# Patient Record
Sex: MALE | Race: WHITE | Employment: OTHER | URBAN - METROPOLITAN AREA
[De-identification: names, ages, dates, MRNs, and addresses within clinical notes are randomized per-mention and may not be internally consistent; named-entity substitution may affect disease eponyms.]

---

## 2017-03-18 PROBLEM — R19.7 DIARRHEA: Status: ACTIVE | Noted: 2017-03-18

## 2017-03-18 PROBLEM — R07.9 CHEST PAIN: Status: ACTIVE | Noted: 2017-03-18

## 2017-03-18 PROBLEM — R19.7 DIARRHEA: Status: RESOLVED | Noted: 2017-03-18 | Resolved: 2017-03-18

## 2018-03-27 ENCOUNTER — HOSPITAL ENCOUNTER (OUTPATIENT)
Dept: MRI IMAGING | Age: 78
Discharge: HOME | End: 2018-03-27
Attending: FAMILY MEDICINE | Admitting: FAMILY MEDICINE

## 2018-03-27 DIAGNOSIS — D35.2 BENIGN NEOPLASM OF PITUITARY GLAND (HCC): ICD-10-CM

## 2018-03-27 DIAGNOSIS — J85.1 ABSCESS OF LEFT LUNG WITH PNEUMONIA, UNSPECIFIED PART OF LUNG (HCC): ICD-10-CM

## 2018-03-27 DIAGNOSIS — J85.1 ABSCESS OF LUNG WITH PNEUMONIA (HCC): ICD-10-CM

## 2018-03-27 DIAGNOSIS — D35.3 BENIGN NEOPLASM OF PITUITARY GLAND AND CRANIOPHARYNGEAL DUCT (POUCH) (HCC): ICD-10-CM

## 2018-03-27 DIAGNOSIS — D35.2 BENIGN NEOPLASM OF PITUITARY GLAND AND CRANIOPHARYNGEAL DUCT (POUCH) (HCC): ICD-10-CM

## 2018-06-20 ENCOUNTER — HOSPITAL ENCOUNTER (OUTPATIENT)
Dept: ULTRASOUND IMAGING | Age: 78
Discharge: HOME | End: 2018-06-20
Attending: FAMILY MEDICINE | Admitting: FAMILY MEDICINE

## 2018-06-20 DIAGNOSIS — I67.9 CEREBROVASCULAR DISORDER, WITH DELIVERY, WITH MENTION OF POSTPARTUM COMPLICATION: ICD-10-CM

## 2018-06-20 DIAGNOSIS — M81.0 SENILE OSTEOPOROSIS: ICD-10-CM

## 2018-06-20 DIAGNOSIS — I67.9 CEREBROVASCULAR DISEASE: ICD-10-CM

## 2018-11-02 ENCOUNTER — APPOINTMENT (OUTPATIENT)
Dept: GENERAL RADIOLOGY | Age: 78
DRG: 072 | End: 2018-11-02
Payer: MEDICARE

## 2018-11-02 ENCOUNTER — APPOINTMENT (OUTPATIENT)
Dept: CT IMAGING | Age: 78
DRG: 072 | End: 2018-11-02
Payer: MEDICARE

## 2018-11-02 ENCOUNTER — HOSPITAL ENCOUNTER (INPATIENT)
Age: 78
LOS: 4 days | Discharge: HOME HEALTH | DRG: 072 | End: 2018-11-06
Attending: EMERGENCY MEDICINE | Admitting: HOSPITALIST
Payer: MEDICARE

## 2018-11-02 DIAGNOSIS — D72.829 LEUKOCYTOSIS, UNSPECIFIED TYPE: ICD-10-CM

## 2018-11-02 DIAGNOSIS — R41.82 ALTERED MENTAL STATUS, UNSPECIFIED ALTERED MENTAL STATUS TYPE: Primary | ICD-10-CM

## 2018-11-02 PROBLEM — G93.40 ACUTE ENCEPHALOPATHY: Status: ACTIVE | Noted: 2018-11-02

## 2018-11-02 LAB
ALBUMIN SERPL-MCNC: 4.1 GM/DL (ref 3.4–5)
ALP BLD-CCNC: 75 IU/L (ref 40–129)
ALT SERPL-CCNC: 24 U/L (ref 10–40)
AMMONIA: 26 UMOL/L (ref 16–60)
ANION GAP SERPL CALCULATED.3IONS-SCNC: 21 MMOL/L (ref 4–16)
AST SERPL-CCNC: 40 IU/L (ref 15–37)
BACTERIA: NEGATIVE /HPF
BANDED NEUTROPHILS ABSOLUTE COUNT: 2.82 K/CU MM
BANDED NEUTROPHILS RELATIVE PERCENT: 13 % (ref 5–11)
BILIRUB SERPL-MCNC: 1.3 MG/DL (ref 0–1)
BILIRUBIN URINE: NEGATIVE MG/DL
BLOOD, URINE: ABNORMAL
BUN BLDV-MCNC: 20 MG/DL (ref 6–23)
CALCIUM SERPL-MCNC: 9.9 MG/DL (ref 8.3–10.6)
CHLORIDE BLD-SCNC: 91 MMOL/L (ref 99–110)
CHP ED QC CHECK: YES
CLARITY: CLEAR
CO2: 18 MMOL/L (ref 21–32)
COLOR: YELLOW
CREAT SERPL-MCNC: 1.1 MG/DL (ref 0.9–1.3)
DIFFERENTIAL TYPE: ABNORMAL
GFR AFRICAN AMERICAN: >60 ML/MIN/1.73M2
GFR NON-AFRICAN AMERICAN: >60 ML/MIN/1.73M2
GLUCOSE BLD-MCNC: 191 MG/DL (ref 70–99)
GLUCOSE BLD-MCNC: 270 MG/DL (ref 70–99)
GLUCOSE BLD-MCNC: 301 MG/DL
GLUCOSE BLD-MCNC: 301 MG/DL (ref 70–99)
GLUCOSE BLD-MCNC: 320 MG/DL (ref 70–99)
GLUCOSE, URINE: 150 MG/DL
HCT VFR BLD CALC: 43.4 % (ref 42–52)
HEMOGLOBIN: 14.5 GM/DL (ref 13.5–18)
KETONES, URINE: ABNORMAL MG/DL
LEUKOCYTE ESTERASE, URINE: NEGATIVE
LIPASE: 21 IU/L (ref 13–60)
LYMPHOCYTES ABSOLUTE: 1.3 K/CU MM
LYMPHOCYTES RELATIVE PERCENT: 6 % (ref 24–44)
MCH RBC QN AUTO: 30.3 PG (ref 27–31)
MCHC RBC AUTO-ENTMCNC: 33.4 % (ref 32–36)
MCV RBC AUTO: 90.6 FL (ref 78–100)
MONOCYTES ABSOLUTE: 1.7 K/CU MM
MONOCYTES RELATIVE PERCENT: 8 % (ref 0–4)
MUCUS: ABNORMAL HPF
NITRITE URINE, QUANTITATIVE: NEGATIVE
OVALOCYTES: ABNORMAL
PDW BLD-RTO: 13.2 % (ref 11.7–14.9)
PH, URINE: 6 (ref 5–8)
PLATELET # BLD: 213 K/CU MM (ref 140–440)
PMV BLD AUTO: 9.6 FL (ref 7.5–11.1)
POLYCHROMASIA: ABNORMAL
POTASSIUM SERPL-SCNC: 3.5 MMOL/L (ref 3.5–5.1)
PROTEIN UA: >500 MG/DL
RBC # BLD: 4.79 M/CU MM (ref 4.6–6.2)
RBC URINE: 2 /HPF (ref 0–3)
SEGMENTED NEUTROPHILS ABSOLUTE COUNT: 15.9 K/CU MM
SEGMENTED NEUTROPHILS RELATIVE PERCENT: 73 % (ref 36–66)
SODIUM BLD-SCNC: 130 MMOL/L (ref 135–145)
SPECIFIC GRAVITY UA: 1.05 (ref 1–1.03)
SQUAMOUS EPITHELIAL: <1 /HPF
TOTAL PROTEIN: 7.9 GM/DL (ref 6.4–8.2)
TRICHOMONAS: ABNORMAL /HPF
TROPONIN T: 0.02 NG/ML
UROBILINOGEN, URINE: NORMAL MG/DL (ref 0.2–1)
WBC # BLD: 21.7 K/CU MM (ref 4–10.5)
WBC UA: 1 /HPF (ref 0–2)

## 2018-11-02 PROCEDURE — 6370000000 HC RX 637 (ALT 250 FOR IP): Performed by: NURSE PRACTITIONER

## 2018-11-02 PROCEDURE — 2140000000 HC CCU INTERMEDIATE R&B

## 2018-11-02 PROCEDURE — 6360000002 HC RX W HCPCS: Performed by: EMERGENCY MEDICINE

## 2018-11-02 PROCEDURE — 85007 BL SMEAR W/DIFF WBC COUNT: CPT

## 2018-11-02 PROCEDURE — 81001 URINALYSIS AUTO W/SCOPE: CPT

## 2018-11-02 PROCEDURE — 83690 ASSAY OF LIPASE: CPT

## 2018-11-02 PROCEDURE — 94761 N-INVAS EAR/PLS OXIMETRY MLT: CPT

## 2018-11-02 PROCEDURE — 2500000003 HC RX 250 WO HCPCS: Performed by: NURSE PRACTITIONER

## 2018-11-02 PROCEDURE — 2580000003 HC RX 258: Performed by: EMERGENCY MEDICINE

## 2018-11-02 PROCEDURE — 82140 ASSAY OF AMMONIA: CPT

## 2018-11-02 PROCEDURE — 84484 ASSAY OF TROPONIN QUANT: CPT

## 2018-11-02 PROCEDURE — 96374 THER/PROPH/DIAG INJ IV PUSH: CPT

## 2018-11-02 PROCEDURE — 71046 X-RAY EXAM CHEST 2 VIEWS: CPT

## 2018-11-02 PROCEDURE — 2580000003 HC RX 258: Performed by: NURSE PRACTITIONER

## 2018-11-02 PROCEDURE — S0028 INJECTION, FAMOTIDINE, 20 MG: HCPCS | Performed by: NURSE PRACTITIONER

## 2018-11-02 PROCEDURE — 70450 CT HEAD/BRAIN W/O DYE: CPT

## 2018-11-02 PROCEDURE — 80053 COMPREHEN METABOLIC PANEL: CPT

## 2018-11-02 PROCEDURE — 6360000002 HC RX W HCPCS: Performed by: NURSE PRACTITIONER

## 2018-11-02 PROCEDURE — 82962 GLUCOSE BLOOD TEST: CPT

## 2018-11-02 PROCEDURE — 6360000004 HC RX CONTRAST MEDICATION: Performed by: EMERGENCY MEDICINE

## 2018-11-02 PROCEDURE — 87040 BLOOD CULTURE FOR BACTERIA: CPT

## 2018-11-02 PROCEDURE — 74177 CT ABD & PELVIS W/CONTRAST: CPT

## 2018-11-02 PROCEDURE — 36415 COLL VENOUS BLD VENIPUNCTURE: CPT

## 2018-11-02 PROCEDURE — 93005 ELECTROCARDIOGRAM TRACING: CPT | Performed by: EMERGENCY MEDICINE

## 2018-11-02 PROCEDURE — 85027 COMPLETE CBC AUTOMATED: CPT

## 2018-11-02 PROCEDURE — 71250 CT THORAX DX C-: CPT

## 2018-11-02 PROCEDURE — 99285 EMERGENCY DEPT VISIT HI MDM: CPT

## 2018-11-02 PROCEDURE — 87086 URINE CULTURE/COLONY COUNT: CPT

## 2018-11-02 RX ORDER — DOCUSATE SODIUM 100 MG/1
100 CAPSULE, LIQUID FILLED ORAL 2 TIMES DAILY
Status: DISCONTINUED | OUTPATIENT
Start: 2018-11-02 | End: 2018-11-06 | Stop reason: HOSPADM

## 2018-11-02 RX ORDER — DEXTROSE MONOHYDRATE 25 G/50ML
12.5 INJECTION, SOLUTION INTRAVENOUS PRN
Status: DISCONTINUED | OUTPATIENT
Start: 2018-11-02 | End: 2018-11-06 | Stop reason: HOSPADM

## 2018-11-02 RX ORDER — LATANOPROST 50 UG/ML
1 SOLUTION/ DROPS OPHTHALMIC NIGHTLY
COMMUNITY

## 2018-11-02 RX ORDER — CIPROFLOXACIN 2 MG/ML
400 INJECTION, SOLUTION INTRAVENOUS EVERY 12 HOURS
Status: DISCONTINUED | OUTPATIENT
Start: 2018-11-02 | End: 2018-11-06 | Stop reason: HOSPADM

## 2018-11-02 RX ORDER — CILOSTAZOL 100 MG/1
100 TABLET ORAL 2 TIMES DAILY
Status: DISCONTINUED | OUTPATIENT
Start: 2018-11-02 | End: 2018-11-06 | Stop reason: HOSPADM

## 2018-11-02 RX ORDER — ONDANSETRON 2 MG/ML
4 INJECTION INTRAMUSCULAR; INTRAVENOUS EVERY 30 MIN PRN
Status: DISCONTINUED | OUTPATIENT
Start: 2018-11-02 | End: 2018-11-02 | Stop reason: ALTCHOICE

## 2018-11-02 RX ORDER — LISINOPRIL 20 MG/1
20 TABLET ORAL DAILY
Status: DISCONTINUED | OUTPATIENT
Start: 2018-11-02 | End: 2018-11-06 | Stop reason: HOSPADM

## 2018-11-02 RX ORDER — ASCORBIC ACID 1000 MG
1 TABLET ORAL DAILY
Status: ON HOLD | COMMUNITY
End: 2019-01-23

## 2018-11-02 RX ORDER — NICOTINE POLACRILEX 4 MG
15 LOZENGE BUCCAL PRN
Status: DISCONTINUED | OUTPATIENT
Start: 2018-11-02 | End: 2018-11-06 | Stop reason: HOSPADM

## 2018-11-02 RX ORDER — SODIUM CHLORIDE 0.9 % (FLUSH) 0.9 %
10 SYRINGE (ML) INJECTION EVERY 12 HOURS SCHEDULED
Status: DISCONTINUED | OUTPATIENT
Start: 2018-11-02 | End: 2018-11-06 | Stop reason: HOSPADM

## 2018-11-02 RX ORDER — ATORVASTATIN CALCIUM 40 MG/1
40 TABLET, FILM COATED ORAL DAILY
COMMUNITY

## 2018-11-02 RX ORDER — ASPIRIN 325 MG
325 TABLET ORAL DAILY
Status: DISCONTINUED | OUTPATIENT
Start: 2018-11-03 | End: 2018-11-06 | Stop reason: HOSPADM

## 2018-11-02 RX ORDER — ONDANSETRON 2 MG/ML
4 INJECTION INTRAMUSCULAR; INTRAVENOUS EVERY 6 HOURS PRN
Status: DISCONTINUED | OUTPATIENT
Start: 2018-11-02 | End: 2018-11-06 | Stop reason: HOSPADM

## 2018-11-02 RX ORDER — CARVEDILOL 6.25 MG/1
6.25 TABLET ORAL 2 TIMES DAILY WITH MEALS
Status: DISCONTINUED | OUTPATIENT
Start: 2018-11-02 | End: 2018-11-05

## 2018-11-02 RX ORDER — SODIUM CHLORIDE 9 MG/ML
INJECTION, SOLUTION INTRAVENOUS CONTINUOUS
Status: DISCONTINUED | OUTPATIENT
Start: 2018-11-02 | End: 2018-11-02 | Stop reason: ALTCHOICE

## 2018-11-02 RX ORDER — DOCUSATE SODIUM 100 MG/1
100 CAPSULE, LIQUID FILLED ORAL NIGHTLY PRN
COMMUNITY

## 2018-11-02 RX ORDER — SODIUM CHLORIDE 9 MG/ML
INJECTION, SOLUTION INTRAVENOUS CONTINUOUS
Status: DISCONTINUED | OUTPATIENT
Start: 2018-11-02 | End: 2018-11-05

## 2018-11-02 RX ORDER — 0.9 % SODIUM CHLORIDE 0.9 %
10 VIAL (ML) INJECTION PRN
Status: DISCONTINUED | OUTPATIENT
Start: 2018-11-02 | End: 2018-11-06 | Stop reason: HOSPADM

## 2018-11-02 RX ORDER — DILTIAZEM HYDROCHLORIDE 60 MG/1
60 TABLET, FILM COATED ORAL 2 TIMES DAILY
Status: DISCONTINUED | OUTPATIENT
Start: 2018-11-02 | End: 2018-11-05

## 2018-11-02 RX ORDER — SODIUM CHLORIDE 0.9 % (FLUSH) 0.9 %
10 SYRINGE (ML) INJECTION PRN
Status: DISCONTINUED | OUTPATIENT
Start: 2018-11-02 | End: 2018-11-06 | Stop reason: HOSPADM

## 2018-11-02 RX ORDER — DEXTROSE MONOHYDRATE 50 MG/ML
100 INJECTION, SOLUTION INTRAVENOUS PRN
Status: DISCONTINUED | OUTPATIENT
Start: 2018-11-02 | End: 2018-11-06 | Stop reason: HOSPADM

## 2018-11-02 RX ORDER — DOCUSATE SODIUM 100 MG/1
100 CAPSULE, LIQUID FILLED ORAL NIGHTLY
Status: DISCONTINUED | OUTPATIENT
Start: 2018-11-02 | End: 2018-11-02 | Stop reason: SDUPTHER

## 2018-11-02 RX ADMIN — SODIUM CHLORIDE: 9 INJECTION, SOLUTION INTRAVENOUS at 12:11

## 2018-11-02 RX ADMIN — CIPROFLOXACIN 400 MG: 2 INJECTION, SOLUTION INTRAVENOUS at 17:50

## 2018-11-02 RX ADMIN — CARVEDILOL 6.25 MG: 6.25 TABLET, FILM COATED ORAL at 17:46

## 2018-11-02 RX ADMIN — SODIUM CHLORIDE, PRESERVATIVE FREE 10 ML: 5 INJECTION INTRAVENOUS at 12:34

## 2018-11-02 RX ADMIN — DOCUSATE SODIUM 100 MG: 100 CAPSULE, LIQUID FILLED ORAL at 22:12

## 2018-11-02 RX ADMIN — SODIUM CHLORIDE: 900 INJECTION INTRAVENOUS at 18:56

## 2018-11-02 RX ADMIN — LISINOPRIL 20 MG: 20 TABLET ORAL at 18:05

## 2018-11-02 RX ADMIN — INSULIN HUMAN 7 UNITS: 100 INJECTION, SUSPENSION SUBCUTANEOUS at 22:16

## 2018-11-02 RX ADMIN — INSULIN LISPRO 1 UNITS: 100 INJECTION, SOLUTION INTRAVENOUS; SUBCUTANEOUS at 22:16

## 2018-11-02 RX ADMIN — INSULIN LISPRO 6 UNITS: 100 INJECTION, SOLUTION INTRAVENOUS; SUBCUTANEOUS at 17:44

## 2018-11-02 RX ADMIN — CILOSTAZOL 100 MG: 100 TABLET ORAL at 22:12

## 2018-11-02 RX ADMIN — SODIUM CHLORIDE, PRESERVATIVE FREE 10 ML: 5 INJECTION INTRAVENOUS at 22:13

## 2018-11-02 RX ADMIN — ENOXAPARIN SODIUM 40 MG: 40 INJECTION SUBCUTANEOUS at 17:49

## 2018-11-02 RX ADMIN — ONDANSETRON HYDROCHLORIDE 4 MG: 2 INJECTION, SOLUTION INTRAMUSCULAR; INTRAVENOUS at 12:11

## 2018-11-02 RX ADMIN — DILTIAZEM HYDROCHLORIDE 60 MG: 60 TABLET, FILM COATED ORAL at 22:12

## 2018-11-02 RX ADMIN — FAMOTIDINE 20 MG: 10 INJECTION, SOLUTION INTRAVENOUS at 22:12

## 2018-11-02 RX ADMIN — IOPAMIDOL 80 ML: 755 INJECTION, SOLUTION INTRAVENOUS at 12:34

## 2018-11-02 RX ADMIN — METRONIDAZOLE 500 MG: 500 INJECTION, SOLUTION INTRAVENOUS at 17:50

## 2018-11-02 ASSESSMENT — PAIN SCALES - GENERAL
PAINLEVEL_OUTOF10: 0
PAINLEVEL_OUTOF10: 5
PAINLEVEL_OUTOF10: 3
PAINLEVEL_OUTOF10: 0

## 2018-11-02 ASSESSMENT — PAIN DESCRIPTION - LOCATION: LOCATION: HEAD

## 2018-11-02 ASSESSMENT — PAIN DESCRIPTION - PAIN TYPE: TYPE: ACUTE PAIN

## 2018-11-02 NOTE — ED NOTES
[place],lactic acid,venousph,ammonai,type & screen, and first set of blood cultures were drawn on patient     Betina Alemanem  11/02/18 1135

## 2018-11-02 NOTE — H&P
Hyperglycemia; and Headache    She Siu is a 77 y.o. male who presents with acute confusion and elevated blood sugars at home. The patient is alert but poor historian. Wife at bedside provides most HPI information. Reports acutely ill for 2 days with poor po intake d/t continued nausea and vomiting. Today presents d/t increased confusion. She describes the patient being b/l BKA and wears prosthetics. He could not figure out how to put on prosthetics without assistance. States he wandered around the house today. She states he also complained of headache and epigastric pain. Incontinent of urine and noted dark in color. He has known  large suprasellar mass unchanged per CT. Extensive work up at [facility] 2017. Outside records reviewed. Ten point ROS: reviewed negative, unless as noted in above HPI. Objective:   No intake or output data in the 24 hours ending 11/02/18 1552     Vitals:   Vitals:    11/02/18 1331 11/02/18 1432 11/02/18 1514 11/02/18 1531   BP: (!) 179/108 (!) 170/94 (!) 168/130 (!) 170/90   Pulse: 101 99 100 97   Resp: 25 13 16 15   Temp:       TempSrc:       SpO2: 96% 96% 98% 97%   Weight:       Height:           Physical Exam: 11/02/18     GEN -Awake chronically ill appearing male, sitting upright in bed , NAD. normal body habitus. Appears given age. EYES -Pupils are equally round. No scleral erythema, discharge, or conjunctivitis. HENT -MM are moist. Oral pharynx without exudates, no evidence of thrush. NECK -Supple, no apparent thyromegaly or masses. RESP -CTA, no wheezes, rales or rhonchi. Symmetric chest movement while on RA.  C/V -S1/S2 auscultated. RRR without appreciable M/R/G. No JVD or carotid bruits. Peripheral pulses equal bilaterally and palpable. No peripheral edema. GI -Abdomen is soft non distended and without significant tenderness. No masses or guarding. + BS Rectal exam deferred.  -No CVA tenderness. Renee catheter is not present.   LYMPH-No palpable cervical Hallucinations, confusion    Celebrex [Celecoxib] Other (See Comments)     'causes him to be nervous and jittery\"    Diphenhydramine Hcl Other (See Comments)     nervous and jittery    Diphenhydramine Hcl [Diphenhydramine] Anxiety    Exenatide Other (See Comments)     Causes him to be nervous and jittery    Metoprolol Succinate Other (See Comments)     Hallucinations     Phenergan [Promethazine Hcl] Other (See Comments)     Hallucinations, nervous, jittery    Plavix [Clopidogrel Bisulfate] Other (See Comments)     Causes bleeding in his stomach    Pseudoephedrine Anxiety    Reglan [Metoclopramide Hcl] Other (See Comments)     \"Caused him to just sit in a chair and rock back and forth\"       Medications:   Medications:    Infusions:    sodium chloride 150 mL/hr at 11/02/18 1211     PRN Meds:   ondansetron 4 mg Q30 Min PRN   sodium chloride (PF) 10 mL PRN       Data:     Laboratory this visit:  Reviewed  Recent Labs      11/02/18   1128   WBC  21.7*   HGB  14.5   HCT  43.4   PLT  213      Recent Labs      11/02/18   1128   NA  130*   K  3.5   CL  91*   CO2  18*   BUN  20   CREATININE  1.1     Recent Labs      11/02/18   1128   AST  40*   ALT  24   BILITOT  1.3*   ALKPHOS  75       Radiology this visit:  Reviewed. Xr Chest Standard (2 Vw)    Result Date: 11/2/2018  EXAMINATION: TWO VIEWS OF THE CHEST 11/2/2018 12:21 pm COMPARISON: CT chest 03/27/2018, x-ray chest portable 05/24/2017 HISTORY: ORDERING SYSTEM PROVIDED HISTORY: altered mental status TECHNOLOGIST PROVIDED HISTORY: Reason for exam:->altered mental status Ordering Physician Provided Reason for Exam: altered mental status Acuity: Acute Type of Exam: Initial Additional signs and symptoms: Pt's wife present at bedside and states pt has been vomiting for 2 days and has been c/o headache. Wife denies pt falling. FINDINGS: Postsurgical changes from previous CABG. Normal heart size and pulmonary vasculature. Aortic calcifications.   No focal consolidations, pleural effusions, or pneumothorax. No evidence of acute process in the chest.     Ct Head Wo Contrast    Result Date: 11/2/2018  EXAMINATION: CT OF THE HEAD WITHOUT CONTRAST  11/2/2018 12:32 pm TECHNIQUE: CT of the head was performed without the administration of intravenous contrast. Dose modulation, iterative reconstruction, and/or weight based adjustment of the mA/kV was utilized to reduce the radiation dose to as low as reasonably achievable. COMPARISON: CT 05/24/2017 HISTORY: ORDERING SYSTEM PROVIDED HISTORY: CONFUSION/DELIRIUM, ALTERED LOC, UNEXPLAINED TECHNOLOGIST PROVIDED HISTORY: Has a \"code stroke\" or \"stroke alert\" been called? ->No Ordering Physician Provided Reason for Exam: CONFUSION/DELIRIUM Acuity: Acute Type of Exam: Initial FINDINGS: BRAIN/VENTRICLES: There is no acute intracranial hemorrhage, mass effect or midline shift. No abnormal extra-axial fluid collection. The gray-white differentiation is maintained without evidence of an acute infarct. There is no evidence of hydrocephalus. Sellar/suprasellar mass is unchanged at 2.7 x 2.3 cm on series 2, image 27. Adjacent to the left trang there is a 1.7 x 1.2 cm lesion on series 2, image 24, not significant changed. Mild atrophic changes. Mild periventricular white matter hypoattenuation, likely small vessel ischemic disease. ORBITS: The visualized portion of the orbits demonstrate no acute abnormality. SINUSES: The visualized paranasal sinuses and mastoid air cells demonstrate no acute abnormality. SOFT TISSUES/SKULL:  No acute abnormality of the visualized skull or soft tissues. No acute intracranial abnormality. No significant change in the sellar/suprasellar mass with extension into the left prepontine cistern.      Ct Chest Wo Contrast    Result Date: 11/2/2018  EXAMINATION: CT OF THE CHEST WITHOUT CONTRAST 11/2/2018 3:10 pm TECHNIQUE: CT of the chest was performed without the administration of intravenous contrast. may relate to an incompetent sphincter. 3. Stable small hiatal hernia.        EKG this visit:  Reviewed             Electronically signed by MOLLY Leslie CNP on 11/2/2018 at 3:52 PM

## 2018-11-02 NOTE — ED NOTES
Urinal provided to pt per his request. Wife at bedside. Call light in reach.       Alo Rader RN  11/02/18 3124

## 2018-11-03 LAB
ALBUMIN SERPL-MCNC: 4.2 GM/DL (ref 3.4–5)
ALP BLD-CCNC: 65 IU/L (ref 40–128)
ALT SERPL-CCNC: 26 U/L (ref 10–40)
ANION GAP SERPL CALCULATED.3IONS-SCNC: 17 MMOL/L (ref 4–16)
AST SERPL-CCNC: 58 IU/L (ref 15–37)
BASOPHILS ABSOLUTE: 0 K/CU MM
BASOPHILS RELATIVE PERCENT: 0.1 % (ref 0–1)
BILIRUB SERPL-MCNC: 0.9 MG/DL (ref 0–1)
BUN BLDV-MCNC: 24 MG/DL (ref 6–23)
CALCIUM SERPL-MCNC: 9.3 MG/DL (ref 8.3–10.6)
CHLORIDE BLD-SCNC: 100 MMOL/L (ref 99–110)
CO2: 21 MMOL/L (ref 21–32)
CREAT SERPL-MCNC: 1.1 MG/DL (ref 0.9–1.3)
DIFFERENTIAL TYPE: ABNORMAL
EKG ATRIAL RATE: 97 BPM
EKG DIAGNOSIS: NORMAL
EKG P AXIS: 60 DEGREES
EKG P-R INTERVAL: 188 MS
EKG Q-T INTERVAL: 382 MS
EKG QRS DURATION: 104 MS
EKG QTC CALCULATION (BAZETT): 485 MS
EKG R AXIS: -37 DEGREES
EKG T AXIS: 100 DEGREES
EKG VENTRICULAR RATE: 97 BPM
EOSINOPHILS ABSOLUTE: 0 K/CU MM
EOSINOPHILS RELATIVE PERCENT: 0 % (ref 0–3)
GFR AFRICAN AMERICAN: >60 ML/MIN/1.73M2
GFR NON-AFRICAN AMERICAN: >60 ML/MIN/1.73M2
GLUCOSE BLD-MCNC: 161 MG/DL (ref 70–99)
GLUCOSE BLD-MCNC: 189 MG/DL (ref 70–99)
GLUCOSE BLD-MCNC: 213 MG/DL (ref 70–99)
GLUCOSE BLD-MCNC: 221 MG/DL (ref 70–99)
GLUCOSE BLD-MCNC: 227 MG/DL (ref 70–99)
GLUCOSE BLD-MCNC: 259 MG/DL (ref 70–99)
HCT VFR BLD CALC: 41.3 % (ref 42–52)
HEMOGLOBIN: 13.6 GM/DL (ref 13.5–18)
IMMATURE NEUTROPHIL %: 0.4 % (ref 0–0.43)
LACTATE: 2.1 MMOL/L (ref 0.4–2)
LACTATE: 2.4 MMOL/L (ref 0.4–2)
LYMPHOCYTES ABSOLUTE: 1.8 K/CU MM
LYMPHOCYTES RELATIVE PERCENT: 11.5 % (ref 24–44)
MAGNESIUM: 2.2 MG/DL (ref 1.8–2.4)
MCH RBC QN AUTO: 29.7 PG (ref 27–31)
MCHC RBC AUTO-ENTMCNC: 32.9 % (ref 32–36)
MCV RBC AUTO: 90.2 FL (ref 78–100)
MONOCYTES ABSOLUTE: 1.2 K/CU MM
MONOCYTES RELATIVE PERCENT: 7.6 % (ref 0–4)
NUCLEATED RBC %: 0 %
PDW BLD-RTO: 13.2 % (ref 11.7–14.9)
PLATELET # BLD: 207 K/CU MM (ref 140–440)
PMV BLD AUTO: 9.8 FL (ref 7.5–11.1)
POTASSIUM SERPL-SCNC: 3.8 MMOL/L (ref 3.5–5.1)
RBC # BLD: 4.58 M/CU MM (ref 4.6–6.2)
SEGMENTED NEUTROPHILS ABSOLUTE COUNT: 12.6 K/CU MM
SEGMENTED NEUTROPHILS RELATIVE PERCENT: 80.4 % (ref 36–66)
SODIUM BLD-SCNC: 138 MMOL/L (ref 135–145)
TOTAL IMMATURE NEUTOROPHIL: 0.07 K/CU MM
TOTAL NUCLEATED RBC: 0 K/CU MM
TOTAL PROTEIN: 6.8 GM/DL (ref 6.4–8.2)
WBC # BLD: 15.7 K/CU MM (ref 4–10.5)

## 2018-11-03 PROCEDURE — 80053 COMPREHEN METABOLIC PANEL: CPT

## 2018-11-03 PROCEDURE — 83735 ASSAY OF MAGNESIUM: CPT

## 2018-11-03 PROCEDURE — 2580000003 HC RX 258: Performed by: NURSE PRACTITIONER

## 2018-11-03 PROCEDURE — S0028 INJECTION, FAMOTIDINE, 20 MG: HCPCS | Performed by: NURSE PRACTITIONER

## 2018-11-03 PROCEDURE — 82962 GLUCOSE BLOOD TEST: CPT

## 2018-11-03 PROCEDURE — 6360000002 HC RX W HCPCS: Performed by: HOSPITALIST

## 2018-11-03 PROCEDURE — 2140000000 HC CCU INTERMEDIATE R&B

## 2018-11-03 PROCEDURE — 36415 COLL VENOUS BLD VENIPUNCTURE: CPT

## 2018-11-03 PROCEDURE — 6370000000 HC RX 637 (ALT 250 FOR IP): Performed by: INTERNAL MEDICINE

## 2018-11-03 PROCEDURE — 2500000003 HC RX 250 WO HCPCS: Performed by: NURSE PRACTITIONER

## 2018-11-03 PROCEDURE — 6360000002 HC RX W HCPCS: Performed by: NURSE PRACTITIONER

## 2018-11-03 PROCEDURE — 6370000000 HC RX 637 (ALT 250 FOR IP): Performed by: NURSE PRACTITIONER

## 2018-11-03 PROCEDURE — 93010 ELECTROCARDIOGRAM REPORT: CPT | Performed by: INTERNAL MEDICINE

## 2018-11-03 PROCEDURE — 83605 ASSAY OF LACTIC ACID: CPT

## 2018-11-03 PROCEDURE — 85025 COMPLETE CBC W/AUTO DIFF WBC: CPT

## 2018-11-03 RX ORDER — ACETAMINOPHEN 325 MG/1
650 TABLET ORAL EVERY 4 HOURS PRN
Status: DISCONTINUED | OUTPATIENT
Start: 2018-11-03 | End: 2018-11-06 | Stop reason: HOSPADM

## 2018-11-03 RX ORDER — HALOPERIDOL 5 MG/ML
2 INJECTION INTRAMUSCULAR EVERY 6 HOURS PRN
Status: DISCONTINUED | OUTPATIENT
Start: 2018-11-03 | End: 2018-11-06 | Stop reason: HOSPADM

## 2018-11-03 RX ORDER — HALOPERIDOL 5 MG/ML
2 INJECTION INTRAMUSCULAR ONCE
Status: COMPLETED | OUTPATIENT
Start: 2018-11-03 | End: 2018-11-03

## 2018-11-03 RX ADMIN — CILOSTAZOL 100 MG: 100 TABLET ORAL at 21:56

## 2018-11-03 RX ADMIN — FAMOTIDINE 20 MG: 10 INJECTION, SOLUTION INTRAVENOUS at 21:57

## 2018-11-03 RX ADMIN — LISINOPRIL 20 MG: 20 TABLET ORAL at 09:03

## 2018-11-03 RX ADMIN — CIPROFLOXACIN 400 MG: 2 INJECTION, SOLUTION INTRAVENOUS at 05:47

## 2018-11-03 RX ADMIN — INSULIN HUMAN 7 UNITS: 100 INJECTION, SUSPENSION SUBCUTANEOUS at 09:24

## 2018-11-03 RX ADMIN — CIPROFLOXACIN 400 MG: 2 INJECTION, SOLUTION INTRAVENOUS at 17:02

## 2018-11-03 RX ADMIN — HALOPERIDOL LACTATE 2 MG: 5 INJECTION, SOLUTION INTRAMUSCULAR at 22:03

## 2018-11-03 RX ADMIN — SODIUM CHLORIDE, PRESERVATIVE FREE 10 ML: 5 INJECTION INTRAVENOUS at 21:56

## 2018-11-03 RX ADMIN — DOCUSATE SODIUM 100 MG: 100 CAPSULE, LIQUID FILLED ORAL at 09:03

## 2018-11-03 RX ADMIN — ENOXAPARIN SODIUM 40 MG: 40 INJECTION SUBCUTANEOUS at 09:01

## 2018-11-03 RX ADMIN — MAGNESIUM HYDROXIDE 30 ML: 400 SUSPENSION ORAL at 15:09

## 2018-11-03 RX ADMIN — INSULIN LISPRO 2 UNITS: 100 INJECTION, SOLUTION INTRAVENOUS; SUBCUTANEOUS at 22:05

## 2018-11-03 RX ADMIN — INSULIN HUMAN 7 UNITS: 100 INJECTION, SUSPENSION SUBCUTANEOUS at 22:08

## 2018-11-03 RX ADMIN — METRONIDAZOLE 500 MG: 500 INJECTION, SOLUTION INTRAVENOUS at 02:37

## 2018-11-03 RX ADMIN — CILOSTAZOL 100 MG: 100 TABLET ORAL at 09:02

## 2018-11-03 RX ADMIN — INSULIN LISPRO 2 UNITS: 100 INJECTION, SOLUTION INTRAVENOUS; SUBCUTANEOUS at 12:53

## 2018-11-03 RX ADMIN — SODIUM CHLORIDE: 900 INJECTION INTRAVENOUS at 18:44

## 2018-11-03 RX ADMIN — FAMOTIDINE 20 MG: 10 INJECTION, SOLUTION INTRAVENOUS at 09:23

## 2018-11-03 RX ADMIN — METRONIDAZOLE 500 MG: 500 INJECTION, SOLUTION INTRAVENOUS at 12:28

## 2018-11-03 RX ADMIN — ACETAMINOPHEN 650 MG: 325 TABLET ORAL at 18:43

## 2018-11-03 RX ADMIN — METRONIDAZOLE 500 MG: 500 INJECTION, SOLUTION INTRAVENOUS at 19:27

## 2018-11-03 RX ADMIN — ACETAMINOPHEN 650 MG: 325 TABLET ORAL at 01:16

## 2018-11-03 RX ADMIN — DOCUSATE SODIUM 100 MG: 100 CAPSULE, LIQUID FILLED ORAL at 21:57

## 2018-11-03 RX ADMIN — ASPIRIN 325 MG ORAL TABLET 325 MG: 325 PILL ORAL at 09:03

## 2018-11-03 RX ADMIN — CARVEDILOL 6.25 MG: 6.25 TABLET, FILM COATED ORAL at 17:02

## 2018-11-03 RX ADMIN — INSULIN LISPRO 6 UNITS: 100 INJECTION, SOLUTION INTRAVENOUS; SUBCUTANEOUS at 09:24

## 2018-11-03 RX ADMIN — DILTIAZEM HYDROCHLORIDE 60 MG: 60 TABLET, FILM COATED ORAL at 21:56

## 2018-11-03 RX ADMIN — HALOPERIDOL LACTATE 2 MG: 5 INJECTION, SOLUTION INTRAMUSCULAR at 05:38

## 2018-11-03 RX ADMIN — DILTIAZEM HYDROCHLORIDE 60 MG: 60 TABLET, FILM COATED ORAL at 09:03

## 2018-11-03 RX ADMIN — INSULIN LISPRO 2 UNITS: 100 INJECTION, SOLUTION INTRAVENOUS; SUBCUTANEOUS at 17:31

## 2018-11-03 RX ADMIN — CARVEDILOL 6.25 MG: 6.25 TABLET, FILM COATED ORAL at 09:02

## 2018-11-03 ASSESSMENT — PAIN SCALES - GENERAL
PAINLEVEL_OUTOF10: 0
PAINLEVEL_OUTOF10: 0
PAINLEVEL_OUTOF10: 8
PAINLEVEL_OUTOF10: 0
PAINLEVEL_OUTOF10: 0
PAINLEVEL_OUTOF10: 10
PAINLEVEL_OUTOF10: 4

## 2018-11-03 ASSESSMENT — PAIN DESCRIPTION - ORIENTATION
ORIENTATION: RIGHT;LEFT
ORIENTATION: RIGHT

## 2018-11-03 ASSESSMENT — PAIN DESCRIPTION - PAIN TYPE
TYPE: ACUTE PAIN
TYPE: ACUTE PAIN

## 2018-11-03 ASSESSMENT — PAIN DESCRIPTION - LOCATION
LOCATION: FOOT
LOCATION: HEAD

## 2018-11-03 ASSESSMENT — PAIN DESCRIPTION - DESCRIPTORS: DESCRIPTORS: PATIENT UNABLE TO DESCRIBE

## 2018-11-03 NOTE — PROGRESS NOTES
Nutrition Assessment    Type and Reason for Visit: Initial, Positive Nutrition Screen (weight loss, poor intake )    Nutrition Recommendations: Continue current carb controlled diet per order    Will offer glucerna oral nutrition supplement at this time    Assist with meals    Malnutrition Assessment:  · Malnutrition Status: Insufficient data  · Context: Acute illness or injury    Nutrition Diagnosis:   · Problem: Predicted suboptimal energy intake  · Etiology: related to Cognitive or neurological impairment     Signs and symptoms:  as evidenced by Diet history of poor intake    Nutrition Assessment:  · Subjective Assessment: Admit due to altered mental status. Sitter present in room on visit. Currently on carb controlled diet, sitter reporting patient eating meals today. · Nutrition-Focused Physical Findings: nutrition bre 2 probably inadequate, restless in bed on visit  · Wound Type: None  · Current Nutrition Therapies:  · Oral Diet Orders: Carb Control 4 Carbs/Meal   · Oral Diet intake: Unable to assess, Non-select  · Oral Nutrition Supplement (ONS) Orders: None  · ONS intake:    · Anthropometric Measures:  · Ht: 5' 8\" (172.7 cm)   · Current Body Wt: 146 lb (66.2 kg)  · % Weight Change: limited weight hx at this time,     · Ideal Body Wt: 154 lb (69.9 kg), % Ideal Body 107  · Adjusted Body Wt: 136 lb (61.7 kg), body weight adjusted for Bilateral, BKA  · BMI Classification: BMI 18.5 - 24.9 Normal Weight  · Comparative Standards (Estimated Nutrition Needs):  · Estimated Daily Total Kcal: 7650-8272  · Estimated Daily Protein (g): 66-79    Estimated Intake vs Estimated Needs: Intake Improving    Nutrition Risk Level:  Moderate    Nutrition Interventions:   Continue current diet, Start ONS  Continued Inpatient Monitoring, Education not appropriate at this time, Coordination of Care, Feeding Assistance/Environment Change    Nutrition Evaluation:   · Evaluation: Goals set   · Goals: Patient will consume at least 70% of meals during stay     · Monitoring: Meal Intake, Diet Tolerance, Pertinent Labs, Weight, Patient/Family Education, Nausea or Vomiting, Mental Status/Confusion    See Adult Nutrition Doc Flowsheet for more detail.      Electronically signed by Raghav Miller RD, PASCALE on 11/3/18 at 12:04 PM    Contact Number: [phone]

## 2018-11-03 NOTE — PROGRESS NOTES
Hospitalist Progress Note      Name:  Kim Deal /Age/Sex: 1940  (77 y.o. male)   MRN & CSN:  [id number] & [id number] Admission Date/Time: 2018 11:19 AM   Location:  [facility] PCP: Rigo Kaiser MD       Kim Deal is a 77 y.o.  male  who presents with Altered Mental Status; Hyperglycemia; and Headache      Assessment and Plan:   1. Acute metabolic encephalopathy  Wbc count 21.7, CT abdomen showed no acute disease, CT chest showed nad  UA clear, CXR NAD, lactic was 2.7, recheck lactic in am tomm. Wbc count improved overnight with abx, but still has some confusion, pt agitated from time to time  Patient was given Haldol overnight and is restless this morning. Concern for acute dystonic reaction from extrapyramidal side effects of Haldol  Unable to give Benadryl second to patient allergy , cont to monitor patient  Check Brain MRI to rule out cva when more medically stable and calm. 2. Elevated troponin in setting of h/o cad  CAD s/p PTCA. On tele. trops negative trend. Likely represent demand  On asa, BB. Statin and nitro. 3. HTN  Stable on home meds    4. DM  a1c is 7.1  cnt ssi here. Hold oral meds for now    5. HLD  Cont statin    dispo  Cont abx, give him 24-48 hours to recover from encephalopathy      Diet DIET CARB CONTROL;   Dietary Nutrition Supplements: Diabetic Oral Supplement   DVT Prophylaxis [x] Lovenox, []  Heparin, [] SCDs, [] Ambulation   GI Prophylaxis [x] PPI,  [] H2 Blocker,  [] Carafate,  [] Diet/Tube Feeds   Code Status Full Code     Medications:   Medications:    sodium chloride flush  10 mL Intravenous 2 times per day    docusate sodium  100 mg Oral BID    enoxaparin  40 mg Subcutaneous Daily    famotidine (PEPCID) injection  20 mg Intravenous BID    insulin lispro  0-12 Units Subcutaneous TID WC    insulin lispro  0-6 Units Subcutaneous Nightly    carvedilol  6.25 mg Oral BID WC    cilostazol  100 mg Oral BID    diltiazem  60 mg Oral BID   

## 2018-11-03 NOTE — PLAN OF CARE
Problem: Falls - Risk of:  Goal: Will remain free from falls  Will remain free from falls   Outcome: Ongoing    Goal: Absence of physical injury  Absence of physical injury   Outcome: Ongoing      Problem: Pain:  Goal: Pain level will decrease  Pain level will decrease   Outcome: Ongoing    Goal: Control of acute pain  Control of acute pain   Outcome: Ongoing    Goal: Control of chronic pain  Control of chronic pain   Outcome: Ongoing      Problem: Risk for Impaired Skin Integrity  Goal: Tissue integrity - skin and mucous membranes  Structural intactness and normal physiological function of skin and  mucous membranes.    Outcome: Ongoing      Problem: Restraint Use - Nonviolent/Non-Self-Destructive Behavior:  Goal: Absence of restraint indications  Absence of restraint indications   Outcome: Ongoing    Goal: Absence of restraint-related injury  Absence of restraint-related injury   Outcome: Ongoing

## 2018-11-03 NOTE — PLAN OF CARE
Problem: Falls - Risk of:  Goal: Will remain free from falls  Will remain free from falls   Outcome: Ongoing      Problem: Pain:  Goal: Pain level will decrease  Pain level will decrease   Outcome: Ongoing    Goal: Control of acute pain  Control of acute pain   Outcome: Ongoing    Goal: Control of chronic pain  Control of chronic pain   Outcome: Ongoing      Problem: Risk for Impaired Skin Integrity  Goal: Tissue integrity - skin and mucous membranes  Structural intactness and normal physiological function of skin and  mucous membranes.    Outcome: Ongoing      Problem: Restraint Use - Nonviolent/Non-Self-Destructive Behavior:  Goal: Absence of restraint indications  Absence of restraint indications   Outcome: Ongoing    Goal: Absence of restraint-related injury  Absence of restraint-related injury   Outcome: Ongoing      Problem: Nutrition  Goal: Optimal nutrition therapy  Outcome: Ongoing

## 2018-11-04 LAB
BASOPHILS ABSOLUTE: 0 K/CU MM
BASOPHILS RELATIVE PERCENT: 0.1 % (ref 0–1)
CULTURE: NORMAL
DIFFERENTIAL TYPE: ABNORMAL
EOSINOPHILS ABSOLUTE: 0.1 K/CU MM
EOSINOPHILS RELATIVE PERCENT: 0.3 % (ref 0–3)
GLUCOSE BLD-MCNC: 169 MG/DL (ref 70–99)
GLUCOSE BLD-MCNC: 182 MG/DL (ref 70–99)
GLUCOSE BLD-MCNC: 190 MG/DL (ref 70–99)
GLUCOSE BLD-MCNC: 253 MG/DL (ref 70–99)
HCT VFR BLD CALC: 38.3 % (ref 42–52)
HEMOGLOBIN: 12.6 GM/DL (ref 13.5–18)
IMMATURE NEUTROPHIL %: 0.6 % (ref 0–0.43)
LACTATE: 1.7 MMOL/L (ref 0.4–2)
LYMPHOCYTES ABSOLUTE: 2.4 K/CU MM
LYMPHOCYTES RELATIVE PERCENT: 13.8 % (ref 24–44)
Lab: NORMAL
MCH RBC QN AUTO: 29.5 PG (ref 27–31)
MCHC RBC AUTO-ENTMCNC: 32.9 % (ref 32–36)
MCV RBC AUTO: 89.7 FL (ref 78–100)
MONOCYTES ABSOLUTE: 1.5 K/CU MM
MONOCYTES RELATIVE PERCENT: 8.6 % (ref 0–4)
NUCLEATED RBC %: 0 %
PDW BLD-RTO: 13.2 % (ref 11.7–14.9)
PLATELET # BLD: 216 K/CU MM (ref 140–440)
PMV BLD AUTO: 9.9 FL (ref 7.5–11.1)
RBC # BLD: 4.27 M/CU MM (ref 4.6–6.2)
REPORT STATUS: NORMAL
SEGMENTED NEUTROPHILS ABSOLUTE COUNT: 13.2 K/CU MM
SEGMENTED NEUTROPHILS RELATIVE PERCENT: 76.6 % (ref 36–66)
SPECIMEN: NORMAL
TOTAL IMMATURE NEUTOROPHIL: 0.1 K/CU MM
TOTAL NUCLEATED RBC: 0 K/CU MM
WBC # BLD: 17.2 K/CU MM (ref 4–10.5)

## 2018-11-04 PROCEDURE — 6370000000 HC RX 637 (ALT 250 FOR IP): Performed by: NURSE PRACTITIONER

## 2018-11-04 PROCEDURE — 36415 COLL VENOUS BLD VENIPUNCTURE: CPT

## 2018-11-04 PROCEDURE — 2500000003 HC RX 250 WO HCPCS: Performed by: NURSE PRACTITIONER

## 2018-11-04 PROCEDURE — 83605 ASSAY OF LACTIC ACID: CPT

## 2018-11-04 PROCEDURE — 2580000003 HC RX 258: Performed by: NURSE PRACTITIONER

## 2018-11-04 PROCEDURE — 82962 GLUCOSE BLOOD TEST: CPT

## 2018-11-04 PROCEDURE — 85025 COMPLETE CBC W/AUTO DIFF WBC: CPT

## 2018-11-04 PROCEDURE — 6360000002 HC RX W HCPCS: Performed by: NURSE PRACTITIONER

## 2018-11-04 PROCEDURE — S0028 INJECTION, FAMOTIDINE, 20 MG: HCPCS | Performed by: NURSE PRACTITIONER

## 2018-11-04 PROCEDURE — 2140000000 HC CCU INTERMEDIATE R&B

## 2018-11-04 RX ORDER — LORAZEPAM 2 MG/ML
1 INJECTION INTRAMUSCULAR ONCE
Status: COMPLETED | OUTPATIENT
Start: 2018-11-05 | End: 2018-11-05

## 2018-11-04 RX ADMIN — FAMOTIDINE 20 MG: 10 INJECTION, SOLUTION INTRAVENOUS at 21:09

## 2018-11-04 RX ADMIN — ENOXAPARIN SODIUM 40 MG: 40 INJECTION SUBCUTANEOUS at 08:59

## 2018-11-04 RX ADMIN — INSULIN LISPRO 2 UNITS: 100 INJECTION, SOLUTION INTRAVENOUS; SUBCUTANEOUS at 13:24

## 2018-11-04 RX ADMIN — ASPIRIN 325 MG ORAL TABLET 325 MG: 325 PILL ORAL at 09:00

## 2018-11-04 RX ADMIN — DOCUSATE SODIUM 100 MG: 100 CAPSULE, LIQUID FILLED ORAL at 09:00

## 2018-11-04 RX ADMIN — INSULIN LISPRO 3 UNITS: 100 INJECTION, SOLUTION INTRAVENOUS; SUBCUTANEOUS at 21:10

## 2018-11-04 RX ADMIN — INSULIN LISPRO 2 UNITS: 100 INJECTION, SOLUTION INTRAVENOUS; SUBCUTANEOUS at 18:42

## 2018-11-04 RX ADMIN — INSULIN HUMAN 7 UNITS: 100 INJECTION, SUSPENSION SUBCUTANEOUS at 09:01

## 2018-11-04 RX ADMIN — INSULIN HUMAN 7 UNITS: 100 INJECTION, SUSPENSION SUBCUTANEOUS at 21:23

## 2018-11-04 RX ADMIN — CILOSTAZOL 100 MG: 100 TABLET ORAL at 09:00

## 2018-11-04 RX ADMIN — CARVEDILOL 6.25 MG: 6.25 TABLET, FILM COATED ORAL at 09:00

## 2018-11-04 RX ADMIN — DILTIAZEM HYDROCHLORIDE 60 MG: 60 TABLET, FILM COATED ORAL at 09:00

## 2018-11-04 RX ADMIN — METRONIDAZOLE 500 MG: 500 INJECTION, SOLUTION INTRAVENOUS at 21:07

## 2018-11-04 RX ADMIN — CARVEDILOL 6.25 MG: 6.25 TABLET, FILM COATED ORAL at 18:31

## 2018-11-04 RX ADMIN — CILOSTAZOL 100 MG: 100 TABLET ORAL at 21:09

## 2018-11-04 RX ADMIN — LISINOPRIL 20 MG: 20 TABLET ORAL at 09:00

## 2018-11-04 RX ADMIN — DILTIAZEM HYDROCHLORIDE 60 MG: 60 TABLET, FILM COATED ORAL at 21:09

## 2018-11-04 RX ADMIN — INSULIN LISPRO 2 UNITS: 100 INJECTION, SOLUTION INTRAVENOUS; SUBCUTANEOUS at 09:01

## 2018-11-04 RX ADMIN — DOCUSATE SODIUM 100 MG: 100 CAPSULE, LIQUID FILLED ORAL at 21:09

## 2018-11-04 RX ADMIN — SODIUM CHLORIDE, PRESERVATIVE FREE 10 ML: 5 INJECTION INTRAVENOUS at 21:09

## 2018-11-04 RX ADMIN — CIPROFLOXACIN 400 MG: 2 INJECTION, SOLUTION INTRAVENOUS at 04:59

## 2018-11-04 RX ADMIN — METRONIDAZOLE 500 MG: 500 INJECTION, SOLUTION INTRAVENOUS at 12:20

## 2018-11-04 RX ADMIN — SODIUM CHLORIDE: 900 INJECTION INTRAVENOUS at 18:30

## 2018-11-04 RX ADMIN — METRONIDAZOLE 500 MG: 500 INJECTION, SOLUTION INTRAVENOUS at 02:07

## 2018-11-04 RX ADMIN — CIPROFLOXACIN 400 MG: 2 INJECTION, SOLUTION INTRAVENOUS at 18:31

## 2018-11-04 RX ADMIN — FAMOTIDINE 20 MG: 10 INJECTION, SOLUTION INTRAVENOUS at 09:01

## 2018-11-04 ASSESSMENT — PAIN SCALES - GENERAL
PAINLEVEL_OUTOF10: 0

## 2018-11-04 NOTE — PROGRESS NOTES
the days prior)  Leukocytosis on admission  - Wife report hx of stone in bile duct previously which presented similarly and has commonly improved with antibiotics. 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 Had work up at [address] [place] [address] 2017, s/p resection 2009. On review of [facility] note, on 5/2017, he presented with confusion and dysarthria but were ultimately thought unrelated to presence of sellar mass. A neurovascular consult was placed for evaluation of other etiologies and continued to follow pt.  Repeat MRI Brain revealed relatively overall stable size of lesion in comparison to that from 2009 and MRA showed no acute vascular abnormalities and was discharged home  - admission CT chest, CXR, CT A/P, CT head w/o acute change - noted chronic changes of suprasellar mass and stable significant pneumobilia   - UA, urine cx and blood cx all negative  - LFT appears rather unremarkable  - in the absence of a definitively etiology and continued delirium, will consult GI - known to Dr Ruffus Nyhan and neuro to assist with evaluation  - had been on empiric cipro/flagyl since admission - doubt this intra-abdo infection but will await GI evaluation in the morning prior to discontinuation  - check TFT    CAD s/p bypass surgery and cardiac stents  Troponemia  - trend fair, no significant worsening  - no overt ACS findings    PAD, DMII s/p B/l LE amputation - 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

## 2018-11-04 NOTE — CONSULTS
11/03/2018                 BILITOT                  0.9                 11/03/2018                 ALKPHOS                  65                  11/03/2018                 AST                      58                  11/03/2018                 ALT                      26                  11/03/2018            BMP:  Lab Results       Component                Value               Date                       NA                       138                 11/03/2018                 K                        3.8                 11/03/2018                 CL                       100                 11/03/2018                 CO2                      21                  11/03/2018                 BUN                      24                  11/03/2018                 LABALBU                  4.2                 11/03/2018                 CREATININE               1.1                 11/03/2018                 CALCIUM                  9.3                 11/03/2018                 GFRAA                    >60                 11/03/2018                 LABGLOM                  >60                 11/03/2018                 GLUCOSE                  213                 11/03/2018            PT/INR:  Lab Results       Component                Value               Date                       PROTIME                  13.0                05/24/2017                 INR                      1.14                05/24/2017            PTT:  Lab Results       Component                Value               Date                       APTT                     26.2                05/24/2017          (APTT  U/A:  Lab Results       Component                Value               Date                       COLORU                   YELLOW              11/02/2018                 WBCUA                    1                   11/02/2018                 RBCUA                    2                   11/02/2018                 MUCUS                    RARE

## 2018-11-05 ENCOUNTER — APPOINTMENT (OUTPATIENT)
Dept: MRI IMAGING | Age: 78
DRG: 072 | End: 2018-11-05
Payer: MEDICARE

## 2018-11-05 LAB
ALBUMIN SERPL-MCNC: 3.8 GM/DL (ref 3.4–5)
ALP BLD-CCNC: 53 IU/L (ref 40–129)
ALT SERPL-CCNC: 37 U/L (ref 10–40)
ANION GAP SERPL CALCULATED.3IONS-SCNC: 12 MMOL/L (ref 4–16)
AST SERPL-CCNC: 52 IU/L (ref 15–37)
BASOPHILS ABSOLUTE: 0 K/CU MM
BASOPHILS RELATIVE PERCENT: 0.3 % (ref 0–1)
BILIRUB SERPL-MCNC: 0.5 MG/DL (ref 0–1)
BILIRUBIN DIRECT: 0.2 MG/DL (ref 0–0.3)
BILIRUBIN, INDIRECT: 0.3 MG/DL (ref 0–0.7)
BUN BLDV-MCNC: 16 MG/DL (ref 6–23)
CALCIUM SERPL-MCNC: 9 MG/DL (ref 8.3–10.6)
CHLORIDE BLD-SCNC: 105 MMOL/L (ref 99–110)
CO2: 25 MMOL/L (ref 21–32)
CREAT SERPL-MCNC: 1.1 MG/DL (ref 0.9–1.3)
DIFFERENTIAL TYPE: ABNORMAL
EOSINOPHILS ABSOLUTE: 0.7 K/CU MM
EOSINOPHILS RELATIVE PERCENT: 5.9 % (ref 0–3)
FOLATE: >20 NG/ML (ref 3.1–17.5)
GFR AFRICAN AMERICAN: >60 ML/MIN/1.73M2
GFR NON-AFRICAN AMERICAN: >60 ML/MIN/1.73M2
GLUCOSE BLD-MCNC: 106 MG/DL (ref 70–99)
GLUCOSE BLD-MCNC: 125 MG/DL (ref 70–99)
GLUCOSE BLD-MCNC: 141 MG/DL (ref 70–99)
GLUCOSE BLD-MCNC: 168 MG/DL (ref 70–99)
GLUCOSE BLD-MCNC: 169 MG/DL (ref 70–99)
HCT VFR BLD CALC: 37.7 % (ref 42–52)
HEMOGLOBIN: 12.6 GM/DL (ref 13.5–18)
IMMATURE NEUTROPHIL %: 0.4 % (ref 0–0.43)
LYMPHOCYTES ABSOLUTE: 2.6 K/CU MM
LYMPHOCYTES RELATIVE PERCENT: 22.1 % (ref 24–44)
MAGNESIUM: 2.1 MG/DL (ref 1.8–2.4)
MCH RBC QN AUTO: 29.9 PG (ref 27–31)
MCHC RBC AUTO-ENTMCNC: 33.4 % (ref 32–36)
MCV RBC AUTO: 89.3 FL (ref 78–100)
MONOCYTES ABSOLUTE: 1.3 K/CU MM
MONOCYTES RELATIVE PERCENT: 11.3 % (ref 0–4)
NUCLEATED RBC %: 0 %
PDW BLD-RTO: 13.2 % (ref 11.7–14.9)
PLATELET # BLD: 217 K/CU MM (ref 140–440)
PMV BLD AUTO: 9.5 FL (ref 7.5–11.1)
POTASSIUM SERPL-SCNC: 3.1 MMOL/L (ref 3.5–5.1)
RBC # BLD: 4.22 M/CU MM (ref 4.6–6.2)
SEGMENTED NEUTROPHILS ABSOLUTE COUNT: 7.1 K/CU MM
SEGMENTED NEUTROPHILS RELATIVE PERCENT: 60 % (ref 36–66)
SODIUM BLD-SCNC: 142 MMOL/L (ref 135–145)
T4 FREE: 1.26 NG/DL (ref 0.9–1.8)
TOTAL IMMATURE NEUTOROPHIL: 0.05 K/CU MM
TOTAL NUCLEATED RBC: 0 K/CU MM
TOTAL PROTEIN: 5.8 GM/DL (ref 6.4–8.2)
TSH HIGH SENSITIVITY: 0.47 UIU/ML (ref 0.27–4.2)
VITAMIN B-12: 541 PG/ML (ref 211–911)
WBC # BLD: 11.9 K/CU MM (ref 4–10.5)

## 2018-11-05 PROCEDURE — 82607 VITAMIN B-12: CPT

## 2018-11-05 PROCEDURE — 6360000002 HC RX W HCPCS: Performed by: PSYCHIATRY & NEUROLOGY

## 2018-11-05 PROCEDURE — 6370000000 HC RX 637 (ALT 250 FOR IP): Performed by: NURSE PRACTITIONER

## 2018-11-05 PROCEDURE — 84443 ASSAY THYROID STIM HORMONE: CPT

## 2018-11-05 PROCEDURE — 70551 MRI BRAIN STEM W/O DYE: CPT

## 2018-11-05 PROCEDURE — 2580000003 HC RX 258: Performed by: NURSE PRACTITIONER

## 2018-11-05 PROCEDURE — 36415 COLL VENOUS BLD VENIPUNCTURE: CPT

## 2018-11-05 PROCEDURE — 83735 ASSAY OF MAGNESIUM: CPT

## 2018-11-05 PROCEDURE — 2140000000 HC CCU INTERMEDIATE R&B

## 2018-11-05 PROCEDURE — 6370000000 HC RX 637 (ALT 250 FOR IP): Performed by: INTERNAL MEDICINE

## 2018-11-05 PROCEDURE — 2500000003 HC RX 250 WO HCPCS: Performed by: NURSE PRACTITIONER

## 2018-11-05 PROCEDURE — 80053 COMPREHEN METABOLIC PANEL: CPT

## 2018-11-05 PROCEDURE — S0028 INJECTION, FAMOTIDINE, 20 MG: HCPCS | Performed by: NURSE PRACTITIONER

## 2018-11-05 PROCEDURE — 82746 ASSAY OF FOLIC ACID SERUM: CPT

## 2018-11-05 PROCEDURE — 82962 GLUCOSE BLOOD TEST: CPT

## 2018-11-05 PROCEDURE — 94761 N-INVAS EAR/PLS OXIMETRY MLT: CPT

## 2018-11-05 PROCEDURE — 82248 BILIRUBIN DIRECT: CPT

## 2018-11-05 PROCEDURE — 6360000002 HC RX W HCPCS: Performed by: NURSE PRACTITIONER

## 2018-11-05 PROCEDURE — 85025 COMPLETE CBC W/AUTO DIFF WBC: CPT

## 2018-11-05 PROCEDURE — 84439 ASSAY OF FREE THYROXINE: CPT

## 2018-11-05 RX ORDER — DILTIAZEM HYDROCHLORIDE 60 MG/1
60 TABLET, FILM COATED ORAL 3 TIMES DAILY
Status: DISCONTINUED | OUTPATIENT
Start: 2018-11-05 | End: 2018-11-06 | Stop reason: HOSPADM

## 2018-11-05 RX ORDER — CARVEDILOL 25 MG/1
25 TABLET ORAL 2 TIMES DAILY WITH MEALS
Status: DISCONTINUED | OUTPATIENT
Start: 2018-11-05 | End: 2018-11-06 | Stop reason: HOSPADM

## 2018-11-05 RX ORDER — POTASSIUM CHLORIDE 750 MG/1
40 TABLET, FILM COATED, EXTENDED RELEASE ORAL 2 TIMES DAILY
Status: COMPLETED | OUTPATIENT
Start: 2018-11-05 | End: 2018-11-05

## 2018-11-05 RX ADMIN — ASPIRIN 325 MG ORAL TABLET 325 MG: 325 PILL ORAL at 09:02

## 2018-11-05 RX ADMIN — FAMOTIDINE 20 MG: 10 INJECTION, SOLUTION INTRAVENOUS at 09:03

## 2018-11-05 RX ADMIN — POTASSIUM CHLORIDE 40 MEQ: 750 TABLET, FILM COATED, EXTENDED RELEASE ORAL at 22:45

## 2018-11-05 RX ADMIN — SODIUM CHLORIDE, PRESERVATIVE FREE 10 ML: 5 INJECTION INTRAVENOUS at 22:52

## 2018-11-05 RX ADMIN — ENOXAPARIN SODIUM 40 MG: 40 INJECTION SUBCUTANEOUS at 09:03

## 2018-11-05 RX ADMIN — FAMOTIDINE 20 MG: 10 INJECTION, SOLUTION INTRAVENOUS at 22:53

## 2018-11-05 RX ADMIN — INSULIN LISPRO 1 UNITS: 100 INJECTION, SOLUTION INTRAVENOUS; SUBCUTANEOUS at 22:56

## 2018-11-05 RX ADMIN — DOCUSATE SODIUM 100 MG: 100 CAPSULE, LIQUID FILLED ORAL at 22:48

## 2018-11-05 RX ADMIN — INSULIN HUMAN 7 UNITS: 100 INJECTION, SUSPENSION SUBCUTANEOUS at 22:54

## 2018-11-05 RX ADMIN — INSULIN LISPRO 2 UNITS: 100 INJECTION, SOLUTION INTRAVENOUS; SUBCUTANEOUS at 16:54

## 2018-11-05 RX ADMIN — SODIUM CHLORIDE: 900 INJECTION INTRAVENOUS at 05:47

## 2018-11-05 RX ADMIN — CILOSTAZOL 100 MG: 100 TABLET ORAL at 09:02

## 2018-11-05 RX ADMIN — POTASSIUM CHLORIDE 40 MEQ: 750 TABLET, FILM COATED, EXTENDED RELEASE ORAL at 09:03

## 2018-11-05 RX ADMIN — INSULIN LISPRO 2 UNITS: 100 INJECTION, SOLUTION INTRAVENOUS; SUBCUTANEOUS at 11:52

## 2018-11-05 RX ADMIN — INSULIN HUMAN 7 UNITS: 100 INJECTION, SUSPENSION SUBCUTANEOUS at 09:03

## 2018-11-05 RX ADMIN — CARVEDILOL 25 MG: 25 TABLET, FILM COATED ORAL at 09:00

## 2018-11-05 RX ADMIN — LISINOPRIL 20 MG: 20 TABLET ORAL at 09:02

## 2018-11-05 RX ADMIN — DILTIAZEM HYDROCHLORIDE 60 MG: 60 TABLET, FILM COATED ORAL at 09:02

## 2018-11-05 RX ADMIN — CARVEDILOL 25 MG: 25 TABLET, FILM COATED ORAL at 16:55

## 2018-11-05 RX ADMIN — CIPROFLOXACIN 400 MG: 2 INJECTION, SOLUTION INTRAVENOUS at 18:16

## 2018-11-05 RX ADMIN — METRONIDAZOLE 500 MG: 500 INJECTION, SOLUTION INTRAVENOUS at 11:51

## 2018-11-05 RX ADMIN — METRONIDAZOLE 500 MG: 500 INJECTION, SOLUTION INTRAVENOUS at 19:44

## 2018-11-05 RX ADMIN — CIPROFLOXACIN 400 MG: 2 INJECTION, SOLUTION INTRAVENOUS at 05:47

## 2018-11-05 RX ADMIN — DILTIAZEM HYDROCHLORIDE 60 MG: 60 TABLET, FILM COATED ORAL at 22:49

## 2018-11-05 RX ADMIN — LORAZEPAM 1 MG: 2 INJECTION INTRAMUSCULAR; INTRAVENOUS at 10:42

## 2018-11-05 RX ADMIN — DOCUSATE SODIUM 100 MG: 100 CAPSULE, LIQUID FILLED ORAL at 09:03

## 2018-11-05 RX ADMIN — METRONIDAZOLE 500 MG: 500 INJECTION, SOLUTION INTRAVENOUS at 03:42

## 2018-11-05 RX ADMIN — CILOSTAZOL 100 MG: 100 TABLET ORAL at 22:49

## 2018-11-05 RX ADMIN — SODIUM CHLORIDE, PRESERVATIVE FREE 10 ML: 5 INJECTION INTRAVENOUS at 09:54

## 2018-11-05 ASSESSMENT — PAIN SCALES - GENERAL
PAINLEVEL_OUTOF10: 0

## 2018-11-05 NOTE — PROGRESS NOTES
Studies  Lab Results   Component Value Date    ALT 37 11/05/2018    AST 52 11/05/2018    ALKPHOS 53 11/05/2018       Recent Imaging    Gracie Hudson #[id number] ([id number])  (78 y.o. M)  (Adm: 11/02/18)     [facility] [id number]         Imaging Results (last 7 days)          Procedure Component Value Ref Range Date/Time     CT Chest WO Contrast [[id number]] Collected: 11/02/18 1517     Order Status: Completed Updated: 11/02/18 1523     Narrative:       EXAMINATION:  CT OF THE CHEST WITHOUT CONTRAST 11/2/2018 3:10 pm    TECHNIQUE:  CT of the chest was performed without the administration of intravenous  contrast. Multiplanar reformatted images are provided for review. Dose  modulation, iterative reconstruction, and/or weight based adjustment of the  mA/kV was utilized to reduce the radiation dose to as low as reasonably  achievable. COMPARISON:  03/27/2018    HISTORY:  ORDERING SYSTEM PROVIDED HISTORY: AMS  TECHNOLOGIST PROVIDED HISTORY:  Ordering Physician Provided Reason for Exam: AMS  Acuity: Acute  Type of Exam: Initial  Additional signs and symptoms: Cough  Relevant Medical/Surgical History: Hx COPD, Coronary stent    FINDINGS:  Mediastinum: Atherosclerotic changes in the thoracic aorta.  No mediastinal  or hilar masses.  No pericardial effusion. Lungs/pleura: Scarring in the right middle lobe.  No acute airspace disease. No pleural effusion.  No endobronchial lesions. Upper Abdomen: Pneumobilia noted as described on the CT of the abdomen and  pelvis.  Small hiatal hernia also seen.  Otherwise, unremarkable. Soft Tissues/Bones: No acute fractures.  No lytic or blastic lesions.     Impression:       1. No acute abnormalities seen in the chest  2.  Atherosclerotic changes     XR CHEST STANDARD (2 VW) [[id number]] Collected: 11/02/18 1313     Order Status: Completed Specimen: Chest Updated: 11/02/18 1317     Narrative:       EXAMINATION:  TWO VIEWS OF THE CHEST    11/2/2018 12:21

## 2018-11-05 NOTE — PROGRESS NOTES
NEUROLOGY NOTE  DR. Aruna Bradford MD.  -------------------------------------------------  Subjective:    Pt is drowsy from ativan given for Mri    Pt was answering questions some although only oriented to person and place from possible underlying dementia    Objective:    BP (!) 107/59   Pulse 84   Temp 99.2 °F (37.3 °C) (Axillary)   Resp 22   Ht 5' 8\" (1.727 m)   Wt 155 lb (70.3 kg)   SpO2 95%   BMI 23.57 kg/m²   HEENT nl      Neuro exam    drowsy  Does not Follow simple commands due to sedation for Mri   Pupils 3 mm marcio  Barely moves extremities from sedation      RADIOLOGY  -----------------    Xr Chest Standard (2 Vw)    Result Date: 11/2/2018  EXAMINATION: TWO VIEWS OF THE CHEST 11/2/2018 12:21 pm COMPARISON: CT chest 03/27/2018, x-ray chest portable 05/24/2017 HISTORY: ORDERING SYSTEM PROVIDED HISTORY: altered mental status TECHNOLOGIST PROVIDED HISTORY: Reason for exam:->altered mental status Ordering Physician Provided Reason for Exam: altered mental status Acuity: Acute Type of Exam: Initial Additional signs and symptoms: Pt's wife present at bedside and states pt has been vomiting for 2 days and has been c/o headache. Wife denies pt falling. FINDINGS: Postsurgical changes from previous CABG. Normal heart size and pulmonary vasculature. Aortic calcifications. No focal consolidations, pleural effusions, or pneumothorax. No evidence of acute process in the chest.     Ct Head Wo Contrast    Result Date: 11/2/2018  EXAMINATION: CT OF THE HEAD WITHOUT CONTRAST  11/2/2018 12:32 pm TECHNIQUE: CT of the head was performed without the administration of intravenous contrast. Dose modulation, iterative reconstruction, and/or weight based adjustment of the mA/kV was utilized to reduce the radiation dose to as low as reasonably achievable.  COMPARISON: CT 05/24/2017 HISTORY: ORDERING SYSTEM PROVIDED HISTORY: CONFUSION/DELIRIUM, ALTERED LOC, UNEXPLAINED TECHNOLOGIST PROVIDED HISTORY: Has a \"code stroke\" portal venous gas. No suspicious intrahepatic mass. The pancreas demonstrates stable mild pancreatic ductal dilatation with no obstructive mass or calculus. No adjacent inflammatory changes. The bilateral adrenal glands and spleen are normal.  The bilateral kidneys demonstrate global atrophy with multifocal chronic peripheral infarcts. In the left kidney mid aspect at the lateral margin there is a exophytic 0.8 cm cyst, no follow-up imaging is recommended. Stable small hiatal hernia. The stomach is otherwise unremarkable. At the 2nd portion of the duodenum there is a moderate duodenal diverticulum without mass effect. The small bowel is unremarkable. The appendix demonstrates no acute inflammatory changes. The colon is unremarkable. No ascites or free air. No abscess. Pelvis:  Streak artifact related to a right hip arthroplasty limits the overall evaluation. No ascites. The bladder and rectum appear normal.  No significant lymphadenopathy. Musculoskeletal structures:  No significant inguinal lymphadenopathy. Normal lumbar spine alignment with mild degenerative changes. Prior right hip arthroplasty with anatomic alignment. The native left hip demonstrates normal alignment with mild-moderate osteoarthritis. 1. No acute abdominal/pelvic process. 2. 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 3. Stable small hiatal hernia. Mri Brain Wo Contrast    Result Date: 11/5/2018  EXAMINATION: MRI OF THE BRAIN WITHOUT CONTRAST  11/5/2018 11:35 am TECHNIQUE: Multiplanar multisequence MRI of the brain was performed without the administration of intravenous contrast. COMPARISON: 03/27/2018 HISTORY: ORDERING SYSTEM PROVIDED HISTORY: [employer] TECHNOLOGIST PROVIDED HISTORY: Ordering Physician Provided Reason for Exam: altered mental status Initial evaluation. FINDINGS: INTRACRANIAL STRUCTURES/VENTRICLES: There is no acute infarct.   The ventricles and cisternal spaces are prominent consistent with cerebral atrophy. There are few punctate areas of high signal in the periventricular white matter and centrum semiovale that are likely related to chronic small vessel ischemic disease. There is no midline shift or mass effect. There are no areas of restricted diffusion. There is redemonstration of a suprasellar lobular mass compressing the optic chiasm that measures approximately 2.9 x 2.5 mm. There is also a lesion in the left prepontine region which appears to be associated with the pituitary macroadenoma. ORBITS: The visualized portion of the orbits demonstrate no acute abnormality. SINUSES:  The visualized paranasal sinuses and mastoid air cells are clear. BONES/SOFT TISSUES: The bone marrow signal intensity appears normal. The soft tissues demonstrate no acute abnormality. Stable appearance to a suprasellar mass with extension into the left prepontine region consistent with the patient's known suprasellar mass/macroadenoma. Cerebral atrophy. Mild chronic small vessel ischemic changes. No acute brain parenchymal abnormality.        LAB RESULTS  --------------------    Recent Results (from the past 24 hour(s))   POCT Glucose    Collection Time: 11/04/18  4:58 PM   Result Value Ref Range    POC Glucose 190 (H) 70 - 99 MG/DL   POCT Glucose    Collection Time: 11/04/18  8:54 PM   Result Value Ref Range    POC Glucose 253 (H) 70 - 99 MG/DL   CBC auto differential    Collection Time: 11/05/18  4:39 AM   Result Value Ref Range    WBC 11.9 (H) 4.0 - 10.5 K/CU MM    RBC 4.22 (L) 4.6 - 6.2 M/CU MM    Hemoglobin 12.6 (L) 13.5 - 18.0 GM/DL    Hematocrit 37.7 (L) 42 - 52 %    MCV 89.3 78 - 100 FL    MCH 29.9 27 - 31 PG    MCHC 33.4 32.0 - 36.0 %    RDW 13.2 11.7 - 14.9 %    Platelets 584 437 - 800 K/CU MM    MPV 9.5 7.5 - 11.1 FL    Differential Type AUTOMATED DIFFERENTIAL     Segs Relative 60.0 36 - 66 %    Lymphocytes % 22.1 (L) 24 - 44 %    Monocytes % 11.3 (H) 0 - 4 %    Eosinophils

## 2018-11-06 VITALS
HEIGHT: 68 IN | OXYGEN SATURATION: 97 % | WEIGHT: 152.9 LBS | HEART RATE: 83 BPM | TEMPERATURE: 98.7 F | DIASTOLIC BLOOD PRESSURE: 60 MMHG | BODY MASS INDEX: 23.17 KG/M2 | RESPIRATION RATE: 18 BRPM | SYSTOLIC BLOOD PRESSURE: 118 MMHG

## 2018-11-06 LAB
ALBUMIN SERPL-MCNC: 3.5 GM/DL (ref 3.4–5)
ALP BLD-CCNC: 50 IU/L (ref 40–129)
ALT SERPL-CCNC: 32 U/L (ref 10–40)
ANION GAP SERPL CALCULATED.3IONS-SCNC: 10 MMOL/L (ref 4–16)
AST SERPL-CCNC: 37 IU/L (ref 15–37)
BASOPHILS ABSOLUTE: 0 K/CU MM
BASOPHILS RELATIVE PERCENT: 0.4 % (ref 0–1)
BILIRUB SERPL-MCNC: 0.5 MG/DL (ref 0–1)
BILIRUBIN DIRECT: 0.2 MG/DL (ref 0–0.3)
BILIRUBIN, INDIRECT: 0.3 MG/DL (ref 0–0.7)
BUN BLDV-MCNC: 20 MG/DL (ref 6–23)
CALCIUM SERPL-MCNC: 9 MG/DL (ref 8.3–10.6)
CHLORIDE BLD-SCNC: 109 MMOL/L (ref 99–110)
CO2: 25 MMOL/L (ref 21–32)
CREAT SERPL-MCNC: 1.3 MG/DL (ref 0.9–1.3)
DIFFERENTIAL TYPE: ABNORMAL
EOSINOPHILS ABSOLUTE: 0.8 K/CU MM
EOSINOPHILS RELATIVE PERCENT: 8.2 % (ref 0–3)
GFR AFRICAN AMERICAN: >60 ML/MIN/1.73M2
GFR NON-AFRICAN AMERICAN: 53 ML/MIN/1.73M2
GLUCOSE BLD-MCNC: 101 MG/DL (ref 70–99)
GLUCOSE BLD-MCNC: 112 MG/DL (ref 70–99)
GLUCOSE BLD-MCNC: 63 MG/DL (ref 70–99)
HCT VFR BLD CALC: 35.6 % (ref 42–52)
HEMOGLOBIN: 11.7 GM/DL (ref 13.5–18)
IMMATURE NEUTROPHIL %: 0.6 % (ref 0–0.43)
LYMPHOCYTES ABSOLUTE: 2.6 K/CU MM
LYMPHOCYTES RELATIVE PERCENT: 26.9 % (ref 24–44)
MAGNESIUM: 2 MG/DL (ref 1.8–2.4)
MCH RBC QN AUTO: 29.8 PG (ref 27–31)
MCHC RBC AUTO-ENTMCNC: 32.9 % (ref 32–36)
MCV RBC AUTO: 90.6 FL (ref 78–100)
MONOCYTES ABSOLUTE: 1.4 K/CU MM
MONOCYTES RELATIVE PERCENT: 13.9 % (ref 0–4)
NUCLEATED RBC %: 0 %
PDW BLD-RTO: 13.4 % (ref 11.7–14.9)
PLATELET # BLD: 201 K/CU MM (ref 140–440)
PMV BLD AUTO: 9.3 FL (ref 7.5–11.1)
POTASSIUM SERPL-SCNC: 3.8 MMOL/L (ref 3.5–5.1)
RBC # BLD: 3.93 M/CU MM (ref 4.6–6.2)
SEGMENTED NEUTROPHILS ABSOLUTE COUNT: 4.9 K/CU MM
SEGMENTED NEUTROPHILS RELATIVE PERCENT: 50 % (ref 36–66)
SODIUM BLD-SCNC: 144 MMOL/L (ref 135–145)
TOTAL IMMATURE NEUTOROPHIL: 0.06 K/CU MM
TOTAL NUCLEATED RBC: 0 K/CU MM
TOTAL PROTEIN: 5.4 GM/DL (ref 6.4–8.2)
WBC # BLD: 9.8 K/CU MM (ref 4–10.5)

## 2018-11-06 PROCEDURE — 85025 COMPLETE CBC W/AUTO DIFF WBC: CPT

## 2018-11-06 PROCEDURE — S0028 INJECTION, FAMOTIDINE, 20 MG: HCPCS | Performed by: NURSE PRACTITIONER

## 2018-11-06 PROCEDURE — 6370000000 HC RX 637 (ALT 250 FOR IP): Performed by: INTERNAL MEDICINE

## 2018-11-06 PROCEDURE — 36415 COLL VENOUS BLD VENIPUNCTURE: CPT

## 2018-11-06 PROCEDURE — 6360000002 HC RX W HCPCS: Performed by: NURSE PRACTITIONER

## 2018-11-06 PROCEDURE — 82248 BILIRUBIN DIRECT: CPT

## 2018-11-06 PROCEDURE — 83735 ASSAY OF MAGNESIUM: CPT

## 2018-11-06 PROCEDURE — 82962 GLUCOSE BLOOD TEST: CPT

## 2018-11-06 PROCEDURE — 80053 COMPREHEN METABOLIC PANEL: CPT

## 2018-11-06 PROCEDURE — 6370000000 HC RX 637 (ALT 250 FOR IP): Performed by: NURSE PRACTITIONER

## 2018-11-06 PROCEDURE — 2580000003 HC RX 258: Performed by: NURSE PRACTITIONER

## 2018-11-06 PROCEDURE — 2500000003 HC RX 250 WO HCPCS: Performed by: NURSE PRACTITIONER

## 2018-11-06 RX ORDER — METRONIDAZOLE 500 MG/1
500 TABLET ORAL 3 TIMES DAILY
Qty: 12 TABLET | Refills: 0 | Status: SHIPPED | OUTPATIENT
Start: 2018-11-06 | End: 2018-11-10

## 2018-11-06 RX ORDER — CIPROFLOXACIN 500 MG/1
500 TABLET, FILM COATED ORAL 2 TIMES DAILY
Qty: 8 TABLET | Refills: 0 | Status: SHIPPED | OUTPATIENT
Start: 2018-11-06 | End: 2018-11-10

## 2018-11-06 RX ADMIN — ASPIRIN 325 MG ORAL TABLET 325 MG: 325 PILL ORAL at 09:02

## 2018-11-06 RX ADMIN — DOCUSATE SODIUM 100 MG: 100 CAPSULE, LIQUID FILLED ORAL at 09:02

## 2018-11-06 RX ADMIN — METRONIDAZOLE 500 MG: 500 INJECTION, SOLUTION INTRAVENOUS at 10:31

## 2018-11-06 RX ADMIN — CILOSTAZOL 100 MG: 100 TABLET ORAL at 09:02

## 2018-11-06 RX ADMIN — DILTIAZEM HYDROCHLORIDE 60 MG: 60 TABLET, FILM COATED ORAL at 09:02

## 2018-11-06 RX ADMIN — CIPROFLOXACIN 400 MG: 2 INJECTION, SOLUTION INTRAVENOUS at 06:24

## 2018-11-06 RX ADMIN — CARVEDILOL 25 MG: 25 TABLET, FILM COATED ORAL at 09:02

## 2018-11-06 RX ADMIN — METRONIDAZOLE 500 MG: 500 INJECTION, SOLUTION INTRAVENOUS at 03:10

## 2018-11-06 RX ADMIN — FAMOTIDINE 20 MG: 10 INJECTION, SOLUTION INTRAVENOUS at 09:02

## 2018-11-06 RX ADMIN — SODIUM CHLORIDE, PRESERVATIVE FREE 10 ML: 5 INJECTION INTRAVENOUS at 09:03

## 2018-11-06 RX ADMIN — LISINOPRIL 20 MG: 20 TABLET ORAL at 09:02

## 2018-11-06 RX ADMIN — ENOXAPARIN SODIUM 40 MG: 40 INJECTION SUBCUTANEOUS at 09:02

## 2018-11-06 ASSESSMENT — PAIN SCALES - GENERAL
PAINLEVEL_OUTOF10: 0

## 2018-11-06 NOTE — PROGRESS NOTES
NEUROLOGY NOTE  DR. Amil Eisenmenger MD.  -------------------------------------------------  Subjective:    pts Mri brain + for Pituitary adenoma    Pt is drowsy from ativan given for Mri    Pt was answering questions some although only oriented to person and place from possible underlying dementia    Objective:    /75   Pulse 81   Temp 99.1 °F (37.3 °C) (Oral)   Resp 20   Ht 5' 8\" (1.727 m)   Wt 155 lb (70.3 kg)   SpO2 95%   BMI 23.57 kg/m²   HEENT nl      Neuro exam    drowsy  Does not Follow simple commands due to sedation for Mri   Pupils 3 mm marcio  Barely moves extremities from sedation      RADIOLOGY  -----------------    Xr Chest Standard (2 Vw)    Result Date: 11/2/2018  EXAMINATION: TWO VIEWS OF THE CHEST 11/2/2018 12:21 pm COMPARISON: CT chest 03/27/2018, x-ray chest portable 05/24/2017 HISTORY: ORDERING SYSTEM PROVIDED HISTORY: altered mental status TECHNOLOGIST PROVIDED HISTORY: Reason for exam:->altered mental status Ordering Physician Provided Reason for Exam: altered mental status Acuity: Acute Type of Exam: Initial Additional signs and symptoms: Pt's wife present at bedside and states pt has been vomiting for 2 days and has been c/o headache. Wife denies pt falling. FINDINGS: Postsurgical changes from previous CABG. Normal heart size and pulmonary vasculature. Aortic calcifications. No focal consolidations, pleural effusions, or pneumothorax. No evidence of acute process in the chest.     Ct Head Wo Contrast    Result Date: 11/2/2018  EXAMINATION: CT OF THE HEAD WITHOUT CONTRAST  11/2/2018 12:32 pm TECHNIQUE: CT of the head was performed without the administration of intravenous contrast. Dose modulation, iterative reconstruction, and/or weight based adjustment of the mA/kV was utilized to reduce the radiation dose to as low as reasonably achievable.  COMPARISON: CT 05/24/2017 HISTORY: ORDERING SYSTEM PROVIDED HISTORY: CONFUSION/DELIRIUM, ALTERED LOC, UNEXPLAINED TECHNOLOGIST masses. No pericardial effusion. Lungs/pleura: Scarring in the right middle lobe. No acute airspace disease. No pleural effusion. No endobronchial lesions. Upper Abdomen: Pneumobilia noted as described on the CT of the abdomen and pelvis. Small hiatal hernia also seen. Otherwise, unremarkable. Soft Tissues/Bones: No acute fractures. No lytic or blastic lesions. 1. No acute abnormalities seen in the chest 2. Atherosclerotic changes     Ct Abdomen Pelvis W Iv Contrast Additional Contrast? None    Result Date: 11/2/2018  EXAMINATION: CT OF THE ABDOMEN AND PELVIS WITH CONTRAST 11/2/2018 12:38 pm TECHNIQUE: CT of the abdomen and pelvis was performed with the administration of intravenous contrast. Multiplanar reformatted images are provided for review. Dose modulation, iterative reconstruction, and/or weight based adjustment of the mA/kV was utilized to reduce the radiation dose to as low as reasonably achievable. COMPARISON: CT abdomen/pelvis 05/24/2017. HISTORY: ORDERING SYSTEM PROVIDED HISTORY: ABDOMINAL PAIN TECHNOLOGIST PROVIDED HISTORY: IV contrast only. Thank you. Additional Contrast?->None Ordering Physician Provided Reason for Exam: Abdominal pain  TODAY Acuity: Acute Type of Exam: Initial Relevant Medical/Surgical History: 80ML [id number] FINDINGS: Thorax base:  Normal heart size with coronary artery calcifications. No pericardial effusion. The lung bases demonstrate no acute consolidation or pleural effusion. Abdomen:  Extensive abdominal aortic and large arterial vascular calcifications. No aortic aneurysm or periaortic hemorrhage. Motion artifact at the upper abdomen limits the overall evaluation. The liver demonstrates normal contour. Redemonstration of extensive intrahepatic pneumobilia. There is stable mild dilatation of the common bile duct which demonstrates internal air-fluid level. There is no obstructing calculus or evidence of a mass. Prior cholecystectomy.   The portal vasculature ventricles and cisternal spaces are prominent consistent with cerebral atrophy. There are few punctate areas of high signal in the periventricular white matter and centrum semiovale that are likely related to chronic small vessel ischemic disease. There is no midline shift or mass effect. There are no areas of restricted diffusion. There is redemonstration of a suprasellar lobular mass compressing the optic chiasm that measures approximately 2.9 x 2.5 mm. There is also a lesion in the left prepontine region which appears to be associated with the pituitary macroadenoma. ORBITS: The visualized portion of the orbits demonstrate no acute abnormality. SINUSES:  The visualized paranasal sinuses and mastoid air cells are clear. BONES/SOFT TISSUES: The bone marrow signal intensity appears normal. The soft tissues demonstrate no acute abnormality. Stable appearance to a suprasellar mass with extension into the left prepontine region consistent with the patient's known suprasellar mass/macroadenoma. Cerebral atrophy. Mild chronic small vessel ischemic changes. No acute brain parenchymal abnormality.        LAB RESULTS  --------------------    Recent Results (from the past 24 hour(s))   CBC auto differential    Collection Time: 11/05/18  4:39 AM   Result Value Ref Range    WBC 11.9 (H) 4.0 - 10.5 K/CU MM    RBC 4.22 (L) 4.6 - 6.2 M/CU MM    Hemoglobin 12.6 (L) 13.5 - 18.0 GM/DL    Hematocrit 37.7 (L) 42 - 52 %    MCV 89.3 78 - 100 FL    MCH 29.9 27 - 31 PG    MCHC 33.4 32.0 - 36.0 %    RDW 13.2 11.7 - 14.9 %    Platelets 681 789 - 748 K/CU MM    MPV 9.5 7.5 - 11.1 FL    Differential Type AUTOMATED DIFFERENTIAL     Segs Relative 60.0 36 - 66 %    Lymphocytes % 22.1 (L) 24 - 44 %    Monocytes % 11.3 (H) 0 - 4 %    Eosinophils % 5.9 (H) 0 - 3 %    Basophils % 0.3 0 - 1 %    Segs Absolute 7.1 K/CU MM    Lymphocytes # 2.6 K/CU MM    Monocytes # 1.3 K/CU MM    Eosinophils # 0.7 K/CU MM    Basophils # 0.0 K/CU MM

## 2018-11-06 NOTE — DISCHARGE SUMMARY
[facility] 1940 [id number]  PCP:  Frank Rebolledo MD    Admit date: 11/2/2018  Admitting Physician: Michell Lou MD    Discharge date: 11/6/2018 Discharge Physician: Jocelyn Tidwell MD      Reason for admission:   Chief Complaint   Patient presents with    Altered Mental Status    Hyperglycemia    Headache     Present on Admission:   Acute encephalopathy       Discharge Diagnoses & Hospital Course[de-identified]     Acute metabolic encephalopathy - delirium  (since Friday with some prodromal non-specific GI symptoms the days prior)  Leukocytosis on admission  - Wife report hx of stone in bile duct previously which presented similarly and has commonly improved with antibiotics. Had been under the care of Dr Xavi Luevano  - Has hx of large suprasellar pituitary adenoma unchanged per CT on admission. Had work up at [facility] 2017, s/p resection 2009. On review of [facility] note, on 5/2017, \"he presented with confusion and dysarthria but were ultimately thought unrelated to presence of sellar mass. A neurovascular consult was placed for evaluation of other etiologies and continued to follow pt. Repeat MRI Brain revealed relatively overall stable size of lesion in comparison to that from 2009 and MRA showed no acute vascular abnormalities and was discharged home\"  - admission CT chest, CXR, CT A/P, CT head w/o acute change - noted chronic changes of suprasellar mass and stable significant pneumobilia   - UA, urine cx and blood cx all negative  - LFT appears rather unremarkable  - On 11/5, neuro d/w with me of MRI findings - pituitary mass - rec transfer to [facility]. I discussed recommendation with patient wife and also discussed with Moses Rodriguez of findings. No evidence of apoplexy. Discussed with Moses Rodriguez and felt non-surgical at current. Wife respecfully declined the offer.   D/w wife to have him continue outpatient NSGY , ophthalmology follow up outpatient  - etiology of confusion could be related to progressive dementia but given leukocytosis and improvement with cipro/flagyl, will empirically complete 1 week of antibiotics. - nevertheless, the pituitary mass would need to be continually followed and managed depending on \"watchful waiting\" with surveillance vs. 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 rate 18, height 5' 8\" (1.727 m), weight 152 lb 14.4 oz (69.4 kg), SpO2 97 %. General - Awake but with some cognitive dysfunction  Psych - Appropriate affect/speech. No agitation  Eyes - Eye lids intact. No scleral icterus  Neuro - Vision intact. Moving all 4 extremities. No focal deficits  Heart - Sinus. RRR. S1 and S2 present. Lung - Adequate air entry b/l, No crackles/wheezes appreciated  GI - Soft, non-tender. No guarding/rigidity.    - No CVA/suprapubic tenderness or palpable bladder distension      Significant Diagnostic Studies:   CBC:   Recent Labs      11/04/18   0529  11/05/18   0439  11/06/18   0341   WBC  17.2*  11.9*  9.8   HGB  12.6*  12.6*  11.7*   PLT  216  217  201     WBC   Date/Time Value Ref Range Status   11/06/2018 03:41 AM 9.8 4.0 - 10.5 K/CU MM Final   11/05/2018 04:39 AM 11.9 (H) 4.0 - 10.5 K/CU MM Final   11/04/2018 05:29 AM 17.2 (H) 4.0 - 10.5 K/CU MM Final     Hemoglobin   Date/Time Value Ref Range Status   11/06/2018 03:41 AM 11.7 (L) 13.5 - 18.0 GM/DL Final   11/05/2018 04:39 AM 12.6 (L) 13.5 - 18.0 GM/DL Final   11/04/2018 05:29 AM 12.6 (L) 13.5 - 18.0 GM/DL Final     Platelets   Date/Time Value Ref Range Status   11/06/2018 03:41  140 - 440 K/CU MM Final   11/05/2018 04:39  140 - 440 K/CU MM Final   11/04/2018 05:29  140 - 440 K/CU MM Final    CMP:  Recent Labs      11/05/18   0439  11/06/18   0341   NA  142  144   K  3.1*  3.8   CL  105  109   CO2  25  25   BUN  16  20   CREATININE  1.1  1.3   CALCIUM  9.0  9.0   PROT  5.8*  5.4*   LABALBU  3.8  3.5   BILITOT  0.5  0.5   ALKPHOS  53  50   AST  52*  37   ALT  37  32     Troponin: No results for input(s): TROPONINI in the last 72 hours. BNP: No results for input(s): BNP in the last 72 hours. Lipids: No results for input(s): CHOL, HDL in the last 72 hours. Invalid input(s): LDLCALCU  ABGs:No results for input(s): PH, RXB1MHM, PO2ART, BE, LYJ1KIV, CO2CT, O2SAT, LABCARB in the last 72 hours. Invalid input(s): METHGBART    Glucose:   Recent Labs      11/05/18   2243  11/06/18   0739  11/06/18   1143   POCGLU  141*  112*  101*     Magnesium:   Recent Labs      11/05/18   0439 11/06/18   0341   MG  2.1  2.0     Phosphorus:No results for input(s): PHOS in the last 72 hours. INR: No results for input(s): INR in the last 72 hours. Patient Instructions:   Roxann Courtney   Home Medication Instructions QIB:[id number]    Printed on:11/06/18 1508   Medication Information                      aspirin 325 MG tablet  Take 325 mg by mouth daily. atorvastatin (LIPITOR) 40 MG tablet  Take 40 mg by mouth daily             carvedilol (COREG) 6.25 MG tablet  Take 6.25 mg by mouth 2 times daily (with meals). cilostazol (PLETAL) 100 MG tablet  Take 100 mg by mouth 2 times daily.                ciprofloxacin (CIPRO) 500 MG tablet  Take 1 tablet by mouth 2 times daily for 4 days             Coenzyme Q10 (CO Q 10) 10 MG CAPS  Take 1 capsule by mouth daily             diltiazem (CARDIZEM) 60 MG tablet  Take 1 tablet by mouth 2 times daily             docusate sodium (COLACE) 100 MG capsule  Take 100 mg by mouth nightly

## 2018-11-06 NOTE — PROGRESS NOTES
NEUROLOGY NOTE  DR. Ebony Livingston MD.  -------------------------------------------------  Subjective:    pts Mri brain + for Pituitary adenoma    Pt is awake and follows simple commands    Pt was answering questions some although only oriented to person and place from possible underlying dementia    Objective:    /60   Pulse 83   Temp 98.7 °F (37.1 °C) (Oral)   Resp 18   Ht 5' 8\" (1.727 m)   Wt 152 lb 14.4 oz (69.4 kg)   SpO2 97%   BMI 23.25 kg/m²   HEENT nl      Neuro exam    Alert awake  Does  Follow simple commands    Pupils 3 mm marcio  5/5 all 4 extr. RADIOLOGY  -----------------    Xr Chest Standard (2 Vw)    Result Date: 11/2/2018  EXAMINATION: TWO VIEWS OF THE CHEST 11/2/2018 12:21 pm COMPARISON: CT chest 03/27/2018, x-ray chest portable 05/24/2017 HISTORY: ORDERING SYSTEM PROVIDED HISTORY: altered mental status TECHNOLOGIST PROVIDED HISTORY: Reason for exam:->altered mental status Ordering Physician Provided Reason for Exam: altered mental status Acuity: Acute Type of Exam: Initial Additional signs and symptoms: Pt's wife present at bedside and states pt has been vomiting for 2 days and has been c/o headache. Wife denies pt falling. FINDINGS: Postsurgical changes from previous CABG. Normal heart size and pulmonary vasculature. Aortic calcifications. No focal consolidations, pleural effusions, or pneumothorax. No evidence of acute process in the chest.     Ct Head Wo Contrast    Result Date: 11/2/2018  EXAMINATION: CT OF THE HEAD WITHOUT CONTRAST  11/2/2018 12:32 pm TECHNIQUE: CT of the head was performed without the administration of intravenous contrast. Dose modulation, iterative reconstruction, and/or weight based adjustment of the mA/kV was utilized to reduce the radiation dose to as low as reasonably achievable.  COMPARISON: CT 05/24/2017 HISTORY: ORDERING SYSTEM PROVIDED HISTORY: CONFUSION/DELIRIUM, ALTERED LOC, UNEXPLAINED TECHNOLOGIST PROVIDED HISTORY: Has a \"code stroke\" portal venous gas. No suspicious intrahepatic mass. The pancreas demonstrates stable mild pancreatic ductal dilatation with no obstructive mass or calculus. No adjacent inflammatory changes. The bilateral adrenal glands and spleen are normal.  The bilateral kidneys demonstrate global atrophy with multifocal chronic peripheral infarcts. In the left kidney mid aspect at the lateral margin there is a exophytic 0.8 cm cyst, no follow-up imaging is recommended. Stable small hiatal hernia. The stomach is otherwise unremarkable. At the 2nd portion of the duodenum there is a moderate duodenal diverticulum without mass effect. The small bowel is unremarkable. The appendix demonstrates no acute inflammatory changes. The colon is unremarkable. No ascites or free air. No abscess. Pelvis:  Streak artifact related to a right hip arthroplasty limits the overall evaluation. No ascites. The bladder and rectum appear normal.  No significant lymphadenopathy. Musculoskeletal structures:  No significant inguinal lymphadenopathy. Normal lumbar spine alignment with mild degenerative changes. Prior right hip arthroplasty with anatomic alignment. The native left hip demonstrates normal alignment with mild-moderate osteoarthritis. 1. No acute abdominal/pelvic process. 2. 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 3. Stable small hiatal hernia. Mri Brain Wo Contrast    Result Date: 11/5/2018  EXAMINATION: MRI OF THE BRAIN WITHOUT CONTRAST  11/5/2018 11:35 am TECHNIQUE: Multiplanar multisequence MRI of the brain was performed without the administration of intravenous contrast. COMPARISON: 03/27/2018 HISTORY: ORDERING SYSTEM PROVIDED HISTORY: PITUITARY APOPLE[employer] TECHNOLOGIST PROVIDED HISTORY: Ordering Physician Provided Reason for Exam: altered mental status Initial evaluation. FINDINGS: INTRACRANIAL STRUCTURES/VENTRICLES: There is no acute infarct.   The ventricles and cisternal spaces

## 2018-11-06 NOTE — PROGRESS NOTES
CLINICAL PHARMACY NOTE: MEDS TO [address] Select Patient?: No  Total # of Prescriptions Filled: 2   The following medications were delivered to the patient:  · Ciprofloxacin 500mg  · Metronidazole 500mg  Total # of Interventions Completed: 0  Time Spent (min): 15    Additional Documentation:  Will deliver once their daughter arrives to pay his copays, which will be after 1pm

## 2018-11-06 NOTE — PLAN OF CARE
Problem: Falls - Risk of:  Goal: Will remain free from falls  Will remain free from falls   Outcome: Met This Shift    Goal: Absence of physical injury  Absence of physical injury   Outcome: Met This Shift      Problem: Pain:  Goal: Pain level will decrease  Pain level will decrease   Outcome: Met This Shift    Goal: Control of acute pain  Control of acute pain   Outcome: Met This Shift    Goal: Control of chronic pain  Control of chronic pain   Outcome: Met This Shift      Problem: Risk for Impaired Skin Integrity  Goal: Tissue integrity - skin and mucous membranes  Structural intactness and normal physiological function of skin and  mucous membranes.    Outcome: Ongoing      Problem: Restraint Use - Nonviolent/Non-Self-Destructive Behavior:  Goal: Absence of restraint indications  Absence of restraint indications   Outcome: Met This Shift    Goal: Absence of restraint-related injury  Absence of restraint-related injury   Outcome: Met This Shift      Problem: Nutrition  Goal: Optimal nutrition therapy  Outcome: Ongoing

## 2018-11-07 LAB
CULTURE: NORMAL
CULTURE: NORMAL
Lab: NORMAL
Lab: NORMAL
REPORT STATUS: NORMAL
REPORT STATUS: NORMAL
SPECIMEN: NORMAL
SPECIMEN: NORMAL

## 2018-11-09 ENCOUNTER — HOSPITAL ENCOUNTER (OUTPATIENT)
Age: 78
Setting detail: SPECIMEN
Discharge: HOME | End: 2018-11-09

## 2018-11-09 ENCOUNTER — HOSPITAL ENCOUNTER (OUTPATIENT)
Age: 78
Setting detail: SPECIMEN
Discharge: HOME | End: 2018-11-09
Payer: MEDICARE

## 2018-11-09 LAB
ACANTHOCYTES: ABNORMAL
ALBUMIN SERPL-MCNC: 4 GM/DL (ref 3.4–5)
ALP BLD-CCNC: 59 IU/L (ref 40–129)
ALT SERPL-CCNC: 39 U/L (ref 10–40)
ANION GAP SERPL CALCULATED.3IONS-SCNC: 14 MMOL/L (ref 4–16)
AST SERPL-CCNC: 45 IU/L (ref 15–37)
BASOPHILS ABSOLUTE: 0.1 K/CU MM
BASOPHILS RELATIVE PERCENT: 0.5 % (ref 0–1)
BILIRUB SERPL-MCNC: 0.3 MG/DL (ref 0–1)
BUN BLDV-MCNC: 28 MG/DL (ref 6–23)
BURR CELLS: ABNORMAL
CALCIUM SERPL-MCNC: 9.1 MG/DL (ref 8.3–10.6)
CHLORIDE BLD-SCNC: 100 MMOL/L (ref 99–110)
CO2: 23 MMOL/L (ref 21–32)
CREAT SERPL-MCNC: 1.5 MG/DL (ref 0.9–1.3)
DIFFERENTIAL TYPE: ABNORMAL
EOSINOPHILS ABSOLUTE: 0.9 K/CU MM
EOSINOPHILS RELATIVE PERCENT: 8.4 % (ref 0–3)
GFR AFRICAN AMERICAN: 55 ML/MIN/1.73M2
GFR NON-AFRICAN AMERICAN: 45 ML/MIN/1.73M2
GLUCOSE BLD-MCNC: 141 MG/DL (ref 70–99)
HCT VFR BLD CALC: 41.7 % (ref 42–52)
HEMOGLOBIN: 13 GM/DL (ref 13.5–18)
IMMATURE NEUTROPHIL %: 0.9 % (ref 0–0.43)
LYMPHOCYTES ABSOLUTE: 3.6 K/CU MM
LYMPHOCYTES RELATIVE PERCENT: 32.4 % (ref 24–44)
MACROCYTES: ABNORMAL
MCH RBC QN AUTO: 29.5 PG (ref 27–31)
MCHC RBC AUTO-ENTMCNC: 31.2 % (ref 32–36)
MCV RBC AUTO: 94.8 FL (ref 78–100)
MONOCYTES ABSOLUTE: 1.5 K/CU MM
MONOCYTES RELATIVE PERCENT: 13.9 % (ref 0–4)
OVALOCYTES: ABNORMAL
PDW BLD-RTO: 13.7 % (ref 11.7–14.9)
PLATELET # BLD: 241 K/CU MM (ref 140–440)
PLT MORPHOLOGY: ABNORMAL
PMV BLD AUTO: 9.3 FL (ref 7.5–11.1)
POLYCHROMASIA: ABNORMAL
POTASSIUM SERPL-SCNC: 3.9 MMOL/L (ref 3.5–5.1)
RBC # BLD: 4.4 M/CU MM (ref 4.6–6.2)
SEGMENTED NEUTROPHILS ABSOLUTE COUNT: 4.8 K/CU MM
SEGMENTED NEUTROPHILS RELATIVE PERCENT: 43.9 % (ref 36–66)
SODIUM BLD-SCNC: 137 MMOL/L (ref 135–145)
TOTAL IMMATURE NEUTOROPHIL: 0.1 K/CU MM
TOTAL PROTEIN: 6.4 GM/DL (ref 6.4–8.2)
WBC # BLD: 11 K/CU MM (ref 4–10.5)

## 2018-11-09 PROCEDURE — 85025 COMPLETE CBC W/AUTO DIFF WBC: CPT

## 2018-11-09 PROCEDURE — 87086 URINE CULTURE/COLONY COUNT: CPT

## 2018-11-09 PROCEDURE — 36415 COLL VENOUS BLD VENIPUNCTURE: CPT

## 2018-11-09 PROCEDURE — 80053 COMPREHEN METABOLIC PANEL: CPT

## 2018-11-09 PROCEDURE — 81001 URINALYSIS AUTO W/SCOPE: CPT

## 2018-11-10 ENCOUNTER — HOSPITAL ENCOUNTER (OUTPATIENT)
Age: 78
Setting detail: SPECIMEN
Discharge: HOME | End: 2018-11-10

## 2018-11-11 LAB
BACTERIA: ABNORMAL /HPF
BILIRUBIN URINE: NEGATIVE MG/DL
BLOOD, URINE: NEGATIVE
CALCIUM OXALATE CRYSTALS: ABNORMAL /HPF
CLARITY: ABNORMAL
COLOR: ABNORMAL
GLUCOSE, URINE: NEGATIVE MG/DL
KETONES, URINE: NEGATIVE MG/DL
LEUKOCYTE ESTERASE, URINE: NEGATIVE
MUCUS: ABNORMAL HPF
NITRITE URINE, QUANTITATIVE: NEGATIVE
PH, URINE: 5 (ref 5–8)
PROTEIN UA: NEGATIVE MG/DL
RBC URINE: ABNORMAL /HPF (ref 0–3)
SPECIFIC GRAVITY UA: 1.02 (ref 1–1.03)
TRICHOMONAS: ABNORMAL /HPF
UROBILINOGEN, URINE: NORMAL MG/DL (ref 0.2–1)
WBC UA: <1 /HPF (ref 0–2)

## 2018-11-12 ENCOUNTER — HOSPITAL ENCOUNTER (OUTPATIENT)
Age: 78
Setting detail: SPECIMEN
Discharge: HOME | End: 2018-11-12
Payer: MEDICARE

## 2018-11-12 LAB
ALBUMIN SERPL-MCNC: 3.7 GM/DL (ref 3.4–5)
ALP BLD-CCNC: 47 IU/L (ref 40–129)
ALT SERPL-CCNC: 30 U/L (ref 10–40)
AMMONIA: 28 UMOL/L (ref 16–60)
ANION GAP SERPL CALCULATED.3IONS-SCNC: 13 MMOL/L (ref 4–16)
AST SERPL-CCNC: 27 IU/L (ref 15–37)
BASOPHILS ABSOLUTE: 0.1 K/CU MM
BASOPHILS RELATIVE PERCENT: 1.3 % (ref 0–1)
BILIRUB SERPL-MCNC: 0.4 MG/DL (ref 0–1)
BUN BLDV-MCNC: 20 MG/DL (ref 6–23)
CALCIUM SERPL-MCNC: 9.2 MG/DL (ref 8.3–10.6)
CHLORIDE BLD-SCNC: 103 MMOL/L (ref 99–110)
CHOLESTEROL: 144 MG/DL
CO2: 25 MMOL/L (ref 21–32)
CORTISOL - AM: 14.3 UG/DL (ref 6–18.4)
CREAT SERPL-MCNC: 1.3 MG/DL (ref 0.9–1.3)
CULTURE: NORMAL
DIFFERENTIAL TYPE: ABNORMAL
EOSINOPHILS ABSOLUTE: 0.7 K/CU MM
EOSINOPHILS RELATIVE PERCENT: 8.6 % (ref 0–3)
ESTIMATED AVERAGE GLUCOSE: 148 MG/DL
GFR AFRICAN AMERICAN: >60 ML/MIN/1.73M2
GFR NON-AFRICAN AMERICAN: 53 ML/MIN/1.73M2
GLUCOSE BLD-MCNC: 102 MG/DL (ref 70–99)
HBA1C MFR BLD: 6.8 % (ref 4.2–6.3)
HCT VFR BLD CALC: 38.7 % (ref 42–52)
HDLC SERPL-MCNC: 52 MG/DL
HEMOGLOBIN: 12.4 GM/DL (ref 13.5–18)
IMMATURE NEUTROPHIL %: 0.8 % (ref 0–0.43)
LDL CHOLESTEROL DIRECT: 84 MG/DL
LYMPHOCYTES ABSOLUTE: 2.9 K/CU MM
LYMPHOCYTES RELATIVE PERCENT: 36.5 % (ref 24–44)
Lab: NORMAL
MCH RBC QN AUTO: 30.4 PG (ref 27–31)
MCHC RBC AUTO-ENTMCNC: 32 % (ref 32–36)
MCV RBC AUTO: 94.9 FL (ref 78–100)
MONOCYTES ABSOLUTE: 0.9 K/CU MM
MONOCYTES RELATIVE PERCENT: 11.5 % (ref 0–4)
NUCLEATED RBC %: 0 %
PDW BLD-RTO: 13.7 % (ref 11.7–14.9)
PLATELET # BLD: 255 K/CU MM (ref 140–440)
PMV BLD AUTO: 9 FL (ref 7.5–11.1)
POTASSIUM SERPL-SCNC: 4.2 MMOL/L (ref 3.5–5.1)
RBC # BLD: 4.08 M/CU MM (ref 4.6–6.2)
REPORT STATUS: NORMAL
SEGMENTED NEUTROPHILS ABSOLUTE COUNT: 3.3 K/CU MM
SEGMENTED NEUTROPHILS RELATIVE PERCENT: 41.3 % (ref 36–66)
SODIUM BLD-SCNC: 141 MMOL/L (ref 135–145)
SPECIMEN: NORMAL
TOTAL IMMATURE NEUTOROPHIL: 0.06 K/CU MM
TOTAL NUCLEATED RBC: 0 K/CU MM
TOTAL PROTEIN: 6 GM/DL (ref 6.4–8.2)
TRIGL SERPL-MCNC: 101 MG/DL
TSH HIGH SENSITIVITY: 1.03 UIU/ML (ref 0.27–4.2)
WBC # BLD: 8 K/CU MM (ref 4–10.5)

## 2018-11-12 PROCEDURE — 82533 TOTAL CORTISOL: CPT

## 2018-11-12 PROCEDURE — 82140 ASSAY OF AMMONIA: CPT

## 2018-11-12 PROCEDURE — 84443 ASSAY THYROID STIM HORMONE: CPT

## 2018-11-12 PROCEDURE — 36415 COLL VENOUS BLD VENIPUNCTURE: CPT

## 2018-11-12 PROCEDURE — 85025 COMPLETE CBC W/AUTO DIFF WBC: CPT

## 2018-11-12 PROCEDURE — 83721 ASSAY OF BLOOD LIPOPROTEIN: CPT

## 2018-11-12 PROCEDURE — 80053 COMPREHEN METABOLIC PANEL: CPT

## 2018-11-12 PROCEDURE — 80061 LIPID PANEL: CPT

## 2018-11-12 PROCEDURE — 83036 HEMOGLOBIN GLYCOSYLATED A1C: CPT

## 2019-01-22 ENCOUNTER — HOSPITAL ENCOUNTER (INPATIENT)
Age: 79
LOS: 9 days | Discharge: NURSING, REHAB OR LONG-TERM CARE | DRG: 444 | End: 2019-01-31
Attending: EMERGENCY MEDICINE | Admitting: FAMILY MEDICINE
Payer: MEDICARE

## 2019-01-22 DIAGNOSIS — R11.2 NON-INTRACTABLE VOMITING WITH NAUSEA, UNSPECIFIED VOMITING TYPE: ICD-10-CM

## 2019-01-22 DIAGNOSIS — R93.2 ABNORMAL FINDINGS ON IMAGING OF BILIARY TRACT: ICD-10-CM

## 2019-01-22 DIAGNOSIS — R10.13 ABDOMINAL PAIN, EPIGASTRIC: Primary | ICD-10-CM

## 2019-01-22 PROBLEM — R11.10 VOMITING: Status: ACTIVE | Noted: 2019-01-22

## 2019-01-22 LAB
ALBUMIN SERPL-MCNC: 4.1 GM/DL (ref 3.4–5)
ALP BLD-CCNC: 85 IU/L (ref 40–129)
ALT SERPL-CCNC: 22 U/L (ref 10–40)
ANION GAP SERPL CALCULATED.3IONS-SCNC: 17 MMOL/L (ref 4–16)
AST SERPL-CCNC: 29 IU/L (ref 15–37)
BACTERIA: NEGATIVE /HPF
BASE EXCESS MIXED: 1.1 (ref 0–1.2)
BASOPHILS ABSOLUTE: 0 K/CU MM
BASOPHILS RELATIVE PERCENT: 0.3 % (ref 0–1)
BILIRUB SERPL-MCNC: 1.1 MG/DL (ref 0–1)
BILIRUBIN URINE: NEGATIVE MG/DL
BLOOD, URINE: ABNORMAL
BUN BLDV-MCNC: 18 MG/DL (ref 6–23)
CALCIUM SERPL-MCNC: 9.5 MG/DL (ref 8.3–10.6)
CHLORIDE BLD-SCNC: 98 MMOL/L (ref 99–110)
CLARITY: CLEAR
CO2: 20 MMOL/L (ref 21–32)
COLOR: YELLOW
CREAT SERPL-MCNC: 1.2 MG/DL (ref 0.9–1.3)
DIFFERENTIAL TYPE: ABNORMAL
EOSINOPHILS ABSOLUTE: 0 K/CU MM
EOSINOPHILS RELATIVE PERCENT: 0.3 % (ref 0–3)
GFR AFRICAN AMERICAN: >60 ML/MIN/1.73M2
GFR NON-AFRICAN AMERICAN: 58 ML/MIN/1.73M2
GLUCOSE BLD-MCNC: 156 MG/DL (ref 70–99)
GLUCOSE, URINE: 50 MG/DL
HCO3 VENOUS: 22.9 MMOL/L (ref 19–25)
HCT VFR BLD CALC: 37.8 % (ref 42–52)
HEMOGLOBIN: 12.7 GM/DL (ref 13.5–18)
HYALINE CASTS: 0 /LPF
IMMATURE NEUTROPHIL %: 0.8 % (ref 0–0.43)
KETONES, URINE: ABNORMAL MG/DL
LACTATE: 1.3 MMOL/L (ref 0.4–2)
LEUKOCYTE ESTERASE, URINE: NEGATIVE
LIPASE: 21 IU/L (ref 13–60)
LYMPHOCYTES ABSOLUTE: 2.5 K/CU MM
LYMPHOCYTES RELATIVE PERCENT: 16.9 % (ref 24–44)
MAGNESIUM: 2.1 MG/DL (ref 1.8–2.4)
MCH RBC QN AUTO: 30.2 PG (ref 27–31)
MCHC RBC AUTO-ENTMCNC: 33.6 % (ref 32–36)
MCV RBC AUTO: 90 FL (ref 78–100)
MONOCYTES ABSOLUTE: 1.8 K/CU MM
MONOCYTES RELATIVE PERCENT: 12 % (ref 0–4)
NITRITE URINE, QUANTITATIVE: NEGATIVE
NUCLEATED RBC %: 0 %
O2 SAT, VEN: 93.4 % (ref 50–70)
PCO2, VEN: 28 MMHG (ref 38–52)
PDW BLD-RTO: 12.8 % (ref 11.7–14.9)
PH VENOUS: 7.52 (ref 7.32–7.42)
PH, URINE: 7 (ref 5–8)
PLATELET # BLD: 252 K/CU MM (ref 140–440)
PMV BLD AUTO: 9.3 FL (ref 7.5–11.1)
PO2, VEN: 113 MMHG (ref 28–48)
POTASSIUM SERPL-SCNC: 3.3 MMOL/L (ref 3.5–5.1)
PROTEIN UA: 100 MG/DL
RBC # BLD: 4.2 M/CU MM (ref 4.6–6.2)
RBC URINE: <1 /HPF (ref 0–3)
SEGMENTED NEUTROPHILS ABSOLUTE COUNT: 10.2 K/CU MM
SEGMENTED NEUTROPHILS RELATIVE PERCENT: 69.7 % (ref 36–66)
SODIUM BLD-SCNC: 135 MMOL/L (ref 135–145)
SPECIFIC GRAVITY UA: 1.01 (ref 1–1.03)
SQUAMOUS EPITHELIAL: <1 /HPF
TOTAL IMMATURE NEUTOROPHIL: 0.11 K/CU MM
TOTAL NUCLEATED RBC: 0 K/CU MM
TOTAL PROTEIN: 7.2 GM/DL (ref 6.4–8.2)
TRICHOMONAS: ABNORMAL /HPF
TROPONIN T: <0.01 NG/ML
UROBILINOGEN, URINE: NORMAL MG/DL (ref 0.2–1)
WBC # BLD: 14.6 K/CU MM (ref 4–10.5)
WBC UA: <1 /HPF (ref 0–2)

## 2019-01-22 PROCEDURE — 99285 EMERGENCY DEPT VISIT HI MDM: CPT

## 2019-01-22 PROCEDURE — 1200000000 HC SEMI PRIVATE

## 2019-01-22 PROCEDURE — 84484 ASSAY OF TROPONIN QUANT: CPT

## 2019-01-22 PROCEDURE — 80053 COMPREHEN METABOLIC PANEL: CPT

## 2019-01-22 PROCEDURE — 83605 ASSAY OF LACTIC ACID: CPT

## 2019-01-22 PROCEDURE — 82805 BLOOD GASES W/O2 SATURATION: CPT

## 2019-01-22 PROCEDURE — 96365 THER/PROPH/DIAG IV INF INIT: CPT

## 2019-01-22 PROCEDURE — 36415 COLL VENOUS BLD VENIPUNCTURE: CPT

## 2019-01-22 PROCEDURE — 85025 COMPLETE CBC W/AUTO DIFF WBC: CPT

## 2019-01-22 PROCEDURE — 93010 ELECTROCARDIOGRAM REPORT: CPT | Performed by: INTERNAL MEDICINE

## 2019-01-22 PROCEDURE — 87086 URINE CULTURE/COLONY COUNT: CPT

## 2019-01-22 PROCEDURE — 96375 TX/PRO/DX INJ NEW DRUG ADDON: CPT

## 2019-01-22 PROCEDURE — 83735 ASSAY OF MAGNESIUM: CPT

## 2019-01-22 PROCEDURE — 6360000002 HC RX W HCPCS: Performed by: EMERGENCY MEDICINE

## 2019-01-22 PROCEDURE — 2580000003 HC RX 258: Performed by: EMERGENCY MEDICINE

## 2019-01-22 PROCEDURE — 87040 BLOOD CULTURE FOR BACTERIA: CPT

## 2019-01-22 PROCEDURE — 81001 URINALYSIS AUTO W/SCOPE: CPT

## 2019-01-22 PROCEDURE — 83690 ASSAY OF LIPASE: CPT

## 2019-01-22 PROCEDURE — 93005 ELECTROCARDIOGRAM TRACING: CPT | Performed by: EMERGENCY MEDICINE

## 2019-01-22 RX ORDER — POTASSIUM CHLORIDE 7.45 MG/ML
20 INJECTION INTRAVENOUS ONCE
Status: DISCONTINUED | OUTPATIENT
Start: 2019-01-22 | End: 2019-01-22 | Stop reason: CLARIF

## 2019-01-22 RX ORDER — DEXTROSE MONOHYDRATE 25 G/50ML
12.5 INJECTION, SOLUTION INTRAVENOUS PRN
Status: DISCONTINUED | OUTPATIENT
Start: 2019-01-22 | End: 2019-01-31 | Stop reason: HOSPADM

## 2019-01-22 RX ORDER — MORPHINE SULFATE 4 MG/ML
2 INJECTION, SOLUTION INTRAMUSCULAR; INTRAVENOUS EVERY 30 MIN PRN
Status: DISCONTINUED | OUTPATIENT
Start: 2019-01-22 | End: 2019-01-22

## 2019-01-22 RX ORDER — SODIUM CHLORIDE 9 MG/ML
INJECTION, SOLUTION INTRAVENOUS CONTINUOUS
Status: DISCONTINUED | OUTPATIENT
Start: 2019-01-22 | End: 2019-01-22

## 2019-01-22 RX ORDER — SODIUM CHLORIDE 0.9 % (FLUSH) 0.9 %
10 SYRINGE (ML) INJECTION PRN
Status: DISCONTINUED | OUTPATIENT
Start: 2019-01-22 | End: 2019-01-31 | Stop reason: HOSPADM

## 2019-01-22 RX ORDER — TRAMADOL HYDROCHLORIDE 50 MG/1
50 TABLET ORAL EVERY 6 HOURS PRN
Status: ON HOLD | COMMUNITY
End: 2019-01-31

## 2019-01-22 RX ORDER — DILTIAZEM HYDROCHLORIDE 60 MG/1
60 TABLET, FILM COATED ORAL 2 TIMES DAILY
Status: DISCONTINUED | OUTPATIENT
Start: 2019-01-22 | End: 2019-01-31 | Stop reason: HOSPADM

## 2019-01-22 RX ORDER — CILOSTAZOL 100 MG/1
100 TABLET ORAL 2 TIMES DAILY
Status: DISCONTINUED | OUTPATIENT
Start: 2019-01-22 | End: 2019-01-23

## 2019-01-22 RX ORDER — ASPIRIN 325 MG
325 TABLET ORAL DAILY
Status: DISCONTINUED | OUTPATIENT
Start: 2019-01-23 | End: 2019-01-31 | Stop reason: HOSPADM

## 2019-01-22 RX ORDER — POTASSIUM CHLORIDE 7.45 MG/ML
10 INJECTION INTRAVENOUS ONCE
Status: COMPLETED | OUTPATIENT
Start: 2019-01-22 | End: 2019-01-22

## 2019-01-22 RX ORDER — GABAPENTIN 600 MG/1
600 TABLET ORAL 3 TIMES DAILY PRN
Status: ON HOLD | COMMUNITY
End: 2020-04-09 | Stop reason: HOSPADM

## 2019-01-22 RX ORDER — ATORVASTATIN CALCIUM 40 MG/1
40 TABLET, FILM COATED ORAL DAILY
Status: DISCONTINUED | OUTPATIENT
Start: 2019-01-23 | End: 2019-01-31 | Stop reason: HOSPADM

## 2019-01-22 RX ORDER — DEXTROSE MONOHYDRATE 50 MG/ML
100 INJECTION, SOLUTION INTRAVENOUS PRN
Status: DISCONTINUED | OUTPATIENT
Start: 2019-01-22 | End: 2019-01-31 | Stop reason: HOSPADM

## 2019-01-22 RX ORDER — ONDANSETRON 2 MG/ML
4 INJECTION INTRAMUSCULAR; INTRAVENOUS EVERY 6 HOURS PRN
Status: DISCONTINUED | OUTPATIENT
Start: 2019-01-22 | End: 2019-01-31 | Stop reason: HOSPADM

## 2019-01-22 RX ORDER — ONDANSETRON 2 MG/ML
4 INJECTION INTRAMUSCULAR; INTRAVENOUS EVERY 6 HOURS PRN
Status: DISCONTINUED | OUTPATIENT
Start: 2019-01-22 | End: 2019-01-23

## 2019-01-22 RX ORDER — MORPHINE SULFATE 4 MG/ML
2 INJECTION, SOLUTION INTRAMUSCULAR; INTRAVENOUS
Status: DISCONTINUED | OUTPATIENT
Start: 2019-01-22 | End: 2019-01-24

## 2019-01-22 RX ORDER — GLIMEPIRIDE 1 MG/1
1 TABLET ORAL
Status: DISCONTINUED | OUTPATIENT
Start: 2019-01-23 | End: 2019-01-23

## 2019-01-22 RX ORDER — SODIUM CHLORIDE 0.9 % (FLUSH) 0.9 %
10 SYRINGE (ML) INJECTION 2 TIMES DAILY
Status: DISCONTINUED | OUTPATIENT
Start: 2019-01-23 | End: 2019-01-31 | Stop reason: HOSPADM

## 2019-01-22 RX ORDER — SODIUM CHLORIDE 9 MG/ML
INJECTION, SOLUTION INTRAVENOUS CONTINUOUS
Status: DISCONTINUED | OUTPATIENT
Start: 2019-01-22 | End: 2019-01-31 | Stop reason: HOSPADM

## 2019-01-22 RX ORDER — CARVEDILOL 6.25 MG/1
6.25 TABLET ORAL 2 TIMES DAILY WITH MEALS
Status: DISCONTINUED | OUTPATIENT
Start: 2019-01-23 | End: 2019-01-23

## 2019-01-22 RX ORDER — 0.9 % SODIUM CHLORIDE 0.9 %
1000 INTRAVENOUS SOLUTION INTRAVENOUS ONCE
Status: DISCONTINUED | OUTPATIENT
Start: 2019-01-22 | End: 2019-01-31 | Stop reason: HOSPADM

## 2019-01-22 RX ORDER — TRAMADOL HYDROCHLORIDE 50 MG/1
50 TABLET ORAL EVERY 6 HOURS PRN
Status: DISCONTINUED | OUTPATIENT
Start: 2019-01-22 | End: 2019-01-31 | Stop reason: HOSPADM

## 2019-01-22 RX ORDER — NICOTINE POLACRILEX 4 MG
15 LOZENGE BUCCAL PRN
Status: DISCONTINUED | OUTPATIENT
Start: 2019-01-22 | End: 2019-01-31 | Stop reason: HOSPADM

## 2019-01-22 RX ORDER — ONDANSETRON 2 MG/ML
4 INJECTION INTRAMUSCULAR; INTRAVENOUS EVERY 30 MIN PRN
Status: DISCONTINUED | OUTPATIENT
Start: 2019-01-22 | End: 2019-01-22

## 2019-01-22 RX ADMIN — SODIUM CHLORIDE: 9 INJECTION, SOLUTION INTRAVENOUS at 19:38

## 2019-01-22 RX ADMIN — POTASSIUM CHLORIDE 10 MEQ: 7.46 INJECTION, SOLUTION INTRAVENOUS at 21:13

## 2019-01-22 RX ADMIN — ONDANSETRON 4 MG: 2 INJECTION INTRAMUSCULAR; INTRAVENOUS at 19:50

## 2019-01-22 RX ADMIN — POTASSIUM CHLORIDE 10 MEQ: 7.46 INJECTION, SOLUTION INTRAVENOUS at 22:19

## 2019-01-22 RX ADMIN — MORPHINE SULFATE 2 MG: 4 INJECTION INTRAVENOUS at 19:50

## 2019-01-22 ASSESSMENT — PAIN SCALES - GENERAL: PAINLEVEL_OUTOF10: 5

## 2019-01-23 ENCOUNTER — ANESTHESIA EVENT (OUTPATIENT)
Dept: ENDOSCOPY | Age: 79
DRG: 444 | End: 2019-01-23
Payer: MEDICARE

## 2019-01-23 ENCOUNTER — APPOINTMENT (OUTPATIENT)
Dept: MRI IMAGING | Age: 79
DRG: 444 | End: 2019-01-23
Payer: MEDICARE

## 2019-01-23 LAB
ANION GAP SERPL CALCULATED.3IONS-SCNC: 14 MMOL/L (ref 4–16)
BASOPHILS ABSOLUTE: 0.1 K/CU MM
BASOPHILS RELATIVE PERCENT: 0.6 % (ref 0–1)
BUN BLDV-MCNC: 18 MG/DL (ref 6–23)
CALCIUM SERPL-MCNC: 9.1 MG/DL (ref 8.3–10.6)
CHLORIDE BLD-SCNC: 102 MMOL/L (ref 99–110)
CO2: 22 MMOL/L (ref 21–32)
CREAT SERPL-MCNC: 1.4 MG/DL (ref 0.9–1.3)
DIFFERENTIAL TYPE: ABNORMAL
EOSINOPHILS ABSOLUTE: 0.5 K/CU MM
EOSINOPHILS RELATIVE PERCENT: 4.8 % (ref 0–3)
ESTIMATED AVERAGE GLUCOSE: 131 MG/DL
GFR AFRICAN AMERICAN: 59 ML/MIN/1.73M2
GFR NON-AFRICAN AMERICAN: 49 ML/MIN/1.73M2
GLUCOSE BLD-MCNC: 100 MG/DL (ref 70–99)
GLUCOSE BLD-MCNC: 113 MG/DL (ref 70–99)
GLUCOSE BLD-MCNC: 137 MG/DL (ref 70–99)
GLUCOSE BLD-MCNC: 64 MG/DL (ref 70–99)
GLUCOSE BLD-MCNC: 71 MG/DL (ref 70–99)
GLUCOSE BLD-MCNC: 72 MG/DL (ref 70–99)
GLUCOSE BLD-MCNC: 81 MG/DL (ref 70–99)
HBA1C MFR BLD: 6.2 % (ref 4.2–6.3)
HCT VFR BLD CALC: 40.5 % (ref 42–52)
HEMOGLOBIN: 13 GM/DL (ref 13.5–18)
IMMATURE NEUTROPHIL %: 0.4 % (ref 0–0.43)
INR BLD: 1.11 INDEX
LYMPHOCYTES ABSOLUTE: 2.7 K/CU MM
LYMPHOCYTES RELATIVE PERCENT: 25.9 % (ref 24–44)
MAGNESIUM: 2 MG/DL (ref 1.8–2.4)
MCH RBC QN AUTO: 30.1 PG (ref 27–31)
MCHC RBC AUTO-ENTMCNC: 32.1 % (ref 32–36)
MCV RBC AUTO: 93.8 FL (ref 78–100)
MONOCYTES ABSOLUTE: 1.4 K/CU MM
MONOCYTES RELATIVE PERCENT: 13.6 % (ref 0–4)
NUCLEATED RBC %: 0 %
PDW BLD-RTO: 12.9 % (ref 11.7–14.9)
PLATELET # BLD: 230 K/CU MM (ref 140–440)
PMV BLD AUTO: 9.1 FL (ref 7.5–11.1)
POTASSIUM SERPL-SCNC: 3.8 MMOL/L (ref 3.5–5.1)
PROTHROMBIN TIME: 12.6 SECONDS (ref 9.12–12.5)
RBC # BLD: 4.32 M/CU MM (ref 4.6–6.2)
SEGMENTED NEUTROPHILS ABSOLUTE COUNT: 5.8 K/CU MM
SEGMENTED NEUTROPHILS RELATIVE PERCENT: 54.7 % (ref 36–66)
SODIUM BLD-SCNC: 138 MMOL/L (ref 135–145)
TOTAL IMMATURE NEUTOROPHIL: 0.04 K/CU MM
TOTAL NUCLEATED RBC: 0 K/CU MM
WBC # BLD: 10.5 K/CU MM (ref 4–10.5)

## 2019-01-23 PROCEDURE — 82962 GLUCOSE BLOOD TEST: CPT

## 2019-01-23 PROCEDURE — 74181 MRI ABDOMEN W/O CONTRAST: CPT

## 2019-01-23 PROCEDURE — 1200000000 HC SEMI PRIVATE

## 2019-01-23 PROCEDURE — 6360000002 HC RX W HCPCS: Performed by: SPECIALIST

## 2019-01-23 PROCEDURE — 94761 N-INVAS EAR/PLS OXIMETRY MLT: CPT

## 2019-01-23 PROCEDURE — 6360000002 HC RX W HCPCS: Performed by: FAMILY MEDICINE

## 2019-01-23 PROCEDURE — 6370000000 HC RX 637 (ALT 250 FOR IP): Performed by: FAMILY MEDICINE

## 2019-01-23 PROCEDURE — 6360000002 HC RX W HCPCS: Performed by: HOSPITALIST

## 2019-01-23 PROCEDURE — 83036 HEMOGLOBIN GLYCOSYLATED A1C: CPT

## 2019-01-23 PROCEDURE — 2580000003 HC RX 258: Performed by: EMERGENCY MEDICINE

## 2019-01-23 PROCEDURE — 85610 PROTHROMBIN TIME: CPT

## 2019-01-23 PROCEDURE — 85025 COMPLETE CBC W/AUTO DIFF WBC: CPT

## 2019-01-23 PROCEDURE — 96361 HYDRATE IV INFUSION ADD-ON: CPT

## 2019-01-23 PROCEDURE — 36415 COLL VENOUS BLD VENIPUNCTURE: CPT

## 2019-01-23 PROCEDURE — 2580000003 HC RX 258: Performed by: FAMILY MEDICINE

## 2019-01-23 PROCEDURE — 6360000002 HC RX W HCPCS: Performed by: EMERGENCY MEDICINE

## 2019-01-23 PROCEDURE — 83735 ASSAY OF MAGNESIUM: CPT

## 2019-01-23 PROCEDURE — 6370000000 HC RX 637 (ALT 250 FOR IP): Performed by: HOSPITALIST

## 2019-01-23 PROCEDURE — 2580000003 HC RX 258: Performed by: HOSPITALIST

## 2019-01-23 PROCEDURE — 96375 TX/PRO/DX INJ NEW DRUG ADDON: CPT

## 2019-01-23 PROCEDURE — 2580000003 HC RX 258: Performed by: SPECIALIST

## 2019-01-23 PROCEDURE — 80048 BASIC METABOLIC PNL TOTAL CA: CPT

## 2019-01-23 RX ORDER — CARVEDILOL 12.5 MG/1
12.5 TABLET ORAL 2 TIMES DAILY WITH MEALS
Status: DISCONTINUED | OUTPATIENT
Start: 2019-01-23 | End: 2019-01-24

## 2019-01-23 RX ADMIN — CEFTRIAXONE 1 G: 1 INJECTION, POWDER, FOR SOLUTION INTRAMUSCULAR; INTRAVENOUS at 21:04

## 2019-01-23 RX ADMIN — CARVEDILOL 12.5 MG: 12.5 TABLET, FILM COATED ORAL at 17:16

## 2019-01-23 RX ADMIN — TRAMADOL HYDROCHLORIDE 50 MG: 50 TABLET, FILM COATED ORAL at 23:43

## 2019-01-23 RX ADMIN — DEXTROSE MONOHYDRATE 12.5 G: 25 INJECTION, SOLUTION INTRAVENOUS at 01:00

## 2019-01-23 RX ADMIN — MORPHINE SULFATE 2 MG: 4 INJECTION INTRAVENOUS at 06:32

## 2019-01-23 RX ADMIN — ONDANSETRON 4 MG: 2 INJECTION INTRAMUSCULAR; INTRAVENOUS at 17:04

## 2019-01-23 RX ADMIN — DEXTROSE MONOHYDRATE 1 G: 5 INJECTION INTRAVENOUS at 00:52

## 2019-01-23 RX ADMIN — MORPHINE SULFATE 2 MG: 4 INJECTION INTRAVENOUS at 12:41

## 2019-01-23 RX ADMIN — DILTIAZEM HYDROCHLORIDE 60 MG: 60 TABLET, FILM COATED ORAL at 21:04

## 2019-01-23 RX ADMIN — SODIUM CHLORIDE: 9 INJECTION, SOLUTION INTRAVENOUS at 04:44

## 2019-01-23 RX ADMIN — HYDRALAZINE HYDROCHLORIDE 10 MG: 20 INJECTION, SOLUTION INTRAMUSCULAR; INTRAVENOUS at 19:19

## 2019-01-23 RX ADMIN — TRAMADOL HYDROCHLORIDE 50 MG: 50 TABLET, FILM COATED ORAL at 17:17

## 2019-01-23 RX ADMIN — TRAMADOL HYDROCHLORIDE 50 MG: 50 TABLET, FILM COATED ORAL at 11:37

## 2019-01-23 ASSESSMENT — PAIN SCALES - GENERAL
PAINLEVEL_OUTOF10: 5
PAINLEVEL_OUTOF10: 7
PAINLEVEL_OUTOF10: 6
PAINLEVEL_OUTOF10: 6

## 2019-01-23 ASSESSMENT — COPD QUESTIONNAIRES: CAT_SEVERITY: MODERATE

## 2019-01-24 ENCOUNTER — ANESTHESIA (OUTPATIENT)
Dept: ENDOSCOPY | Age: 79
DRG: 444 | End: 2019-01-24
Payer: MEDICARE

## 2019-01-24 ENCOUNTER — APPOINTMENT (OUTPATIENT)
Dept: CT IMAGING | Age: 79
DRG: 444 | End: 2019-01-24
Payer: MEDICARE

## 2019-01-24 ENCOUNTER — ANESTHESIA EVENT (OUTPATIENT)
Dept: ENDOSCOPY | Age: 79
DRG: 444 | End: 2019-01-24
Payer: MEDICARE

## 2019-01-24 LAB
ALBUMIN SERPL-MCNC: 4.1 GM/DL (ref 3.4–5)
ALP BLD-CCNC: 80 IU/L (ref 40–129)
ALT SERPL-CCNC: 26 U/L (ref 10–40)
AMMONIA: 55 UMOL/L (ref 16–60)
ANION GAP SERPL CALCULATED.3IONS-SCNC: 17 MMOL/L (ref 4–16)
AST SERPL-CCNC: 42 IU/L (ref 15–37)
BACTERIA: NEGATIVE /HPF
BASOPHILS ABSOLUTE: 0.1 K/CU MM
BASOPHILS RELATIVE PERCENT: 1 % (ref 0–1)
BILIRUB SERPL-MCNC: 0.6 MG/DL (ref 0–1)
BILIRUBIN URINE: NEGATIVE MG/DL
BLOOD, URINE: NEGATIVE
BUN BLDV-MCNC: 16 MG/DL (ref 6–23)
CALCIUM SERPL-MCNC: 9 MG/DL (ref 8.3–10.6)
CHLORIDE BLD-SCNC: 105 MMOL/L (ref 99–110)
CLARITY: CLEAR
CO2: 18 MMOL/L (ref 21–32)
COLOR: COLORLESS
CREAT SERPL-MCNC: 1.2 MG/DL (ref 0.9–1.3)
CULTURE: NORMAL
DIFFERENTIAL TYPE: ABNORMAL
EOSINOPHILS ABSOLUTE: 0.7 K/CU MM
EOSINOPHILS RELATIVE PERCENT: 7.2 % (ref 0–3)
GFR AFRICAN AMERICAN: >60 ML/MIN/1.73M2
GFR NON-AFRICAN AMERICAN: 58 ML/MIN/1.73M2
GLUCOSE BLD-MCNC: 146 MG/DL (ref 70–99)
GLUCOSE BLD-MCNC: 160 MG/DL (ref 70–99)
GLUCOSE BLD-MCNC: 171 MG/DL (ref 70–99)
GLUCOSE BLD-MCNC: 213 MG/DL (ref 70–99)
GLUCOSE, URINE: NEGATIVE MG/DL
HCT VFR BLD CALC: 40.7 % (ref 42–52)
HEMOGLOBIN: 13.4 GM/DL (ref 13.5–18)
IMMATURE NEUTROPHIL %: 0.3 % (ref 0–0.43)
KETONES, URINE: NEGATIVE MG/DL
LEUKOCYTE ESTERASE, URINE: NEGATIVE
LIPASE: 40 IU/L (ref 13–60)
LYMPHOCYTES ABSOLUTE: 2.7 K/CU MM
LYMPHOCYTES RELATIVE PERCENT: 27.9 % (ref 24–44)
Lab: NORMAL
MCH RBC QN AUTO: 29.7 PG (ref 27–31)
MCHC RBC AUTO-ENTMCNC: 32.9 % (ref 32–36)
MCV RBC AUTO: 90.2 FL (ref 78–100)
MONOCYTES ABSOLUTE: 1.3 K/CU MM
MONOCYTES RELATIVE PERCENT: 13.3 % (ref 0–4)
MUCUS: ABNORMAL HPF
NITRITE URINE, QUANTITATIVE: NEGATIVE
NUCLEATED RBC %: 0 %
PDW BLD-RTO: 12.8 % (ref 11.7–14.9)
PH, URINE: 7 (ref 5–8)
PLATELET # BLD: 217 K/CU MM (ref 140–440)
PMV BLD AUTO: 8.9 FL (ref 7.5–11.1)
POTASSIUM SERPL-SCNC: 3.7 MMOL/L (ref 3.5–5.1)
PROTEIN UA: NEGATIVE MG/DL
RBC # BLD: 4.51 M/CU MM (ref 4.6–6.2)
RBC URINE: <1 /HPF (ref 0–3)
REPORT STATUS: NORMAL
SEGMENTED NEUTROPHILS ABSOLUTE COUNT: 4.9 K/CU MM
SEGMENTED NEUTROPHILS RELATIVE PERCENT: 50.3 % (ref 36–66)
SODIUM BLD-SCNC: 140 MMOL/L (ref 135–145)
SPECIFIC GRAVITY UA: 1 (ref 1–1.03)
SPECIMEN: NORMAL
TOTAL COLONY COUNT: NORMAL
TOTAL IMMATURE NEUTOROPHIL: 0.03 K/CU MM
TOTAL NUCLEATED RBC: 0 K/CU MM
TOTAL PROTEIN: 6.4 GM/DL (ref 6.4–8.2)
TRICHOMONAS: ABNORMAL /HPF
UROBILINOGEN, URINE: NORMAL MG/DL (ref 0.2–1)
WBC # BLD: 9.7 K/CU MM (ref 4–10.5)
WBC UA: <1 /HPF (ref 0–2)

## 2019-01-24 PROCEDURE — 6360000002 HC RX W HCPCS: Performed by: SPECIALIST

## 2019-01-24 PROCEDURE — 94761 N-INVAS EAR/PLS OXIMETRY MLT: CPT

## 2019-01-24 PROCEDURE — 2580000003 HC RX 258: Performed by: HOSPITALIST

## 2019-01-24 PROCEDURE — 6360000002 HC RX W HCPCS: Performed by: FAMILY MEDICINE

## 2019-01-24 PROCEDURE — 2140000000 HC CCU INTERMEDIATE R&B

## 2019-01-24 PROCEDURE — 70450 CT HEAD/BRAIN W/O DYE: CPT

## 2019-01-24 PROCEDURE — 51702 INSERT TEMP BLADDER CATH: CPT

## 2019-01-24 PROCEDURE — 80053 COMPREHEN METABOLIC PANEL: CPT

## 2019-01-24 PROCEDURE — 6370000000 HC RX 637 (ALT 250 FOR IP): Performed by: FAMILY MEDICINE

## 2019-01-24 PROCEDURE — 36415 COLL VENOUS BLD VENIPUNCTURE: CPT

## 2019-01-24 PROCEDURE — 2700000000 HC OXYGEN THERAPY PER DAY

## 2019-01-24 PROCEDURE — 2580000003 HC RX 258: Performed by: SPECIALIST

## 2019-01-24 PROCEDURE — 81001 URINALYSIS AUTO W/SCOPE: CPT

## 2019-01-24 PROCEDURE — 85025 COMPLETE CBC W/AUTO DIFF WBC: CPT

## 2019-01-24 PROCEDURE — 82140 ASSAY OF AMMONIA: CPT

## 2019-01-24 PROCEDURE — 82962 GLUCOSE BLOOD TEST: CPT

## 2019-01-24 PROCEDURE — 83690 ASSAY OF LIPASE: CPT

## 2019-01-24 PROCEDURE — 6370000000 HC RX 637 (ALT 250 FOR IP): Performed by: HOSPITALIST

## 2019-01-24 RX ORDER — LORAZEPAM 2 MG/ML
0.5 INJECTION INTRAMUSCULAR ONCE
Status: DISCONTINUED | OUTPATIENT
Start: 2019-01-24 | End: 2019-01-31 | Stop reason: HOSPADM

## 2019-01-24 RX ORDER — CARVEDILOL 6.25 MG/1
6.25 TABLET ORAL 2 TIMES DAILY WITH MEALS
Status: DISCONTINUED | OUTPATIENT
Start: 2019-01-24 | End: 2019-01-31 | Stop reason: HOSPADM

## 2019-01-24 RX ORDER — 0.9 % SODIUM CHLORIDE 0.9 %
500 INTRAVENOUS SOLUTION INTRAVENOUS ONCE
Status: COMPLETED | OUTPATIENT
Start: 2019-01-24 | End: 2019-01-24

## 2019-01-24 RX ORDER — HYDROMORPHONE HCL 110MG/55ML
1 PATIENT CONTROLLED ANALGESIA SYRINGE INTRAVENOUS
Status: DISCONTINUED | OUTPATIENT
Start: 2019-01-24 | End: 2019-01-24

## 2019-01-24 RX ORDER — LORAZEPAM 2 MG/ML
1 INJECTION INTRAMUSCULAR EVERY 6 HOURS PRN
Status: DISCONTINUED | OUTPATIENT
Start: 2019-01-24 | End: 2019-01-27

## 2019-01-24 RX ADMIN — LORAZEPAM 1 MG: 2 INJECTION INTRAMUSCULAR; INTRAVENOUS at 02:47

## 2019-01-24 RX ADMIN — TRAMADOL HYDROCHLORIDE 50 MG: 50 TABLET, FILM COATED ORAL at 13:24

## 2019-01-24 RX ADMIN — HYDROMORPHONE HYDROCHLORIDE 1 MG: 2 INJECTION INTRAMUSCULAR; INTRAVENOUS; SUBCUTANEOUS at 01:33

## 2019-01-24 RX ADMIN — ASPIRIN 325 MG ORAL TABLET 325 MG: 325 PILL ORAL at 12:14

## 2019-01-24 RX ADMIN — ATORVASTATIN CALCIUM 40 MG: 40 TABLET, FILM COATED ORAL at 12:14

## 2019-01-24 RX ADMIN — INSULIN LISPRO 1 UNITS: 100 INJECTION, SOLUTION INTRAVENOUS; SUBCUTANEOUS at 21:47

## 2019-01-24 RX ADMIN — SODIUM CHLORIDE: 9 INJECTION, SOLUTION INTRAVENOUS at 17:51

## 2019-01-24 RX ADMIN — Medication 30 MG: at 12:14

## 2019-01-24 RX ADMIN — TRAMADOL HYDROCHLORIDE 50 MG: 50 TABLET, FILM COATED ORAL at 20:32

## 2019-01-24 RX ADMIN — DILTIAZEM HYDROCHLORIDE 60 MG: 60 TABLET, FILM COATED ORAL at 12:14

## 2019-01-24 RX ADMIN — SODIUM CHLORIDE 500 ML: 9 INJECTION, SOLUTION INTRAVENOUS at 15:39

## 2019-01-24 RX ADMIN — SODIUM CHLORIDE: 9 INJECTION, SOLUTION INTRAVENOUS at 02:47

## 2019-01-24 RX ADMIN — DILTIAZEM HYDROCHLORIDE 60 MG: 60 TABLET, FILM COATED ORAL at 20:32

## 2019-01-24 RX ADMIN — CEFTRIAXONE 1 G: 1 INJECTION, POWDER, FOR SOLUTION INTRAMUSCULAR; INTRAVENOUS at 21:47

## 2019-01-24 RX ADMIN — CARVEDILOL 12.5 MG: 12.5 TABLET, FILM COATED ORAL at 12:13

## 2019-01-24 ASSESSMENT — PAIN SCALES - GENERAL
PAINLEVEL_OUTOF10: 3
PAINLEVEL_OUTOF10: 5
PAINLEVEL_OUTOF10: 6

## 2019-01-24 ASSESSMENT — COPD QUESTIONNAIRES: CAT_SEVERITY: MODERATE

## 2019-01-25 ENCOUNTER — APPOINTMENT (OUTPATIENT)
Dept: GENERAL RADIOLOGY | Age: 79
DRG: 444 | End: 2019-01-25
Payer: MEDICARE

## 2019-01-25 ENCOUNTER — ANESTHESIA (OUTPATIENT)
Dept: ENDOSCOPY | Age: 79
DRG: 444 | End: 2019-01-25
Payer: MEDICARE

## 2019-01-25 VITALS
DIASTOLIC BLOOD PRESSURE: 74 MMHG | SYSTOLIC BLOOD PRESSURE: 118 MMHG | RESPIRATION RATE: 22 BRPM | OXYGEN SATURATION: 100 %

## 2019-01-25 LAB
GLUCOSE BLD-MCNC: 110 MG/DL (ref 70–99)
GLUCOSE BLD-MCNC: 146 MG/DL (ref 70–99)
GLUCOSE BLD-MCNC: 284 MG/DL (ref 70–99)
GLUCOSE BLD-MCNC: 342 MG/DL (ref 70–99)
GLUCOSE BLD-MCNC: 97 MG/DL (ref 70–99)
HCT VFR BLD CALC: 34.8 % (ref 42–52)
HCT VFR BLD CALC: 46.7 % (ref 42–52)
HEMOGLOBIN: 11.2 GM/DL (ref 13.5–18)
HEMOGLOBIN: 13.3 GM/DL (ref 13.5–18)

## 2019-01-25 PROCEDURE — 85014 HEMATOCRIT: CPT

## 2019-01-25 PROCEDURE — 7100000000 HC PACU RECOVERY - FIRST 15 MIN: Performed by: SPECIALIST

## 2019-01-25 PROCEDURE — 2720000010 HC SURG SUPPLY STERILE: Performed by: SPECIALIST

## 2019-01-25 PROCEDURE — C1726 CATH, BAL DIL, NON-VASCULAR: HCPCS | Performed by: SPECIALIST

## 2019-01-25 PROCEDURE — 6360000004 HC RX CONTRAST MEDICATION: Performed by: SPECIALIST

## 2019-01-25 PROCEDURE — 3700000001 HC ADD 15 MINUTES (ANESTHESIA): Performed by: SPECIALIST

## 2019-01-25 PROCEDURE — 3609015200 HC ERCP REMOVE CALCULI/DEBRIS BILIARY/PANCREAS DUCT: Performed by: SPECIALIST

## 2019-01-25 PROCEDURE — 7100000001 HC PACU RECOVERY - ADDTL 15 MIN: Performed by: SPECIALIST

## 2019-01-25 PROCEDURE — 2580000003 HC RX 258: Performed by: NURSE ANESTHETIST, CERTIFIED REGISTERED

## 2019-01-25 PROCEDURE — 2140000000 HC CCU INTERMEDIATE R&B

## 2019-01-25 PROCEDURE — 82962 GLUCOSE BLOOD TEST: CPT

## 2019-01-25 PROCEDURE — 0FC98ZZ EXTIRPATION OF MATTER FROM COMMON BILE DUCT, VIA NATURAL OR ARTIFICIAL OPENING ENDOSCOPIC: ICD-10-PCS | Performed by: SPECIALIST

## 2019-01-25 PROCEDURE — 6370000000 HC RX 637 (ALT 250 FOR IP): Performed by: FAMILY MEDICINE

## 2019-01-25 PROCEDURE — C1769 GUIDE WIRE: HCPCS | Performed by: SPECIALIST

## 2019-01-25 PROCEDURE — BF10YZZ FLUOROSCOPY OF BILE DUCTS USING OTHER CONTRAST: ICD-10-PCS | Performed by: SPECIALIST

## 2019-01-25 PROCEDURE — BF101ZZ FLUOROSCOPY OF BILE DUCTS USING LOW OSMOLAR CONTRAST: ICD-10-PCS | Performed by: SPECIALIST

## 2019-01-25 PROCEDURE — 2709999900 HC NON-CHARGEABLE SUPPLY: Performed by: SPECIALIST

## 2019-01-25 PROCEDURE — 6360000002 HC RX W HCPCS: Performed by: FAMILY MEDICINE

## 2019-01-25 PROCEDURE — 3700000000 HC ANESTHESIA ATTENDED CARE: Performed by: SPECIALIST

## 2019-01-25 PROCEDURE — 6360000002 HC RX W HCPCS: Performed by: SPECIALIST

## 2019-01-25 PROCEDURE — 2580000003 HC RX 258: Performed by: FAMILY MEDICINE

## 2019-01-25 PROCEDURE — 94761 N-INVAS EAR/PLS OXIMETRY MLT: CPT

## 2019-01-25 PROCEDURE — 2500000003 HC RX 250 WO HCPCS: Performed by: NURSE ANESTHETIST, CERTIFIED REGISTERED

## 2019-01-25 PROCEDURE — 2580000003 HC RX 258: Performed by: SPECIALIST

## 2019-01-25 PROCEDURE — 2580000003 HC RX 258: Performed by: HOSPITALIST

## 2019-01-25 PROCEDURE — 85018 HEMOGLOBIN: CPT

## 2019-01-25 PROCEDURE — 36415 COLL VENOUS BLD VENIPUNCTURE: CPT

## 2019-01-25 PROCEDURE — 2700000000 HC OXYGEN THERAPY PER DAY

## 2019-01-25 PROCEDURE — 6370000000 HC RX 637 (ALT 250 FOR IP): Performed by: HOSPITALIST

## 2019-01-25 RX ORDER — HALOPERIDOL 5 MG/ML
2 INJECTION INTRAMUSCULAR ONCE
Status: DISCONTINUED | OUTPATIENT
Start: 2019-01-25 | End: 2019-01-31 | Stop reason: HOSPADM

## 2019-01-25 RX ORDER — SODIUM CHLORIDE 9 MG/ML
INJECTION, SOLUTION INTRAVENOUS CONTINUOUS PRN
Status: DISCONTINUED | OUTPATIENT
Start: 2019-01-25 | End: 2019-01-25 | Stop reason: SDUPTHER

## 2019-01-25 RX ADMIN — SODIUM CHLORIDE, PRESERVATIVE FREE 10 ML: 5 INJECTION INTRAVENOUS at 08:59

## 2019-01-25 RX ADMIN — SODIUM CHLORIDE: 9 INJECTION, SOLUTION INTRAVENOUS at 20:02

## 2019-01-25 RX ADMIN — LORAZEPAM 1 MG: 2 INJECTION INTRAMUSCULAR; INTRAVENOUS at 08:59

## 2019-01-25 RX ADMIN — INSULIN LISPRO 3 UNITS: 100 INJECTION, SOLUTION INTRAVENOUS; SUBCUTANEOUS at 18:44

## 2019-01-25 RX ADMIN — SODIUM CHLORIDE: 9 INJECTION, SOLUTION INTRAVENOUS at 08:05

## 2019-01-25 RX ADMIN — CARVEDILOL 6.25 MG: 6.25 TABLET, FILM COATED ORAL at 18:44

## 2019-01-25 RX ADMIN — LORAZEPAM 1 MG: 2 INJECTION INTRAMUSCULAR; INTRAVENOUS at 02:24

## 2019-01-25 RX ADMIN — CEFTRIAXONE 1 G: 1 INJECTION, POWDER, FOR SOLUTION INTRAMUSCULAR; INTRAVENOUS at 20:02

## 2019-01-25 RX ADMIN — INSULIN LISPRO 2 UNITS: 100 INJECTION, SOLUTION INTRAVENOUS; SUBCUTANEOUS at 20:01

## 2019-01-25 RX ADMIN — SODIUM CHLORIDE: 9 INJECTION, SOLUTION INTRAVENOUS at 12:47

## 2019-01-25 RX ADMIN — DILTIAZEM HYDROCHLORIDE 60 MG: 60 TABLET, FILM COATED ORAL at 20:02

## 2019-01-25 RX ADMIN — TRAMADOL HYDROCHLORIDE 50 MG: 50 TABLET, FILM COATED ORAL at 18:49

## 2019-01-25 RX ADMIN — GLUCAGON HYDROCHLORIDE 0.2 MG: KIT at 14:02

## 2019-01-25 ASSESSMENT — PULMONARY FUNCTION TESTS
PIF_VALUE: 10
PIF_VALUE: 15
PIF_VALUE: 14
PIF_VALUE: 13
PIF_VALUE: 14
PIF_VALUE: 6
PIF_VALUE: 0
PIF_VALUE: 14
PIF_VALUE: 15
PIF_VALUE: 14
PIF_VALUE: 13
PIF_VALUE: 1
PIF_VALUE: 14
PIF_VALUE: 15
PIF_VALUE: 15
PIF_VALUE: 11
PIF_VALUE: 14
PIF_VALUE: 24
PIF_VALUE: 15
PIF_VALUE: 11
PIF_VALUE: 15
PIF_VALUE: 5
PIF_VALUE: 14
PIF_VALUE: 1
PIF_VALUE: 14
PIF_VALUE: 23
PIF_VALUE: 19
PIF_VALUE: 6
PIF_VALUE: 14
PIF_VALUE: 4
PIF_VALUE: 14
PIF_VALUE: 11
PIF_VALUE: 1
PIF_VALUE: 14
PIF_VALUE: 4
PIF_VALUE: 3
PIF_VALUE: 14
PIF_VALUE: 14
PIF_VALUE: 15
PIF_VALUE: 17
PIF_VALUE: 15
PIF_VALUE: 10
PIF_VALUE: 2
PIF_VALUE: 1
PIF_VALUE: 16
PIF_VALUE: 14
PIF_VALUE: 14
PIF_VALUE: 11
PIF_VALUE: 14
PIF_VALUE: 14
PIF_VALUE: 1
PIF_VALUE: 1
PIF_VALUE: 14
PIF_VALUE: 10
PIF_VALUE: 15
PIF_VALUE: 15
PIF_VALUE: 14
PIF_VALUE: 3
PIF_VALUE: 2
PIF_VALUE: 2
PIF_VALUE: 33
PIF_VALUE: 0
PIF_VALUE: 14
PIF_VALUE: 0
PIF_VALUE: 15
PIF_VALUE: 18
PIF_VALUE: 23
PIF_VALUE: 14
PIF_VALUE: 3
PIF_VALUE: 14
PIF_VALUE: 15
PIF_VALUE: 14
PIF_VALUE: 1
PIF_VALUE: 15
PIF_VALUE: 14
PIF_VALUE: 15
PIF_VALUE: 14
PIF_VALUE: 11
PIF_VALUE: 1
PIF_VALUE: 3
PIF_VALUE: 11
PIF_VALUE: 1
PIF_VALUE: 15
PIF_VALUE: 14
PIF_VALUE: 14
PIF_VALUE: 15
PIF_VALUE: 15
PIF_VALUE: 3
PIF_VALUE: 3
PIF_VALUE: 11
PIF_VALUE: 15
PIF_VALUE: 11
PIF_VALUE: 16
PIF_VALUE: 15
PIF_VALUE: 4
PIF_VALUE: 13

## 2019-01-25 ASSESSMENT — PAIN SCALES - GENERAL
PAINLEVEL_OUTOF10: 0
PAINLEVEL_OUTOF10: 0
PAINLEVEL_OUTOF10: 1
PAINLEVEL_OUTOF10: 0
PAINLEVEL_OUTOF10: 0
PAINLEVEL_OUTOF10: 10

## 2019-01-25 ASSESSMENT — COPD QUESTIONNAIRES: CAT_SEVERITY: MODERATE

## 2019-01-26 ENCOUNTER — APPOINTMENT (OUTPATIENT)
Dept: CT IMAGING | Age: 79
DRG: 444 | End: 2019-01-26
Payer: MEDICARE

## 2019-01-26 LAB
ACANTHOCYTES: ABNORMAL
ALBUMIN SERPL-MCNC: 3.4 GM/DL (ref 3.4–5)
ALP BLD-CCNC: 75 IU/L (ref 40–128)
ALT SERPL-CCNC: 36 U/L (ref 10–40)
ANION GAP SERPL CALCULATED.3IONS-SCNC: 13 MMOL/L (ref 4–16)
AST SERPL-CCNC: 49 IU/L (ref 15–37)
BANDED NEUTROPHILS ABSOLUTE COUNT: 1.58 K/CU MM
BANDED NEUTROPHILS RELATIVE PERCENT: 10 % (ref 5–11)
BILIRUB SERPL-MCNC: 0.4 MG/DL (ref 0–1)
BUN BLDV-MCNC: 21 MG/DL (ref 6–23)
BURR CELLS: ABNORMAL
CALCIUM SERPL-MCNC: 8.6 MG/DL (ref 8.3–10.6)
CHLORIDE BLD-SCNC: 107 MMOL/L (ref 99–110)
CO2: 18 MMOL/L (ref 21–32)
CREAT SERPL-MCNC: 1.1 MG/DL (ref 0.9–1.3)
DIFFERENTIAL TYPE: ABNORMAL
GFR AFRICAN AMERICAN: >60 ML/MIN/1.73M2
GFR NON-AFRICAN AMERICAN: >60 ML/MIN/1.73M2
GLUCOSE BLD-MCNC: 140 MG/DL (ref 70–99)
GLUCOSE BLD-MCNC: 199 MG/DL (ref 70–99)
GLUCOSE BLD-MCNC: 207 MG/DL (ref 70–99)
GLUCOSE BLD-MCNC: 208 MG/DL (ref 70–99)
GLUCOSE BLD-MCNC: 246 MG/DL (ref 70–99)
GLUCOSE BLD-MCNC: 322 MG/DL (ref 70–99)
HCT VFR BLD CALC: 32.8 % (ref 42–52)
HCT VFR BLD CALC: 34.8 % (ref 42–52)
HCT VFR BLD CALC: 35 % (ref 42–52)
HCT VFR BLD CALC: 36 % (ref 42–52)
HEMOGLOBIN: 10.5 GM/DL (ref 13.5–18)
HEMOGLOBIN: 11.1 GM/DL (ref 13.5–18)
HEMOGLOBIN: 11.2 GM/DL (ref 13.5–18)
HEMOGLOBIN: 11.4 GM/DL (ref 13.5–18)
LIPASE: 29 IU/L (ref 13–60)
LYMPHOCYTES ABSOLUTE: 1.1 K/CU MM
LYMPHOCYTES RELATIVE PERCENT: 7 % (ref 24–44)
MCH RBC QN AUTO: 30.3 PG (ref 27–31)
MCHC RBC AUTO-ENTMCNC: 32.2 % (ref 32–36)
MCV RBC AUTO: 94.1 FL (ref 78–100)
MONOCYTES ABSOLUTE: 0.2 K/CU MM
MONOCYTES RELATIVE PERCENT: 1 % (ref 0–4)
OVALOCYTES: ABNORMAL
PDW BLD-RTO: 13 % (ref 11.7–14.9)
PLATELET # BLD: 160 K/CU MM (ref 140–440)
PMV BLD AUTO: 10 FL (ref 7.5–11.1)
POTASSIUM SERPL-SCNC: 3.5 MMOL/L (ref 3.5–5.1)
RBC # BLD: 3.7 M/CU MM (ref 4.6–6.2)
SEGMENTED NEUTROPHILS ABSOLUTE COUNT: 12.9 K/CU MM
SEGMENTED NEUTROPHILS RELATIVE PERCENT: 82 % (ref 36–66)
SODIUM BLD-SCNC: 138 MMOL/L (ref 135–145)
TOTAL PROTEIN: 5.3 GM/DL (ref 6.4–8.2)
WBC # BLD: 15.8 K/CU MM (ref 4–10.5)

## 2019-01-26 PROCEDURE — 6360000004 HC RX CONTRAST MEDICATION: Performed by: SPECIALIST

## 2019-01-26 PROCEDURE — 83690 ASSAY OF LIPASE: CPT

## 2019-01-26 PROCEDURE — 84403 ASSAY OF TOTAL TESTOSTERONE: CPT

## 2019-01-26 PROCEDURE — 6360000002 HC RX W HCPCS: Performed by: NURSE PRACTITIONER

## 2019-01-26 PROCEDURE — 6370000000 HC RX 637 (ALT 250 FOR IP): Performed by: HOSPITALIST

## 2019-01-26 PROCEDURE — 74160 CT ABDOMEN W/CONTRAST: CPT

## 2019-01-26 PROCEDURE — 2700000000 HC OXYGEN THERAPY PER DAY

## 2019-01-26 PROCEDURE — 2580000003 HC RX 258: Performed by: HOSPITALIST

## 2019-01-26 PROCEDURE — 85018 HEMOGLOBIN: CPT

## 2019-01-26 PROCEDURE — 6370000000 HC RX 637 (ALT 250 FOR IP): Performed by: FAMILY MEDICINE

## 2019-01-26 PROCEDURE — 80053 COMPREHEN METABOLIC PANEL: CPT

## 2019-01-26 PROCEDURE — 83001 ASSAY OF GONADOTROPIN (FSH): CPT

## 2019-01-26 PROCEDURE — 6360000002 HC RX W HCPCS: Performed by: FAMILY MEDICINE

## 2019-01-26 PROCEDURE — 85014 HEMATOCRIT: CPT

## 2019-01-26 PROCEDURE — 6360000002 HC RX W HCPCS: Performed by: SPECIALIST

## 2019-01-26 PROCEDURE — 6360000002 HC RX W HCPCS

## 2019-01-26 PROCEDURE — 36415 COLL VENOUS BLD VENIPUNCTURE: CPT

## 2019-01-26 PROCEDURE — 94761 N-INVAS EAR/PLS OXIMETRY MLT: CPT

## 2019-01-26 PROCEDURE — 84270 ASSAY OF SEX HORMONE GLOBUL: CPT

## 2019-01-26 PROCEDURE — 83002 ASSAY OF GONADOTROPIN (LH): CPT

## 2019-01-26 PROCEDURE — 85027 COMPLETE CBC AUTOMATED: CPT

## 2019-01-26 PROCEDURE — 6370000000 HC RX 637 (ALT 250 FOR IP): Performed by: INTERNAL MEDICINE

## 2019-01-26 PROCEDURE — 2140000000 HC CCU INTERMEDIATE R&B

## 2019-01-26 PROCEDURE — 2580000003 HC RX 258: Performed by: SPECIALIST

## 2019-01-26 PROCEDURE — 82962 GLUCOSE BLOOD TEST: CPT

## 2019-01-26 PROCEDURE — 84146 ASSAY OF PROLACTIN: CPT

## 2019-01-26 RX ORDER — INSULIN GLARGINE 100 [IU]/ML
20 INJECTION, SOLUTION SUBCUTANEOUS NIGHTLY
Status: DISCONTINUED | OUTPATIENT
Start: 2019-01-26 | End: 2019-01-27

## 2019-01-26 RX ORDER — HYDROMORPHONE HCL 110MG/55ML
0.5 PATIENT CONTROLLED ANALGESIA SYRINGE INTRAVENOUS ONCE
Status: COMPLETED | OUTPATIENT
Start: 2019-01-26 | End: 2019-01-26

## 2019-01-26 RX ORDER — HYDROMORPHONE HCL 110MG/55ML
PATIENT CONTROLLED ANALGESIA SYRINGE INTRAVENOUS
Status: COMPLETED
Start: 2019-01-26 | End: 2019-01-26

## 2019-01-26 RX ORDER — 0.9 % SODIUM CHLORIDE 0.9 %
10 VIAL (ML) INJECTION
Status: COMPLETED | OUTPATIENT
Start: 2019-01-26 | End: 2019-01-26

## 2019-01-26 RX ORDER — INSULIN GLARGINE 100 [IU]/ML
15 INJECTION, SOLUTION SUBCUTANEOUS NIGHTLY
Status: DISCONTINUED | OUTPATIENT
Start: 2019-01-26 | End: 2019-01-26

## 2019-01-26 RX ORDER — HYDROMORPHONE HCL 110MG/55ML
0.5 PATIENT CONTROLLED ANALGESIA SYRINGE INTRAVENOUS EVERY 4 HOURS PRN
Status: DISCONTINUED | OUTPATIENT
Start: 2019-01-26 | End: 2019-01-27

## 2019-01-26 RX ADMIN — DILTIAZEM HYDROCHLORIDE 60 MG: 60 TABLET, FILM COATED ORAL at 10:34

## 2019-01-26 RX ADMIN — IOPAMIDOL 75 ML: 755 INJECTION, SOLUTION INTRAVENOUS at 08:50

## 2019-01-26 RX ADMIN — CEFTRIAXONE 1 G: 1 INJECTION, POWDER, FOR SOLUTION INTRAMUSCULAR; INTRAVENOUS at 20:56

## 2019-01-26 RX ADMIN — LORAZEPAM 1 MG: 2 INJECTION INTRAMUSCULAR; INTRAVENOUS at 04:34

## 2019-01-26 RX ADMIN — INSULIN HUMAN 15 UNITS: 100 INJECTION, SUSPENSION SUBCUTANEOUS at 10:44

## 2019-01-26 RX ADMIN — SODIUM CHLORIDE, PRESERVATIVE FREE 10 ML: 5 INJECTION INTRAVENOUS at 08:51

## 2019-01-26 RX ADMIN — HYDROMORPHONE HYDROCHLORIDE 0.5 MG: 2 INJECTION INTRAMUSCULAR; INTRAVENOUS; SUBCUTANEOUS at 09:14

## 2019-01-26 RX ADMIN — CARVEDILOL 6.25 MG: 6.25 TABLET, FILM COATED ORAL at 10:38

## 2019-01-26 RX ADMIN — Medication 0.5 MG: at 09:14

## 2019-01-26 RX ADMIN — Medication 30 MG: at 10:37

## 2019-01-26 RX ADMIN — INSULIN LISPRO 4 UNITS: 100 INJECTION, SOLUTION INTRAVENOUS; SUBCUTANEOUS at 16:15

## 2019-01-26 RX ADMIN — TRAMADOL HYDROCHLORIDE 50 MG: 50 TABLET, FILM COATED ORAL at 16:31

## 2019-01-26 RX ADMIN — HYDROMORPHONE HYDROCHLORIDE 0.5 MG: 2 INJECTION INTRAMUSCULAR; INTRAVENOUS; SUBCUTANEOUS at 20:52

## 2019-01-26 RX ADMIN — TRAMADOL HYDROCHLORIDE 50 MG: 50 TABLET, FILM COATED ORAL at 01:16

## 2019-01-26 RX ADMIN — ATORVASTATIN CALCIUM 40 MG: 40 TABLET, FILM COATED ORAL at 10:37

## 2019-01-26 RX ADMIN — SODIUM CHLORIDE: 9 INJECTION, SOLUTION INTRAVENOUS at 12:35

## 2019-01-26 RX ADMIN — ASPIRIN 325 MG ORAL TABLET 325 MG: 325 PILL ORAL at 10:34

## 2019-01-26 ASSESSMENT — PAIN SCALES - GENERAL
PAINLEVEL_OUTOF10: 0
PAINLEVEL_OUTOF10: 4
PAINLEVEL_OUTOF10: 0
PAINLEVEL_OUTOF10: 7
PAINLEVEL_OUTOF10: 0
PAINLEVEL_OUTOF10: 0

## 2019-01-26 ASSESSMENT — PAIN DESCRIPTION - PAIN TYPE
TYPE: ACUTE PAIN
TYPE: ACUTE PAIN

## 2019-01-26 ASSESSMENT — PAIN DESCRIPTION - LOCATION
LOCATION: GENERALIZED
LOCATION: ABDOMEN

## 2019-01-26 ASSESSMENT — PAIN DESCRIPTION - DESCRIPTORS
DESCRIPTORS: ACHING
DESCRIPTORS: ACHING

## 2019-01-27 LAB
CULTURE: NORMAL
FOLLICLE STIMULATING HORMONE: 2.3 MLU/ML
GLUCOSE BLD-MCNC: 135 MG/DL (ref 70–99)
GLUCOSE BLD-MCNC: 148 MG/DL (ref 70–99)
GLUCOSE BLD-MCNC: 191 MG/DL (ref 70–99)
GLUCOSE BLD-MCNC: 78 MG/DL (ref 70–99)
GLUCOSE BLD-MCNC: 85 MG/DL (ref 70–99)
GLUCOSE BLD-MCNC: 89 MG/DL (ref 70–99)
HCT VFR BLD CALC: 34 % (ref 42–52)
HCT VFR BLD CALC: 35.6 % (ref 42–52)
HEMOGLOBIN: 10.7 GM/DL (ref 13.5–18)
HEMOGLOBIN: 10.8 GM/DL (ref 13.5–18)
LH: 0.7 MIU/L
Lab: NORMAL
PROLACTIN: 3.9 NG/ML
REPORT STATUS: NORMAL
SPECIMEN: NORMAL

## 2019-01-27 PROCEDURE — 2580000003 HC RX 258: Performed by: SPECIALIST

## 2019-01-27 PROCEDURE — 6360000002 HC RX W HCPCS: Performed by: SPECIALIST

## 2019-01-27 PROCEDURE — 85018 HEMOGLOBIN: CPT

## 2019-01-27 PROCEDURE — 85014 HEMATOCRIT: CPT

## 2019-01-27 PROCEDURE — 82962 GLUCOSE BLOOD TEST: CPT

## 2019-01-27 PROCEDURE — 6370000000 HC RX 637 (ALT 250 FOR IP): Performed by: INTERNAL MEDICINE

## 2019-01-27 PROCEDURE — 6370000000 HC RX 637 (ALT 250 FOR IP): Performed by: FAMILY MEDICINE

## 2019-01-27 PROCEDURE — 2580000003 HC RX 258: Performed by: FAMILY MEDICINE

## 2019-01-27 PROCEDURE — 2580000003 HC RX 258: Performed by: HOSPITALIST

## 2019-01-27 PROCEDURE — 2140000000 HC CCU INTERMEDIATE R&B

## 2019-01-27 PROCEDURE — 6370000000 HC RX 637 (ALT 250 FOR IP): Performed by: HOSPITALIST

## 2019-01-27 PROCEDURE — 36415 COLL VENOUS BLD VENIPUNCTURE: CPT

## 2019-01-27 RX ORDER — OXYCODONE HYDROCHLORIDE 5 MG/1
5 TABLET ORAL EVERY 6 HOURS PRN
Status: DISCONTINUED | OUTPATIENT
Start: 2019-01-27 | End: 2019-01-31 | Stop reason: HOSPADM

## 2019-01-27 RX ORDER — ACETAMINOPHEN 325 MG/1
650 TABLET ORAL EVERY 4 HOURS PRN
Status: DISCONTINUED | OUTPATIENT
Start: 2019-01-27 | End: 2019-01-31 | Stop reason: HOSPADM

## 2019-01-27 RX ORDER — INSULIN GLARGINE 100 [IU]/ML
10 INJECTION, SOLUTION SUBCUTANEOUS NIGHTLY
Status: DISCONTINUED | OUTPATIENT
Start: 2019-01-27 | End: 2019-01-29

## 2019-01-27 RX ADMIN — TRAMADOL HYDROCHLORIDE 50 MG: 50 TABLET, FILM COATED ORAL at 23:36

## 2019-01-27 RX ADMIN — OXYCODONE HYDROCHLORIDE 5 MG: 5 TABLET ORAL at 19:36

## 2019-01-27 RX ADMIN — SODIUM CHLORIDE: 9 INJECTION, SOLUTION INTRAVENOUS at 02:39

## 2019-01-27 RX ADMIN — CARVEDILOL 6.25 MG: 6.25 TABLET, FILM COATED ORAL at 08:26

## 2019-01-27 RX ADMIN — DILTIAZEM HYDROCHLORIDE 60 MG: 60 TABLET, FILM COATED ORAL at 08:23

## 2019-01-27 RX ADMIN — CEFTRIAXONE 1 G: 1 INJECTION, POWDER, FOR SOLUTION INTRAMUSCULAR; INTRAVENOUS at 19:40

## 2019-01-27 RX ADMIN — ATORVASTATIN CALCIUM 40 MG: 40 TABLET, FILM COATED ORAL at 08:26

## 2019-01-27 RX ADMIN — INSULIN LISPRO 2 UNITS: 100 INJECTION, SOLUTION INTRAVENOUS; SUBCUTANEOUS at 00:07

## 2019-01-27 RX ADMIN — ACETAMINOPHEN 650 MG: 325 TABLET ORAL at 19:03

## 2019-01-27 RX ADMIN — CARVEDILOL 6.25 MG: 6.25 TABLET, FILM COATED ORAL at 17:05

## 2019-01-27 RX ADMIN — INSULIN LISPRO 2 UNITS: 100 INJECTION, SOLUTION INTRAVENOUS; SUBCUTANEOUS at 20:03

## 2019-01-27 RX ADMIN — ENOXAPARIN SODIUM 40 MG: 100 INJECTION SUBCUTANEOUS at 08:26

## 2019-01-27 RX ADMIN — SODIUM CHLORIDE: 9 INJECTION, SOLUTION INTRAVENOUS at 15:32

## 2019-01-27 RX ADMIN — SODIUM CHLORIDE, PRESERVATIVE FREE 10 ML: 5 INJECTION INTRAVENOUS at 08:32

## 2019-01-27 RX ADMIN — DILTIAZEM HYDROCHLORIDE 60 MG: 60 TABLET, FILM COATED ORAL at 19:37

## 2019-01-27 RX ADMIN — Medication 30 MG: at 08:26

## 2019-01-27 RX ADMIN — ASPIRIN 325 MG ORAL TABLET 325 MG: 325 PILL ORAL at 08:23

## 2019-01-27 ASSESSMENT — PAIN SCALES - GENERAL
PAINLEVEL_OUTOF10: 4
PAINLEVEL_OUTOF10: 0
PAINLEVEL_OUTOF10: 6
PAINLEVEL_OUTOF10: 6
PAINLEVEL_OUTOF10: 0
PAINLEVEL_OUTOF10: 4

## 2019-01-27 ASSESSMENT — PAIN DESCRIPTION - DESCRIPTORS
DESCRIPTORS: ACHING
DESCRIPTORS: ACHING

## 2019-01-27 ASSESSMENT — PAIN DESCRIPTION - PAIN TYPE
TYPE: ACUTE PAIN

## 2019-01-27 ASSESSMENT — PAIN DESCRIPTION - LOCATION
LOCATION: ABDOMEN

## 2019-01-28 LAB
CULTURE: NORMAL
GLUCOSE BLD-MCNC: 100 MG/DL (ref 70–99)
GLUCOSE BLD-MCNC: 158 MG/DL (ref 70–99)
GLUCOSE BLD-MCNC: 247 MG/DL (ref 70–99)
GLUCOSE BLD-MCNC: 76 MG/DL (ref 70–99)
GLUCOSE BLD-MCNC: 79 MG/DL (ref 70–99)
GLUCOSE BLD-MCNC: 89 MG/DL (ref 70–99)
HCT VFR BLD CALC: 33.3 % (ref 42–52)
HEMOGLOBIN: 10.7 GM/DL (ref 13.5–18)
Lab: NORMAL
MCH RBC QN AUTO: 29.9 PG (ref 27–31)
MCHC RBC AUTO-ENTMCNC: 32.1 % (ref 32–36)
MCV RBC AUTO: 93 FL (ref 78–100)
PDW BLD-RTO: 13.2 % (ref 11.7–14.9)
PLATELET # BLD: 149 K/CU MM (ref 140–440)
PMV BLD AUTO: 10.4 FL (ref 7.5–11.1)
RBC # BLD: 3.58 M/CU MM (ref 4.6–6.2)
REPORT STATUS: NORMAL
SPECIMEN: NORMAL
WBC # BLD: 9.8 K/CU MM (ref 4–10.5)

## 2019-01-28 PROCEDURE — 2580000003 HC RX 258: Performed by: FAMILY MEDICINE

## 2019-01-28 PROCEDURE — 6370000000 HC RX 637 (ALT 250 FOR IP): Performed by: HOSPITALIST

## 2019-01-28 PROCEDURE — 82962 GLUCOSE BLOOD TEST: CPT

## 2019-01-28 PROCEDURE — 36415 COLL VENOUS BLD VENIPUNCTURE: CPT

## 2019-01-28 PROCEDURE — 6370000000 HC RX 637 (ALT 250 FOR IP): Performed by: INTERNAL MEDICINE

## 2019-01-28 PROCEDURE — 6360000002 HC RX W HCPCS: Performed by: SPECIALIST

## 2019-01-28 PROCEDURE — 6360000002 HC RX W HCPCS: Performed by: FAMILY MEDICINE

## 2019-01-28 PROCEDURE — 97530 THERAPEUTIC ACTIVITIES: CPT

## 2019-01-28 PROCEDURE — 97163 PT EVAL HIGH COMPLEX 45 MIN: CPT

## 2019-01-28 PROCEDURE — 94761 N-INVAS EAR/PLS OXIMETRY MLT: CPT

## 2019-01-28 PROCEDURE — 97167 OT EVAL HIGH COMPLEX 60 MIN: CPT

## 2019-01-28 PROCEDURE — 2580000003 HC RX 258: Performed by: HOSPITALIST

## 2019-01-28 PROCEDURE — 2140000000 HC CCU INTERMEDIATE R&B

## 2019-01-28 PROCEDURE — 6370000000 HC RX 637 (ALT 250 FOR IP): Performed by: FAMILY MEDICINE

## 2019-01-28 PROCEDURE — 85027 COMPLETE CBC AUTOMATED: CPT

## 2019-01-28 PROCEDURE — 2580000003 HC RX 258: Performed by: SPECIALIST

## 2019-01-28 PROCEDURE — 97112 NEUROMUSCULAR REEDUCATION: CPT

## 2019-01-28 PROCEDURE — 92610 EVALUATE SWALLOWING FUNCTION: CPT

## 2019-01-28 PROCEDURE — 2700000000 HC OXYGEN THERAPY PER DAY

## 2019-01-28 RX ORDER — HALOPERIDOL 5 MG/ML
5 INJECTION INTRAMUSCULAR ONCE
Status: COMPLETED | OUTPATIENT
Start: 2019-01-28 | End: 2019-01-28

## 2019-01-28 RX ORDER — HYDROXYZINE PAMOATE 25 MG/1
50 CAPSULE ORAL EVERY 6 HOURS PRN
Status: DISCONTINUED | OUTPATIENT
Start: 2019-01-28 | End: 2019-01-31 | Stop reason: HOSPADM

## 2019-01-28 RX ADMIN — SODIUM CHLORIDE: 9 INJECTION, SOLUTION INTRAVENOUS at 18:14

## 2019-01-28 RX ADMIN — OXYCODONE HYDROCHLORIDE 5 MG: 5 TABLET ORAL at 03:39

## 2019-01-28 RX ADMIN — SODIUM CHLORIDE: 9 INJECTION, SOLUTION INTRAVENOUS at 05:57

## 2019-01-28 RX ADMIN — ATORVASTATIN CALCIUM 40 MG: 40 TABLET, FILM COATED ORAL at 09:12

## 2019-01-28 RX ADMIN — ENOXAPARIN SODIUM 40 MG: 100 INJECTION SUBCUTANEOUS at 09:12

## 2019-01-28 RX ADMIN — HYDROXYZINE PAMOATE 50 MG: 25 CAPSULE ORAL at 01:00

## 2019-01-28 RX ADMIN — DILTIAZEM HYDROCHLORIDE 60 MG: 60 TABLET, FILM COATED ORAL at 09:11

## 2019-01-28 RX ADMIN — Medication 30 MG: at 09:12

## 2019-01-28 RX ADMIN — INSULIN GLARGINE 10 UNITS: 100 INJECTION, SOLUTION SUBCUTANEOUS at 20:56

## 2019-01-28 RX ADMIN — SODIUM CHLORIDE, PRESERVATIVE FREE 10 ML: 5 INJECTION INTRAVENOUS at 09:12

## 2019-01-28 RX ADMIN — INSULIN LISPRO 1 UNITS: 100 INJECTION, SOLUTION INTRAVENOUS; SUBCUTANEOUS at 20:57

## 2019-01-28 RX ADMIN — ASPIRIN 325 MG ORAL TABLET 325 MG: 325 PILL ORAL at 09:11

## 2019-01-28 RX ADMIN — CEFTRIAXONE 1 G: 1 INJECTION, POWDER, FOR SOLUTION INTRAMUSCULAR; INTRAVENOUS at 20:57

## 2019-01-28 RX ADMIN — CARVEDILOL 6.25 MG: 6.25 TABLET, FILM COATED ORAL at 18:08

## 2019-01-28 RX ADMIN — HALOPERIDOL LACTATE 5 MG: 5 INJECTION INTRAMUSCULAR at 03:12

## 2019-01-28 RX ADMIN — CARVEDILOL 6.25 MG: 6.25 TABLET, FILM COATED ORAL at 09:11

## 2019-01-28 RX ADMIN — DILTIAZEM HYDROCHLORIDE 60 MG: 60 TABLET, FILM COATED ORAL at 20:57

## 2019-01-28 ASSESSMENT — PAIN SCALES - GENERAL
PAINLEVEL_OUTOF10: 0
PAINLEVEL_OUTOF10: 7
PAINLEVEL_OUTOF10: 0

## 2019-01-28 ASSESSMENT — PAIN DESCRIPTION - LOCATION: LOCATION: BACK

## 2019-01-28 ASSESSMENT — PAIN DESCRIPTION - PAIN TYPE: TYPE: CHRONIC PAIN;ACUTE PAIN

## 2019-01-29 LAB
GLUCOSE BLD-MCNC: 133 MG/DL (ref 70–99)
GLUCOSE BLD-MCNC: 143 MG/DL (ref 70–99)
GLUCOSE BLD-MCNC: 158 MG/DL (ref 70–99)
GLUCOSE BLD-MCNC: 161 MG/DL (ref 70–99)
GLUCOSE BLD-MCNC: 206 MG/DL (ref 70–99)
HCT VFR BLD CALC: 38.6 % (ref 42–52)
HEMOGLOBIN: 11.8 GM/DL (ref 13.5–18)
MCH RBC QN AUTO: 30.2 PG (ref 27–31)
MCHC RBC AUTO-ENTMCNC: 30.6 % (ref 32–36)
MCV RBC AUTO: 98.7 FL (ref 78–100)
PDW BLD-RTO: 13.3 % (ref 11.7–14.9)
PLATELET # BLD: 176 K/CU MM (ref 140–440)
PMV BLD AUTO: 10.7 FL (ref 7.5–11.1)
RBC # BLD: 3.91 M/CU MM (ref 4.6–6.2)
SEX HORMONE BINDING GLOBULIN: 99
TESTOSTERONE FREE PERCENT: 0.8
TESTOSTERONE FREE: <1
TESTOSTERONE TOTAL-MALE: <3
WBC # BLD: 11.4 K/CU MM (ref 4–10.5)

## 2019-01-29 PROCEDURE — 2140000000 HC CCU INTERMEDIATE R&B

## 2019-01-29 PROCEDURE — 6360000002 HC RX W HCPCS: Performed by: SPECIALIST

## 2019-01-29 PROCEDURE — 2580000003 HC RX 258: Performed by: SPECIALIST

## 2019-01-29 PROCEDURE — 92526 ORAL FUNCTION THERAPY: CPT

## 2019-01-29 PROCEDURE — 36415 COLL VENOUS BLD VENIPUNCTURE: CPT

## 2019-01-29 PROCEDURE — 82962 GLUCOSE BLOOD TEST: CPT

## 2019-01-29 PROCEDURE — 6370000000 HC RX 637 (ALT 250 FOR IP): Performed by: HOSPITALIST

## 2019-01-29 PROCEDURE — 2580000003 HC RX 258: Performed by: HOSPITALIST

## 2019-01-29 PROCEDURE — 85027 COMPLETE CBC AUTOMATED: CPT

## 2019-01-29 PROCEDURE — 2580000003 HC RX 258: Performed by: FAMILY MEDICINE

## 2019-01-29 PROCEDURE — 6370000000 HC RX 637 (ALT 250 FOR IP): Performed by: FAMILY MEDICINE

## 2019-01-29 RX ORDER — INSULIN GLARGINE 100 [IU]/ML
15 INJECTION, SOLUTION SUBCUTANEOUS NIGHTLY
Status: DISCONTINUED | OUTPATIENT
Start: 2019-01-29 | End: 2019-01-31 | Stop reason: HOSPADM

## 2019-01-29 RX ADMIN — ATORVASTATIN CALCIUM 40 MG: 40 TABLET, FILM COATED ORAL at 09:35

## 2019-01-29 RX ADMIN — DILTIAZEM HYDROCHLORIDE 60 MG: 60 TABLET, FILM COATED ORAL at 21:35

## 2019-01-29 RX ADMIN — CARVEDILOL 6.25 MG: 6.25 TABLET, FILM COATED ORAL at 17:15

## 2019-01-29 RX ADMIN — INSULIN LISPRO 1 UNITS: 100 INJECTION, SOLUTION INTRAVENOUS; SUBCUTANEOUS at 21:34

## 2019-01-29 RX ADMIN — CARVEDILOL 6.25 MG: 6.25 TABLET, FILM COATED ORAL at 09:35

## 2019-01-29 RX ADMIN — ASPIRIN 325 MG ORAL TABLET 325 MG: 325 PILL ORAL at 09:36

## 2019-01-29 RX ADMIN — Medication 30 MG: at 09:35

## 2019-01-29 RX ADMIN — DILTIAZEM HYDROCHLORIDE 60 MG: 60 TABLET, FILM COATED ORAL at 09:35

## 2019-01-29 RX ADMIN — SODIUM CHLORIDE, PRESERVATIVE FREE 10 ML: 5 INJECTION INTRAVENOUS at 09:35

## 2019-01-29 RX ADMIN — MAGNESIUM HYDROXIDE 30 ML: 400 SUSPENSION ORAL at 12:06

## 2019-01-29 RX ADMIN — CEFTRIAXONE 1 G: 1 INJECTION, POWDER, FOR SOLUTION INTRAMUSCULAR; INTRAVENOUS at 21:33

## 2019-01-29 RX ADMIN — SODIUM CHLORIDE: 9 INJECTION, SOLUTION INTRAVENOUS at 19:13

## 2019-01-29 RX ADMIN — OXYCODONE HYDROCHLORIDE 5 MG: 5 TABLET ORAL at 07:50

## 2019-01-29 RX ADMIN — INSULIN LISPRO 1 UNITS: 100 INJECTION, SOLUTION INTRAVENOUS; SUBCUTANEOUS at 17:23

## 2019-01-29 RX ADMIN — INSULIN LISPRO 1 UNITS: 100 INJECTION, SOLUTION INTRAVENOUS; SUBCUTANEOUS at 12:06

## 2019-01-29 RX ADMIN — ENOXAPARIN SODIUM 40 MG: 100 INJECTION SUBCUTANEOUS at 09:34

## 2019-01-29 ASSESSMENT — PAIN DESCRIPTION - ORIENTATION: ORIENTATION: RIGHT;LEFT

## 2019-01-29 ASSESSMENT — PAIN SCALES - GENERAL
PAINLEVEL_OUTOF10: 0
PAINLEVEL_OUTOF10: 7
PAINLEVEL_OUTOF10: 0
PAINLEVEL_OUTOF10: 5
PAINLEVEL_OUTOF10: 0

## 2019-01-29 ASSESSMENT — PAIN DESCRIPTION - ONSET: ONSET: ON-GOING

## 2019-01-29 ASSESSMENT — PAIN - FUNCTIONAL ASSESSMENT: PAIN_FUNCTIONAL_ASSESSMENT: PREVENTS OR INTERFERES WITH MANY ACTIVE NOT PASSIVE ACTIVITIES

## 2019-01-29 ASSESSMENT — PAIN DESCRIPTION - PROGRESSION
CLINICAL_PROGRESSION: RAPIDLY IMPROVING
CLINICAL_PROGRESSION: GRADUALLY WORSENING

## 2019-01-29 ASSESSMENT — PAIN DESCRIPTION - PAIN TYPE: TYPE: PHANTOM PAIN

## 2019-01-29 ASSESSMENT — PAIN DESCRIPTION - DESCRIPTORS: DESCRIPTORS: ACHING;SORE

## 2019-01-29 ASSESSMENT — PAIN DESCRIPTION - LOCATION: LOCATION: LEG

## 2019-01-29 ASSESSMENT — PAIN DESCRIPTION - FREQUENCY: FREQUENCY: INTERMITTENT

## 2019-01-30 LAB
GLUCOSE BLD-MCNC: 125 MG/DL (ref 70–99)
GLUCOSE BLD-MCNC: 146 MG/DL (ref 70–99)
GLUCOSE BLD-MCNC: 151 MG/DL (ref 70–99)
GLUCOSE BLD-MCNC: 158 MG/DL (ref 70–99)
GLUCOSE BLD-MCNC: 184 MG/DL (ref 70–99)

## 2019-01-30 PROCEDURE — 97110 THERAPEUTIC EXERCISES: CPT

## 2019-01-30 PROCEDURE — 2140000000 HC CCU INTERMEDIATE R&B

## 2019-01-30 PROCEDURE — 94761 N-INVAS EAR/PLS OXIMETRY MLT: CPT

## 2019-01-30 PROCEDURE — 6370000000 HC RX 637 (ALT 250 FOR IP): Performed by: HOSPITALIST

## 2019-01-30 PROCEDURE — 82962 GLUCOSE BLOOD TEST: CPT

## 2019-01-30 PROCEDURE — 97112 NEUROMUSCULAR REEDUCATION: CPT

## 2019-01-30 PROCEDURE — 2700000000 HC OXYGEN THERAPY PER DAY

## 2019-01-30 PROCEDURE — 2580000003 HC RX 258: Performed by: SPECIALIST

## 2019-01-30 PROCEDURE — 2580000003 HC RX 258: Performed by: HOSPITALIST

## 2019-01-30 PROCEDURE — 6360000002 HC RX W HCPCS: Performed by: SPECIALIST

## 2019-01-30 PROCEDURE — 6360000002 HC RX W HCPCS: Performed by: FAMILY MEDICINE

## 2019-01-30 PROCEDURE — 6370000000 HC RX 637 (ALT 250 FOR IP): Performed by: FAMILY MEDICINE

## 2019-01-30 RX ADMIN — ENOXAPARIN SODIUM 40 MG: 100 INJECTION SUBCUTANEOUS at 08:36

## 2019-01-30 RX ADMIN — HYDROXYZINE PAMOATE 50 MG: 25 CAPSULE ORAL at 14:28

## 2019-01-30 RX ADMIN — CEFTRIAXONE 1 G: 1 INJECTION, POWDER, FOR SOLUTION INTRAMUSCULAR; INTRAVENOUS at 20:48

## 2019-01-30 RX ADMIN — OXYCODONE HYDROCHLORIDE 5 MG: 5 TABLET ORAL at 20:55

## 2019-01-30 RX ADMIN — HYDROXYZINE PAMOATE 50 MG: 25 CAPSULE ORAL at 20:55

## 2019-01-30 RX ADMIN — DILTIAZEM HYDROCHLORIDE 60 MG: 60 TABLET, FILM COATED ORAL at 20:48

## 2019-01-30 RX ADMIN — Medication 30 MG: at 08:36

## 2019-01-30 RX ADMIN — CARVEDILOL 6.25 MG: 6.25 TABLET, FILM COATED ORAL at 08:36

## 2019-01-30 RX ADMIN — DILTIAZEM HYDROCHLORIDE 60 MG: 60 TABLET, FILM COATED ORAL at 08:36

## 2019-01-30 RX ADMIN — INSULIN LISPRO 1 UNITS: 100 INJECTION, SOLUTION INTRAVENOUS; SUBCUTANEOUS at 11:23

## 2019-01-30 RX ADMIN — ATORVASTATIN CALCIUM 40 MG: 40 TABLET, FILM COATED ORAL at 08:36

## 2019-01-30 RX ADMIN — SODIUM CHLORIDE: 9 INJECTION, SOLUTION INTRAVENOUS at 08:41

## 2019-01-30 RX ADMIN — TRAMADOL HYDROCHLORIDE 50 MG: 50 TABLET, FILM COATED ORAL at 08:36

## 2019-01-30 RX ADMIN — ONDANSETRON 4 MG: 2 INJECTION INTRAMUSCULAR; INTRAVENOUS at 08:41

## 2019-01-30 RX ADMIN — INSULIN LISPRO 1 UNITS: 100 INJECTION, SOLUTION INTRAVENOUS; SUBCUTANEOUS at 08:26

## 2019-01-30 RX ADMIN — ASPIRIN 325 MG ORAL TABLET 325 MG: 325 PILL ORAL at 08:36

## 2019-01-30 RX ADMIN — TRAMADOL HYDROCHLORIDE 50 MG: 50 TABLET, FILM COATED ORAL at 14:28

## 2019-01-30 RX ADMIN — CARVEDILOL 6.25 MG: 6.25 TABLET, FILM COATED ORAL at 17:50

## 2019-01-30 RX ADMIN — HYDROXYZINE PAMOATE 50 MG: 25 CAPSULE ORAL at 02:59

## 2019-01-30 RX ADMIN — INSULIN LISPRO 1 UNITS: 100 INJECTION, SOLUTION INTRAVENOUS; SUBCUTANEOUS at 20:47

## 2019-01-30 ASSESSMENT — PAIN SCALES - GENERAL
PAINLEVEL_OUTOF10: 0
PAINLEVEL_OUTOF10: 0
PAINLEVEL_OUTOF10: 4
PAINLEVEL_OUTOF10: 0
PAINLEVEL_OUTOF10: 5
PAINLEVEL_OUTOF10: 5
PAINLEVEL_OUTOF10: 0

## 2019-01-30 ASSESSMENT — PAIN DESCRIPTION - DESCRIPTORS: DESCRIPTORS: ACHING

## 2019-01-30 ASSESSMENT — PAIN DESCRIPTION - LOCATION
LOCATION: ABDOMEN;HEAD
LOCATION: LEG

## 2019-01-30 ASSESSMENT — PAIN DESCRIPTION - ORIENTATION: ORIENTATION: RIGHT

## 2019-01-31 VITALS
OXYGEN SATURATION: 100 % | SYSTOLIC BLOOD PRESSURE: 121 MMHG | RESPIRATION RATE: 14 BRPM | BODY MASS INDEX: 22.33 KG/M2 | WEIGHT: 159.5 LBS | HEART RATE: 77 BPM | HEIGHT: 71 IN | TEMPERATURE: 98.2 F | DIASTOLIC BLOOD PRESSURE: 71 MMHG

## 2019-01-31 LAB — GLUCOSE BLD-MCNC: 92 MG/DL (ref 70–99)

## 2019-01-31 PROCEDURE — 82962 GLUCOSE BLOOD TEST: CPT

## 2019-01-31 PROCEDURE — 6370000000 HC RX 637 (ALT 250 FOR IP): Performed by: HOSPITALIST

## 2019-01-31 PROCEDURE — 97112 NEUROMUSCULAR REEDUCATION: CPT

## 2019-01-31 PROCEDURE — 51702 INSERT TEMP BLADDER CATH: CPT

## 2019-01-31 PROCEDURE — 6370000000 HC RX 637 (ALT 250 FOR IP): Performed by: FAMILY MEDICINE

## 2019-01-31 PROCEDURE — 6360000002 HC RX W HCPCS: Performed by: SPECIALIST

## 2019-01-31 PROCEDURE — 97110 THERAPEUTIC EXERCISES: CPT

## 2019-01-31 RX ORDER — TRAMADOL HYDROCHLORIDE 50 MG/1
50 TABLET ORAL EVERY 6 HOURS PRN
Qty: 5 TABLET | Refills: 0 | Status: SHIPPED | OUTPATIENT
Start: 2019-01-31 | End: 2019-02-03

## 2019-01-31 RX ORDER — INSULIN GLARGINE 100 [IU]/ML
15 INJECTION, SOLUTION SUBCUTANEOUS NIGHTLY
Qty: 1 VIAL | Refills: 3 | Status: ON HOLD | OUTPATIENT
Start: 2019-01-31 | End: 2020-04-09 | Stop reason: HOSPADM

## 2019-01-31 RX ADMIN — ENOXAPARIN SODIUM 40 MG: 100 INJECTION SUBCUTANEOUS at 09:27

## 2019-01-31 RX ADMIN — Medication 30 MG: at 09:28

## 2019-01-31 RX ADMIN — TRAMADOL HYDROCHLORIDE 50 MG: 50 TABLET, FILM COATED ORAL at 09:28

## 2019-01-31 RX ADMIN — ASPIRIN 325 MG ORAL TABLET 325 MG: 325 PILL ORAL at 09:28

## 2019-01-31 RX ADMIN — ATORVASTATIN CALCIUM 40 MG: 40 TABLET, FILM COATED ORAL at 09:28

## 2019-01-31 RX ADMIN — CARVEDILOL 6.25 MG: 6.25 TABLET, FILM COATED ORAL at 09:28

## 2019-01-31 RX ADMIN — DILTIAZEM HYDROCHLORIDE 60 MG: 60 TABLET, FILM COATED ORAL at 09:28

## 2019-01-31 ASSESSMENT — PAIN SCALES - GENERAL
PAINLEVEL_OUTOF10: 0
PAINLEVEL_OUTOF10: 0
PAINLEVEL_OUTOF10: 5

## 2019-02-01 ENCOUNTER — HOSPITAL ENCOUNTER (OUTPATIENT)
Age: 79
Setting detail: SPECIMEN
Discharge: HOME | End: 2019-02-01

## 2019-02-01 LAB
ALBUMIN SERPL-MCNC: 3 GM/DL (ref 3.4–5)
ALP BLD-CCNC: 60 IU/L (ref 40–128)
ALT SERPL-CCNC: 18 U/L (ref 10–40)
ANION GAP SERPL CALCULATED.3IONS-SCNC: 10 MMOL/L (ref 4–16)
AST SERPL-CCNC: 19 IU/L (ref 15–37)
BASOPHILS ABSOLUTE: 0.1 K/CU MM
BASOPHILS RELATIVE PERCENT: 0.5 % (ref 0–1)
BILIRUB SERPL-MCNC: 0.3 MG/DL (ref 0–1)
BUN BLDV-MCNC: 18 MG/DL (ref 6–23)
CALCIUM SERPL-MCNC: 8.7 MG/DL (ref 8.3–10.6)
CHLORIDE BLD-SCNC: 110 MMOL/L (ref 99–110)
CO2: 24 MMOL/L (ref 21–32)
CREAT SERPL-MCNC: 1.3 MG/DL (ref 0.9–1.3)
DIFFERENTIAL TYPE: ABNORMAL
EKG ATRIAL RATE: 83 BPM
EKG DIAGNOSIS: NORMAL
EKG P AXIS: 118 DEGREES
EKG P-R INTERVAL: 264 MS
EKG Q-T INTERVAL: 430 MS
EKG QRS DURATION: 102 MS
EKG QTC CALCULATION (BAZETT): 505 MS
EKG R AXIS: -27 DEGREES
EKG T AXIS: -1 DEGREES
EKG VENTRICULAR RATE: 83 BPM
EOSINOPHILS ABSOLUTE: 0.5 K/CU MM
EOSINOPHILS RELATIVE PERCENT: 5.9 % (ref 0–3)
GFR AFRICAN AMERICAN: >60 ML/MIN/1.73M2
GFR NON-AFRICAN AMERICAN: 53 ML/MIN/1.73M2
GLUCOSE BLD-MCNC: 134 MG/DL (ref 70–99)
HCT VFR BLD CALC: 34.8 % (ref 42–52)
HEMOGLOBIN: 10.8 GM/DL (ref 13.5–18)
IMMATURE NEUTROPHIL %: 0.7 % (ref 0–0.43)
LYMPHOCYTES ABSOLUTE: 1.5 K/CU MM
LYMPHOCYTES RELATIVE PERCENT: 15.9 % (ref 24–44)
MCH RBC QN AUTO: 29.9 PG (ref 27–31)
MCHC RBC AUTO-ENTMCNC: 31 % (ref 32–36)
MCV RBC AUTO: 96.4 FL (ref 78–100)
MONOCYTES ABSOLUTE: 1 K/CU MM
MONOCYTES RELATIVE PERCENT: 11.2 % (ref 0–4)
NUCLEATED RBC %: 0 %
PDW BLD-RTO: 13.8 % (ref 11.7–14.9)
PLATELET # BLD: 192 K/CU MM (ref 140–440)
PMV BLD AUTO: 11.1 FL (ref 7.5–11.1)
POTASSIUM SERPL-SCNC: 3.8 MMOL/L (ref 3.5–5.1)
RBC # BLD: 3.61 M/CU MM (ref 4.6–6.2)
SEGMENTED NEUTROPHILS ABSOLUTE COUNT: 6.1 K/CU MM
SEGMENTED NEUTROPHILS RELATIVE PERCENT: 65.8 % (ref 36–66)
SODIUM BLD-SCNC: 144 MMOL/L (ref 135–145)
TOTAL IMMATURE NEUTOROPHIL: 0.06 K/CU MM
TOTAL NUCLEATED RBC: 0 K/CU MM
TOTAL PROTEIN: 4.8 GM/DL (ref 6.4–8.2)
WBC # BLD: 9.2 K/CU MM (ref 4–10.5)

## 2019-02-01 PROCEDURE — 36415 COLL VENOUS BLD VENIPUNCTURE: CPT

## 2019-02-01 PROCEDURE — 80053 COMPREHEN METABOLIC PANEL: CPT

## 2019-02-01 PROCEDURE — 85025 COMPLETE CBC W/AUTO DIFF WBC: CPT

## 2019-02-11 ENCOUNTER — HOSPITAL ENCOUNTER (OUTPATIENT)
Age: 79
Setting detail: SPECIMEN
Discharge: HOME | End: 2019-02-11
Payer: MEDICARE

## 2019-02-11 LAB
ANION GAP SERPL CALCULATED.3IONS-SCNC: 12 MMOL/L (ref 4–16)
BUN BLDV-MCNC: 17 MG/DL (ref 6–23)
CALCIUM SERPL-MCNC: 8.8 MG/DL (ref 8.3–10.6)
CHLORIDE BLD-SCNC: 102 MMOL/L (ref 99–110)
CO2: 22 MMOL/L (ref 21–32)
CREAT SERPL-MCNC: 1.2 MG/DL (ref 0.9–1.3)
ESTIMATED AVERAGE GLUCOSE: 128 MG/DL
GFR AFRICAN AMERICAN: >60 ML/MIN/1.73M2
GFR NON-AFRICAN AMERICAN: 58 ML/MIN/1.73M2
GLUCOSE BLD-MCNC: 89 MG/DL (ref 70–99)
HBA1C MFR BLD: 6.1 % (ref 4.2–6.3)
HCT VFR BLD CALC: 34.8 % (ref 42–52)
HEMOGLOBIN: 10.9 GM/DL (ref 13.5–18)
MCH RBC QN AUTO: 29.7 PG (ref 27–31)
MCHC RBC AUTO-ENTMCNC: 31.3 % (ref 32–36)
MCV RBC AUTO: 94.8 FL (ref 78–100)
PDW BLD-RTO: 13.2 % (ref 11.7–14.9)
PLATELET # BLD: 214 K/CU MM (ref 140–440)
PMV BLD AUTO: 10.2 FL (ref 7.5–11.1)
POTASSIUM SERPL-SCNC: 4.7 MMOL/L (ref 3.5–5.1)
RBC # BLD: 3.67 M/CU MM (ref 4.6–6.2)
SODIUM BLD-SCNC: 136 MMOL/L (ref 135–145)
WBC # BLD: 6.7 K/CU MM (ref 4–10.5)

## 2019-02-11 PROCEDURE — 36415 COLL VENOUS BLD VENIPUNCTURE: CPT

## 2019-02-11 PROCEDURE — 80048 BASIC METABOLIC PNL TOTAL CA: CPT

## 2019-02-11 PROCEDURE — 83036 HEMOGLOBIN GLYCOSYLATED A1C: CPT

## 2019-02-11 PROCEDURE — 85027 COMPLETE CBC AUTOMATED: CPT

## 2019-03-15 ENCOUNTER — HOSPITAL ENCOUNTER (OUTPATIENT)
Age: 79
Setting detail: SPECIMEN
Discharge: HOME | End: 2019-03-15
Payer: MEDICARE

## 2019-03-15 LAB
ALBUMIN SERPL-MCNC: 4.3 GM/DL (ref 3.4–5)
ALP BLD-CCNC: 70 IU/L (ref 40–129)
ALT SERPL-CCNC: 14 U/L (ref 10–40)
ANION GAP SERPL CALCULATED.3IONS-SCNC: 6 MMOL/L (ref 4–16)
AST SERPL-CCNC: 20 IU/L (ref 15–37)
BASOPHILS ABSOLUTE: 0.1 K/CU MM
BASOPHILS RELATIVE PERCENT: 0.9 % (ref 0–1)
BILIRUB SERPL-MCNC: 0.4 MG/DL (ref 0–1)
BUN BLDV-MCNC: 16 MG/DL (ref 6–23)
CALCIUM SERPL-MCNC: 9.5 MG/DL (ref 8.3–10.6)
CHLORIDE BLD-SCNC: 99 MMOL/L (ref 99–110)
CHOLESTEROL, FASTING: 122 MG/DL
CO2: 35 MMOL/L (ref 21–32)
CREAT SERPL-MCNC: 1.2 MG/DL (ref 0.9–1.3)
DIFFERENTIAL TYPE: ABNORMAL
EOSINOPHILS ABSOLUTE: 0.8 K/CU MM
EOSINOPHILS RELATIVE PERCENT: 10 % (ref 0–3)
ESTIMATED AVERAGE GLUCOSE: 146 MG/DL
GFR AFRICAN AMERICAN: >60 ML/MIN/1.73M2
GFR NON-AFRICAN AMERICAN: 58 ML/MIN/1.73M2
GLUCOSE FASTING: 166 MG/DL (ref 70–99)
HBA1C MFR BLD: 6.7 % (ref 4.2–6.3)
HCT VFR BLD CALC: 39.6 % (ref 42–52)
HDLC SERPL-MCNC: 62 MG/DL
HEMOGLOBIN: 12.8 GM/DL (ref 13.5–18)
IMMATURE NEUTROPHIL %: 0.3 % (ref 0–0.43)
LDL CHOLESTEROL DIRECT: 60 MG/DL
LYMPHOCYTES ABSOLUTE: 1.9 K/CU MM
LYMPHOCYTES RELATIVE PERCENT: 23.8 % (ref 24–44)
MCH RBC QN AUTO: 29.2 PG (ref 27–31)
MCHC RBC AUTO-ENTMCNC: 32.3 % (ref 32–36)
MCV RBC AUTO: 90.4 FL (ref 78–100)
MONOCYTES ABSOLUTE: 0.9 K/CU MM
MONOCYTES RELATIVE PERCENT: 10.9 % (ref 0–4)
PDW BLD-RTO: 13.1 % (ref 11.7–14.9)
PLATELET # BLD: 190 K/CU MM (ref 140–440)
PMV BLD AUTO: 9.9 FL (ref 7.5–11.1)
POTASSIUM SERPL-SCNC: 4.1 MMOL/L (ref 3.5–5.1)
RBC # BLD: 4.38 M/CU MM (ref 4.6–6.2)
SEGMENTED NEUTROPHILS ABSOLUTE COUNT: 4.2 K/CU MM
SEGMENTED NEUTROPHILS RELATIVE PERCENT: 54.1 % (ref 36–66)
SODIUM BLD-SCNC: 140 MMOL/L (ref 135–145)
TOTAL IMMATURE NEUTOROPHIL: 0.02 K/CU MM
TOTAL PROTEIN: 6.4 GM/DL (ref 6.4–8.2)
TRIGLYCERIDE, FASTING: 62 MG/DL
WBC # BLD: 7.8 K/CU MM (ref 4–10.5)

## 2019-03-15 PROCEDURE — 80061 LIPID PANEL: CPT

## 2019-03-15 PROCEDURE — 83036 HEMOGLOBIN GLYCOSYLATED A1C: CPT

## 2019-03-15 PROCEDURE — 85025 COMPLETE CBC W/AUTO DIFF WBC: CPT

## 2019-03-15 PROCEDURE — 80053 COMPREHEN METABOLIC PANEL: CPT

## 2019-09-01 ENCOUNTER — APPOINTMENT (OUTPATIENT)
Dept: GENERAL RADIOLOGY | Age: 79
DRG: 564 | End: 2019-09-01
Payer: MEDICARE

## 2019-09-01 ENCOUNTER — APPOINTMENT (OUTPATIENT)
Dept: MRI IMAGING | Age: 79
DRG: 564 | End: 2019-09-01
Payer: MEDICARE

## 2019-09-01 ENCOUNTER — HOSPITAL ENCOUNTER (INPATIENT)
Age: 79
LOS: 9 days | Discharge: NURSING, REHAB OR LONG-TERM CARE | DRG: 564 | End: 2019-09-10
Attending: EMERGENCY MEDICINE | Admitting: INTERNAL MEDICINE
Payer: MEDICARE

## 2019-09-01 ENCOUNTER — APPOINTMENT (OUTPATIENT)
Dept: CT IMAGING | Age: 79
DRG: 564 | End: 2019-09-01
Payer: MEDICARE

## 2019-09-01 DIAGNOSIS — R10.13 EPIGASTRIC PAIN: ICD-10-CM

## 2019-09-01 DIAGNOSIS — R11.2 NAUSEA VOMITING AND DIARRHEA: Primary | ICD-10-CM

## 2019-09-01 DIAGNOSIS — R10.9 ABDOMINAL PAIN, UNSPECIFIED ABDOMINAL LOCATION: ICD-10-CM

## 2019-09-01 DIAGNOSIS — R73.9 HYPERGLYCEMIA: ICD-10-CM

## 2019-09-01 DIAGNOSIS — D72.829 LEUKOCYTOSIS, UNSPECIFIED TYPE: ICD-10-CM

## 2019-09-01 DIAGNOSIS — E87.20 LACTIC ACIDOSIS: ICD-10-CM

## 2019-09-01 DIAGNOSIS — R17 ELEVATED BILIRUBIN: ICD-10-CM

## 2019-09-01 DIAGNOSIS — R19.7 NAUSEA VOMITING AND DIARRHEA: Primary | ICD-10-CM

## 2019-09-01 DIAGNOSIS — R77.8 ELEVATED TROPONIN: ICD-10-CM

## 2019-09-01 PROBLEM — A41.9 SEPSIS (HCC): Status: ACTIVE | Noted: 2019-09-01

## 2019-09-01 LAB
ALBUMIN SERPL-MCNC: 4.9 GM/DL (ref 3.4–5)
ALP BLD-CCNC: 91 IU/L (ref 40–129)
ALT SERPL-CCNC: 24 U/L (ref 10–40)
AMMONIA: 38 UMOL/L (ref 16–60)
ANION GAP SERPL CALCULATED.3IONS-SCNC: 19 MMOL/L (ref 4–16)
AST SERPL-CCNC: 32 IU/L (ref 15–37)
BACTERIA: NEGATIVE /HPF
BASE EXCESS MIXED: ABNORMAL (ref 0–1.2)
BASOPHILS ABSOLUTE: 0 K/CU MM
BASOPHILS RELATIVE PERCENT: 0.2 % (ref 0–1)
BETA-HYDROXYBUTYRATE: 8.9 MG/DL (ref 0–3)
BILIRUB SERPL-MCNC: 1.5 MG/DL (ref 0–1)
BILIRUBIN URINE: NEGATIVE MG/DL
BLOOD, URINE: ABNORMAL
BUN BLDV-MCNC: 23 MG/DL (ref 6–23)
CALCIUM SERPL-MCNC: 10.7 MG/DL (ref 8.3–10.6)
CHLORIDE BLD-SCNC: 90 MMOL/L (ref 99–110)
CLARITY: CLEAR
CO2: 23 MMOL/L (ref 21–32)
COLOR: YELLOW
COMMENT: ABNORMAL
CREAT SERPL-MCNC: 1.3 MG/DL (ref 0.9–1.3)
DIFFERENTIAL TYPE: ABNORMAL
EOSINOPHILS ABSOLUTE: 0 K/CU MM
EOSINOPHILS RELATIVE PERCENT: 0 % (ref 0–3)
GFR AFRICAN AMERICAN: >60 ML/MIN/1.73M2
GFR NON-AFRICAN AMERICAN: 53 ML/MIN/1.73M2
GLUCOSE BLD-MCNC: 309 MG/DL (ref 70–99)
GLUCOSE BLD-MCNC: 365 MG/DL (ref 70–99)
GLUCOSE, URINE: >500 MG/DL
HCO3 VENOUS: 24.6 MMOL/L (ref 19–25)
HCT VFR BLD CALC: 45.2 % (ref 42–52)
HEMOGLOBIN: 15 GM/DL (ref 13.5–18)
IMMATURE NEUTROPHIL %: 0.6 % (ref 0–0.43)
KETONES, URINE: ABNORMAL MG/DL
LACTATE: 2.4 MMOL/L (ref 0.4–2)
LACTATE: ABNORMAL MMOL/L (ref 0.4–2)
LACTIC ACID, SEPSIS: 2.6 MMOL/L (ref 0.5–1.9)
LEUKOCYTE ESTERASE, URINE: NEGATIVE
LIPASE: 19 IU/L (ref 13–60)
LYMPHOCYTES ABSOLUTE: 1.4 K/CU MM
LYMPHOCYTES RELATIVE PERCENT: 7 % (ref 24–44)
MCH RBC QN AUTO: 29.1 PG (ref 27–31)
MCHC RBC AUTO-ENTMCNC: 33.2 % (ref 32–36)
MCV RBC AUTO: 87.6 FL (ref 78–100)
MONOCYTES ABSOLUTE: 1.8 K/CU MM
MONOCYTES RELATIVE PERCENT: 8.6 % (ref 0–4)
NITRITE URINE, QUANTITATIVE: NEGATIVE
O2 SAT, VEN: 94.4 % (ref 50–70)
PCO2, VEN: 38 MMHG (ref 38–52)
PDW BLD-RTO: 12.9 % (ref 11.7–14.9)
PH VENOUS: 7.42 (ref 7.32–7.42)
PH, URINE: 7 (ref 5–8)
PLATELET # BLD: 227 K/CU MM (ref 140–440)
PMV BLD AUTO: 9.7 FL (ref 7.5–11.1)
PO2, VEN: 166 MMHG (ref 28–48)
POTASSIUM SERPL-SCNC: 3.3 MMOL/L (ref 3.5–5.1)
PROTEIN UA: >500 MG/DL
RBC # BLD: 5.16 M/CU MM (ref 4.6–6.2)
RBC URINE: 1 /HPF (ref 0–3)
SEGMENTED NEUTROPHILS ABSOLUTE COUNT: 17.2 K/CU MM
SEGMENTED NEUTROPHILS RELATIVE PERCENT: 83.6 % (ref 36–66)
SODIUM BLD-SCNC: 132 MMOL/L (ref 135–145)
SPECIFIC GRAVITY UA: 1.02 (ref 1–1.03)
TOTAL IMMATURE NEUTOROPHIL: 0.12 K/CU MM
TOTAL PROTEIN: 8.3 GM/DL (ref 6.4–8.2)
TRICHOMONAS: ABNORMAL /HPF
TROPONIN T: 0.15 NG/ML
TROPONIN T: 0.17 NG/ML
UROBILINOGEN, URINE: NORMAL MG/DL (ref 0.2–1)
WBC # BLD: 20.6 K/CU MM (ref 4–10.5)
WBC UA: <1 /HPF (ref 0–2)

## 2019-09-01 PROCEDURE — 83605 ASSAY OF LACTIC ACID: CPT

## 2019-09-01 PROCEDURE — 87040 BLOOD CULTURE FOR BACTERIA: CPT

## 2019-09-01 PROCEDURE — 70450 CT HEAD/BRAIN W/O DYE: CPT

## 2019-09-01 PROCEDURE — 84484 ASSAY OF TROPONIN QUANT: CPT

## 2019-09-01 PROCEDURE — 36415 COLL VENOUS BLD VENIPUNCTURE: CPT

## 2019-09-01 PROCEDURE — 6360000002 HC RX W HCPCS: Performed by: INTERNAL MEDICINE

## 2019-09-01 PROCEDURE — 6360000002 HC RX W HCPCS: Performed by: PHYSICIAN ASSISTANT

## 2019-09-01 PROCEDURE — 96365 THER/PROPH/DIAG IV INF INIT: CPT

## 2019-09-01 PROCEDURE — 2580000003 HC RX 258: Performed by: PHYSICIAN ASSISTANT

## 2019-09-01 PROCEDURE — 74177 CT ABD & PELVIS W/CONTRAST: CPT

## 2019-09-01 PROCEDURE — 96361 HYDRATE IV INFUSION ADD-ON: CPT

## 2019-09-01 PROCEDURE — 96375 TX/PRO/DX INJ NEW DRUG ADDON: CPT

## 2019-09-01 PROCEDURE — 6360000004 HC RX CONTRAST MEDICATION: Performed by: PHYSICIAN ASSISTANT

## 2019-09-01 PROCEDURE — 93005 ELECTROCARDIOGRAM TRACING: CPT | Performed by: PHYSICIAN ASSISTANT

## 2019-09-01 PROCEDURE — 82962 GLUCOSE BLOOD TEST: CPT

## 2019-09-01 PROCEDURE — 85025 COMPLETE CBC W/AUTO DIFF WBC: CPT

## 2019-09-01 PROCEDURE — 6370000000 HC RX 637 (ALT 250 FOR IP): Performed by: INTERNAL MEDICINE

## 2019-09-01 PROCEDURE — 2060000000 HC ICU INTERMEDIATE R&B

## 2019-09-01 PROCEDURE — 99285 EMERGENCY DEPT VISIT HI MDM: CPT

## 2019-09-01 PROCEDURE — 74181 MRI ABDOMEN W/O CONTRAST: CPT

## 2019-09-01 PROCEDURE — 2500000003 HC RX 250 WO HCPCS: Performed by: INTERNAL MEDICINE

## 2019-09-01 PROCEDURE — 96368 THER/DIAG CONCURRENT INF: CPT

## 2019-09-01 PROCEDURE — 82805 BLOOD GASES W/O2 SATURATION: CPT

## 2019-09-01 PROCEDURE — 71045 X-RAY EXAM CHEST 1 VIEW: CPT

## 2019-09-01 PROCEDURE — 81001 URINALYSIS AUTO W/SCOPE: CPT

## 2019-09-01 PROCEDURE — 82800 BLOOD PH: CPT

## 2019-09-01 PROCEDURE — 82010 KETONE BODYS QUAN: CPT

## 2019-09-01 PROCEDURE — 83690 ASSAY OF LIPASE: CPT

## 2019-09-01 PROCEDURE — 2580000003 HC RX 258: Performed by: INTERNAL MEDICINE

## 2019-09-01 PROCEDURE — 82140 ASSAY OF AMMONIA: CPT

## 2019-09-01 PROCEDURE — 80053 COMPREHEN METABOLIC PANEL: CPT

## 2019-09-01 RX ORDER — LATANOPROST 50 UG/ML
1 SOLUTION/ DROPS OPHTHALMIC NIGHTLY
Status: DISCONTINUED | OUTPATIENT
Start: 2019-09-01 | End: 2019-09-10 | Stop reason: HOSPADM

## 2019-09-01 RX ORDER — NICOTINE POLACRILEX 4 MG
15 LOZENGE BUCCAL PRN
Status: DISCONTINUED | OUTPATIENT
Start: 2019-09-01 | End: 2019-09-10 | Stop reason: HOSPADM

## 2019-09-01 RX ORDER — CARVEDILOL 6.25 MG/1
6.25 TABLET ORAL 2 TIMES DAILY WITH MEALS
Status: DISCONTINUED | OUTPATIENT
Start: 2019-09-02 | End: 2019-09-10 | Stop reason: HOSPADM

## 2019-09-01 RX ORDER — DEXTROSE MONOHYDRATE 50 MG/ML
100 INJECTION, SOLUTION INTRAVENOUS PRN
Status: DISCONTINUED | OUTPATIENT
Start: 2019-09-01 | End: 2019-09-10 | Stop reason: HOSPADM

## 2019-09-01 RX ORDER — DILTIAZEM HYDROCHLORIDE 60 MG/1
60 TABLET, FILM COATED ORAL 2 TIMES DAILY
Status: DISCONTINUED | OUTPATIENT
Start: 2019-09-01 | End: 2019-09-10 | Stop reason: HOSPADM

## 2019-09-01 RX ORDER — SODIUM CHLORIDE 0.9 % (FLUSH) 0.9 %
10 SYRINGE (ML) INJECTION PRN
Status: DISCONTINUED | OUTPATIENT
Start: 2019-09-01 | End: 2019-09-10 | Stop reason: HOSPADM

## 2019-09-01 RX ORDER — SODIUM CHLORIDE 9 MG/ML
INJECTION, SOLUTION INTRAVENOUS CONTINUOUS
Status: DISCONTINUED | OUTPATIENT
Start: 2019-09-01 | End: 2019-09-02

## 2019-09-01 RX ORDER — 0.9 % SODIUM CHLORIDE 0.9 %
1000 INTRAVENOUS SOLUTION INTRAVENOUS ONCE
Status: COMPLETED | OUTPATIENT
Start: 2019-09-01 | End: 2019-09-02

## 2019-09-01 RX ORDER — ATORVASTATIN CALCIUM 40 MG/1
40 TABLET, FILM COATED ORAL DAILY
Status: DISCONTINUED | OUTPATIENT
Start: 2019-09-02 | End: 2019-09-10 | Stop reason: HOSPADM

## 2019-09-01 RX ORDER — CIPROFLOXACIN 2 MG/ML
400 INJECTION, SOLUTION INTRAVENOUS EVERY 12 HOURS
Status: DISCONTINUED | OUTPATIENT
Start: 2019-09-01 | End: 2019-09-02

## 2019-09-01 RX ORDER — ASPIRIN 325 MG
325 TABLET ORAL DAILY
Status: DISCONTINUED | OUTPATIENT
Start: 2019-09-02 | End: 2019-09-10 | Stop reason: HOSPADM

## 2019-09-01 RX ORDER — DEXTROSE MONOHYDRATE 25 G/50ML
12.5 INJECTION, SOLUTION INTRAVENOUS PRN
Status: DISCONTINUED | OUTPATIENT
Start: 2019-09-01 | End: 2019-09-10 | Stop reason: HOSPADM

## 2019-09-01 RX ORDER — MORPHINE SULFATE 4 MG/ML
4 INJECTION, SOLUTION INTRAMUSCULAR; INTRAVENOUS EVERY 30 MIN PRN
Status: DISCONTINUED | OUTPATIENT
Start: 2019-09-01 | End: 2019-09-01

## 2019-09-01 RX ORDER — ONDANSETRON 2 MG/ML
4 INJECTION INTRAMUSCULAR; INTRAVENOUS EVERY 30 MIN PRN
Status: DISCONTINUED | OUTPATIENT
Start: 2019-09-01 | End: 2019-09-01

## 2019-09-01 RX ORDER — CILOSTAZOL 100 MG/1
100 TABLET ORAL 2 TIMES DAILY
Status: DISCONTINUED | OUTPATIENT
Start: 2019-09-01 | End: 2019-09-03

## 2019-09-01 RX ORDER — 0.9 % SODIUM CHLORIDE 0.9 %
1000 INTRAVENOUS SOLUTION INTRAVENOUS ONCE
Status: COMPLETED | OUTPATIENT
Start: 2019-09-01 | End: 2019-09-01

## 2019-09-01 RX ORDER — DOCUSATE SODIUM 100 MG/1
100 CAPSULE, LIQUID FILLED ORAL NIGHTLY PRN
Status: DISCONTINUED | OUTPATIENT
Start: 2019-09-01 | End: 2019-09-10 | Stop reason: HOSPADM

## 2019-09-01 RX ADMIN — IOPAMIDOL 75 ML: 755 INJECTION, SOLUTION INTRAVENOUS at 17:07

## 2019-09-01 RX ADMIN — INSULIN LISPRO 4 UNITS: 100 INJECTION, SOLUTION INTRAVENOUS; SUBCUTANEOUS at 23:08

## 2019-09-01 RX ADMIN — METRONIDAZOLE 500 MG: 500 INJECTION, SOLUTION INTRAVENOUS at 20:15

## 2019-09-01 RX ADMIN — SODIUM CHLORIDE: 9 INJECTION, SOLUTION INTRAVENOUS at 23:02

## 2019-09-01 RX ADMIN — DILTIAZEM HYDROCHLORIDE 60 MG: 60 TABLET, FILM COATED ORAL at 23:03

## 2019-09-01 RX ADMIN — ONDANSETRON 4 MG: 2 INJECTION INTRAMUSCULAR; INTRAVENOUS at 17:03

## 2019-09-01 RX ADMIN — CILOSTAZOL 100 MG: 100 TABLET ORAL at 23:03

## 2019-09-01 RX ADMIN — CIPROFLOXACIN 400 MG: 2 INJECTION, SOLUTION INTRAVENOUS at 20:16

## 2019-09-01 RX ADMIN — ENOXAPARIN SODIUM 70 MG: 80 INJECTION SUBCUTANEOUS at 23:03

## 2019-09-01 RX ADMIN — MORPHINE SULFATE 4 MG: 4 INJECTION, SOLUTION INTRAMUSCULAR; INTRAVENOUS at 17:03

## 2019-09-01 RX ADMIN — SODIUM CHLORIDE 1000 ML: 9 INJECTION, SOLUTION INTRAVENOUS at 22:05

## 2019-09-01 RX ADMIN — SODIUM CHLORIDE 1000 ML: 9 INJECTION, SOLUTION INTRAVENOUS at 17:03

## 2019-09-01 RX ADMIN — LATANOPROST 1 DROP: 50 SOLUTION/ DROPS OPHTHALMIC at 23:03

## 2019-09-01 ASSESSMENT — PAIN DESCRIPTION - PAIN TYPE: TYPE: ACUTE PAIN

## 2019-09-01 ASSESSMENT — PAIN SCALES - GENERAL
PAINLEVEL_OUTOF10: 0
PAINLEVEL_OUTOF10: 0
PAINLEVEL_OUTOF10: 10
PAINLEVEL_OUTOF10: 10

## 2019-09-01 ASSESSMENT — PAIN DESCRIPTION - LOCATION: LOCATION: ABDOMEN

## 2019-09-01 ASSESSMENT — PAIN DESCRIPTION - FREQUENCY: FREQUENCY: CONTINUOUS

## 2019-09-01 ASSESSMENT — PAIN DESCRIPTION - ORIENTATION: ORIENTATION: MID;UPPER

## 2019-09-01 NOTE — PROGRESS NOTES
Pt sees Dr. Coy Viramontes for stones. Sister at bedside and stated that Dr. Coy Viramontes said if pt starts feeling this way to consult him.

## 2019-09-01 NOTE — H&P
findings of bowel obstruction or inflammatory change.  Scattered  retained fecal debris within the colon. Pelvis: The bladder and rectum are normal.    Peritoneum/Retroperitoneum: No intraperitoneal free air or free fluid.  No  evidence of mesenteric or retroperitoneal lymphadenopathy. Bones/Soft Tissues: No suspicious lytic or blastic osseous lesion.     Impression:       No acute intra-abdominal or pelvic abnormality.     CT Head WO Contrast [[id number]] Collected: 09/01/19 1718     Order Status: Completed Updated: 09/01/19 1728     Narrative:       EXAMINATION:  CT OF THE HEAD WITHOUT CONTRAST  9/1/2019 5:05 pm    TECHNIQUE:  CT of the head was performed without the administration of intravenous  contrast. Dose modulation, iterative reconstruction, and/or weight based  adjustment of the mA/kV was utilized to reduce the radiation dose to as low  as reasonably achievable. COMPARISON:  01/24/2019    HISTORY:  ORDERING SYSTEM PROVIDED HISTORY: CONFUSION/DELIRIUM, ALTERED LOC, UNEXPLAINED  TECHNOLOGIST PROVIDED HISTORY:  Has a \"code stroke\" or \"stroke alert\" been called? ->No  Reason for Exam: AMS  Acuity: Acute  Type of Exam: Initial  Additional signs and symptoms: AMS  Relevant Medical/Surgical History: NONE    FINDINGS:  BRAIN/VENTRICLES: There is redemonstration of the sellar and suprasellar mass  extending into the left prepontine region consistent with the patient's known  macroadenoma.  No acute intracranial hemorrhage or cortical-based infarct. No mass effect or midline shift.  Periventricular hypodensity suggesting  sequela of chronic small vessel ischemic disease.  There are no findings of  acute hydrocephalus. ORBITS: The visualized portion of the orbits demonstrate no acute abnormality. SINUSES: The visualized paranasal sinuses and mastoid air cells demonstrate  no acute abnormality.     SOFT TISSUES/SKULL:  No acute abnormality of the visualized skull or soft  tissues.     Impression:

## 2019-09-01 NOTE — ED TRIAGE NOTES
Pt to ED today via EMS for c/o vomiting since last night. Pt has hx of bile stones and has to have them removed every couple years. Pt currently living at home by himself, his wife who is patients care giver is currently at a nursing home for rehab s/p neck fx. Daughter is at bedside, states her and her brother both live 1 hr away and has had people stopping over to check on patient.

## 2019-09-02 ENCOUNTER — APPOINTMENT (OUTPATIENT)
Dept: GENERAL RADIOLOGY | Age: 79
DRG: 564 | End: 2019-09-02
Payer: MEDICARE

## 2019-09-02 ENCOUNTER — APPOINTMENT (OUTPATIENT)
Dept: CT IMAGING | Age: 79
DRG: 564 | End: 2019-09-02
Payer: MEDICARE

## 2019-09-02 LAB
ALBUMIN SERPL-MCNC: 4.2 GM/DL (ref 3.4–5)
ALP BLD-CCNC: 74 IU/L (ref 40–129)
ALT SERPL-CCNC: 19 U/L (ref 10–40)
ANION GAP SERPL CALCULATED.3IONS-SCNC: 14 MMOL/L (ref 4–16)
AST SERPL-CCNC: 33 IU/L (ref 15–37)
ATYPICAL LYMPHOCYTE ABSOLUTE COUNT: ABNORMAL
BANDED NEUTROPHILS ABSOLUTE COUNT: 3.01 K/CU MM
BANDED NEUTROPHILS RELATIVE PERCENT: 14 % (ref 5–11)
BILIRUB SERPL-MCNC: 0.8 MG/DL (ref 0–1)
BILIRUBIN DIRECT: 0.2 MG/DL (ref 0–0.3)
BILIRUBIN, INDIRECT: 0.6 MG/DL (ref 0–0.7)
BUN BLDV-MCNC: 27 MG/DL (ref 6–23)
CALCIUM IONIZED: 4.44 MG/DL (ref 4.48–5.28)
CALCIUM SERPL-MCNC: 9.3 MG/DL (ref 8.3–10.6)
CHLORIDE BLD-SCNC: 104 MMOL/L (ref 99–110)
CO2: 22 MMOL/L (ref 21–32)
CREAT SERPL-MCNC: 1.5 MG/DL (ref 0.9–1.3)
DIFFERENTIAL TYPE: ABNORMAL
EKG ATRIAL RATE: 108 BPM
EKG DIAGNOSIS: NORMAL
EKG P AXIS: 40 DEGREES
EKG P-R INTERVAL: 180 MS
EKG Q-T INTERVAL: 366 MS
EKG QRS DURATION: 104 MS
EKG QTC CALCULATION (BAZETT): 490 MS
EKG R AXIS: -30 DEGREES
EKG T AXIS: 67 DEGREES
EKG VENTRICULAR RATE: 108 BPM
ELLIPTOCYTES: ABNORMAL
GFR AFRICAN AMERICAN: 54 ML/MIN/1.73M2
GFR NON-AFRICAN AMERICAN: 45 ML/MIN/1.73M2
GLUCOSE BLD-MCNC: 137 MG/DL (ref 70–99)
GLUCOSE BLD-MCNC: 138 MG/DL (ref 70–99)
GLUCOSE BLD-MCNC: 147 MG/DL (ref 70–99)
GLUCOSE BLD-MCNC: 149 MG/DL (ref 70–99)
GLUCOSE BLD-MCNC: 152 MG/DL (ref 70–99)
GLUCOSE BLD-MCNC: 201 MG/DL (ref 70–99)
GLUCOSE BLD-MCNC: 215 MG/DL (ref 70–99)
HCT VFR BLD CALC: 41 % (ref 42–52)
HEMOGLOBIN: 13 GM/DL (ref 13.5–18)
IONIZED CA: 1.11 MMOL/L (ref 1.12–1.32)
LACTATE: 2.1 MMOL/L (ref 0.4–2)
LACTIC ACID, SEPSIS: 3 MMOL/L (ref 0.5–1.9)
LYMPHOCYTES ABSOLUTE: 3.7 K/CU MM
LYMPHOCYTES RELATIVE PERCENT: 17 % (ref 24–44)
MCH RBC QN AUTO: 29.3 PG (ref 27–31)
MCHC RBC AUTO-ENTMCNC: 31.7 % (ref 32–36)
MCV RBC AUTO: 92.3 FL (ref 78–100)
METAMYELOCYTES ABSOLUTE COUNT: 0.65 K/CU MM
METAMYELOCYTES PERCENT: 3 %
MONOCYTES ABSOLUTE: 0.9 K/CU MM
MONOCYTES RELATIVE PERCENT: 4 % (ref 0–4)
MYELOCYTE PERCENT: 1 %
MYELOCYTES ABSOLUTE COUNT: 0.22 K/CU MM
PDW BLD-RTO: 13.1 % (ref 11.7–14.9)
PLATELET # BLD: 205 K/CU MM (ref 140–440)
PMV BLD AUTO: 9.6 FL (ref 7.5–11.1)
POTASSIUM SERPL-SCNC: 3.7 MMOL/L (ref 3.5–5.1)
PROCALCITONIN: 0.68
RBC # BLD: 4.44 M/CU MM (ref 4.6–6.2)
SEGMENTED NEUTROPHILS ABSOLUTE COUNT: 13 K/CU MM
SEGMENTED NEUTROPHILS RELATIVE PERCENT: 61 % (ref 36–66)
SODIUM BLD-SCNC: 140 MMOL/L (ref 135–145)
TOTAL PROTEIN: 6.5 GM/DL (ref 6.4–8.2)
TROPONIN T: 0.15 NG/ML
WBC # BLD: 21.5 K/CU MM (ref 4–10.5)
WBC # BLD: ABNORMAL 10*3/UL

## 2019-09-02 PROCEDURE — 36415 COLL VENOUS BLD VENIPUNCTURE: CPT

## 2019-09-02 PROCEDURE — 6360000002 HC RX W HCPCS

## 2019-09-02 PROCEDURE — 99222 1ST HOSP IP/OBS MODERATE 55: CPT | Performed by: INTERNAL MEDICINE

## 2019-09-02 PROCEDURE — 2700000000 HC OXYGEN THERAPY PER DAY

## 2019-09-02 PROCEDURE — 6360000002 HC RX W HCPCS: Performed by: INTERNAL MEDICINE

## 2019-09-02 PROCEDURE — 82962 GLUCOSE BLOOD TEST: CPT

## 2019-09-02 PROCEDURE — 93010 ELECTROCARDIOGRAM REPORT: CPT | Performed by: INTERNAL MEDICINE

## 2019-09-02 PROCEDURE — 80053 COMPREHEN METABOLIC PANEL: CPT

## 2019-09-02 PROCEDURE — C9113 INJ PANTOPRAZOLE SODIUM, VIA: HCPCS | Performed by: INTERNAL MEDICINE

## 2019-09-02 PROCEDURE — 84484 ASSAY OF TROPONIN QUANT: CPT

## 2019-09-02 PROCEDURE — 94761 N-INVAS EAR/PLS OXIMETRY MLT: CPT

## 2019-09-02 PROCEDURE — 94640 AIRWAY INHALATION TREATMENT: CPT

## 2019-09-02 PROCEDURE — 2060000000 HC ICU INTERMEDIATE R&B

## 2019-09-02 PROCEDURE — 84145 PROCALCITONIN (PCT): CPT

## 2019-09-02 PROCEDURE — 94150 VITAL CAPACITY TEST: CPT

## 2019-09-02 PROCEDURE — 2500000003 HC RX 250 WO HCPCS: Performed by: NURSE PRACTITIONER

## 2019-09-02 PROCEDURE — 71250 CT THORAX DX C-: CPT

## 2019-09-02 PROCEDURE — 2500000003 HC RX 250 WO HCPCS: Performed by: INTERNAL MEDICINE

## 2019-09-02 PROCEDURE — 82248 BILIRUBIN DIRECT: CPT

## 2019-09-02 PROCEDURE — 2580000003 HC RX 258: Performed by: INTERNAL MEDICINE

## 2019-09-02 PROCEDURE — 83605 ASSAY OF LACTIC ACID: CPT

## 2019-09-02 PROCEDURE — 82330 ASSAY OF CALCIUM: CPT

## 2019-09-02 PROCEDURE — 71045 X-RAY EXAM CHEST 1 VIEW: CPT

## 2019-09-02 PROCEDURE — 6370000000 HC RX 637 (ALT 250 FOR IP): Performed by: INTERNAL MEDICINE

## 2019-09-02 PROCEDURE — 85027 COMPLETE CBC AUTOMATED: CPT

## 2019-09-02 PROCEDURE — 85007 BL SMEAR W/DIFF WBC COUNT: CPT

## 2019-09-02 PROCEDURE — 89220 SPUTUM SPECIMEN COLLECTION: CPT

## 2019-09-02 RX ORDER — ACETAMINOPHEN 650 MG/1
650 SUPPOSITORY RECTAL EVERY 4 HOURS PRN
Status: DISCONTINUED | OUTPATIENT
Start: 2019-09-02 | End: 2019-09-10 | Stop reason: HOSPADM

## 2019-09-02 RX ORDER — HYDROMORPHONE HCL 110MG/55ML
0.5 PATIENT CONTROLLED ANALGESIA SYRINGE INTRAVENOUS
Status: DISCONTINUED | OUTPATIENT
Start: 2019-09-02 | End: 2019-09-03

## 2019-09-02 RX ORDER — DILTIAZEM HYDROCHLORIDE 5 MG/ML
10 INJECTION INTRAVENOUS ONCE
Status: COMPLETED | OUTPATIENT
Start: 2019-09-02 | End: 2019-09-02

## 2019-09-02 RX ORDER — ONDANSETRON 2 MG/ML
4 INJECTION INTRAMUSCULAR; INTRAVENOUS EVERY 6 HOURS PRN
Status: DISCONTINUED | OUTPATIENT
Start: 2019-09-02 | End: 2019-09-10 | Stop reason: HOSPADM

## 2019-09-02 RX ORDER — PANTOPRAZOLE SODIUM 40 MG/10ML
40 INJECTION, POWDER, LYOPHILIZED, FOR SOLUTION INTRAVENOUS 2 TIMES DAILY
Status: DISCONTINUED | OUTPATIENT
Start: 2019-09-02 | End: 2019-09-10 | Stop reason: HOSPADM

## 2019-09-02 RX ORDER — HYDROMORPHONE HCL 110MG/55ML
PATIENT CONTROLLED ANALGESIA SYRINGE INTRAVENOUS
Status: COMPLETED
Start: 2019-09-02 | End: 2019-09-02

## 2019-09-02 RX ORDER — PROMETHAZINE HYDROCHLORIDE 25 MG/ML
INJECTION, SOLUTION INTRAMUSCULAR; INTRAVENOUS
Status: COMPLETED
Start: 2019-09-02 | End: 2019-09-02

## 2019-09-02 RX ORDER — IPRATROPIUM BROMIDE AND ALBUTEROL SULFATE 2.5; .5 MG/3ML; MG/3ML
1 SOLUTION RESPIRATORY (INHALATION)
Status: DISCONTINUED | OUTPATIENT
Start: 2019-09-02 | End: 2019-09-10 | Stop reason: HOSPADM

## 2019-09-02 RX ORDER — PROMETHAZINE HYDROCHLORIDE 25 MG/ML
6.25 INJECTION, SOLUTION INTRAMUSCULAR; INTRAVENOUS EVERY 6 HOURS PRN
Status: DISCONTINUED | OUTPATIENT
Start: 2019-09-02 | End: 2019-09-10 | Stop reason: HOSPADM

## 2019-09-02 RX ORDER — MORPHINE SULFATE 4 MG/ML
2 INJECTION, SOLUTION INTRAMUSCULAR; INTRAVENOUS EVERY 4 HOURS PRN
Status: DISCONTINUED | OUTPATIENT
Start: 2019-09-02 | End: 2019-09-02

## 2019-09-02 RX ADMIN — MORPHINE SULFATE 2 MG: 4 INJECTION, SOLUTION INTRAMUSCULAR; INTRAVENOUS at 09:48

## 2019-09-02 RX ADMIN — METRONIDAZOLE 500 MG: 500 INJECTION, SOLUTION INTRAVENOUS at 20:31

## 2019-09-02 RX ADMIN — ONDANSETRON HYDROCHLORIDE 4 MG: 2 INJECTION, SOLUTION INTRAMUSCULAR; INTRAVENOUS at 09:49

## 2019-09-02 RX ADMIN — LATANOPROST 1 DROP: 50 SOLUTION/ DROPS OPHTHALMIC at 20:33

## 2019-09-02 RX ADMIN — PROMETHAZINE HYDROCHLORIDE 6.25 MG: 25 INJECTION INTRAMUSCULAR; INTRAVENOUS at 17:13

## 2019-09-02 RX ADMIN — MORPHINE SULFATE 2 MG: 4 INJECTION, SOLUTION INTRAMUSCULAR; INTRAVENOUS at 16:10

## 2019-09-02 RX ADMIN — INSULIN LISPRO 1 UNITS: 100 INJECTION, SOLUTION INTRAVENOUS; SUBCUTANEOUS at 03:40

## 2019-09-02 RX ADMIN — DILTIAZEM HYDROCHLORIDE 10 MG: 5 INJECTION INTRAVENOUS at 22:51

## 2019-09-02 RX ADMIN — METRONIDAZOLE 500 MG: 500 INJECTION, SOLUTION INTRAVENOUS at 12:53

## 2019-09-02 RX ADMIN — INSULIN LISPRO 2 UNITS: 100 INJECTION, SOLUTION INTRAVENOUS; SUBCUTANEOUS at 23:36

## 2019-09-02 RX ADMIN — CIPROFLOXACIN 400 MG: 2 INJECTION, SOLUTION INTRAVENOUS at 09:26

## 2019-09-02 RX ADMIN — Medication 10 ML: at 20:33

## 2019-09-02 RX ADMIN — IPRATROPIUM BROMIDE AND ALBUTEROL SULFATE 1 AMPULE: .5; 3 SOLUTION RESPIRATORY (INHALATION) at 10:26

## 2019-09-02 RX ADMIN — IPRATROPIUM BROMIDE AND ALBUTEROL SULFATE 1 AMPULE: .5; 3 SOLUTION RESPIRATORY (INHALATION) at 20:46

## 2019-09-02 RX ADMIN — METRONIDAZOLE 500 MG: 500 INJECTION, SOLUTION INTRAVENOUS at 04:56

## 2019-09-02 RX ADMIN — HYDROMORPHONE HYDROCHLORIDE 0.5 MG: 2 INJECTION, SOLUTION INTRAMUSCULAR; INTRAVENOUS; SUBCUTANEOUS at 21:36

## 2019-09-02 RX ADMIN — ENOXAPARIN SODIUM 30 MG: 30 INJECTION SUBCUTANEOUS at 17:41

## 2019-09-02 RX ADMIN — HYDROMORPHONE HYDROCHLORIDE 0.5 MG: 2 INJECTION, SOLUTION INTRAMUSCULAR; INTRAVENOUS; SUBCUTANEOUS at 17:12

## 2019-09-02 RX ADMIN — ONDANSETRON HYDROCHLORIDE 4 MG: 2 INJECTION, SOLUTION INTRAMUSCULAR; INTRAVENOUS at 16:11

## 2019-09-02 RX ADMIN — PROMETHAZINE HYDROCHLORIDE 6.25 MG: 25 INJECTION, SOLUTION INTRAMUSCULAR; INTRAVENOUS at 17:13

## 2019-09-02 RX ADMIN — CEFTRIAXONE 1 G: 1 INJECTION, POWDER, FOR SOLUTION INTRAMUSCULAR; INTRAVENOUS at 11:40

## 2019-09-02 RX ADMIN — PANTOPRAZOLE SODIUM 40 MG: 40 INJECTION, POWDER, FOR SOLUTION INTRAVENOUS at 20:31

## 2019-09-02 RX ADMIN — PANTOPRAZOLE SODIUM 40 MG: 40 INJECTION, POWDER, FOR SOLUTION INTRAVENOUS at 09:49

## 2019-09-02 ASSESSMENT — PAIN SCALES - WONG BAKER
WONGBAKER_NUMERICALRESPONSE: 0
WONGBAKER_NUMERICALRESPONSE: 6
WONGBAKER_NUMERICALRESPONSE: 8
WONGBAKER_NUMERICALRESPONSE: 8
WONGBAKER_NUMERICALRESPONSE: 10

## 2019-09-02 ASSESSMENT — PAIN DESCRIPTION - LOCATION
LOCATION: ABDOMEN

## 2019-09-02 ASSESSMENT — PAIN SCALES - GENERAL
PAINLEVEL_OUTOF10: 0
PAINLEVEL_OUTOF10: 7
PAINLEVEL_OUTOF10: 0

## 2019-09-02 ASSESSMENT — PAIN DESCRIPTION - DESCRIPTORS
DESCRIPTORS: ACHING
DESCRIPTORS: ACHING;DISCOMFORT
DESCRIPTORS: ACHING;DISCOMFORT
DESCRIPTORS: ACHING

## 2019-09-02 ASSESSMENT — PAIN - FUNCTIONAL ASSESSMENT: PAIN_FUNCTIONAL_ASSESSMENT: ACTIVITIES ARE NOT PREVENTED

## 2019-09-02 ASSESSMENT — PAIN DESCRIPTION - PAIN TYPE
TYPE: ACUTE PAIN

## 2019-09-02 ASSESSMENT — PAIN DESCRIPTION - ORIENTATION
ORIENTATION: MID;UPPER
ORIENTATION: UPPER;MID

## 2019-09-02 ASSESSMENT — PAIN DESCRIPTION - FREQUENCY
FREQUENCY: CONTINUOUS

## 2019-09-02 ASSESSMENT — PAIN DESCRIPTION - PROGRESSION: CLINICAL_PROGRESSION: OTHER (COMMENT)

## 2019-09-02 ASSESSMENT — PAIN DESCRIPTION - ONSET: ONSET: UNABLE TO TELL

## 2019-09-02 NOTE — CONSULTS
Anxiety    Exenatide Other (See Comments)     Causes him to be nervous and jittery    Metoprolol Succinate Other (See Comments)     Hallucinations     Phenergan [Promethazine Hcl] Other (See Comments)     Hallucinations, nervous, jittery    Plavix [Clopidogrel Bisulfate] Other (See Comments)     Causes bleeding in his stomach    Pseudoephedrine Anxiety    Reglan [Metoclopramide Hcl] Other (See Comments)     \"Caused him to just sit in a chair and rock back and forth\"         pantoprazole (PROTONIX) injection 40 mg BID   ipratropium-albuterol (DUONEB) nebulizer solution 1 ampule Q4H WA   cefTRIAXone (ROCEPHIN) 1 g in dextrose 5 % 50 mL IVPB Q24H   ondansetron (ZOFRAN) injection 4 mg Q6H PRN   morphine sulfate (PF) injection 2 mg Q4H PRN   sodium chloride flush 0.9 % injection 10 mL PRN   aspirin tablet 325 mg Daily   atorvastatin (LIPITOR) tablet 40 mg Daily   carvedilol (COREG) tablet 6.25 mg BID WC   cilostazol (PLETAL) tablet 100 mg BID   diltiazem (CARDIZEM) tablet 60 mg BID   docusate sodium (COLACE) capsule 100 mg Nightly PRN   latanoprost (XALATAN) 0.005 % ophthalmic solution 1 drop Nightly   metronidazole (FLAGYL) 500 mg in NaCl 100 mL IVPB premix Q8H   glucose (GLUTOSE) 40 % oral gel 15 g PRN   dextrose 50 % IV solution PRN   glucagon (rDNA) injection 1 mg PRN   dextrose 5 % solution PRN   insulin lispro (HUMALOG) injection vial 0-6 Units Q4H     Current Facility-Administered Medications   Medication Dose Route Frequency Provider Last Rate Last Dose    pantoprazole (PROTONIX) injection 40 mg  40 mg Intravenous BID Chelsi Velazquez MD        ipratropium-albuterol (DUONEB) nebulizer solution 1 ampule  1 ampule Inhalation Q4H EVARISTO Velazquez MD        cefTRIAXone (ROCEPHIN) 1 g in dextrose 5 % 50 mL IVPB  1 g Intravenous Q24H Chelsi Velazquez MD        ondansetron [facility]) injection 4 mg  4 mg Intravenous Q6H PRN Chelsi Velazquez MD        morphine sulfate (PF) injection 2 mg  2 mg Intravenous Q4H

## 2019-09-02 NOTE — CONSULTS
been  sent for culture. INR was 1. 11. Blood chemistry showed potassium of  3.3; otherwise, it was unremarkable. BUN and creatinine were normal.   Lipids were normal.  Liver functions were normal except for a bilirubin  of 1.5. Repeat blood sugar was 360. IMPRESSION:  1. History of vomiting which is recent. 2.  History of recurrent common bile duct stones, status post history of  ERCP and sphincterotomies. 3.  Status post cholecystectomy. 4.  Status post remote history of coronary artery bypass surgery. 5.  Leukocytosis, etiology undetermined. RECOMMENDATIONS:  I recommended MRCP done today. We will give him some  liquid diet and we will follow the patient.         Arsenio Ochoa MD    D: 09/01/2019 17:54:09       T: 09/01/2019 19:52:57     [email or web address]  Job#: [id number]     Doc#: [id number]    CC:  MD Ros Pryor MD

## 2019-09-02 NOTE — CONSULTS
Name:  Chanel Guzman /Age/Sex: 1940  ([de-identified] y.o. male)   MRN & CSN:  [id number] & [id number] Admission Date/Time: 2019  1:51 PM   Location:  -A PCP: Whitney Elkins, [address] Day: 2          Referring physician:  Phil Duffy MD         Reason for consultation:  Daniel Roper        Thanks for referral.    Information source: chart/nurse    CC;  confused      HPI:   Thank you for involving me in taking  care of Chanel Guzman who  is a [de-identified] y. o.year  Old male  Presents with  Confusion, Has h/o CAD, PVD  And GI issues,  Trop elevated, has no CP, SOB. Past medical history:    has a past medical history of Arthritis, Biliary stones, CAD (coronary artery disease), COPD (chronic obstructive pulmonary disease) ([facility] [address] Diabetes mellitus ([facility] [address] [facility] History of blood transfusion, Hx of angiography, Hyperlipidemia, Hypertension, Kidney stone, and Kidney stones. Past surgical history:   has a past surgical history that includes Cardiac surgery; joint replacement; Cholecystectomy; Coronary angioplasty with stent; fracture surgery; Leg amputation below knee; ERCP (14); other surgical history (14); ERCP (14); ERCP (10/24/15); ERCP (2017); Abdomen surgery; Dilatation, esophagus; and ERCP (N/A, 2019). Social History:   reports that he has never smoked. He has never used smokeless tobacco. He reports that he does not drink alcohol or use drugs. Family history:  family history includes Cancer in his father and mother; Diabetes in his mother.     Allergies   Allergen Reactions    Byetta 10 Mcg Pen [Exenatide]     Phenylephrine     Sulbactam     Ampicillin Rash    Aricept [Donepezil Hydrochloride] Other (See Comments)     Hallucinations, confusion    Benztropine Other (See Comments)     Hallucinations, confusion    Celebrex [Celecoxib] Other (See Comments)     'causes him to be nervous and jittery\"    Diphenhydramine Hcl Other (See Comments) normocephalic     Eyes: normal, noninjected conjunctiva    ENT: normal mucosa, noninjected throat, normal     NECK: No JVP  No thyromegaly        Cardiovascular: No thrills palpated   Auscultation: Normal S1 and S2,  no murmur   carotid bruit no   Abdominal Aorta no bruit    Respiratory:    Breath sounds Diminshed bilaterally    Extremities:  Trace Edema clubbing ,   no cyanosis    SKIN: Warm and well perfused, no pallor or cyanosis    Vascular exam:   marcio AKA        Abdomen:  No masses or tenderness. No organomegaly noted. Neurological:  confused  Lab Review   Recent Labs     09/02/19 0320   WBC 21.5*   HGB 13.0*   HCT 41.0*         Recent Labs     09/02/19 0320      K 3.7      CO2 22   BUN 27*   CREATININE 1.5*     Recent Labs     09/02/19 0320   AST 33   ALT 19   BILIDIR 0.2   BILITOT 0.8   ALKPHOS 74     No results for input(s): TROPONINI in the last 72 hours.   Lab Results   Component Value Date    BNP 9 03/11/2014     (H) 08/30/2012     Lab Results   Component Value Date    INR 1.11 01/23/2019    PROTIME 12.6 (H) 01/23/2019           Assessment/Recommendations:     - Elev trop type 2 elevation, check echo in am  - CAD  H/o CABg  CPM  - risk factor modification  - hyperlipidimea on statin         Padmini Albright MD, 9/2/2019 11:43 AM

## 2019-09-02 NOTE — PROGRESS NOTES
Patient is relaxing in room and states patients ABD feels better and doesn't feel sick anymore. Patients daughter was updated.

## 2019-09-03 ENCOUNTER — APPOINTMENT (OUTPATIENT)
Dept: CT IMAGING | Age: 79
DRG: 564 | End: 2019-09-03
Payer: MEDICARE

## 2019-09-03 LAB
ALBUMIN SERPL-MCNC: 4.2 GM/DL (ref 3.4–5)
ALP BLD-CCNC: 72 IU/L (ref 40–129)
ALT SERPL-CCNC: 20 U/L (ref 10–40)
ANION GAP SERPL CALCULATED.3IONS-SCNC: 13 MMOL/L (ref 4–16)
AST SERPL-CCNC: 35 IU/L (ref 15–37)
ATYPICAL LYMPHOCYTE ABSOLUTE COUNT: ABNORMAL
BANDED NEUTROPHILS ABSOLUTE COUNT: 1.22 K/CU MM
BANDED NEUTROPHILS RELATIVE PERCENT: 8 % (ref 5–11)
BILIRUB SERPL-MCNC: 0.5 MG/DL (ref 0–1)
BILIRUBIN DIRECT: 0.2 MG/DL (ref 0–0.3)
BILIRUBIN, INDIRECT: 0.3 MG/DL (ref 0–0.7)
BUN BLDV-MCNC: 25 MG/DL (ref 6–23)
C-REACTIVE PROTEIN, HIGH SENSITIVITY: 149.5 MG/L
CALCIUM IONIZED: 4.68 MG/DL (ref 4.48–5.28)
CALCIUM SERPL-MCNC: 9.3 MG/DL (ref 8.3–10.6)
CHLORIDE BLD-SCNC: 106 MMOL/L (ref 99–110)
CO2: 24 MMOL/L (ref 21–32)
CREAT SERPL-MCNC: 1.4 MG/DL (ref 0.9–1.3)
DIFFERENTIAL TYPE: ABNORMAL
EKG ATRIAL RATE: 101 BPM
EKG DIAGNOSIS: NORMAL
EKG P AXIS: 38 DEGREES
EKG P-R INTERVAL: 182 MS
EKG Q-T INTERVAL: 394 MS
EKG QRS DURATION: 106 MS
EKG QTC CALCULATION (BAZETT): 510 MS
EKG R AXIS: -29 DEGREES
EKG T AXIS: 81 DEGREES
EKG VENTRICULAR RATE: 101 BPM
ELLIPTOCYTES: ABNORMAL
EOSINOPHILS ABSOLUTE: 0.9 K/CU MM
EOSINOPHILS RELATIVE PERCENT: 6 % (ref 0–3)
ERYTHROCYTE SEDIMENTATION RATE: 42 MM/HR (ref 0–20)
GFR AFRICAN AMERICAN: 59 ML/MIN/1.73M2
GFR NON-AFRICAN AMERICAN: 49 ML/MIN/1.73M2
GLUCOSE BLD-MCNC: 135 MG/DL (ref 70–99)
GLUCOSE BLD-MCNC: 140 MG/DL (ref 70–99)
GLUCOSE BLD-MCNC: 186 MG/DL (ref 70–99)
GLUCOSE BLD-MCNC: 187 MG/DL (ref 70–99)
GLUCOSE BLD-MCNC: 198 MG/DL (ref 70–99)
GLUCOSE BLD-MCNC: 220 MG/DL (ref 70–99)
HCT VFR BLD CALC: 41 % (ref 42–52)
HEMOGLOBIN: 13 GM/DL (ref 13.5–18)
INR BLD: 1.17 INDEX
IONIZED CA: 1.17 MMOL/L (ref 1.12–1.32)
LACTATE: 1.1 MMOL/L (ref 0.4–2)
LV EF: 50 %
LVEF MODALITY: NORMAL
LYMPHOCYTES ABSOLUTE: 2.1 K/CU MM
LYMPHOCYTES RELATIVE PERCENT: 14 % (ref 24–44)
MAGNESIUM: 2.4 MG/DL (ref 1.8–2.4)
MCH RBC QN AUTO: 29.3 PG (ref 27–31)
MCHC RBC AUTO-ENTMCNC: 31.7 % (ref 32–36)
MCV RBC AUTO: 92.3 FL (ref 78–100)
METAMYELOCYTES ABSOLUTE COUNT: 0.15 K/CU MM
METAMYELOCYTES PERCENT: 1 %
MONOCYTES ABSOLUTE: 1.8 K/CU MM
MONOCYTES RELATIVE PERCENT: 12 % (ref 0–4)
PDW BLD-RTO: 13.2 % (ref 11.7–14.9)
PLATELET # BLD: 201 K/CU MM (ref 140–440)
PMV BLD AUTO: 9.8 FL (ref 7.5–11.1)
POTASSIUM SERPL-SCNC: 3.6 MMOL/L (ref 3.5–5.1)
PROCALCITONIN: 0.51
PROTHROMBIN TIME: 13.6 SECONDS (ref 9.12–12.5)
RBC # BLD: 4.44 M/CU MM (ref 4.6–6.2)
SEGMENTED NEUTROPHILS ABSOLUTE COUNT: 9.1 K/CU MM
SEGMENTED NEUTROPHILS RELATIVE PERCENT: 59 % (ref 36–66)
SODIUM BLD-SCNC: 143 MMOL/L (ref 135–145)
TOTAL PROTEIN: 6.4 GM/DL (ref 6.4–8.2)
WBC # BLD: 15.3 K/CU MM (ref 4–10.5)
WBC # BLD: ABNORMAL 10*3/UL

## 2019-09-03 PROCEDURE — 82330 ASSAY OF CALCIUM: CPT

## 2019-09-03 PROCEDURE — APPSS30 APP SPLIT SHARED TIME 16-30 MINUTES: Performed by: NURSE PRACTITIONER

## 2019-09-03 PROCEDURE — 84145 PROCALCITONIN (PCT): CPT

## 2019-09-03 PROCEDURE — 6360000002 HC RX W HCPCS: Performed by: INTERNAL MEDICINE

## 2019-09-03 PROCEDURE — 2500000003 HC RX 250 WO HCPCS: Performed by: NURSE PRACTITIONER

## 2019-09-03 PROCEDURE — 6370000000 HC RX 637 (ALT 250 FOR IP): Performed by: INTERNAL MEDICINE

## 2019-09-03 PROCEDURE — 82962 GLUCOSE BLOOD TEST: CPT

## 2019-09-03 PROCEDURE — 99222 1ST HOSP IP/OBS MODERATE 55: CPT | Performed by: INTERNAL MEDICINE

## 2019-09-03 PROCEDURE — 83735 ASSAY OF MAGNESIUM: CPT

## 2019-09-03 PROCEDURE — 80053 COMPREHEN METABOLIC PANEL: CPT

## 2019-09-03 PROCEDURE — 6360000002 HC RX W HCPCS: Performed by: NURSE PRACTITIONER

## 2019-09-03 PROCEDURE — C9113 INJ PANTOPRAZOLE SODIUM, VIA: HCPCS | Performed by: INTERNAL MEDICINE

## 2019-09-03 PROCEDURE — 93306 TTE W/DOPPLER COMPLETE: CPT

## 2019-09-03 PROCEDURE — 2700000000 HC OXYGEN THERAPY PER DAY

## 2019-09-03 PROCEDURE — 2580000003 HC RX 258: Performed by: INTERNAL MEDICINE

## 2019-09-03 PROCEDURE — 83605 ASSAY OF LACTIC ACID: CPT

## 2019-09-03 PROCEDURE — 93005 ELECTROCARDIOGRAM TRACING: CPT | Performed by: NURSE PRACTITIONER

## 2019-09-03 PROCEDURE — 2060000000 HC ICU INTERMEDIATE R&B

## 2019-09-03 PROCEDURE — 87073 CULTURE BACTERIA ANAEROBIC: CPT

## 2019-09-03 PROCEDURE — 85652 RBC SED RATE AUTOMATED: CPT

## 2019-09-03 PROCEDURE — 73700 CT LOWER EXTREMITY W/O DYE: CPT

## 2019-09-03 PROCEDURE — 85610 PROTHROMBIN TIME: CPT

## 2019-09-03 PROCEDURE — 92610 EVALUATE SWALLOWING FUNCTION: CPT | Performed by: SPEECH-LANGUAGE PATHOLOGIST

## 2019-09-03 PROCEDURE — 87186 SC STD MICRODIL/AGAR DIL: CPT

## 2019-09-03 PROCEDURE — 87077 CULTURE AEROBIC IDENTIFY: CPT

## 2019-09-03 PROCEDURE — 99231 SBSQ HOSP IP/OBS SF/LOW 25: CPT | Performed by: INTERNAL MEDICINE

## 2019-09-03 PROCEDURE — 86141 C-REACTIVE PROTEIN HS: CPT

## 2019-09-03 PROCEDURE — 82248 BILIRUBIN DIRECT: CPT

## 2019-09-03 PROCEDURE — 87147 CULTURE TYPE IMMUNOLOGIC: CPT

## 2019-09-03 PROCEDURE — 87071 CULTURE AEROBIC QUANT OTHER: CPT

## 2019-09-03 PROCEDURE — 2500000003 HC RX 250 WO HCPCS: Performed by: INTERNAL MEDICINE

## 2019-09-03 PROCEDURE — 86140 C-REACTIVE PROTEIN: CPT

## 2019-09-03 PROCEDURE — 93010 ELECTROCARDIOGRAM REPORT: CPT | Performed by: INTERNAL MEDICINE

## 2019-09-03 PROCEDURE — 87040 BLOOD CULTURE FOR BACTERIA: CPT

## 2019-09-03 PROCEDURE — 85027 COMPLETE CBC AUTOMATED: CPT

## 2019-09-03 PROCEDURE — 94640 AIRWAY INHALATION TREATMENT: CPT

## 2019-09-03 PROCEDURE — 2580000003 HC RX 258: Performed by: NURSE PRACTITIONER

## 2019-09-03 PROCEDURE — 94761 N-INVAS EAR/PLS OXIMETRY MLT: CPT

## 2019-09-03 PROCEDURE — 85007 BL SMEAR W/DIFF WBC COUNT: CPT

## 2019-09-03 PROCEDURE — 36415 COLL VENOUS BLD VENIPUNCTURE: CPT

## 2019-09-03 RX ORDER — DILTIAZEM HYDROCHLORIDE 5 MG/ML
10 INJECTION INTRAVENOUS ONCE
Status: COMPLETED | OUTPATIENT
Start: 2019-09-03 | End: 2019-09-03

## 2019-09-03 RX ORDER — HYDRALAZINE HYDROCHLORIDE 20 MG/ML
10 INJECTION INTRAMUSCULAR; INTRAVENOUS EVERY 6 HOURS PRN
Status: DISCONTINUED | OUTPATIENT
Start: 2019-09-03 | End: 2019-09-04

## 2019-09-03 RX ORDER — ACETYLCYSTEINE 200 MG/ML
2 SOLUTION ORAL; RESPIRATORY (INHALATION) 3 TIMES DAILY
Status: DISCONTINUED | OUTPATIENT
Start: 2019-09-03 | End: 2019-09-10 | Stop reason: HOSPADM

## 2019-09-03 RX ORDER — ACETYLCYSTEINE 200 MG/ML
2 SOLUTION ORAL; RESPIRATORY (INHALATION) 3 TIMES DAILY
Status: DISCONTINUED | OUTPATIENT
Start: 2019-09-03 | End: 2019-09-03

## 2019-09-03 RX ORDER — POTASSIUM CHLORIDE AND SODIUM CHLORIDE 900; 300 MG/100ML; MG/100ML
INJECTION, SOLUTION INTRAVENOUS CONTINUOUS
Status: DISCONTINUED | OUTPATIENT
Start: 2019-09-03 | End: 2019-09-05

## 2019-09-03 RX ORDER — MAGNESIUM SULFATE 1 G/100ML
1 INJECTION INTRAVENOUS ONCE
Status: COMPLETED | OUTPATIENT
Start: 2019-09-03 | End: 2019-09-03

## 2019-09-03 RX ORDER — SODIUM CHLORIDE 9 MG/ML
INJECTION, SOLUTION INTRAVENOUS CONTINUOUS
Status: DISCONTINUED | OUTPATIENT
Start: 2019-09-03 | End: 2019-09-03

## 2019-09-03 RX ADMIN — POTASSIUM CHLORIDE AND SODIUM CHLORIDE: 900; 300 INJECTION, SOLUTION INTRAVENOUS at 10:33

## 2019-09-03 RX ADMIN — MAGNESIUM SULFATE HEPTAHYDRATE 1 G: 1 INJECTION, SOLUTION INTRAVENOUS at 06:36

## 2019-09-03 RX ADMIN — INSULIN LISPRO 1 UNITS: 100 INJECTION, SOLUTION INTRAVENOUS; SUBCUTANEOUS at 18:30

## 2019-09-03 RX ADMIN — PANTOPRAZOLE SODIUM 40 MG: 40 INJECTION, POWDER, FOR SOLUTION INTRAVENOUS at 10:32

## 2019-09-03 RX ADMIN — IPRATROPIUM BROMIDE AND ALBUTEROL SULFATE 1 AMPULE: .5; 3 SOLUTION RESPIRATORY (INHALATION) at 12:06

## 2019-09-03 RX ADMIN — LATANOPROST 1 DROP: 50 SOLUTION/ DROPS OPHTHALMIC at 21:33

## 2019-09-03 RX ADMIN — ACETAMINOPHEN 650 MG: 650 SUPPOSITORY RECTAL at 17:17

## 2019-09-03 RX ADMIN — INSULIN LISPRO 2 UNITS: 100 INJECTION, SOLUTION INTRAVENOUS; SUBCUTANEOUS at 10:33

## 2019-09-03 RX ADMIN — IPRATROPIUM BROMIDE AND ALBUTEROL SULFATE 1 AMPULE: .5; 3 SOLUTION RESPIRATORY (INHALATION) at 19:51

## 2019-09-03 RX ADMIN — DILTIAZEM HYDROCHLORIDE 10 MG: 5 INJECTION INTRAVENOUS at 06:35

## 2019-09-03 RX ADMIN — HYDRALAZINE HYDROCHLORIDE 10 MG: 20 INJECTION INTRAMUSCULAR; INTRAVENOUS at 17:02

## 2019-09-03 RX ADMIN — ACETYLCYSTEINE 400 MG: 200 SOLUTION ORAL; RESPIRATORY (INHALATION) at 16:23

## 2019-09-03 RX ADMIN — IPRATROPIUM BROMIDE AND ALBUTEROL SULFATE 1 AMPULE: .5; 3 SOLUTION RESPIRATORY (INHALATION) at 16:22

## 2019-09-03 RX ADMIN — SODIUM CHLORIDE: 9 INJECTION, SOLUTION INTRAVENOUS at 01:00

## 2019-09-03 RX ADMIN — MEROPENEM 1 G: 1 INJECTION, POWDER, FOR SOLUTION INTRAVENOUS at 15:30

## 2019-09-03 RX ADMIN — METRONIDAZOLE 500 MG: 500 INJECTION, SOLUTION INTRAVENOUS at 06:36

## 2019-09-03 RX ADMIN — PANTOPRAZOLE SODIUM 40 MG: 40 INJECTION, POWDER, FOR SOLUTION INTRAVENOUS at 21:33

## 2019-09-03 RX ADMIN — Medication 10 ML: at 10:32

## 2019-09-03 RX ADMIN — ACETAMINOPHEN 650 MG: 650 SUPPOSITORY RECTAL at 13:15

## 2019-09-03 RX ADMIN — ACETYLCYSTEINE 400 MG: 200 SOLUTION ORAL; RESPIRATORY (INHALATION) at 08:31

## 2019-09-03 RX ADMIN — MEROPENEM 1 G: 1 INJECTION, POWDER, FOR SOLUTION INTRAVENOUS at 23:25

## 2019-09-03 RX ADMIN — ACETYLCYSTEINE 400 MG: 200 SOLUTION ORAL; RESPIRATORY (INHALATION) at 19:51

## 2019-09-03 RX ADMIN — INSULIN LISPRO 1 UNITS: 100 INJECTION, SOLUTION INTRAVENOUS; SUBCUTANEOUS at 15:39

## 2019-09-03 RX ADMIN — INSULIN LISPRO 1 UNITS: 100 INJECTION, SOLUTION INTRAVENOUS; SUBCUTANEOUS at 07:06

## 2019-09-03 RX ADMIN — METRONIDAZOLE 500 MG: 500 INJECTION, SOLUTION INTRAVENOUS at 13:19

## 2019-09-03 RX ADMIN — CEFTRIAXONE SODIUM 2 G: 2 INJECTION, POWDER, FOR SOLUTION INTRAMUSCULAR; INTRAVENOUS at 10:33

## 2019-09-03 RX ADMIN — IPRATROPIUM BROMIDE AND ALBUTEROL SULFATE 1 AMPULE: .5; 3 SOLUTION RESPIRATORY (INHALATION) at 08:31

## 2019-09-03 ASSESSMENT — PAIN DESCRIPTION - LOCATION
LOCATION: ABDOMEN

## 2019-09-03 ASSESSMENT — PAIN DESCRIPTION - FREQUENCY
FREQUENCY: CONTINUOUS

## 2019-09-03 ASSESSMENT — PAIN DESCRIPTION - DESCRIPTORS
DESCRIPTORS: ACHING;DISCOMFORT

## 2019-09-03 ASSESSMENT — PAIN DESCRIPTION - ORIENTATION
ORIENTATION: MID;LEFT
ORIENTATION: MID;UPPER
ORIENTATION: MID;LEFT
ORIENTATION: MID;UPPER

## 2019-09-03 ASSESSMENT — PAIN - FUNCTIONAL ASSESSMENT
PAIN_FUNCTIONAL_ASSESSMENT: ACTIVITIES ARE NOT PREVENTED

## 2019-09-03 ASSESSMENT — PAIN DESCRIPTION - PAIN TYPE
TYPE: ACUTE PAIN

## 2019-09-03 ASSESSMENT — PAIN SCALES - GENERAL
PAINLEVEL_OUTOF10: 2
PAINLEVEL_OUTOF10: 0
PAINLEVEL_OUTOF10: 5
PAINLEVEL_OUTOF10: 1
PAINLEVEL_OUTOF10: 0
PAINLEVEL_OUTOF10: 5
PAINLEVEL_OUTOF10: 2
PAINLEVEL_OUTOF10: 0

## 2019-09-03 ASSESSMENT — PAIN DESCRIPTION - PROGRESSION
CLINICAL_PROGRESSION: OTHER (COMMENT)
CLINICAL_PROGRESSION: GRADUALLY IMPROVING
CLINICAL_PROGRESSION: OTHER (COMMENT)
CLINICAL_PROGRESSION: GRADUALLY IMPROVING

## 2019-09-03 ASSESSMENT — PAIN DESCRIPTION - ONSET
ONSET: UNABLE TO TELL
ONSET: UNABLE TO TELL
ONSET: ON-GOING
ONSET: ON-GOING

## 2019-09-03 NOTE — PROGRESS NOTES
Continuous  Pain Onset: Unable to tell  Clinical Progression: Other (Comment)(unable to state)  Functional Pain Assessment: Activities are not prevented  Non-Pharmaceutical Pain Intervention(s): Repositioned, Relaxation techniques  Response to Pain Intervention: Asleep with RR greater than 10    Reason for Referral  Jenae Aden was referred for a bedside swallow evaluation to assess the efficiency of his swallow function, identify signs and symptoms of aspiration and make recommendations regarding safe dietary consistencies, effective compensatory strategies, and safe eating environment. Treatment Plan  Requires SLP Intervention: Yes  Duration/Frequency of Treatment: 3-5 x/week x 1 week or until goals met  D/C Recommendations: To be determined       Recommended Diet and Intervention  Diet Solids Recommendation: NPO  Liquid Consistency Recommendation: NPO  Recommended Form of Meds: Via alternative means of nutrition  Recommendations: Dysphagia treatment;NPO  Therapeutic Interventions: Therapeutic PO trials with SLP;Patient/Family education; Thermal stimulation    Compensatory Swallowing Strategies       Treatment/Goals  Short-term Goals  Timeframe for Short-term Goals: 1 week  or until goals met  Goal 1: Pt will participate in repeat BSE for safe initiaton of PO diet. Long-term Goals  Timeframe for Long-term Goals: N/a    General  Chart Reviewed: Yes  Behavior/Cognition: Alert;Confused; Doesn't follow directions  Respiratory Status: O2 via nasual cannula  O2 Device: Nasal cannula  Follows Directions: None  Patient Positioning: Upright in bed  Baseline Vocal Quality: Wet  Volitional Cough: Weak  Volitional Swallow: Absent  Prior Dysphagia History: dysphagia, mbs 2014 with trace silent aspiration thins  Consistencies Administered:  Ice Chips           Vision/Hearing  Hearing  Hearing: Within functional limits    Oral Motor Deficits  Oral/Motor  Oral Motor: (unable to assess due to impaired command following)    Oral Phase Dysfunction  Oral Phase  Oral Phase: WFL(for limited trials of ice-chips x 2)     Indicators of Pharyngeal Phase Dysfunction   Pharyngeal Phase  Pharyngeal Phase: Exceptions  Indicators of Pharyngeal Phase Dysfunction  Absent Swallow: All    Prognosis  Prognosis  Prognosis for safe diet advancement: fair  Barriers to reach goals: cognitive deficits;severity of dysphagia  Barriers/Prognosis Comment: previous h/o dysphagia  Individuals consulted  Consulted and agree with results and recommendations: RN    Education  Patient Education: results and recommendations  Patient Education Response: No evidence of learning  Safety Devices in place: Yes  Type of devices:  All fall risk precautions in place    G-Code            Therapy Time  SLP Individual Minutes  Time In: 4624  Time Out: 7122  Minutes: [address] [place], [place]  9/3/2019 9:14 AM

## 2019-09-03 NOTE — PROGRESS NOTES
does not drink alcohol or use drugs. Family history:  family history includes Cancer in his father and mother; Diabetes in his mother. Allergies   Allergen Reactions    Byetta 10 Mcg Pen [Exenatide]     Phenylephrine     Sulbactam     Ampicillin Rash    Aricept [Donepezil Hydrochloride] Other (See Comments)     Hallucinations, confusion    Benztropine Other (See Comments)     Hallucinations, confusion    Celebrex [Celecoxib] Other (See Comments)     'causes him to be nervous and jittery\"    Diphenhydramine Hcl Other (See Comments)     nervous and jittery    Diphenhydramine Hcl [Diphenhydramine] Anxiety    Exenatide Other (See Comments)     Causes him to be nervous and jittery    Metoprolol Succinate Other (See Comments)     Hallucinations     Phenergan [Promethazine Hcl] Other (See Comments)     Hallucinations, nervous, jittery    Plavix [Clopidogrel Bisulfate] Other (See Comments)     Causes bleeding in his stomach    Pseudoephedrine Anxiety    Reglan [Metoclopramide Hcl] Other (See Comments)     \"Caused him to just sit in a chair and rock back and forth\"       Review of Systems:   All 14 systems were reviewed and are negative  Except for the positive findings  which as documented     BP (!) 178/71   Pulse 101   Temp 100.8 °F (38.2 °C) (Oral)   Resp 20   Ht 5' 5\" (1.651 m)   Wt 153 lb 14.1 oz (69.8 kg)   SpO2 92%   BMI 25.61 kg/m²       Intake/Output Summary (Last 24 hours) at 9/3/2019 1700  Last data filed at 9/3/2019 1656  Gross per 24 hour   Intake 2343.25 ml   Output 950 ml   Net 1393.25 ml       Physical Exam:  Constitutional:  Well developed, Well nourished, No acute distress, Non-toxic appearance. HENT:  Normocephalic, Atraumatic, Bilateral external ears normal, Oropharynx moist  Eyes:  PERRL, Conjunctiva normal, No discharge. Respiratory:  Normal breath sounds, No respiratory distress, No wheezing, No chest tenderness.    Cardiovascular:  Elevated heart rate, Normal rhythm, No

## 2019-09-03 NOTE — PROGRESS NOTES
Spoke with Dr. Kourtney Perrin in regards to Dr. Zelda White recommendation to hold lovenox and pletal for possible ERCP. New orders received.  Valentina Darden RN

## 2019-09-03 NOTE — PROGRESS NOTES
TECHNOLOGIST PROVIDED HISTORY: Reason for Exam: severe abd pain and emisis  JG FINDINGS: MRCP images are suboptimal due to respiratory motion artifact. Known pneumobilia seen on recent CT is suboptimally visualized by MRI. Liver otherwise appears homogeneous without evidence of discrete lesion or fatty infiltration. Spleen and adrenal glands appear unremarkable. Stable small renal cysts and nonspecific perinephric edema. Stable mild pancreatic atrophy. Stable scattered small pancreatic cystic lesions adjacent to the pancreatic duct largest largest again in the superior pancreatic body. Gallbladder: Cholecystectomy. Bile Ducts: Stable central intrahepatic and extrahepatic bile duct dilatation measuring up to approximately 1.7 cm. No rounded filling defects identified. Few areas of heterogeneous linear hypointensity within the extrahepatic bile ducts. Pancreatic Duct: Stable diffuse dilatation measuring up to approximately 7 mm. Other:  Normal caliber abdominal aorta. No gross adenopathy. Mild size hiatal hernia. Bowel loops are normal caliber. Lung bases grossly clear. Normal heart size. 1. Cholecystectomy with stable central intrahepatic and extrahepatic bile duct dilatation as measured above. No rounded filling defects to suggest choledocholithiasis. Few areas of heterogeneous linear hypointensity within the extrahepatic bile ducts which are nonspecific, possibly sludge or sloughed mucosa. 2. Stable mild pancreatic atrophy with diffuse ductal dilatation as measured above. Stable scattered small pancreatic cystic lesions adjacent to the pancreatic duct largest largest again in the superior pancreatic body suggesting side-branch IPMNs. Consider follow-up assessment in 1-2 years to ensure continued stability.            [facility]

## 2019-09-03 NOTE — CONSULTS
endocarditis  Wounds: C/D/I  HEMT: AT/NC Oropharynx pink, moist, and without lesions or exudates; Upper edentulous, lower teeth, many missing teeth, no loose teeth  Eyes: PERRLA, EOMI, conjunctiva pink, sclera anicteric. Neck: Supple. Trachea midline. No LAD. Chest: no distress and CTA. Good air movement. Heart: RRR and no MRG. Abd: soft, non-distended, tenderness to light palpation, no hepatomegaly. Normoactive bowel sounds. Ext: no clubbing, cyanosis, or edema, bilateral BKA  Catheter Site: without erythema or tenderness  Neuro: Disoriented x 4. CN 2-12 intact and no focal sensory or motor deficits    ? Diagnostic Studies: reviewed  9/1/2019 XR Chest Portable:    FINDINGS:  No consolidation, effusion or pneumothorax.  Stable cardiomediastinal  silhouette.      Impression:       No acute process. 9/1/2019 CT Head WO Contrast:    ?  Impression:        No acute intracranial abnormality. 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 9/1/2019 CT Abdomen Pelvis W IV Contrast:? FINDINGS:  Lower Chest: The visualized portion of the lower chest demonstrates no acute  abnormality. Organs: Stable left hepatic pneumobilia suggesting postsurgical origin.  The  pancreas, spleen, adrenal glands are normal.  The kidneys enhance  symmetrically.  No renal mass or hydronephrosis.  The gallbladder has been  resected. GI/Bowel: No findings of bowel obstruction or inflammatory change.  Scattered  retained fecal debris within the colon. Pelvis: The bladder and rectum are normal.    Peritoneum/Retroperitoneum: No intraperitoneal free air or free fluid.  No  evidence of mesenteric or retroperitoneal lymphadenopathy. Bones/Soft Tissues: No suspicious lytic or blastic osseous lesion.      Impression:       No acute intra-abdominal or pelvic abnormality. 9/2/2019 MRI Abdomen WO Contrast MRCP:  Impression:        1.  Cholecystectomy

## 2019-09-04 LAB
ALBUMIN SERPL-MCNC: 3.8 GM/DL (ref 3.4–5)
ALP BLD-CCNC: 68 IU/L (ref 40–129)
ALT SERPL-CCNC: 17 U/L (ref 10–40)
ANION GAP SERPL CALCULATED.3IONS-SCNC: 14 MMOL/L (ref 4–16)
AST SERPL-CCNC: 23 IU/L (ref 15–37)
BASOPHILS ABSOLUTE: 0.1 K/CU MM
BASOPHILS RELATIVE PERCENT: 0.3 % (ref 0–1)
BILIRUB SERPL-MCNC: 0.6 MG/DL (ref 0–1)
BILIRUBIN DIRECT: 0.2 MG/DL (ref 0–0.3)
BILIRUBIN, INDIRECT: 0.4 MG/DL (ref 0–0.7)
BUN BLDV-MCNC: 17 MG/DL (ref 6–23)
CALCIUM IONIZED: 4.28 MG/DL (ref 4.48–5.28)
CALCIUM SERPL-MCNC: 9.2 MG/DL (ref 8.3–10.6)
CHLORIDE BLD-SCNC: 107 MMOL/L (ref 99–110)
CO2: 22 MMOL/L (ref 21–32)
CREAT SERPL-MCNC: 1.2 MG/DL (ref 0.9–1.3)
DIFFERENTIAL TYPE: ABNORMAL
EOSINOPHILS ABSOLUTE: 0.1 K/CU MM
EOSINOPHILS RELATIVE PERCENT: 0.9 % (ref 0–3)
GFR AFRICAN AMERICAN: >60 ML/MIN/1.73M2
GFR NON-AFRICAN AMERICAN: 58 ML/MIN/1.73M2
GLUCOSE BLD-MCNC: 132 MG/DL (ref 70–99)
GLUCOSE BLD-MCNC: 149 MG/DL (ref 70–99)
GLUCOSE BLD-MCNC: 150 MG/DL (ref 70–99)
GLUCOSE BLD-MCNC: 152 MG/DL (ref 70–99)
GLUCOSE BLD-MCNC: 161 MG/DL (ref 70–99)
GLUCOSE BLD-MCNC: 165 MG/DL (ref 70–99)
GLUCOSE BLD-MCNC: 167 MG/DL (ref 70–99)
GLUCOSE BLD-MCNC: 173 MG/DL (ref 70–99)
HCT VFR BLD CALC: 39.1 % (ref 42–52)
HEMOGLOBIN: 12.4 GM/DL (ref 13.5–18)
IMMATURE NEUTROPHIL %: 0.7 % (ref 0–0.43)
IONIZED CA: 1.07 MMOL/L (ref 1.12–1.32)
LACTATE: 1.3 MMOL/L (ref 0.4–2)
LYMPHOCYTES ABSOLUTE: 1.2 K/CU MM
LYMPHOCYTES RELATIVE PERCENT: 8.3 % (ref 24–44)
MCH RBC QN AUTO: 29.1 PG (ref 27–31)
MCHC RBC AUTO-ENTMCNC: 31.7 % (ref 32–36)
MCV RBC AUTO: 91.8 FL (ref 78–100)
MONOCYTES ABSOLUTE: 1.4 K/CU MM
MONOCYTES RELATIVE PERCENT: 9.3 % (ref 0–4)
NUCLEATED RBC %: 0 %
PDW BLD-RTO: 13.2 % (ref 11.7–14.9)
PLATELET # BLD: 199 K/CU MM (ref 140–440)
PMV BLD AUTO: 9.8 FL (ref 7.5–11.1)
POTASSIUM SERPL-SCNC: 3.5 MMOL/L (ref 3.5–5.1)
PROCALCITONIN: 0.36
RBC # BLD: 4.26 M/CU MM (ref 4.6–6.2)
SEGMENTED NEUTROPHILS ABSOLUTE COUNT: 11.8 K/CU MM
SEGMENTED NEUTROPHILS RELATIVE PERCENT: 80.5 % (ref 36–66)
SODIUM BLD-SCNC: 143 MMOL/L (ref 135–145)
TOTAL IMMATURE NEUTOROPHIL: 0.1 K/CU MM
TOTAL NUCLEATED RBC: 0 K/CU MM
TOTAL PROTEIN: 5.8 GM/DL (ref 6.4–8.2)
WBC # BLD: 14.6 K/CU MM (ref 4–10.5)

## 2019-09-04 PROCEDURE — 82330 ASSAY OF CALCIUM: CPT

## 2019-09-04 PROCEDURE — 6360000002 HC RX W HCPCS: Performed by: INTERNAL MEDICINE

## 2019-09-04 PROCEDURE — C9113 INJ PANTOPRAZOLE SODIUM, VIA: HCPCS | Performed by: INTERNAL MEDICINE

## 2019-09-04 PROCEDURE — 99232 SBSQ HOSP IP/OBS MODERATE 35: CPT | Performed by: INTERNAL MEDICINE

## 2019-09-04 PROCEDURE — 92526 ORAL FUNCTION THERAPY: CPT | Performed by: SPEECH-LANGUAGE PATHOLOGIST

## 2019-09-04 PROCEDURE — 2700000000 HC OXYGEN THERAPY PER DAY

## 2019-09-04 PROCEDURE — 82962 GLUCOSE BLOOD TEST: CPT

## 2019-09-04 PROCEDURE — 2580000003 HC RX 258: Performed by: INTERNAL MEDICINE

## 2019-09-04 PROCEDURE — 84145 PROCALCITONIN (PCT): CPT

## 2019-09-04 PROCEDURE — 6360000002 HC RX W HCPCS: Performed by: NURSE PRACTITIONER

## 2019-09-04 PROCEDURE — 99211 OFF/OP EST MAY X REQ PHY/QHP: CPT

## 2019-09-04 PROCEDURE — 85025 COMPLETE CBC W/AUTO DIFF WBC: CPT

## 2019-09-04 PROCEDURE — 82248 BILIRUBIN DIRECT: CPT

## 2019-09-04 PROCEDURE — 6370000000 HC RX 637 (ALT 250 FOR IP): Performed by: INTERNAL MEDICINE

## 2019-09-04 PROCEDURE — 36415 COLL VENOUS BLD VENIPUNCTURE: CPT

## 2019-09-04 PROCEDURE — APPSS30 APP SPLIT SHARED TIME 16-30 MINUTES: Performed by: NURSE PRACTITIONER

## 2019-09-04 PROCEDURE — 94150 VITAL CAPACITY TEST: CPT

## 2019-09-04 PROCEDURE — 94761 N-INVAS EAR/PLS OXIMETRY MLT: CPT

## 2019-09-04 PROCEDURE — 83605 ASSAY OF LACTIC ACID: CPT

## 2019-09-04 PROCEDURE — 2060000000 HC ICU INTERMEDIATE R&B

## 2019-09-04 PROCEDURE — 80053 COMPREHEN METABOLIC PANEL: CPT

## 2019-09-04 PROCEDURE — 2500000003 HC RX 250 WO HCPCS: Performed by: INTERNAL MEDICINE

## 2019-09-04 PROCEDURE — 94640 AIRWAY INHALATION TREATMENT: CPT

## 2019-09-04 RX ORDER — ACETAMINOPHEN 325 MG/1
650 TABLET ORAL EVERY 4 HOURS PRN
Status: DISCONTINUED | OUTPATIENT
Start: 2019-09-04 | End: 2019-09-10 | Stop reason: HOSPADM

## 2019-09-04 RX ORDER — OXYCODONE HYDROCHLORIDE 5 MG/1
5 TABLET ORAL EVERY 4 HOURS PRN
Status: DISCONTINUED | OUTPATIENT
Start: 2019-09-04 | End: 2019-09-10 | Stop reason: HOSPADM

## 2019-09-04 RX ORDER — MORPHINE SULFATE 4 MG/ML
4 INJECTION, SOLUTION INTRAMUSCULAR; INTRAVENOUS EVERY 4 HOURS PRN
Status: DISCONTINUED | OUTPATIENT
Start: 2019-09-04 | End: 2019-09-10 | Stop reason: HOSPADM

## 2019-09-04 RX ORDER — METOPROLOL TARTRATE 5 MG/5ML
5 INJECTION INTRAVENOUS EVERY 6 HOURS PRN
Status: DISCONTINUED | OUTPATIENT
Start: 2019-09-04 | End: 2019-09-10 | Stop reason: HOSPADM

## 2019-09-04 RX ADMIN — INSULIN LISPRO 1 UNITS: 100 INJECTION, SOLUTION INTRAVENOUS; SUBCUTANEOUS at 10:51

## 2019-09-04 RX ADMIN — MORPHINE SULFATE 4 MG: 4 INJECTION, SOLUTION INTRAMUSCULAR; INTRAVENOUS at 22:26

## 2019-09-04 RX ADMIN — METOPROLOL TARTRATE 5 MG: 5 INJECTION INTRAVENOUS at 18:32

## 2019-09-04 RX ADMIN — INSULIN LISPRO 1 UNITS: 100 INJECTION, SOLUTION INTRAVENOUS; SUBCUTANEOUS at 01:04

## 2019-09-04 RX ADMIN — IPRATROPIUM BROMIDE AND ALBUTEROL SULFATE 1 AMPULE: .5; 3 SOLUTION RESPIRATORY (INHALATION) at 15:37

## 2019-09-04 RX ADMIN — MEROPENEM 1 G: 1 INJECTION, POWDER, FOR SOLUTION INTRAVENOUS at 14:22

## 2019-09-04 RX ADMIN — LATANOPROST 1 DROP: 50 SOLUTION/ DROPS OPHTHALMIC at 21:02

## 2019-09-04 RX ADMIN — Medication 10 ML: at 08:07

## 2019-09-04 RX ADMIN — IPRATROPIUM BROMIDE AND ALBUTEROL SULFATE 1 AMPULE: .5; 3 SOLUTION RESPIRATORY (INHALATION) at 21:18

## 2019-09-04 RX ADMIN — ACETYLCYSTEINE 400 MG: 200 SOLUTION ORAL; RESPIRATORY (INHALATION) at 21:23

## 2019-09-04 RX ADMIN — INSULIN LISPRO 1 UNITS: 100 INJECTION, SOLUTION INTRAVENOUS; SUBCUTANEOUS at 03:00

## 2019-09-04 RX ADMIN — MEROPENEM 1 G: 1 INJECTION, POWDER, FOR SOLUTION INTRAVENOUS at 06:20

## 2019-09-04 RX ADMIN — IPRATROPIUM BROMIDE AND ALBUTEROL SULFATE 1 AMPULE: .5; 3 SOLUTION RESPIRATORY (INHALATION) at 07:44

## 2019-09-04 RX ADMIN — ACETYLCYSTEINE 400 MG: 200 SOLUTION ORAL; RESPIRATORY (INHALATION) at 07:44

## 2019-09-04 RX ADMIN — METOPROLOL TARTRATE 5 MG: 5 INJECTION INTRAVENOUS at 08:04

## 2019-09-04 RX ADMIN — PANTOPRAZOLE SODIUM 40 MG: 40 INJECTION, POWDER, FOR SOLUTION INTRAVENOUS at 21:01

## 2019-09-04 RX ADMIN — PANTOPRAZOLE SODIUM 40 MG: 40 INJECTION, POWDER, FOR SOLUTION INTRAVENOUS at 08:04

## 2019-09-04 RX ADMIN — INSULIN LISPRO 1 UNITS: 100 INJECTION, SOLUTION INTRAVENOUS; SUBCUTANEOUS at 06:21

## 2019-09-04 RX ADMIN — INSULIN LISPRO 1 UNITS: 100 INJECTION, SOLUTION INTRAVENOUS; SUBCUTANEOUS at 15:28

## 2019-09-04 RX ADMIN — ACETYLCYSTEINE 400 MG: 200 SOLUTION ORAL; RESPIRATORY (INHALATION) at 15:37

## 2019-09-04 RX ADMIN — POTASSIUM CHLORIDE AND SODIUM CHLORIDE 50 ML/HR: 900; 300 INJECTION, SOLUTION INTRAVENOUS at 06:25

## 2019-09-04 RX ADMIN — INSULIN LISPRO 1 UNITS: 100 INJECTION, SOLUTION INTRAVENOUS; SUBCUTANEOUS at 23:40

## 2019-09-04 RX ADMIN — MEROPENEM 1 G: 1 INJECTION, POWDER, FOR SOLUTION INTRAVENOUS at 22:27

## 2019-09-04 RX ADMIN — ENOXAPARIN SODIUM 30 MG: 30 INJECTION SUBCUTANEOUS at 08:06

## 2019-09-04 RX ADMIN — HYDRALAZINE HYDROCHLORIDE 10 MG: 20 INJECTION INTRAMUSCULAR; INTRAVENOUS at 01:30

## 2019-09-04 RX ADMIN — IPRATROPIUM BROMIDE AND ALBUTEROL SULFATE 1 AMPULE: .5; 3 SOLUTION RESPIRATORY (INHALATION) at 11:48

## 2019-09-04 ASSESSMENT — PAIN SCALES - GENERAL
PAINLEVEL_OUTOF10: 0
PAINLEVEL_OUTOF10: 7
PAINLEVEL_OUTOF10: 0

## 2019-09-04 ASSESSMENT — PAIN DESCRIPTION - LOCATION: LOCATION: ABDOMEN

## 2019-09-04 ASSESSMENT — PAIN DESCRIPTION - PROGRESSION: CLINICAL_PROGRESSION: OTHER (COMMENT)

## 2019-09-04 ASSESSMENT — PAIN DESCRIPTION - ONSET: ONSET: UNABLE TO TELL

## 2019-09-04 ASSESSMENT — PAIN DESCRIPTION - DESCRIPTORS: DESCRIPTORS: DISCOMFORT

## 2019-09-04 ASSESSMENT — PAIN DESCRIPTION - FREQUENCY: FREQUENCY: CONTINUOUS

## 2019-09-04 ASSESSMENT — PAIN DESCRIPTION - PAIN TYPE: TYPE: ACUTE PAIN

## 2019-09-04 ASSESSMENT — PAIN DESCRIPTION - ORIENTATION: ORIENTATION: MID

## 2019-09-04 NOTE — PROGRESS NOTES
Infectious Disease Progress Note  2019   Patient Name: Ernestine Dubin : 1940      Impression  Bacterial sepsis:  -Suggested by fever and elevated pct. -GI suspects choledocholithiasis with cholangitis , however this is unclear (Owing to a previus history of similar       presentation). -Right BKA stump is tender and red:? Stump osteomyelitis  -CT right femur suspects osteomyelitis- will confirm with MRI     Plan  -D/c ceftriaxone and metronidazole  -Start meropenem  -MRI of the right femur    Ongoing Antimicrobial Therapy:  meropenem 9/3  ? Completed Antimicrobial Therapy  ceftriaxone 9/3  metronidazole -9/3  ciprofloxacin   ceftriaxone   ciprofloxacin 2018  cephalexin 10/2017  piperacillin-taxobactam 10/2015  moxifloxacin   ? History:? Interval history noted  Denies n/v/d/f or untoward effects of antibiotics. Physical Exam:  Vital Signs: BP (!) 160/84   Pulse 112   Temp 98.3 °F (36.8 °C) (Oral)   Resp 18   Ht 5' 5\" (1.651 m)   Wt 155 lb 13.8 oz (70.7 kg)   SpO2 93%   BMI 25.94 kg/m²     VS: noted; wt   Gen: Pleasantly disoriented, no distress  Skin: no stigmata of endocarditis  Wounds: C/D/I  HEMT: AT/NC Oropharynx pink, moist, and without lesions or exudates; Upper edentulous, lower teeth, many missing teeth, no loose teeth  Eyes: PERRLA, EOMI, conjunctiva pink, sclera anicteric. Neck: Supple. Trachea midline. No LAD. Chest: no distress and CTA. Good air movement. Heart: RRR and no MRG. Abd: soft, non-distended, tenderness to light palpation, no hepatomegaly. Normoactive bowel sounds.   Ext: no clubbing, cyanosis, or edema, bilateral BKA, right BKA stump callus, surrounding inflammation tender  Catheter Site: without erythema or tenderness  Neuro: Disoriented x 4. CN 2-12 intact and no focal sensory or motor deficits     Radiologic / Imaging / TESTING  9/3/2019 CT Femur Right WO contrast:  Impression:        Bone lysis within the distal tibia at the site of amputation posterior with  associated soft tissue swelling.  Findings are suspicious for osteomyelitis. This could be confirmed with contrast-enhanced MRI if patient is able.           Labs:    Recent Results (from the past 24 hour(s))   POCT Glucose    Collection Time: 09/03/19 10:30 AM   Result Value Ref Range    POC Glucose 220 (H) 70 - 99 MG/DL   EKG 12 Lead    Collection Time: 09/03/19  1:09 PM   Result Value Ref Range    Ventricular Rate 101 BPM    Atrial Rate 101 BPM    P-R Interval 182 ms    QRS Duration 106 ms    Q-T Interval 394 ms    QTc Calculation (Bazett) 510 ms    P Axis 38 degrees    R Axis -29 degrees    T Axis 81 degrees    Diagnosis       Sinus tachycardia  Inferior infarct (cited on or before 01-SEP-2019)  Abnormal ECG  When compared with ECG of 01-SEP-2019 16:21,  No significant change was found  Confirmed by [facility] Wendy ZHU ([id number]) on 9/3/2019 4:58:02 PM     POCT Glucose    Collection Time: 09/03/19  3:29 PM   Result Value Ref Range    POC Glucose 187 (H) 70 - 99 MG/DL   Wound culture    Collection Time: 09/03/19  5:05 PM   Result Value Ref Range    Specimen LEG     Special Requests R stump     Culture CULTURE IN PROGRESS    POCT Glucose    Collection Time: 09/03/19  6:30 PM   Result Value Ref Range    POC Glucose 186 (H) 70 - 99 MG/DL   POCT Glucose    Collection Time: 09/04/19  1:01 AM   Result Value Ref Range    POC Glucose 152 (H) 70 - 99 MG/DL   POCT Glucose    Collection Time: 09/04/19  3:30 AM   Result Value Ref Range    POC Glucose 161 (H) 70 - 99 MG/DL   CBC Auto Differential    Collection Time: 09/04/19  3:35 AM   Result Value Ref Range    WBC 14.6 (H) 4.0 - 10.5 K/CU MM    RBC 4.26 (L) 4.6 - 6.2 M/CU MM    Hemoglobin 12.4 (L) 13.5 - 18.0 GM/DL    Hematocrit 39.1 (L) 42 - 52 %    MCV 91.8 78 - 100 FL    MCH 29.1 27 - 31 PG    MCHC 31.7 (L) 32.0 - 36.0 %    RDW 13.2 11.7 - 14.9 %    Platelets 954 205 - 573 K/CU MM    MPV 9.8 7.5 - 11.1 FL    Differential Type AUTOMATED DIFFERENTIAL

## 2019-09-04 NOTE — PROGRESS NOTES
Speech Language Pathology  [facility]  DEPARTMENT OF SPEECH/LANGUAGE PATHOLOGY  DAILY PROGRESS NOTE  Sydna Phalen  9/4/2019  [id number]  Lactic acidosis [E87.2]  Epigastric pain [R10.13]  Hyperglycemia [R73.9]  Elevated troponin [R74.8]  Elevated bilirubin [R17]  Nausea vomiting and diarrhea [R11.2, R19.7]  Sepsis ([address] [A41.9]  Abdominal pain, unspecified abdominal location [R10.9]  Leukocytosis, unspecified type [D72.829]  Allergies   Allergen Reactions    Byetta 10 Mcg Pen [Exenatide]     Phenylephrine     Sulbactam     Ampicillin Rash    Aricept [Donepezil Hydrochloride] Other (See Comments)     Hallucinations, confusion    Benztropine Other (See Comments)     Hallucinations, confusion    Celebrex [Celecoxib] Other (See Comments)     'causes him to be nervous and jittery\"    Diphenhydramine Hcl Other (See Comments)     nervous and jittery    Diphenhydramine Hcl [Diphenhydramine] Anxiety    Exenatide Other (See Comments)     Causes him to be nervous and jittery    Metoprolol Succinate Other (See Comments)     Hallucinations     Phenergan [Promethazine Hcl] Other (See Comments)     Hallucinations, nervous, jittery    Plavix [Clopidogrel Bisulfate] Other (See Comments)     Causes bleeding in his stomach    Pseudoephedrine Anxiety    Reglan [Metoclopramide Hcl] Other (See Comments)     \"Caused him to just sit in a chair and rock back and forth\"         Pt was seen this date for dysphagia treatment. IMPRESSION AND RECOMMENDATIONS:   Pt seen for ongoing assessment of swallowing. Pt has a h/o dysphagia with recommendation for nectar thick liquids on previous admissions. Pt demonstrates improved secretion management today with clear vocal quality.   Volitional swallow is absent, intermittent severely effortful swallow and moderately reduced laryngeal elevation for trials of ice-chips and thins by spoon and cup with 1 episode of immediate cough that appeared to

## 2019-09-04 NOTE — CONSULTS
[id number] [facility] Wound Ostomy Continence Nurse  Consult Note       NAME:  Lissette Johnson RECORD NUMBER:  [id number]  AGE: [de-identified] y.o. GENDER: male  : 1940  TODAY'S DATE:  2019    Subjective   Reason for  Evaluation and Assessment: skin assessment      Fariba Roche is a [de-identified] y.o. male referred by:   [] Physician  [] Nursing  [] Other:     Wound Identification:  Wound Type: pressure  Contributing Factors: chronic pressure, decreased mobility and shear force    Wound History:  Seen in hospital, no history of skin issues  Current Wound Care Treatment:  Wound Care: Wash with soap and water, pat dry. Cover with [employer]. Change every other day. Patient Goal of Care:  [x] Wound Healing  [] Odor Control  [] Palliative Care  [] Pain Control   [] Other:         PAST MEDICAL HISTORY        Diagnosis Date    Arthritis     Biliary stones     CAD (coronary artery disease)     [facility] disease) [facility]     Diabetes mellitus ([facility] [address]     History of blood transfusion     Hx of angiography 3/11/2014    Pulmonary Arteries: Small sliding hiatal hernia. Mod coronary artery calcifications. Mild bilat gynecomastia.     Hyperlipidemia     Hypertension     Kidney stone     Kidney stones        PAST SURGICAL HISTORY    Past Surgical History:   Procedure Laterality Date    ABDOMEN SURGERY      stones in bile duct removed x3    CARDIAC SURGERY      CHOLECYSTECTOMY      CORONARY ANGIOPLASTY WITH STENT PLACEMENT      DILATATION, ESOPHAGUS      ERCP  14    w/ dilation of papilla    ERCP  14    ERCP  10/24/15    extractions stones, balloon dilatation     ERCP  2017    stone extraction     ERCP N/A 2019    ERCP STONE REMOVAL/ DILATATION OF PAPILLA WITH 10-12MM AND 12-15MM BALLOON AND 9-12MM, 12-15MM  EXTRACTOR BALLOON USED FOR REMOVAL OF MULTIPLE  CBD STONES performed by Lizzy Theodore MD at [address] ophthalmic solution 1 drop nightly      diltiazem (CARDIZEM) 60 MG tablet Take 1 tablet by mouth 2 times daily 90 tablet 3    glimepiride (AMARYL) 1 MG tablet Take 1 mg by mouth every morning (before breakfast)      lisinopril (PRINIVIL;ZESTRIL) 20 MG tablet Take 1 tablet by mouth daily. (Patient not taking: Reported on 1/23/2019) 30 tablet 3    hydrocortisone valerate (WESTCORT) 0.2 % ointment Apply topically twice daily to rash as needed 60 g 1    Multiple Vitamins-Minerals (MULTIVITAMIN PO) Take 1 tablet by mouth daily.  cilostazol (PLETAL) 100 MG tablet Take 100 mg by mouth 2 times daily.  carvedilol (COREG) 6.25 MG tablet Take 6.25 mg by mouth 2 times daily (with meals).  aspirin 325 MG tablet Take 325 mg by mouth daily.  insulin lispro (HUMALOG) 100 UNIT/ML injection Inject  into the skin 3 times daily (before meals).  Sliding scale          Objective    BP (!) 141/71   Pulse 92   Temp 98.3 °F (36.8 °C) (Oral)   Resp 19   Ht 5' 5\" (1.651 m)   Wt 155 lb 13.8 oz (70.7 kg)   SpO2 93%   BMI 25.94 kg/m²     LABS:  WBC:    Lab Results   Component Value Date    WBC 14.6 09/04/2019     H/H:    Lab Results   Component Value Date    HGB 12.4 09/04/2019    HCT 39.1 09/04/2019     PTT:    Lab Results   Component Value Date    APTT 26.2 05/24/2017   [APTT}  PT/INR:    Lab Results   Component Value Date    PROTIME 13.6 09/03/2019    INR 1.17 09/03/2019     HgBA1c:    Lab Results   Component Value Date    LABA1C 6.7 03/15/2019       Assessment   Beka Risk Score: Beka Scale Score: 16    Patient Active Problem List   Diagnosis Code    Diabetes mellitus ([facility] [address] E11.9    Hypertension I10    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    Choledocholithiasis K80.50    COPD (chronic obstructive pulmonary disease) (HCC) J44.9    Chest pain R07.9    Abdominal pain, epigastric R10.13    Elevated LFTs R94.5    Abnormal findings on imaging of biliary tract R93.2    Chest pain R07.9    Coronary artery disease involving coronary bypass graft of native heart without angina pectoris I25.810    Acute encephalopathy G93.40    Vomiting R11.10    Sepsis (HCC) A41.9    Nausea vomiting and diarrhea R11.2, R19.7    Elevated troponin R74.8    Fever R50.9       Measurements:            Response to treatment:  Well tolerated by patient. Pain Assessment:  Severity:  0 / 10  Quality of pain: N/A  Wound Pain Timing/Severity: none  Premedicated: N/A    Plan   Plan of Care:    Photo taken of right lower leg stump. No open areas noted. Callous area with small reddened area on center. Elevated on pillow to avoid pressure.  Will monitor  Specialty Bed Required : Yes   [x] Low Air Loss   [] Pressure Redistribution  [] Fluid Immersion  [] Bariatric  [] Total Pressure Relief  [] Other:     Current Diet: Diet NPO Effective Now  Dietician consult:  N/A    Discharge Plan:  Placement for patient upon discharge: home with support, undecided     Patient appropriate for Outpatient [address]: N/A    Referrals:  []   [] [facility]  [] Supplies  [] Other    Patient/Caregiver Teaching:  Level of patient/caregiver understanding able to:   [] Indicates understanding       [] Needs reinforcement  [] Unsuccessful      [] Verbal Understanding  [] Demonstrated understanding       [x] No evidence of learning  [] Refused teaching         [] N/A       Electronically signed by Freedom Garcia RN, on 9/4/2019 at 12:26 PM

## 2019-09-04 NOTE — CARE COORDINATION
Spoke with patient at bedside . Patient is from home and normally lives with his wife. Patient confirmed that patient's wife is currently in a nursing home. Patient stated that he has prosthesis at home for his bilateral BKA's but added that he also has a wheel chair for mobility. Patient stated a friend from [facility] assists patient at home. Patient has PCP and [facility] and is able to afford his prescriptions. Case Management following to assist with discharge needs.

## 2019-09-04 NOTE — PROGRESS NOTES
[facility] [place]  [address], [address] [facility],Third [address]  Phone: [phone]    Fax [phone]                  Joe Shankar MD, Kulwant Tilley MD, Olaf Lai MD, MD Hollis Jaramillo MD Bennye Balsam, MD Marrion Shores, APRDONN Ennis, APRN Monty Duane, APRDONN [place], APRDONN    Cardiology Progress Note      Today's Plan: Pre op clearance for ERCP. Admit Date:  9/1/2019    Consult reason/ Seen today for: elevated troponin    Subjective and  Overnight Events:  He states no to pain. He continue to be confused. Plan to do ERCP soon. Assessment / Plan / Recommendation:     1. Elev trop type 2 elevation, Echo- EF 50%   2. CAD  H/o CABg  CPM  3. Risk factor modification  4. Hyperlipidemia; Lipid panel at goal. on statin  5. Pre op clearance; he is considered a moderate risk for surgery  6. DVT prophylaxis if not contraindicated while in the hospital.       History of Presenting Illness:    Chief complain on admission : [de-identified] y. o.year old who is admitted for  Chief Complaint   Patient presents with    Emesis     hx of bile stones that he has to have removed every couple years        Past medical history:    has a past medical history of Arthritis, Biliary stones, CAD (coronary artery disease), COPD (chronic obstructive pulmonary disease) ([facility] [address] Diabetes mellitus ([facility] [address] History of blood transfusion, Hx of angiography, Hyperlipidemia, Hypertension, Kidney stone, and Kidney stones. Past surgical history:   has a past surgical history that includes Cardiac surgery; joint replacement; Cholecystectomy; Coronary angioplasty with stent; fracture surgery; Leg amputation below knee; ERCP (03/13/14); other surgical history (03/13/14); ERCP (9/11/14); ERCP (10/24/15); ERCP (03/18/2017); Abdomen surgery; Dilatation, esophagus; and ERCP (N/A, 1/25/2019). Social History:   reports that he has never smoked.  He has never used smokeless rate, Normal rhythm, No murmurs appreciated, No rubs appreciated, No gallops appreciated, JVP not elevated  Abdomen/GI:  Bowel sounds normal, Soft, No tenderness, No masses, No pulsatile masses. Musculoskeletal:  Intact distal pulses, No edema, No tenderness, No cyanosis, No clubbing. Lymphatic:  No lymphadenopathy noted. Neurologic:  Alert & oriented x 1  Psychiatric:  Affect  and  Mood : sad    Telemetry Reviewed:   Sinus Tach Rate 120    Medications:    acetylcysteine  2 mL Inhalation TID    meropenem  1 g Intravenous Q8H    pantoprazole  40 mg Intravenous BID    ipratropium-albuterol  1 ampule Inhalation Q4H WA    enoxaparin  30 mg Subcutaneous Daily    aspirin  325 mg Oral Daily    atorvastatin  40 mg Oral Daily    carvedilol  6.25 mg Oral BID WC    diltiazem  60 mg Oral BID    latanoprost  1 drop Both Eyes Nightly    insulin lispro  0-6 Units Subcutaneous Q4H      0.9% NaCl with KCl 40 mEq 50 mL/hr (09/04/19 0625)    dextrose       metoprolol, hydrALAZINE, ondansetron, promethazine, acetaminophen, sodium chloride flush, docusate sodium, glucose, dextrose, glucagon (rDNA), dextrose    Lab Data:  CBC:   Recent Labs     09/02/19  0320 09/03/19 0211 09/04/19  0335   WBC 21.5* 15.3* 14.6*   HGB 13.0* 13.0* 12.4*   HCT 41.0* 41.0* 39.1*   MCV 92.3 92.3 91.8    201 199     BMP:   Recent Labs     09/02/19  0320 09/03/19 0211 09/04/19  0335    143 143   K 3.7 3.6 3.5    106 107   CO2 22 24 22   BUN 27* 25* 17   CREATININE 1.5* 1.4* 1.2     PT/INR:   Recent Labs     09/03/19 0211   PROTIME 13.6*   INR 1.17     BNP:  No results for input(s): PROBNP in the last 72 hours.   TROPONIN:   Recent Labs     09/01/19  1418 09/01/19  2153 09/02/19  0320   TROPONINT 0.152* 0.175* 0.154*      Revised Cardiac Risk Index:   High risk type of surgery no   H/O ischemic heart disease  (h/o MI, or a positive stress test, current complaint of chest pain considered to be secondary to myocardial ischemia,  Use of nitrate therapy or ECG with pathological Q waves; do not count prior coronary revascularization procedure unless one of the other criteria for ischemic heart disease is present) no     H/O heart failure no  H/O CVA no   H/O DM treated with insulin yes  Preoperative serum creatinine >2.0 mg/dl (177 micromol/L) no     The calculated rate of cardiac death, nonfatal myocardial infarction, and nonfatal cardiac arrest according to the number of predictors is; One risk factor 1.0% ( 95% CI: 0.5-1.4)     he is considered a moderate risk for surgery. ECHO :   Echocardiogram 9/3/2019    Pt. unable to lay flat done in partial sitting position.   Technically difficult examination patient uncooperative.   Left ventricular function is low normal, EF is estimated at 50%.  Grade II diastolic dysfunction.   Sclerotic, but non-stenotic aortic valve.   Mitral annular calcification is present.   No significant valvular regurgitation noted.   No evidence of pericardial effusion. All labs, medications and tests reviewed by myself , continue all other medications of all above medical condition listed as is except for changes mentioned above. Thank you very much for consult , please call with questions. Electronically signed by MOLLY Fisher CNP on 9/4/2019 at 10:52 AM        I have seen ,spoken to  and examined this patient personally, independently of the nurse practitioner. I have reviewed the hospital care given to date and reviewed all pertinent labs and imaging. The plan was developed mutually at the time of the visit with the patient,  NP  and myself. I have spoken with patient, nursing staff and provided written and verbal instructions . The above note has been reviewed and I agree with the assessment, diagnosis, and treatment plan with changes made by me as follows     CARDIOLOGY ATTENDING ADDENDUM    HPI:  I have reviewed the above HPI  And agree with above   Fernando Davila is a [de-identified] y. o.year

## 2019-09-04 NOTE — PROGRESS NOTES
Date: 9/1/2019  EXAMINATION: CT OF THE HEAD WITHOUT CONTRAST  9/1/2019 5:05 pm TECHNIQUE: CT of the head was performed without the administration of intravenous contrast. Dose modulation, iterative reconstruction, and/or weight based adjustment of the mA/kV was utilized to reduce the radiation dose to as low as reasonably achievable. COMPARISON: 01/24/2019 HISTORY: ORDERING SYSTEM PROVIDED HISTORY: CONFUSION/DELIRIUM, ALTERED LOC, UNEXPLAINED TECHNOLOGIST PROVIDED HISTORY: Has a \"code stroke\" or \"stroke alert\" been called? ->No Reason for Exam: AMS Acuity: Acute Type of Exam: Initial Additional signs and symptoms: AMS Relevant Medical/Surgical History: NONE FINDINGS: BRAIN/VENTRICLES: There is redemonstration of the sellar and suprasellar mass extending into the left prepontine region consistent with the patient's known macroadenoma. No acute intracranial hemorrhage or cortical-based infarct. No mass effect or midline shift. Periventricular hypodensity suggesting sequela of chronic small vessel ischemic disease. There are no findings of acute hydrocephalus. ORBITS: The visualized portion of the orbits demonstrate no acute abnormality. SINUSES: The visualized paranasal sinuses and mastoid air cells demonstrate no acute abnormality. SOFT TISSUES/SKULL:  No acute abnormality of the visualized skull or soft tissues. No acute intracranial abnormality. 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 Ct Abdomen Pelvis W Iv Contrast    Result Date: 9/1/2019  EXAMINATION: CT OF THE ABDOMEN AND PELVIS WITH CONTRAST 9/1/2019 5:05 pm TECHNIQUE: CT of the abdomen and pelvis was performed with the administration of intravenous contrast. Multiplanar reformatted images are provided for review.  Dose modulation, iterative reconstruction, and/or weight based adjustment of the mA/kV was utilized to reduce the radiation dose to as low as reasonably

## 2019-09-05 ENCOUNTER — APPOINTMENT (OUTPATIENT)
Dept: MRI IMAGING | Age: 79
DRG: 564 | End: 2019-09-05
Payer: MEDICARE

## 2019-09-05 ENCOUNTER — APPOINTMENT (OUTPATIENT)
Dept: GENERAL RADIOLOGY | Age: 79
DRG: 564 | End: 2019-09-05
Payer: MEDICARE

## 2019-09-05 LAB
ALBUMIN SERPL-MCNC: 3.6 GM/DL (ref 3.4–5)
ALBUMIN SERPL-MCNC: 3.6 GM/DL (ref 3.4–5)
ALP BLD-CCNC: 68 IU/L (ref 40–128)
ALP BLD-CCNC: 68 IU/L (ref 40–129)
ALT SERPL-CCNC: 15 U/L (ref 10–40)
ALT SERPL-CCNC: 15 U/L (ref 10–40)
AMYLASE: 62 U/L (ref 25–115)
ANION GAP SERPL CALCULATED.3IONS-SCNC: 13 MMOL/L (ref 4–16)
APTT: 25.7 SECONDS (ref 21.2–33)
AST SERPL-CCNC: 22 IU/L (ref 15–37)
AST SERPL-CCNC: 22 IU/L (ref 15–37)
BASOPHILS ABSOLUTE: 0.1 K/CU MM
BASOPHILS RELATIVE PERCENT: 0.6 % (ref 0–1)
BILIRUB SERPL-MCNC: 0.4 MG/DL (ref 0–1)
BILIRUB SERPL-MCNC: 0.4 MG/DL (ref 0–1)
BILIRUBIN DIRECT: 0.2 MG/DL (ref 0–0.3)
BILIRUBIN, INDIRECT: 0.2 MG/DL (ref 0–0.7)
BUN BLDV-MCNC: 22 MG/DL (ref 6–23)
CALCIUM IONIZED: 3.92 MG/DL (ref 4.48–5.28)
CALCIUM SERPL-MCNC: 9.2 MG/DL (ref 8.3–10.6)
CHLORIDE BLD-SCNC: 116 MMOL/L (ref 99–110)
CO2: 20 MMOL/L (ref 21–32)
CREAT SERPL-MCNC: 1.1 MG/DL (ref 0.9–1.3)
DIFFERENTIAL TYPE: ABNORMAL
EOSINOPHILS ABSOLUTE: 0.8 K/CU MM
EOSINOPHILS RELATIVE PERCENT: 6.8 % (ref 0–3)
GFR AFRICAN AMERICAN: >60 ML/MIN/1.73M2
GFR NON-AFRICAN AMERICAN: >60 ML/MIN/1.73M2
GLUCOSE BLD-MCNC: 107 MG/DL (ref 70–99)
GLUCOSE BLD-MCNC: 110 MG/DL (ref 70–99)
GLUCOSE BLD-MCNC: 127 MG/DL (ref 70–99)
GLUCOSE BLD-MCNC: 134 MG/DL (ref 70–99)
GLUCOSE BLD-MCNC: 168 MG/DL (ref 70–99)
GLUCOSE BLD-MCNC: 185 MG/DL (ref 70–99)
GLUCOSE BLD-MCNC: 234 MG/DL (ref 70–99)
HCT VFR BLD CALC: 41.7 % (ref 42–52)
HEMOGLOBIN: 12.4 GM/DL (ref 13.5–18)
IMMATURE NEUTROPHIL %: 0.6 % (ref 0–0.43)
INR BLD: 0.99 INDEX
IONIZED CA: 0.98 MMOL/L (ref 1.12–1.32)
LIPASE: 52 IU/L (ref 13–60)
LYMPHOCYTES ABSOLUTE: 1.9 K/CU MM
LYMPHOCYTES RELATIVE PERCENT: 16.2 % (ref 24–44)
MCH RBC QN AUTO: 28.8 PG (ref 27–31)
MCHC RBC AUTO-ENTMCNC: 29.7 % (ref 32–36)
MCV RBC AUTO: 96.8 FL (ref 78–100)
MONOCYTES ABSOLUTE: 1.3 K/CU MM
MONOCYTES RELATIVE PERCENT: 11.3 % (ref 0–4)
NUCLEATED RBC %: 0 %
PDW BLD-RTO: 13.4 % (ref 11.7–14.9)
PLATELET # BLD: 220 K/CU MM (ref 140–440)
PMV BLD AUTO: 9.6 FL (ref 7.5–11.1)
POTASSIUM SERPL-SCNC: 4.7 MMOL/L (ref 3.5–5.1)
PROTHROMBIN TIME: 11.5 SECONDS (ref 9.12–12.5)
RBC # BLD: 4.31 M/CU MM (ref 4.6–6.2)
SEGMENTED NEUTROPHILS ABSOLUTE COUNT: 7.4 K/CU MM
SEGMENTED NEUTROPHILS RELATIVE PERCENT: 64.5 % (ref 36–66)
SODIUM BLD-SCNC: 149 MMOL/L (ref 135–145)
TOTAL IMMATURE NEUTOROPHIL: 0.07 K/CU MM
TOTAL NUCLEATED RBC: 0 K/CU MM
TOTAL PROTEIN: 5.9 GM/DL (ref 6.4–8.2)
TOTAL PROTEIN: 5.9 GM/DL (ref 6.4–8.2)
WBC # BLD: 11.4 K/CU MM (ref 4–10.5)

## 2019-09-05 PROCEDURE — C9113 INJ PANTOPRAZOLE SODIUM, VIA: HCPCS | Performed by: INTERNAL MEDICINE

## 2019-09-05 PROCEDURE — 74230 X-RAY XM SWLNG FUNCJ C+: CPT

## 2019-09-05 PROCEDURE — 6360000002 HC RX W HCPCS: Performed by: INTERNAL MEDICINE

## 2019-09-05 PROCEDURE — 2060000000 HC ICU INTERMEDIATE R&B

## 2019-09-05 PROCEDURE — 2700000000 HC OXYGEN THERAPY PER DAY

## 2019-09-05 PROCEDURE — 6360000002 HC RX W HCPCS: Performed by: NURSE PRACTITIONER

## 2019-09-05 PROCEDURE — 73718 MRI LOWER EXTREMITY W/O DYE: CPT

## 2019-09-05 PROCEDURE — 80053 COMPREHEN METABOLIC PANEL: CPT

## 2019-09-05 PROCEDURE — 99231 SBSQ HOSP IP/OBS SF/LOW 25: CPT | Performed by: INTERNAL MEDICINE

## 2019-09-05 PROCEDURE — 2580000003 HC RX 258: Performed by: INTERNAL MEDICINE

## 2019-09-05 PROCEDURE — 85025 COMPLETE CBC W/AUTO DIFF WBC: CPT

## 2019-09-05 PROCEDURE — 94640 AIRWAY INHALATION TREATMENT: CPT

## 2019-09-05 PROCEDURE — 82150 ASSAY OF AMYLASE: CPT

## 2019-09-05 PROCEDURE — 36415 COLL VENOUS BLD VENIPUNCTURE: CPT

## 2019-09-05 PROCEDURE — 92526 ORAL FUNCTION THERAPY: CPT

## 2019-09-05 PROCEDURE — 85610 PROTHROMBIN TIME: CPT

## 2019-09-05 PROCEDURE — 83690 ASSAY OF LIPASE: CPT

## 2019-09-05 PROCEDURE — 92611 MOTION FLUOROSCOPY/SWALLOW: CPT

## 2019-09-05 PROCEDURE — 82962 GLUCOSE BLOOD TEST: CPT

## 2019-09-05 PROCEDURE — 99232 SBSQ HOSP IP/OBS MODERATE 35: CPT | Performed by: NURSE PRACTITIONER

## 2019-09-05 PROCEDURE — 6370000000 HC RX 637 (ALT 250 FOR IP): Performed by: INTERNAL MEDICINE

## 2019-09-05 PROCEDURE — APPSS30 APP SPLIT SHARED TIME 16-30 MINUTES: Performed by: NURSE PRACTITIONER

## 2019-09-05 PROCEDURE — 85730 THROMBOPLASTIN TIME PARTIAL: CPT

## 2019-09-05 PROCEDURE — 2500000003 HC RX 250 WO HCPCS: Performed by: INTERNAL MEDICINE

## 2019-09-05 PROCEDURE — 94761 N-INVAS EAR/PLS OXIMETRY MLT: CPT

## 2019-09-05 PROCEDURE — 82248 BILIRUBIN DIRECT: CPT

## 2019-09-05 PROCEDURE — 82330 ASSAY OF CALCIUM: CPT

## 2019-09-05 RX ORDER — SODIUM CHLORIDE 450 MG/100ML
1000 INJECTION, SOLUTION INTRAVENOUS CONTINUOUS
Status: DISCONTINUED | OUTPATIENT
Start: 2019-09-05 | End: 2019-09-06

## 2019-09-05 RX ORDER — LINEZOLID 2 MG/ML
600 INJECTION, SOLUTION INTRAVENOUS EVERY 12 HOURS
Status: DISCONTINUED | OUTPATIENT
Start: 2019-09-05 | End: 2019-09-05

## 2019-09-05 RX ADMIN — ACETYLCYSTEINE 400 MG: 200 SOLUTION ORAL; RESPIRATORY (INHALATION) at 20:07

## 2019-09-05 RX ADMIN — IPRATROPIUM BROMIDE AND ALBUTEROL SULFATE 1 AMPULE: .5; 3 SOLUTION RESPIRATORY (INHALATION) at 08:31

## 2019-09-05 RX ADMIN — MEROPENEM 1 G: 1 INJECTION, POWDER, FOR SOLUTION INTRAVENOUS at 06:12

## 2019-09-05 RX ADMIN — MEROPENEM 1 G: 1 INJECTION, POWDER, FOR SOLUTION INTRAVENOUS at 16:27

## 2019-09-05 RX ADMIN — CARVEDILOL 6.25 MG: 6.25 TABLET, FILM COATED ORAL at 16:35

## 2019-09-05 RX ADMIN — LATANOPROST 1 DROP: 50 SOLUTION/ DROPS OPHTHALMIC at 21:16

## 2019-09-05 RX ADMIN — PANTOPRAZOLE SODIUM 40 MG: 40 INJECTION, POWDER, FOR SOLUTION INTRAVENOUS at 07:53

## 2019-09-05 RX ADMIN — Medication 10 ML: at 07:55

## 2019-09-05 RX ADMIN — IPRATROPIUM BROMIDE AND ALBUTEROL SULFATE 1 AMPULE: .5; 3 SOLUTION RESPIRATORY (INHALATION) at 16:02

## 2019-09-05 RX ADMIN — DILTIAZEM HYDROCHLORIDE 60 MG: 60 TABLET, FILM COATED ORAL at 21:13

## 2019-09-05 RX ADMIN — ACETYLCYSTEINE 400 MG: 200 SOLUTION ORAL; RESPIRATORY (INHALATION) at 16:03

## 2019-09-05 RX ADMIN — IPRATROPIUM BROMIDE AND ALBUTEROL SULFATE 1 AMPULE: .5; 3 SOLUTION RESPIRATORY (INHALATION) at 11:29

## 2019-09-05 RX ADMIN — MEROPENEM 1 G: 1 INJECTION, POWDER, FOR SOLUTION INTRAVENOUS at 21:16

## 2019-09-05 RX ADMIN — METOPROLOL TARTRATE 5 MG: 5 INJECTION INTRAVENOUS at 07:53

## 2019-09-05 RX ADMIN — PANTOPRAZOLE SODIUM 40 MG: 40 INJECTION, POWDER, FOR SOLUTION INTRAVENOUS at 21:15

## 2019-09-05 RX ADMIN — Medication 10 ML: at 21:15

## 2019-09-05 RX ADMIN — SODIUM CHLORIDE 1000 ML: 4.5 INJECTION, SOLUTION INTRAVENOUS at 16:27

## 2019-09-05 RX ADMIN — POTASSIUM CHLORIDE AND SODIUM CHLORIDE: 900; 300 INJECTION, SOLUTION INTRAVENOUS at 02:37

## 2019-09-05 RX ADMIN — MORPHINE SULFATE 4 MG: 4 INJECTION, SOLUTION INTRAMUSCULAR; INTRAVENOUS at 07:54

## 2019-09-05 RX ADMIN — ONDANSETRON HYDROCHLORIDE 4 MG: 2 INJECTION, SOLUTION INTRAMUSCULAR; INTRAVENOUS at 07:53

## 2019-09-05 RX ADMIN — VANCOMYCIN HYDROCHLORIDE 1250 MG: 5 INJECTION, POWDER, LYOPHILIZED, FOR SOLUTION INTRAVENOUS at 16:27

## 2019-09-05 RX ADMIN — INSULIN LISPRO 1 UNITS: 100 INJECTION, SOLUTION INTRAVENOUS; SUBCUTANEOUS at 23:05

## 2019-09-05 RX ADMIN — INSULIN LISPRO 1 UNITS: 100 INJECTION, SOLUTION INTRAVENOUS; SUBCUTANEOUS at 16:31

## 2019-09-05 RX ADMIN — ACETYLCYSTEINE 400 MG: 200 SOLUTION ORAL; RESPIRATORY (INHALATION) at 08:32

## 2019-09-05 RX ADMIN — IPRATROPIUM BROMIDE AND ALBUTEROL SULFATE 1 AMPULE: .5; 3 SOLUTION RESPIRATORY (INHALATION) at 20:05

## 2019-09-05 RX ADMIN — INSULIN LISPRO 2 UNITS: 100 INJECTION, SOLUTION INTRAVENOUS; SUBCUTANEOUS at 19:09

## 2019-09-05 ASSESSMENT — PAIN SCALES - GENERAL: PAINLEVEL_OUTOF10: 7

## 2019-09-05 ASSESSMENT — PAIN DESCRIPTION - LOCATION: LOCATION: ABDOMEN

## 2019-09-05 ASSESSMENT — PAIN DESCRIPTION - FREQUENCY: FREQUENCY: CONTINUOUS

## 2019-09-05 ASSESSMENT — PAIN DESCRIPTION - ORIENTATION: ORIENTATION: MID

## 2019-09-05 ASSESSMENT — PAIN DESCRIPTION - PAIN TYPE: TYPE: ACUTE PAIN

## 2019-09-05 ASSESSMENT — PAIN DESCRIPTION - DESCRIPTORS: DESCRIPTORS: DISCOMFORT;ACHING

## 2019-09-05 NOTE — PROGRESS NOTES
current complaint of chest pain considered to be secondary to myocardial ischemia,  Use of nitrate therapy or ECG with pathological Q waves; do not count prior coronary revascularization procedure unless one of the other criteria for ischemic heart disease is present) no     H/O heart failure no  H/O CVA no   H/O DM treated with insulin yes  Preoperative serum creatinine >2.0 mg/dl (177 micromol/L) no     The calculated rate of cardiac death, nonfatal myocardial infarction, and nonfatal cardiac arrest according to the number of predictors is; One risk factor 1.0% ( 95% CI: 0.5-1.4)     he is considered a moderate risk for surgery. ECHO :   Echocardiogram 9/3/2019    Pt. unable to lay flat done in partial sitting position.   Technically difficult examination patient uncooperative.   Left ventricular function is low normal, EF is estimated at 50%.  Grade II diastolic dysfunction.   Sclerotic, but non-stenotic aortic valve.   Mitral annular calcification is present.   No significant valvular regurgitation noted.   No evidence of pericardial effusion. All labs, medications and tests reviewed by myself , continue all other medications of all above medical condition listed as is except for changes mentioned above. Thank you very much for consult , please call with questions. Electronically signed by MOLLY Crandall CNP on 9/5/2019 at 11:04 AM        I have seen ,spoken to  and examined this patient personally, independently of the nurse practitioner. I have reviewed the hospital care given to date and reviewed all pertinent labs and imaging. The plan was developed mutually at the time of the visit with the patient,  NP  and myself. I have spoken with patient, nursing staff and provided written and verbal instructions . The above note has been reviewed and I agree with the assessment, diagnosis, and treatment plan with changes made by me as follows     CARDIOLOGY ATTENDING ADDENDUM    HPI:  I have

## 2019-09-05 NOTE — PROGRESS NOTES
Lang Peacock MD   40 mg at 09/05/19 0753    ipratropium-albuterol (DUONEB) nebulizer solution 1 ampule  1 ampule Inhalation Q4H WA Lang Peacock MD   1 ampule at 09/05/19 1129    ondansetron (ZOFRAN) injection 4 mg  4 mg Intravenous Q6H PRN Lang Peacock MD   4 mg at 09/05/19 0753    enoxaparin (LOVENOX) injection 30 mg  30 mg Subcutaneous Daily Lang Peacock MD   30 mg at 09/04/19 0806    promethazine (PHENERGAN) injection 6.25 mg  6.25 mg Intravenous Q6H PRN Lang Peacock MD   6.25 mg at 09/02/19 1713    acetaminophen (TYLENOL) suppository 650 mg  650 mg Rectal Q4H PRN Lang Peacock MD   650 mg at 09/03/19 1717    sodium chloride flush 0.9 % injection 10 mL  10 mL Intravenous PRDONN Cooley MD   10 mL at 09/05/19 0755    aspirin tablet 325 mg  325 mg Oral Daily Rosa Isela Cooley MD        atorvastatin (LIPITOR) tablet 40 mg  40 mg Oral Daily Rosa Isela Cooley MD        carvedilol (COREG) tablet 6.25 mg  6.25 mg Oral BID WC Rosa Isela Cooley MD        diltiazem (CARDIZEM) tablet 60 mg  60 mg Oral BID Rosa Isela Cooley MD   60 mg at 09/01/19 2303    docusate sodium (COLACE) capsule 100 mg  100 mg Oral Nightly HUMBLE Cooley MD        latanoprost (XALATAN) 0.005 % ophthalmic solution 1 drop  1 drop Both Eyes Nightly Rosa Isela Cooley MD   1 drop at 09/04/19 2102    glucose (GLUTOSE) 40 % oral gel 15 g  15 g Oral HUMBLE Cooley MD        dextrose 50 % IV solution  12.5 g Intravenous HUMBLE Cooley MD        glucagon (rDNA) injection 1 mg  1 mg Intramuscular PRDONN Cooley MD        dextrose 5 % solution  100 mL/hr Intravenous PRMD Chidi Galan insulin lispro (HUMALOG) injection vial 0-6 Units  0-6 Units Subcutaneous Q4H Rosa Isela Cooley MD   1 Units at 09/04/19 2340       Subjective:     Patient seemed to be somewhat better today. Still n.p.o. due to high risk of aspiration. Possible ERCP today      Objective:        Intake/Output Summary (Last 24 hours) at 9/5/2019 9176  Last MUCUS RARE 01/24/2019    BACTERIA NEGATIVE 09/01/2019    CLARITYU CLEAR 09/01/2019    SPECGRAV 1.025 09/01/2019    LEUKOCYTESUR NEGATIVE 09/01/2019    BLOODU SMALL 09/01/2019       Ct Head Wo Contrast    Result Date: 9/1/2019  EXAMINATION: CT OF THE HEAD WITHOUT CONTRAST  9/1/2019 5:05 pm TECHNIQUE: CT of the head was performed without the administration of intravenous contrast. Dose modulation, iterative reconstruction, and/or weight based adjustment of the mA/kV was utilized to reduce the radiation dose to as low as reasonably achievable. COMPARISON: 01/24/2019 HISTORY: ORDERING SYSTEM PROVIDED HISTORY: CONFUSION/DELIRIUM, ALTERED LOC, UNEXPLAINED TECHNOLOGIST PROVIDED HISTORY: Has a \"code stroke\" or \"stroke alert\" been called? ->No Reason for Exam: AMS Acuity: Acute Type of Exam: Initial Additional signs and symptoms: AMS Relevant Medical/Surgical History: NONE FINDINGS: BRAIN/VENTRICLES: There is redemonstration of the sellar and suprasellar mass extending into the left prepontine region consistent with the patient's known macroadenoma. No acute intracranial hemorrhage or cortical-based infarct. No mass effect or midline shift. Periventricular hypodensity suggesting sequela of chronic small vessel ischemic disease. There are no findings of acute hydrocephalus. ORBITS: The visualized portion of the orbits demonstrate no acute abnormality. SINUSES: The visualized paranasal sinuses and mastoid air cells demonstrate no acute abnormality. SOFT TISSUES/SKULL:  No acute abnormality of the visualized skull or soft tissues. No acute intracranial abnormality. 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      Ct Abdomen Pelvis W Iv Contrast    Result Date: 9/1/2019  EXAMINATION: CT OF THE ABDOMEN AND PELVIS WITH CONTRAST 9/1/2019 5:05 pm TECHNIQUE: CT of the abdomen and pelvis was performed with the administration of intravenous contrast.

## 2019-09-05 NOTE — PROCEDURES
tolerance monitoring and possible diet level advancement. Patient Position: Lateral and Patient Degrees: 90 degrees upright       Consistencies Administered: Dysphagia Minced and Moist (Dysphagia II); Dysphagia Pureed (Dysphagia I); Thin teaspoon;Nectar  teaspoon;Honey cup                             Dysphagia Outcome Severity Scale: Level 3: Moderate dysphagia- Total assisstance, supervision or strategies. Two or more diet consistencies restricted  Penetration-Aspiration Scale (PAS): 8 - Material Enters the airway, passes below the vocal folds, and no effort is made to eject    Recommended Diet:  Solid consistency: Dysphagia Pureed (Dysphagia I)  Liquid consistency: Moderately Thick (Honey)  Liquid administration via: Cup;Spoon    Medication administration: Meds in puree    Safe Swallow Protocol:  Supervision: Close  Compensatory Swallowing Strategies: Remain upright for 30-45 minutes after meals;Eat/Feed slowly;Upright as possible for all oral intake              Recommendations/Treatment  Requires SLP Intervention: Yes  Recommendations: GI Eval     D/C Recommendations: To be determined  Postural Changes and/or Swallow Maneuvers: Upright 90 degrees      Recommended Exercises:    Therapeutic Interventions: Diet tolerance monitoring;Patient/Family education; Therapeutic PO trials with SLP         Education: Images and recommendations were reviewed with Gail Conti  following this exam.   Patient Education: results and recommendations  Patient Education Response: Needs reinforcement    Prognosis  Prognosis for safe diet advancement: fair  Barriers to reach goals: severity of dysphagia  Barriers/Prognosis Comment: previous h/o dysphagia  Duration/Frequency of Treatment  Duration/Frequency of Treatment: 3-4xs weekly/ LOS or until goals are met   Safety Devices  Safety Devices in place: Yes  Type of devices:  All fall risk precautions in place      Goals:    Long Term:               Short Term:     Goal 1: Pt

## 2019-09-06 ENCOUNTER — ANESTHESIA EVENT (OUTPATIENT)
Dept: ENDOSCOPY | Age: 79
DRG: 564 | End: 2019-09-06
Payer: MEDICARE

## 2019-09-06 LAB
ALBUMIN SERPL-MCNC: 3.5 GM/DL (ref 3.4–5)
ALBUMIN SERPL-MCNC: 3.5 GM/DL (ref 3.4–5)
ALP BLD-CCNC: 66 IU/L (ref 40–128)
ALP BLD-CCNC: 66 IU/L (ref 40–129)
ALT SERPL-CCNC: 16 U/L (ref 10–40)
ALT SERPL-CCNC: 16 U/L (ref 10–40)
ANION GAP SERPL CALCULATED.3IONS-SCNC: 12 MMOL/L (ref 4–16)
AST SERPL-CCNC: 23 IU/L (ref 15–37)
AST SERPL-CCNC: 23 IU/L (ref 15–37)
BASOPHILS ABSOLUTE: 0.1 K/CU MM
BASOPHILS RELATIVE PERCENT: 0.6 % (ref 0–1)
BILIRUB SERPL-MCNC: 0.4 MG/DL (ref 0–1)
BILIRUB SERPL-MCNC: 0.4 MG/DL (ref 0–1)
BILIRUBIN DIRECT: 0.2 MG/DL (ref 0–0.3)
BILIRUBIN, INDIRECT: 0.2 MG/DL (ref 0–0.7)
BUN BLDV-MCNC: 23 MG/DL (ref 6–23)
CALCIUM IONIZED: 4.72 MG/DL (ref 4.48–5.28)
CALCIUM SERPL-MCNC: 9.2 MG/DL (ref 8.3–10.6)
CHLORIDE BLD-SCNC: 110 MMOL/L (ref 99–110)
CO2: 22 MMOL/L (ref 21–32)
CREAT SERPL-MCNC: 1.1 MG/DL (ref 0.9–1.3)
CULTURE: ABNORMAL
CULTURE: NORMAL
CULTURE: NORMAL
DIFFERENTIAL TYPE: ABNORMAL
EOSINOPHILS ABSOLUTE: 1.2 K/CU MM
EOSINOPHILS RELATIVE PERCENT: 10.3 % (ref 0–3)
GFR AFRICAN AMERICAN: >60 ML/MIN/1.73M2
GFR NON-AFRICAN AMERICAN: >60 ML/MIN/1.73M2
GLUCOSE BLD-MCNC: 121 MG/DL (ref 70–99)
GLUCOSE BLD-MCNC: 143 MG/DL (ref 70–99)
GLUCOSE BLD-MCNC: 186 MG/DL (ref 70–99)
GLUCOSE BLD-MCNC: 194 MG/DL (ref 70–99)
GLUCOSE BLD-MCNC: 207 MG/DL (ref 70–99)
GLUCOSE BLD-MCNC: 267 MG/DL (ref 70–99)
HCT VFR BLD CALC: 39.8 % (ref 42–52)
HEMOGLOBIN: 12.2 GM/DL (ref 13.5–18)
IMMATURE NEUTROPHIL %: 0.8 % (ref 0–0.43)
IONIZED CA: 1.18 MMOL/L (ref 1.12–1.32)
LYMPHOCYTES ABSOLUTE: 1.6 K/CU MM
LYMPHOCYTES RELATIVE PERCENT: 14 % (ref 24–44)
Lab: ABNORMAL
Lab: NORMAL
Lab: NORMAL
MCH RBC QN AUTO: 29 PG (ref 27–31)
MCHC RBC AUTO-ENTMCNC: 30.7 % (ref 32–36)
MCV RBC AUTO: 94.5 FL (ref 78–100)
MONOCYTES ABSOLUTE: 1.3 K/CU MM
MONOCYTES RELATIVE PERCENT: 11.7 % (ref 0–4)
NUCLEATED RBC %: 0 %
PDW BLD-RTO: 13.3 % (ref 11.7–14.9)
PLATELET # BLD: 217 K/CU MM (ref 140–440)
PMV BLD AUTO: 9.9 FL (ref 7.5–11.1)
POTASSIUM SERPL-SCNC: 4.2 MMOL/L (ref 3.5–5.1)
PROCALCITONIN: 0.2
RBC # BLD: 4.21 M/CU MM (ref 4.6–6.2)
SEGMENTED NEUTROPHILS ABSOLUTE COUNT: 7.1 K/CU MM
SEGMENTED NEUTROPHILS RELATIVE PERCENT: 62.6 % (ref 36–66)
SODIUM BLD-SCNC: 144 MMOL/L (ref 135–145)
SPECIMEN: ABNORMAL
SPECIMEN: NORMAL
SPECIMEN: NORMAL
TOTAL IMMATURE NEUTOROPHIL: 0.09 K/CU MM
TOTAL NUCLEATED RBC: 0 K/CU MM
TOTAL PROTEIN: 5.7 GM/DL (ref 6.4–8.2)
TOTAL PROTEIN: 5.7 GM/DL (ref 6.4–8.2)
WBC # BLD: 11.3 K/CU MM (ref 4–10.5)

## 2019-09-06 PROCEDURE — 99222 1ST HOSP IP/OBS MODERATE 55: CPT | Performed by: NURSE PRACTITIONER

## 2019-09-06 PROCEDURE — 84145 PROCALCITONIN (PCT): CPT

## 2019-09-06 PROCEDURE — 92526 ORAL FUNCTION THERAPY: CPT

## 2019-09-06 PROCEDURE — 82962 GLUCOSE BLOOD TEST: CPT

## 2019-09-06 PROCEDURE — 2580000003 HC RX 258: Performed by: INTERNAL MEDICINE

## 2019-09-06 PROCEDURE — C9113 INJ PANTOPRAZOLE SODIUM, VIA: HCPCS | Performed by: INTERNAL MEDICINE

## 2019-09-06 PROCEDURE — 82248 BILIRUBIN DIRECT: CPT

## 2019-09-06 PROCEDURE — 6370000000 HC RX 637 (ALT 250 FOR IP): Performed by: INTERNAL MEDICINE

## 2019-09-06 PROCEDURE — 82330 ASSAY OF CALCIUM: CPT

## 2019-09-06 PROCEDURE — 1200000000 HC SEMI PRIVATE

## 2019-09-06 PROCEDURE — 94640 AIRWAY INHALATION TREATMENT: CPT

## 2019-09-06 PROCEDURE — 94150 VITAL CAPACITY TEST: CPT

## 2019-09-06 PROCEDURE — 36415 COLL VENOUS BLD VENIPUNCTURE: CPT

## 2019-09-06 PROCEDURE — 80053 COMPREHEN METABOLIC PANEL: CPT

## 2019-09-06 PROCEDURE — 94761 N-INVAS EAR/PLS OXIMETRY MLT: CPT

## 2019-09-06 PROCEDURE — 6370000000 HC RX 637 (ALT 250 FOR IP): Performed by: NURSE PRACTITIONER

## 2019-09-06 PROCEDURE — 6360000002 HC RX W HCPCS: Performed by: INTERNAL MEDICINE

## 2019-09-06 PROCEDURE — 85025 COMPLETE CBC W/AUTO DIFF WBC: CPT

## 2019-09-06 PROCEDURE — 2580000003 HC RX 258: Performed by: SPECIALIST

## 2019-09-06 PROCEDURE — 6360000002 HC RX W HCPCS: Performed by: NURSE PRACTITIONER

## 2019-09-06 RX ORDER — SODIUM CHLORIDE, SODIUM LACTATE, POTASSIUM CHLORIDE, CALCIUM CHLORIDE 600; 310; 30; 20 MG/100ML; MG/100ML; MG/100ML; MG/100ML
INJECTION, SOLUTION INTRAVENOUS CONTINUOUS
Status: DISCONTINUED | OUTPATIENT
Start: 2019-09-06 | End: 2019-09-10 | Stop reason: HOSPADM

## 2019-09-06 RX ADMIN — INSULIN LISPRO 3 UNITS: 100 INJECTION, SOLUTION INTRAVENOUS; SUBCUTANEOUS at 04:19

## 2019-09-06 RX ADMIN — IPRATROPIUM BROMIDE AND ALBUTEROL SULFATE 1 AMPULE: .5; 3 SOLUTION RESPIRATORY (INHALATION) at 15:44

## 2019-09-06 RX ADMIN — IPRATROPIUM BROMIDE AND ALBUTEROL SULFATE 1 AMPULE: .5; 3 SOLUTION RESPIRATORY (INHALATION) at 07:39

## 2019-09-06 RX ADMIN — DILTIAZEM HYDROCHLORIDE 60 MG: 60 TABLET, FILM COATED ORAL at 09:42

## 2019-09-06 RX ADMIN — CHLORHEXIDINE GLUCONATE: 4 LIQUID TOPICAL at 22:44

## 2019-09-06 RX ADMIN — PANTOPRAZOLE SODIUM 40 MG: 40 INJECTION, POWDER, FOR SOLUTION INTRAVENOUS at 21:40

## 2019-09-06 RX ADMIN — INSULIN LISPRO 1 UNITS: 100 INJECTION, SOLUTION INTRAVENOUS; SUBCUTANEOUS at 07:01

## 2019-09-06 RX ADMIN — DILTIAZEM HYDROCHLORIDE 60 MG: 60 TABLET, FILM COATED ORAL at 21:40

## 2019-09-06 RX ADMIN — MEROPENEM 1 G: 1 INJECTION, POWDER, FOR SOLUTION INTRAVENOUS at 19:03

## 2019-09-06 RX ADMIN — IPRATROPIUM BROMIDE AND ALBUTEROL SULFATE 1 AMPULE: .5; 3 SOLUTION RESPIRATORY (INHALATION) at 20:19

## 2019-09-06 RX ADMIN — ACETYLCYSTEINE 400 MG: 200 SOLUTION ORAL; RESPIRATORY (INHALATION) at 15:45

## 2019-09-06 RX ADMIN — PANTOPRAZOLE SODIUM 40 MG: 40 INJECTION, POWDER, FOR SOLUTION INTRAVENOUS at 09:41

## 2019-09-06 RX ADMIN — CARVEDILOL 6.25 MG: 6.25 TABLET, FILM COATED ORAL at 19:03

## 2019-09-06 RX ADMIN — VANCOMYCIN HYDROCHLORIDE 1250 MG: 5 INJECTION, POWDER, LYOPHILIZED, FOR SOLUTION INTRAVENOUS at 15:40

## 2019-09-06 RX ADMIN — SODIUM CHLORIDE 1000 ML: 4.5 INJECTION, SOLUTION INTRAVENOUS at 04:18

## 2019-09-06 RX ADMIN — ACETYLCYSTEINE 400 MG: 200 SOLUTION ORAL; RESPIRATORY (INHALATION) at 20:21

## 2019-09-06 RX ADMIN — MEROPENEM 1 G: 1 INJECTION, POWDER, FOR SOLUTION INTRAVENOUS at 06:03

## 2019-09-06 RX ADMIN — Medication 10 ML: at 09:42

## 2019-09-06 RX ADMIN — ENOXAPARIN SODIUM 30 MG: 30 INJECTION SUBCUTANEOUS at 09:41

## 2019-09-06 RX ADMIN — Medication: at 21:40

## 2019-09-06 RX ADMIN — ACETYLCYSTEINE 400 MG: 200 SOLUTION ORAL; RESPIRATORY (INHALATION) at 07:40

## 2019-09-06 RX ADMIN — INSULIN LISPRO 2 UNITS: 100 INJECTION, SOLUTION INTRAVENOUS; SUBCUTANEOUS at 12:48

## 2019-09-06 RX ADMIN — IPRATROPIUM BROMIDE AND ALBUTEROL SULFATE 1 AMPULE: .5; 3 SOLUTION RESPIRATORY (INHALATION) at 11:07

## 2019-09-06 RX ADMIN — SODIUM CHLORIDE, POTASSIUM CHLORIDE, SODIUM LACTATE AND CALCIUM CHLORIDE: 600; 310; 30; 20 INJECTION, SOLUTION INTRAVENOUS at 14:15

## 2019-09-06 RX ADMIN — CARVEDILOL 6.25 MG: 6.25 TABLET, FILM COATED ORAL at 09:42

## 2019-09-06 RX ADMIN — Medication 10 ML: at 21:40

## 2019-09-06 RX ADMIN — ASPIRIN 325 MG ORAL TABLET 325 MG: 325 PILL ORAL at 09:42

## 2019-09-06 RX ADMIN — ATORVASTATIN CALCIUM 40 MG: 40 TABLET, FILM COATED ORAL at 09:42

## 2019-09-06 ASSESSMENT — PAIN SCALES - GENERAL
PAINLEVEL_OUTOF10: 0
PAINLEVEL_OUTOF10: 0

## 2019-09-06 NOTE — PROGRESS NOTES
Hospitalist Progress Note         Admit Date: 9/1/2019    PCP: Elke Mcmillan MD     Chief Complaint   Patient presents with    Emesis     hx of bile stones that he has to have removed every couple years        Assessment and Plan:      Sepsis ([address] possibly from gastroenteritis vs ??choledocholithiasis: GI evaluated. ERCP by Kam Dominguez today. Restart Pletal and Lovenox after procedure. Continue keep n.p.o.   Nausea vomiting and diarrhea pending for stool C. difficile. PPI IV and Zofran IV   Elevated troponin evaluated by cardiology. Normal EF on echo.  Swelling difficulty/choking on water: Aspiration risk on barium swallow study. On dysphagia diet as per SLP recommendations. Monitor for aspiration. 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 Wound cultures growing MRSA heavy growth. Continue vancomycin. VTE prophylaxis LMWH. Transfer from stepdown unit    Current Facility-Administered Medications   Medication Dose Route Frequency Provider Last Rate Last Dose    [START ON 9/8/2019] enoxaparin (LOVENOX) injection 30 mg  30 mg Subcutaneous Daily Chelsie Santoyo MD        lactated ringers infusion   Intravenous Continuous Chelsie Santoyo  mL/hr at 09/06/19 1415      meropenem (MERREM) 1 g in sodium chloride 0.9 % 100 mL IVPB (mini-bag)  1 g Intravenous Q12H Cary Vasquez MD        mupirocin (BACTROBAN) 2 % ointment   Nasal BID MOLLY Welch CNP        chlorhexidine gluconate (ANTISEPTIC SKIN CLEANSER) 4 % solution   Topical Daily MOLLY Welch CNP        insulin lispro (HUMALOG) injection vial 0-6 Units  0-6 Units Subcutaneous TID WC Oracio Shanks MD        insulin lispro (HUMALOG) injection vial 0-3 Units  0-3 Units Subcutaneous Nightly Oracio Shanks MD        vancomycin (VANCOCIN) 1,250 mg in dextrose 5 % 250 mL IVPB  1,250 mg Intravenous Q24H Cary Vasquez  mL/hr at Grisel Cortez MD        dextrose 50 % IV solution  12.5 g Intravenous PRN Grisel Cortez MD        glucagon (rDNA) injection 1 mg  1 mg Intramuscular PRN Grisel Cortez MD        dextrose 5 % solution  100 mL/hr Intravenous PRN Grisel Cortez MD           Subjective:     Patient seemed to be much better today. Possible ERCP tomorrow. Tolerating diet today      Objective: Intake/Output Summary (Last 24 hours) at 9/6/2019 1651  Last data filed at 9/6/2019 1415  Gross per 24 hour   Intake 2284 ml   Output 1750 ml   Net 534 ml      Vitals:   Vitals:    09/06/19 1459   BP:    Pulse: 71   Resp:    Temp:    SpO2:      Physical Exam:  General Appearance:  Alert awake not in distress  Head:      Normocephalic, without obvious abnormality, atraumatic  Eyes:       Conjunctiva/corneas clear, EOM's intact  Lungs:    Stable B/L diffuse intermittent rhonchi and occasional wheeze +  Heart:                Regular rate and rhythm, S1 and S2 normal, no murmur,   rub or gallop  Abdomen:     Soft, non-tender, bowel sounds active, no masses, no organomegaly  Extremities:   B/L BKA+  Neurological:   Grossly Intact.     Significant Diagnostic Studies:   DATA:    CBC   Recent Labs     09/04/19  0335 09/05/19  0347 09/06/19  0212   WBC 14.6* 11.4* 11.3*   HGB 12.4* 12.4* 12.2*   HCT 39.1* 41.7* 39.8*    220 217      BMP   Recent Labs     09/04/19  0335 09/05/19  0347 09/06/19  0212    149* 144   K 3.5 4.7 4.2    116* 110   CO2 22 20* 22   BUN 17 22 23   CREATININE 1.2 1.1 1.1     LFT'S   Recent Labs     09/04/19  0335 09/05/19  0347 09/06/19  0212   AST 23 22  22 23  23   ALT 17 15  15 16  16   BILIDIR 0.2 0.2 0.2   BILITOT 0.6 0.4  0.4 0.4  0.4   ALKPHOS 68 68  68 66  66     COAG   Recent Labs     09/05/19  0347   INR 0.99     POC:   Lab Results   Component Value Date    POCGLU 121 09/06/2019    POCGLU 207 09/06/2019    POCGLU 143 09/06/2019    POCGLU 267 09/06/2019     [id number]:  Lab Results Component Value Date    LABA1C 6.7 03/15/2019     CARDIAC ENZYMES  No results for input(s): CKTOTAL, CKMB, CKMBINDEX, TROPONINI in the last 72 hours. Troponin:   No results for input(s): TROPONINT in the last 72 hours. BNP: No results for input(s): PROBNP in the last 72 hours. U/A:    Lab Results   Component Value Date    COLORU YELLOW 09/01/2019    WBCUA <1 09/01/2019    RBCUA 1 09/01/2019    MUCUS RARE 01/24/2019    BACTERIA NEGATIVE 09/01/2019    CLARITYU CLEAR 09/01/2019    SPECGRAV 1.025 09/01/2019    LEUKOCYTESUR NEGATIVE 09/01/2019    BLOODU SMALL 09/01/2019       Ct Head Wo Contrast    Result Date: 9/1/2019  EXAMINATION: CT OF THE HEAD WITHOUT CONTRAST  9/1/2019 5:05 pm TECHNIQUE: CT of the head was performed without the administration of intravenous contrast. Dose modulation, iterative reconstruction, and/or weight based adjustment of the mA/kV was utilized to reduce the radiation dose to as low as reasonably achievable. COMPARISON: 01/24/2019 HISTORY: ORDERING SYSTEM PROVIDED HISTORY: CONFUSION/DELIRIUM, ALTERED LOC, UNEXPLAINED TECHNOLOGIST PROVIDED HISTORY: Has a \"code stroke\" or \"stroke alert\" been called? ->No Reason for Exam: AMS Acuity: Acute Type of Exam: Initial Additional signs and symptoms: AMS Relevant Medical/Surgical History: NONE FINDINGS: BRAIN/VENTRICLES: There is redemonstration of the sellar and suprasellar mass extending into the left prepontine region consistent with the patient's known macroadenoma. No acute intracranial hemorrhage or cortical-based infarct. No mass effect or midline shift. Periventricular hypodensity suggesting sequela of chronic small vessel ischemic disease. There are no findings of acute hydrocephalus. ORBITS: The visualized portion of the orbits demonstrate no acute abnormality. SINUSES: The visualized paranasal sinuses and mastoid air cells demonstrate no acute abnormality. SOFT TISSUES/SKULL:  No acute abnormality of the visualized skull or soft tissues. No acute intracranial abnormality. 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 Ct Abdomen Pelvis W Iv Contrast    Result Date: 9/1/2019  EXAMINATION: CT OF THE ABDOMEN AND PELVIS WITH CONTRAST 9/1/2019 5:05 pm TECHNIQUE: CT of the abdomen and pelvis was performed with the administration of intravenous contrast. Multiplanar reformatted images are provided for review. Dose modulation, iterative reconstruction, and/or weight based adjustment of the mA/kV was utilized to reduce the radiation dose to as low as reasonably achievable. COMPARISON: 1/26/2019 HISTORY: ORDERING SYSTEM PROVIDED HISTORY: ABDOMINAL PAIN TECHNOLOGIST PROVIDED HISTORY: IV contrast only. Thank you. Reason for Exam: ABD PAIN Acuity: Unknown Type of Exam: Unknown Additional signs and symptoms: NONE Relevant Medical/Surgical History: 75 ML ISOVUE 370 FINDINGS: Lower Chest: The visualized portion of the lower chest demonstrates no acute abnormality. Organs: Stable left hepatic pneumobilia suggesting postsurgical origin. The pancreas, spleen, adrenal glands are normal.  The kidneys enhance symmetrically. No renal mass or hydronephrosis. The gallbladder has been resected. GI/Bowel: No findings of bowel obstruction or inflammatory change. Scattered retained fecal debris within the colon. Pelvis: The bladder and rectum are normal. Peritoneum/Retroperitoneum: No intraperitoneal free air or free fluid. No evidence of mesenteric or retroperitoneal lymphadenopathy. Bones/Soft Tissues: No suspicious lytic or blastic osseous lesion. No acute intra-abdominal or pelvic abnormality.      Xr Chest Portable    Result Date: 9/1/2019  EXAMINATION: ONE XRAY VIEW OF THE CHEST 9/1/2019 3:53 pm COMPARISON: 11/02/2018 HISTORY: ORDERING SYSTEM PROVIDED HISTORY: pain or SOB TECHNOLOGIST PROVIDED HISTORY: Reason for exam:->pain or SOB Reason for Exam: chest pain Acuity: Acute Type of filling defects to suggest choledocholithiasis. Few areas of heterogeneous linear hypointensity within the extrahepatic bile ducts which are nonspecific, possibly sludge or sloughed mucosa. 2. Stable mild pancreatic atrophy with diffuse ductal dilatation as measured above. Stable scattered small pancreatic cystic lesions adjacent to the pancreatic duct largest largest again in the superior pancreatic body suggesting side-branch IPMNs. Consider follow-up assessment in 1-2 years to ensure continued stability.            [facility]

## 2019-09-06 NOTE — PROGRESS NOTES
Nutrition Assessment    Type and Reason for Visit: Initial    Nutrition Recommendations:   · Continue current diet  · Start frozen supplements    Nutrition Assessment: Pt assessed due to a length of stay. He has been on a dysphagia puree, moderately thick and has only been consuming 26-50% of his meals. Will order supplements and continue to follow. Malnutrition Assessment:  · Malnutrition Status: At risk for malnutrition  · Context: Chronic illness  · Findings of the 6 clinical characteristics of malnutrition (Minimum of 2 out of 6 clinical characteristics is required to make the diagnosis of moderate or severe Protein Calorie Malnutrition based on AND/ASPEN Guidelines):  1. Energy Intake-Less than or equal to 75% of estimated energy requirement, Greater than or equal to 5 days    2. Weight Loss-No significant weight loss, in 3 months  3. Fat Loss-No significant subcutaneous fat loss, Orbital  4. Muscle Loss-No significant muscle mass loss, Clavicles (pectoralis and deltoids)  5. Fluid Accumulation-No significant fluid accumulation, Extremities  6.   Strength-Not measured    Nutrition Risk Level: High    Nutrient Needs:  · Estimated Daily Total Kcal: 9972-3462 based on MSJ  · Estimated Daily Protein (g): 62-74 based on 1-1.2 g/kg/IBW  · Estimated Daily Total Fluid (ml/day): 7961-7502 based on 1 mL/kcal    Nutrition Diagnosis:   · Problem: Increased nutrient needs  · Etiology: related to Endocrine dysfunction     Signs and symptoms:  as evidenced by Presence of wounds    Objective Information:  · Wound Type: Open Wounds  · Current Nutrition Therapies:  · Oral Diet Orders: Dysphagia Pureed (Dysphagia 1), Moderately Thick   · Oral Diet intake: 26-50%, 51-75%  · Oral Nutrition Supplement (ONS) Orders: None  · Anthropometric Measures:  · Ht: 5' 5\" (165.1 cm)   · Current Body Wt: 151 lb (68.5 kg)  · Admission Body Wt: 152 lb (68.9 kg)  · Usual Body Wt: 152 lb (68.9 kg)  · % Weight Change: none noted  · Ideal Body Wt: 136 lb (61.7 kg), % Ideal Body 111%  · BMI Classification: BMI 25.0 - 29.9 Overweight    Nutrition Interventions:   Continue current diet, Start ONS  Continued Inpatient Monitoring, Education Not Indicated, Coordination of Care    Nutrition Evaluation:   · Evaluation: Goals set   · Goals: pt will consume greater than 75% of his meals and supplements    · Monitoring: Meal Intake, Weight, Pertinent Labs, Supplement Intake, Wound Healing      Electronically signed by Isabela Yu RD, LD on 1/1/37 at 2:48 PM    Contact Number: [phone]

## 2019-09-06 NOTE — PROGRESS NOTES
tender  Catheter Site: without erythema or tenderness  Neuro: Disoriented x 4. 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 No evidence for osteomyelitis is identified. Trace focal fluid in the distal amputation stump adjacent to the residual  tibia.  No surrounding inflammatory change.  Findings are nonspecific but  could reflect an adventitial bursa.  Abscess felt unlikely given the lack of  surrounding inflammatory change.  Clinical correlation can be made and  aspiration can be considered as clinically indicated. Degenerative changes to the right knee along with trace knee joint effusion. 9/5/2019 Barium Swallow:  Abnormal swallowing mechanism with aspiration.       Focal narrowing is noted within the cervical esophagus at the level of C4-C5   demonstrated on multiple images.  Underlying stricture is suspected.       Please see separate speech pathology report for full discussion of findings   and recommendations.           Labs:    Recent Results (from the past 24 hour(s))   POCT Glucose    Collection Time: 09/04/19 11:39 PM   Result Value Ref Range    POC Glucose 150 (H) 70 - 99 MG/DL   POCT Glucose    Collection Time: 09/05/19  3:41 AM   Result Value Ref Range    POC Glucose 110 (H) 70 - 99 MG/DL   Calcium, Ionized    Collection Time: 09/05/19  3:47 AM   Result Value Ref Range    Ionized Ca 0.98 (L) 1.12 - 1.32 mMOL/L    Calcium, Ion 3.92 (L) 4.48 - 5.28 MG/DL   Hepatic Function Panel    Collection Time: 09/05/19  3:47 AM   Result Value Ref Range    Alb 3.6 3.4 - 5.0 GM/DL    Total Bilirubin 0.4 0.0 - 1.0 MG/DL    Bilirubin, Direct 0.2 0.0 - 0.3 MG/DL    Bilirubin, Indirect 0.2 0 - 0.7 MG/DL    Alkaline Phosphatase 68 40 - 129 IU/L    AST 22 15 - 37 IU/L    ALT 15 10 - 40 U/L    Total Protein 5.9 (L) 6.4 - 8.2 GM/DL   CBC Auto Differential    Collection Time: 09/05/19  3:47 AM   Result Value Ref Range    WBC 11.4 (H) 4.0 - 10.5 K/CU MM    RBC 4.31 (L) 4.6 - 6.2 M/CU MM    Hemoglobin 12.4 (L) 13.5 - 18.0 GM/DL    Hematocrit 41.7 (L) 42 - 52 %    MCV 96.8 78 - 100 FL    MCH 28.8 27 - 31 PG    MCHC 29.7 (L) 32.0 - 36.0 %    RDW 13.4 11.7 - 14.9 %    Platelets 647 249 - 777 K/CU MM    MPV 9.6 7.5 - 11.1 FL    Differential Type AUTOMATED DIFFERENTIAL     Segs Relative 64.5 36 - 66 %    Lymphocytes % 16.2 (L) 24 - 44 %    Monocytes % 11.3 (H) 0 - 4 %    Eosinophils % 6.8 (H) 0 - 3 %    Basophils % 0.6 0 - 1 %    Segs Absolute 7.4 K/CU MM    Lymphocytes Absolute 1.9 K/CU MM    Monocytes Absolute 1.3 K/CU MM    Eosinophils Absolute 0.8 K/CU MM    Basophils Absolute 0.1 K/CU MM    Nucleated RBC % 0.0 %    Total Nucleated RBC 0.0 K/CU MM    Total Immature Neutrophil 0.07 K/CU MM    Immature Neutrophil % 0.6 (H) 0 - 0.43 %   Comprehensive Metabolic Panel    Collection Time: 09/05/19  3:47 AM   Result Value Ref Range    Sodium 149 (H) 135 - 145 MMOL/L    Potassium 4.7 3.5 - 5.1 MMOL/L    Chloride 116 (H) 99 - 110 mMol/L    CO2 20 (L) 21 - 32 MMOL/L    BUN 22 6 - 23 MG/DL    CREATININE 1.1 0.9 - 1.3 MG/DL    Glucose 134 (H) 70 - 99 MG/DL    Calcium 9.2 8.3 - 10.6 MG/DL    Alb 3.6 3.4 - 5.0 GM/DL    Total Protein 5.9 (L) 6.4 - 8.2 GM/DL    Total Bilirubin 0.4 0.0 - 1.0 MG/DL    ALT 15 10 - 40 U/L    AST 22 15 - 37 IU/L    Alkaline Phosphatase 68 40 - 128 IU/L    GFR Non-African American >60 >60 mL/min/1.73m2    GFR African American >60 >60 mL/min/1.73m2    Anion Gap 13 4 - 16   Amylase    Collection Time: 09/05/19  3:47 AM   Result Value Ref Range    Amylase 62 25 - 115 U/L   Lipase    Collection Time: 09/05/19  3:47 AM   Result Value Ref Range    Lipase 52 13 - 60 IU/L   Protime/INR & PTT    Collection Time: 09/05/19  3:47 AM   Result Value Ref Range    Protime 11.5 9.12 - 12.5 SECONDS    INR 0.99 INDEX    aPTT 25.7 21.2 - 33.0 SECONDS   POCT

## 2019-09-06 NOTE — PROGRESS NOTES
Continues with pharyngeal dysphagia characterized by delayed swallow initiation and reduced laryngeal elevation, and spontaneous repetitive swallow for both textures. Delayed coughing noted following completion of PO trials - suspect d/t backflow of material from esophagus into pharynx as demonstrated on MBS yesterday. Attempted education with pt re: current diet recommendations, results of MBS, and plan for continued therapy. Pt demonstrates limited understanding at this time and will benefit from repeated education. Recommend continued puree/honey diet with strict aspiration precautions. Pt is a total feed and should be presented with PO at a slow rate. SLP will continue to follow.     GOALS (current status in bold):  Short-term Goals  Timeframe for Short-term Goals: 1 week  or until goals met  Goal 1: Pt will tolerate pureed diet/moderately (honey) thick liquids without clinical evidence of aspiration 100% Ongoing, continue  Goal 2: Pt will participate in advanced PO trials for possible safe diet level advancement 10/10 trials Not appropriate, DNT  Goal 3: Pt/caregivers will demonstrate comprehension of recommendations/POC Caregivers indicate understanding, continue      EDUCATION: recommendations, plan    PAIN RATING (0-10 Scale): does not report  Time in/Time out: SLP Individual Minutes  Time In: 0945  Time Out: 1000  Minutes: 15    Visit number: [address] MEND[place] CCC-SLP  9/6/2019  11:09 AM

## 2019-09-06 NOTE — ANESTHESIA PRE PROCEDURE
09/06/2019    ALT 16 09/06/2019    ALT 16 09/06/2019       POC Tests:   Recent Labs     09/06/19  1508   POCGLU 121*       Coags:   Lab Results   Component Value Date    PROTIME 11.5 09/05/2019    INR 0.99 09/05/2019    APTT 25.7 09/05/2019       HCG (If Applicable): No results found for: PREGTESTUR, PREGSERUM, HCG, HCGQUANT     ABGs: No results found for: PHART, PO2ART, MEH5SLA, OTZ7IMK, BEART, A7VITHPD     Type & Screen (If Applicable):  No results found for: LABABO, [address]    Anesthesia Evaluation  Patient summary reviewed  Airway: Mallampati: III  TM distance: >3 FB   Neck ROM: limited  Mouth opening: > = 3 FB Dental:    (+) upper dentures      Pulmonary:normal exam    (+) COPD:                             Cardiovascular:    (+) hypertension:, CAD:, CABG/stent:,          Beta Blocker:  Order written      ROS comment: Cardiology note 9/5/2019:  1. Elev trop type 2 elevation, Echo- EF 50%. Continue current plan   2. CAD  H/o CABg  CPM  3. Risk factor modification  4. Hyperlipidemia; Lipid panel at goal. on statin  5. Pre op clearance; he is considered a moderate risk for surgery  6. DVT prophylaxis if not contraindicated while in the hospital.     Echo 9/1/2019:  Summary   Pt. unable to lay flat done in partial sitting position.   Technically difficult examination patient uncooperative.   Left ventricular function is low normal, EF is estimated at 50%.  Grade II diastolic dysfunction.   Sclerotic, but non-stenotic aortic valve.   Mitral annular calcification is present.   No significant valvular regurgitation noted.   No evidence of pericardial effusion. Neuro/Psych:   Negative Neuro/Psych ROS              GI/Hepatic/Renal:            ROS comment: CBD stones. Endo/Other:    (+) Diabetes, . Abdominal:           Vascular: negative vascular ROS. Anesthesia Plan      general     ASA 4       Induction: intravenous.       Anesthetic plan and risks discussed with patient. Plan discussed with attending. MOLLY Acevedo - CRNA   9/6/2019       Pre Anesthesia Assessment complete.  Chart reviewed on 9/6/2019

## 2019-09-06 NOTE — PROGRESS NOTES
RBC 0.0 K/CU MM    Total Immature Neutrophil 0.09 K/CU MM    Immature Neutrophil % 0.8 (H) 0 - 0.43 %   Comprehensive Metabolic Panel    Collection Time: 09/06/19  2:12 AM   Result Value Ref Range    Sodium 144 135 - 145 MMOL/L    Potassium 4.2 3.5 - 5.1 MMOL/L    Chloride 110 99 - 110 mMol/L    CO2 22 21 - 32 MMOL/L    BUN 23 6 - 23 MG/DL    CREATININE 1.1 0.9 - 1.3 MG/DL    Glucose 194 (H) 70 - 99 MG/DL    Calcium 9.2 8.3 - 10.6 MG/DL    Alb 3.5 3.4 - 5.0 GM/DL    Total Protein 5.7 (L) 6.4 - 8.2 GM/DL    Total Bilirubin 0.4 0.0 - 1.0 MG/DL    ALT 16 10 - 40 U/L    AST 23 15 - 37 IU/L    Alkaline Phosphatase 66 40 - 128 IU/L    GFR Non-African American >60 >60 mL/min/1.73m2    GFR African American >60 >60 mL/min/1.73m2    Anion Gap 12 4 - 16   Procalcitonin    Collection Time: 09/06/19  2:12 AM   Result Value Ref Range    Procalcitonin 0.197    POCT Glucose    Collection Time: 09/06/19  4:16 AM   Result Value Ref Range    POC Glucose 267 (H) 70 - 99 MG/DL   POCT Glucose    Collection Time: 09/06/19  7:00 AM   Result Value Ref Range    POC Glucose 143 (H) 70 - 99 MG/DL     CULTURE results: Invalid input(s): BLOOD CULTURE,  URINE CULTURE, SURGICAL CULTURE    Diagnosis:  Patient Active Problem List   Diagnosis    Diabetes mellitus ([facility]    Hypertension    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[facility]    Choledocholithiasis    COPD (chronic obstructive pulmonary disease) (HCC)    Chest pain    Abdominal pain, epigastric    Elevated LFTs    Abnormal findings on imaging of biliary tract    Chest pain    Coronary artery disease involving coronary bypass graft of native heart without angina pectoris    Acute encephalopathy    Vomiting    Sepsis (HCC)    Nausea vomiting and diarrhea    Elevated troponin    Fever       Active Problems  Active Problems:    Sepsis (HCC)    Nausea vomiting and diarrhea    Elevated troponin    Fever  Resolved Problems:    * No resolved hospital problems.  *    Electronically

## 2019-09-07 ENCOUNTER — ANESTHESIA (OUTPATIENT)
Dept: ENDOSCOPY | Age: 79
DRG: 564 | End: 2019-09-07
Payer: MEDICARE

## 2019-09-07 ENCOUNTER — APPOINTMENT (OUTPATIENT)
Dept: GENERAL RADIOLOGY | Age: 79
DRG: 564 | End: 2019-09-07
Payer: MEDICARE

## 2019-09-07 VITALS
SYSTOLIC BLOOD PRESSURE: 144 MMHG | OXYGEN SATURATION: 97 % | RESPIRATION RATE: 9 BRPM | TEMPERATURE: 98.4 F | DIASTOLIC BLOOD PRESSURE: 111 MMHG

## 2019-09-07 LAB
ALBUMIN SERPL-MCNC: 3.6 GM/DL (ref 3.4–5)
ALBUMIN SERPL-MCNC: 3.6 GM/DL (ref 3.4–5)
ALP BLD-CCNC: 68 IU/L (ref 40–128)
ALP BLD-CCNC: 68 IU/L (ref 40–129)
ALT SERPL-CCNC: 17 U/L (ref 10–40)
ALT SERPL-CCNC: 17 U/L (ref 10–40)
AMYLASE: 69 U/L (ref 25–115)
ANION GAP SERPL CALCULATED.3IONS-SCNC: 14 MMOL/L (ref 4–16)
AST SERPL-CCNC: 29 IU/L (ref 15–37)
AST SERPL-CCNC: 29 IU/L (ref 15–37)
BASOPHILS ABSOLUTE: 0.1 K/CU MM
BASOPHILS RELATIVE PERCENT: 0.7 % (ref 0–1)
BILIRUB SERPL-MCNC: 0.7 MG/DL (ref 0–1)
BILIRUB SERPL-MCNC: 0.7 MG/DL (ref 0–1)
BILIRUBIN DIRECT: 0.2 MG/DL (ref 0–0.3)
BILIRUBIN, INDIRECT: 0.5 MG/DL (ref 0–0.7)
BUN BLDV-MCNC: 18 MG/DL (ref 6–23)
CALCIUM IONIZED: 4.68 MG/DL (ref 4.48–5.28)
CALCIUM SERPL-MCNC: 9.3 MG/DL (ref 8.3–10.6)
CHLORIDE BLD-SCNC: 100 MMOL/L (ref 99–110)
CO2: 24 MMOL/L (ref 21–32)
CREAT SERPL-MCNC: 1 MG/DL (ref 0.9–1.3)
DIFFERENTIAL TYPE: ABNORMAL
DOSE AMOUNT: NORMAL
DOSE TIME: NORMAL
EOSINOPHILS ABSOLUTE: 0.7 K/CU MM
EOSINOPHILS RELATIVE PERCENT: 8 % (ref 0–3)
GFR AFRICAN AMERICAN: >60 ML/MIN/1.73M2
GFR NON-AFRICAN AMERICAN: >60 ML/MIN/1.73M2
GLUCOSE BLD-MCNC: 133 MG/DL (ref 70–99)
GLUCOSE BLD-MCNC: 173 MG/DL (ref 70–99)
GLUCOSE BLD-MCNC: 222 MG/DL (ref 70–99)
GLUCOSE BLD-MCNC: 237 MG/DL (ref 70–99)
GLUCOSE BLD-MCNC: 244 MG/DL (ref 70–99)
GLUCOSE BLD-MCNC: 273 MG/DL (ref 70–99)
GLUCOSE BLD-MCNC: 292 MG/DL (ref 70–99)
HCT VFR BLD CALC: 40.9 % (ref 42–52)
HEMOGLOBIN: 13.3 GM/DL (ref 13.5–18)
IMMATURE NEUTROPHIL %: 1.2 % (ref 0–0.43)
IONIZED CA: 1.17 MMOL/L (ref 1.12–1.32)
LIPASE: 68 IU/L (ref 13–60)
LYMPHOCYTES ABSOLUTE: 1.6 K/CU MM
LYMPHOCYTES RELATIVE PERCENT: 18.4 % (ref 24–44)
MCH RBC QN AUTO: 29 PG (ref 27–31)
MCHC RBC AUTO-ENTMCNC: 32.5 % (ref 32–36)
MCV RBC AUTO: 89.3 FL (ref 78–100)
MONOCYTES ABSOLUTE: 1.4 K/CU MM
MONOCYTES RELATIVE PERCENT: 15.8 % (ref 0–4)
NUCLEATED RBC %: 0 %
PDW BLD-RTO: 12.7 % (ref 11.7–14.9)
PLATELET # BLD: 224 K/CU MM (ref 140–440)
PMV BLD AUTO: 9.7 FL (ref 7.5–11.1)
POTASSIUM SERPL-SCNC: 4.1 MMOL/L (ref 3.5–5.1)
RBC # BLD: 4.58 M/CU MM (ref 4.6–6.2)
SEGMENTED NEUTROPHILS ABSOLUTE COUNT: 4.9 K/CU MM
SEGMENTED NEUTROPHILS RELATIVE PERCENT: 55.9 % (ref 36–66)
SODIUM BLD-SCNC: 138 MMOL/L (ref 135–145)
TOTAL IMMATURE NEUTOROPHIL: 0.11 K/CU MM
TOTAL NUCLEATED RBC: 0 K/CU MM
TOTAL PROTEIN: 5.9 GM/DL (ref 6.4–8.2)
TOTAL PROTEIN: 5.9 GM/DL (ref 6.4–8.2)
VANCOMYCIN TROUGH: 10.3 UG/ML (ref 10–20)
WBC # BLD: 8.8 K/CU MM (ref 4–10.5)

## 2019-09-07 PROCEDURE — 7100000000 HC PACU RECOVERY - FIRST 15 MIN: Performed by: SPECIALIST

## 2019-09-07 PROCEDURE — 80202 ASSAY OF VANCOMYCIN: CPT

## 2019-09-07 PROCEDURE — 6370000000 HC RX 637 (ALT 250 FOR IP): Performed by: INTERNAL MEDICINE

## 2019-09-07 PROCEDURE — C1726 CATH, BAL DIL, NON-VASCULAR: HCPCS | Performed by: SPECIALIST

## 2019-09-07 PROCEDURE — 2580000003 HC RX 258: Performed by: INTERNAL MEDICINE

## 2019-09-07 PROCEDURE — 3700000001 HC ADD 15 MINUTES (ANESTHESIA): Performed by: SPECIALIST

## 2019-09-07 PROCEDURE — 6360000002 HC RX W HCPCS: Performed by: INTERNAL MEDICINE

## 2019-09-07 PROCEDURE — 2500000003 HC RX 250 WO HCPCS: Performed by: NURSE ANESTHETIST, CERTIFIED REGISTERED

## 2019-09-07 PROCEDURE — 2580000003 HC RX 258: Performed by: SPECIALIST

## 2019-09-07 PROCEDURE — 7100000001 HC PACU RECOVERY - ADDTL 15 MIN: Performed by: SPECIALIST

## 2019-09-07 PROCEDURE — 94640 AIRWAY INHALATION TREATMENT: CPT

## 2019-09-07 PROCEDURE — 1200000000 HC SEMI PRIVATE

## 2019-09-07 PROCEDURE — 0FC98ZZ EXTIRPATION OF MATTER FROM COMMON BILE DUCT, VIA NATURAL OR ARTIFICIAL OPENING ENDOSCOPIC: ICD-10-PCS | Performed by: SPECIALIST

## 2019-09-07 PROCEDURE — C9113 INJ PANTOPRAZOLE SODIUM, VIA: HCPCS | Performed by: INTERNAL MEDICINE

## 2019-09-07 PROCEDURE — 80053 COMPREHEN METABOLIC PANEL: CPT

## 2019-09-07 PROCEDURE — 3609015200 HC ERCP REMOVE CALCULI/DEBRIS BILIARY/PANCREAS DUCT: Performed by: SPECIALIST

## 2019-09-07 PROCEDURE — 36415 COLL VENOUS BLD VENIPUNCTURE: CPT

## 2019-09-07 PROCEDURE — 6370000000 HC RX 637 (ALT 250 FOR IP): Performed by: NURSE PRACTITIONER

## 2019-09-07 PROCEDURE — 2580000003 HC RX 258: Performed by: NURSE ANESTHETIST, CERTIFIED REGISTERED

## 2019-09-07 PROCEDURE — 2720000010 HC SURG SUPPLY STERILE: Performed by: SPECIALIST

## 2019-09-07 PROCEDURE — 94150 VITAL CAPACITY TEST: CPT

## 2019-09-07 PROCEDURE — 3700000000 HC ANESTHESIA ATTENDED CARE: Performed by: SPECIALIST

## 2019-09-07 PROCEDURE — 94761 N-INVAS EAR/PLS OXIMETRY MLT: CPT

## 2019-09-07 PROCEDURE — C1769 GUIDE WIRE: HCPCS | Performed by: SPECIALIST

## 2019-09-07 PROCEDURE — 0F798ZZ DILATION OF COMMON BILE DUCT, VIA NATURAL OR ARTIFICIAL OPENING ENDOSCOPIC: ICD-10-PCS | Performed by: SPECIALIST

## 2019-09-07 PROCEDURE — 82962 GLUCOSE BLOOD TEST: CPT

## 2019-09-07 PROCEDURE — 80048 BASIC METABOLIC PNL TOTAL CA: CPT

## 2019-09-07 PROCEDURE — 83690 ASSAY OF LIPASE: CPT

## 2019-09-07 PROCEDURE — 76000 FLUOROSCOPY <1 HR PHYS/QHP: CPT

## 2019-09-07 PROCEDURE — 85025 COMPLETE CBC W/AUTO DIFF WBC: CPT

## 2019-09-07 PROCEDURE — 2709999900 HC NON-CHARGEABLE SUPPLY: Performed by: SPECIALIST

## 2019-09-07 PROCEDURE — 82330 ASSAY OF CALCIUM: CPT

## 2019-09-07 PROCEDURE — 82248 BILIRUBIN DIRECT: CPT

## 2019-09-07 PROCEDURE — 6360000002 HC RX W HCPCS: Performed by: NURSE ANESTHETIST, CERTIFIED REGISTERED

## 2019-09-07 PROCEDURE — 82150 ASSAY OF AMYLASE: CPT

## 2019-09-07 RX ORDER — HYDRALAZINE HYDROCHLORIDE 20 MG/ML
5 INJECTION INTRAMUSCULAR; INTRAVENOUS EVERY 10 MIN PRN
Status: DISCONTINUED | OUTPATIENT
Start: 2019-09-07 | End: 2019-09-07 | Stop reason: HOSPADM

## 2019-09-07 RX ORDER — FENTANYL CITRATE 50 UG/ML
50 INJECTION, SOLUTION INTRAMUSCULAR; INTRAVENOUS EVERY 5 MIN PRN
Status: DISCONTINUED | OUTPATIENT
Start: 2019-09-07 | End: 2019-09-07 | Stop reason: HOSPADM

## 2019-09-07 RX ORDER — ONDANSETRON 2 MG/ML
INJECTION INTRAMUSCULAR; INTRAVENOUS PRN
Status: DISCONTINUED | OUTPATIENT
Start: 2019-09-07 | End: 2019-09-07 | Stop reason: SDUPTHER

## 2019-09-07 RX ORDER — PROPOFOL 10 MG/ML
INJECTION, EMULSION INTRAVENOUS PRN
Status: DISCONTINUED | OUTPATIENT
Start: 2019-09-07 | End: 2019-09-07 | Stop reason: SDUPTHER

## 2019-09-07 RX ORDER — QUETIAPINE FUMARATE 25 MG/1
50 TABLET, FILM COATED ORAL 2 TIMES DAILY
Status: DISCONTINUED | OUTPATIENT
Start: 2019-09-07 | End: 2019-09-10 | Stop reason: HOSPADM

## 2019-09-07 RX ORDER — SODIUM CHLORIDE, SODIUM LACTATE, POTASSIUM CHLORIDE, CALCIUM CHLORIDE 600; 310; 30; 20 MG/100ML; MG/100ML; MG/100ML; MG/100ML
INJECTION, SOLUTION INTRAVENOUS CONTINUOUS PRN
Status: DISCONTINUED | OUTPATIENT
Start: 2019-09-07 | End: 2019-09-07 | Stop reason: SDUPTHER

## 2019-09-07 RX ORDER — FENTANYL CITRATE 50 UG/ML
INJECTION, SOLUTION INTRAMUSCULAR; INTRAVENOUS PRN
Status: DISCONTINUED | OUTPATIENT
Start: 2019-09-07 | End: 2019-09-07 | Stop reason: SDUPTHER

## 2019-09-07 RX ORDER — MEPERIDINE HYDROCHLORIDE 25 MG/ML
12.5 INJECTION INTRAMUSCULAR; INTRAVENOUS; SUBCUTANEOUS EVERY 5 MIN PRN
Status: DISCONTINUED | OUTPATIENT
Start: 2019-09-07 | End: 2019-09-07 | Stop reason: HOSPADM

## 2019-09-07 RX ORDER — SUCCINYLCHOLINE CHLORIDE 20 MG/ML
INJECTION INTRAMUSCULAR; INTRAVENOUS PRN
Status: DISCONTINUED | OUTPATIENT
Start: 2019-09-07 | End: 2019-09-07 | Stop reason: SDUPTHER

## 2019-09-07 RX ORDER — LIDOCAINE HYDROCHLORIDE 20 MG/ML
INJECTION, SOLUTION EPIDURAL; INFILTRATION; INTRACAUDAL; PERINEURAL PRN
Status: DISCONTINUED | OUTPATIENT
Start: 2019-09-07 | End: 2019-09-07 | Stop reason: SDUPTHER

## 2019-09-07 RX ORDER — HYDROMORPHONE HCL 110MG/55ML
0.5 PATIENT CONTROLLED ANALGESIA SYRINGE INTRAVENOUS EVERY 5 MIN PRN
Status: DISCONTINUED | OUTPATIENT
Start: 2019-09-07 | End: 2019-09-07 | Stop reason: HOSPADM

## 2019-09-07 RX ORDER — DEXAMETHASONE SODIUM PHOSPHATE 4 MG/ML
INJECTION, SOLUTION INTRA-ARTICULAR; INTRALESIONAL; INTRAMUSCULAR; INTRAVENOUS; SOFT TISSUE PRN
Status: DISCONTINUED | OUTPATIENT
Start: 2019-09-07 | End: 2019-09-07 | Stop reason: SDUPTHER

## 2019-09-07 RX ADMIN — ATORVASTATIN CALCIUM 40 MG: 40 TABLET, FILM COATED ORAL at 11:11

## 2019-09-07 RX ADMIN — SODIUM CHLORIDE, POTASSIUM CHLORIDE, SODIUM LACTATE AND CALCIUM CHLORIDE: 600; 310; 30; 20 INJECTION, SOLUTION INTRAVENOUS at 10:02

## 2019-09-07 RX ADMIN — DILTIAZEM HYDROCHLORIDE 60 MG: 60 TABLET, FILM COATED ORAL at 11:11

## 2019-09-07 RX ADMIN — QUETIAPINE FUMARATE 50 MG: 25 TABLET ORAL at 21:37

## 2019-09-07 RX ADMIN — DEXAMETHASONE SODIUM PHOSPHATE 8 MG: 4 INJECTION, SOLUTION INTRAMUSCULAR; INTRAVENOUS at 09:07

## 2019-09-07 RX ADMIN — FENTANYL CITRATE 50 MCG: 50 INJECTION INTRAMUSCULAR; INTRAVENOUS at 08:36

## 2019-09-07 RX ADMIN — LIDOCAINE HYDROCHLORIDE 60 MG: 20 INJECTION, SOLUTION EPIDURAL; INFILTRATION; INTRACAUDAL; PERINEURAL at 08:47

## 2019-09-07 RX ADMIN — DILTIAZEM HYDROCHLORIDE 60 MG: 60 TABLET, FILM COATED ORAL at 21:37

## 2019-09-07 RX ADMIN — Medication: at 21:37

## 2019-09-07 RX ADMIN — ASPIRIN 325 MG ORAL TABLET 325 MG: 325 PILL ORAL at 11:10

## 2019-09-07 RX ADMIN — CARVEDILOL 6.25 MG: 6.25 TABLET, FILM COATED ORAL at 11:10

## 2019-09-07 RX ADMIN — SODIUM CHLORIDE, POTASSIUM CHLORIDE, SODIUM LACTATE AND CALCIUM CHLORIDE: 600; 310; 30; 20 INJECTION, SOLUTION INTRAVENOUS at 05:17

## 2019-09-07 RX ADMIN — MEROPENEM 1 G: 1 INJECTION, POWDER, FOR SOLUTION INTRAVENOUS at 05:17

## 2019-09-07 RX ADMIN — IPRATROPIUM BROMIDE AND ALBUTEROL SULFATE 1 AMPULE: .5; 3 SOLUTION RESPIRATORY (INHALATION) at 14:49

## 2019-09-07 RX ADMIN — IPRATROPIUM BROMIDE AND ALBUTEROL SULFATE 1 AMPULE: .5; 3 SOLUTION RESPIRATORY (INHALATION) at 11:38

## 2019-09-07 RX ADMIN — PROPOFOL 10 MG: 10 INJECTION, EMULSION INTRAVENOUS at 08:47

## 2019-09-07 RX ADMIN — FENTANYL CITRATE 50 MCG: 50 INJECTION INTRAMUSCULAR; INTRAVENOUS at 09:02

## 2019-09-07 RX ADMIN — SODIUM CHLORIDE, POTASSIUM CHLORIDE, SODIUM LACTATE AND CALCIUM CHLORIDE: 600; 310; 30; 20 INJECTION, SOLUTION INTRAVENOUS at 08:36

## 2019-09-07 RX ADMIN — Medication: at 11:11

## 2019-09-07 RX ADMIN — GLUCAGON HYDROCHLORIDE 1 MG: KIT at 09:04

## 2019-09-07 RX ADMIN — VANCOMYCIN HYDROCHLORIDE 1250 MG: 5 INJECTION, POWDER, LYOPHILIZED, FOR SOLUTION INTRAVENOUS at 15:26

## 2019-09-07 RX ADMIN — SUCCINYLCHOLINE CHLORIDE 100 MG: 20 INJECTION, SOLUTION INTRAMUSCULAR; INTRAVENOUS at 08:47

## 2019-09-07 RX ADMIN — PANTOPRAZOLE SODIUM 40 MG: 40 INJECTION, POWDER, FOR SOLUTION INTRAVENOUS at 11:11

## 2019-09-07 RX ADMIN — PANTOPRAZOLE SODIUM 40 MG: 40 INJECTION, POWDER, FOR SOLUTION INTRAVENOUS at 21:36

## 2019-09-07 RX ADMIN — ONDANSETRON 4 MG: 2 INJECTION INTRAMUSCULAR; INTRAVENOUS at 09:07

## 2019-09-07 RX ADMIN — MEROPENEM 1 G: 1 INJECTION, POWDER, FOR SOLUTION INTRAVENOUS at 16:12

## 2019-09-07 RX ADMIN — Medication 10 ML: at 11:11

## 2019-09-07 RX ADMIN — QUETIAPINE FUMARATE 50 MG: 25 TABLET ORAL at 12:31

## 2019-09-07 ASSESSMENT — PAIN SCALES - GENERAL
PAINLEVEL_OUTOF10: 0

## 2019-09-07 ASSESSMENT — PULMONARY FUNCTION TESTS
PIF_VALUE: 1
PIF_VALUE: 11
PIF_VALUE: 11
PIF_VALUE: 2
PIF_VALUE: 1
PIF_VALUE: 1
PIF_VALUE: 11
PIF_VALUE: 9
PIF_VALUE: 4
PIF_VALUE: 1
PIF_VALUE: 2
PIF_VALUE: 11
PIF_VALUE: 25
PIF_VALUE: 27
PIF_VALUE: 4
PIF_VALUE: 11
PIF_VALUE: 11
PIF_VALUE: 5
PIF_VALUE: 11
PIF_VALUE: 11
PIF_VALUE: 3
PIF_VALUE: 2
PIF_VALUE: 1
PIF_VALUE: 12
PIF_VALUE: 0
PIF_VALUE: 11
PIF_VALUE: 10
PIF_VALUE: 0
PIF_VALUE: 9
PIF_VALUE: 3
PIF_VALUE: 8
PIF_VALUE: 24
PIF_VALUE: 15
PIF_VALUE: 1
PIF_VALUE: 11
PIF_VALUE: 0
PIF_VALUE: 12
PIF_VALUE: 2
PIF_VALUE: 11
PIF_VALUE: 11
PIF_VALUE: 17
PIF_VALUE: 1
PIF_VALUE: 10
PIF_VALUE: 1
PIF_VALUE: 11
PIF_VALUE: 6
PIF_VALUE: 8
PIF_VALUE: 17
PIF_VALUE: 11
PIF_VALUE: 13
PIF_VALUE: 7
PIF_VALUE: 1

## 2019-09-07 NOTE — DISCHARGE INSTR - COC
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    Choledocholithiasis K80.50    COPD (chronic obstructive pulmonary disease) ([facility]) J44.9    Chest pain R07.9    Abdominal pain, epigastric R10.13    Elevated LFTs R94.5    Abnormal findings on imaging of biliary tract R93.2    Chest pain R07.9    Coronary artery disease involving coronary bypass graft of native heart without angina pectoris I25.810    Acute encephalopathy G93.40    Vomiting R11.10    Sepsis ([facility]) A41.9    Nausea vomiting and diarrhea R11.2, R19.7    Elevated troponin R74.8    Fever R50.9       Isolation/Infection:   Isolation          Contact        Patient Infection Status     Infection Onset Added Last Indicated Last Indicated By Review Planned Expiration Resolved Resolved By    [facility] 09/03/19 09/05/19 09/03/19 Wound culture              Nurse Assessment:  Last Vital Signs: BP (!) 164/79   Pulse 77   Temp 98.7 °F (37.1 °C) (Oral)   Resp 18   Ht 5' 5\" (1.651 m)   Wt 152 lb 12.5 oz (69.3 kg)   SpO2 95%   BMI 25.42 kg/m²     Last documented pain score (0-10 scale): Pain Level: 0  Last Weight:   Wt Readings from Last 1 Encounters:   09/06/19 152 lb 12.5 oz (69.3 kg)     Mental Status:  oriented, alert, coherent, logical and thought processes intact    IV Access:  - None    Nursing Mobility/ADLs:  Walking   Assisted  Transfer  Assisted  Bathing  Assisted  Dressing  Assisted  Toileting  Assisted  Feeding  Assisted  Med Admin  Assisted  Med Delivery   whole    Wound Care Documentation and Therapy:        Elimination:  Continence:   · Bowel:  Yes  · Bladder: Yes  Urinary Catheter: Removal Date 9/10/2019   Colostomy/Ileostomy/Ileal Conduit: No       Date of Last BM: ***    Intake/Output Summary (Last 24 hours) at 9/7/2019 3699 [facility] filed at 9/7/2019 1006  Gross per 24 hour   Intake 2600 ml   Output 2640 ml   Net -40 ml     I/O last 3 completed shifts: In: 2180 [P.O.:380; I.V.:1800]  Out: 2000 [Urine:2000]    Safety Concerns: At Risk for Falls    Impairments/Disabilities:      None        Patient's personal belongings (please select all that are sent with patient):  None    RN SIGNATURE:  Electronically signed by Arabella Choi RN on 9/10/19 at 4:22 PM    CASE MANAGEMENT/SOCIAL WORK SECTION    Inpatient Status Date: ***    Readmission Risk Assessment Score:  Readmission Risk              Risk of Unplanned Readmission:        23           Discharging to Facility/ Agency   · Name:   · Address:  · Phone:  · Fax:    Dialysis Facility (if applicable)   · Name:  · Address:  · Dialysis Schedule:  · Phone:  · Fax:    / signature: {Esignature:[id number]}    PHYSICIAN SECTION  Nutrition Therapy:  Current Nutrition Therapy:   - Oral Diet: Minced/moist and Honey thick liquids  Routes of Feeding: Oral  Liquids: Honey Thick Liquids  Daily Fluid Restriction: no  Last Modified Barium Swallow with Video (Video Swallowing Test): done     Treatments at the Time of Hospital Discharge:   Respiratory Treatments: None  Oxygen Therapy:  is not on home oxygen therapy. Ventilator:    - No ventilator support    Rehab Therapies: Physical Therapy and Occupational Therapy  Weight Bearing Status/Restrictions: No weight bearing restirctions  Wound care: Wound care: Right lower leg amp. Site. Wash with soap and water, pat dry. Cover with mepilex border, change every other day. Continue to elevate on pillow to avoid pressure. Wound team to reassess on 9/13/19.          Prognosis: Good    Condition at Discharge: Stable    Rehab Potential (if transferring to Rehab): Good    Recommended Labs or Other Treatments After Discharge: CBC,BMP,LFT and Mag in 5 days    Physician Certification: I certify the above information and transfer of

## 2019-09-07 NOTE — BRIEF OP NOTE
Brief Postoperative Note      Chelsi Coppola is a [de-identified] y.o. male     Pre-operative Diagnosis: CBD STONES / BILIARY SEPSIS    Post-operative Diagnosis: WIDELY PATENT PAPILLA FROM PRIOR ES ---- DILATED CBD---CBD STONES AND SLUDGE --REMOVAL OF CBD STONE AND SLUDGE    Procedure:  ERCP WITH BALLOON DILATION OF [id number]----REMOVAL OF CBD STONE / SLUDGE WITH 15 MM BALLOON    Anesthesia: GEN    Surgeons/Assistants:Gladis Bryson     Estimated Blood Loss: NONE    Complications: None    Specimens: were not obtained      Gladis Bryson   9/7/2019   9:23 AM

## 2019-09-08 LAB
ALBUMIN SERPL-MCNC: 3.5 GM/DL (ref 3.4–5)
ALBUMIN SERPL-MCNC: 3.5 GM/DL (ref 3.4–5)
ALP BLD-CCNC: 64 IU/L (ref 40–128)
ALP BLD-CCNC: 64 IU/L (ref 40–129)
ALT SERPL-CCNC: 16 U/L (ref 10–40)
ALT SERPL-CCNC: 16 U/L (ref 10–40)
ANION GAP SERPL CALCULATED.3IONS-SCNC: 14 MMOL/L (ref 4–16)
AST SERPL-CCNC: 23 IU/L (ref 15–37)
AST SERPL-CCNC: 23 IU/L (ref 15–37)
BASOPHILS ABSOLUTE: 0 K/CU MM
BASOPHILS RELATIVE PERCENT: 0.3 % (ref 0–1)
BILIRUB SERPL-MCNC: 0.5 MG/DL (ref 0–1)
BILIRUB SERPL-MCNC: 0.5 MG/DL (ref 0–1)
BILIRUBIN DIRECT: 0.2 MG/DL (ref 0–0.3)
BILIRUBIN, INDIRECT: 0.3 MG/DL (ref 0–0.7)
BUN BLDV-MCNC: 22 MG/DL (ref 6–23)
CALCIUM IONIZED: 4.72 MG/DL (ref 4.48–5.28)
CALCIUM SERPL-MCNC: 9.3 MG/DL (ref 8.3–10.6)
CHLORIDE BLD-SCNC: 101 MMOL/L (ref 99–110)
CO2: 23 MMOL/L (ref 21–32)
CREAT SERPL-MCNC: 1 MG/DL (ref 0.9–1.3)
DIFFERENTIAL TYPE: ABNORMAL
EOSINOPHILS ABSOLUTE: 0 K/CU MM
EOSINOPHILS RELATIVE PERCENT: 0 % (ref 0–3)
GFR AFRICAN AMERICAN: >60 ML/MIN/1.73M2
GFR NON-AFRICAN AMERICAN: >60 ML/MIN/1.73M2
GLUCOSE BLD-MCNC: 154 MG/DL (ref 70–99)
GLUCOSE BLD-MCNC: 195 MG/DL (ref 70–99)
GLUCOSE BLD-MCNC: 250 MG/DL (ref 70–99)
GLUCOSE BLD-MCNC: 257 MG/DL (ref 70–99)
GLUCOSE BLD-MCNC: 287 MG/DL (ref 70–99)
HCT VFR BLD CALC: 38.5 % (ref 42–52)
HEMOGLOBIN: 12.6 GM/DL (ref 13.5–18)
IMMATURE NEUTROPHIL %: 2.1 % (ref 0–0.43)
IONIZED CA: 1.18 MMOL/L (ref 1.12–1.32)
LYMPHOCYTES ABSOLUTE: 1.3 K/CU MM
LYMPHOCYTES RELATIVE PERCENT: 11.7 % (ref 24–44)
MCH RBC QN AUTO: 28.7 PG (ref 27–31)
MCHC RBC AUTO-ENTMCNC: 32.7 % (ref 32–36)
MCV RBC AUTO: 87.7 FL (ref 78–100)
MONOCYTES ABSOLUTE: 0.8 K/CU MM
MONOCYTES RELATIVE PERCENT: 7.8 % (ref 0–4)
NUCLEATED RBC %: 0 %
PDW BLD-RTO: 12.4 % (ref 11.7–14.9)
PLATELET # BLD: 210 K/CU MM (ref 140–440)
PMV BLD AUTO: 10 FL (ref 7.5–11.1)
POTASSIUM SERPL-SCNC: 4.2 MMOL/L (ref 3.5–5.1)
RBC # BLD: 4.39 M/CU MM (ref 4.6–6.2)
SEGMENTED NEUTROPHILS ABSOLUTE COUNT: 8.4 K/CU MM
SEGMENTED NEUTROPHILS RELATIVE PERCENT: 78.1 % (ref 36–66)
SODIUM BLD-SCNC: 138 MMOL/L (ref 135–145)
TOTAL IMMATURE NEUTOROPHIL: 0.22 K/CU MM
TOTAL NUCLEATED RBC: 0 K/CU MM
TOTAL PROTEIN: 5.8 GM/DL (ref 6.4–8.2)
TOTAL PROTEIN: 5.8 GM/DL (ref 6.4–8.2)
WBC # BLD: 10.7 K/CU MM (ref 4–10.5)

## 2019-09-08 PROCEDURE — 6360000002 HC RX W HCPCS: Performed by: INTERNAL MEDICINE

## 2019-09-08 PROCEDURE — 6370000000 HC RX 637 (ALT 250 FOR IP): Performed by: NURSE PRACTITIONER

## 2019-09-08 PROCEDURE — 94640 AIRWAY INHALATION TREATMENT: CPT

## 2019-09-08 PROCEDURE — 6370000000 HC RX 637 (ALT 250 FOR IP): Performed by: INTERNAL MEDICINE

## 2019-09-08 PROCEDURE — 82962 GLUCOSE BLOOD TEST: CPT

## 2019-09-08 PROCEDURE — 2580000003 HC RX 258: Performed by: INTERNAL MEDICINE

## 2019-09-08 PROCEDURE — 36415 COLL VENOUS BLD VENIPUNCTURE: CPT

## 2019-09-08 PROCEDURE — 1200000000 HC SEMI PRIVATE

## 2019-09-08 PROCEDURE — 82330 ASSAY OF CALCIUM: CPT

## 2019-09-08 PROCEDURE — 6360000002 HC RX W HCPCS: Performed by: NURSE PRACTITIONER

## 2019-09-08 PROCEDURE — 80053 COMPREHEN METABOLIC PANEL: CPT

## 2019-09-08 PROCEDURE — 85025 COMPLETE CBC W/AUTO DIFF WBC: CPT

## 2019-09-08 PROCEDURE — 6360000002 HC RX W HCPCS: Performed by: SPECIALIST

## 2019-09-08 PROCEDURE — 94761 N-INVAS EAR/PLS OXIMETRY MLT: CPT

## 2019-09-08 PROCEDURE — 2700000000 HC OXYGEN THERAPY PER DAY

## 2019-09-08 PROCEDURE — C9113 INJ PANTOPRAZOLE SODIUM, VIA: HCPCS | Performed by: INTERNAL MEDICINE

## 2019-09-08 PROCEDURE — 2580000003 HC RX 258: Performed by: SPECIALIST

## 2019-09-08 PROCEDURE — 82248 BILIRUBIN DIRECT: CPT

## 2019-09-08 RX ADMIN — SODIUM CHLORIDE, POTASSIUM CHLORIDE, SODIUM LACTATE AND CALCIUM CHLORIDE: 600; 310; 30; 20 INJECTION, SOLUTION INTRAVENOUS at 00:17

## 2019-09-08 RX ADMIN — DILTIAZEM HYDROCHLORIDE 60 MG: 60 TABLET, FILM COATED ORAL at 08:48

## 2019-09-08 RX ADMIN — MEROPENEM 1 G: 1 INJECTION, POWDER, FOR SOLUTION INTRAVENOUS at 00:14

## 2019-09-08 RX ADMIN — ENOXAPARIN SODIUM 30 MG: 30 INJECTION SUBCUTANEOUS at 08:44

## 2019-09-08 RX ADMIN — IPRATROPIUM BROMIDE AND ALBUTEROL SULFATE 1 AMPULE: .5; 3 SOLUTION RESPIRATORY (INHALATION) at 15:54

## 2019-09-08 RX ADMIN — ATORVASTATIN CALCIUM 40 MG: 40 TABLET, FILM COATED ORAL at 08:48

## 2019-09-08 RX ADMIN — CHLORHEXIDINE GLUCONATE: 4 LIQUID TOPICAL at 08:48

## 2019-09-08 RX ADMIN — IPRATROPIUM BROMIDE AND ALBUTEROL SULFATE 1 AMPULE: .5; 3 SOLUTION RESPIRATORY (INHALATION) at 07:41

## 2019-09-08 RX ADMIN — LATANOPROST 1 DROP: 50 SOLUTION/ DROPS OPHTHALMIC at 00:14

## 2019-09-08 RX ADMIN — ACETYLCYSTEINE 400 MG: 200 SOLUTION ORAL; RESPIRATORY (INHALATION) at 20:21

## 2019-09-08 RX ADMIN — ASPIRIN 325 MG ORAL TABLET 325 MG: 325 PILL ORAL at 08:48

## 2019-09-08 RX ADMIN — VANCOMYCIN HYDROCHLORIDE 1500 MG: 5 INJECTION, POWDER, LYOPHILIZED, FOR SOLUTION INTRAVENOUS at 15:20

## 2019-09-08 RX ADMIN — IPRATROPIUM BROMIDE AND ALBUTEROL SULFATE 1 AMPULE: .5; 3 SOLUTION RESPIRATORY (INHALATION) at 12:17

## 2019-09-08 RX ADMIN — CARVEDILOL 6.25 MG: 6.25 TABLET, FILM COATED ORAL at 08:48

## 2019-09-08 RX ADMIN — Medication 10 ML: at 08:44

## 2019-09-08 RX ADMIN — PANTOPRAZOLE SODIUM 40 MG: 40 INJECTION, POWDER, FOR SOLUTION INTRAVENOUS at 21:18

## 2019-09-08 RX ADMIN — Medication: at 21:18

## 2019-09-08 RX ADMIN — CARVEDILOL 6.25 MG: 6.25 TABLET, FILM COATED ORAL at 16:35

## 2019-09-08 RX ADMIN — QUETIAPINE FUMARATE 50 MG: 25 TABLET ORAL at 08:48

## 2019-09-08 RX ADMIN — ACETYLCYSTEINE 400 MG: 200 SOLUTION ORAL; RESPIRATORY (INHALATION) at 07:42

## 2019-09-08 RX ADMIN — LATANOPROST 1 DROP: 50 SOLUTION/ DROPS OPHTHALMIC at 21:30

## 2019-09-08 RX ADMIN — Medication: at 08:44

## 2019-09-08 RX ADMIN — SODIUM CHLORIDE, POTASSIUM CHLORIDE, SODIUM LACTATE AND CALCIUM CHLORIDE: 600; 310; 30; 20 INJECTION, SOLUTION INTRAVENOUS at 21:18

## 2019-09-08 RX ADMIN — IPRATROPIUM BROMIDE AND ALBUTEROL SULFATE 1 AMPULE: .5; 3 SOLUTION RESPIRATORY (INHALATION) at 20:22

## 2019-09-08 RX ADMIN — OXYCODONE HYDROCHLORIDE 5 MG: 5 TABLET ORAL at 21:17

## 2019-09-08 RX ADMIN — DILTIAZEM HYDROCHLORIDE 60 MG: 60 TABLET, FILM COATED ORAL at 21:18

## 2019-09-08 RX ADMIN — MEROPENEM 1 G: 1 INJECTION, POWDER, FOR SOLUTION INTRAVENOUS at 17:27

## 2019-09-08 RX ADMIN — MEROPENEM 1 G: 1 INJECTION, POWDER, FOR SOLUTION INTRAVENOUS at 08:44

## 2019-09-08 RX ADMIN — QUETIAPINE FUMARATE 50 MG: 25 TABLET ORAL at 21:18

## 2019-09-08 RX ADMIN — Medication 10 ML: at 21:18

## 2019-09-08 RX ADMIN — PANTOPRAZOLE SODIUM 40 MG: 40 INJECTION, POWDER, FOR SOLUTION INTRAVENOUS at 08:44

## 2019-09-08 ASSESSMENT — PAIN SCALES - GENERAL
PAINLEVEL_OUTOF10: 0
PAINLEVEL_OUTOF10: 7

## 2019-09-08 NOTE — PLAN OF CARE
Ongoing
Problem: Falls - Risk of:  Goal: Will remain free from falls  Description  Will remain free from falls  9/8/2019 1129 by Karin Ann RN  Outcome: Ongoing  9/8/2019 0302 by Keya Man LPN  Outcome: Ongoing  Goal: Absence of physical injury  Description  Absence of physical injury  9/8/2019 1129 by Karin Ann RN  Outcome: Ongoing  9/8/2019 0302 by Keya Man LPN  Outcome: Ongoing     Problem: Risk for Impaired Skin Integrity  Goal: Tissue integrity - skin and mucous membranes  Description  Structural intactness and normal physiological function of skin and  mucous membranes.   9/8/2019 1129 by Karin Ann RN  Outcome: Ongoing  9/8/2019 0302 by Keya Man LPN  Outcome: Ongoing     Problem: Pain:  Goal: Pain level will decrease  Description  Pain level will decrease  9/8/2019 1129 by Karin Ann RN  Outcome: Ongoing  9/8/2019 0302 by Keya Man LPN  Outcome: Ongoing  Goal: Control of acute pain  Description  Control of acute pain  9/8/2019 1129 by Karin Ann RN  Outcome: Ongoing  9/8/2019 0302 by Keya Man LPN  Outcome: Ongoing  Goal: Control of chronic pain  Description  Control of chronic pain  9/8/2019 1129 by Karin Ann RN  Outcome: Ongoing  9/8/2019 0302 by Keya Man LPN  Outcome: Ongoing
therapy  9/7/2019 0017 by Paul Park RN  Outcome: Ongoing  9/6/2019 1450 by Kishore Chua RD, LD  Outcome: Ongoing

## 2019-09-08 NOTE — PROGRESS NOTES
HISTORY: ORDERING SYSTEM PROVIDED HISTORY: pain or SOB TECHNOLOGIST PROVIDED HISTORY: Reason for exam:->pain or SOB Reason for Exam: chest pain Acuity: Acute Type of Exam: Initial FINDINGS: No consolidation, effusion or pneumothorax. Stable cardiomediastinal silhouette. No acute process. Mri Abdomen Wo Contrast Mrcp    Result Date: 9/2/2019  EXAMINATION: MRI OF THE ABDOMEN WITHOUT CONTRAST AND MRCP 9/1/2019 8:07 pm TECHNIQUE: Multiplanar multisequence MRI of the abdomen was performed without the administration of intravenous contrast.  After initial T2 axial and coronal images, thick slab, thin slab and 3D coronal MRCP sequences were obtained without the administration of intravenous contrast.  MIP images are provided for review. COMPARISON: MRI abdomen 01/23/2019 CT abdomen pelvis 09/01/2019 HISTORY: ORDERING SYSTEM PROVIDED HISTORY: Epigastric pain TECHNOLOGIST PROVIDED HISTORY: Reason for Exam: severe abd pain and emisis  JG FINDINGS: MRCP images are suboptimal due to respiratory motion artifact. Known pneumobilia seen on recent CT is suboptimally visualized by MRI. Liver otherwise appears homogeneous without evidence of discrete lesion or fatty infiltration. Spleen and adrenal glands appear unremarkable. Stable small renal cysts and nonspecific perinephric edema. Stable mild pancreatic atrophy. Stable scattered small pancreatic cystic lesions adjacent to the pancreatic duct largest largest again in the superior pancreatic body. Gallbladder: Cholecystectomy. Bile Ducts: Stable central intrahepatic and extrahepatic bile duct dilatation measuring up to approximately 1.7 cm. No rounded filling defects identified. Few areas of heterogeneous linear hypointensity within the extrahepatic bile ducts. Pancreatic Duct: Stable diffuse dilatation measuring up to approximately 7 mm. Other:  Normal caliber abdominal aorta. No gross adenopathy. Mild size hiatal hernia. Bowel loops are normal caliber.   Lung

## 2019-09-09 LAB
ALBUMIN SERPL-MCNC: 3.3 GM/DL (ref 3.4–5)
ALBUMIN SERPL-MCNC: 3.3 GM/DL (ref 3.4–5)
ALP BLD-CCNC: 60 IU/L (ref 40–128)
ALP BLD-CCNC: 60 IU/L (ref 40–129)
ALT SERPL-CCNC: 19 U/L (ref 10–40)
ALT SERPL-CCNC: 19 U/L (ref 10–40)
ANION GAP SERPL CALCULATED.3IONS-SCNC: 10 MMOL/L (ref 4–16)
AST SERPL-CCNC: 23 IU/L (ref 15–37)
AST SERPL-CCNC: 23 IU/L (ref 15–37)
BASOPHILS ABSOLUTE: 0 K/CU MM
BASOPHILS RELATIVE PERCENT: 0.3 % (ref 0–1)
BILIRUB SERPL-MCNC: 0.3 MG/DL (ref 0–1)
BILIRUB SERPL-MCNC: 0.3 MG/DL (ref 0–1)
BILIRUBIN DIRECT: 0.2 MG/DL (ref 0–0.3)
BILIRUBIN, INDIRECT: 0.1 MG/DL (ref 0–0.7)
BUN BLDV-MCNC: 22 MG/DL (ref 6–23)
CALCIUM IONIZED: 4.92 MG/DL (ref 4.48–5.28)
CALCIUM SERPL-MCNC: 9.2 MG/DL (ref 8.3–10.6)
CHLORIDE BLD-SCNC: 104 MMOL/L (ref 99–110)
CO2: 28 MMOL/L (ref 21–32)
CREAT SERPL-MCNC: 1.1 MG/DL (ref 0.9–1.3)
DIFFERENTIAL TYPE: ABNORMAL
EOSINOPHILS ABSOLUTE: 0.2 K/CU MM
EOSINOPHILS RELATIVE PERCENT: 1.8 % (ref 0–3)
GFR AFRICAN AMERICAN: >60 ML/MIN/1.73M2
GFR NON-AFRICAN AMERICAN: >60 ML/MIN/1.73M2
GLUCOSE BLD-MCNC: 140 MG/DL (ref 70–99)
GLUCOSE BLD-MCNC: 165 MG/DL (ref 70–99)
GLUCOSE BLD-MCNC: 200 MG/DL (ref 70–99)
GLUCOSE BLD-MCNC: 226 MG/DL (ref 70–99)
GLUCOSE BLD-MCNC: 245 MG/DL (ref 70–99)
HCT VFR BLD CALC: 38.6 % (ref 42–52)
HEMOGLOBIN: 12.2 GM/DL (ref 13.5–18)
IMMATURE NEUTROPHIL %: 1.8 % (ref 0–0.43)
IONIZED CA: 1.23 MMOL/L (ref 1.12–1.32)
LYMPHOCYTES ABSOLUTE: 1.7 K/CU MM
LYMPHOCYTES RELATIVE PERCENT: 18.4 % (ref 24–44)
MCH RBC QN AUTO: 28.5 PG (ref 27–31)
MCHC RBC AUTO-ENTMCNC: 31.6 % (ref 32–36)
MCV RBC AUTO: 90.2 FL (ref 78–100)
MONOCYTES ABSOLUTE: 1 K/CU MM
MONOCYTES RELATIVE PERCENT: 11 % (ref 0–4)
NUCLEATED RBC %: 0 %
PDW BLD-RTO: 12.8 % (ref 11.7–14.9)
PLATELET # BLD: 215 K/CU MM (ref 140–440)
PMV BLD AUTO: 9.9 FL (ref 7.5–11.1)
POTASSIUM SERPL-SCNC: 4.2 MMOL/L (ref 3.5–5.1)
RBC # BLD: 4.28 M/CU MM (ref 4.6–6.2)
SEGMENTED NEUTROPHILS ABSOLUTE COUNT: 6.2 K/CU MM
SEGMENTED NEUTROPHILS RELATIVE PERCENT: 66.7 % (ref 36–66)
SODIUM BLD-SCNC: 142 MMOL/L (ref 135–145)
TOTAL IMMATURE NEUTOROPHIL: 0.17 K/CU MM
TOTAL NUCLEATED RBC: 0 K/CU MM
TOTAL PROTEIN: 5.5 GM/DL (ref 6.4–8.2)
TOTAL PROTEIN: 5.5 GM/DL (ref 6.4–8.2)
WBC # BLD: 9.3 K/CU MM (ref 4–10.5)

## 2019-09-09 PROCEDURE — 6360000002 HC RX W HCPCS: Performed by: NURSE PRACTITIONER

## 2019-09-09 PROCEDURE — 2580000003 HC RX 258: Performed by: INTERNAL MEDICINE

## 2019-09-09 PROCEDURE — C9113 INJ PANTOPRAZOLE SODIUM, VIA: HCPCS | Performed by: INTERNAL MEDICINE

## 2019-09-09 PROCEDURE — 6370000000 HC RX 637 (ALT 250 FOR IP): Performed by: INTERNAL MEDICINE

## 2019-09-09 PROCEDURE — 97112 NEUROMUSCULAR REEDUCATION: CPT

## 2019-09-09 PROCEDURE — 85025 COMPLETE CBC W/AUTO DIFF WBC: CPT

## 2019-09-09 PROCEDURE — 94640 AIRWAY INHALATION TREATMENT: CPT

## 2019-09-09 PROCEDURE — 99232 SBSQ HOSP IP/OBS MODERATE 35: CPT | Performed by: NURSE PRACTITIONER

## 2019-09-09 PROCEDURE — 6370000000 HC RX 637 (ALT 250 FOR IP): Performed by: NURSE PRACTITIONER

## 2019-09-09 PROCEDURE — 82248 BILIRUBIN DIRECT: CPT

## 2019-09-09 PROCEDURE — 82330 ASSAY OF CALCIUM: CPT

## 2019-09-09 PROCEDURE — 1200000000 HC SEMI PRIVATE

## 2019-09-09 PROCEDURE — 97530 THERAPEUTIC ACTIVITIES: CPT

## 2019-09-09 PROCEDURE — 80053 COMPREHEN METABOLIC PANEL: CPT

## 2019-09-09 PROCEDURE — 82962 GLUCOSE BLOOD TEST: CPT

## 2019-09-09 PROCEDURE — 94761 N-INVAS EAR/PLS OXIMETRY MLT: CPT

## 2019-09-09 PROCEDURE — 97163 PT EVAL HIGH COMPLEX 45 MIN: CPT

## 2019-09-09 PROCEDURE — 6360000002 HC RX W HCPCS: Performed by: INTERNAL MEDICINE

## 2019-09-09 PROCEDURE — 36415 COLL VENOUS BLD VENIPUNCTURE: CPT

## 2019-09-09 PROCEDURE — 97110 THERAPEUTIC EXERCISES: CPT

## 2019-09-09 PROCEDURE — 6360000002 HC RX W HCPCS: Performed by: SPECIALIST

## 2019-09-09 PROCEDURE — 80048 BASIC METABOLIC PNL TOTAL CA: CPT

## 2019-09-09 PROCEDURE — 94150 VITAL CAPACITY TEST: CPT

## 2019-09-09 PROCEDURE — 92526 ORAL FUNCTION THERAPY: CPT | Performed by: SPEECH-LANGUAGE PATHOLOGIST

## 2019-09-09 PROCEDURE — 2580000003 HC RX 258: Performed by: SPECIALIST

## 2019-09-09 RX ADMIN — ATORVASTATIN CALCIUM 40 MG: 40 TABLET, FILM COATED ORAL at 11:25

## 2019-09-09 RX ADMIN — CARVEDILOL 6.25 MG: 6.25 TABLET, FILM COATED ORAL at 11:25

## 2019-09-09 RX ADMIN — SODIUM CHLORIDE, POTASSIUM CHLORIDE, SODIUM LACTATE AND CALCIUM CHLORIDE: 600; 310; 30; 20 INJECTION, SOLUTION INTRAVENOUS at 12:13

## 2019-09-09 RX ADMIN — ACETYLCYSTEINE 400 MG: 200 SOLUTION ORAL; RESPIRATORY (INHALATION) at 20:47

## 2019-09-09 RX ADMIN — DILTIAZEM HYDROCHLORIDE 60 MG: 60 TABLET, FILM COATED ORAL at 23:30

## 2019-09-09 RX ADMIN — PANTOPRAZOLE SODIUM 40 MG: 40 INJECTION, POWDER, FOR SOLUTION INTRAVENOUS at 11:25

## 2019-09-09 RX ADMIN — QUETIAPINE FUMARATE 50 MG: 25 TABLET ORAL at 23:30

## 2019-09-09 RX ADMIN — IPRATROPIUM BROMIDE AND ALBUTEROL SULFATE 1 AMPULE: .5; 3 SOLUTION RESPIRATORY (INHALATION) at 20:47

## 2019-09-09 RX ADMIN — VANCOMYCIN HYDROCHLORIDE 1500 MG: 5 INJECTION, POWDER, LYOPHILIZED, FOR SOLUTION INTRAVENOUS at 13:18

## 2019-09-09 RX ADMIN — ENOXAPARIN SODIUM 30 MG: 30 INJECTION SUBCUTANEOUS at 11:25

## 2019-09-09 RX ADMIN — IPRATROPIUM BROMIDE AND ALBUTEROL SULFATE 1 AMPULE: .5; 3 SOLUTION RESPIRATORY (INHALATION) at 11:54

## 2019-09-09 RX ADMIN — CARVEDILOL 6.25 MG: 6.25 TABLET, FILM COATED ORAL at 16:44

## 2019-09-09 RX ADMIN — MEROPENEM 1 G: 1 INJECTION, POWDER, FOR SOLUTION INTRAVENOUS at 00:36

## 2019-09-09 RX ADMIN — MEROPENEM 1 G: 1 INJECTION, POWDER, FOR SOLUTION INTRAVENOUS at 11:26

## 2019-09-09 RX ADMIN — QUETIAPINE FUMARATE 50 MG: 25 TABLET ORAL at 11:25

## 2019-09-09 RX ADMIN — LATANOPROST 1 DROP: 50 SOLUTION/ DROPS OPHTHALMIC at 23:30

## 2019-09-09 RX ADMIN — ACETYLCYSTEINE 400 MG: 200 SOLUTION ORAL; RESPIRATORY (INHALATION) at 11:55

## 2019-09-09 RX ADMIN — ASPIRIN 325 MG ORAL TABLET 325 MG: 325 PILL ORAL at 11:25

## 2019-09-09 RX ADMIN — IPRATROPIUM BROMIDE AND ALBUTEROL SULFATE 1 AMPULE: .5; 3 SOLUTION RESPIRATORY (INHALATION) at 15:43

## 2019-09-09 RX ADMIN — DILTIAZEM HYDROCHLORIDE 60 MG: 60 TABLET, FILM COATED ORAL at 11:24

## 2019-09-09 RX ADMIN — CHLORHEXIDINE GLUCONATE: 4 LIQUID TOPICAL at 11:32

## 2019-09-09 ASSESSMENT — PAIN SCALES - GENERAL: PAINLEVEL_OUTOF10: 0

## 2019-09-09 NOTE — PROGRESS NOTES
or edema, bilateral BKA, right BKA stump callus, surrounding inflammation tender  Catheter Site: without erythema or tenderness  Neuro: Disoriented x 4. CN 2-12 intact and no focal sensory or motor deficits      Radiologic / Imaging / TESTING  Reviewed.       Labs:    Recent Results (from the past 24 hour(s))   POCT Glucose    Collection Time: 09/08/19  5:52 PM   Result Value Ref Range    POC Glucose 287 (H) 70 - 99 MG/DL   POCT Glucose    Collection Time: 09/08/19  9:28 PM   Result Value Ref Range    POC Glucose 154 (H) 70 - 99 MG/DL   Calcium, Ionized    Collection Time: 09/09/19  6:55 AM   Result Value Ref Range    Ionized Ca 1.23 1.12 - 1.32 mMOL/L    Calcium, Ion 4.92 4.48 - 5.28 MG/DL   Hepatic Function Panel    Collection Time: 09/09/19  6:55 AM   Result Value Ref Range    Alb 3.3 (L) 3.4 - 5.0 GM/DL    Total Bilirubin 0.3 0.0 - 1.0 MG/DL    Bilirubin, Direct 0.2 0.0 - 0.3 MG/DL    Bilirubin, Indirect 0.1 0 - 0.7 MG/DL    Alkaline Phosphatase 60 40 - 129 IU/L    AST 23 15 - 37 IU/L    ALT 19 10 - 40 U/L    Total Protein 5.5 (L) 6.4 - 8.2 GM/DL   CBC Auto Differential    Collection Time: 09/09/19  6:55 AM   Result Value Ref Range    WBC 9.3 4.0 - 10.5 K/CU MM    RBC 4.28 (L) 4.6 - 6.2 M/CU MM    Hemoglobin 12.2 (L) 13.5 - 18.0 GM/DL    Hematocrit 38.6 (L) 42 - 52 %    MCV 90.2 78 - 100 FL    MCH 28.5 27 - 31 PG    MCHC 31.6 (L) 32.0 - 36.0 %    RDW 12.8 11.7 - 14.9 %    Platelets 552 587 - 002 K/CU MM    MPV 9.9 7.5 - 11.1 FL    Differential Type AUTOMATED DIFFERENTIAL     Segs Relative 66.7 (H) 36 - 66 %    Lymphocytes % 18.4 (L) 24 - 44 %    Monocytes % 11.0 (H) 0 - 4 %    Eosinophils % 1.8 0 - 3 %    Basophils % 0.3 0 - 1 %    Segs Absolute 6.2 K/CU MM    Lymphocytes Absolute 1.7 K/CU MM    Monocytes Absolute 1.0 K/CU MM    Eosinophils Absolute 0.2 K/CU MM    Basophils Absolute 0.0 K/CU MM    Nucleated RBC % 0.0 %    Total Nucleated RBC 0.0 K/CU MM    Total Immature Neutrophil 0.17 K/CU MM    Immature Neutrophil % 1.8 (H) 0 - 0.43 %   Comprehensive Metabolic Panel    Collection Time: 09/09/19  6:55 AM   Result Value Ref Range    Sodium 142 135 - 145 MMOL/L    Potassium 4.2 3.5 - 5.1 MMOL/L    Chloride 104 99 - 110 mMol/L    CO2 28 21 - 32 MMOL/L    BUN 22 6 - 23 MG/DL    CREATININE 1.1 0.9 - 1.3 MG/DL    Glucose 165 (H) 70 - 99 MG/DL    Calcium 9.2 8.3 - 10.6 MG/DL    Alb 3.3 (L) 3.4 - 5.0 GM/DL    Total Protein 5.5 (L) 6.4 - 8.2 GM/DL    Total Bilirubin 0.3 0.0 - 1.0 MG/DL    ALT 19 10 - 40 U/L    AST 23 15 - 37 IU/L    Alkaline Phosphatase 60 40 - 128 IU/L    GFR Non-African American >60 >60 mL/min/1.73m2    GFR African American >60 >60 mL/min/1.73m2    Anion Gap 10 4 - 16   POCT Glucose    Collection Time: 09/09/19  9:03 AM   Result Value Ref Range    POC Glucose 140 (H) 70 - 99 MG/DL   POCT Glucose    Collection Time: 09/09/19  1:06 PM   Result Value Ref Range    POC Glucose 245 (H) 70 - 99 MG/DL     CULTURE results: Invalid input(s): BLOOD CULTURE,  URINE CULTURE, SURGICAL CULTURE    Diagnosis:  Patient Active Problem List   Diagnosis    Diabetes mellitus ([facility]    Hypertension    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[facility]    Choledocholithiasis    COPD (chronic obstructive pulmonary disease) (HCC)    Chest pain    Abdominal pain, epigastric    Elevated LFTs    Abnormal findings on imaging of biliary tract    Chest pain    Coronary artery disease involving coronary bypass graft of native heart without angina pectoris    Acute encephalopathy    Vomiting    Sepsis (HCC)    Nausea vomiting and diarrhea    Elevated troponin    Fever       Active Problems  Active Problems:    Sepsis (HCC)    Nausea vomiting and diarrhea    Elevated troponin    Fever  Resolved Problems:    * No resolved hospital problems. *    Electronically signed by: Electronically signed by MOLLY Pitts CNP on 9/9/2019 at 2:04 PM

## 2019-09-09 NOTE — PROGRESS NOTES
verbal and tactile cues for BUE and BLE placement throughout rolling)  Supine to Sit: Moderate assistance(with verbal and tactile cues for BUE and BLE placement throughout transfer; with HOB elevated; with increased time for task completion)  Sit to Supine: Minimal assistance           Balance  Posture: Poor(forward head, rounded shoulders, increased thoracic kyphosis)  Sitting - Static: Fair;+  Sitting - Dynamic: Fair  Comments: patient required assist varying from CGAx1 to minAx1 throughout sitting balance activity; patient given verbal and tactile cues to maintain upright posture in order to avoid COM shifting outside of SHAD and in order to avoid retropulsion  Exercises  Comments: patient completed 2 sets of 10 reps of BLE seated marching in available and allowed ROM; patient completed 2 sets of 10 reps of BUE shoulder flexion and elbow flexion in available and allowed ROM     Therapeutic Activity Training:   Therapeutic activity training was instructed today. Cues were given for safety, sequence, UE/LE placement, awareness, and balance. Activities performed today included bed mobility training, sup-sit. Neuro-Muscular re-education:  Cues were given for position, posture, kinesthetic sense, safety, recruitment, and rationale. Cues were verbal and/or tactile. Therapeutic Exercise:  Cues were given for technique, safety, recruitment, and rationale. Cues were verbal and/or tactile. Plan   Plan  Times per week: 3+  Current Treatment Recommendations: Strengthening, ROM, Balance Training, Functional Mobility Training, Transfer Training, ADL/Self-care Training, Endurance Training, IADL Training, Gait Training, Neuromuscular Re-education, Home Exercise Program, Safety Education & Training, Patient/Caregiver Education & Training, Pain Management, Equipment Evaluation, Education, & procurement, Positioning, Cognitive/Perceptual Training, Wheelchair Mobility Training  Safety Devices  Type of devices:  All

## 2019-09-10 VITALS
OXYGEN SATURATION: 96 % | SYSTOLIC BLOOD PRESSURE: 104 MMHG | HEIGHT: 65 IN | BODY MASS INDEX: 23.74 KG/M2 | HEART RATE: 70 BPM | DIASTOLIC BLOOD PRESSURE: 59 MMHG | TEMPERATURE: 97.2 F | RESPIRATION RATE: 17 BRPM | WEIGHT: 142.5 LBS

## 2019-09-10 LAB
ALBUMIN SERPL-MCNC: 3.2 GM/DL (ref 3.4–5)
ALP BLD-CCNC: 63 IU/L (ref 40–129)
ALT SERPL-CCNC: 18 U/L (ref 10–40)
ANION GAP SERPL CALCULATED.3IONS-SCNC: 11 MMOL/L (ref 4–16)
AST SERPL-CCNC: 21 IU/L (ref 15–37)
BASOPHILS ABSOLUTE: 0.1 K/CU MM
BASOPHILS RELATIVE PERCENT: 0.6 % (ref 0–1)
BILIRUB SERPL-MCNC: 0.3 MG/DL (ref 0–1)
BILIRUBIN DIRECT: 0.2 MG/DL (ref 0–0.3)
BILIRUBIN, INDIRECT: 0.1 MG/DL (ref 0–0.7)
BUN BLDV-MCNC: 24 MG/DL (ref 6–23)
CALCIUM IONIZED: 4.72 MG/DL (ref 4.48–5.28)
CALCIUM SERPL-MCNC: 9.4 MG/DL (ref 8.3–10.6)
CHLORIDE BLD-SCNC: 102 MMOL/L (ref 99–110)
CO2: 29 MMOL/L (ref 21–32)
CREAT SERPL-MCNC: 1.3 MG/DL (ref 0.9–1.3)
DIFFERENTIAL TYPE: ABNORMAL
EOSINOPHILS ABSOLUTE: 0.9 K/CU MM
EOSINOPHILS RELATIVE PERCENT: 10 % (ref 0–3)
GFR AFRICAN AMERICAN: >60 ML/MIN/1.73M2
GFR NON-AFRICAN AMERICAN: 53 ML/MIN/1.73M2
GLUCOSE BLD-MCNC: 140 MG/DL (ref 70–99)
GLUCOSE BLD-MCNC: 206 MG/DL (ref 70–99)
GLUCOSE BLD-MCNC: 228 MG/DL (ref 70–99)
HCT VFR BLD CALC: 40.6 % (ref 42–52)
HEMOGLOBIN: 12.8 GM/DL (ref 13.5–18)
IMMATURE NEUTROPHIL %: 2.4 % (ref 0–0.43)
IONIZED CA: 1.18 MMOL/L (ref 1.12–1.32)
LYMPHOCYTES ABSOLUTE: 2.2 K/CU MM
LYMPHOCYTES RELATIVE PERCENT: 24.7 % (ref 24–44)
MCH RBC QN AUTO: 28.9 PG (ref 27–31)
MCHC RBC AUTO-ENTMCNC: 31.5 % (ref 32–36)
MCV RBC AUTO: 91.6 FL (ref 78–100)
MONOCYTES ABSOLUTE: 1.1 K/CU MM
MONOCYTES RELATIVE PERCENT: 12 % (ref 0–4)
NUCLEATED RBC %: 0 %
PDW BLD-RTO: 13 % (ref 11.7–14.9)
PLATELET # BLD: 218 K/CU MM (ref 140–440)
PMV BLD AUTO: 9.9 FL (ref 7.5–11.1)
POTASSIUM SERPL-SCNC: 4.2 MMOL/L (ref 3.5–5.1)
RBC # BLD: 4.43 M/CU MM (ref 4.6–6.2)
SEGMENTED NEUTROPHILS ABSOLUTE COUNT: 4.5 K/CU MM
SEGMENTED NEUTROPHILS RELATIVE PERCENT: 50.3 % (ref 36–66)
SODIUM BLD-SCNC: 142 MMOL/L (ref 135–145)
TOTAL IMMATURE NEUTOROPHIL: 0.22 K/CU MM
TOTAL NUCLEATED RBC: 0 K/CU MM
TOTAL PROTEIN: 5.4 GM/DL (ref 6.4–8.2)
WBC # BLD: 9 K/CU MM (ref 4–10.5)

## 2019-09-10 PROCEDURE — 6360000002 HC RX W HCPCS: Performed by: INTERNAL MEDICINE

## 2019-09-10 PROCEDURE — 82330 ASSAY OF CALCIUM: CPT

## 2019-09-10 PROCEDURE — 80053 COMPREHEN METABOLIC PANEL: CPT

## 2019-09-10 PROCEDURE — 36415 COLL VENOUS BLD VENIPUNCTURE: CPT

## 2019-09-10 PROCEDURE — 82962 GLUCOSE BLOOD TEST: CPT

## 2019-09-10 PROCEDURE — 6370000000 HC RX 637 (ALT 250 FOR IP): Performed by: INTERNAL MEDICINE

## 2019-09-10 PROCEDURE — 94640 AIRWAY INHALATION TREATMENT: CPT

## 2019-09-10 PROCEDURE — 99232 SBSQ HOSP IP/OBS MODERATE 35: CPT | Performed by: NURSE PRACTITIONER

## 2019-09-10 PROCEDURE — 80202 ASSAY OF VANCOMYCIN: CPT

## 2019-09-10 PROCEDURE — 97542 WHEELCHAIR MNGMENT TRAINING: CPT

## 2019-09-10 PROCEDURE — 97535 SELF CARE MNGMENT TRAINING: CPT

## 2019-09-10 PROCEDURE — 82248 BILIRUBIN DIRECT: CPT

## 2019-09-10 PROCEDURE — 85025 COMPLETE CBC W/AUTO DIFF WBC: CPT

## 2019-09-10 PROCEDURE — 97166 OT EVAL MOD COMPLEX 45 MIN: CPT

## 2019-09-10 PROCEDURE — 6360000002 HC RX W HCPCS: Performed by: SPECIALIST

## 2019-09-10 PROCEDURE — C9113 INJ PANTOPRAZOLE SODIUM, VIA: HCPCS | Performed by: INTERNAL MEDICINE

## 2019-09-10 PROCEDURE — 2580000003 HC RX 258: Performed by: INTERNAL MEDICINE

## 2019-09-10 PROCEDURE — 97530 THERAPEUTIC ACTIVITIES: CPT

## 2019-09-10 PROCEDURE — 6370000000 HC RX 637 (ALT 250 FOR IP): Performed by: NURSE PRACTITIONER

## 2019-09-10 RX ORDER — PANTOPRAZOLE SODIUM 40 MG/1
40 TABLET, DELAYED RELEASE ORAL
Qty: 90 TABLET | Refills: 1 | Status: ON HOLD | OUTPATIENT
Start: 2019-09-10 | End: 2020-07-05

## 2019-09-10 RX ORDER — DOXYCYCLINE HYCLATE 100 MG
100 TABLET ORAL EVERY 12 HOURS SCHEDULED
Qty: 8 TABLET | Refills: 0 | Status: SHIPPED | OUTPATIENT
Start: 2019-09-10 | End: 2019-09-14

## 2019-09-10 RX ORDER — QUETIAPINE FUMARATE 50 MG/1
50 TABLET, FILM COATED ORAL 2 TIMES DAILY
Qty: 60 TABLET | Refills: 0 | Status: ON HOLD | OUTPATIENT
Start: 2019-09-10 | End: 2020-04-09 | Stop reason: HOSPADM

## 2019-09-10 RX ORDER — HEPARIN SODIUM (PORCINE) LOCK FLUSH IV SOLN 100 UNIT/ML 100 UNIT/ML
100 SOLUTION INTRAVENOUS PRN
Status: DISCONTINUED | OUTPATIENT
Start: 2019-09-10 | End: 2019-09-10

## 2019-09-10 RX ORDER — DOXYCYCLINE HYCLATE 100 MG
100 TABLET ORAL EVERY 12 HOURS SCHEDULED
Status: DISCONTINUED | OUTPATIENT
Start: 2019-09-10 | End: 2019-09-10 | Stop reason: HOSPADM

## 2019-09-10 RX ADMIN — PANTOPRAZOLE SODIUM 40 MG: 40 INJECTION, POWDER, FOR SOLUTION INTRAVENOUS at 09:17

## 2019-09-10 RX ADMIN — DILTIAZEM HYDROCHLORIDE 60 MG: 60 TABLET, FILM COATED ORAL at 09:16

## 2019-09-10 RX ADMIN — IPRATROPIUM BROMIDE AND ALBUTEROL SULFATE 1 AMPULE: .5; 3 SOLUTION RESPIRATORY (INHALATION) at 13:18

## 2019-09-10 RX ADMIN — ASPIRIN 325 MG ORAL TABLET 325 MG: 325 PILL ORAL at 09:16

## 2019-09-10 RX ADMIN — QUETIAPINE FUMARATE 50 MG: 25 TABLET ORAL at 09:16

## 2019-09-10 RX ADMIN — ATORVASTATIN CALCIUM 40 MG: 40 TABLET, FILM COATED ORAL at 09:16

## 2019-09-10 RX ADMIN — CHLORHEXIDINE GLUCONATE: 4 LIQUID TOPICAL at 09:18

## 2019-09-10 RX ADMIN — CARVEDILOL 6.25 MG: 6.25 TABLET, FILM COATED ORAL at 09:16

## 2019-09-10 RX ADMIN — Medication 10 ML: at 09:17

## 2019-09-10 RX ADMIN — ENOXAPARIN SODIUM 30 MG: 30 INJECTION SUBCUTANEOUS at 09:17

## 2019-09-10 ASSESSMENT — PAIN SCALES - GENERAL: PAINLEVEL_OUTOF10: 0

## 2019-09-10 NOTE — PROGRESS NOTES
Physical Therapy    Physical Therapy Treatment Note  Name: Bianca Velasco MRN: [id number] :   1940   Date:  9/10/2019   Admission Date: 2019 Room:  [address] [place], [place] [address]A   Restrictions/Precautions:  Restrictions/Precautions  Restrictions/Precautions: Fall Risk, General Precautions       Communication with other providers:  Per nurse ok to tx  Subjective:  Patient states:  Pleasant and Agreeable to tx  Pain:   Location, Type, Intensity (0/10 to 10/10):  No c/o pain during  tx session  Objective:    Observation:  Alert but confused. Pt had both prosthesis on. Treatment, including education/measures:  Sit<=>stand from chair max assist, from wc min assist.  amb with rw 4' x 2 with mod assist and cues. Pt very unsteady when first standing. wc mobility with min assist and cues for brakes. Pt uses UEs and is able to propel wc.50' x4 sba. Safety  Patient left safely in the chair, with call light/phone in reach with alarm applied. Gait belt was used for transfers and gait. Assessment / Impression:       Patient's tolerance of treatment:  good   Adverse Reaction: na  Significant change in status and impact:  na  Barriers to improvement:  Strength and safety  Plan for Next Session:    Cont. POC  Time in:  1440  Time out:  1520  Timed treatment minutes:  40  Total treatment time:  40    Previously filed items:  Social/Functional History  Lives With: Spouse  Type of Home: House  Home Layout: One level  Home Access: Stairs to enter with rails  Entrance Stairs - Number of Steps: 5  Bathroom Shower/Tub: Walk-in shower  Bathroom Toilet: Standard  Bathroom Equipment: Shower chair, Grab bars in shower  Home Equipment: Rolling walker, [employer]  ADL Assistance: Independent  Homemaking Assistance: Needs assistance  Ambulation Assistance: Independent(mod I with rolling walker and BLE prostheses or manual w/c)  Transfer Assistance: Independent  Active : No     Long term goals  Long term goal 1:  In one week, pt will

## 2019-09-10 NOTE — PROGRESS NOTES
Nutrition Assessment    Type and Reason for Visit: Reassess    Nutrition Recommendations:   · Continue on current low-fat, dysphagia diet  · Order nutrition supplement  · Monitor and restrict intake of outside food  · Please document all meal intakes in Flowsheet    Nutrition Assessment: Pt hx of intake at 1-25%. Pt currently eating well, reports >50% of breakfast today. Remains on low fat, dysphagia diet. Pt reports eating mcgriddle for breakfast, consider monitoring presence of outside food. Pt states no abdominal discomfort, feeling well; hoping to DC. Malnutrition Assessment:  · Malnutrition Status: Mild Malnutrition  · Context: Chronic illness  · Findings of the 6 clinical characteristics of malnutrition (Minimum of 2 out of 6 clinical characteristics is required to make the diagnosis of moderate or severe Protein Calorie Malnutrition based on AND/ASPEN Guidelines):  1. Energy Intake-Less than or equal to 75% of estimated energy requirement, Greater than or equal to 5 days    2. Weight Loss-5% loss or greater(7%), in 1 week(in 9 days)  3. Fat Loss-No significant subcutaneous fat loss, Orbital  4. Muscle Loss-Mild muscle mass loss, Temples (temporalis muscle), Clavicles (pectoralis and deltoids)  5. Fluid Accumulation-No significant fluid accumulation, Extremities  6.   Strength-Not measured    Nutrition Risk Level: High    Nutrient Needs:  · Estimated Daily Total Kcal: 8175-4089 based on MSJ  · Estimated Daily Protein (g): 62-74 based on 1-1.2 g/kg/IBW  · Estimated Daily Total Fluid (ml/day): 2862-5843 based on 1 mL/kcal    Nutrition Diagnosis:   · Problem: Inadequate oral intake, Increased nutrient needs  · Etiology: related to Acute injury/trauma, Alteration in GI function     Signs and symptoms:  as evidenced by Weight loss greater than or equal to 2% in 1 week, Mild muscle loss, Intake 50-75%    Objective Information:  · Wound Type: None  · Current Nutrition Therapies:  · Oral Diet Orders: Dysphagia Pureed (Dysphagia 1), Moderately Thick   · Oral Diet intake: 51-75%  · Oral Nutrition Supplement (ONS) Orders: None  · Anthropometric Measures:  · Ht: 5' 5\" (165.1 cm)   · Current Body Wt: 142 lb (64.4 kg)  · Admission Body Wt: 152 lb (68.9 kg)  · Usual Body Wt: 152 lb (68.9 kg)  · % Weight Change:  ,  7% in 9 days  · Ideal Body Wt: 136 lb (61.7 kg), % Ideal Body 104%  · BMI Classification: BMI 18.5 - 24.9 Normal Weight    Nutrition Interventions:   Continue current diet, Start ONS  Continued Inpatient Monitoring, Education Not Indicated, Coordination of Care    Nutrition Evaluation:   · Evaluation: Goals set   · Goals: pt will consume greater than 75% of his meals and supplements    · Monitoring: Meal Intake, Weight, Supplement Intake, Pertinent Labs      Electronically signed by Mir Stark on 9/10/19 at 2:54 PM    Contact Number: [phone]

## 2019-09-10 NOTE — PROGRESS NOTES
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 Leukocytosis continuing to trend downward,  is 9.3  ?  Afebrile  ? Procalcitonin on  was 0.197  ? Kidney and liver function remaining stable on ABX therapy  ? Right stump wound improving, no erythema, no drainage  ? MRI right femur  shows no osteomyelitis with trace knee joint effusion  · Probable Choledocholithiasis with Cholangitis:  ? ERCP obtained, dilated common bile duct with stone/sludge confirmed and removed  ? Improved with meropenem  ? Gastro signed off      Plan  · OK to DC from ID Standpoing  · DC vancomycin  · Start doxycycline 100 mg po bid x 5 days (end date 19)       Ongoing Antimicrobial Therapy  Doxycyclin -     Completed Antimicrobial Therapy  Meropenem 9/3-  Ceftriaxone 9/3  Metronidazole -9/3  Ciprofloxacin   ? History:? Interval history noted  Denies n/v/d/f or untoward effects of antibiotics  Physical Exam:  Vital Signs: BP (!) 104/59   Pulse 70   Temp 97.2 °F (36.2 °C) (Oral)   Resp 17   Ht 5' 5\" (1.651 m)   Wt 142 lb 8 oz (64.6 kg)   SpO2 96%   BMI 23.71 kg/m²     Gen: alert and oriented X3, no distress  Skin: no stigmata of endocarditis  Wounds: C/D/I  HEMT: AT/NC Oropharynx pink, moist, and without lesions or exudates; dentition in good state of repair  Eyes: PERRLA, EOMI, conjunctiva pink, sclera anicteric. Neck: Supple. Trachea midline. No LAD. Chest: no distress and CTA. Good air movement. Heart: RRR and no MRG. Abd: soft, non-distended, no tenderness, no hepatomegaly. Normoactive bowel sounds. Ext: no clubbing, cyanosis, or edema  Catheter Site: without erythema or tenderness  Neuro: Mental status intact. CN 2-12 intact and no focal sensory or motor deficits     Radiologic / Imaging / TESTING  No results found.      Labs:    Recent Results (from the past 24 hour(s))   POCT Alb 3.2 (L) 3.4 - 5.0 GM/DL    Total Bilirubin 0.3 0.0 - 1.0 MG/DL    Bilirubin, Direct 0.2 0.0 - 0.3 MG/DL    Bilirubin, Indirect 0.1 0 - 0.7 MG/DL    Alkaline Phosphatase 63 40 - 129 IU/L    AST 21 15 - 37 IU/L    ALT 18 10 - 40 U/L    Total Protein 5.4 (L) 6.4 - 8.2 GM/DL   POCT Glucose    Collection Time: 09/10/19 11:10 AM   Result Value Ref Range    POC Glucose 228 (H) 70 - 99 MG/DL     CULTURE results: Invalid input(s): BLOOD CULTURE,  URINE CULTURE, SURGICAL CULTURE    Diagnosis:  Patient Active Problem List   Diagnosis    Diabetes mellitus ([facility]    Hypertension    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[facility]    Choledocholithiasis    COPD (chronic obstructive pulmonary disease) (HCC)    Chest pain    Abdominal pain, epigastric    Elevated LFTs    Abnormal findings on imaging of biliary tract    Chest pain    Coronary artery disease involving coronary bypass graft of native heart without angina pectoris    Acute encephalopathy    Vomiting    Sepsis (HCC)    Nausea vomiting and diarrhea    Elevated troponin    Fever       Active Problems  Active Problems:    Sepsis (HCC)    Nausea vomiting and diarrhea    Elevated troponin    Fever  Resolved Problems:    * No resolved hospital problems. *    Electronically signed by: Electronically signed by Mere Blanco.  MOLLY Ring CNP on 9/10/2019 at 12:19 PM

## 2020-02-11 ENCOUNTER — TELEPHONE (OUTPATIENT)
Dept: CARDIOLOGY CLINIC | Age: 80
End: 2020-02-11

## 2020-03-29 ENCOUNTER — HOSPITAL ENCOUNTER (INPATIENT)
Age: 80
LOS: 2 days | Discharge: NURSING, REHAB OR LONG-TERM CARE | DRG: 444 | End: 2020-04-01
Attending: EMERGENCY MEDICINE | Admitting: INTERNAL MEDICINE
Payer: MEDICARE

## 2020-03-29 ENCOUNTER — APPOINTMENT (OUTPATIENT)
Dept: GENERAL RADIOLOGY | Age: 80
DRG: 444 | End: 2020-03-29
Payer: MEDICARE

## 2020-03-29 ENCOUNTER — APPOINTMENT (OUTPATIENT)
Dept: CT IMAGING | Age: 80
DRG: 444 | End: 2020-03-29
Payer: MEDICARE

## 2020-03-29 LAB
ALBUMIN SERPL-MCNC: 4.1 GM/DL (ref 3.4–5)
ALP BLD-CCNC: 69 IU/L (ref 40–129)
ALT SERPL-CCNC: 22 U/L (ref 10–40)
ANION GAP SERPL CALCULATED.3IONS-SCNC: 17 MMOL/L (ref 4–16)
AST SERPL-CCNC: 35 IU/L (ref 15–37)
BACTERIA: NEGATIVE /HPF
BASOPHILS ABSOLUTE: 0.1 K/CU MM
BASOPHILS RELATIVE PERCENT: 0.7 % (ref 0–1)
BILIRUB SERPL-MCNC: 1.2 MG/DL (ref 0–1)
BILIRUBIN URINE: NEGATIVE MG/DL
BLOOD, URINE: NEGATIVE
BUN BLDV-MCNC: 16 MG/DL (ref 6–23)
CALCIUM SERPL-MCNC: 9.5 MG/DL (ref 8.3–10.6)
CHLORIDE BLD-SCNC: 98 MMOL/L (ref 99–110)
CLARITY: CLEAR
CO2: 19 MMOL/L (ref 21–32)
COLOR: YELLOW
CREAT SERPL-MCNC: 1.2 MG/DL (ref 0.9–1.3)
DIFFERENTIAL TYPE: ABNORMAL
EOSINOPHILS ABSOLUTE: 0.1 K/CU MM
EOSINOPHILS RELATIVE PERCENT: 1 % (ref 0–3)
GFR AFRICAN AMERICAN: >60 ML/MIN/1.73M2
GFR NON-AFRICAN AMERICAN: 58 ML/MIN/1.73M2
GLUCOSE BLD-MCNC: 227 MG/DL (ref 70–99)
GLUCOSE, URINE: NEGATIVE MG/DL
HCT VFR BLD CALC: 35.5 % (ref 42–52)
HEMOGLOBIN: 12.2 GM/DL (ref 13.5–18)
IMMATURE NEUTROPHIL %: 0.6 % (ref 0–0.43)
INR BLD: 1.09 INDEX
KETONES, URINE: ABNORMAL MG/DL
LACTATE: 1.9 MMOL/L (ref 0.4–2)
LEUKOCYTE ESTERASE, URINE: NEGATIVE
LYMPHOCYTES ABSOLUTE: 1.7 K/CU MM
LYMPHOCYTES RELATIVE PERCENT: 16.5 % (ref 24–44)
MCH RBC QN AUTO: 29.7 PG (ref 27–31)
MCHC RBC AUTO-ENTMCNC: 34.4 % (ref 32–36)
MCV RBC AUTO: 86.4 FL (ref 78–100)
MONOCYTES ABSOLUTE: 1.6 K/CU MM
MONOCYTES RELATIVE PERCENT: 15.3 % (ref 0–4)
MUCUS: ABNORMAL HPF
NITRITE URINE, QUANTITATIVE: NEGATIVE
NUCLEATED RBC %: 0 %
PDW BLD-RTO: 13.1 % (ref 11.7–14.9)
PH, URINE: 9 (ref 5–8)
PLATELET # BLD: 208 K/CU MM (ref 140–440)
PMV BLD AUTO: 9.3 FL (ref 7.5–11.1)
POTASSIUM SERPL-SCNC: 4.2 MMOL/L (ref 3.5–5.1)
PROTEIN UA: 30 MG/DL
PROTHROMBIN TIME: 13.2 SECONDS (ref 11.7–14.5)
RBC # BLD: 4.11 M/CU MM (ref 4.6–6.2)
RBC URINE: ABNORMAL /HPF (ref 0–3)
SEGMENTED NEUTROPHILS ABSOLUTE COUNT: 7 K/CU MM
SEGMENTED NEUTROPHILS RELATIVE PERCENT: 65.9 % (ref 36–66)
SODIUM BLD-SCNC: 134 MMOL/L (ref 135–145)
SPECIFIC GRAVITY UA: 1.01 (ref 1–1.03)
SQUAMOUS EPITHELIAL: <1 /HPF
TOTAL IMMATURE NEUTOROPHIL: 0.06 K/CU MM
TOTAL NUCLEATED RBC: 0 K/CU MM
TOTAL PROTEIN: 6.6 GM/DL (ref 6.4–8.2)
TRICHOMONAS: ABNORMAL /HPF
TROPONIN T: <0.01 NG/ML
UROBILINOGEN, URINE: NORMAL MG/DL (ref 0.2–1)
WBC # BLD: 10.6 K/CU MM (ref 4–10.5)
WBC UA: ABNORMAL /HPF (ref 0–2)

## 2020-03-29 PROCEDURE — 99285 EMERGENCY DEPT VISIT HI MDM: CPT

## 2020-03-29 PROCEDURE — 85025 COMPLETE CBC W/AUTO DIFF WBC: CPT

## 2020-03-29 PROCEDURE — 71045 X-RAY EXAM CHEST 1 VIEW: CPT

## 2020-03-29 PROCEDURE — 83690 ASSAY OF LIPASE: CPT

## 2020-03-29 PROCEDURE — 96375 TX/PRO/DX INJ NEW DRUG ADDON: CPT

## 2020-03-29 PROCEDURE — 80053 COMPREHEN METABOLIC PANEL: CPT

## 2020-03-29 PROCEDURE — 96374 THER/PROPH/DIAG INJ IV PUSH: CPT

## 2020-03-29 PROCEDURE — 83605 ASSAY OF LACTIC ACID: CPT

## 2020-03-29 PROCEDURE — 84484 ASSAY OF TROPONIN QUANT: CPT

## 2020-03-29 PROCEDURE — 85610 PROTHROMBIN TIME: CPT

## 2020-03-29 PROCEDURE — 81001 URINALYSIS AUTO W/SCOPE: CPT

## 2020-03-29 PROCEDURE — 6360000004 HC RX CONTRAST MEDICATION: Performed by: EMERGENCY MEDICINE

## 2020-03-29 PROCEDURE — 70450 CT HEAD/BRAIN W/O DYE: CPT

## 2020-03-29 PROCEDURE — 2580000003 HC RX 258: Performed by: EMERGENCY MEDICINE

## 2020-03-29 PROCEDURE — 6360000002 HC RX W HCPCS: Performed by: EMERGENCY MEDICINE

## 2020-03-29 PROCEDURE — 93005 ELECTROCARDIOGRAM TRACING: CPT | Performed by: EMERGENCY MEDICINE

## 2020-03-29 PROCEDURE — 87040 BLOOD CULTURE FOR BACTERIA: CPT

## 2020-03-29 PROCEDURE — 74177 CT ABD & PELVIS W/CONTRAST: CPT

## 2020-03-29 RX ORDER — SODIUM CHLORIDE 0.9 % (FLUSH) 0.9 %
10 SYRINGE (ML) INJECTION 2 TIMES DAILY
Status: DISCONTINUED | OUTPATIENT
Start: 2020-03-29 | End: 2020-03-30

## 2020-03-29 RX ORDER — ONDANSETRON 2 MG/ML
4 INJECTION INTRAMUSCULAR; INTRAVENOUS EVERY 30 MIN PRN
Status: DISCONTINUED | OUTPATIENT
Start: 2020-03-29 | End: 2020-03-30

## 2020-03-29 RX ORDER — 0.9 % SODIUM CHLORIDE 0.9 %
500 INTRAVENOUS SOLUTION INTRAVENOUS ONCE
Status: COMPLETED | OUTPATIENT
Start: 2020-03-29 | End: 2020-03-29

## 2020-03-29 RX ORDER — MORPHINE SULFATE 4 MG/ML
4 INJECTION, SOLUTION INTRAMUSCULAR; INTRAVENOUS EVERY 30 MIN PRN
Status: DISCONTINUED | OUTPATIENT
Start: 2020-03-29 | End: 2020-03-30

## 2020-03-29 RX ORDER — SODIUM CHLORIDE 9 MG/ML
INJECTION, SOLUTION INTRAVENOUS CONTINUOUS
Status: DISCONTINUED | OUTPATIENT
Start: 2020-03-29 | End: 2020-04-01 | Stop reason: HOSPADM

## 2020-03-29 RX ADMIN — SODIUM CHLORIDE 1000 ML: 9 INJECTION, SOLUTION INTRAVENOUS at 21:20

## 2020-03-29 RX ADMIN — ONDANSETRON HYDROCHLORIDE 4 MG: 2 SOLUTION INTRAMUSCULAR; INTRAVENOUS at 21:20

## 2020-03-29 RX ADMIN — SODIUM CHLORIDE 500 ML: 9 INJECTION, SOLUTION INTRAVENOUS at 21:22

## 2020-03-29 RX ADMIN — IOPAMIDOL 80 ML: 755 INJECTION, SOLUTION INTRAVENOUS at 21:30

## 2020-03-29 RX ADMIN — MORPHINE SULFATE 4 MG: 4 INJECTION, SOLUTION INTRAMUSCULAR; INTRAVENOUS at 21:20

## 2020-03-29 ASSESSMENT — PAIN DESCRIPTION - LOCATION: LOCATION: KNEE

## 2020-03-29 ASSESSMENT — PAIN DESCRIPTION - PAIN TYPE: TYPE: ACUTE PAIN

## 2020-03-29 ASSESSMENT — PAIN SCALES - GENERAL
PAINLEVEL_OUTOF10: 1
PAINLEVEL_OUTOF10: 8
PAINLEVEL_OUTOF10: 5

## 2020-03-29 ASSESSMENT — PAIN DESCRIPTION - ORIENTATION: ORIENTATION: RIGHT;LEFT

## 2020-03-29 NOTE — ED TRIAGE NOTES
Pt to ED today via EMS for c/o altered mental status. Per EMS, family reports pt has been confused today. EMS reports blood sugar was 241 en route. Pt alert, oriented to place but not not time. When pt asked what year it was pt responded it was \"march\". When patient asked again what year it was, pt then responded \"April 11\". Pt was asked who the current president is and patient responded \"trump\". 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  Pt c/o bilateral knee pain on arrival to ED

## 2020-03-29 NOTE — ED PROVIDER NOTES
Attends meetings of clubs or organizations: Not on file     Relationship status: Not on file    Intimate partner violence     Fear of current or ex partner: Not on file     Emotionally abused: Not on file     Physically abused: Not on file     Forced sexual activity: Not on file   Other Topics Concern    Not on file   Social History Narrative    Not on file     Current Facility-Administered Medications   Medication Dose Route Frequency Provider Last Rate Last Dose    0.9 % sodium chloride infusion   Intravenous Continuous Nestor Xiao,  mL/hr at 03/29/20 2120 1,000 mL at 03/29/20 2120    morphine sulfate (PF) injection 4 mg  4 mg Intravenous Q30 Min PRN Nestor Xiao, DO   4 mg at 03/29/20 2120    ondansetron (ZOFRAN) injection 4 mg  4 mg Intravenous Q30 Min PRN Nestor Xiao, DO   4 mg at 03/29/20 2120    sodium chloride flush 0.9 % injection 10 mL  10 mL Intravenous BID Harlo Rompierre, DO         Current Outpatient Medications   Medication Sig Dispense Refill    QUEtiapine (SEROQUEL) 50 MG tablet Take 1 tablet by mouth 2 times daily 60 tablet 0    pantoprazole (PROTONIX) 40 MG tablet Take 1 tablet by mouth every morning (before breakfast) 90 tablet 1    insulin glargine (LANTUS) 100 UNIT/ML injection vial Inject 15 Units into the skin nightly 1 vial 3    gabapentin (NEURONTIN) 600 MG tablet Take 600 mg by mouth 3 times daily as needed. Yesica Cherry atorvastatin (LIPITOR) 40 MG tablet Take 40 mg by mouth daily      docusate sodium (COLACE) 100 MG capsule Take 100 mg by mouth nightly as needed       latanoprost (XALATAN) 0.005 % ophthalmic solution 1 drop nightly      diltiazem (CARDIZEM) 60 MG tablet Take 1 tablet by mouth 2 times daily 90 tablet 3    hydrocortisone valerate (WESTCORT) 0.2 % ointment Apply topically twice daily to rash as needed 60 g 1    Multiple Vitamins-Minerals (MULTIVITAMIN PO) Take 1 tablet by mouth daily.       cilostazol (PLETAL) 100 MG tablet Take 100 mg by mouth 2 times daily.        carvedilol (COREG) 6.25 MG tablet Take 6.25 mg by mouth 2 times daily (with meals).  aspirin 325 MG tablet Take 325 mg by mouth daily.  insulin lispro (HUMALOG) 100 UNIT/ML injection Inject  into the skin 3 times daily (before meals). Sliding scale        Allergies   Allergen Reactions    Byetta 10 Mcg Pen [Exenatide]     Phenylephrine     Sulbactam     Ampicillin Rash    Aricept [Donepezil Hydrochloride] Other (See Comments)     Hallucinations, confusion    Benztropine Other (See Comments)     Hallucinations, confusion    Celebrex [Celecoxib] Other (See Comments)     'causes him to be nervous and jittery\"    Diphenhydramine Hcl Other (See Comments)     nervous and jittery    Diphenhydramine Hcl [Diphenhydramine] Anxiety    Exenatide Other (See Comments)     Causes him to be nervous and jittery    Metoprolol Succinate Other (See Comments)     Hallucinations     Phenergan [Promethazine Hcl] Other (See Comments)     Hallucinations, nervous, jittery    Plavix [Clopidogrel Bisulfate] Other (See Comments)     Causes bleeding in his stomach    Pseudoephedrine Anxiety    Reglan [Metoclopramide Hcl] Other (See Comments)     \"Caused him to just sit in a chair and rock back and forth\"       Nursing Notes Reviewed    Physical Exam:  ED Triage Vitals [03/29/20 1909]   Enc Vitals Group      BP (!) 156/88      Pulse 95      Resp 20      Temp 98.4 °F (36.9 °C)      Temp Source Oral      SpO2 98 %      Weight 150 lb (68 kg)      Height 5' 11\" (1.803 m)      Head Circumference       Peak Flow       Pain Score       Pain Loc       Pain Edu? Excl. in [address]? GENERAL APPEARANCE: Awake and alert. Cooperative. No acute distress. HEAD: Normocephalic. Atraumatic. EYES: EOM's grossly intact. Sclera anicteric. ENT: Tolerates saliva. No trismus. MMM. NECK: Supple. Trachea midline. CARDIO: RRR. Radial pulse 2+. LUNGS: Respirations unlabored. CTAB. ABDOMEN: Soft. Non-distended. American 58 (L) >60 mL/min/1.73m2    GFR African American >60 >60 mL/min/1.73m2    Anion Gap 17 (H) 4 - 16   Troponin   Result Value Ref Range    Troponin T <0.010 <0.01 NG/ML   Protime-INR   Result Value Ref Range    Protime 13.2 11.7 - 14.5 SECONDS    INR 1.09 INDEX   Lactic Acid, Plasma   Result Value Ref Range    Lactate 1.9 0.4 - 2.0 mMOL/L   Urinalysis   Result Value Ref Range    Color, UA YELLOW YELLOW    Clarity, UA CLEAR CLEAR    Glucose, Urine NEGATIVE NEGATIVE MG/DL    Bilirubin Urine NEGATIVE NEGATIVE MG/DL    Ketones, Urine MODERATE (A) NEGATIVE MG/DL    Specific Gravity, UA 1.013 1.001 - 1.035    Blood, Urine NEGATIVE NEGATIVE    pH, Urine 9.0 (HH) 5.0 - 8.0    Protein, UA 30 (A) NEGATIVE MG/DL    Urobilinogen, Urine NORMAL 0.2 - 1.0 MG/DL    Nitrite Urine, Quantitative NEGATIVE NEGATIVE    Leukocyte Esterase, Urine NEGATIVE NEGATIVE    RBC, UA NONE SEEN 0 - 3 /HPF    WBC, UA NONE SEEN 0 - 2 /HPF    Bacteria, UA NEGATIVE NEGATIVE /HPF    Squam Epithel, UA <1 /HPF    Mucus, UA RARE (A) NEGATIVE HPF    Trichomonas, UA NONE SEEN NONE SEEN /HPF   EKG 12 Lead   Result Value Ref Range    Ventricular Rate 91 BPM    Atrial Rate 90 BPM    QRS Duration 96 ms    Q-T Interval 422 ms    QTc Calculation (Bazett) 519 ms    R Axis -36 degrees    T Axis 61 degrees    Diagnosis       Accelerated Junctional rhythm  Left axis deviation  Cannot rule out Anterior infarct , age undetermined  Abnormal ECG  When compared with ECG of 03-SEP-2019 13:09,  Junctional rhythm has replaced Sinus rhythm         EKG (if obtained): (All EKG's are interpreted by myself in the absence of a cardiologist)  Sinus rhythm at 91. Left axis with poor R wave progression. No ST elevation or depression. No ectopy. No change from prior tracing.     Radiographs (if obtained):  [] The following radiograph was interpreted by myself in the absence of a radiologist:  [x] Radiologist's Report reviewed at time of ED visit:  Ct Head Wo Contrast    Result Date: 3/29/2020  EXAMINATION: CT OF THE HEAD WITHOUT CONTRAST  3/29/2020 9:26 pm TECHNIQUE: CT of the head was performed without the administration of intravenous contrast. Dose modulation, iterative reconstruction, and/or weight based adjustment of the mA/kV was utilized to reduce the radiation dose to as low as reasonably achievable. COMPARISON: CT head from 09/01/2019 HISTORY: ORDERING SYSTEM PROVIDED HISTORY: ams TECHNOLOGIST PROVIDED HISTORY: Has a \"code stroke\" or \"stroke alert\" been called? ->No Reason for exam:->ams FINDINGS: The examination is motion degraded. BRAIN/VENTRICLES: 19 x 14 x 16 mm hyperdense round extra-axial mass along the left petrous apex is unchanged. There is also a mass within the sella which is unchanged. There is no acute hemorrhage, herniation, or hydrocephalus. ORBITS: The visualized portion of the orbits demonstrate no acute abnormality. SINUSES: The visualized paranasal sinuses and mastoid air cells demonstrate no acute abnormality. SOFT TISSUES/SKULL:  No acute abnormality of the visualized skull or soft tissues. No acute intracranial abnormality on motion degraded exam.     Ct Abdomen Pelvis W Iv Contrast    Result Date: 3/29/2020  EXAMINATION: CT OF THE ABDOMEN AND PELVIS WITH CONTRAST 3/29/2020 9:31 pm TECHNIQUE: CT of the abdomen and pelvis was performed with the administration of intravenous contrast. Multiplanar reformatted images are provided for review. Dose modulation, iterative reconstruction, and/or weight based adjustment of the mA/kV was utilized to reduce the radiation dose to as low as reasonably achievable. COMPARISON: 09/01/2019 HISTORY: ORDERING SYSTEM PROVIDED HISTORY: abdominal pain TECHNOLOGIST PROVIDED HISTORY: IV contrast only. Thank you. Reason for exam:->abdominal pain Reason for exam:->IV contrast only. Thank you.  Reason for Exam: abdomen pain, ams Acuity: Acute Type of Exam: Initial FINDINGS: Lower Chest:  Visualized portion of the lower chest

## 2020-03-29 NOTE — ED NOTES
Bed: ED-32  Expected date:   Expected time:   Means of arrival:   Comments:  MEDIC Sharyle Smaller, RN  03/29/20 1910

## 2020-03-30 ENCOUNTER — APPOINTMENT (OUTPATIENT)
Dept: MRI IMAGING | Age: 80
DRG: 444 | End: 2020-03-30
Payer: MEDICARE

## 2020-03-30 PROBLEM — R41.0 CONFUSION: Status: ACTIVE | Noted: 2020-03-30

## 2020-03-30 LAB
ADENOVIRUS DETECTION BY PCR: NOT DETECTED
AMMONIA: 56 UMOL/L (ref 16–60)
BASE EXCESS: ABNORMAL (ref 0–3.3)
BASE EXCESS: ABNORMAL (ref 0–3.3)
BORDETELLA PERTUSSIS PCR: NOT DETECTED
CARBON MONOXIDE, BLOOD: 2.1 % (ref 0–5)
CARBON MONOXIDE, BLOOD: 2.3 % (ref 0–5)
CHLAMYDOPHILA PNEUMONIA PCR: NOT DETECTED
CO2 CONTENT: 21.3 MMOL/L (ref 19–24)
CO2 CONTENT: 24 MMOL/L (ref 19–24)
COMMENT: ABNORMAL
COMMENT: ABNORMAL
CORONAVIRUS 229E PCR: NOT DETECTED
CORONAVIRUS HKU1 PCR: NOT DETECTED
CORONAVIRUS NL63 PCR: NOT DETECTED
CORONAVIRUS OC43 PCR: NOT DETECTED
ESTIMATED AVERAGE GLUCOSE: 174 MG/DL
GLUCOSE BLD-MCNC: 130 MG/DL (ref 70–99)
GLUCOSE BLD-MCNC: 178 MG/DL (ref 70–99)
GLUCOSE BLD-MCNC: 222 MG/DL (ref 70–99)
GLUCOSE BLD-MCNC: 262 MG/DL (ref 70–99)
GLUCOSE BLD-MCNC: 280 MG/DL (ref 70–99)
HBA1C MFR BLD: 7.7 % (ref 4.2–6.3)
HCO3 ARTERIAL: 20.3 MMOL/L (ref 18–23)
HCO3 ARTERIAL: 22.6 MMOL/L (ref 18–23)
HUMAN METAPNEUMOVIRUS PCR: NOT DETECTED
INFLUENZA A BY PCR: NOT DETECTED
INFLUENZA A H1 (2009) PCR: NOT DETECTED
INFLUENZA A H1 PANDEMIC PCR: NOT DETECTED
INFLUENZA A H3 PCR: NOT DETECTED
INFLUENZA B BY PCR: NOT DETECTED
LIPASE: 21 IU/L (ref 13–60)
METHEMOGLOBIN ARTERIAL: 1 %
METHEMOGLOBIN ARTERIAL: 1.1 %
MYCOPLASMA PNEUMONIAE PCR: NOT DETECTED
O2 SATURATION: 82.4 % (ref 96–97)
O2 SATURATION: 95 % (ref 96–97)
PARAINFLUENZA 1 PCR: NOT DETECTED
PARAINFLUENZA 2 PCR: NOT DETECTED
PARAINFLUENZA 3 PCR: NOT DETECTED
PARAINFLUENZA 4 PCR: NOT DETECTED
PCO2 ARTERIAL: 32 MMHG (ref 32–45)
PCO2 ARTERIAL: 46 MMHG (ref 32–45)
PH BLOOD: 7.3 (ref 7.34–7.45)
PH BLOOD: 7.41 (ref 7.34–7.45)
PO2 ARTERIAL: 49 MMHG (ref 75–100)
PO2 ARTERIAL: 77 MMHG (ref 75–100)
RHINOVIRUS ENTEROVIRUS PCR: NOT DETECTED
RSV PCR: NOT DETECTED

## 2020-03-30 PROCEDURE — 94640 AIRWAY INHALATION TREATMENT: CPT

## 2020-03-30 PROCEDURE — 82962 GLUCOSE BLOOD TEST: CPT

## 2020-03-30 PROCEDURE — 6370000000 HC RX 637 (ALT 250 FOR IP): Performed by: NURSE PRACTITIONER

## 2020-03-30 PROCEDURE — 93010 ELECTROCARDIOGRAM REPORT: CPT | Performed by: INTERNAL MEDICINE

## 2020-03-30 PROCEDURE — 36415 COLL VENOUS BLD VENIPUNCTURE: CPT

## 2020-03-30 PROCEDURE — 87486 CHLMYD PNEUM DNA AMP PROBE: CPT

## 2020-03-30 PROCEDURE — 1200000000 HC SEMI PRIVATE

## 2020-03-30 PROCEDURE — 74181 MRI ABDOMEN W/O CONTRAST: CPT

## 2020-03-30 PROCEDURE — A9577 INJ MULTIHANCE: HCPCS | Performed by: PSYCHIATRY & NEUROLOGY

## 2020-03-30 PROCEDURE — 87798 DETECT AGENT NOS DNA AMP: CPT

## 2020-03-30 PROCEDURE — 2580000003 HC RX 258: Performed by: INTERNAL MEDICINE

## 2020-03-30 PROCEDURE — 97530 THERAPEUTIC ACTIVITIES: CPT

## 2020-03-30 PROCEDURE — 83036 HEMOGLOBIN GLYCOSYLATED A1C: CPT

## 2020-03-30 PROCEDURE — 6360000002 HC RX W HCPCS: Performed by: SPECIALIST

## 2020-03-30 PROCEDURE — 6360000004 HC RX CONTRAST MEDICATION: Performed by: PSYCHIATRY & NEUROLOGY

## 2020-03-30 PROCEDURE — 97166 OT EVAL MOD COMPLEX 45 MIN: CPT

## 2020-03-30 PROCEDURE — 87581 M.PNEUMON DNA AMP PROBE: CPT

## 2020-03-30 PROCEDURE — 94761 N-INVAS EAR/PLS OXIMETRY MLT: CPT

## 2020-03-30 PROCEDURE — 82803 BLOOD GASES ANY COMBINATION: CPT

## 2020-03-30 PROCEDURE — 6370000000 HC RX 637 (ALT 250 FOR IP): Performed by: INTERNAL MEDICINE

## 2020-03-30 PROCEDURE — 97535 SELF CARE MNGMENT TRAINING: CPT

## 2020-03-30 PROCEDURE — 82140 ASSAY OF AMMONIA: CPT

## 2020-03-30 PROCEDURE — 36600 WITHDRAWAL OF ARTERIAL BLOOD: CPT

## 2020-03-30 PROCEDURE — 2700000000 HC OXYGEN THERAPY PER DAY

## 2020-03-30 PROCEDURE — 70553 MRI BRAIN STEM W/O & W/DYE: CPT

## 2020-03-30 PROCEDURE — 97162 PT EVAL MOD COMPLEX 30 MIN: CPT

## 2020-03-30 PROCEDURE — 87633 RESP VIRUS 12-25 TARGETS: CPT

## 2020-03-30 PROCEDURE — 6370000000 HC RX 637 (ALT 250 FOR IP): Performed by: PSYCHIATRY & NEUROLOGY

## 2020-03-30 PROCEDURE — 2580000003 HC RX 258: Performed by: EMERGENCY MEDICINE

## 2020-03-30 PROCEDURE — 6360000002 HC RX W HCPCS: Performed by: INTERNAL MEDICINE

## 2020-03-30 RX ORDER — SODIUM CHLORIDE 0.9 % (FLUSH) 0.9 %
10 SYRINGE (ML) INJECTION PRN
Status: DISCONTINUED | OUTPATIENT
Start: 2020-03-30 | End: 2020-04-01 | Stop reason: HOSPADM

## 2020-03-30 RX ORDER — CILOSTAZOL 100 MG/1
100 TABLET ORAL 2 TIMES DAILY
Status: DISCONTINUED | OUTPATIENT
Start: 2020-03-30 | End: 2020-04-01 | Stop reason: HOSPADM

## 2020-03-30 RX ORDER — QUETIAPINE FUMARATE 25 MG/1
25 TABLET, FILM COATED ORAL EVERY EVENING
Status: DISCONTINUED | OUTPATIENT
Start: 2020-03-30 | End: 2020-04-01 | Stop reason: HOSPADM

## 2020-03-30 RX ORDER — CIPROFLOXACIN 2 MG/ML
400 INJECTION, SOLUTION INTRAVENOUS EVERY 12 HOURS
Status: DISCONTINUED | OUTPATIENT
Start: 2020-03-30 | End: 2020-04-01 | Stop reason: HOSPADM

## 2020-03-30 RX ORDER — DILTIAZEM HYDROCHLORIDE 60 MG/1
60 TABLET, FILM COATED ORAL 2 TIMES DAILY
Status: DISCONTINUED | OUTPATIENT
Start: 2020-03-30 | End: 2020-04-01 | Stop reason: HOSPADM

## 2020-03-30 RX ORDER — ACETAMINOPHEN 325 MG/1
650 TABLET ORAL EVERY 6 HOURS PRN
Status: DISCONTINUED | OUTPATIENT
Start: 2020-03-30 | End: 2020-04-01 | Stop reason: HOSPADM

## 2020-03-30 RX ORDER — ACETAMINOPHEN 650 MG/1
650 SUPPOSITORY RECTAL EVERY 6 HOURS PRN
Status: DISCONTINUED | OUTPATIENT
Start: 2020-03-30 | End: 2020-04-01 | Stop reason: HOSPADM

## 2020-03-30 RX ORDER — DEXTROSE MONOHYDRATE 25 G/50ML
12.5 INJECTION, SOLUTION INTRAVENOUS PRN
Status: DISCONTINUED | OUTPATIENT
Start: 2020-03-30 | End: 2020-04-01 | Stop reason: HOSPADM

## 2020-03-30 RX ORDER — IPRATROPIUM BROMIDE AND ALBUTEROL SULFATE 2.5; .5 MG/3ML; MG/3ML
1 SOLUTION RESPIRATORY (INHALATION) EVERY 4 HOURS PRN
Status: DISCONTINUED | OUTPATIENT
Start: 2020-03-30 | End: 2020-04-01 | Stop reason: HOSPADM

## 2020-03-30 RX ORDER — ATORVASTATIN CALCIUM 40 MG/1
40 TABLET, FILM COATED ORAL DAILY
Status: DISCONTINUED | OUTPATIENT
Start: 2020-03-30 | End: 2020-04-01 | Stop reason: HOSPADM

## 2020-03-30 RX ORDER — MIRTAZAPINE 15 MG/1
15 TABLET, FILM COATED ORAL
Status: DISCONTINUED | OUTPATIENT
Start: 2020-03-30 | End: 2020-04-01 | Stop reason: HOSPADM

## 2020-03-30 RX ORDER — OXYCODONE HYDROCHLORIDE AND ACETAMINOPHEN 5; 325 MG/1; MG/1
1 TABLET ORAL EVERY 4 HOURS PRN
Status: DISCONTINUED | OUTPATIENT
Start: 2020-03-30 | End: 2020-04-01 | Stop reason: HOSPADM

## 2020-03-30 RX ORDER — ONDANSETRON 2 MG/ML
4 INJECTION INTRAMUSCULAR; INTRAVENOUS EVERY 6 HOURS PRN
Status: DISCONTINUED | OUTPATIENT
Start: 2020-03-30 | End: 2020-04-01 | Stop reason: HOSPADM

## 2020-03-30 RX ORDER — ONDANSETRON 4 MG/1
4 TABLET, ORALLY DISINTEGRATING ORAL EVERY 8 HOURS PRN
Status: DISCONTINUED | OUTPATIENT
Start: 2020-03-30 | End: 2020-04-01 | Stop reason: HOSPADM

## 2020-03-30 RX ORDER — ASPIRIN 325 MG
325 TABLET ORAL DAILY
Status: DISCONTINUED | OUTPATIENT
Start: 2020-04-01 | End: 2020-04-01 | Stop reason: HOSPADM

## 2020-03-30 RX ORDER — SODIUM CHLORIDE 0.9 % (FLUSH) 0.9 %
10 SYRINGE (ML) INJECTION EVERY 12 HOURS SCHEDULED
Status: DISCONTINUED | OUTPATIENT
Start: 2020-03-30 | End: 2020-04-01 | Stop reason: HOSPADM

## 2020-03-30 RX ORDER — CARVEDILOL 6.25 MG/1
6.25 TABLET ORAL 2 TIMES DAILY WITH MEALS
Status: DISCONTINUED | OUTPATIENT
Start: 2020-03-30 | End: 2020-04-01 | Stop reason: HOSPADM

## 2020-03-30 RX ORDER — LATANOPROST 50 UG/ML
1 SOLUTION/ DROPS OPHTHALMIC NIGHTLY
Status: DISCONTINUED | OUTPATIENT
Start: 2020-03-30 | End: 2020-04-01 | Stop reason: HOSPADM

## 2020-03-30 RX ORDER — ASPIRIN 325 MG
325 TABLET ORAL DAILY
Status: DISCONTINUED | OUTPATIENT
Start: 2020-03-30 | End: 2020-03-30

## 2020-03-30 RX ORDER — RIVASTIGMINE 4.6 MG/24H
1 PATCH, EXTENDED RELEASE TRANSDERMAL DAILY
Status: DISCONTINUED | OUTPATIENT
Start: 2020-03-30 | End: 2020-03-31

## 2020-03-30 RX ORDER — DEXTROSE MONOHYDRATE 50 MG/ML
100 INJECTION, SOLUTION INTRAVENOUS PRN
Status: DISCONTINUED | OUTPATIENT
Start: 2020-03-30 | End: 2020-04-01 | Stop reason: HOSPADM

## 2020-03-30 RX ORDER — DOCUSATE SODIUM 100 MG/1
100 CAPSULE, LIQUID FILLED ORAL 2 TIMES DAILY PRN
Status: DISCONTINUED | OUTPATIENT
Start: 2020-03-30 | End: 2020-04-01 | Stop reason: HOSPADM

## 2020-03-30 RX ORDER — NICOTINE POLACRILEX 4 MG
15 LOZENGE BUCCAL PRN
Status: DISCONTINUED | OUTPATIENT
Start: 2020-03-30 | End: 2020-04-01 | Stop reason: HOSPADM

## 2020-03-30 RX ORDER — INSULIN GLARGINE 100 [IU]/ML
7 INJECTION, SOLUTION SUBCUTANEOUS NIGHTLY
Status: DISCONTINUED | OUTPATIENT
Start: 2020-03-30 | End: 2020-04-01 | Stop reason: HOSPADM

## 2020-03-30 RX ADMIN — GADOBENATE DIMEGLUMINE 12 ML: 529 INJECTION, SOLUTION INTRAVENOUS at 17:47

## 2020-03-30 RX ADMIN — CARVEDILOL 6.25 MG: 6.25 TABLET, FILM COATED ORAL at 12:43

## 2020-03-30 RX ADMIN — MIRTAZAPINE 15 MG: 15 TABLET, FILM COATED ORAL at 18:42

## 2020-03-30 RX ADMIN — DILTIAZEM HYDROCHLORIDE 60 MG: 60 TABLET, FILM COATED ORAL at 12:43

## 2020-03-30 RX ADMIN — CIPROFLOXACIN 400 MG: 2 INJECTION, SOLUTION INTRAVENOUS at 12:43

## 2020-03-30 RX ADMIN — LATANOPROST 1 DROP: 50 SOLUTION OPHTHALMIC at 22:44

## 2020-03-30 RX ADMIN — ATORVASTATIN CALCIUM 40 MG: 40 TABLET, FILM COATED ORAL at 12:43

## 2020-03-30 RX ADMIN — ENOXAPARIN SODIUM 40 MG: 40 INJECTION SUBCUTANEOUS at 12:48

## 2020-03-30 RX ADMIN — SODIUM CHLORIDE: 9 INJECTION, SOLUTION INTRAVENOUS at 15:06

## 2020-03-30 RX ADMIN — IPRATROPIUM BROMIDE AND ALBUTEROL SULFATE 1 AMPULE: .5; 3 SOLUTION RESPIRATORY (INHALATION) at 02:35

## 2020-03-30 RX ADMIN — ASPIRIN 325 MG ORAL TABLET 325 MG: 325 PILL ORAL at 12:43

## 2020-03-30 RX ADMIN — DILTIAZEM HYDROCHLORIDE 60 MG: 60 TABLET, FILM COATED ORAL at 03:01

## 2020-03-30 RX ADMIN — SODIUM CHLORIDE: 9 INJECTION, SOLUTION INTRAVENOUS at 03:01

## 2020-03-30 RX ADMIN — DILTIAZEM HYDROCHLORIDE 60 MG: 60 TABLET, FILM COATED ORAL at 20:10

## 2020-03-30 RX ADMIN — INSULIN LISPRO 2 UNITS: 100 INJECTION, SOLUTION INTRAVENOUS; SUBCUTANEOUS at 12:48

## 2020-03-30 RX ADMIN — INSULIN LISPRO 3 UNITS: 100 INJECTION, SOLUTION INTRAVENOUS; SUBCUTANEOUS at 09:06

## 2020-03-30 RX ADMIN — QUETIAPINE FUMARATE 25 MG: 25 TABLET ORAL at 18:40

## 2020-03-30 RX ADMIN — ONDANSETRON 4 MG: 2 INJECTION INTRAMUSCULAR; INTRAVENOUS at 03:01

## 2020-03-30 RX ADMIN — SODIUM CHLORIDE, PRESERVATIVE FREE 10 ML: 5 INJECTION INTRAVENOUS at 20:12

## 2020-03-30 ASSESSMENT — PAIN SCALES - GENERAL
PAINLEVEL_OUTOF10: 0
PAINLEVEL_OUTOF10: 0

## 2020-03-30 NOTE — PROGRESS NOTES
Department of Internal Medicine  General Internal Medicine  Attending Progress Note      SUBJECTIVE:  He appears to be a little confused. He needs and MRCP.     OBJECTIVE      Medications    Current Facility-Administered Medications: aspirin tablet 325 mg, 325 mg, Oral, Daily  atorvastatin (LIPITOR) tablet 40 mg, 40 mg, Oral, Daily  carvedilol (COREG) tablet 6.25 mg, 6.25 mg, Oral, BID WC  [Held by provider] cilostazol (PLETAL) tablet 100 mg, 100 mg, Oral, BID  dilTIAZem (CARDIZEM) tablet 60 mg, 60 mg, Oral, BID  docusate sodium (COLACE) capsule 100 mg, 100 mg, Oral, BID PRN  insulin glargine (LANTUS) injection vial 7 Units, 7 Units, Subcutaneous, Nightly  latanoprost (XALATAN) 0.005 % ophthalmic solution 1 drop, 1 drop, Both Eyes, Nightly  QUEtiapine (SEROQUEL) tablet 25 mg, 25 mg, Oral, QPM  sodium chloride flush 0.9 % injection 10 mL, 10 mL, Intravenous, 2 times per day  sodium chloride flush 0.9 % injection 10 mL, 10 mL, Intravenous, PRN  acetaminophen (TYLENOL) tablet 650 mg, 650 mg, Oral, Q6H PRN **OR** acetaminophen (TYLENOL) suppository 650 mg, 650 mg, Rectal, Q6H PRN  magnesium hydroxide (MILK OF MAGNESIA) 400 MG/5ML suspension 30 mL, 30 mL, Oral, Daily PRN  enoxaparin (LOVENOX) injection 40 mg, 40 mg, Subcutaneous, Daily  glucose (GLUTOSE) 40 % oral gel 15 g, 15 g, Oral, PRN  dextrose 50 % IV solution, 12.5 g, Intravenous, PRN  glucagon (rDNA) injection 1 mg, 1 mg, Intramuscular, PRN  dextrose 5 % solution, 100 mL/hr, Intravenous, PRN  insulin lispro (HUMALOG) injection vial 0-6 Units, 0-6 Units, Subcutaneous, TID WC  insulin lispro (HUMALOG) injection vial 0-3 Units, 0-3 Units, Subcutaneous, Nightly  ondansetron (ZOFRAN-ODT) disintegrating tablet 4 mg, 4 mg, Oral, Q8H PRN **OR** ondansetron (ZOFRAN) injection 4 mg, 4 mg, Intravenous, Q6H PRN  ipratropium-albuterol (DUONEB) nebulizer solution 1 ampule, 1 ampule, Inhalation, Q4H PRN  oxyCODONE-acetaminophen (PERCOCET) 5-325 MG per tablet 1 tablet, 1 tablet, Oral, Q4H PRN  ciprofloxacin (CIPRO) IVPB 400 mg, 400 mg, Intravenous, Q12H  0.9 % sodium chloride infusion, , Intravenous, Continuous  Physical    VITALS:  BP (!) 105/46   Pulse 95   Temp 97.7 °F (36.5 °C) (Axillary)   Resp 28   Ht 5' 11\" (1.803 m)   Wt 135 lb 8 oz (61.5 kg)   SpO2 92%   BMI 18.90 kg/m²   Gen - A & O x 1  HEENT -  PERRLA, EOMI  CV - RRR no M/G/R, normal s1, s2  Lungs - CTA bilaterally no W/C/R  Abd - soft, NT, ND  Ext - BKA bilateral    Data    CBC:   Lab Results   Component Value Date    WBC 10.6 03/29/2020    RBC 4.11 03/29/2020    HGB 12.2 03/29/2020    HCT 35.5 03/29/2020    MCV 86.4 03/29/2020    MCH 29.7 03/29/2020    MCHC 34.4 03/29/2020    RDW 13.1 03/29/2020     03/29/2020    MPV 9.3 03/29/2020       ASSESSMENT AND PLAN        Chelsea Hensley is a [de-identified] y.o. male p/w     Bizarre behavior, with possible hallucinations  -No source of reversible infection found, admitted for observation  -Possible sundowning as patient was progressively becoming more confused in the emergency department  -However no diagnosed dementia, will consult neurology  -Consider MRI  - MRCP ordered  -Neurochecks every 4hr  -Continue Seroquel nightly     Nausea and vomiting witness on the medical floor  -Patient complained of right upper quadrant pain  -Continue antiemetics  -Follow-up lipase  -Serial abdominal exam  -GI consult      HTN/hyperlipidemia  - cardizem and coreg  -Atorvastatin     Type 2 diabetes- uncontrolled  Lantus and ISS, TIDAC finger stick  Hypoglycemic protocol  F/u A1c           Arelis Guardado MD, MSc

## 2020-03-30 NOTE — PROGRESS NOTES
RW/holding onto bed rail statically. · Gait: Tolerated 2 fwd steps and 1 bkwd step with RW modA for weight shift, body support, and device management (keeping RW close for more effective use of UEs). Very poor WB tolerance to right LE. Extended time trying to take a step with left. · Educated pt on POC, role of PT, DME, discharge. VCs/TCs and demonstrations for sequencing, posture, weight shift, UE/LE placement to inc safety and indep with mobility     [facility] 6 Clicks Inpatient Mobility:  AM-PAC Inpatient Mobility Raw Score : 12    Safety: patient left in chair with chair alarm, call light within reach,gait belt used. Assessment: Body structures, Functions, Activity limitations: Decreased functional mobility ; Decreased safe awareness; Decreased endurance; Decreased balance; Increased pain; Decreased strength; Decreased cognition; Decreased posture; Decreased ADL status  Pt is an [de-identified]year old male admitted with AMS and recent fall. Recommend subacute rehab once medically stable. At baseline, he is Gisela with gross mobility and ADLs. He is currently requiring up to modA x 2 functioning well below his typical baseline. He would benefit from continued therapy to address his current deficits, dec potential fall risk, and restore PLOF. Complexity: Moderate  Prognosis: Good, no significant barriers to participation at this time. Plan Times per week: 3+/week  Discharge Recommendations: Subacute/Skilled Nursing Facility  Equipment: continue to assess     Goals:  Short term goals  Time Frame for Short term goals: 1 week   Short term goal 1: Pt will perform sit><supine SBA   Short term goal 2: Pt will transfer to bed/recliner Tucker   Short term goal 3: Pt will ambulate 20ft with RW Tucker   Short term goal 4: Pt will perform dynamic sitting tasks without UE support SBA        Treatment plan:  Transfer Training; Strengthening; Balance Training; IADL Training; Endurance Training; Gait Training; ROM;  Functional Mobility

## 2020-03-30 NOTE — PROGRESS NOTES
throughout session; o2 sats ranged from high 80s to low 90s on 6L o2    Body Systems and functions:  · ROM: WFL all joints in BL UEs  · Strength: Grossly 4/5 MMT all major muscle groups BL UEs  · Sensation: WFL in BL UEs (See PT note for LE assessment)  · Tone: Normal  · Coordination: Decreased speed in BL UEs    Activities of Daily Living (ADLs):  · Feeding: Supervision/setup (Dysphagia minced and moist)  · Grooming: SBA (seated hand hygiene with wet wipes on BSC)  · UB bathing: CGA  · LB bathing: Max A  · UB dressing: CGA (light dynamic sitting balance with donning robe seated EOB)  · LB dressing: Max A (pt dependent with donning clean brief and BL slip-on shoes; min A required for donning BL prosthesis with assist for thoroughly rolling up suspension sleeve)  · Toileting: Dependent (pt attempted to have bowel movement on BSC but only urinated small amount; total assist for mgmt of brief both directions and with bryan care in standing with mod A for standing balance with task)    Cognitive and Psychosocial Functioning:  · Overall cognitive status: Impaired (pt disoriented to time/situation and required choices for stating location; evident memory impairments, poor problem solving skills, decreased overall insight/safety awareness)  · Affect: Normal     Balance:   · Sitting: SBA static sitting EOB and on BSC, CGA with dynamic sitting tasks EOB  · Standing: Min A to Mod A with RW    Functional Mobility:  · Bed Mobility: Min A supine to sitting EOB (HOB elevated to 20', increased time required, min cues for use of bed rail)  · Transfers:  Mod A sit to stand from bed and BSC, Mod A stand to sit to bed, Mod A x 2 stand pivot transfer bed to BSC (max coaching for rocking to gain momentum and safe hand/foot placement, 2nd person assist to facilitate pelvis during pivot)  · Ambulation: Unable this date      AM-PAC 6 click short form for inpatient daily activity:   How much help from another person does the patient currently household IADLs, and ambulates mod I with a RW and BL LE prostheses. Pt currently presents with the above impairments, and will need continued OT services in SNF at discharge. Complexity: Moderate  Prognosis: Good  Plan: 3x/week      Goals:  1. Pt will complete all aspects of bed mobility for EOB/OOB ADLs CGA with HOB flat  2. Pt will complete UB/LB bathing min A with setup using long handled sponge PRN  3. Pt will complete all aspects of LB dressing mod A with setup using AE/modified techniques PRN  4. Pt will complete all functional transfers to and from bed, chair, toilet, shower chair min A/good safety awareness  5. Pt will ambulate HH distance to bathroom for toileting mod A x 2 with RW and BL LE prostheses  6. Pt will complete all aspects of toileting task on BSC/regular toilet mod A  7. Pt will complete oral hygiene/grooming routine in standing at sink CGA with no seated rest breaks  8.  Pt will complete ther ex/ther act with focus on UE strengthening, functional standing tolerance >3 minutes, cognitive re-orientation        Time:   Time in: 959  Time out: 1043  Timed treatment minutes: 29  Total time: 44      Electronically signed by:    PATRICK Grace/L, [address] [place] [address], .[id number]

## 2020-03-30 NOTE — DISCHARGE INSTR - COC
the 24 hours ending 20 1510  No intake/output data recorded. Safety Concerns:     [facility] Safety Concerns:[id number]}    Impairments/Disabilities:      [facility] Impairments/Disabilities:660350892}    Patient's personal belongings (please select all that are sent with patient):  {CHP DME Belongings:650046199}    RN SIGNATURE:  {Esignature:111035016}    CASE MANAGEMENT/SOCIAL WORK SECTION    Inpatient Status Date: ***    Readmission Risk Assessment Score:  Readmission Risk              Risk of Unplanned Readmission:        15           Discharging to Facility/ Agency   · Name: [facility]  · Address: [address]  · Phone: [phone]  · Fax: [phone]      PHYSICIAN SECTION    Nutrition Therapy:  Current Nutrition Therapy:   [address] [place] [address] Diet List:245860349}    Routes of Feeding: {CHP DME Other Feedings:822427861}  Liquids: {Slp liquid thickness:16753}  Daily Fluid Restriction: {CHP DME Yes amt example:910864167}  Last Modified Barium Swallow with Video (Video Swallowing Test): {Done Not Done EWIB:009344525}    Treatments at the Time of Hospital Discharge:   Respiratory Treatments: ***  Oxygen Therapy:  {Therapy; copd oxygen:31959}  Ventilator:    {[facility] Vent OXBX:610644431}    Rehab Therapies: {THERAPEUTIC INTERVENTION:2545959563}  Weight Bearing Status/Restrictions: [address] [place] [address] Weight Bearin}  Other Medical Equipment (for information only, NOT a DME order):  {EQUIPMENT:100487863}  Other Treatments: ***      Prognosis: {Prognosis:7738570989}    Condition at Discharge: [address] [place] [address] Patient Condition:059289320}    Rehab Potential (if transferring to Rehab): {Prognosis:0338493734}    Recommended Labs or Other Treatments After Discharge: ***    Physician Certification: I certify the above information and transfer of Alberto Rm  is necessary for the continuing treatment of the diagnosis listed and that he requires {Admit to Appropriate Level of Care:51812} for {GREATER/LESS:713743072} 30 days.      Update Admission H&P: {CHP DME Changes in QWUZN:032649403}    PHYSICIAN SIGNATURE:  {Esignature:959767070}

## 2020-03-30 NOTE — ED NOTES
Dr Jaswinder De La Torre returned call @ [phone] -- transferred call to Dr Mahmood Linear  Waiting orders     Fredrick  03/29/20 5992

## 2020-03-30 NOTE — PLAN OF CARE
Problem: Falls - Risk of:  Goal: Will remain free from falls  Description: Will remain free from falls  Outcome: Ongoing  Goal: Absence of physical injury  Description: Absence of physical injury  Outcome: Ongoing     Problem: SAFETY  Goal: Free from accidental physical injury  Outcome: Ongoing     Problem: DAILY CARE  Goal: Daily care needs are met  Outcome: Ongoing     Problem: PAIN  Goal: Patient's pain/discomfort is manageable  Outcome: Ongoing     Problem: SKIN INTEGRITY  Goal: Skin integrity is maintained or improved  Outcome: Ongoing     Problem: KNOWLEDGE DEFICIT  Goal: Patient/S.O. demonstrates understanding of disease process, treatment plan, medications, and discharge instructions.   Outcome: Ongoing     Problem: DISCHARGE BARRIERS  Goal: Patient's continuum of care needs are met  Outcome: Ongoing

## 2020-03-30 NOTE — CONSULTS
Silvia Hernandez MD.  Section of General Neurology - Adult  Consult Note        Reason for Consult:    Requesting Physician:  No referring provider defined for this encounter. Thank you for your kind referral.    CHIEF COMPLAINT:  confusion         HISTORY OF PRESENT ILLNESS:              The patient is a [de-identified] y.o. male with a history of  male admitted for confusion, that started about 2 weeks ago. The following information was obtain via phone call conversation with his wife: Reportedly he was seeing things that weren't there. For the last two weeks he didn't know how to put on his prosthetics,      Today he vomited a little twice, no blood, wife states he hasn't eaten much. He has had multiple stents in his bile ducts. No complaints of burning during urination or trouble peeing. States he hasn't been sleeping well, and get up 3-4 times a nights which has been going on for a couple weeks. He walks through house and sittings in the living room. He hasn't been doing is usual activities such as cross words puzzles. He fell in the kitchen about a week ago, he didn't not hit his head, but has been moving his ext w/o pain or trouble. Past Medical History:        Diagnosis Date    Arthritis     Biliary stones 2015    CAD (coronary artery disease)     COPD (chronic obstructive pulmonary disease) (HCC)     Diabetes mellitus (HCC)     History of blood transfusion     Hx of angiography 3/11/2014    Pulmonary Arteries: Small sliding hiatal hernia. Mod coronary artery calcifications. Mild bilat gynecomastia.     Hyperlipidemia     Hypertension     Kidney stone      Past Surgical History:        Procedure Laterality Date    ABDOMEN SURGERY      stones in bile duct removed x3    CARDIAC SURGERY      CHOLECYSTECTOMY      CORONARY ANGIOPLASTY WITH STENT PLACEMENT      DILATATION, ESOPHAGUS      ERCP  03/13/14    w/ dilation of papilla    ERCP  9/11/14    ERCP  10/24/15    extractions stones, balloon dilatation  ERCP  03/18/2017    stone extraction     ERCP N/A 1/25/2019    ERCP STONE REMOVAL/ DILATATION OF PAPILLA WITH 10-12MM AND 12-15MM BALLOON AND 9-12MM, 12-15MM  EXTRACTOR BALLOON USED FOR REMOVAL OF MULTIPLE  CBD STONES performed by Sonal Stratton MD at [facility] 1348 ERCP N/A 9/7/2019    ERCP STONE REMOVAL, DILATATION OF PAPILLA WITH 10-12MM BALLOON, AND EXTRACTOR BALLOON 12-15MM USED FOR REMOVAL OF CBD STONE AND SLUDE performed by Evan Heard MD at [address] [place] [address]      JOINT REPLACEMENT      LEG AMPUTATION BELOW KNEE Bilateral     OTHER SURGICAL HISTORY  03/13/14    sphincterotomy     Current Medications:   Current Facility-Administered Medications: atorvastatin (LIPITOR) tablet 40 mg, 40 mg, Oral, Daily  carvedilol (COREG) tablet 6.25 mg, 6.25 mg, Oral, BID WC  [Held by provider] cilostazol (PLETAL) tablet 100 mg, 100 mg, Oral, BID  dilTIAZem (CARDIZEM) tablet 60 mg, 60 mg, Oral, BID  docusate sodium (COLACE) capsule 100 mg, 100 mg, Oral, BID PRN  insulin glargine (LANTUS) injection vial 7 Units, 7 Units, Subcutaneous, Nightly  latanoprost (XALATAN) 0.005 % ophthalmic solution 1 drop, 1 drop, Both Eyes, Nightly  QUEtiapine (SEROQUEL) tablet 25 mg, 25 mg, Oral, QPM  sodium chloride flush 0.9 % injection 10 mL, 10 mL, Intravenous, 2 times per day  sodium chloride flush 0.9 % injection 10 mL, 10 mL, Intravenous, PRN  acetaminophen (TYLENOL) tablet 650 mg, 650 mg, Oral, Q6H PRN **OR** acetaminophen (TYLENOL) suppository 650 mg, 650 mg, Rectal, Q6H PRN  magnesium hydroxide (MILK OF MAGNESIA) 400 MG/5ML suspension 30 mL, 30 mL, Oral, Daily PRN  glucose (GLUTOSE) 40 % oral gel 15 g, 15 g, Oral, PRN  dextrose 50 % IV solution, 12.5 g, Intravenous, PRN  glucagon (rDNA) injection 1 mg, 1 mg, Intramuscular, PRN  dextrose 5 % solution, 100 mL/hr, Intravenous, PRN  insulin lispro (HUMALOG) injection vial 0-6 Units, 0-6 Units, Subcutaneous, TID WC  insulin lispro (HUMALOG) injection vial 0-3

## 2020-03-30 NOTE — PROGRESS NOTES
Pt received from ED and upon placing on the monitor, o2 was in the 70s. Oxygen was applied and patient was placed in high fowlers. O2 improved to low 80s. Chuckie Colon called and came to assess pt at bedside. Respiratory in to give breathing treatment and draw ABG. New orders placed. Pt had two episodes of emesis, green in appearance. Pt resting comfortably, o2 sat remains in the low 90s on 10L high flow cannula.

## 2020-03-31 ENCOUNTER — ANESTHESIA (OUTPATIENT)
Dept: ENDOSCOPY | Age: 80
DRG: 444 | End: 2020-03-31
Payer: MEDICARE

## 2020-03-31 ENCOUNTER — APPOINTMENT (OUTPATIENT)
Dept: GENERAL RADIOLOGY | Age: 80
DRG: 444 | End: 2020-03-31
Payer: MEDICARE

## 2020-03-31 ENCOUNTER — ANESTHESIA EVENT (OUTPATIENT)
Dept: ENDOSCOPY | Age: 80
DRG: 444 | End: 2020-03-31
Payer: MEDICARE

## 2020-03-31 VITALS
RESPIRATION RATE: 1 BRPM | DIASTOLIC BLOOD PRESSURE: 90 MMHG | TEMPERATURE: 97.7 F | OXYGEN SATURATION: 99 % | SYSTOLIC BLOOD PRESSURE: 113 MMHG

## 2020-03-31 LAB
ALBUMIN SERPL-MCNC: 3.3 GM/DL (ref 3.4–5)
ALP BLD-CCNC: 61 IU/L (ref 40–128)
ALT SERPL-CCNC: 16 U/L (ref 10–40)
AMYLASE: 43 U/L (ref 25–115)
ANION GAP SERPL CALCULATED.3IONS-SCNC: 12 MMOL/L (ref 4–16)
APTT: 40.2 SECONDS (ref 25.1–37.1)
AST SERPL-CCNC: 23 IU/L (ref 15–37)
BASOPHILS ABSOLUTE: 0 K/CU MM
BASOPHILS RELATIVE PERCENT: 0.2 % (ref 0–1)
BILIRUB SERPL-MCNC: 0.5 MG/DL (ref 0–1)
BUN BLDV-MCNC: 24 MG/DL (ref 6–23)
CALCIUM SERPL-MCNC: 8.8 MG/DL (ref 8.3–10.6)
CHLORIDE BLD-SCNC: 106 MMOL/L (ref 99–110)
CO2: 21 MMOL/L (ref 21–32)
CREAT SERPL-MCNC: 1.4 MG/DL (ref 0.9–1.3)
DIFFERENTIAL TYPE: ABNORMAL
EOSINOPHILS ABSOLUTE: 0.3 K/CU MM
EOSINOPHILS RELATIVE PERCENT: 2 % (ref 0–3)
FOLATE: 16 NG/ML (ref 3.1–17.5)
GFR AFRICAN AMERICAN: 59 ML/MIN/1.73M2
GFR NON-AFRICAN AMERICAN: 49 ML/MIN/1.73M2
GLUCOSE BLD-MCNC: 134 MG/DL (ref 70–99)
GLUCOSE BLD-MCNC: 146 MG/DL (ref 70–99)
GLUCOSE BLD-MCNC: 179 MG/DL (ref 70–99)
GLUCOSE BLD-MCNC: 200 MG/DL (ref 70–99)
GLUCOSE BLD-MCNC: 247 MG/DL (ref 70–99)
HCT VFR BLD CALC: 32.1 % (ref 42–52)
HEMOGLOBIN: 10.8 GM/DL (ref 13.5–18)
IMMATURE NEUTROPHIL %: 0.8 % (ref 0–0.43)
INR BLD: 1.28 INDEX
LYMPHOCYTES ABSOLUTE: 1.5 K/CU MM
LYMPHOCYTES RELATIVE PERCENT: 8.9 % (ref 24–44)
MCH RBC QN AUTO: 30.3 PG (ref 27–31)
MCHC RBC AUTO-ENTMCNC: 33.6 % (ref 32–36)
MCV RBC AUTO: 90.2 FL (ref 78–100)
MONOCYTES ABSOLUTE: 1.2 K/CU MM
MONOCYTES RELATIVE PERCENT: 7.1 % (ref 0–4)
NUCLEATED RBC %: 0 %
PDW BLD-RTO: 13.4 % (ref 11.7–14.9)
PLATELET # BLD: 152 K/CU MM (ref 140–440)
PMV BLD AUTO: 9 FL (ref 7.5–11.1)
POTASSIUM SERPL-SCNC: 3.8 MMOL/L (ref 3.5–5.1)
PROTHROMBIN TIME: 15.5 SECONDS (ref 11.7–14.5)
RBC # BLD: 3.56 M/CU MM (ref 4.6–6.2)
SEGMENTED NEUTROPHILS ABSOLUTE COUNT: 13.4 K/CU MM
SEGMENTED NEUTROPHILS RELATIVE PERCENT: 81 % (ref 36–66)
SODIUM BLD-SCNC: 139 MMOL/L (ref 135–145)
TOTAL IMMATURE NEUTOROPHIL: 0.14 K/CU MM
TOTAL NUCLEATED RBC: 0 K/CU MM
TOTAL PROTEIN: 5.4 GM/DL (ref 6.4–8.2)
TSH HIGH SENSITIVITY: 0.6 UIU/ML (ref 0.27–4.2)
VITAMIN B-12: 452.8 PG/ML (ref 211–911)
WBC # BLD: 16.5 K/CU MM (ref 4–10.5)

## 2020-03-31 PROCEDURE — 84443 ASSAY THYROID STIM HORMONE: CPT

## 2020-03-31 PROCEDURE — 80053 COMPREHEN METABOLIC PANEL: CPT

## 2020-03-31 PROCEDURE — 6370000000 HC RX 637 (ALT 250 FOR IP): Performed by: INTERNAL MEDICINE

## 2020-03-31 PROCEDURE — 3700000001 HC ADD 15 MINUTES (ANESTHESIA): Performed by: SPECIALIST

## 2020-03-31 PROCEDURE — C1726 CATH, BAL DIL, NON-VASCULAR: HCPCS | Performed by: SPECIALIST

## 2020-03-31 PROCEDURE — 2580000003 HC RX 258: Performed by: EMERGENCY MEDICINE

## 2020-03-31 PROCEDURE — 76000 FLUOROSCOPY <1 HR PHYS/QHP: CPT

## 2020-03-31 PROCEDURE — 82607 VITAMIN B-12: CPT

## 2020-03-31 PROCEDURE — 3609014300 HC ERCP BALLOON DILATE BILIARY/PANC DUCT/AMPULLA EA: Performed by: SPECIALIST

## 2020-03-31 PROCEDURE — C1769 GUIDE WIRE: HCPCS | Performed by: SPECIALIST

## 2020-03-31 PROCEDURE — 6360000002 HC RX W HCPCS: Performed by: SPECIALIST

## 2020-03-31 PROCEDURE — 85025 COMPLETE CBC W/AUTO DIFF WBC: CPT

## 2020-03-31 PROCEDURE — 2500000003 HC RX 250 WO HCPCS: Performed by: NURSE ANESTHETIST, CERTIFIED REGISTERED

## 2020-03-31 PROCEDURE — 2709999900 HC NON-CHARGEABLE SUPPLY: Performed by: SPECIALIST

## 2020-03-31 PROCEDURE — 85730 THROMBOPLASTIN TIME PARTIAL: CPT

## 2020-03-31 PROCEDURE — 80048 BASIC METABOLIC PNL TOTAL CA: CPT

## 2020-03-31 PROCEDURE — BF101ZZ FLUOROSCOPY OF BILE DUCTS USING LOW OSMOLAR CONTRAST: ICD-10-PCS | Performed by: FAMILY MEDICINE

## 2020-03-31 PROCEDURE — 6360000004 HC RX CONTRAST MEDICATION: Performed by: SPECIALIST

## 2020-03-31 PROCEDURE — 6360000002 HC RX W HCPCS: Performed by: NURSE ANESTHETIST, CERTIFIED REGISTERED

## 2020-03-31 PROCEDURE — 2580000003 HC RX 258: Performed by: NURSE ANESTHETIST, CERTIFIED REGISTERED

## 2020-03-31 PROCEDURE — 3700000000 HC ANESTHESIA ATTENDED CARE: Performed by: SPECIALIST

## 2020-03-31 PROCEDURE — 85610 PROTHROMBIN TIME: CPT

## 2020-03-31 PROCEDURE — 7100000000 HC PACU RECOVERY - FIRST 15 MIN: Performed by: SPECIALIST

## 2020-03-31 PROCEDURE — 0FC98ZZ EXTIRPATION OF MATTER FROM COMMON BILE DUCT, VIA NATURAL OR ARTIFICIAL OPENING ENDOSCOPIC: ICD-10-PCS | Performed by: FAMILY MEDICINE

## 2020-03-31 PROCEDURE — 6370000000 HC RX 637 (ALT 250 FOR IP): Performed by: ANESTHESIOLOGY

## 2020-03-31 PROCEDURE — 36415 COLL VENOUS BLD VENIPUNCTURE: CPT

## 2020-03-31 PROCEDURE — 2720000010 HC SURG SUPPLY STERILE: Performed by: SPECIALIST

## 2020-03-31 PROCEDURE — 82150 ASSAY OF AMYLASE: CPT

## 2020-03-31 PROCEDURE — 7100000001 HC PACU RECOVERY - ADDTL 15 MIN: Performed by: SPECIALIST

## 2020-03-31 PROCEDURE — 82962 GLUCOSE BLOOD TEST: CPT

## 2020-03-31 PROCEDURE — 82746 ASSAY OF FOLIC ACID SERUM: CPT

## 2020-03-31 PROCEDURE — 1200000000 HC SEMI PRIVATE

## 2020-03-31 PROCEDURE — 6370000000 HC RX 637 (ALT 250 FOR IP): Performed by: NURSE PRACTITIONER

## 2020-03-31 PROCEDURE — 6370000000 HC RX 637 (ALT 250 FOR IP): Performed by: PSYCHIATRY & NEUROLOGY

## 2020-03-31 PROCEDURE — 0F798ZZ DILATION OF COMMON BILE DUCT, VIA NATURAL OR ARTIFICIAL OPENING ENDOSCOPIC: ICD-10-PCS | Performed by: FAMILY MEDICINE

## 2020-03-31 RX ORDER — SODIUM CHLORIDE 9 MG/ML
INJECTION, SOLUTION INTRAVENOUS CONTINUOUS PRN
Status: DISCONTINUED | OUTPATIENT
Start: 2020-03-31 | End: 2020-03-31 | Stop reason: SDUPTHER

## 2020-03-31 RX ORDER — DEXAMETHASONE SODIUM PHOSPHATE 4 MG/ML
INJECTION, SOLUTION INTRA-ARTICULAR; INTRALESIONAL; INTRAMUSCULAR; INTRAVENOUS; SOFT TISSUE PRN
Status: DISCONTINUED | OUTPATIENT
Start: 2020-03-31 | End: 2020-03-31 | Stop reason: SDUPTHER

## 2020-03-31 RX ORDER — ROCURONIUM BROMIDE 10 MG/ML
INJECTION, SOLUTION INTRAVENOUS PRN
Status: DISCONTINUED | OUTPATIENT
Start: 2020-03-31 | End: 2020-03-31 | Stop reason: SDUPTHER

## 2020-03-31 RX ORDER — ONDANSETRON 2 MG/ML
4 INJECTION INTRAMUSCULAR; INTRAVENOUS
Status: DISCONTINUED | OUTPATIENT
Start: 2020-03-31 | End: 2020-03-31 | Stop reason: HOSPADM

## 2020-03-31 RX ORDER — FENTANYL CITRATE 50 UG/ML
25 INJECTION, SOLUTION INTRAMUSCULAR; INTRAVENOUS EVERY 5 MIN PRN
Status: DISCONTINUED | OUTPATIENT
Start: 2020-03-31 | End: 2020-03-31 | Stop reason: HOSPADM

## 2020-03-31 RX ORDER — PROPOFOL 10 MG/ML
INJECTION, EMULSION INTRAVENOUS PRN
Status: DISCONTINUED | OUTPATIENT
Start: 2020-03-31 | End: 2020-03-31 | Stop reason: SDUPTHER

## 2020-03-31 RX ORDER — LIDOCAINE HYDROCHLORIDE 20 MG/ML
INJECTION, SOLUTION INFILTRATION; PERINEURAL PRN
Status: DISCONTINUED | OUTPATIENT
Start: 2020-03-31 | End: 2020-03-31 | Stop reason: SDUPTHER

## 2020-03-31 RX ORDER — MEMANTINE HYDROCHLORIDE 5 MG/1
5 TABLET ORAL DAILY
Status: DISCONTINUED | OUTPATIENT
Start: 2020-04-01 | End: 2020-04-01 | Stop reason: HOSPADM

## 2020-03-31 RX ORDER — HYDRALAZINE HYDROCHLORIDE 20 MG/ML
5 INJECTION INTRAMUSCULAR; INTRAVENOUS EVERY 10 MIN PRN
Status: DISCONTINUED | OUTPATIENT
Start: 2020-03-31 | End: 2020-03-31 | Stop reason: HOSPADM

## 2020-03-31 RX ORDER — ONDANSETRON 2 MG/ML
INJECTION INTRAMUSCULAR; INTRAVENOUS PRN
Status: DISCONTINUED | OUTPATIENT
Start: 2020-03-31 | End: 2020-03-31 | Stop reason: SDUPTHER

## 2020-03-31 RX ADMIN — DILTIAZEM HYDROCHLORIDE 60 MG: 60 TABLET, FILM COATED ORAL at 22:37

## 2020-03-31 RX ADMIN — MIRTAZAPINE 15 MG: 15 TABLET, FILM COATED ORAL at 17:33

## 2020-03-31 RX ADMIN — INSULIN HUMAN 3 UNITS: 100 INJECTION, SOLUTION PARENTERAL at 09:01

## 2020-03-31 RX ADMIN — CIPROFLOXACIN 400 MG: 2 INJECTION, SOLUTION INTRAVENOUS at 02:22

## 2020-03-31 RX ADMIN — CIPROFLOXACIN 400 MG: 2 INJECTION, SOLUTION INTRAVENOUS at 13:44

## 2020-03-31 RX ADMIN — DILTIAZEM HYDROCHLORIDE 60 MG: 60 TABLET, FILM COATED ORAL at 10:37

## 2020-03-31 RX ADMIN — INSULIN LISPRO 2 UNITS: 100 INJECTION, SOLUTION INTRAVENOUS; SUBCUTANEOUS at 17:37

## 2020-03-31 RX ADMIN — SODIUM CHLORIDE: 9 INJECTION, SOLUTION INTRAVENOUS at 06:47

## 2020-03-31 RX ADMIN — CARVEDILOL 6.25 MG: 6.25 TABLET, FILM COATED ORAL at 10:37

## 2020-03-31 RX ADMIN — QUETIAPINE FUMARATE 25 MG: 25 TABLET ORAL at 17:33

## 2020-03-31 RX ADMIN — ONDANSETRON 4 MG: 2 INJECTION INTRAMUSCULAR; INTRAVENOUS at 08:08

## 2020-03-31 RX ADMIN — PROPOFOL 120 MG: 10 INJECTION, EMULSION INTRAVENOUS at 07:28

## 2020-03-31 RX ADMIN — SODIUM CHLORIDE: 9 INJECTION, SOLUTION INTRAVENOUS at 02:26

## 2020-03-31 RX ADMIN — BENZOCAINE AND MENTHOL 1 LOZENGE: 15; 3.6 LOZENGE ORAL at 18:29

## 2020-03-31 RX ADMIN — OXYCODONE HYDROCHLORIDE AND ACETAMINOPHEN 1 TABLET: 5; 325 TABLET ORAL at 20:27

## 2020-03-31 RX ADMIN — DEXAMETHASONE SODIUM PHOSPHATE 4 MG: 4 INJECTION, SOLUTION INTRAMUSCULAR; INTRAVENOUS at 07:32

## 2020-03-31 RX ADMIN — SODIUM CHLORIDE: 9 INJECTION, SOLUTION INTRAVENOUS at 10:29

## 2020-03-31 RX ADMIN — ACETAMINOPHEN 650 MG: 325 TABLET, FILM COATED ORAL at 02:26

## 2020-03-31 RX ADMIN — LIDOCAINE HYDROCHLORIDE 100 MG: 20 INJECTION, SOLUTION INFILTRATION; PERINEURAL at 07:28

## 2020-03-31 RX ADMIN — PHENYLEPHRINE HYDROCHLORIDE 100 MCG: 10 INJECTION INTRAVENOUS at 07:37

## 2020-03-31 RX ADMIN — PHENYLEPHRINE HYDROCHLORIDE 150 MCG: 10 INJECTION INTRAVENOUS at 07:33

## 2020-03-31 RX ADMIN — INSULIN GLARGINE 7 UNITS: 100 INJECTION, SOLUTION SUBCUTANEOUS at 22:39

## 2020-03-31 RX ADMIN — SUGAMMADEX 200 MG: 100 INJECTION, SOLUTION INTRAVENOUS at 08:08

## 2020-03-31 RX ADMIN — LATANOPROST 1 DROP: 50 SOLUTION OPHTHALMIC at 20:27

## 2020-03-31 RX ADMIN — ATORVASTATIN CALCIUM 40 MG: 40 TABLET, FILM COATED ORAL at 10:37

## 2020-03-31 RX ADMIN — ROCURONIUM BROMIDE 40 MG: 10 INJECTION INTRAVENOUS at 07:28

## 2020-03-31 RX ADMIN — SODIUM CHLORIDE: 9 INJECTION, SOLUTION INTRAVENOUS at 22:41

## 2020-03-31 RX ADMIN — GLUCAGON HYDROCHLORIDE 1 MG: KIT at 07:43

## 2020-03-31 RX ADMIN — CARVEDILOL 6.25 MG: 6.25 TABLET, FILM COATED ORAL at 17:33

## 2020-03-31 ASSESSMENT — PULMONARY FUNCTION TESTS
PIF_VALUE: 17
PIF_VALUE: 1
PIF_VALUE: 16
PIF_VALUE: 0
PIF_VALUE: 16
PIF_VALUE: 16
PIF_VALUE: 1
PIF_VALUE: 16
PIF_VALUE: 17
PIF_VALUE: 16
PIF_VALUE: 0
PIF_VALUE: 14
PIF_VALUE: 16
PIF_VALUE: 15
PIF_VALUE: 16
PIF_VALUE: 16
PIF_VALUE: 1
PIF_VALUE: 15
PIF_VALUE: 1
PIF_VALUE: 16
PIF_VALUE: 0
PIF_VALUE: 16
PIF_VALUE: 15
PIF_VALUE: 14
PIF_VALUE: 15
PIF_VALUE: 16
PIF_VALUE: 16
PIF_VALUE: 1
PIF_VALUE: 15
PIF_VALUE: 4
PIF_VALUE: 0
PIF_VALUE: 15
PIF_VALUE: 1
PIF_VALUE: 0
PIF_VALUE: 1
PIF_VALUE: 0
PIF_VALUE: 18
PIF_VALUE: 16
PIF_VALUE: 16
PIF_VALUE: 1
PIF_VALUE: 20
PIF_VALUE: 0
PIF_VALUE: 16
PIF_VALUE: 16
PIF_VALUE: 0
PIF_VALUE: 16
PIF_VALUE: 15
PIF_VALUE: 16
PIF_VALUE: 0
PIF_VALUE: 0
PIF_VALUE: 16
PIF_VALUE: 16
PIF_VALUE: 0
PIF_VALUE: 0
PIF_VALUE: 16
PIF_VALUE: 1
PIF_VALUE: 0
PIF_VALUE: 15
PIF_VALUE: 16
PIF_VALUE: 17
PIF_VALUE: 17

## 2020-03-31 ASSESSMENT — PAIN SCALES - GENERAL
PAINLEVEL_OUTOF10: 0
PAINLEVEL_OUTOF10: 4
PAINLEVEL_OUTOF10: 3

## 2020-03-31 ASSESSMENT — PAIN DESCRIPTION - LOCATION: LOCATION: LEG

## 2020-03-31 ASSESSMENT — PAIN DESCRIPTION - PAIN TYPE: TYPE: PHANTOM PAIN;CHRONIC PAIN

## 2020-03-31 ASSESSMENT — PAIN DESCRIPTION - ORIENTATION: ORIENTATION: RIGHT;LEFT

## 2020-03-31 NOTE — ANESTHESIA PRE PROCEDURE
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    Choledocholithiasis K80.50    COPD (chronic obstructive pulmonary disease) ([facility] J44.9    Chest pain R07.9    Abdominal pain, epigastric R10.13    Elevated LFTs R94.5    Abnormal findings on imaging of biliary tract R93.2    Chest pain R07.9    Coronary artery disease involving coronary bypass graft of native heart without angina pectoris I25.810    Acute encephalopathy G93.40    Vomiting R11.10    Sepsis (HCC) A41.9    Nausea vomiting and diarrhea R11.2, R19.7    Fever R50.9    Confusion R41.0       Past Medical History:        Diagnosis Date    Arthritis     Biliary stones 2015    CAD (coronary artery disease)     COPD (chronic obstructive pulmonary disease) ([facility]     Diabetes mellitus ([facility]     History of blood transfusion     Hx of angiography 3/11/2014    Pulmonary Arteries: Small sliding hiatal hernia. Mod coronary artery calcifications. Mild bilat gynecomastia.     Hyperlipidemia     Hypertension     Kidney stone        Past Surgical History:        Procedure Laterality Date    ABDOMEN SURGERY      stones in bile duct removed x3    CARDIAC SURGERY      CHOLECYSTECTOMY      CORONARY ANGIOPLASTY WITH STENT PLACEMENT      DILATATION, ESOPHAGUS      ERCP  03/13/14    w/ dilation of papilla    ERCP  9/11/14    ERCP  10/24/15    extractions stones, balloon dilatation     ERCP  03/18/2017    stone extraction     ERCP N/A 1/25/2019    ERCP STONE REMOVAL/ DILATATION OF PAPILLA WITH 10-12MM AND 12-15MM BALLOON AND 9-12MM, 12-15MM  EXTRACTOR BALLOON USED FOR REMOVAL OF MULTIPLE  CBD STONES performed by Zhang Pena MD at [facility] ERCP N/A 9/7/2019    ERCP STONE REMOVAL, DILATATION OF PAPILLA WITH 10-12MM BALLOON, AND EXTRACTOR BALLOON 12-15MM USED FOR REMOVAL OF CBD STONE AND SLUDE performed by Juanita Liu MD at [place] [address]      LEG AMPUTATION BELOW KNEE Bilateral     OTHER SURGICAL HISTORY  03/13/14    sphincterotomy       Social History:    Social History     Tobacco Use    Smoking status: Never Smoker    Smokeless tobacco: Never Used   Substance Use Topics    Alcohol use: No                                Counseling given: Not Answered      Vital Signs (Current):   Vitals:    03/30/20 1516 03/30/20 1621 03/30/20 2010 03/30/20 2012   BP: (!) 98/42  (!) 127/52 (!) 127/52   Pulse:    84   Resp:    20   Temp:    37.1 °C (98.7 °F)   TempSrc:    Oral   SpO2:  (!) 86%  93%   Weight:       Height:                                                  BP Readings from Last 3 Encounters:   03/30/20 (!) 127/52   09/10/19 (!) 104/59   09/07/19 (!) 144/111       NPO Status:                                                                                 BMI:   Wt Readings from Last 3 Encounters:   03/30/20 135 lb 8 oz (61.5 kg)   09/09/19 142 lb 8 oz (64.6 kg)   01/31/19 159 lb 8 oz (72.3 kg)     Body mass index is 18.9 kg/m².     CBC:   Lab Results   Component Value Date    WBC 10.6 03/29/2020    RBC 4.11 03/29/2020    HGB 12.2 03/29/2020    HCT 35.5 03/29/2020    MCV 86.4 03/29/2020    RDW 13.1 03/29/2020     03/29/2020       CMP:   Lab Results   Component Value Date     03/29/2020    K 4.2 03/29/2020    CL 98 03/29/2020    CO2 19 03/29/2020    BUN 16 03/29/2020    CREATININE 1.2 03/29/2020    GFRAA >60 03/29/2020    LABGLOM 58 03/29/2020    GLUCOSE 227 03/29/2020    PROT 6.6 03/29/2020    PROT 8.0 08/30/2012    CALCIUM 9.5 03/29/2020    BILITOT 1.2 03/29/2020    ALKPHOS 69 03/29/2020

## 2020-03-31 NOTE — H&P
I have examined the patient immediately before the procedure and there is no change in the previous history or physical exam
mouth 2 times daily.  carvedilol (COREG) 6.25 MG tablet Take 6.25 mg by mouth 2 times daily (with meals).  aspirin 325 MG tablet Take 325 mg by mouth daily.  insulin lispro (HUMALOG) 100 UNIT/ML injection Inject  into the skin 3 times daily (before meals). Sliding scale            Allergies  Allergies   Allergen Reactions    Byetta 10 Mcg Pen [Exenatide]     Phenylephrine     Sulbactam     Ampicillin Rash    Aricept [Donepezil Hydrochloride] Other (See Comments)     Hallucinations, confusion    Benztropine Other (See Comments)     Hallucinations, confusion    Celebrex [Celecoxib] Other (See Comments)     'causes him to be nervous and jittery\"    Diphenhydramine Hcl Other (See Comments)     nervous and jittery    Diphenhydramine Hcl [Diphenhydramine] Anxiety    Exenatide Other (See Comments)     Causes him to be nervous and jittery    Metoprolol Succinate Other (See Comments)     Hallucinations     Phenergan [Promethazine Hcl] Other (See Comments)     Hallucinations, nervous, jittery    Plavix [Clopidogrel Bisulfate] Other (See Comments)     Causes bleeding in his stomach    Pseudoephedrine Anxiety    Reglan [Metoclopramide Hcl] Other (See Comments)     \"Caused him to just sit in a chair and rock back and forth\"       REVIEW OF SYSTEMS   Within above limitations. 14 point review of systems reviewed. Pertinent positive or negative as per HPI or otherwise negative per 14 point systems review. PHYSICAL EXAM     Wt Readings from Last 3 Encounters:   03/29/20 150 lb (68 kg)   09/09/19 142 lb 8 oz (64.6 kg)   01/31/19 159 lb 8 oz (72.3 kg)       Blood pressure (!) 148/85, pulse 87, temperature 98.4 °F (36.9 °C), temperature source Oral, resp. rate 18, height 5' 11\" (1.803 m), weight 150 lb (68 kg), SpO2 98 %. General - AAO x 2, incontinent in his own stretcher, states that he wants help getting cleaned up  Psych - Appropriate affect/speech.  No agitation, wriggling in bed, however

## 2020-03-31 NOTE — PROGRESS NOTES
(CIPRO) IVPB 400 mg, 400 mg, Intravenous, Q12H  [START ON 4/1/2020] aspirin tablet 325 mg, 325 mg, Oral, Daily  [START ON 4/1/2020] enoxaparin (LOVENOX) injection 40 mg, 40 mg, Subcutaneous, Daily  rivastigmine (EXELON) 4.6 MG/24HR 1 patch, 1 patch, Transdermal, Daily  mirtazapine (REMERON) tablet 15 mg, 15 mg, Oral, Dinner  0.9 % sodium chloride infusion, , Intravenous, Continuous  Physical    VITALS:  BP (!) 133/57   Pulse 73   Temp 98.3 °F (36.8 °C) (Temporal)   Resp 17   Ht 5' 11\" (1.803 m)   Wt 135 lb 1.6 oz (61.3 kg)   SpO2 96%   BMI 18.84 kg/m²   Gen - A & O x 1  HEENT -  PERRLA, EOMI  CV - RRR no M/G/R, normal s1, s2  Lungs - CTA bilaterally no W/C/R  Abd - soft, NT, ND  Ext - BKA bilateral    Data    CBC:   Lab Results   Component Value Date    WBC 16.5 03/31/2020    RBC 3.56 03/31/2020    HGB 10.8 03/31/2020    HCT 32.1 03/31/2020    MCV 90.2 03/31/2020    MCH 30.3 03/31/2020    MCHC 33.6 03/31/2020    RDW 13.4 03/31/2020     03/31/2020    MPV 9.0 03/31/2020       ASSESSMENT AND PLAN        Joaquín Ramos is a [de-identified] y.o. male p/w     Bizarre behavior, with possible hallucinations  -No source of reversible infection found, admitted for observation  -Possible sundowning as patient was progressively becoming more confused in the emergency department  -However no diagnosed dementia, will consult neurology  -Consider MRI  -Neurochecks every 4hr  -Continue Seroquel nightly     Nausea and vomiting witness on the medical floor  -Patient complained of right upper quadrant pain  -Continue antiemetics  -Follow-up lipase  -Serial abdominal exam  -GI consult      HTN/hyperlipidemia  - cardizem and coreg  -Atorvastatin     Type 2 diabetes- uncontrolled  Lantus and ISS, TIDAC finger stick  Hypoglycemic protocol  F/u A1c    Gall stones - removed by ERCP today         Cecilia Silva MD, MSc

## 2020-03-31 NOTE — ANESTHESIA PRE PROCEDURE
Department of Anesthesiology  Preprocedure Note       Name:  Dwayne Kaur   Age:  [de-identified] y.o.  :  1940                                          MRN:  [id number]         Date:  3/31/2020      Surgeon: Siddhartha Perales):  Jenny Xavier MD    Procedure: ERCP DIAGNOSTIC (N/A )    Medications prior to admission:   Prior to Admission medications    Medication Sig Start Date End Date Taking? Authorizing Provider   QUEtiapine (SEROQUEL) 50 MG tablet Take 1 tablet by mouth 2 times daily 9/10/19 10/10/19  Ada Padilla MD   pantoprazole (PROTONIX) 40 MG tablet Take 1 tablet by mouth every morning (before breakfast) 9/10/19   Ada Padilla MD   insulin glargine (LANTUS) 100 UNIT/ML injection vial Inject 15 Units into the skin nightly 19   Adrienne Ruiz DO   gabapentin (NEURONTIN) 600 MG tablet Take 600 mg by mouth 3 times daily as needed. Gordy Semen Historical Provider, MD   atorvastatin (LIPITOR) 40 MG tablet Take 40 mg by mouth daily    Historical Provider, MD   docusate sodium (COLACE) 100 MG capsule Take 100 mg by mouth nightly as needed     Historical Provider, MD   latanoprost (XALATAN) 0.005 % ophthalmic solution 1 drop nightly    Historical Provider, MD   diltiazem (CARDIZEM) 60 MG tablet Take 1 tablet by mouth 2 times daily 3/19/17   Lalita Buckner DO   hydrocortisone valerate (WESTCORT) 0.2 % ointment Apply topically twice daily to rash as needed 10/25/14   Ame Delaney MD   Multiple Vitamins-Minerals (MULTIVITAMIN PO) Take 1 tablet by mouth daily. Historical Provider, MD   cilostazol (PLETAL) 100 MG tablet Take 100 mg by mouth 2 times daily. Historical Provider, MD   carvedilol (COREG) 6.25 MG tablet Take 6.25 mg by mouth 2 times daily (with meals). Historical Provider, MD   aspirin 325 MG tablet Take 325 mg by mouth daily. Historical Provider, MD   insulin lispro (HUMALOG) 100 UNIT/ML injection Inject  into the skin 3 times daily (before meals).  Sliding scale     Historical Provider, MD

## 2020-03-31 NOTE — CARE COORDINATION
Pt has been approved to go to ARU when medically ready per Berkley/LILIA. Notified Jeannine/Quentin that pt is going to ARU. Notified Dr Merlinda Face. PT WILL NEED DISCHARGE-READMIT ORDERS WHEN D/C'D. Dr Merlinda Face informed CM that pt should be ready for d/c tomorrow.   TE

## 2020-03-31 NOTE — PLAN OF CARE
Problem: Falls - Risk of:  Goal: Will remain free from falls  Description: Will remain free from falls  3/31/2020 0012 by Melania Colorado RN  Outcome: Ongoing  3/30/2020 1724 by Camron Fishman LPN  Outcome: Ongoing  Goal: Absence of physical injury  Description: Absence of physical injury  3/31/2020 0012 by Melania Colorado RN  Outcome: Ongoing  3/30/2020 1724 by Camron Fishman LPN  Outcome: Ongoing     Problem: SAFETY  Goal: Free from accidental physical injury  3/31/2020 0012 by Melania Colorado RN  Outcome: Ongoing  3/30/2020 1724 by Camron Fishman LPN  Outcome: Ongoing     Problem: DAILY CARE  Goal: Daily care needs are met  3/31/2020 0012 by Melania Colorado RN  Outcome: Ongoing  3/30/2020 1724 by Camron Fishman LPN  Outcome: Ongoing     Problem: PAIN  Goal: Patient's pain/discomfort is manageable  3/31/2020 0012 by Melania Colorado RN  Outcome: Ongoing  3/30/2020 1724 by Camron Fishman LPN  Outcome: Ongoing     Problem: SKIN INTEGRITY  Goal: Skin integrity is maintained or improved  3/31/2020 0012 by Melania Colorado RN  Outcome: Ongoing  3/30/2020 1724 by Camron Fishman LPN  Outcome: Ongoing     Problem: KNOWLEDGE DEFICIT  Goal: Patient/S.O. demonstrates understanding of disease process, treatment plan, medications, and discharge instructions.   3/31/2020 0012 by Melania Colorado RN  Outcome: Ongoing  3/30/2020 1724 by Camron Fishman LPN  Outcome: Ongoing     Problem: DISCHARGE BARRIERS  Goal: Patient's continuum of care needs are met  3/31/2020 0012 by Melania Colorado RN  Outcome: Ongoing  3/30/2020 1724 by Camron Fishman LPN  Outcome: Ongoing

## 2020-04-01 ENCOUNTER — HOSPITAL ENCOUNTER (INPATIENT)
Age: 80
LOS: 10 days | Discharge: HOME HEALTH | DRG: 071 | End: 2020-04-11
Attending: PHYSICAL MEDICINE & REHABILITATION | Admitting: PHYSICAL MEDICINE & REHABILITATION
Payer: MEDICARE

## 2020-04-01 VITALS
OXYGEN SATURATION: 97 % | TEMPERATURE: 97.6 F | HEIGHT: 71 IN | BODY MASS INDEX: 19.47 KG/M2 | RESPIRATION RATE: 20 BRPM | HEART RATE: 76 BPM | SYSTOLIC BLOOD PRESSURE: 158 MMHG | WEIGHT: 139.1 LBS | DIASTOLIC BLOOD PRESSURE: 64 MMHG

## 2020-04-01 PROBLEM — R41.0 CONFUSION: Status: RESOLVED | Noted: 2020-03-30 | Resolved: 2020-04-01

## 2020-04-01 PROBLEM — G93.41 METABOLIC ENCEPHALOPATHY: Status: ACTIVE | Noted: 2020-04-01

## 2020-04-01 PROBLEM — E44.0 MODERATE MALNUTRITION (HCC): Chronic | Status: ACTIVE | Noted: 2020-04-01

## 2020-04-01 PROBLEM — E44.0 MODERATE MALNUTRITION (HCC): Chronic | Status: RESOLVED | Noted: 2020-04-01 | Resolved: 2020-04-01

## 2020-04-01 LAB
ALBUMIN SERPL-MCNC: 3.3 GM/DL (ref 3.4–5)
ALP BLD-CCNC: 68 IU/L (ref 40–129)
ALT SERPL-CCNC: 19 U/L (ref 10–40)
ANION GAP SERPL CALCULATED.3IONS-SCNC: 11 MMOL/L (ref 4–16)
AST SERPL-CCNC: 25 IU/L (ref 15–37)
BILIRUB SERPL-MCNC: 0.4 MG/DL (ref 0–1)
BUN BLDV-MCNC: 21 MG/DL (ref 6–23)
CALCIUM SERPL-MCNC: 8.7 MG/DL (ref 8.3–10.6)
CHLORIDE BLD-SCNC: 107 MMOL/L (ref 99–110)
CO2: 21 MMOL/L (ref 21–32)
CREAT SERPL-MCNC: 1 MG/DL (ref 0.9–1.3)
GFR AFRICAN AMERICAN: >60 ML/MIN/1.73M2
GFR NON-AFRICAN AMERICAN: >60 ML/MIN/1.73M2
GLUCOSE BLD-MCNC: 134 MG/DL (ref 70–99)
GLUCOSE BLD-MCNC: 152 MG/DL (ref 70–99)
GLUCOSE BLD-MCNC: 153 MG/DL (ref 70–99)
GLUCOSE BLD-MCNC: 205 MG/DL (ref 70–99)
GLUCOSE BLD-MCNC: 279 MG/DL (ref 70–99)
HCT VFR BLD CALC: 32.9 % (ref 42–52)
HEMOGLOBIN: 10.7 GM/DL (ref 13.5–18)
LIPASE: 13 IU/L (ref 13–60)
MCH RBC QN AUTO: 29.6 PG (ref 27–31)
MCHC RBC AUTO-ENTMCNC: 32.5 % (ref 32–36)
MCV RBC AUTO: 90.9 FL (ref 78–100)
PDW BLD-RTO: 13.3 % (ref 11.7–14.9)
PLATELET # BLD: 190 K/CU MM (ref 140–440)
PMV BLD AUTO: 9.6 FL (ref 7.5–11.1)
POTASSIUM SERPL-SCNC: 3.6 MMOL/L (ref 3.5–5.1)
RBC # BLD: 3.62 M/CU MM (ref 4.6–6.2)
SODIUM BLD-SCNC: 139 MMOL/L (ref 135–145)
TOTAL PROTEIN: 5.5 GM/DL (ref 6.4–8.2)
WBC # BLD: 16 K/CU MM (ref 4–10.5)

## 2020-04-01 PROCEDURE — 2700000000 HC OXYGEN THERAPY PER DAY

## 2020-04-01 PROCEDURE — 92610 EVALUATE SWALLOWING FUNCTION: CPT

## 2020-04-01 PROCEDURE — 80053 COMPREHEN METABOLIC PANEL: CPT

## 2020-04-01 PROCEDURE — 6370000000 HC RX 637 (ALT 250 FOR IP): Performed by: PSYCHIATRY & NEUROLOGY

## 2020-04-01 PROCEDURE — 82962 GLUCOSE BLOOD TEST: CPT

## 2020-04-01 PROCEDURE — 99223 1ST HOSP IP/OBS HIGH 75: CPT | Performed by: PHYSICAL MEDICINE & REHABILITATION

## 2020-04-01 PROCEDURE — 6370000000 HC RX 637 (ALT 250 FOR IP): Performed by: INTERNAL MEDICINE

## 2020-04-01 PROCEDURE — 83690 ASSAY OF LIPASE: CPT

## 2020-04-01 PROCEDURE — 2580000003 HC RX 258: Performed by: EMERGENCY MEDICINE

## 2020-04-01 PROCEDURE — 6370000000 HC RX 637 (ALT 250 FOR IP): Performed by: NURSE PRACTITIONER

## 2020-04-01 PROCEDURE — 85025 COMPLETE CBC W/AUTO DIFF WBC: CPT

## 2020-04-01 PROCEDURE — 6360000002 HC RX W HCPCS: Performed by: SPECIALIST

## 2020-04-01 PROCEDURE — 94761 N-INVAS EAR/PLS OXIMETRY MLT: CPT

## 2020-04-01 PROCEDURE — 1280000000 HC REHAB R&B

## 2020-04-01 PROCEDURE — 2580000003 HC RX 258: Performed by: INTERNAL MEDICINE

## 2020-04-01 PROCEDURE — 6370000000 HC RX 637 (ALT 250 FOR IP): Performed by: SPECIALIST

## 2020-04-01 PROCEDURE — 85027 COMPLETE CBC AUTOMATED: CPT

## 2020-04-01 RX ORDER — SODIUM CHLORIDE 0.9 % (FLUSH) 0.9 %
10 SYRINGE (ML) INJECTION EVERY 12 HOURS SCHEDULED
Status: CANCELLED | OUTPATIENT
Start: 2020-04-01

## 2020-04-01 RX ORDER — ASPIRIN 325 MG
325 TABLET ORAL DAILY
Status: DISCONTINUED | OUTPATIENT
Start: 2020-04-02 | End: 2020-04-11 | Stop reason: HOSPADM

## 2020-04-01 RX ORDER — OXYCODONE HYDROCHLORIDE AND ACETAMINOPHEN 5; 325 MG/1; MG/1
1 TABLET ORAL EVERY 4 HOURS PRN
Status: DISCONTINUED | OUTPATIENT
Start: 2020-04-01 | End: 2020-04-11 | Stop reason: HOSPADM

## 2020-04-01 RX ORDER — INSULIN GLARGINE 100 [IU]/ML
7 INJECTION, SOLUTION SUBCUTANEOUS NIGHTLY
Status: CANCELLED | OUTPATIENT
Start: 2020-04-01

## 2020-04-01 RX ORDER — CIPROFLOXACIN 2 MG/ML
400 INJECTION, SOLUTION INTRAVENOUS EVERY 12 HOURS
Status: DISCONTINUED | OUTPATIENT
Start: 2020-04-02 | End: 2020-04-07

## 2020-04-01 RX ORDER — ONDANSETRON 2 MG/ML
4 INJECTION INTRAMUSCULAR; INTRAVENOUS EVERY 6 HOURS PRN
Status: DISCONTINUED | OUTPATIENT
Start: 2020-04-01 | End: 2020-04-09

## 2020-04-01 RX ORDER — IPRATROPIUM BROMIDE AND ALBUTEROL SULFATE 2.5; .5 MG/3ML; MG/3ML
1 SOLUTION RESPIRATORY (INHALATION) EVERY 4 HOURS PRN
Status: CANCELLED | OUTPATIENT
Start: 2020-04-01

## 2020-04-01 RX ORDER — ATORVASTATIN CALCIUM 40 MG/1
40 TABLET, FILM COATED ORAL DAILY
Status: CANCELLED | OUTPATIENT
Start: 2020-04-02

## 2020-04-01 RX ORDER — NICOTINE POLACRILEX 4 MG
15 LOZENGE BUCCAL PRN
Status: DISCONTINUED | OUTPATIENT
Start: 2020-04-01 | End: 2020-04-11 | Stop reason: HOSPADM

## 2020-04-01 RX ORDER — ACETAMINOPHEN 325 MG/1
650 TABLET ORAL EVERY 6 HOURS PRN
Status: DISCONTINUED | OUTPATIENT
Start: 2020-04-01 | End: 2020-04-11 | Stop reason: HOSPADM

## 2020-04-01 RX ORDER — CARVEDILOL 6.25 MG/1
6.25 TABLET ORAL 2 TIMES DAILY WITH MEALS
Status: CANCELLED | OUTPATIENT
Start: 2020-04-01

## 2020-04-01 RX ORDER — LATANOPROST 50 UG/ML
1 SOLUTION/ DROPS OPHTHALMIC NIGHTLY
Status: CANCELLED | OUTPATIENT
Start: 2020-04-01

## 2020-04-01 RX ORDER — MIRTAZAPINE 15 MG/1
15 TABLET, FILM COATED ORAL
Status: CANCELLED | OUTPATIENT
Start: 2020-04-01

## 2020-04-01 RX ORDER — MEMANTINE HYDROCHLORIDE 5 MG/1
5 TABLET ORAL DAILY
Status: CANCELLED | OUTPATIENT
Start: 2020-04-02

## 2020-04-01 RX ORDER — IPRATROPIUM BROMIDE AND ALBUTEROL SULFATE 2.5; .5 MG/3ML; MG/3ML
1 SOLUTION RESPIRATORY (INHALATION) EVERY 4 HOURS PRN
Status: DISCONTINUED | OUTPATIENT
Start: 2020-04-01 | End: 2020-04-11 | Stop reason: HOSPADM

## 2020-04-01 RX ORDER — DILTIAZEM HYDROCHLORIDE 60 MG/1
60 TABLET, FILM COATED ORAL 2 TIMES DAILY
Status: DISCONTINUED | OUTPATIENT
Start: 2020-04-01 | End: 2020-04-11 | Stop reason: HOSPADM

## 2020-04-01 RX ORDER — ONDANSETRON 2 MG/ML
4 INJECTION INTRAMUSCULAR; INTRAVENOUS EVERY 6 HOURS PRN
Status: CANCELLED | OUTPATIENT
Start: 2020-04-01

## 2020-04-01 RX ORDER — CILOSTAZOL 100 MG/1
100 TABLET ORAL 2 TIMES DAILY
Status: DISCONTINUED | OUTPATIENT
Start: 2020-04-01 | End: 2020-04-11 | Stop reason: HOSPADM

## 2020-04-01 RX ORDER — QUETIAPINE FUMARATE 25 MG/1
25 TABLET, FILM COATED ORAL EVERY EVENING
Status: CANCELLED | OUTPATIENT
Start: 2020-04-01

## 2020-04-01 RX ORDER — ACETAMINOPHEN 650 MG/1
650 SUPPOSITORY RECTAL EVERY 6 HOURS PRN
Status: DISCONTINUED | OUTPATIENT
Start: 2020-04-01 | End: 2020-04-01

## 2020-04-01 RX ORDER — CILOSTAZOL 100 MG/1
100 TABLET ORAL 2 TIMES DAILY
Status: CANCELLED | OUTPATIENT
Start: 2020-04-01

## 2020-04-01 RX ORDER — CARVEDILOL 6.25 MG/1
6.25 TABLET ORAL 2 TIMES DAILY WITH MEALS
Status: DISCONTINUED | OUTPATIENT
Start: 2020-04-01 | End: 2020-04-11 | Stop reason: HOSPADM

## 2020-04-01 RX ORDER — ONDANSETRON 4 MG/1
4 TABLET, ORALLY DISINTEGRATING ORAL EVERY 8 HOURS PRN
Status: DISCONTINUED | OUTPATIENT
Start: 2020-04-01 | End: 2020-04-11 | Stop reason: HOSPADM

## 2020-04-01 RX ORDER — MIRTAZAPINE 15 MG/1
15 TABLET, FILM COATED ORAL
Status: DISCONTINUED | OUTPATIENT
Start: 2020-04-01 | End: 2020-04-11 | Stop reason: HOSPADM

## 2020-04-01 RX ORDER — ACETAMINOPHEN 650 MG/1
650 SUPPOSITORY RECTAL EVERY 6 HOURS PRN
Status: CANCELLED | OUTPATIENT
Start: 2020-04-01

## 2020-04-01 RX ORDER — SODIUM CHLORIDE 0.9 % (FLUSH) 0.9 %
10 SYRINGE (ML) INJECTION PRN
Status: CANCELLED | OUTPATIENT
Start: 2020-04-01

## 2020-04-01 RX ORDER — DEXTROSE MONOHYDRATE 50 MG/ML
100 INJECTION, SOLUTION INTRAVENOUS PRN
Status: CANCELLED | OUTPATIENT
Start: 2020-04-01

## 2020-04-01 RX ORDER — NICOTINE POLACRILEX 4 MG
15 LOZENGE BUCCAL PRN
Status: CANCELLED | OUTPATIENT
Start: 2020-04-01

## 2020-04-01 RX ORDER — POLYETHYLENE GLYCOL 3350 17 G/17G
17 POWDER, FOR SOLUTION ORAL DAILY PRN
Status: DISCONTINUED | OUTPATIENT
Start: 2020-04-01 | End: 2020-04-11 | Stop reason: HOSPADM

## 2020-04-01 RX ORDER — DOCUSATE SODIUM 100 MG/1
100 CAPSULE, LIQUID FILLED ORAL 2 TIMES DAILY PRN
Status: CANCELLED | OUTPATIENT
Start: 2020-04-01

## 2020-04-01 RX ORDER — CIPROFLOXACIN 2 MG/ML
400 INJECTION, SOLUTION INTRAVENOUS EVERY 12 HOURS
Status: CANCELLED | OUTPATIENT
Start: 2020-04-02

## 2020-04-01 RX ORDER — ASPIRIN 325 MG
325 TABLET ORAL DAILY
Status: CANCELLED | OUTPATIENT
Start: 2020-04-02

## 2020-04-01 RX ORDER — DEXTROSE MONOHYDRATE 25 G/50ML
12.5 INJECTION, SOLUTION INTRAVENOUS PRN
Status: DISCONTINUED | OUTPATIENT
Start: 2020-04-01 | End: 2020-04-11 | Stop reason: HOSPADM

## 2020-04-01 RX ORDER — DILTIAZEM HYDROCHLORIDE 60 MG/1
60 TABLET, FILM COATED ORAL 2 TIMES DAILY
Status: CANCELLED | OUTPATIENT
Start: 2020-04-01

## 2020-04-01 RX ORDER — LATANOPROST 50 UG/ML
1 SOLUTION/ DROPS OPHTHALMIC NIGHTLY
Status: DISCONTINUED | OUTPATIENT
Start: 2020-04-02 | End: 2020-04-11 | Stop reason: HOSPADM

## 2020-04-01 RX ORDER — SODIUM CHLORIDE 9 MG/ML
INJECTION, SOLUTION INTRAVENOUS CONTINUOUS
Status: CANCELLED | OUTPATIENT
Start: 2020-04-01

## 2020-04-01 RX ORDER — MEMANTINE HYDROCHLORIDE 5 MG/1
5 TABLET ORAL DAILY
Status: DISCONTINUED | OUTPATIENT
Start: 2020-04-02 | End: 2020-04-11 | Stop reason: HOSPADM

## 2020-04-01 RX ORDER — DEXTROSE MONOHYDRATE 50 MG/ML
100 INJECTION, SOLUTION INTRAVENOUS PRN
Status: ACTIVE | OUTPATIENT
Start: 2020-04-01 | End: 2020-04-02

## 2020-04-01 RX ORDER — DEXTROSE MONOHYDRATE 25 G/50ML
12.5 INJECTION, SOLUTION INTRAVENOUS PRN
Status: CANCELLED | OUTPATIENT
Start: 2020-04-01

## 2020-04-01 RX ORDER — SODIUM CHLORIDE 0.9 % (FLUSH) 0.9 %
10 SYRINGE (ML) INJECTION PRN
Status: DISCONTINUED | OUTPATIENT
Start: 2020-04-01 | End: 2020-04-09

## 2020-04-01 RX ORDER — OXYCODONE HYDROCHLORIDE AND ACETAMINOPHEN 5; 325 MG/1; MG/1
1 TABLET ORAL EVERY 4 HOURS PRN
Status: CANCELLED | OUTPATIENT
Start: 2020-04-01

## 2020-04-01 RX ORDER — ATORVASTATIN CALCIUM 40 MG/1
40 TABLET, FILM COATED ORAL NIGHTLY
Status: DISCONTINUED | OUTPATIENT
Start: 2020-04-02 | End: 2020-04-11 | Stop reason: HOSPADM

## 2020-04-01 RX ORDER — DOCUSATE SODIUM 100 MG/1
100 CAPSULE, LIQUID FILLED ORAL 2 TIMES DAILY PRN
Status: DISCONTINUED | OUTPATIENT
Start: 2020-04-01 | End: 2020-04-11 | Stop reason: HOSPADM

## 2020-04-01 RX ORDER — SODIUM CHLORIDE 9 MG/ML
INJECTION, SOLUTION INTRAVENOUS CONTINUOUS
Status: DISCONTINUED | OUTPATIENT
Start: 2020-04-01 | End: 2020-04-04

## 2020-04-01 RX ORDER — ACETAMINOPHEN 325 MG/1
650 TABLET ORAL EVERY 6 HOURS PRN
Status: CANCELLED | OUTPATIENT
Start: 2020-04-01

## 2020-04-01 RX ORDER — ONDANSETRON 4 MG/1
4 TABLET, ORALLY DISINTEGRATING ORAL EVERY 8 HOURS PRN
Status: CANCELLED | OUTPATIENT
Start: 2020-04-01

## 2020-04-01 RX ORDER — DEXTROSE MONOHYDRATE 50 MG/ML
100 INJECTION, SOLUTION INTRAVENOUS PRN
Status: DISCONTINUED | OUTPATIENT
Start: 2020-04-01 | End: 2020-04-01

## 2020-04-01 RX ORDER — INSULIN GLARGINE 100 [IU]/ML
7 INJECTION, SOLUTION SUBCUTANEOUS NIGHTLY
Status: DISCONTINUED | OUTPATIENT
Start: 2020-04-01 | End: 2020-04-11 | Stop reason: HOSPADM

## 2020-04-01 RX ORDER — QUETIAPINE FUMARATE 25 MG/1
25 TABLET, FILM COATED ORAL EVERY EVENING
Status: DISCONTINUED | OUTPATIENT
Start: 2020-04-01 | End: 2020-04-11 | Stop reason: HOSPADM

## 2020-04-01 RX ORDER — SODIUM CHLORIDE 0.9 % (FLUSH) 0.9 %
10 SYRINGE (ML) INJECTION EVERY 12 HOURS SCHEDULED
Status: DISCONTINUED | OUTPATIENT
Start: 2020-04-01 | End: 2020-04-09

## 2020-04-01 RX ADMIN — CIPROFLOXACIN 400 MG: 2 INJECTION, SOLUTION INTRAVENOUS at 13:30

## 2020-04-01 RX ADMIN — Medication 10 ML: at 21:02

## 2020-04-01 RX ADMIN — INSULIN LISPRO 2 UNITS: 100 INJECTION, SOLUTION INTRAVENOUS; SUBCUTANEOUS at 08:25

## 2020-04-01 RX ADMIN — ONDANSETRON 4 MG: 4 TABLET, ORALLY DISINTEGRATING ORAL at 07:01

## 2020-04-01 RX ADMIN — ENOXAPARIN SODIUM 40 MG: 40 INJECTION SUBCUTANEOUS at 08:24

## 2020-04-01 RX ADMIN — CARVEDILOL 6.25 MG: 6.25 TABLET, FILM COATED ORAL at 18:06

## 2020-04-01 RX ADMIN — QUETIAPINE FUMARATE 25 MG: 25 TABLET ORAL at 18:27

## 2020-04-01 RX ADMIN — SODIUM CHLORIDE: 9 INJECTION, SOLUTION INTRAVENOUS at 11:25

## 2020-04-01 RX ADMIN — CILOSTAZOL 100 MG: 100 TABLET ORAL at 21:01

## 2020-04-01 RX ADMIN — INSULIN GLARGINE 7 UNITS: 100 INJECTION, SOLUTION SUBCUTANEOUS at 21:12

## 2020-04-01 RX ADMIN — ASPIRIN 325 MG ORAL TABLET 325 MG: 325 PILL ORAL at 08:24

## 2020-04-01 RX ADMIN — OXYCODONE HYDROCHLORIDE AND ACETAMINOPHEN 1 TABLET: 5; 325 TABLET ORAL at 07:02

## 2020-04-01 RX ADMIN — SODIUM CHLORIDE: 9 INJECTION, SOLUTION INTRAVENOUS at 22:21

## 2020-04-01 RX ADMIN — MIRTAZAPINE 15 MG: 15 TABLET, FILM COATED ORAL at 18:06

## 2020-04-01 RX ADMIN — SODIUM CHLORIDE: 9 INJECTION, SOLUTION INTRAVENOUS at 18:17

## 2020-04-01 RX ADMIN — MEMANTINE 5 MG: 5 TABLET ORAL at 08:24

## 2020-04-01 RX ADMIN — ATORVASTATIN CALCIUM 40 MG: 40 TABLET, FILM COATED ORAL at 08:24

## 2020-04-01 RX ADMIN — DILTIAZEM HYDROCHLORIDE 60 MG: 60 TABLET, FILM COATED ORAL at 21:01

## 2020-04-01 RX ADMIN — CIPROFLOXACIN 400 MG: 2 INJECTION, SOLUTION INTRAVENOUS at 01:34

## 2020-04-01 RX ADMIN — CARVEDILOL 6.25 MG: 6.25 TABLET, FILM COATED ORAL at 08:24

## 2020-04-01 RX ADMIN — INSULIN LISPRO 3 UNITS: 100 INJECTION, SOLUTION INTRAVENOUS; SUBCUTANEOUS at 18:15

## 2020-04-01 RX ADMIN — DILTIAZEM HYDROCHLORIDE 60 MG: 60 TABLET, FILM COATED ORAL at 08:24

## 2020-04-01 RX ADMIN — OXYCODONE HYDROCHLORIDE AND ACETAMINOPHEN 1 TABLET: 5; 325 TABLET ORAL at 01:34

## 2020-04-01 ASSESSMENT — PAIN SCALES - GENERAL
PAINLEVEL_OUTOF10: 4
PAINLEVEL_OUTOF10: 0
PAINLEVEL_OUTOF10: 5
PAINLEVEL_OUTOF10: 0
PAINLEVEL_OUTOF10: 0

## 2020-04-01 ASSESSMENT — PAIN DESCRIPTION - PROGRESSION: CLINICAL_PROGRESSION: NOT CHANGED

## 2020-04-01 ASSESSMENT — PAIN DESCRIPTION - ORIENTATION: ORIENTATION: RIGHT;LEFT

## 2020-04-01 ASSESSMENT — PAIN DESCRIPTION - LOCATION: LOCATION: LEG

## 2020-04-01 ASSESSMENT — PAIN DESCRIPTION - ONSET: ONSET: ON-GOING

## 2020-04-01 ASSESSMENT — PAIN DESCRIPTION - FREQUENCY: FREQUENCY: INTERMITTENT

## 2020-04-01 ASSESSMENT — PAIN DESCRIPTION - PAIN TYPE: TYPE: PHANTOM PAIN;CHRONIC PAIN

## 2020-04-01 ASSESSMENT — PAIN - FUNCTIONAL ASSESSMENT: PAIN_FUNCTIONAL_ASSESSMENT: ACTIVITIES ARE NOT PREVENTED

## 2020-04-01 NOTE — PLAN OF CARE
Nutrition Problem:  Moderate malnutrition, In context of chronic illness  Intervention: Food and/or Nutrient Delivery: Continue current diet  Nutritional Goals:  Patient will meet at least  50% of estimated nutrient needs with po diet and ONS during los

## 2020-04-01 NOTE — PROGRESS NOTES
admission   Date of Eval: 4/1/2020  Evaluating Therapist: Ronda Valencia    Current Diet level:  Current Diet : NPO  Current Liquid Diet : NPO      Primary Complaint  Patient Complaint: AMS    Pain:  Pain Assessment  Pain Assessment: 0-10  Pain Level: 0  Patient's Stated Pain Goal: No pain  Pain Type: Phantom pain, Chronic pain  Pain Location: Leg  Pain Orientation: Right, Left  Non-Pharmaceutical Pain Intervention(s): Distraction, Repositioned  Response to Pain Intervention: Patient Satisfied    Reason for Referral  Melquiades Kidd was referred for a bedside swallow evaluation to assess the efficiency of his swallow function, identify signs and symptoms of aspiration and make recommendations regarding safe dietary consistencies, effective compensatory strategies, and safe eating environment. Impression  Dysphagia Diagnosis: Mild to moderate oral stage dysphagia;Mild to moderate pharyngeal stage dysphagia  Dysphagia Outcome Severity Scale: Level 4: Mild moderate dysphagia- Intermittent supervision/cueing. One - two diet consistencies restricted     Treatment Plan  Requires SLP Intervention: Yes  Duration/Frequency of Treatment: 2-3xs weekly/ LOS or until goals are met   D/C Recommendations: To be determined       Recommended Diet and Intervention  Diet Solids Recommendation: Dysphagia Minced and Moist (Dysphagia II)  Liquid Consistency Recommendation: Thin  Recommended Form of Meds: Whole with puree     Therapeutic Interventions: Diet tolerance monitoring;Patient/Family education; Therapeutic PO trials with SLP    Compensatory Swallowing Strategies  Compensatory Swallowing Strategies: Eat/Feed slowly;Upright as possible for all oral intake    Treatment/Goals  Short-term Goals  Timeframe for Short-term Goals: LOS or until goals are met   Goal 1: Pt will tolerate dysphagia 2 diet/thin liquids without clinical evidence of aspiration 100%  Goal 2: Pt/caregivers will demonstrate comprehension of

## 2020-04-01 NOTE — PROGRESS NOTES
Nutrition Assessment    Type and Reason for Visit: Initial, Positive Nutrition Screen(diet ed, supplement recs)    Nutrition Recommendations:   · Advance to Dysphagia Minced and Moist Diet as timely as able  · Carb Control 4 and Low Fat Diets available/appropriate as needed  · Low perri high protein oral nutrition supplement appropriate, once diet advanced    Nutrition Assessment: S/P ERCP for GBS, awaiting diet advancement per GI. Clear Liquid Diet with strict aspiration cautions ordered per SLP. Tolerating liquids with assistance noted. Malnutrition Assessment:  · Malnutrition Status: Meets the criteria for moderate malnutrition  · Context: Chronic illness  · Findings of the 6 clinical characteristics of malnutrition (Minimum of 2 out of 6 clinical characteristics is required to make the diagnosis of moderate or severe Protein Calorie Malnutrition based on AND/ASPEN Guidelines):  1. Energy Intake-Less than or equal to 75% of estimated energy requirement, Greater than or equal to 5 days    2. Weight Loss-No significant weight loss, in 6 months  3. Fat Loss-Mild subcutaneous fat loss, Orbital  4. Muscle Loss-Mild muscle mass loss, Temples (temporalis muscle), Clavicles (pectoralis and deltoids)  5. Fluid Accumulation-No significant fluid accumulation, Extremities  6.  Strength-Not measured    Nutrition Risk Level: High    Nutrient Needs:  · Estimated Daily Total Kcal: 6651-2618 (Toole-St Jeor)  · Estimated Daily Protein (g): 69-83 (1-1.2 g/kg IBW)  · Estimated Daily Total Fluid (ml/day): 1026-9345 (1 mlkcal)    Nutrition Diagnosis:   · Problem:  Moderate malnutrition, In context of chronic illness  · Etiology: related to Cognitive or neurological impairment, Difficulty swallowing     Signs and symptoms:  as evidenced by BMI, Mild loss of subcutaneous fat, Mild muscle loss, Diet history of poor intake    Objective Information:  · Wound Type: None  · Current Nutrition Therapies:  · Oral Diet Orders: Clear

## 2020-04-01 NOTE — OP NOTE
[address], [address] [place] [address]                                OPERATIVE REPORT    PATIENT NAME: Moody Garvey                  :        1940  MED REC NO:   [id number]                          ROOM:       [id number]  ACCOUNT NO:   [de-identified]                           ADMIT DATE: 2020  PROVIDER:     Juliane Guerrero MD    DATE OF PROCEDURE:  2020    PROCEDURE:  ERCP with balloon dilation of the papilla and removal of  common bile duct stones/sludge. PRIMARY CARE PROVIDER:  Dayton Delgado MD    LOCATION:  The patient is in room [address] [place] [address]. CHIEF COMPLAINT:  History of common bile duct stones with cholangitis. PREMEDICATION:  Please refer to the anesthesiologist's notes. DESCRIPTION OF PROCEDURE:  The patient was placed in the left lateral  decubitus position and a therapeutic video Olympus duodenoscope was  introduced into the back of the throat and was advanced into the  esophagus, stomach, and finally second portion of the duodenum. The  papilla of Vater was easily identified, positioned, and was normal in  location, however, it was widely open from previous sphincterotomy. The common bile duct was very easily selectively cannulated with a  sphincterotome and the guidewire and upon injecting it was dilated and  multiple filling defects were noted. The films were reviewed and it was  decided to dilate the papilla and sweep the common bile duct off the  stones. After obtaining the cholangiogram, the papilla was dilated with  the balloon; size 10, 11, and 12 mm respectively. There was no evidence  of bleeding after dilating the papilla. After the papilla was dilated, the balloon was removed and was replaced  with a 15 mm common bile duct stone retrieval balloon.   The common bile  duct was swept on multiple occasions and at least two to three 10 to 12  mm in size stones were removed along with copious amount of

## 2020-04-01 NOTE — PROGRESS NOTES
NEUROLOGY NOTE  DR. Alexey Garcia MD.  -------------------------------------------------  Subjective:    Pt was confused last nighg since he was on exelon patch    Objective:    /62   Pulse 79   Temp 97.2 °F (36.2 °C) (Oral)   Resp 22   Ht 5' 11\" (1.803 m)   Wt 135 lb 1.6 oz (61.3 kg)   SpO2 97%   BMI 18.84 kg/m²   HEENT nl      Neuro exam    drowsyOriented  X 1  Follow simple commands  Moderate dementia  EOMI Pupils 3 mm marcio  5/5 all 4 extremities      RADIOLOGY  -----------------    Ct Head Wo Contrast    Result Date: 3/29/2020  EXAMINATION: CT OF THE HEAD WITHOUT CONTRAST  3/29/2020 9:26 pm TECHNIQUE: CT of the head was performed without the administration of intravenous contrast. Dose modulation, iterative reconstruction, and/or weight based adjustment of the mA/kV was utilized to reduce the radiation dose to as low as reasonably achievable. COMPARISON: CT head from 09/01/2019 HISTORY: ORDERING SYSTEM PROVIDED HISTORY: ams TECHNOLOGIST PROVIDED HISTORY: Has a \"code stroke\" or \"stroke alert\" been called? ->No Reason for exam:->ams FINDINGS: The examination is motion degraded. BRAIN/VENTRICLES: 19 x 14 x 16 mm hyperdense round extra-axial mass along the left petrous apex is unchanged. There is also a mass within the sella which is unchanged. There is no acute hemorrhage, herniation, or hydrocephalus. ORBITS: The visualized portion of the orbits demonstrate no acute abnormality. SINUSES: The visualized paranasal sinuses and mastoid air cells demonstrate no acute abnormality. SOFT TISSUES/SKULL:  No acute abnormality of the visualized skull or soft tissues.      No acute intracranial abnormality on motion degraded exam.     Ct Abdomen Pelvis W Iv Contrast    Result Date: 3/29/2020  EXAMINATION: CT OF THE ABDOMEN AND PELVIS WITH CONTRAST 3/29/2020 9:31 pm TECHNIQUE: CT of the abdomen and pelvis was performed with the administration of intravenous contrast. Multiplanar reformatted images are provided for review. Dose modulation, iterative reconstruction, and/or weight based adjustment of the mA/kV was utilized to reduce the radiation dose to as low as reasonably achievable. COMPARISON: 09/01/2019 HISTORY: ORDERING SYSTEM PROVIDED HISTORY: abdominal pain TECHNOLOGIST PROVIDED HISTORY: IV contrast only. Thank you. Reason for exam:->abdominal pain Reason for exam:->IV contrast only. Thank you. Reason for Exam: abdomen pain, ams Acuity: Acute Type of Exam: Initial FINDINGS: Lower Chest:  Visualized portion of the lower chest demonstrates no acute abnormality. Organs: There is pneumobilia. A 3 cm fluid and gas containing structure superior to the first portion of the duodenum and in communication with the common bile duct may represent a choledochal cyst but is unchanged. There is no acute abnormality of the liver, pancreas, spleen, adrenals, or kidneys. GI/Bowel: Bowel caliber is normal.  There is no evidence of active bowel inflammation. There is no evidence of acute appendicitis. Pelvis: No acute abnormality of the pelvic viscera. Peritoneum/Retroperitoneum: There are atherosclerotic vascular calcifications. There is no free air or free fluid. Lymph nodes are not enlarged. Bones/Soft Tissues: No acute osseous abnormality. No acute abnormality. Fl Less Than 1 Hour    Result Date: 3/31/2020  EXAMINATION: SPOT FLUOROSCOPIC IMAGES 3/31/2020 8:29 am TECHNIQUE: Fluoroscopy was provided by the radiology department for procedure. Radiologist was not present during examination. FLUOROSCOPY DOSE AND TYPE OR TIME AND EXPOSURES: 1.5 minutes, 12 images COMPARISON: None HISTORY: ORDERING SYSTEM PROVIDED HISTORY: ercp TECHNOLOGIST PROVIDED HISTORY: 1min 32sec Reason for exam:->ercp Reason for Exam: ercp Intraprocedural imaging. FINDINGS: 12 spot images of the abdomen were obtained. Multiple fluoroscopic spot views demonstrate contrast opacification of the common bile duct.      Intraprocedural fluoroscopic spot images

## 2020-04-01 NOTE — PLAN OF CARE
ARU Interdisciplinary Plan of Care (IPOC)  [facility], [address]  [phone]  Fax: [phone]        Aramis Frank    : 1940  Acct #: [[id number]]  MRN: [id number]   PHYSICIAN:  Rinku Rabago MD  Primary Active Problems:   Active Hospital Problems    Diagnosis Date Noted    Metabolic encephalopathy [U38.65] 2020       Rehabilitation Diagnosis:     Metabolic encephalopathy [L76.02]       ADMIT DATE:2020   CARE PLAN     NURSING:  Aramis Frank while on this unit will:      Bowel and Bladder   [x] Be continent of bowel and bladder      [] Have an adequate number of bowel movements   [] Urinate with no urinary retention >300ml in bladder   [] Bladder Scan: (details)   [] Complete bladder protocol with hart removal   [] Initiate Bladder Program to toilet every ___ hours   [] Initiate Bowel Program to toilet every ___hours   [] Bladder training    [] Bowel training  Pulmonary   [x] Maintain O2 SATs at 92___%  Pain Management   [x] Have pain managed while on ARU        [] Be pain free by discharge    [] Medication Management and Education  Maintenance of Skin Integrity/Wound Management   [] Have no skin breakdown while on ARU   [x] Have improved skin integrity via wound measurements   [] Have no signs/symptoms of infection via infection protection and monitoring at the          wound site  Fall Prevention   [x] Be free from injury during hospitalization via fall prevention measures     [x] Disease management and Education  Precautions   [] Weight Bearing Precautions   [] Swallowing Precautions   [] Monitoring of Risks of Complications   [x] DVT Prophylaxis    [x] Fluid/electrolyte/Nutrition Management    [x] Complete education with patient/family with understanding demonstrated for          in-room safety with transfers to bed, toilet, wheelchair, shower as well as                bathroom activities and hygiene. [] Adjustment   [] Other:   Nursing interventions may include bowel/bladder training, education for medical assistive devices, medication education, O2 saturation management, energy conservation, stress management techniques, fall prevention, alarms protocol, seating and positioning, skin/wound care, pressure relief instruction,dressing changes,  infection protection, DVT prophylaxis, and/or assistance with in room safety with transfers to bed, toilet, wheelchair, shower as well as bathroom activities and hygiene. Patient/caregiver education for:   [x] Disease/sustained injury/management      [x] Medication Use   [] Surgical intervention   [] Safety/Precautions   [] Body mechanics and or joint protection   [] Health maintenance         PHYSICAL THERAPY:  Goals:                  Short term goals  Time Frame for Short term goals: 7 days  Short term goal 1: pt will perform all bed mobiltiy mod I  Short term goal 2: Pt will transfer sit to stand and pivot with 2ww as needed with B prostheses with supervision  Short term goal 3: Pt will ambulate 20' with B prostheses and RW with SBA  Short term goal 4: Pt will ascend/descend 4 steps with R rail with CGA using B prostheses  Short term goal 5: Pt will demonstrate ability to  object from floor with CGA               These goals were reviewed with this patient at the time of assessment and Brent Godoy is in agreement. Plan of Care: Pt to be seen 5 days per week for a minimum of 60 minutes for 7 days.                 Current Treatment Recommendations: Transfer Training, Neuromuscular Re-education, Patient/Caregiver Education & Training, Functional Mobility Training, Gait Training, Stair training, Safety Education & Training, Home Exercise Program(ability to progress gait and stair training will be directly impacted by pain and skin integrity of R residual limb with possiblity that home modification of ramp would need to be made. ) community reintegration,animal assisted therapy, and concurrent/group therapy. OCCUPATIONAL THERAPY:  Goals:             Short term goals  Time Frame for Short term goals: STGs=LTGs :  Long term goals  Time Frame for Long term goals : ~10 days or until d/c. Long term goal 1: Pt will complete grooming tasks Ind seated. Long term goal 2: Pt will complete total body bathing c S.  Long term goal 3: Pt will complete UB dressing c setup. Long term goal 4: Pt will complete LB dressing c S (including donning/doffing bilateral prostheses). Long term goal 5: Pt will complete toileting c S.   Long term goals 6: Pt will complete functional transfers (bed, chair, shower, toilet) c DME PRN and S.  Long term goal 7: Pt will perform therex/therax to facilitate increased strength/endurance/ax tolerance (c emphasis on dynamic standing balance/tolerance >5 mins and BUE endurance) c SBA. :    These goals were reviewed with this patient at the time of assessment and Flavio Dangelo is in agreement    Plan of Care:  Pt to be seen 5 days per week for a minimum of 60 minutes for 10 days. Plan  Current Treatment Recommendations: Balance Training, Functional Mobility Training, Endurance Training, Safety Education & Training, Patient/Caregiver Education & Training, Equipment Evaluation, Education, & procurement, Self-Care / ADL         cognitive training, home management, energy conservation training, community reintegration, splint fabrication, patient/caregiver education and training, animal assisted therapy, and concurrent and/or group therapy.       SPEECH THERAPY: (If ordered)  Plan of Care and Goals:   LTG                                  Short-term Goals  Timeframe for Short-term Goals: 3xs weekly/ 1-2 weeks  Goal 1: Pt will tolerate dysphagia 2 diet/nectar thick liquids without clinical evidence of aspiration 100%  Goal 2: Pt will participate in modified barium swallow study to rule out silent aspiration   Goal 3: or safety concerns  Picking Up Object  Assistance Needed: Dependent  CARE Score: 1  Discharge Goal: Partial/moderate assistance  Wheelchair Ability  Uses a Wheelchair and/or Scooter?: Yes  Wheel 50 Feet with Two Turns  Assistance Needed: Supervision or touching assistance  CARE Score: 4  Discharge Goal: Supervision or touching assistance  Wheel 150 Feet  Assistance Needed: Supervision or touching assistance  CARE Score: 4  Discharge Goal: Supervision or touching assistance          OT IRF-ELIZABETH scores and goals for initial assessment:    Eating  Assistance Needed: Setup or clean-up assistance  CARE Score: 5  Discharge Goal: Independent  Oral Hygiene  Assistance Needed: Supervision or touching assistance  CARE Score: 4  Discharge Goal: Independent  Toileting Hygiene  Assistance Needed: Dependent  Comment: used urinal;did not perform all toileting tasks  CARE Score: 1  Discharge Goal: Supervision or touching assistance  Shower/Bathe Self  Assistance Needed: Partial/moderate assistance  CARE Score: 3  Discharge Goal: Supervision or touching assistance  Upper Body Dressing  Assistance Needed: Supervision or touching assistance  CARE Score: 4  Discharge Goal: Supervision or touching assistance  Lower Body Dressing  Assistance Needed: Partial/moderate assistance  CARE Score: 3  Discharge Goal: Supervision or touching assistance  Putting On/Taking Off Footwear  Comment: h/o bilateral BKA  Reason if not Attempted: Not applicable  CARE Score: 9  Discharge Goal: Not Applicable    Activities Prior to Admit:   Homemaking Responsibilities: Yes  Active : No  Mode of Transportation: Car  Occupation: Retired  Leisure & Hobbies: Reading books about the Hindu         Intensity of [address] will be seen a minimum of 3 hours of therapy per day/a minimum of 5 out of 7 days per week.     [] In this rare instance due to the nature of this patient's medical involvement, this patient will be seen 15 hours per week (900 minutes within a 7 day period). Treatments may include therapeutic exercises, gait training, neuromuscular re-ed, transfer training, community reintegration, bed mobility, w/c mobility and training, self care, home mgmt, cognitive training, energy conservation,dysphagia tx, speech/language/communication therapy, group therapy, and patient/family education. In addition, dietician/nutritionist may monitor calorie count as well as intake and collaboratively work with SLP on dietary upgrades. Neuropsychology/Psychology may evaluate and provide necessary support. Group therapy as appropriate to facilitate improved endurance, STR, COORD, function, safety, transfers, awareness and insight into deficits, problem solving, memory, and social interaction and engagement. Medical issues being managed closely and that require 24 hour availability of a physician:   [x] Swallowing Precautions                                     [] Weight bearing precautions   [] Wound Care                             [x] Infection Prevention   [x] DVT Prophylaxis/assessment              [x] Monitoring for complications    [x] Fall Precautions/Prevention                         [x] Fluid/Electrolyte/Nutrition Balance   [x] Voice Protection                           [x] Medication Management   [x] Respiratory                   [x] Pain Mgmt   [x] Bowel/Bladder Fx    Medical Prognosis: [] Good  [x] Fair    [] Guarded   Total expected IRF days 18                                            Physician anticipated functional outcomes:  FWW and HHC OT/PT/SLP and supervision.   Rehab Goals:   [] Return to premorbid function of_______________________________.    [] Independent   [] Mod I  [] Supervision  [] CGA   [] Min A   [] Mod A  Level for ambulation []without assistive device  [] with assistive device        [] Independent   [] Mod I  [x] Supervision [] CGA   [] Min A   [] Mod A  Level for transfers []without assistive device  [x] with assistive

## 2020-04-01 NOTE — CARE COORDINATION
Notified Berkley/LILIA of d/c. ARU to call nurse for report when they are ready for pt. CM notified pt's nurse.   TE 10-Mar-2018 08:22

## 2020-04-02 LAB
GLUCOSE BLD-MCNC: 130 MG/DL (ref 70–99)
GLUCOSE BLD-MCNC: 147 MG/DL (ref 70–99)
GLUCOSE BLD-MCNC: 175 MG/DL (ref 70–99)
GLUCOSE BLD-MCNC: 214 MG/DL (ref 70–99)

## 2020-04-02 PROCEDURE — 6370000000 HC RX 637 (ALT 250 FOR IP): Performed by: INTERNAL MEDICINE

## 2020-04-02 PROCEDURE — 2580000003 HC RX 258: Performed by: INTERNAL MEDICINE

## 2020-04-02 PROCEDURE — 6360000002 HC RX W HCPCS: Performed by: INTERNAL MEDICINE

## 2020-04-02 PROCEDURE — 82962 GLUCOSE BLOOD TEST: CPT

## 2020-04-02 PROCEDURE — 97167 OT EVAL HIGH COMPLEX 60 MIN: CPT

## 2020-04-02 PROCEDURE — 97535 SELF CARE MNGMENT TRAINING: CPT

## 2020-04-02 PROCEDURE — 97116 GAIT TRAINING THERAPY: CPT

## 2020-04-02 PROCEDURE — 97110 THERAPEUTIC EXERCISES: CPT

## 2020-04-02 PROCEDURE — 94761 N-INVAS EAR/PLS OXIMETRY MLT: CPT

## 2020-04-02 PROCEDURE — 99232 SBSQ HOSP IP/OBS MODERATE 35: CPT | Performed by: PHYSICAL MEDICINE & REHABILITATION

## 2020-04-02 PROCEDURE — 97163 PT EVAL HIGH COMPLEX 45 MIN: CPT

## 2020-04-02 PROCEDURE — 92610 EVALUATE SWALLOWING FUNCTION: CPT

## 2020-04-02 PROCEDURE — 97530 THERAPEUTIC ACTIVITIES: CPT

## 2020-04-02 PROCEDURE — 1280000000 HC REHAB R&B

## 2020-04-02 RX ADMIN — QUETIAPINE FUMARATE 25 MG: 25 TABLET ORAL at 17:43

## 2020-04-02 RX ADMIN — OXYCODONE HYDROCHLORIDE AND ACETAMINOPHEN 1 TABLET: 5; 325 TABLET ORAL at 22:15

## 2020-04-02 RX ADMIN — DILTIAZEM HYDROCHLORIDE 60 MG: 60 TABLET, FILM COATED ORAL at 21:27

## 2020-04-02 RX ADMIN — INSULIN LISPRO 1 UNITS: 100 INJECTION, SOLUTION INTRAVENOUS; SUBCUTANEOUS at 17:40

## 2020-04-02 RX ADMIN — CIPROFLOXACIN 400 MG: 2 INJECTION, SOLUTION INTRAVENOUS at 01:57

## 2020-04-02 RX ADMIN — DILTIAZEM HYDROCHLORIDE 60 MG: 60 TABLET, FILM COATED ORAL at 08:44

## 2020-04-02 RX ADMIN — LATANOPROST 1 DROP: 50 SOLUTION OPHTHALMIC at 21:32

## 2020-04-02 RX ADMIN — ENOXAPARIN SODIUM 40 MG: 40 INJECTION SUBCUTANEOUS at 08:45

## 2020-04-02 RX ADMIN — INSULIN LISPRO 1 UNITS: 100 INJECTION, SOLUTION INTRAVENOUS; SUBCUTANEOUS at 12:13

## 2020-04-02 RX ADMIN — INSULIN GLARGINE 7 UNITS: 100 INJECTION, SOLUTION SUBCUTANEOUS at 21:31

## 2020-04-02 RX ADMIN — ASPIRIN 325 MG ORAL TABLET 325 MG: 325 PILL ORAL at 08:44

## 2020-04-02 RX ADMIN — CARVEDILOL 6.25 MG: 6.25 TABLET, FILM COATED ORAL at 17:43

## 2020-04-02 RX ADMIN — ACETAMINOPHEN 650 MG: 325 TABLET ORAL at 17:43

## 2020-04-02 RX ADMIN — MEMANTINE HYDROCHLORIDE 5 MG: 5 TABLET ORAL at 08:44

## 2020-04-02 RX ADMIN — ATORVASTATIN CALCIUM 40 MG: 40 TABLET, FILM COATED ORAL at 21:27

## 2020-04-02 RX ADMIN — CARVEDILOL 6.25 MG: 6.25 TABLET, FILM COATED ORAL at 08:44

## 2020-04-02 RX ADMIN — CILOSTAZOL 100 MG: 100 TABLET ORAL at 21:30

## 2020-04-02 RX ADMIN — INSULIN LISPRO 2 UNITS: 100 INJECTION, SOLUTION INTRAVENOUS; SUBCUTANEOUS at 08:45

## 2020-04-02 RX ADMIN — CIPROFLOXACIN 400 MG: 2 INJECTION, SOLUTION INTRAVENOUS at 14:19

## 2020-04-02 RX ADMIN — OXYCODONE HYDROCHLORIDE AND ACETAMINOPHEN 1 TABLET: 5; 325 TABLET ORAL at 07:13

## 2020-04-02 RX ADMIN — SODIUM CHLORIDE: 9 INJECTION, SOLUTION INTRAVENOUS at 09:57

## 2020-04-02 RX ADMIN — MIRTAZAPINE 15 MG: 15 TABLET, FILM COATED ORAL at 17:43

## 2020-04-02 RX ADMIN — CILOSTAZOL 100 MG: 100 TABLET ORAL at 08:44

## 2020-04-02 RX ADMIN — SODIUM CHLORIDE: 9 INJECTION, SOLUTION INTRAVENOUS at 21:29

## 2020-04-02 RX ADMIN — Medication 10 ML: at 21:28

## 2020-04-02 ASSESSMENT — PAIN DESCRIPTION - LOCATION: LOCATION: LEG

## 2020-04-02 ASSESSMENT — PAIN SCALES - GENERAL
PAINLEVEL_OUTOF10: 3
PAINLEVEL_OUTOF10: 0
PAINLEVEL_OUTOF10: 4
PAINLEVEL_OUTOF10: 5

## 2020-04-02 ASSESSMENT — PAIN DESCRIPTION - ORIENTATION: ORIENTATION: RIGHT;LEFT

## 2020-04-02 ASSESSMENT — PAIN DESCRIPTION - FREQUENCY: FREQUENCY: INTERMITTENT

## 2020-04-02 ASSESSMENT — PAIN DESCRIPTION - PAIN TYPE: TYPE: PHANTOM PAIN;CHRONIC PAIN

## 2020-04-02 ASSESSMENT — PAIN DESCRIPTION - ONSET: ONSET: ON-GOING

## 2020-04-02 ASSESSMENT — PAIN DESCRIPTION - PROGRESSION: CLINICAL_PROGRESSION: NOT CHANGED

## 2020-04-02 ASSESSMENT — PAIN - FUNCTIONAL ASSESSMENT: PAIN_FUNCTIONAL_ASSESSMENT: ACTIVITIES ARE NOT PREVENTED

## 2020-04-02 NOTE — CARE COORDINATION
I spoke with Jaylene Ames CM at [facility]. This pt is approved for 7 days, with clinical update info due on 4/8/2020. Sachi's ph is [phone], fax is [phone].

## 2020-04-02 NOTE — PLAN OF CARE
Problem: Falls - Risk of:  Goal: Will remain free from falls  Description: Will remain free from falls  4/1/2020 2239 by Galen Briones RN  Outcome: Ongoing  4/1/2020 1841 by Melissa Perez RN  Outcome: Ongoing  Goal: Absence of physical injury  Description: Absence of physical injury  4/1/2020 2239 by Galen Briones RN  Outcome: Ongoing  4/1/2020 1841 by Melissa Perez RN  Outcome: Ongoing     Problem: SAFETY  Goal: Free from accidental physical injury  Outcome: Ongoing  Goal: Free from intentional harm  Outcome: Ongoing     Problem: DAILY CARE  Goal: Daily care needs are met  Outcome: Ongoing     Problem: PAIN  Goal: Patient's pain/discomfort is manageable  Outcome: Ongoing     Problem: SKIN INTEGRITY  Goal: Skin integrity is maintained or improved  Outcome: Ongoing     Problem: KNOWLEDGE DEFICIT  Goal: Patient/S.O. demonstrates understanding of disease process, treatment plan, medications, and discharge instructions.   Outcome: Ongoing     Problem: DISCHARGE BARRIERS  Goal: Patient's continuum of care needs are met  Outcome: Ongoing

## 2020-04-02 NOTE — PROGRESS NOTES
Nutrition Assessment    Type and Reason for Visit: Initial(rehab admit )    Nutrition Recommendations:   Continue carb controlled diet with consistency per speech therapy   Assist with meals as needed, encourage intake  Will trial frozen oral nutrition supplement while on thickened liquids    Nutrition Assessment: Rehab admit with metabolic encephalopathy, recent ERCP with hx CBD stones. Diet modified per speech therapy, currently minced and moist (dysphagia II) with nectar thick liquids. Limited po data at this time, recent meal intake 25-50%. Remains high nutrition risk at this time.      Malnutrition Assessment:  · Malnutrition Status: (noted hx moderate malnutrition )  · Context: Chronic illness    Nutrition Risk Level: High    Nutrient Needs:  · Estimated Daily Total Kcal: 3920-7056 (25-30 perri/kg)   · Estimated Daily Protein (g): 69-81 (1.1-1.3 g/kg)  · Estimated Daily Total Fluid (ml/day): 1800 (1ml/perri)     Nutrition Diagnosis:   · Problem: Predicted suboptimal energy intake  · Etiology: related to Cognitive or neurological impairment, Difficulty swallowing     Signs and symptoms:  as evidenced by Diet history of poor intake    Objective Information:  · Nutrition-Focused Physical Findings: sitting up in bed eating lunch, contact isolation   · Wound Type: (abrasions )  · Current Nutrition Therapies:  · Oral Diet Orders: Carb Control 4 Carbs/Meal, Dysphagia Minced and Moist (Dysphagia 2), Mildly Thick   · Oral Diet intake: 26-50%  · Oral Nutrition Supplement (ONS) Orders: None  · ONS intake: Unable to assess  · Anthropometric Measures:  · Ht: 5' 6\" (167.6 cm)   · Current Body Wt: 136 lb 7.4 oz (61.9 kg)  · Admission Body Wt: 135 lb 8 oz (61.5 kg)  · Usual Body Wt:    · % Weight Change:  ,  varying weights    · Ideal Body Wt:  , % Ideal Body 95  · Adjusted Body Wt: 125 lb (56.7 kg), body weight adjusted for Bilateral, BKA  · BMI Classification: BMI 18.5 - 24.9 Normal Weight    Nutrition Interventions: Continue current diet, Start ONS  Continued Inpatient Monitoring, Feeding Assistance/Environment Change, Education not appropriate at this time, Coordination of Care    Nutrition Evaluation:   · Evaluation: Goals set   · Goals: Patient will consume at least 50-75% at meals during stay     · Monitoring: Meal Intake, Supplement Intake, Pertinent Labs, Diet Tolerance, Chewing/Swallowing      Electronically signed by Lizet Claros RD, LD on 4/2/20 at 1:47 PM EDT    Contact Number: [phone]

## 2020-04-02 NOTE — PROGRESS NOTES
NEUROLOGY NOTE  DR. Alexandro Sprague MD.  -------------------------------------------------  Subjective:    Pt doing better    Discussed with pts nurse -pt is doing better    Objective:    BP (!) 158/64   Pulse 76   Temp 97.6 °F (36.4 °C) (Oral)   Resp 20   Ht 5' 11\" (1.803 m)   Wt 139 lb 1.6 oz (63.1 kg)   SpO2 97%   BMI 19.40 kg/m²   HEENT nl      Neuro exam    More awake less confused Oriented  X 1  Follow simple commands  Moderate dementia  EOMI Pupils 3 mm marcio  5/5 all 4 extremities      RADIOLOGY  -----------------    Ct Head Wo Contrast    Result Date: 3/29/2020  EXAMINATION: CT OF THE HEAD WITHOUT CONTRAST  3/29/2020 9:26 pm TECHNIQUE: CT of the head was performed without the administration of intravenous contrast. Dose modulation, iterative reconstruction, and/or weight based adjustment of the mA/kV was utilized to reduce the radiation dose to as low as reasonably achievable. COMPARISON: CT head from 09/01/2019 HISTORY: ORDERING SYSTEM PROVIDED HISTORY: ams TECHNOLOGIST PROVIDED HISTORY: Has a \"code stroke\" or \"stroke alert\" been called? ->No Reason for exam:->ams FINDINGS: The examination is motion degraded. BRAIN/VENTRICLES: 19 x 14 x 16 mm hyperdense round extra-axial mass along the left petrous apex is unchanged. There is also a mass within the sella which is unchanged. There is no acute hemorrhage, herniation, or hydrocephalus. ORBITS: The visualized portion of the orbits demonstrate no acute abnormality. SINUSES: The visualized paranasal sinuses and mastoid air cells demonstrate no acute abnormality. SOFT TISSUES/SKULL:  No acute abnormality of the visualized skull or soft tissues.      No acute intracranial abnormality on motion degraded exam.     Ct Abdomen Pelvis W Iv Contrast    Result Date: 3/29/2020  EXAMINATION: CT OF THE ABDOMEN AND PELVIS WITH CONTRAST 3/29/2020 9:31 pm TECHNIQUE: CT of the abdomen and pelvis was performed with the administration of intravenous contrast. Multiplanar CREATININE 1.0 0.9 - 1.3 MG/DL    Glucose 153 (H) 70 - 99 MG/DL    Calcium 8.7 8.3 - 10.6 MG/DL    Alb 3.3 (L) 3.4 - 5.0 GM/DL    Total Protein 5.5 (L) 6.4 - 8.2 GM/DL    Total Bilirubin 0.4 0.0 - 1.0 MG/DL    ALT 19 10 - 40 U/L    AST 25 15 - 37 IU/L    Alkaline Phosphatase 68 40 - 129 IU/L    GFR Non-African American >60 >60 mL/min/1.73m2    GFR African American >60 >60 mL/min/1.73m2    Anion Gap 11 4 - 16   Lipase    Collection Time: 04/01/20 12:16 PM   Result Value Ref Range    Lipase 13 13 - 60 IU/L   POCT Glucose    Collection Time: 04/01/20  6:05 PM   Result Value Ref Range    POC Glucose 279 (H) 70 - 99 MG/DL   POCT Glucose    Collection Time: 04/01/20  8:48 PM   Result Value Ref Range    POC Glucose 134 (H) 70 - 99 MG/DL         Medical problems    Patient Active Problem List:     Diabetes mellitus ([facility] [address]     Hypertension     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[facility] [address]     Choledocholithiasis     COPD (chronic obstructive pulmonary disease) (HCC)     Chest pain     Abdominal pain, epigastric     Elevated LFTs     Abnormal findings on imaging of biliary tract     Chest pain     Coronary artery disease involving coronary bypass graft of native heart without angina pectoris     Acute encephalopathy     Vomiting     Sepsis (HCC)     Nausea vomiting and diarrhea     Fever     Confusion      ASSESSMENT:  ---------------------    Dementia     Metabolic encephalopathy     Hyponatremia     STEPHON     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    Insomnia     depression     PLAN:     CT brain neg     Mri brain as above     B 12 folate TSH nl     DC exelon patch       namenda     remeron    Pt doing better today. discussed with pts nurse.       Electronically signed by Alexandro Sprague MD on 4/1/2020 at 10:36 PM

## 2020-04-02 NOTE — PROGRESS NOTES
Stairs - Rails: Right  Bathroom Shower/Tub: Tub/Shower unit  Bathroom Toilet: Standard  Bathroom Equipment: Grab bars in shower, Grab bars around toilet, Tub transfer bench  Bathroom Accessibility: Accessible  Home Equipment: Rolling walker, Electric scooter, [employer]  ADL Assistance: Independent  Homemaking Assistance: Needs assistance(he states he and wife both share cooking and cleaning, but wife does laundry in basement)  Homemaking Responsibilities: Yes  Meal Prep Responsibility: Secondary  Laundry Responsibility: No  Cleaning Responsibility: Secondary  Bill Paying/Finance Responsibility: No  Ambulation Assistance: Independent(mod I in the home with B prostheses and RW. states has not used electric scooter much at all)  Transfer Assistance: Independent  Active : No  Mode of Transportation: Car  Occupation: Retired  Type of occupation: [employer]  Leisure & Hobbies: Reading books about the [employer]  Additional Comments: Pt sleeps in regular bed. Does not use supplemental o2. Pt reports he doesn't recall any falls, however ED notes state pt fell 2 weeks before admission.      Restrictions:  Restrictions/Precautions  Restrictions/Precautions: Contact Precautions, Fall Risk(Pt is coughing throughout session with no attempts to contain due to cognition)            Pain Level: 0  Pain Location: Leg    Objective:  Orientation  Overall Orientation Status: Impaired  Orientation Level: Disoriented to time, Disoriented to place, Oriented to person, Disoriented to situation        Vision  Vision: Within Functional Limits  Hearing  Hearing: Within functional limits    ROM:      AROM RLE (degrees)  RLE AROM: WNL     AROM LLE (degrees)  LLE AROM : WNL                Strength:    Strength RLE  Strength RLE: WFL  Strength LLE  Strength LLE: WFL             Bed Mobility:    Bed mobility  Bridging: Supervision  Rolling to Left: Supervision  Rolling to Right: Supervision  Supine to Sit: Supervision  Sit to Supine: Supervision  Scooting: Supervision    Transfers:    Transfers  Sit to Stand: Moderate Assistance  Stand to sit: Minimal Assistance  Car Transfer: Minimal Assistance    Ambulation:    Ambulation?: Yes  Ambulation 1  Surface: level tile  Device: Rolling Walker(B prosthesis)  Assistance: Minimal assistance  Quality of Gait: Pt has severe pain in R distal stump with redness  Gait Deviations: (severe antagia causing decreased step length and impulsivity)  Distance: 5'    Curb/Stairs:  Stairs?: No(pain in R leg prevents safe attempt)       Wheelchair:  Propulsion 1  Propulsion: Manual  Level: Level Tile  Method: [place]  Level of Assistance: Supervision  Description/ Details: cues for direction due to poor cognition  Distance: 50+150  Wheelchair Activities  Wheelchair Type: Standard  Propulsion: Yes    Balance:    Balance  Sitting - Static: Good  Sitting - Dynamic: Good  Standing - Static: Poor, +  Standing - Dynamic: Poor    Assessment:   The patient is a [de-identified]year old male admitted onto ARU after hospitalization for AMS with MARY BETH and ERCP performed 3/31/20.,   This pt would benefit from PT to address deficits in mobility and balance and increased pain in R residual limb affecting ability to ambulate up stairs  to enter home. Pt has baseline cognitive issues that will impact ability to participate and progress challneges. Ability to proegress gait and stairs will be directly impacted by R residual limb pain and skin integrity.      Body structures, Functions, Activity limitations: Decreased functional mobility , Decreased safe awareness, Decreased endurance, Decreased balance, Increased pain, Decreased strength, Decreased cognition, Decreased posture, Decreased ADL status(Pt had cognitive deficits at baseline)     Prognosis: Good  Decision Making: High Complexity  Clinical Presentation: Pt has unstable characteristics due to poor cognition with impulsivity and poor insight into deficits causing unexpected safety risks

## 2020-04-02 NOTE — H&P
Leisa Fitzpatrick    : 1940  [facility]  MRN: [id number]              History and physical      Admitting diagnosis: Metabolic encephalopathy    Comorbid diagnoses impacting rehabilitation: Impaired cognition, generalized weakness, CBD stones with cholangitis, abdominal pain, dysphagia, uncontrolled diabetes with hyperglycemia, mature bilateral BKA, hypertension    Chief complaint: Generalized weakness and abdominal discomfort. History of present illness: Patient is an 79-year-old right-hand-dominant male with a long history of dysvascular complications of diabetes, hypertension and CBD stones. In the weeks leading up to him coming to our ED on 3/29/2020 he had developed malaise, increased confusion and some agitation. His eating was poor and he was less directable. He was also not using his bilateral BKA prostheses like usual.  In our ED he was confused, slightly agitated and suffering from cholangitis. He is found to have significant CBD stones. He was made n.p.o. and the gastroenterologist removed his stones with an ERCP. His blood sugars and blood pressures been fluctuating significantly. Patient has been restricted to a few clear liquids and has been coughing with meals. He has developed significant generalized weakness. He is not safe to return home. Review of systems: Abdominal pain, poor appetite and dizziness. He has not been wearing his BK prostheses. Sleep is poor. He has some pain in the stumps of each leg and tingling in the hands. The remainder of their review of systems was negative except as mentioned in the history of present illness. Social History: The patient is  and lives with his wife in a two-step entry single-story home. There is a tub shower combination for bathing and a standard height commode. There are grab bars in the bathroom. He was responsible for his own personal hygiene but she supervised his medications.   He uses a rolling walker and has prostheses when he is feeling well. Recently he has not been getting around much at all but has access to an electric scooter and a manual wheelchair. His wife takes care of the laundry in the basement. He does not drive. He reports that he has never smoked. He has never used smokeless tobacco. He reports that he does not drink alcohol or use drugs. Prior (baseline) level of function: Modified independent at baseline, although he is needed significant assistance at home recently. Current level of function: Moderate verbal cues and moderate physical assistance for mobility and self-care. Allergies:  Byetta 10 mcg pen [exenatide]; Phenylephrine; Sulbactam; Ampicillin; Aricept [donepezil hydrochloride]; Benztropine; Celebrex [celecoxib]; Diphenhydramine hcl; Diphenhydramine hcl [diphenhydramine]; Exenatide; Metoprolol succinate; Phenergan [promethazine hcl]; Plavix [clopidogrel bisulfate]; Pseudoephedrine; and Reglan [metoclopramide hcl]    Past Medical History:   Past Medical History:   Diagnosis Date    Arthritis     Biliary stones 2015    CAD (coronary artery disease)     [facility]) [facility] [address]     Diabetes mellitus ([facility] [address]     History of blood transfusion     Hx of angiography 3/11/2014    Pulmonary Arteries: Small sliding hiatal hernia. Mod coronary artery calcifications. Mild bilat gynecomastia.     Hyperlipidemia     Hypertension     Kidney stone         Past Surgical History:     Past Surgical History:   Procedure Laterality Date    ABDOMEN SURGERY      stones in bile duct removed x3    CARDIAC SURGERY      CHOLECYSTECTOMY      CORONARY ANGIOPLASTY WITH STENT PLACEMENT      DILATATION, ESOPHAGUS      ERCP  03/13/14    w/ dilation of papilla    ERCP  9/11/14    ERCP  10/24/15    extractions stones, balloon dilatation     ERCP  03/18/2017    stone extraction     ERCP N/A 1/25/2019    ERCP STONE REMOVAL/ DILATATION OF PAPILLA WITH 10-12MM AND verbal instructions when possible. Caregiver training will be important in getting him home again. I personally performed a history and physical on this patient within 24 hours of admission to the rehab unit. I have reviewed the preadmission screening and concur with its findings without change. A detailed plan of care will be established by hospital day 4 and I attest the patient is appropriate for inpatient rehabilitation at this time. I have compared the patient's current functional status noted during my history and physical with that of the preadmission screen and I have found no significant differences.

## 2020-04-02 NOTE — PROGRESS NOTES
Pt's Wife dropped off socks for Pt's Prosthesis - Instructions below, included in bag of socks.      RIGHT LEG  1 Silk Sock / 2 Gel Socks / 1 #3 Sock //OR// 1 Silk Sock / 1 Gel Sock / 2 #5 Socks    LEFT LEG  1 Silk Sock / 1 Gel Sock / 2 #3 Socks //OR// 1 Silk Sock / 2 Gel Socks / 1 #1 Sock    \"Whatever works best. Any questions, can call Celio Rubio at [facility] - [phone]\"

## 2020-04-02 NOTE — PROGRESS NOTES
Occupational Therapy                                                  [facility] ARU OCCUPATIONAL THERAPY EVALUATION    Chart Review:  Past Medical History:   Diagnosis Date    Arthritis     Biliary stones 2015    CAD (coronary artery disease)     [facility]) [facility] [address]     Diabetes mellitus ([facility] [address]     History of blood transfusion     Hx of angiography 3/11/2014    Pulmonary Arteries: Small sliding hiatal hernia. Mod coronary artery calcifications. Mild bilat gynecomastia.     Hyperlipidemia     Hypertension     Kidney stone      Past Surgical History:   Procedure Laterality Date    ABDOMEN SURGERY      stones in bile duct removed x3    CARDIAC SURGERY      CHOLECYSTECTOMY      CORONARY ANGIOPLASTY WITH STENT PLACEMENT      DILATATION, ESOPHAGUS      ERCP  03/13/14    w/ dilation of papilla    ERCP  9/11/14    ERCP  10/24/15    extractions stones, balloon dilatation     ERCP  03/18/2017    stone extraction     ERCP N/A 1/25/2019    ERCP STONE REMOVAL/ DILATATION OF PAPILLA WITH 10-12MM AND 12-15MM BALLOON AND 9-12MM, 12-15MM  EXTRACTOR BALLOON USED FOR REMOVAL OF MULTIPLE  CBD STONES performed by Malachi Pulliam MD at [place] [address] ERCP N/A 9/7/2019    ERCP STONE REMOVAL, DILATATION OF PAPILLA WITH 10-12MM BALLOON, AND EXTRACTOR BALLOON 12-15MM USED FOR REMOVAL OF CBD STONE AND SLUDE performed by Keron Saini MD at [place] [address] ERCP N/A 3/31/2020    ERCP DILATION BALLOON to 12  AND SWEEP OF CBD STONE AND SLUDGE performed by Keron Saini MD at [place] [address]      LEG AMPUTATION BELOW KNEE Bilateral     OTHER SURGICAL HISTORY  03/13/14    sphincterotomy     Social History:  Social/Functional History  Lives With: Spouse  Type of Home: House  Home Layout: One level  Home Access: Stairs to enter with rails  Entrance Stairs - Number of Steps: 2  Entrance Stairs - Rails: Right  Bathroom Shower/Tub: Tub/Shower unit  [place] WFL    Strength:    LUE Strength  Gross LUE Strength: WFL  L Hand General: 4+/5  LUE Strength Comment: 4+/5 grossly  RUE Strength  Gross RUE Strength: WFL  R Hand General: 4+/5  RUE Strength Comment: 4+/5 grossly    Quality of Movement: Tone RUE  RUE Tone: Normotonic  Tone LUE  LUE Tone: Normotonic  Coordination  Movements Are Fluid And Coordinated: Yes       Sensation:    Sensation  Overall Sensation Status: [facility]    ADL's:    ADL  Feeding: Setup  Grooming: Supervision(seated)  UE Bathing: Supervision  LE Bathing: Minimal assistance(to thoroughly wash bottom via lateral lean to the L; pt perseverative requiring redirection)  UE Dressing: Supervision  LE Dressing: Minimal assistance(pt able to don bilateral prosthetics with only touching assist.  Required CGA-min A while in stance for balance performing pants management up)  Toileting: Unable to assess(comment)(pt denied need during eval; did not have episode while seated on commode). Pt needs to perform shower/dressing ADL in specific sequence (LB dressing including donning prosthetics while still on shower bench) to be able to perform transfer    Bed Mobility:    Bed mobility  Supine to Sit: Supervision  Sit to Supine: Supervision      Transfers:    Transfers  Stand Pivot Transfers: Minimal assistance(c RW)  Sit to stand: Moderate assistance(Ranged from min-mod A)  Stand to sit: Minimal assistance  Toilet Transfers  Toilet - Technique: Stand pivot  Equipment Used: Grab bars  Toilet Transfer: Moderate assistance  Toilet Transfers Comments: Difficulty repositioning LLE d/t increased pain on RLE with weightbearing     Shower Transfers  Shower - Transfer Type: To and From  Shower - Transfer To: (built in shower bench)  Shower - Technique: Stand pivot  Shower Transfers:  Moderate assistance  Shower Transfers Comments: Difficulty repositioning LLE d/t increased pain on RLE with weightbearing       Functional Mobility:    Balance  Sitting Balance: Supervision(required 2*

## 2020-04-03 ENCOUNTER — APPOINTMENT (OUTPATIENT)
Dept: GENERAL RADIOLOGY | Age: 80
DRG: 071 | End: 2020-04-03
Attending: PHYSICAL MEDICINE & REHABILITATION
Payer: MEDICARE

## 2020-04-03 LAB
CULTURE: NORMAL
EKG ATRIAL RATE: 90 BPM
EKG DIAGNOSIS: NORMAL
EKG Q-T INTERVAL: 422 MS
EKG QRS DURATION: 96 MS
EKG QTC CALCULATION (BAZETT): 519 MS
EKG R AXIS: -36 DEGREES
EKG T AXIS: 61 DEGREES
EKG VENTRICULAR RATE: 91 BPM
GLUCOSE BLD-MCNC: 149 MG/DL (ref 70–99)
GLUCOSE BLD-MCNC: 169 MG/DL (ref 70–99)
GLUCOSE BLD-MCNC: 181 MG/DL (ref 70–99)
GLUCOSE BLD-MCNC: 190 MG/DL (ref 70–99)
Lab: NORMAL
SPECIMEN: NORMAL

## 2020-04-03 PROCEDURE — 6370000000 HC RX 637 (ALT 250 FOR IP): Performed by: INTERNAL MEDICINE

## 2020-04-03 PROCEDURE — 94150 VITAL CAPACITY TEST: CPT

## 2020-04-03 PROCEDURE — 92526 ORAL FUNCTION THERAPY: CPT

## 2020-04-03 PROCEDURE — 82962 GLUCOSE BLOOD TEST: CPT

## 2020-04-03 PROCEDURE — 97116 GAIT TRAINING THERAPY: CPT

## 2020-04-03 PROCEDURE — 1280000000 HC REHAB R&B

## 2020-04-03 PROCEDURE — 94761 N-INVAS EAR/PLS OXIMETRY MLT: CPT

## 2020-04-03 PROCEDURE — 97530 THERAPEUTIC ACTIVITIES: CPT

## 2020-04-03 PROCEDURE — 6360000002 HC RX W HCPCS: Performed by: INTERNAL MEDICINE

## 2020-04-03 PROCEDURE — 97110 THERAPEUTIC EXERCISES: CPT

## 2020-04-03 PROCEDURE — 74230 X-RAY XM SWLNG FUNCJ C+: CPT

## 2020-04-03 PROCEDURE — 99232 SBSQ HOSP IP/OBS MODERATE 35: CPT | Performed by: PHYSICAL MEDICINE & REHABILITATION

## 2020-04-03 PROCEDURE — 92611 MOTION FLUOROSCOPY/SWALLOW: CPT

## 2020-04-03 PROCEDURE — 2580000003 HC RX 258: Performed by: INTERNAL MEDICINE

## 2020-04-03 PROCEDURE — 97542 WHEELCHAIR MNGMENT TRAINING: CPT

## 2020-04-03 RX ADMIN — MIRTAZAPINE 15 MG: 15 TABLET, FILM COATED ORAL at 17:40

## 2020-04-03 RX ADMIN — INSULIN LISPRO 1 UNITS: 100 INJECTION, SOLUTION INTRAVENOUS; SUBCUTANEOUS at 11:52

## 2020-04-03 RX ADMIN — INSULIN LISPRO 1 UNITS: 100 INJECTION, SOLUTION INTRAVENOUS; SUBCUTANEOUS at 17:03

## 2020-04-03 RX ADMIN — ENOXAPARIN SODIUM 40 MG: 40 INJECTION SUBCUTANEOUS at 10:13

## 2020-04-03 RX ADMIN — CIPROFLOXACIN 400 MG: 2 INJECTION, SOLUTION INTRAVENOUS at 02:10

## 2020-04-03 RX ADMIN — ATORVASTATIN CALCIUM 40 MG: 40 TABLET, FILM COATED ORAL at 21:35

## 2020-04-03 RX ADMIN — INSULIN GLARGINE 7 UNITS: 100 INJECTION, SOLUTION SUBCUTANEOUS at 20:30

## 2020-04-03 RX ADMIN — CILOSTAZOL 100 MG: 100 TABLET ORAL at 10:13

## 2020-04-03 RX ADMIN — CILOSTAZOL 100 MG: 100 TABLET ORAL at 21:35

## 2020-04-03 RX ADMIN — ASPIRIN 325 MG ORAL TABLET 325 MG: 325 PILL ORAL at 08:20

## 2020-04-03 RX ADMIN — ACETAMINOPHEN 650 MG: 325 TABLET ORAL at 21:35

## 2020-04-03 RX ADMIN — SODIUM CHLORIDE: 9 INJECTION, SOLUTION INTRAVENOUS at 06:44

## 2020-04-03 RX ADMIN — CARVEDILOL 6.25 MG: 6.25 TABLET, FILM COATED ORAL at 17:40

## 2020-04-03 RX ADMIN — CARVEDILOL 6.25 MG: 6.25 TABLET, FILM COATED ORAL at 08:20

## 2020-04-03 RX ADMIN — DILTIAZEM HYDROCHLORIDE 60 MG: 60 TABLET, FILM COATED ORAL at 08:20

## 2020-04-03 RX ADMIN — MEMANTINE HYDROCHLORIDE 5 MG: 5 TABLET ORAL at 10:09

## 2020-04-03 RX ADMIN — DILTIAZEM HYDROCHLORIDE 60 MG: 60 TABLET, FILM COATED ORAL at 21:35

## 2020-04-03 RX ADMIN — SODIUM CHLORIDE: 9 INJECTION, SOLUTION INTRAVENOUS at 21:38

## 2020-04-03 RX ADMIN — QUETIAPINE FUMARATE 25 MG: 25 TABLET ORAL at 17:40

## 2020-04-03 RX ADMIN — CIPROFLOXACIN 400 MG: 2 INJECTION, SOLUTION INTRAVENOUS at 13:59

## 2020-04-03 RX ADMIN — LATANOPROST 1 DROP: 50 SOLUTION OPHTHALMIC at 20:31

## 2020-04-03 RX ADMIN — OXYCODONE HYDROCHLORIDE AND ACETAMINOPHEN 1 TABLET: 5; 325 TABLET ORAL at 06:40

## 2020-04-03 ASSESSMENT — PAIN DESCRIPTION - FREQUENCY: FREQUENCY: INTERMITTENT

## 2020-04-03 ASSESSMENT — PAIN SCALES - GENERAL
PAINLEVEL_OUTOF10: 5
PAINLEVEL_OUTOF10: 0
PAINLEVEL_OUTOF10: 2
PAINLEVEL_OUTOF10: 3

## 2020-04-03 ASSESSMENT — PAIN DESCRIPTION - PROGRESSION: CLINICAL_PROGRESSION: NOT CHANGED

## 2020-04-03 ASSESSMENT — PAIN DESCRIPTION - LOCATION: LOCATION: LEG

## 2020-04-03 ASSESSMENT — PAIN DESCRIPTION - ONSET: ONSET: ON-GOING

## 2020-04-03 ASSESSMENT — PAIN DESCRIPTION - ORIENTATION: ORIENTATION: LEFT

## 2020-04-03 ASSESSMENT — PAIN DESCRIPTION - PAIN TYPE: TYPE: CHRONIC PAIN

## 2020-04-03 NOTE — PROGRESS NOTES
Lorri Eliz    : 1940  Acct #: [de-identified]  MRN: [id number]              PM&R Progress Note      Admitting diagnosis: Metabolic encephalopathy     Comorbid diagnoses impacting rehabilitation: Impaired cognition, generalized weakness, CBD stones with cholangitis, abdominal pain, dysphagia, uncontrolled diabetes with hyperglycemia, mature bilateral BKA, hypertension    Chief complaint: Did not sleep well. Prior (baseline) level of function: Independent. Current level of function:         Current  IRF-ELIZABETH and Goals:      Prior Functioning: Everyday Activities  Self Care: Needed some help  Indoor Mobility (Ambulation):  Independent  Stairs: Independent  Functional Cognition: Needed some help  Prior Device Use: Walker, Orthotics/Prosthetics, Motorized wheelchair and/or scooter    Eating  Assistance Needed: Setup or clean-up assistance  CARE Score: 5  Discharge Goal: Independent  Oral Hygiene  Assistance Needed: Supervision or touching assistance  CARE Score: 4  Discharge Goal: Independent  Toileting Hygiene  Assistance Needed: Dependent  Comment: used urinal;did not perform all toileting tasks  CARE Score: 1  Discharge Goal: Supervision or touching assistance  Shower/Bathe Self  Assistance Needed: Partial/moderate assistance  CARE Score: 3  Discharge Goal: Supervision or touching assistance  Upper Body Dressing  Assistance Needed: Supervision or touching assistance  CARE Score: 4  Discharge Goal: Supervision or touching assistance  Lower Body Dressing  Assistance Needed: Partial/moderate assistance  CARE Score: 3  Discharge Goal: Supervision or touching assistance  Putting On/Taking Off Footwear  Comment: h/o bilateral BKA  Reason if not Attempted: Not applicable  CARE Score: 9  Discharge Goal: Not Applicable    Roll Left and Right  Assistance Needed: Supervision or touching assistance  CARE Score: 4  Discharge Goal: Independent  Sit to Lying  Assistance Needed: Supervision or touching assistance  CARE Score: 4  Discharge Goal: Independent  Sit to Stand  Assistance Needed: Partial/moderate assistance  Comment: B prosetheses  CARE Score: 3  Discharge Goal: Supervision or touching assistance  Chair/Bed-to-Chair Transfer  Assistance Needed: Partial/moderate assistance  Comment: B prostheses  CARE Score: 3  Discharge Goal: Supervision or touching assistance  Toilet Transfer  Assistance Needed: Partial/moderate assistance  CARE Score: 3  Discharge Goal: Supervision or touching assistance  Car Transfer  Assistance Needed: Partial/moderate assistance  CARE Score: 3  Discharge Goal: Supervision or touching assistance   Walk 10 Feet? Walk 10 Feet?: No  1 Step  1 Step?: No  Reason if not Attempted: Not attempted due to medical condition or safety concerns  Picking Up Object  Assistance Needed: Dependent  CARE Score: 1  Discharge Goal: Partial/moderate assistance  Wheelchair Ability  Uses a Wheelchair and/or Scooter?: Yes  Wheel 50 Feet with Two Turns  Assistance Needed: Supervision or touching assistance  CARE Score: 4  Discharge Goal: Supervision or touching assistance  Wheel 150 Feet  Assistance Needed: Supervision or touching assistance  CARE Score: 4  Discharge Goal: Supervision or touching assistance            I      Exam:    Blood pressure (!) 173/81, pulse 83, temperature 98.3 °F (36.8 °C), resp. rate 16, height 5' 6\" (1.676 m), weight 136 lb 8 oz (61.9 kg), SpO2 96 %. General: Sitting up in bed. Talkative but somewhat confused. Easily distracted. In no distress. HEENT: Mucous membranes are little dry. Neck supple. No adenopathy. Pulmonary: Shallow but clear. Cardiac: Regular rate and rhythm. Abdomen: Patient's abdomen is soft and nondistended. Bowel sounds were present throughout. There was no rebound, guarding or masses noted. Upper extremities: Can bring both hands together in the midline. Fair strength. [address] coordination.     Lower extremities: Mature BK amputation stumps. Knees flexible. Sitting balance was good. Standing balance was poor. Lab Results   Component Value Date    WBC 16.0 (H) 04/01/2020    HGB 10.7 (L) 04/01/2020    HCT 32.9 (L) 04/01/2020    MCV 90.9 04/01/2020     04/01/2020     Lab Results   Component Value Date    INR 1.28 03/31/2020    INR 1.09 03/29/2020    INR 0.99 09/05/2019    PROTIME 15.5 (H) 03/31/2020    PROTIME 13.2 03/29/2020    PROTIME 11.5 09/05/2019     Lab Results   Component Value Date    CREATININE 1.0 04/01/2020    BUN 21 04/01/2020     04/01/2020    K 3.6 04/01/2020     04/01/2020    CO2 21 04/01/2020     Lab Results   Component Value Date    ALT 19 04/01/2020    AST 25 04/01/2020    ALKPHOS 68 04/01/2020    BILITOT 0.4 04/01/2020       Expected length of stay  prior to a supervised level of function for discharge home with a walker and [facility] OT/PT is 2 weeks. Recommendations:    1. Metabolic encephalopathy with gait disturbance and dysphagia: Developing the routine for his daily occupational and physical therapy with speech-language pathology. Slow resumption of an oral diet with modified textures and thin liquids. Speech pathology wants to do a modified barium swallow study due to coughing with each meal and medication administration session. Emphasizing pulmonary hygiene, DVT prophylaxis and bowel and bladder retraining. 2. DVT prophylaxis: Lovenox 40 mg subcu daily. I must monitor his hemoglobin and platelet count while on his medication. Weightbearing activities using his bilateral prostheses pursued daily. No signs of acute blood loss. 3. Cholangitis: Patient requires IV Cipro and is on a graduated diet. We must monitor his intake and treat his nausea. 4. Uncontrolled diabetes with hyperglycemia: Patient requires Lantus and Humalog. Blood sugars are checked at mealtime and bedtime. Consistent oral intake encouraged.   Blood sugars occasionally greater than 175.  5. Hypertension: Coreg twice

## 2020-04-03 NOTE — PROCEDURES
INSTRUMENTAL SWALLOW REPORT  MODIFIED BARIUM SWALLOW    NAME: Shae Frye   : 1940  MRN: [id number]       Date of Eval: 4/3/2020     Ordering Physician: Dr. Liberty Morales  Radiologist: Available upon consult  ENT:  n/a  Referring Diagnosis(es): Referring Diagnosis: dysphagia    Past Medical History:  has a past medical history of Arthritis, Biliary stones, CAD (coronary artery disease), COPD (chronic obstructive pulmonary disease) ([facility] [address] Diabetes mellitus ([facility] [address] History of blood transfusion, Hx of angiography, Hyperlipidemia, Hypertension, and Kidney stone. Past Surgical History:  has a past surgical history that includes Cardiac surgery; joint replacement; Cholecystectomy; Coronary angioplasty with stent; fracture surgery; Leg amputation below knee (Bilateral); ERCP (14); other surgical history (14); ERCP (14); ERCP (10/24/15); ERCP (2017); Abdomen surgery; Dilatation, esophagus; ERCP (N/A, 2019); ERCP (N/A, 2019); and ERCP (N/A, 3/31/2020). Current Diet Solid Consistency: Dysphagia Minced and Moist (Dysphagia II)  Current Diet Liquid Consistency: Mildly Thick (Nectar)    Date of Prior Study: 2019  Type of Study: Repeat MBS  Results of Prior Study: aspiration of thin/nectar thick liquids       Patient Complaints/Reason for Referral:  Shae Frye was referred for a MBS to assess the efficiency of his/her swallow function, assess for aspiration, and to make recommendations regarding safe dietary consistencies, effective compensatory strategies, and safe eating environment. Patient complaints: frequent coughing     Onset of problem:   Date of Onset: 2-3 years            Behavior/Cognition/Vision/Hearing:  Behavior/Cognition: Alert; Cooperative;Pleasant mood  Vision: Within Functional Limits  Hearing: Within functional limits    Impressions:  Shae Frye was seen for a modified barium swallow study after being admitted to ARU with acute encephalopathy. Pt was alert, cooperative and pleasant throughout the session. Relevant medical hx includes CAD, COPD, hypertension and TIA. Pt has a significant hx of dysphagia with most recent modified barium swallow study completed 9/05/19, indicating silent aspiration of thin and nectar thick liquids. For today's assessment pt was positioned upright 90 degrees and filmed in the lateral view. PO trials of puree, soft/regular solids, nectar thick liquids and thin liquids by cup/straw sips were given. Pt presented with a mild-moderate oropharyngeal dysphagia. Oral swallow characterized by prolonged mastication, minimal oral holding, slow A-P transit and lingual residue observed with trials of regular solids. Pharyngeal swallow was delayed with pooling observed in the valleculae and to the pyriform sinus with thin liquids by cup/straw sips. Pt demonstrated reduced laryngeal elevation with adequate epiglottic inversion. Penetration and trace silent aspiration was observed with trials thin liquids by cup/straw sips. Pt was unable to fully clear aspirated liquids from airway when cued to cough. A chin tuck posture was not effective in reducing penetration of thin liquids. Reduced pharyngeal peristalsis was observed with moderate pharyngeal residue observed in the valleculae with trials of puree and soft solids. Pt was able to clear pharyngeal residue spontaneously with a second swallow. Suspect esophageal dysphagia with slow mobility and backflow observed in the upper esophagus during esophageal sweep. Recommend continue dysphagia 2 diet/nectar thick liquids with strict aspiration precautions. Pt would benefit from implementation of jakc free water protocol. ST reviewed images and results of MBSS with pt following study and discussed recommendations and safe feeding protocol with nursing. All questions were answered at this time. ST will continue to follow for dysphagia treatment. Patient Position: Lateral and Patient Degrees: 90 upright       Consistencies Administered: Reg solid; Dysphagia Minced and Moist (Dysphagia II); Dysphagia Pureed (Dysphagia I); Thin cup; Thin straw;Nectar straw;Nectar cup                             Dysphagia Outcome Severity Scale: Level 4: Mild moderate dysphagia- Intermittent supervision/cueing. One - two diet consistencies restricted  Penetration-Aspiration Scale (PAS): 8 - Material Enters the airway, passes below the vocal folds, and no effort is made to eject    Recommended Diet:  Solid consistency: Dysphagia Minced and Moist (Dysphagia II)  Liquid consistency: Mildly Thick (Nectar)  Liquid administration via: Cup;Straw    Medication administration: Meds in puree    Safe Swallow Protocol:  Supervision: Distant  Compensatory Swallowing Strategies: Upright as possible for all oral intake;Eat/Feed slowly; Small bites/sips              Recommendations/Treatment  Requires SLP Intervention: Yes        D/C Recommendations: To be determined  Postural Changes and/or Swallow Maneuvers: Upright 90 degrees      Recommended Exercises:    Therapeutic Interventions: Patient/Family education; Therapeutic PO trials with SLP; Diet tolerance monitoring;Chang Water Protocol;Pharyngeal exercises         Education: Images and recommendations were reviewed with Danya Melgoza following this exam.   Patient Education: recommendations/POC  Patient Education Response: Verbalizes understanding    Prognosis  Prognosis for safe diet advancement: good  Barriers to reach goals: severity of dysphagia;cognitive deficits  Duration/Frequency of Treatment  Duration/Frequency of Treatment: 3xs weekly/ 1-2 weeks   Safety Devices  Safety Devices in place: Yes      Goals:    Long Term:               Short Term:     Goal 1: Pt will tolerate dysphagia 2 diet/nectar thick liquids without clinical evidence of aspiration 100%  Goal 2: Pt will trial and demonstrate understanding of chang free water

## 2020-04-03 NOTE — PROGRESS NOTES
Case Management Admission Note      Patient:Dawit Squires      :1940  YXE:[id number]  Rehab Dx/Hx: Metabolic encephalopathy [E71.25]    Chief Complaint:   Past Medical History:   Diagnosis Date    Arthritis     Biliary stones     CAD (coronary artery disease)     COPD (chronic obstructive pulmonary disease) (HCC)     Diabetes mellitus ([facility] [address]     History of blood transfusion     Hx of angiography 3/11/2014    Pulmonary Arteries: Small sliding hiatal hernia. Mod coronary artery calcifications. Mild bilat gynecomastia.     Hyperlipidemia     Hypertension     Kidney stone      Past Surgical History:   Procedure Laterality Date    ABDOMEN SURGERY      stones in bile duct removed x3    CARDIAC SURGERY      CHOLECYSTECTOMY      CORONARY ANGIOPLASTY WITH STENT PLACEMENT      DILATATION, ESOPHAGUS      ERCP  14    w/ dilation of papilla    ERCP  14    ERCP  10/24/15    extractions stones, balloon dilatation     ERCP  2017    stone extraction     ERCP N/A 2019    ERCP STONE REMOVAL/ DILATATION OF PAPILLA WITH 10-12MM AND 12-15MM BALLOON AND 9-12MM, 12-15MM  EXTRACTOR BALLOON USED FOR REMOVAL OF MULTIPLE  CBD STONES performed by Tavo Ortiz MD at [place] [address] ERCP N/A 2019    ERCP STONE REMOVAL, DILATATION OF PAPILLA WITH 10-12MM BALLOON, AND EXTRACTOR BALLOON 12-15MM USED FOR REMOVAL OF CBD STONE AND SLUDE performed by Lukas You MD at [place] [address] ERCP N/A 3/31/2020    ERCP DILATION BALLOON to 12  AND SWEEP OF CBD STONE AND SLUDGE performed by Lukas You MD at [place] [address]      LEG AMPUTATION BELOW KNEE Bilateral     OTHER SURGICAL HISTORY  14    sphincterotomy     Allergies   Allergen Reactions    Byetta 10 Mcg Pen [Exenatide]     Phenylephrine     Sulbactam     Ampicillin Rash    Aricept [Donepezil Hydrochloride] Other (See Comments)     Hallucinations, confusion    Benztropine Other

## 2020-04-03 NOTE — PROGRESS NOTES
effects    [] Patient limited by fatigue  [] Patient limited by pain   [] Patient limited by medical complications:    [] Adverse reaction to Tx:   [] Significant change in status    Barrier/s to progress/learning:   []   None  [x]   Cognition  []   Hearing deficit  []   Pre-morbid mental/psychological status   []   Motivation  []   Communication  []   Anxiety  []   Vision deficit  []   Attention  []   Other:      Safety:       []  bed alarm set    [x]  chair alarm set    []  Pt refused alarms                [x]  Telesitter activated      [x]  Gait belt used during tx session      []other:         Number of Minutes/Billable Intervention  Gait Training 20   Therapeutic Exercise    Neuro Re-Ed    Therapeutic Activity 30   Wheelchair Propulsion 10   Group    Other:    TOTAL 60         Social History  Social/Functional History  Lives With: Spouse  Type of Home: House  Home Layout: One level  Home Access: Stairs to enter with rails  Entrance Stairs - Number of Steps: 2  Entrance Stairs - Rails: Right  Bathroom Shower/Tub: Tub/Shower unit  Bathroom Toilet: Standard  Bathroom Equipment: Grab bars in shower, Grab bars around toilet, Tub transfer bench  Bathroom Accessibility: Accessible  Home Equipment: Rolling walker, Electric scooter, [employer]  ADL Assistance: Independent  Homemaking Assistance: Needs assistance(he states he and wife both share cooking and cleaning, but wife does laundry in basement)  Homemaking Responsibilities: Yes  Meal Prep Responsibility: Secondary  Laundry Responsibility: No  Cleaning Responsibility: Secondary  Bill Paying/Finance Responsibility: No  Ambulation Assistance: Independent(mod I in the home with B prostheses and RW. states has not used electric scooter much at all)  Transfer Assistance: Independent  Active : No  Mode of Transportation: Car  Occupation: Retired  Type of occupation: [employer]  Leisure & Hobbies: Reading books about the [employer]  Additional Comments: Pt sleeps

## 2020-04-03 NOTE — PROGRESS NOTES
Occupational Therapy   Physical Rehabilitation: OCCUPATIONAL THERAPY     [x] daily progress note       [] discharge       Patient Name:  Efra Guajardo   :  1940 MRN: [id number]  Room:  [address] [place] [address] Date of Admission: 2020  Rehabilitation Diagnosis:   Metabolic encephalopathy [S23.81]       Date          FRI 4/3/2020       Day of ARU Week:  3   Time IN/OUT [id number]   Individual Tx Minutes 60   Group Tx Minutes    Co-Treat Minutes    Concurrent Tx Minutes    TOTAL Tx Time Mins 60   Variance Time    Variance Time []   Refusal due to:     []   Medical hold/reason:    []   Illness   []   Off Unit for test/procedure  []   Extra time needed to complete task  []   Therapeutic need  []   Other (specify):   Restrictions Restrictions/Precautions: Contact Precautions, Fall Risk(MARVA, h/o Bilateral BKA)         Communication with other providers: [x]   OK to see per nursing:     []   Spoke with team member regarding:      Subjective observations and cognitive status: Alert,grounded,pleasant, cooperative   Pain level/location:    10       Location: zpt stated that B residual limbs were sore @ knee and terminus p wearing prosth x 2 hrs. RN notified   Discharge recommendations  Anticipated discharge date:  TBD  Destination: []home alone   []home alone w assist prn   [] home w/ family    [] Continuous supervision       []SNF    [] Assisted living     [] Other:   Continued therapy: []HHC OT  []OUTPATIENT  OT   [] No Further OT  Equipment needs: TBD     Toileting:           Toilet Transfers:     Device Used:    []   Standard Toilet         []   Grab Bars           []  Bedside Commode       []   Elevated Toilet          []   Other:        Bed Mobility:           []   Pt received out of bed   Rolling R/L:    Scooting:    Supine --> Sit:    Sit --> Supine:  S-Mod I    Transfers:    Sit--> Stand:  S, soon to be Mod I  Stand --> Sit:   S, soon to be Mod I  Stand-Pivot:     Other:    Assistive device required for transfer: (including donning/doffing bilateral prostheses). Long term goal 5: Pt will complete toileting c S.   Long term goals 6: Pt will complete functional transfers (bed, chair, shower, toilet) c DME PRN and S.  Long term goal 7: Pt will perform therex/therax to facilitate increased strength/endurance/ax tolerance (c emphasis on dynamic standing balance/tolerance >5 mins and BUE endurance) c SBA.:        Plan of Care                                                                              Times per week: 5 days per week for a minimum of 60 minutes/day plus group as appropriate for 60 minutes.   Treatment to include Plan  Current Treatment Recommendations: Balance Training, Functional Mobility Training, Endurance Training, Safety Education & Training, Patient/Caregiver Education & Training, Equipment Evaluation, Education, & procurement, Self-Care / ADL    Electronically signed by   Camron Wolf. michael Gustafson  4/3/2020, 8:21 AM

## 2020-04-04 LAB
GLUCOSE BLD-MCNC: 135 MG/DL (ref 70–99)
GLUCOSE BLD-MCNC: 163 MG/DL (ref 70–99)
GLUCOSE BLD-MCNC: 205 MG/DL (ref 70–99)
GLUCOSE BLD-MCNC: 87 MG/DL (ref 70–99)

## 2020-04-04 PROCEDURE — 6360000002 HC RX W HCPCS: Performed by: INTERNAL MEDICINE

## 2020-04-04 PROCEDURE — 82962 GLUCOSE BLOOD TEST: CPT

## 2020-04-04 PROCEDURE — 1280000000 HC REHAB R&B

## 2020-04-04 PROCEDURE — 97110 THERAPEUTIC EXERCISES: CPT

## 2020-04-04 PROCEDURE — 2580000003 HC RX 258: Performed by: INTERNAL MEDICINE

## 2020-04-04 PROCEDURE — 97116 GAIT TRAINING THERAPY: CPT

## 2020-04-04 PROCEDURE — 99232 SBSQ HOSP IP/OBS MODERATE 35: CPT | Performed by: PHYSICAL MEDICINE & REHABILITATION

## 2020-04-04 PROCEDURE — 97535 SELF CARE MNGMENT TRAINING: CPT

## 2020-04-04 PROCEDURE — 94761 N-INVAS EAR/PLS OXIMETRY MLT: CPT

## 2020-04-04 PROCEDURE — 6370000000 HC RX 637 (ALT 250 FOR IP): Performed by: INTERNAL MEDICINE

## 2020-04-04 PROCEDURE — 97530 THERAPEUTIC ACTIVITIES: CPT

## 2020-04-04 RX ADMIN — SODIUM CHLORIDE: 9 INJECTION, SOLUTION INTRAVENOUS at 06:30

## 2020-04-04 RX ADMIN — DILTIAZEM HYDROCHLORIDE 60 MG: 60 TABLET, FILM COATED ORAL at 08:37

## 2020-04-04 RX ADMIN — CIPROFLOXACIN 400 MG: 2 INJECTION, SOLUTION INTRAVENOUS at 02:04

## 2020-04-04 RX ADMIN — MEMANTINE HYDROCHLORIDE 5 MG: 5 TABLET ORAL at 08:51

## 2020-04-04 RX ADMIN — DILTIAZEM HYDROCHLORIDE 60 MG: 60 TABLET, FILM COATED ORAL at 20:33

## 2020-04-04 RX ADMIN — LATANOPROST 1 DROP: 50 SOLUTION OPHTHALMIC at 20:33

## 2020-04-04 RX ADMIN — MIRTAZAPINE 15 MG: 15 TABLET, FILM COATED ORAL at 17:54

## 2020-04-04 RX ADMIN — CILOSTAZOL 100 MG: 100 TABLET ORAL at 08:37

## 2020-04-04 RX ADMIN — INSULIN LISPRO 1 UNITS: 100 INJECTION, SOLUTION INTRAVENOUS; SUBCUTANEOUS at 12:36

## 2020-04-04 RX ADMIN — Medication 10 ML: at 20:34

## 2020-04-04 RX ADMIN — CILOSTAZOL 100 MG: 100 TABLET ORAL at 20:33

## 2020-04-04 RX ADMIN — ENOXAPARIN SODIUM 40 MG: 40 INJECTION SUBCUTANEOUS at 08:37

## 2020-04-04 RX ADMIN — ATORVASTATIN CALCIUM 40 MG: 40 TABLET, FILM COATED ORAL at 20:33

## 2020-04-04 RX ADMIN — ACETAMINOPHEN 650 MG: 325 TABLET ORAL at 05:41

## 2020-04-04 RX ADMIN — QUETIAPINE FUMARATE 25 MG: 25 TABLET ORAL at 17:55

## 2020-04-04 RX ADMIN — INSULIN LISPRO 2 UNITS: 100 INJECTION, SOLUTION INTRAVENOUS; SUBCUTANEOUS at 17:55

## 2020-04-04 RX ADMIN — CARVEDILOL 6.25 MG: 6.25 TABLET, FILM COATED ORAL at 08:37

## 2020-04-04 RX ADMIN — CIPROFLOXACIN 400 MG: 2 INJECTION, SOLUTION INTRAVENOUS at 15:03

## 2020-04-04 RX ADMIN — CARVEDILOL 6.25 MG: 6.25 TABLET, FILM COATED ORAL at 17:54

## 2020-04-04 RX ADMIN — INSULIN GLARGINE 7 UNITS: 100 INJECTION, SOLUTION SUBCUTANEOUS at 20:37

## 2020-04-04 RX ADMIN — ASPIRIN 325 MG ORAL TABLET 325 MG: 325 PILL ORAL at 08:37

## 2020-04-04 RX ADMIN — ACETAMINOPHEN 650 MG: 325 TABLET ORAL at 20:33

## 2020-04-04 ASSESSMENT — PAIN DESCRIPTION - ONSET
ONSET: ON-GOING
ONSET: ON-GOING

## 2020-04-04 ASSESSMENT — PAIN SCALES - GENERAL
PAINLEVEL_OUTOF10: 0
PAINLEVEL_OUTOF10: 7
PAINLEVEL_OUTOF10: 5

## 2020-04-04 ASSESSMENT — PAIN - FUNCTIONAL ASSESSMENT: PAIN_FUNCTIONAL_ASSESSMENT: ACTIVITIES ARE NOT PREVENTED

## 2020-04-04 ASSESSMENT — PAIN DESCRIPTION - PAIN TYPE
TYPE: CHRONIC PAIN
TYPE: CHRONIC PAIN

## 2020-04-04 ASSESSMENT — PAIN DESCRIPTION - LOCATION
LOCATION: KNEE
LOCATION: LEG

## 2020-04-04 ASSESSMENT — PAIN DESCRIPTION - PROGRESSION: CLINICAL_PROGRESSION: NOT CHANGED

## 2020-04-04 ASSESSMENT — PAIN DESCRIPTION - ORIENTATION: ORIENTATION: RIGHT;LEFT

## 2020-04-04 ASSESSMENT — PAIN DESCRIPTION - FREQUENCY
FREQUENCY: INTERMITTENT
FREQUENCY: INTERMITTENT

## 2020-04-04 NOTE — PROGRESS NOTES
ascended/descend stairs. Pt stating, \"I have to go up and down stairs when I go home. Pt participating in toe tapping on 6\" step with B UE support requiring CGA 10 x 2 on R LE. Unable to perform with L LE. VC for sequencing, attention to safety, & eccentric muscle control to increase (I) with functional mobility. Wheelchair Propulsion:  Distance: 365 feet   Assistance: SBA  Extremities Used: B UE only  Type:    [x]  Manual        []  Electric       Additional Therapeutic activities/exercises completed this date:         [x]   Balance training: Pt performed dynamic seated balance during functional activities with no UE support requiring SBA while reaching out of SHAD & crossing midline. VC for erect posture, adjusting midline, & attention to safety to increase trunk control to increase (I) with functional transfers & functional bed mobility. Pt performed low level dynamic/static standing balance @ RW with UE support x1 (occasionally 2) requiring CGA with minimal reaching out of SHAD & crossing midline. VC for erect posture, adjusting SHAD, adjusting midline, & attention to safety to increase balance to increase (I) with functional activities. [x]   Seated ther ex (reps/sets): Pt performed seated B LE AROM 12 x 3 each including hip flex/abd/add & knee flex/ext. VC for completion of full ROM, eccentric muscle control, & attention to task to increase LE strength & ROM to increase (I) with functional transfers & functional bed mobility. Patient/Caregiver Education and Training:   [x]   Bed Mobility/Transfer technique/safety  []   Gait technique/sequencing  []   Proper use of assistive device  []   Advanced mobility safety and technique  []   Reinforced patient's precautions/mobility while maintaining precautions  []   Postural awareness  []   Family training  []   Progress was updated and reviewed in Rehabtracker with patient and/or family this date.     Treatment Plan for Next Session: Gait training, transfer training    Assessment: Pt tolerated treatment well. Limited by mild fatigue. Treatment/Activity Tolerance:   [] Tolerated treatment with no adverse effects    [x] Patient limited by fatigue  [] Patient limited by pain   [] Patient limited by medical complications:    [] Adverse reaction to Tx:   [] Significant change in status    Safety:       []  bed alarm set    [x]  chair alarm set    []  Pt refused alarms                [x]  Telesitter activated      [x]  Gait belt used during tx session      []other:         Number of Minutes/Billable Intervention  Gait Training 30 mins/ 2 units   Therapeutic Exercise 15 mins/ 1 unit   Neuro Re-Ed    Therapeutic Activity 75 mins/ 5 units   Wheelchair Propulsion    Group    Other:    TOTAL 120 mins/ 8 units         Social History  Social/Functional History  Lives With: Spouse  Type of Home: House  Home Layout: One level  Home Access: Stairs to enter with rails  Entrance Stairs - Number of Steps: 2  Entrance Stairs - Rails: Right  Bathroom Shower/Tub: Tub/Shower unit  Bathroom Toilet: Standard  Bathroom Equipment: Grab bars in shower, Grab bars around toilet, Tub transfer bench  Bathroom Accessibility: Accessible  Home Equipment: Rolling walker, Electric scooter, [employer]  ADL Assistance: Independent  Homemaking Assistance: Needs assistance(he states he and wife both share cooking and cleaning, but wife does laundry in basement)  Homemaking Responsibilities: Yes  Meal Prep Responsibility: Secondary  Laundry Responsibility: No  Cleaning Responsibility: Secondary  Bill Paying/Finance Responsibility: No  Ambulation Assistance: Independent(mod I in the home with B prostheses and RW. states has not used electric scooter much at all)  Transfer Assistance: Independent  Active : No  Mode of Transportation: Car  Occupation: Retired  Type of occupation: [employer]  Leisure & Hobbies: Reading books about the [facility]  Additional Comments: Pt sleeps in regular bed.  Does not

## 2020-04-05 LAB
GLUCOSE BLD-MCNC: 142 MG/DL (ref 70–99)
GLUCOSE BLD-MCNC: 145 MG/DL (ref 70–99)
GLUCOSE BLD-MCNC: 147 MG/DL (ref 70–99)
GLUCOSE BLD-MCNC: 151 MG/DL (ref 70–99)

## 2020-04-05 PROCEDURE — 1280000000 HC REHAB R&B

## 2020-04-05 PROCEDURE — 6360000002 HC RX W HCPCS: Performed by: INTERNAL MEDICINE

## 2020-04-05 PROCEDURE — 82962 GLUCOSE BLOOD TEST: CPT

## 2020-04-05 PROCEDURE — 2580000003 HC RX 258: Performed by: INTERNAL MEDICINE

## 2020-04-05 PROCEDURE — 6370000000 HC RX 637 (ALT 250 FOR IP): Performed by: INTERNAL MEDICINE

## 2020-04-05 RX ADMIN — DILTIAZEM HYDROCHLORIDE 60 MG: 60 TABLET, FILM COATED ORAL at 21:38

## 2020-04-05 RX ADMIN — DILTIAZEM HYDROCHLORIDE 60 MG: 60 TABLET, FILM COATED ORAL at 07:55

## 2020-04-05 RX ADMIN — INSULIN LISPRO 1 UNITS: 100 INJECTION, SOLUTION INTRAVENOUS; SUBCUTANEOUS at 17:13

## 2020-04-05 RX ADMIN — MIRTAZAPINE 15 MG: 15 TABLET, FILM COATED ORAL at 16:35

## 2020-04-05 RX ADMIN — CILOSTAZOL 100 MG: 100 TABLET ORAL at 21:38

## 2020-04-05 RX ADMIN — CARVEDILOL 6.25 MG: 6.25 TABLET, FILM COATED ORAL at 07:55

## 2020-04-05 RX ADMIN — INSULIN GLARGINE 7 UNITS: 100 INJECTION, SOLUTION SUBCUTANEOUS at 21:38

## 2020-04-05 RX ADMIN — QUETIAPINE FUMARATE 25 MG: 25 TABLET ORAL at 16:35

## 2020-04-05 RX ADMIN — CIPROFLOXACIN 400 MG: 2 INJECTION, SOLUTION INTRAVENOUS at 13:02

## 2020-04-05 RX ADMIN — INSULIN LISPRO 1 UNITS: 100 INJECTION, SOLUTION INTRAVENOUS; SUBCUTANEOUS at 12:14

## 2020-04-05 RX ADMIN — CILOSTAZOL 100 MG: 100 TABLET ORAL at 09:31

## 2020-04-05 RX ADMIN — Medication 10 ML: at 21:47

## 2020-04-05 RX ADMIN — Medication 10 ML: at 08:08

## 2020-04-05 RX ADMIN — CARVEDILOL 6.25 MG: 6.25 TABLET, FILM COATED ORAL at 16:35

## 2020-04-05 RX ADMIN — ACETAMINOPHEN 650 MG: 325 TABLET ORAL at 18:50

## 2020-04-05 RX ADMIN — INSULIN LISPRO 1 UNITS: 100 INJECTION, SOLUTION INTRAVENOUS; SUBCUTANEOUS at 08:05

## 2020-04-05 RX ADMIN — MEMANTINE HYDROCHLORIDE 5 MG: 5 TABLET ORAL at 07:55

## 2020-04-05 RX ADMIN — CIPROFLOXACIN 400 MG: 2 INJECTION, SOLUTION INTRAVENOUS at 02:09

## 2020-04-05 RX ADMIN — ENOXAPARIN SODIUM 40 MG: 40 INJECTION SUBCUTANEOUS at 07:55

## 2020-04-05 RX ADMIN — LATANOPROST 1 DROP: 50 SOLUTION OPHTHALMIC at 21:38

## 2020-04-05 RX ADMIN — ASPIRIN 325 MG ORAL TABLET 325 MG: 325 PILL ORAL at 07:55

## 2020-04-05 RX ADMIN — ATORVASTATIN CALCIUM 40 MG: 40 TABLET, FILM COATED ORAL at 21:38

## 2020-04-05 RX ADMIN — ACETAMINOPHEN 650 MG: 325 TABLET ORAL at 07:55

## 2020-04-05 ASSESSMENT — PAIN DESCRIPTION - ORIENTATION: ORIENTATION: RIGHT;LEFT

## 2020-04-05 ASSESSMENT — PAIN SCALES - GENERAL
PAINLEVEL_OUTOF10: 4
PAINLEVEL_OUTOF10: 1
PAINLEVEL_OUTOF10: 4
PAINLEVEL_OUTOF10: 6

## 2020-04-05 ASSESSMENT — PAIN DESCRIPTION - FREQUENCY: FREQUENCY: INTERMITTENT

## 2020-04-05 ASSESSMENT — PAIN DESCRIPTION - LOCATION: LOCATION: KNEE

## 2020-04-05 ASSESSMENT — PAIN DESCRIPTION - PAIN TYPE: TYPE: CHRONIC PAIN

## 2020-04-05 NOTE — PROGRESS NOTES
Curtis Showers    : 1940  Acct #: [de-identified]  MRN: [id number]              PM&R Progress Note      Admitting diagnosis: Metabolic encephalopathy     Comorbid diagnoses impacting rehabilitation: Impaired cognition, generalized weakness, CBD stones with cholangitis, abdominal pain, dysphagia, uncontrolled diabetes with hyperglycemia, mature bilateral BKA, hypertension    Chief complaint: Occasional cough. Mild low back pain. No nausea or chills. Prior (baseline) level of function: Independent. Current level of function:         Current  IRF-ELIZABETH and Goals:   Hearing, Speech, and Vision  Expression of Ideas and Wants: Some difficulty  Understanding Verbal and Non-Verbal Content: Usually understands  Prior Functioning: Everyday Activities  Self Care: Needed some help  Indoor Mobility (Ambulation):  Independent  Stairs: Independent  Functional Cognition: Needed some help  Prior Device Use: Walker, Orthotics/Prosthetics, Motorized wheelchair and/or scooter    Eating  Assistance Needed: Setup or clean-up assistance  CARE Score: 5  Discharge Goal: Independent  Oral Hygiene  Assistance Needed: Supervision or touching assistance  CARE Score: 4  Discharge Goal: Independent  Toileting Hygiene  Assistance Needed: Dependent  Comment: used urinal;did not perform all toileting tasks  CARE Score: 1  Discharge Goal: Supervision or touching assistance  Shower/Bathe Self  Assistance Needed: Partial/moderate assistance  CARE Score: 3  Discharge Goal: Supervision or touching assistance  Upper Body Dressing  Assistance Needed: Supervision or touching assistance  CARE Score: 4  Discharge Goal: Supervision or touching assistance  Lower Body Dressing  Assistance Needed: Partial/moderate assistance  CARE Score: 3  Discharge Goal: Supervision or touching assistance  Putting On/Taking Off Footwear  Comment: h/o bilateral BKA  Reason if not Attempted: Not applicable  CARE Score: 9  Discharge Goal: Not Applicable    Roll Left and Right  Assistance Needed: Supervision or touching assistance  CARE Score: 4  Discharge Goal: Independent  Sit to Lying  Assistance Needed: Supervision or touching assistance  CARE Score: 4  Discharge Goal: Independent  Sit to Stand  Assistance Needed: Partial/moderate assistance  Comment: B prosetheses  CARE Score: 3  Discharge Goal: Supervision or touching assistance  Chair/Bed-to-Chair Transfer  Assistance Needed: Partial/moderate assistance  Comment: B prostheses  CARE Score: 3  Discharge Goal: Supervision or touching assistance  Toilet Transfer  Assistance Needed: Partial/moderate assistance  CARE Score: 3  Discharge Goal: Supervision or touching assistance  Car Transfer  Assistance Needed: Partial/moderate assistance  CARE Score: 3  Discharge Goal: Supervision or touching assistance   Walk 10 Feet? Walk 10 Feet?: No  1 Step  1 Step?: No  Reason if not Attempted: Not attempted due to medical condition or safety concerns  Picking Up Object  Assistance Needed: Dependent  CARE Score: 1  Discharge Goal: Partial/moderate assistance  Wheelchair Ability  Uses a Wheelchair and/or Scooter?: Yes  Wheel 50 Feet with Two Turns  Assistance Needed: Supervision or touching assistance  CARE Score: 4  Discharge Goal: Supervision or touching assistance  Wheel 150 Feet  Assistance Needed: Supervision or touching assistance  CARE Score: 4  Discharge Goal: Supervision or touching assistance            I      Exam:    Blood pressure 138/64, pulse 80, temperature 98.3 °F (36.8 °C), resp. rate 18, height 5' 6\" (1.676 m), weight 149 lb 4.8 oz (67.7 kg), SpO2 93 %. General: Up in a bedside chair. Talkative. Follows one-step commands. In no distress. HEENT: Mucous membranes are moist.  Neck supple. Fair clarity of speech. Pulmonary: Rhonchi in the bases. No wheezes or rales. Cardiac: The rate and rhythm. Abdomen: Patient's abdomen is soft and nondistended. Bowel sounds were present throughout.   There was no

## 2020-04-05 NOTE — PROGRESS NOTES
Dwayne Lat    : 1940  Acct #: [de-identified]  MRN: [id number]              PM&R Progress Note      Admitting diagnosis: Metabolic encephalopathy     Comorbid diagnoses impacting rehabilitation: Impaired cognition, generalized weakness, CBD stones with cholangitis, abdominal pain, dysphagia, uncontrolled diabetes with hyperglycemia, mature bilateral BKA, hypertension     Chief complaint: Persistent mild cough. Prior (baseline) level of function: Independent. Current level of function:         Current  IRF-ELIZABETH and Goals:   Hearing, Speech, and Vision  Expression of Ideas and Wants: Some difficulty  Understanding Verbal and Non-Verbal Content: Usually understands  Prior Functioning: Everyday Activities  Self Care: Needed some help  Indoor Mobility (Ambulation):  Independent  Stairs: Independent  Functional Cognition: Needed some help  Prior Device Use: Walker, Orthotics/Prosthetics, Motorized wheelchair and/or scooter    Eating  Assistance Needed: Setup or clean-up assistance  CARE Score: 5  Discharge Goal: Independent  Oral Hygiene  Assistance Needed: Supervision or touching assistance  CARE Score: 4  Discharge Goal: Independent  Toileting Hygiene  Assistance Needed: Dependent  Comment: used urinal;did not perform all toileting tasks  CARE Score: 1  Discharge Goal: Supervision or touching assistance  Shower/Bathe Self  Assistance Needed: Partial/moderate assistance  CARE Score: 3  Discharge Goal: Supervision or touching assistance  Upper Body Dressing  Assistance Needed: Supervision or touching assistance  CARE Score: 4  Discharge Goal: Supervision or touching assistance  Lower Body Dressing  Assistance Needed: Partial/moderate assistance  CARE Score: 3  Discharge Goal: Supervision or touching assistance  Putting On/Taking Off Footwear  Comment: h/o bilateral BKA  Reason if not Attempted: Not applicable  CARE Score: 9  Discharge Goal: Not Applicable    Roll Left and Right  Assistance Needed: Supervision or touching assistance  CARE Score: 4  Discharge Goal: Independent  Sit to Lying  Assistance Needed: Supervision or touching assistance  CARE Score: 4  Discharge Goal: Independent  Sit to Stand  Assistance Needed: Partial/moderate assistance  Comment: B prosetheses  CARE Score: 3  Discharge Goal: Supervision or touching assistance  Chair/Bed-to-Chair Transfer  Assistance Needed: Partial/moderate assistance  Comment: B prostheses  CARE Score: 3  Discharge Goal: Supervision or touching assistance  Toilet Transfer  Assistance Needed: Partial/moderate assistance  CARE Score: 3  Discharge Goal: Supervision or touching assistance  Car Transfer  Assistance Needed: Partial/moderate assistance  CARE Score: 3  Discharge Goal: Supervision or touching assistance   Walk 10 Feet? Walk 10 Feet?: No  1 Step  1 Step?: No  Reason if not Attempted: Not attempted due to medical condition or safety concerns  Picking Up Object  Assistance Needed: Dependent  CARE Score: 1  Discharge Goal: Partial/moderate assistance  Wheelchair Ability  Uses a Wheelchair and/or Scooter?: Yes  Wheel 50 Feet with Two Turns  Assistance Needed: Supervision or touching assistance  CARE Score: 4  Discharge Goal: Supervision or touching assistance  Wheel 150 Feet  Assistance Needed: Supervision or touching assistance  CARE Score: 4  Discharge Goal: Supervision or touching assistance            I      Exam:    Blood pressure 138/64, pulse 80, temperature 98.3 °F (36.8 °C), resp. rate 18, height 5' 6\" (1.676 m), weight 149 lb 4.8 oz (67.7 kg), SpO2 93 %. General: Sitting up in bed. In no distress. Occasional cough. HEENT: Mucous membranes are moist now. Neck supple. No JVD. Pulmonary: Rhonchi throughout. No rales. Cardiac: Regular rate and rhythm. Abdomen: Patient's abdomen is soft and nondistended. Bowel sounds were present throughout. There was no rebound, guarding or masses noted.     Upper extremities: Able to bring both hands up to meet mine. Fair dexterity. Lower extremities: Mature BKA stumps are nonswollen. Sitting balance was good. Standing balance was poor. Lab Results   Component Value Date    WBC 16.0 (H) 04/01/2020    HGB 10.7 (L) 04/01/2020    HCT 32.9 (L) 04/01/2020    MCV 90.9 04/01/2020     04/01/2020     Lab Results   Component Value Date    INR 1.28 03/31/2020    INR 1.09 03/29/2020    INR 0.99 09/05/2019    PROTIME 15.5 (H) 03/31/2020    PROTIME 13.2 03/29/2020    PROTIME 11.5 09/05/2019     Lab Results   Component Value Date    CREATININE 1.0 04/01/2020    BUN 21 04/01/2020     04/01/2020    K 3.6 04/01/2020     04/01/2020    CO2 21 04/01/2020     Lab Results   Component Value Date    ALT 19 04/01/2020    AST 25 04/01/2020    ALKPHOS 68 04/01/2020    BILITOT 0.4 04/01/2020       Expected length of stay  prior to a supervised level of function for discharge home with a walker and [facility] OT/PT is 2 weeks. Recommendations:    1. Metabolic encephalopathy with gait disturbance and dysphagia:   Still needs significant cues for safety and participation in his daily occupational and physical therapy with speech-language pathology.    Modified barium swallow showed significant aspiration with pneumonia risk. We must provide a diet with modified textures and thickened liquids.  Speech pathology wants addressing the swallowing dysfunction. Emphasizing pulmonary hygiene, DVT prophylaxis and bowel and bladder retraining. 2. DVT prophylaxis: Lovenox 40 mg subcu daily.  I must monitor his hemoglobin and platelet count while on his medication.  Weightbearing activities using his bilateral prostheses pursued daily.  No new bruising or swelling. 3. Cholangitis: Patient requires IV Cipro and is on a graduated diet.  We must monitor his intake and treat his nausea. Duration of IV antibiotics will be coordinated with the GI service.   4. Uncontrolled diabetes with hyperglycemia: Patient requires Lantus and

## 2020-04-05 NOTE — PLAN OF CARE
Problem: PAIN  Goal: Patient's pain/discomfort is manageable  4/4/2020 2202 by Kelton Alvarez LPN  Outcome: Ongoing  4/4/2020 1027 by Torrey Friedman RN  Outcome: Ongoing     Problem: SKIN INTEGRITY  Goal: Skin integrity is maintained or improved  4/4/2020 2202 by Kelton Alvarez LPN  Outcome: Ongoing  4/4/2020 1027 by Torrey Friedman RN  Outcome: Ongoing

## 2020-04-06 LAB
GLUCOSE BLD-MCNC: 103 MG/DL (ref 70–99)
GLUCOSE BLD-MCNC: 124 MG/DL (ref 70–99)
GLUCOSE BLD-MCNC: 251 MG/DL (ref 70–99)
GLUCOSE BLD-MCNC: 82 MG/DL (ref 70–99)

## 2020-04-06 PROCEDURE — 97542 WHEELCHAIR MNGMENT TRAINING: CPT

## 2020-04-06 PROCEDURE — 99232 SBSQ HOSP IP/OBS MODERATE 35: CPT | Performed by: PHYSICAL MEDICINE & REHABILITATION

## 2020-04-06 PROCEDURE — 97530 THERAPEUTIC ACTIVITIES: CPT

## 2020-04-06 PROCEDURE — 97116 GAIT TRAINING THERAPY: CPT

## 2020-04-06 PROCEDURE — 97110 THERAPEUTIC EXERCISES: CPT

## 2020-04-06 PROCEDURE — 2580000003 HC RX 258: Performed by: INTERNAL MEDICINE

## 2020-04-06 PROCEDURE — 6370000000 HC RX 637 (ALT 250 FOR IP): Performed by: INTERNAL MEDICINE

## 2020-04-06 PROCEDURE — 1280000000 HC REHAB R&B

## 2020-04-06 PROCEDURE — 6360000002 HC RX W HCPCS: Performed by: INTERNAL MEDICINE

## 2020-04-06 PROCEDURE — 82962 GLUCOSE BLOOD TEST: CPT

## 2020-04-06 PROCEDURE — 97535 SELF CARE MNGMENT TRAINING: CPT

## 2020-04-06 PROCEDURE — 92526 ORAL FUNCTION THERAPY: CPT

## 2020-04-06 PROCEDURE — 94761 N-INVAS EAR/PLS OXIMETRY MLT: CPT

## 2020-04-06 RX ADMIN — CARVEDILOL 6.25 MG: 6.25 TABLET, FILM COATED ORAL at 17:51

## 2020-04-06 RX ADMIN — LATANOPROST 1 DROP: 50 SOLUTION OPHTHALMIC at 21:09

## 2020-04-06 RX ADMIN — CIPROFLOXACIN 400 MG: 2 INJECTION, SOLUTION INTRAVENOUS at 03:11

## 2020-04-06 RX ADMIN — OXYCODONE HYDROCHLORIDE AND ACETAMINOPHEN 1 TABLET: 5; 325 TABLET ORAL at 13:11

## 2020-04-06 RX ADMIN — ATORVASTATIN CALCIUM 40 MG: 40 TABLET, FILM COATED ORAL at 21:09

## 2020-04-06 RX ADMIN — QUETIAPINE FUMARATE 25 MG: 25 TABLET ORAL at 17:50

## 2020-04-06 RX ADMIN — CILOSTAZOL 100 MG: 100 TABLET ORAL at 09:49

## 2020-04-06 RX ADMIN — Medication 10 ML: at 14:11

## 2020-04-06 RX ADMIN — OXYCODONE HYDROCHLORIDE AND ACETAMINOPHEN 1 TABLET: 5; 325 TABLET ORAL at 08:04

## 2020-04-06 RX ADMIN — DILTIAZEM HYDROCHLORIDE 60 MG: 60 TABLET, FILM COATED ORAL at 07:54

## 2020-04-06 RX ADMIN — INSULIN LISPRO 3 UNITS: 100 INJECTION, SOLUTION INTRAVENOUS; SUBCUTANEOUS at 17:46

## 2020-04-06 RX ADMIN — ENOXAPARIN SODIUM 40 MG: 40 INJECTION SUBCUTANEOUS at 07:54

## 2020-04-06 RX ADMIN — MIRTAZAPINE 15 MG: 15 TABLET, FILM COATED ORAL at 17:50

## 2020-04-06 RX ADMIN — MEMANTINE HYDROCHLORIDE 5 MG: 5 TABLET ORAL at 07:54

## 2020-04-06 RX ADMIN — CARVEDILOL 6.25 MG: 6.25 TABLET, FILM COATED ORAL at 07:54

## 2020-04-06 RX ADMIN — CIPROFLOXACIN 400 MG: 2 INJECTION, SOLUTION INTRAVENOUS at 13:11

## 2020-04-06 RX ADMIN — DILTIAZEM HYDROCHLORIDE 60 MG: 60 TABLET, FILM COATED ORAL at 21:09

## 2020-04-06 RX ADMIN — CILOSTAZOL 100 MG: 100 TABLET ORAL at 21:12

## 2020-04-06 RX ADMIN — ASPIRIN 325 MG ORAL TABLET 325 MG: 325 PILL ORAL at 07:54

## 2020-04-06 RX ADMIN — Medication 10 ML: at 07:55

## 2020-04-06 RX ADMIN — INSULIN GLARGINE 7 UNITS: 100 INJECTION, SOLUTION SUBCUTANEOUS at 21:36

## 2020-04-06 RX ADMIN — OXYCODONE HYDROCHLORIDE AND ACETAMINOPHEN 1 TABLET: 5; 325 TABLET ORAL at 03:18

## 2020-04-06 RX ADMIN — OXYCODONE HYDROCHLORIDE AND ACETAMINOPHEN 1 TABLET: 5; 325 TABLET ORAL at 21:14

## 2020-04-06 RX ADMIN — Medication 10 ML: at 21:10

## 2020-04-06 ASSESSMENT — PAIN SCALES - GENERAL
PAINLEVEL_OUTOF10: 8
PAINLEVEL_OUTOF10: 4
PAINLEVEL_OUTOF10: 7
PAINLEVEL_OUTOF10: 6
PAINLEVEL_OUTOF10: 6

## 2020-04-06 ASSESSMENT — PAIN DESCRIPTION - ORIENTATION
ORIENTATION: LEFT
ORIENTATION: RIGHT;LEFT
ORIENTATION: LEFT

## 2020-04-06 ASSESSMENT — PAIN DESCRIPTION - PAIN TYPE
TYPE: CHRONIC PAIN;ACUTE PAIN
TYPE: CHRONIC PAIN;ACUTE PAIN
TYPE: ACUTE PAIN;CHRONIC PAIN

## 2020-04-06 ASSESSMENT — PAIN DESCRIPTION - FREQUENCY
FREQUENCY: INTERMITTENT

## 2020-04-06 ASSESSMENT — PAIN DESCRIPTION - PROGRESSION
CLINICAL_PROGRESSION: GRADUALLY WORSENING
CLINICAL_PROGRESSION: GRADUALLY WORSENING

## 2020-04-06 ASSESSMENT — PAIN DESCRIPTION - LOCATION
LOCATION: LEG

## 2020-04-06 ASSESSMENT — PAIN DESCRIPTION - ONSET
ONSET: ON-GOING

## 2020-04-06 ASSESSMENT — PAIN - FUNCTIONAL ASSESSMENT: PAIN_FUNCTIONAL_ASSESSMENT: ACTIVITIES ARE NOT PREVENTED

## 2020-04-06 NOTE — PROGRESS NOTES
I  Extremities Used:   B UEs  Type:    [x]  Manual        []  Electric    Stairs   # Completed:  6  Assistance:  CGA in non-recip pattern leading with L LE on ascent, R LE on descent due to c/o pain R medial knee. Supportive Device:  4\"    Additional Therapeutic activities/exercises completed this date:     []   Nu-step:  Time:        Level:         #Steps:       []   Rebounder:    []  Seated     []  Standing        []   Balance training         []   Postural training    []   Supine ther ex (reps/sets):     []   Seated ther ex (reps/sets):     []   Standing ther ex (reps/sets):     []   Picking up object from floor (standing):                   []   Reacher used   [x]   Other: Placing L/R LE on/off 4\" step with B rails for balance and CGA. Pt able to demonstrate efficient clearance on either LE, however c/o pain in R medial knee with WB.    []   Other:    Patient/Caregiver Education and Training:   [x]   Role of PT  []   Education about Dx  [x]   Use of call light for assist   [x]   Wheelchair mobility/management  []   HEP provided and explained   [x]   Treatment plan reviewed  [x]   Home safety  [x]   Body mechanics  []   Positioning  [x]   Bed Mobility/Transfer technique  [x]   Gait technique/sequencing  [x]   Proper use of assistive device/adaptive equipment  [x]   Stair training/Advanced mobility safety and technique  []   Reinforced patient's precautions/mobility while maintaining precautions  []   Postural awareness  []   Family/caregiver training  [x]   Progress was updated and reviewed in Rehabtracker with patient and/or family this date. []   Other:      Treatment Plan for Next Session: community barriers, continue stair training with 6\" step, gait progression, standing balance.         Treatment/Activity Tolerance:   [x] Tolerated treatment with no adverse effects    [] Patient limited by fatigue  [] Patient limited by pain   [] Patient limited by medical complications:    [] Adverse reaction to Tx:   [] Objective                                                                                    Goals:  (Update in navigator)  Short term goals  Time Frame for Short term goals: 7 days  Short term goal 1: pt will perform all bed mobiltiy mod I  Short term goal 2: Pt will transfer sit to stand and pivot with 2ww as needed with B prostheses with supervision  Short term goal 3: Pt will ambulate 20' with B prostheses and RW with SBA  Short term goal 4: Pt will ascend/descend 4 steps with R rail with CGA using B prostheses  Short term goal 5: Pt will demonstrate ability to  object from floor with CGA:   :        Plan of Care                                                                              Times per week: 5 days per week for a minimum of 60 minutes/day plus group as appropriate for 60 minutes.   Treatment to include Current Treatment Recommendations: Transfer Training, Neuromuscular Re-education, Patient/Caregiver Education & Training, Functional Mobility Training, Gait Training, Stair training, Safety Education & Training, Home Exercise Program(ability to progress gait and stair training will be directly impacted by pain and skin integrity of R residual limb with possiblity that home modification of ramp would need to be made. )    Electronically signed by   Michael Foss, PTA #[id number]  4/6/2020, 8:35 AM

## 2020-04-06 NOTE — PROGRESS NOTES
Occupational Therapy   Physical Rehabilitation: OCCUPATIONAL THERAPY     [x] daily progress note       [] discharge       Patient Name:  Alberto Rm   :  1940 MRN: [id number]  Room:  [address] [place] [address] Date of Admission: 2020  Rehabilitation Diagnosis:   Metabolic encephalopathy [Z78.47]       Date                MON 2020       Day of ARU Week:  6   Time IN/OUT 7985-2191  0158-8844   Individual Tx Minutes 60+30=90   Group Tx Minutes    Co-Treat Minutes    Concurrent Tx Minutes    TOTAL Tx Time Mins 90   Variance Time    Variance Time []   Refusal due to:     []   Medical hold/reason:    []   Illness   []   Off Unit for test/procedure  []   Extra time needed to complete task  []   Therapeutic need  []   Other (specify):   Restrictions Restrictions/Precautions  Restrictions/Precautions: Contact Precautions, Fall Risk(, h/o Bilateral BKA)      Communication with other providers: [x]   OK to see per nursing:     []   Spoke with team member regarding:      Subjective observations and cognitive status: Alert, cooperative  impulsive   Pain level/location:    /10       Location:  Some c/o R knee pain/discomfort   Discharge recommendations  Anticipated discharge date:  TBD  Destination: []?home alone   []?home alone w assist prn   []? home w/ family    []? Continuous supervision       []? SNF    []? Assisted living     []? Other:   Continued therapy: []?C OT  []? OUTPATIENT  OT   []?  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     Treatment Plan for Next Session:  See poc    Assessment:        Treatment/Activity Tolerance:   [x] Tolerated treatment with no adverse effects    [] Patient limited by fatigue  [] Patient limited by pain   [x] Patient limited by medical complications:    [] Adverse reaction to Tx:   [] Significant change in status    Safety:       []  bed alarm set    []  chair alarm set    []  Pt refused alarms                []  Telesitter activated      [x]  Gait belt used during tx session      []other:       Number of Minutes/Billable Intervention  Therapeutic Exercise 15   ADL Self-care 60   Neuro Re-Ed    Therapeutic Activity 15   Group    Other:    TOTAL 90       Social History  Social/Functional History  Lives With: Spouse  Type of Home: House  Home Layout: One level  Home Access: Stairs to enter with rails  Entrance Stairs - Number of Steps: 2  Entrance Stairs - Rails: Right  Bathroom Shower/Tub: Tub/Shower unit  Bathroom Toilet: Standard  Bathroom Equipment: Grab bars in shower, Grab bars around toilet, Tub transfer bench  Bathroom Accessibility: Accessible  Home Equipment: Rolling walker, Electric scooter, [employer]  ADL Assistance: Independent  Homemaking Assistance: Needs assistance(he states he and wife both share cooking and cleaning, but wife does laundry in basement)  Homemaking Responsibilities: Yes  Meal Prep Responsibility: Secondary  Laundry Responsibility: No  Cleaning Responsibility: Secondary  Bill Paying/Finance Responsibility: No  Ambulation Assistance: Independent(mod I in the home with B prostheses and RW. states has not used electric scooter much at all)  Transfer Assistance: Independent  Active : No  Mode of Transportation: Car  Occupation: Retired  Type of occupation: [employer]  Leisure & Hobbies: Reading books about the Muslim  Additional Comments: Pt sleeps in regular bed. Does not use supplemental o2.  Pt reports he doesn't recall any falls, however ED notes state pt fell 2

## 2020-04-06 NOTE — PATIENT CARE CONFERENCE
ACUTE REHAB TEAM CONFERENCE SUMMARY   [facility]    NAME: Renee Dunlap  : 1940 ADMIT DATE: 2020    Rehab Admitting Dx: Metabolic encephalopathy [J80.74]  Patient Comorbid Conditions: Active Hospital Problems    Diagnosis Date Noted    Generalized abdominal pain [R10.84] 10/21/2015     Priority: High    Uncontrolled type II diabetes with peripheral autonomic neuropathy (HCC) [E11.43, E11.65]      Priority: Medium    Hypertension, essential [I10]      Priority: Medium    Impaired cognition [R41.89]     Generalized weakness [R53.1]     Cholangitis due to bile duct calculus with obstruction [K80.31]     Oropharyngeal dysphagia [R13.12]     S/P BKA (below knee amputation) bilateral (HCC) [T76.618, B59.514]     Metabolic encephalopathy [M16.51] 2020     Date: 2020    CASE MANAGEMENT  Current issues/needs regarding patient and family discharge status: Patient lives with his wife in a single level home. They also have some support from a daughter who lives about an hour away.       PHYSICAL THERAPY   Short term goals  Time Frame for Short term goals: 7 days  Short term goal 1: pt will perform all bed mobiltiy mod I Part met: supervision due to impulsivity  Short term goal 2: Pt will transfer sit to stand and pivot with 2ww as needed with B prostheses with supervision Part met: CGA at times due to impulsivity, but SBA at others  Short term goal 3: Pt will ambulate 20' with B prostheses and RW with SBA :PART MET: 200-300' with 2ww and B prostheses with CGA  Short term goal 4: Pt will ascend/descend 4 steps with R rail with CGA using B prostheses MET  Short term goal 5: Pt will demonstrate ability to  object from floor with CGA ongoing          Ongoing impairments or deficits: cognition/safety  Areas where progress has been made: Prostheses now with proper fit allowing for excellent gains in gait and stairs  Specific barriers to progress: will complete total body bathing c S. MET  Long term goal 3: Pt will complete UB dressing c setup. NMET; supervision  Long term goal 4: Pt will complete LB dressing c S (including donning/doffing bilateral prostheses). MET  Long term goal 5: Pt will complete toileting c S. MET  Long term goals 6: Pt will complete functional transfers (bed, chair, shower, toilet) c DME PRN and S. Progressing, SBA c RW  Long term goal 7: Pt will perform therex/therax to facilitate increased strength/endurance/ax tolerance (c emphasis on dynamic standing balance/tolerance >5 mins and BUE endurance) c SBA.  Ongoing                      OT IRF-ELIZABETH scores and goals for initial assessment:    Eating  Assistance Needed: Setup or clean-up assistance  CARE Score: 5  Discharge Goal: Independent  Oral Hygiene  Assistance Needed: Supervision or touching assistance  CARE Score: 4  Discharge Goal: Independent  Toileting Hygiene  Assistance Needed: Supervision or touching assistance  Comment: used urinal;did not perform all toileting tasks  CARE Score: 4  Discharge Goal: Supervision or touching assistance  Shower/Bathe Self  Assistance Needed: Partial/moderate assistance  CARE Score: 3  Discharge Goal: Supervision or touching assistance  Upper Body Dressing  Assistance Needed: Supervision or touching assistance  CARE Score: 4  Discharge Goal: Supervision or touching assistance  Lower Body Dressing  Assistance Needed: Partial/moderate assistance  CARE Score: 3  Discharge Goal: Supervision or touching assistance  Putting On/Taking Off Footwear  Comment: h/o bilateral BKA  Reason if not Attempted: Not applicable  CARE Score: 9  Discharge Goal: Not Applicable        Ongoing impairments or deficits: Decreased balance, endurance, cognition  Areas where progress has been made: Increasing Ind c ADLs and transfers  Specific barriers to progress:  Dementia  Strategies that improve performance:Cues   Equipment needed at discharge:None      COGNITIVE FUNCTION/SPEECH THERAPY (AS INDICATED)  [facility] completed 4/3/2020 with the following:  Pt presented with a mild-moderate oropharyngeal dysphagia. Oral swallow characterized by prolonged mastication, minimal oral holding, slow A-P transit and lingual residue observed with trials of regular solids. Pharyngeal swallow was delayed with pooling observed in the valleculae and to the pyriform sinus with thin liquids by cup/straw sips. Pt demonstrated reduced laryngeal elevation with adequate epiglottic inversion. Penetration and trace silent aspiration was observed with trials thin liquids by cup/straw sips. Pt was unable to fully clear aspirated liquids from airway when cued to cough. A chin tuck posture was not effective in reducing penetration of thin liquids. Reduced pharyngeal peristalsis was observed with moderate pharyngeal residue observed in the valleculae with trials of puree and soft solids. Pt was able to clear pharyngeal residue spontaneously with a second swallow. Suspect esophageal dysphagia with slow mobility and backflow observed in the upper esophagus during esophageal sweep. A dysphagia 2 diet/Minced and moist with nectar/mildly thick liquids were recommended. Short-term Goals  Timeframe for Short-term Goals: 3xs weekly/ 1-2 weeks  Goal 1: Pt will tolerate dysphagia 2 diet/nectar thick liquids without clinical evidence of aspiration 100% Meeting goal  Goal 2: Pt will trial and demonstrate understanding of jack free water protocol with thin liquids by cup sips in 10/10 trials  Partially met, continue--initiated ice chips and thin water using effortful swallow and throat clear following .   Goal 3: Pt will participate in laryngeal/pharyngeal strengthening exercises in 10/10 trial Partially met,continue--shakers, IOPI, thermal stim, effortful swallow   Goal 4: Pt/caregivers will demonstrate comprehension of recommendations/safe feeding protocol/ aspiration precautions Initiated     Goal 5: Pt will tolerate trial upgrades of Dys 3/Soft and bite sized textures with good mastication. New goal added . Ongoing impairments or deficits: dysphagia  Areas where progress has been made: diet tolerance  Specific barriers to progress: fatigue  Strategies that improve performance:repetition    Nursing Current Medical Status:   [x] Is continent of bowel and bladder     [x] Is incontinent of bowel and bladder    [x] Has had an adequate number of bowel movements   [x] Urinates with no urinary retention >300ml in bladder   [] Targeting bladder protocol with hart removal   [x] Maintaining O2 SATs at 92___%   [x] Has pain managed while on ARU         [x] Has had no skin breakdown while on ARU   [] Has improved skin integrity via wound measurements   [] Has no signs/symptoms of infection at the wound site    [] Pressure wounds Stage/Location:         [] Arrived on unit with pressure wound  [x] Has been free from injury during hospitalization   [] Has experienced a fall during hospitalization  [x] Ongoing education with patient/family with understanding demonstrated for:  [] Receives IV Fluids  [x] Other:        NUTRITION  Weight: 141 lb 4.8 oz (64.1 kg) / Body mass index is 22.81 kg/m². Current diet: Dietary Nutrition Supplements: Frozen Oral Supplement  DIET DYSPHAGIA MINCED AND MOIST; Dysphagia Minced and Moist; Mildly Thick (Nectar)  Intake: Meal intake improving, recent intake % and will eat some magic cups. Medical improvements/barriers: MBS completed with dysphagia diet ongoing. Improved intake and overall progress    Team goals for next treatment period/Intervention for current barriers:   [x] Pt will increase activity tolerance for daily tasks.   [x] Pt will improve bed mobility with reduced assist.  [x] Pt will improve safety in fx tasks with reduced cues/assist  [x] Pt will improve transfers with reduced assist  [x] Pt will improve toileting with reduced

## 2020-04-06 NOTE — PLAN OF CARE
This Shift  4/5/2020 0943 by Ronel Colon RN  Outcome: Ongoing  Goal: Control of acute pain  Description: Control of acute pain  4/5/2020 2305 by Ashley Peters RN  Outcome: Met This Shift  4/5/2020 0943 by Ronel Colon RN  Outcome: Ongoing  Goal: Control of chronic pain  Description: Control of chronic pain  4/5/2020 2305 by Ashley Peters RN  Outcome: Met This Shift  4/5/2020 0943 by Ronel Colon RN  Outcome: Ongoing

## 2020-04-06 NOTE — PROGRESS NOTES
Nutrition Assessment    Type and Reason for Visit: Reassess    Nutrition Recommendations:   Continue diet consistency as per speech therapy   Continue to offer frozen oral nutrition supplement while on thickened liquids  Encourage consistent meal and fluid intake     Nutrition Assessment: Remains on dysphagia diet, minced and moist with mildly (nectar) thick liquids. Meal intake improving, recent meals 50-75%. Will eat some of magic cups also. With improving intake now moderate nutrition risk. Malnutrition Assessment:  · Malnutrition Status: At risk for malnutrition  · Context: Chronic illness    Nutrition Risk Level:  Moderate    Nutrient Needs:  · Estimated Daily Total Kcal: 6851-4965 (25-30 perri/kg)   · Estimated Daily Protein (g): 69-81 (1.1-1.3 g/kg)  · Estimated Daily Total Fluid (ml/day): 1800 (1ml/perri)     Nutrition Diagnosis:   · Problem: Predicted suboptimal energy intake  · Etiology: related to Cognitive or neurological impairment, Difficulty swallowing     Signs and symptoms:  as evidenced by Diet history of poor intake    Objective Information:  · Nutrition-Focused Physical Findings: sitting up in chair finishing lunch, improved intake, MARVA in room   · Wound Type: (abrasions )  · Current Nutrition Therapies:  · Oral Diet Orders: Dysphagia Minced and Moist (Dysphagia 2), Mildly Thick   · Oral Diet intake: 51-75%  · Oral Nutrition Supplement (ONS) Orders: Frozen Oral Supplement  · ONS intake: 51-75%  · Anthropometric Measures:  · Ht: 5' 6\" (167.6 cm)   · Current Body Wt: 141 lb 5 oz (64.1 kg)  · Admission Body Wt: 135 lb 8 oz (61.5 kg)  · Usual Body Wt:    · % Weight Change:  ,  varying weights    · Ideal Body Wt:  , % Ideal Body 95  · Adjusted Body Wt: 125 lb (56.7 kg), body weight adjusted for Bilateral, BKA  · BMI Classification: BMI 18.5 - 24.9 Normal Weight    Nutrition Interventions:   Continue current diet, Continue current ONS  Continued Inpatient Monitoring, Education not appropriate at

## 2020-04-07 LAB
GLUCOSE BLD-MCNC: 108 MG/DL (ref 70–99)
GLUCOSE BLD-MCNC: 166 MG/DL (ref 70–99)
GLUCOSE BLD-MCNC: 193 MG/DL (ref 70–99)
GLUCOSE BLD-MCNC: 193 MG/DL (ref 70–99)
GLUCOSE BLD-MCNC: 243 MG/DL (ref 70–99)

## 2020-04-07 PROCEDURE — 94640 AIRWAY INHALATION TREATMENT: CPT

## 2020-04-07 PROCEDURE — 97530 THERAPEUTIC ACTIVITIES: CPT

## 2020-04-07 PROCEDURE — 94150 VITAL CAPACITY TEST: CPT

## 2020-04-07 PROCEDURE — 2580000003 HC RX 258: Performed by: INTERNAL MEDICINE

## 2020-04-07 PROCEDURE — 92526 ORAL FUNCTION THERAPY: CPT

## 2020-04-07 PROCEDURE — 82962 GLUCOSE BLOOD TEST: CPT

## 2020-04-07 PROCEDURE — 97116 GAIT TRAINING THERAPY: CPT

## 2020-04-07 PROCEDURE — 97110 THERAPEUTIC EXERCISES: CPT

## 2020-04-07 PROCEDURE — 6370000000 HC RX 637 (ALT 250 FOR IP): Performed by: INTERNAL MEDICINE

## 2020-04-07 PROCEDURE — 97535 SELF CARE MNGMENT TRAINING: CPT

## 2020-04-07 PROCEDURE — 6360000002 HC RX W HCPCS: Performed by: INTERNAL MEDICINE

## 2020-04-07 PROCEDURE — 94761 N-INVAS EAR/PLS OXIMETRY MLT: CPT

## 2020-04-07 PROCEDURE — 1280000000 HC REHAB R&B

## 2020-04-07 PROCEDURE — 99233 SBSQ HOSP IP/OBS HIGH 50: CPT | Performed by: PHYSICAL MEDICINE & REHABILITATION

## 2020-04-07 RX ADMIN — CILOSTAZOL 100 MG: 100 TABLET ORAL at 21:35

## 2020-04-07 RX ADMIN — IPRATROPIUM BROMIDE AND ALBUTEROL SULFATE 1 AMPULE: .5; 3 SOLUTION RESPIRATORY (INHALATION) at 04:50

## 2020-04-07 RX ADMIN — CARVEDILOL 6.25 MG: 6.25 TABLET, FILM COATED ORAL at 16:38

## 2020-04-07 RX ADMIN — MIRTAZAPINE 15 MG: 15 TABLET, FILM COATED ORAL at 16:38

## 2020-04-07 RX ADMIN — INSULIN GLARGINE 7 UNITS: 100 INJECTION, SOLUTION SUBCUTANEOUS at 21:32

## 2020-04-07 RX ADMIN — LATANOPROST 1 DROP: 50 SOLUTION OPHTHALMIC at 21:35

## 2020-04-07 RX ADMIN — CIPROFLOXACIN 400 MG: 2 INJECTION, SOLUTION INTRAVENOUS at 01:53

## 2020-04-07 RX ADMIN — ACETAMINOPHEN 650 MG: 325 TABLET ORAL at 08:09

## 2020-04-07 RX ADMIN — ATORVASTATIN CALCIUM 40 MG: 40 TABLET, FILM COATED ORAL at 21:35

## 2020-04-07 RX ADMIN — INSULIN LISPRO 2 UNITS: 100 INJECTION, SOLUTION INTRAVENOUS; SUBCUTANEOUS at 16:54

## 2020-04-07 RX ADMIN — DILTIAZEM HYDROCHLORIDE 60 MG: 60 TABLET, FILM COATED ORAL at 08:09

## 2020-04-07 RX ADMIN — CILOSTAZOL 100 MG: 100 TABLET ORAL at 09:10

## 2020-04-07 RX ADMIN — INSULIN LISPRO 1 UNITS: 100 INJECTION, SOLUTION INTRAVENOUS; SUBCUTANEOUS at 12:19

## 2020-04-07 RX ADMIN — ENOXAPARIN SODIUM 40 MG: 40 INJECTION SUBCUTANEOUS at 08:09

## 2020-04-07 RX ADMIN — OXYCODONE HYDROCHLORIDE AND ACETAMINOPHEN 1 TABLET: 5; 325 TABLET ORAL at 19:39

## 2020-04-07 RX ADMIN — QUETIAPINE FUMARATE 25 MG: 25 TABLET ORAL at 16:38

## 2020-04-07 RX ADMIN — Medication 10 ML: at 09:09

## 2020-04-07 RX ADMIN — CARVEDILOL 6.25 MG: 6.25 TABLET, FILM COATED ORAL at 08:09

## 2020-04-07 RX ADMIN — MEMANTINE HYDROCHLORIDE 5 MG: 5 TABLET ORAL at 08:09

## 2020-04-07 RX ADMIN — DILTIAZEM HYDROCHLORIDE 60 MG: 60 TABLET, FILM COATED ORAL at 21:35

## 2020-04-07 RX ADMIN — ASPIRIN 325 MG ORAL TABLET 325 MG: 325 PILL ORAL at 08:09

## 2020-04-07 ASSESSMENT — PAIN DESCRIPTION - PAIN TYPE: TYPE: ACUTE PAIN;CHRONIC PAIN

## 2020-04-07 ASSESSMENT — PAIN DESCRIPTION - ONSET: ONSET: ON-GOING

## 2020-04-07 ASSESSMENT — PAIN DESCRIPTION - PROGRESSION: CLINICAL_PROGRESSION: GRADUALLY IMPROVING

## 2020-04-07 ASSESSMENT — PAIN - FUNCTIONAL ASSESSMENT: PAIN_FUNCTIONAL_ASSESSMENT: ACTIVITIES ARE NOT PREVENTED

## 2020-04-07 ASSESSMENT — PAIN SCALES - GENERAL
PAINLEVEL_OUTOF10: 7
PAINLEVEL_OUTOF10: 3
PAINLEVEL_OUTOF10: 1

## 2020-04-07 ASSESSMENT — PAIN DESCRIPTION - ORIENTATION: ORIENTATION: RIGHT;LEFT

## 2020-04-07 ASSESSMENT — PAIN DESCRIPTION - LOCATION: LOCATION: LEG

## 2020-04-07 ASSESSMENT — PAIN DESCRIPTION - FREQUENCY: FREQUENCY: INTERMITTENT

## 2020-04-07 NOTE — PROGRESS NOTES
Occupational Therapy   Physical Rehabilitation: OCCUPATIONAL THERAPY     [x] daily progress note       [] discharge       Patient Name:  Chelsea Hensley   :  1940 MRN: [id number]  Room:  [address] [place] [address] Date of Admission: 2020  Rehabilitation Diagnosis:   Metabolic encephalopathy [M42.87]       Date        2020       Day of ARU Week:  7   Time IN/OUT 2252-3632   Individual Tx Minutes 60   Group Tx Minutes    Co-Treat Minutes    Concurrent Tx Minutes    TOTAL Tx Time Mins 60   Variance Time +15   Variance Time []   Refusal due to:     []   Medical hold/reason:    []   Illness   []   Off Unit for test/procedure  [x]   Extra time needed to complete task  []   Therapeutic need  []   Other (specify):   Restrictions Restrictions/Precautions: Contact Precautions, Fall Risk(MARVA, h/o Bilateral BKA)         Communication with other providers: [x]   OK to see per nursing:     []   Spoke with team member regarding:      Subjective observations and cognitive status: Max v/cs for cog task, cooperative, STM deficits  Pleasant, cooperative      Pain level/location:    /10       Location: no c/o pain  Bruising, red spots at terminus of BLE from pros. RN, PT, luzma Huang notified   Discharge recommendations  Anticipated discharge date:  TBD  Destination: []??home alone   []? ?home alone w assist prn   []? ? home w/ family    []? ? Continuous supervision       []? ?SNF    []? ? Assisted living     []? ? Other:   Continued therapy: []??C OT  []??OUTPATIENT  OT   []?? No Further OT  Equipment needs: TBD     Toileting:   S for safety       Toilet Transfers:  S  Device Used:    [x]   Standard Toilet         [x]   Grab Bars           []  Bedside Commode       []   Elevated Toilet          []   Other:        Bed Mobility:           []   Pt received out of bed   Rolling R/L:    Scooting:    Supine --> Sit:  Mod I  Sit --> Supine:   Mod I    Transfers:    Sit--> Stand:  SBA  Stand --> Sit:   SBA  Stand-Pivot:   SBA  Other: Assistive device required for transfer:         Functional Mobility:  na  Assistance:    Device:   []   Dwightligia Ibanez     []   Standard Natalie Payment []   Wheelchair        []   [employer]       []   Karonyumiko Myriambrendan         []   Cardiac Natalie Payment       []   Other:        Homemaking Tasks: Additional Therapeutic activities/exercises completed this date:     [x]   ADL Training  toileting   [x]   Balance/Postural training     [x]   Bed/Transfer Training   [x]   Endurance Training   []   Neuromuscular Re-ed   []   Nu-step:  Time:        Level:         #Steps:       []   Rebounder:    []  Seated     []  Standing        []   Supine Ther Ex (reps/sets):     [x]   Seated Ther Ex (reps/sets):BUE exercises c #3 free wts x 20 reps     []   Standing Ther Ex (reps/sets):     [x]   Other:  Cog/fine motor card game, Max v/cs throughout game for play. Good atten to task, STM def    Comments:      Patient/Caregiver Education and Training:   []   [employer] Equipment Use  [x]   Bed Mobility/Transfer Technique/Safety  [x]   Energy Conservation Tips  []   Family training  [x]   Postural Awareness  [x]   Safety During Functional Activities  [x]   Reinforced Patient's Precautions   []   Progress was updated and reviewed in Rehabtracker with patient and/or family this         date.     Treatment Plan for Next Session:  See poc  Assessment:        Treatment/Activity Tolerance:   [x] Tolerated treatment with no adverse effects    [] Patient limited by fatigue  [] Patient limited by pain   [] Patient limited by medical complications:    [] Adverse reaction to Tx:   [] Significant change in status    Safety:       []  bed alarm set    []  chair alarm set    []  Pt refused alarms                []  Telesitter activated      [x]  Gait belt used during tx session      []other:       Number of Minutes/Billable Intervention  Therapeutic Exercise 15   ADL Self-care 15   Neuro Re-Ed    Therapeutic Activity 30   Group    Other:    TOTAL 60       Social History  Social/Functional History  Lives With: Spouse  Type of Home: House  Home Layout: One level  Home Access: Stairs to enter with rails  Entrance Stairs - Number of Steps: 2  Entrance Stairs - Rails: Right  Bathroom Shower/Tub: Tub/Shower unit  Bathroom Toilet: Standard  Bathroom Equipment: Grab bars in shower, Grab bars around toilet, Tub transfer bench  Bathroom Accessibility: Accessible  Home Equipment: Rolling walker, Electric scooter, [employer]  ADL Assistance: Independent  Homemaking Assistance: Needs assistance(he states he and wife both share cooking and cleaning, but wife does laundry in basement)  Homemaking Responsibilities: Yes  Meal Prep Responsibility: Secondary  Laundry Responsibility: No  Cleaning Responsibility: Secondary  Bill Paying/Finance Responsibility: No  Ambulation Assistance: Independent(mod I in the home with B prostheses and RW. states has not used electric scooter much at all)  Transfer Assistance: Independent  Active : No  Mode of Transportation: Car  Occupation: Retired  Type of occupation: [employer]  Leisure & Hobbies: Reading books about the [facility]  Additional Comments: Pt sleeps in regular bed. Does not use supplemental o2. Pt reports he doesn't recall any falls, however ED notes state pt fell 2 weeks before admission. Objective                                                                                    Goals:  (Update in navigator)  Short term goals  Time Frame for Short term goals: STGs=LTGs:  Long term goals  Time Frame for Long term goals : ~10 days or until d/c. Long term goal 1: Pt will complete grooming tasks Ind seated. Long term goal 2: Pt will complete total body bathing c S.  Long term goal 3: Pt will complete UB dressing c setup. Long term goal 4: Pt will complete LB dressing c S (including donning/doffing bilateral prostheses).   Long term goal 5: Pt will complete toileting c S.   Long term goals 6: Pt will complete functional transfers (bed, chair, shower, toilet) c DME PRN and S.  Long term goal 7: Pt will perform therex/therax to facilitate increased strength/endurance/ax tolerance (c emphasis on dynamic standing balance/tolerance >5 mins and BUE endurance) c SBA.:        Plan of Care                                                                              Times per week: 5 days per week for a minimum of 60 minutes/day plus group as appropriate for 60 minutes.   Treatment to include Plan  Current Treatment Recommendations: Balance Training, Functional Mobility Training, Endurance Training, Safety Education & Training, Patient/Caregiver Education & Training, Equipment Evaluation, Education, & procurement, Self-Care / ADL    Electronically signed by   Patricia Betts. michael Gustafson  4/7/2020, 8:11 AM

## 2020-04-07 NOTE — PROGRESS NOTES
[facility]  ACUTE REHAB UNIT  SPEECH/LANGUAGE PATHOLOGY      [x] Daily           [] Discharge    Patient:Dawit Beatty      :1940  YLK:[id number]  Rehab Dx/Hx: Metabolic encephalopathy [C14.75]   Allergies   Allergen Reactions    Byetta 10 Mcg Pen [Exenatide]     Phenylephrine     Sulbactam     Ampicillin Rash    Aricept [Donepezil Hydrochloride] Other (See Comments)     Hallucinations, confusion    Benztropine Other (See Comments)     Hallucinations, confusion    Celebrex [Celecoxib] Other (See Comments)     'causes him to be nervous and jittery\"    Diphenhydramine Hcl Other (See Comments)     nervous and jittery    Diphenhydramine Hcl [Diphenhydramine] Anxiety    Exenatide Other (See Comments)     Causes him to be nervous and jittery    Metoprolol Succinate Other (See Comments)     Hallucinations     Phenergan [Promethazine Hcl] Other (See Comments)     Hallucinations, nervous, jittery    Plavix [Clopidogrel Bisulfate] Other (See Comments)     Causes bleeding in his stomach    Pseudoephedrine Anxiety    Reglan [Metoclopramide Hcl] Other (See Comments)     \"Caused him to just sit in a chair and rock back and forth\"     Precautions: Sit up for all meals and thereafter for 30 minutes, Eat with small bites (1/2 tsp; 1 tsp), Drink from a cup only with small sips, Alternate solids with liquids and Take your medication with apple sauce;  Restrictions/Precautions: Contact Precautions, Fall Risk(, h/o Bilateral BKA)          Home Situation/IADL:   Social/Functional History  Lives With: Spouse  Type of Home: House  Home Layout: One level  Home Access: Stairs to enter with rails  Entrance Stairs - Number of Steps: 2  Entrance Stairs - Rails: Right  Bathroom Shower/Tub: Tub/Shower unit  Bathroom Toilet: Standard  Bathroom Equipment: Grab bars in shower, Grab bars around toilet, Tub transfer bench  Bathroom Accessibility: Accessible  Home Equipment: Rolling walker, Speech/Language Treatment    [] Instruction in HEP    Group   [x] Dysphagia Treatment   [] Cognitive Skill Castro    Other:         Assessment / Impression                                                          Treatment/Activity Tolerance:   [x] Tolerated Treatment well:     [] Patient limited by fatigue/pain:       [] Patient limited by medical complications:    [] Adverse Reaction to Tx:   [] Significant change in status:      Electronically Signed by  Sonia Reina, [id number] [facility]    4/7/2020  12:08 PM

## 2020-04-07 NOTE — PROGRESS NOTES
Case Management Note     [] Daily  [x] Weekly Care Conference Note  [] Discharge    Patient:Dawit Hayes  LLD:[id number]  Rehab Dx/Hx: Metabolic encephalopathy [V70.55]  Contact Info: (Name/phone):  Date:  4/7/2020 Contact:  [] Spouse/Family  [] Insurance    [x] Team Care Conference  [] Patient  [] Other Comments: Patient lives with his wife in a single level home. He requires supervision for transfers and ambulation, but occasionally needs CGA with ambulation. He requires supervision with ADLs and toileting. Signed:      Date:  4/7/2020 Contact:  [] Spouse/Family  [] Insurance    [] Team Care Conference  [x] Patient  [] Other Comments: MSW informed patient of planned discharge date. MSW also called his wife to inform her. Equipment Needs       [] Andi Maria L  []  Kulwant Toombs 2 W   []  Myrle Kil  [] Wheelchair  [] Tub Seat  [] Tub Bench  [] Bedside Commode  []    []  []    Patient has DME   Anticipated Discharge Date      4/11/2020   Home Eval Scheduled Date:    Additional Therapy Needs after Discharge: [x] [address]  [] Outpatient  [x] PT     [x] Nursing  [x] OT    [] Aide  [x] ST        Patient's wife requests [facility]. Current level of fx:   Bathing:   Grooming:   Dressing:   Mobility:   Cognition:   Communication:   Swallowing:   Nursing issues:   Signed:  Beth Puri, 4/7/2020, 3:48 PM

## 2020-04-07 NOTE — PROGRESS NOTES
throughout. There was no rebound, guarding or masses noted. Upper extremities: Can bring both hands up to meet mine. Strong . No reflexes. Lower extremities: Mature bilateral BK amputations. Sitting balance was good. Standing balance was poor. Lab Results   Component Value Date    WBC 16.0 (H) 04/01/2020    HGB 10.7 (L) 04/01/2020    HCT 32.9 (L) 04/01/2020    MCV 90.9 04/01/2020     04/01/2020     Lab Results   Component Value Date    INR 1.28 03/31/2020    INR 1.09 03/29/2020    INR 0.99 09/05/2019    PROTIME 15.5 (H) 03/31/2020    PROTIME 13.2 03/29/2020    PROTIME 11.5 09/05/2019     Lab Results   Component Value Date    CREATININE 1.0 04/01/2020    BUN 21 04/01/2020     04/01/2020    K 3.6 04/01/2020     04/01/2020    CO2 21 04/01/2020     Lab Results   Component Value Date    ALT 19 04/01/2020    AST 25 04/01/2020    ALKPHOS 68 04/01/2020    BILITOT 0.4 04/01/2020       Expected length of stay  prior to a supervised level of function for discharge home with a walker and [address] OT/PT is 4/11/2020. Recommendations:    1. Metabolic encephalopathy with gait disturbance and dysphagia:    When his prosthetics are in place, he is showing clear benefit from the daily occupational and physical therapy with speech-language pathology.    Modified barium swallow showed significant aspiration with pneumonia risk.  We must provide a diet with modified textures and thickened liquids.  Speech pathology wants addressing the swallowing dysfunction.  Emphasizing pulmonary hygiene, DVT prophylaxis and bowel and bladder retraining. 2. DVT prophylaxis: Lovenox 40 mg subcu daily.  I must monitor his hemoglobin and platelet count while on his medication.  Weightbearing activities using his bilateral prostheses pursued daily.  No signs or symptoms of blood loss.     3. Cholangitis: Patient requires IV Cipro. He is now on a general diet with thickened liquids. Not showing signs of dehydration. Will monitor his chemistries soon. Duration of IV antibiotics will be coordinated with the GI service. 4. Uncontrolled diabetes with hyperglycemia: Patient requires Lantus and Humalog.  Blood sugars are checked at mealtime and bedtime.  Consistent oral intake encouraged.  Blood sugars occasionally greater than 175.  5. Hypertension: Coreg twice daily.  Vital signs are checked at rest and with activity.  Consistent oral intake is encouraged.  Good control with current medication.   6. Probable baseline dementia: The patient requires Namenda, Remeron and Seroquel.  Treating him in a calm and consistent environment and providing both written and verbal instructions when possible.  Caregiver training will be important in getting him home again.

## 2020-04-08 LAB
ALBUMIN SERPL-MCNC: 3 GM/DL (ref 3.4–5)
ALP BLD-CCNC: 59 IU/L (ref 40–129)
ALT SERPL-CCNC: 44 U/L (ref 10–40)
ANION GAP SERPL CALCULATED.3IONS-SCNC: 12 MMOL/L (ref 4–16)
AST SERPL-CCNC: 47 IU/L (ref 15–37)
BILIRUB SERPL-MCNC: 0.2 MG/DL (ref 0–1)
BILIRUBIN DIRECT: 0.2 MG/DL (ref 0–0.3)
BILIRUBIN, INDIRECT: 0 MG/DL (ref 0–0.7)
BUN BLDV-MCNC: 22 MG/DL (ref 6–23)
CALCIUM SERPL-MCNC: 9.2 MG/DL (ref 8.3–10.6)
CHLORIDE BLD-SCNC: 103 MMOL/L (ref 99–110)
CO2: 26 MMOL/L (ref 21–32)
CREAT SERPL-MCNC: 1.2 MG/DL (ref 0.9–1.3)
GFR AFRICAN AMERICAN: >60 ML/MIN/1.73M2
GFR NON-AFRICAN AMERICAN: 58 ML/MIN/1.73M2
GLUCOSE BLD-MCNC: 139 MG/DL (ref 70–99)
GLUCOSE BLD-MCNC: 139 MG/DL (ref 70–99)
GLUCOSE BLD-MCNC: 185 MG/DL (ref 70–99)
GLUCOSE BLD-MCNC: 220 MG/DL (ref 70–99)
GLUCOSE BLD-MCNC: 235 MG/DL (ref 70–99)
HCT VFR BLD CALC: 33.9 % (ref 42–52)
HEMOGLOBIN: 10.6 GM/DL (ref 13.5–18)
MCH RBC QN AUTO: 29.5 PG (ref 27–31)
MCHC RBC AUTO-ENTMCNC: 31.3 % (ref 32–36)
MCV RBC AUTO: 94.4 FL (ref 78–100)
PDW BLD-RTO: 13.6 % (ref 11.7–14.9)
PLATELET # BLD: 298 K/CU MM (ref 140–440)
PMV BLD AUTO: 8.9 FL (ref 7.5–11.1)
POTASSIUM SERPL-SCNC: 4.5 MMOL/L (ref 3.5–5.1)
RBC # BLD: 3.59 M/CU MM (ref 4.6–6.2)
SODIUM BLD-SCNC: 141 MMOL/L (ref 135–145)
TOTAL PROTEIN: 5.4 GM/DL (ref 6.4–8.2)
WBC # BLD: 9.2 K/CU MM (ref 4–10.5)

## 2020-04-08 PROCEDURE — 92526 ORAL FUNCTION THERAPY: CPT

## 2020-04-08 PROCEDURE — 97535 SELF CARE MNGMENT TRAINING: CPT

## 2020-04-08 PROCEDURE — 6370000000 HC RX 637 (ALT 250 FOR IP): Performed by: INTERNAL MEDICINE

## 2020-04-08 PROCEDURE — 80053 COMPREHEN METABOLIC PANEL: CPT

## 2020-04-08 PROCEDURE — 94150 VITAL CAPACITY TEST: CPT

## 2020-04-08 PROCEDURE — 1280000000 HC REHAB R&B

## 2020-04-08 PROCEDURE — 85027 COMPLETE CBC AUTOMATED: CPT

## 2020-04-08 PROCEDURE — 2580000003 HC RX 258: Performed by: INTERNAL MEDICINE

## 2020-04-08 PROCEDURE — 36415 COLL VENOUS BLD VENIPUNCTURE: CPT

## 2020-04-08 PROCEDURE — 6360000002 HC RX W HCPCS: Performed by: INTERNAL MEDICINE

## 2020-04-08 PROCEDURE — 97530 THERAPEUTIC ACTIVITIES: CPT

## 2020-04-08 PROCEDURE — 94761 N-INVAS EAR/PLS OXIMETRY MLT: CPT

## 2020-04-08 PROCEDURE — 97116 GAIT TRAINING THERAPY: CPT

## 2020-04-08 PROCEDURE — 82962 GLUCOSE BLOOD TEST: CPT

## 2020-04-08 PROCEDURE — 82248 BILIRUBIN DIRECT: CPT

## 2020-04-08 PROCEDURE — 99232 SBSQ HOSP IP/OBS MODERATE 35: CPT | Performed by: PHYSICAL MEDICINE & REHABILITATION

## 2020-04-08 RX ADMIN — MIRTAZAPINE 15 MG: 15 TABLET, FILM COATED ORAL at 17:56

## 2020-04-08 RX ADMIN — ATORVASTATIN CALCIUM 40 MG: 40 TABLET, FILM COATED ORAL at 20:55

## 2020-04-08 RX ADMIN — INSULIN LISPRO 2 UNITS: 100 INJECTION, SOLUTION INTRAVENOUS; SUBCUTANEOUS at 11:56

## 2020-04-08 RX ADMIN — CARVEDILOL 6.25 MG: 6.25 TABLET, FILM COATED ORAL at 16:50

## 2020-04-08 RX ADMIN — CILOSTAZOL 100 MG: 100 TABLET ORAL at 09:29

## 2020-04-08 RX ADMIN — CILOSTAZOL 100 MG: 100 TABLET ORAL at 20:55

## 2020-04-08 RX ADMIN — OXYCODONE HYDROCHLORIDE AND ACETAMINOPHEN 1 TABLET: 5; 325 TABLET ORAL at 16:50

## 2020-04-08 RX ADMIN — OXYCODONE HYDROCHLORIDE AND ACETAMINOPHEN 1 TABLET: 5; 325 TABLET ORAL at 06:39

## 2020-04-08 RX ADMIN — CARVEDILOL 6.25 MG: 6.25 TABLET, FILM COATED ORAL at 08:29

## 2020-04-08 RX ADMIN — DILTIAZEM HYDROCHLORIDE 60 MG: 60 TABLET, FILM COATED ORAL at 08:28

## 2020-04-08 RX ADMIN — ASPIRIN 325 MG ORAL TABLET 325 MG: 325 PILL ORAL at 08:28

## 2020-04-08 RX ADMIN — INSULIN LISPRO 2 UNITS: 100 INJECTION, SOLUTION INTRAVENOUS; SUBCUTANEOUS at 16:50

## 2020-04-08 RX ADMIN — LATANOPROST 1 DROP: 50 SOLUTION OPHTHALMIC at 20:56

## 2020-04-08 RX ADMIN — MEMANTINE HYDROCHLORIDE 5 MG: 5 TABLET ORAL at 08:29

## 2020-04-08 RX ADMIN — OXYCODONE HYDROCHLORIDE AND ACETAMINOPHEN 1 TABLET: 5; 325 TABLET ORAL at 20:56

## 2020-04-08 RX ADMIN — ENOXAPARIN SODIUM 40 MG: 40 INJECTION SUBCUTANEOUS at 08:29

## 2020-04-08 RX ADMIN — Medication 10 ML: at 20:40

## 2020-04-08 RX ADMIN — INSULIN GLARGINE 7 UNITS: 100 INJECTION, SOLUTION SUBCUTANEOUS at 21:04

## 2020-04-08 RX ADMIN — QUETIAPINE FUMARATE 25 MG: 25 TABLET ORAL at 17:56

## 2020-04-08 RX ADMIN — DILTIAZEM HYDROCHLORIDE 60 MG: 60 TABLET, FILM COATED ORAL at 20:51

## 2020-04-08 ASSESSMENT — PAIN DESCRIPTION - DESCRIPTORS
DESCRIPTORS: DISCOMFORT
DESCRIPTORS: CRAMPING;DISCOMFORT;SHOOTING
DESCRIPTORS: ACHING;DISCOMFORT

## 2020-04-08 ASSESSMENT — PAIN SCALES - GENERAL
PAINLEVEL_OUTOF10: 2
PAINLEVEL_OUTOF10: 2
PAINLEVEL_OUTOF10: 5
PAINLEVEL_OUTOF10: 5

## 2020-04-08 ASSESSMENT — PAIN DESCRIPTION - FREQUENCY
FREQUENCY: INTERMITTENT
FREQUENCY: INTERMITTENT
FREQUENCY: CONTINUOUS

## 2020-04-08 ASSESSMENT — PAIN DESCRIPTION - PROGRESSION: CLINICAL_PROGRESSION: GRADUALLY IMPROVING

## 2020-04-08 ASSESSMENT — PAIN DESCRIPTION - ORIENTATION
ORIENTATION: RIGHT;LEFT
ORIENTATION: RIGHT
ORIENTATION: RIGHT

## 2020-04-08 ASSESSMENT — PAIN DESCRIPTION - LOCATION
LOCATION: GROIN;LEG
LOCATION: GROIN
LOCATION: LEG

## 2020-04-08 ASSESSMENT — PAIN DESCRIPTION - PAIN TYPE
TYPE: ACUTE PAIN
TYPE: ACUTE PAIN;CHRONIC PAIN
TYPE: ACUTE PAIN

## 2020-04-08 ASSESSMENT — PAIN DESCRIPTION - ONSET
ONSET: ON-GOING
ONSET: SUDDEN
ONSET: ON-GOING

## 2020-04-08 ASSESSMENT — PAIN - FUNCTIONAL ASSESSMENT: PAIN_FUNCTIONAL_ASSESSMENT: ACTIVITIES ARE NOT PREVENTED

## 2020-04-08 NOTE — PROGRESS NOTES
[facility]  ACUTE REHAB UNIT  SPEECH/LANGUAGE PATHOLOGY      [x] Daily           [] Discharge    Patient:Dawit Jimenez      :1940  NHD:[id number]  Rehab Dx/Hx: Metabolic encephalopathy [S42.80]   Allergies   Allergen Reactions    Byetta 10 Mcg Pen [Exenatide]     Phenylephrine     Sulbactam     Ampicillin Rash    Aricept [Donepezil Hydrochloride] Other (See Comments)     Hallucinations, confusion    Benztropine Other (See Comments)     Hallucinations, confusion    Celebrex [Celecoxib] Other (See Comments)     'causes him to be nervous and jittery\"    Diphenhydramine Hcl Other (See Comments)     nervous and jittery    Diphenhydramine Hcl [Diphenhydramine] Anxiety    Exenatide Other (See Comments)     Causes him to be nervous and jittery    Metoprolol Succinate Other (See Comments)     Hallucinations     Phenergan [Promethazine Hcl] Other (See Comments)     Hallucinations, nervous, jittery    Plavix [Clopidogrel Bisulfate] Other (See Comments)     Causes bleeding in his stomach    Pseudoephedrine Anxiety    Reglan [Metoclopramide Hcl] Other (See Comments)     \"Caused him to just sit in a chair and rock back and forth\"     Precautions: Sit up for all meals and thereafter for 30 minutes, Eat with small bites (1/2 tsp; 1 tsp), Drink from a cup only with small sips, Swallow hard (effortful swallow) and Take your medication with apple sauce;  Restrictions/Precautions: Contact Precautions, Fall Risk(MARVA, h/o Bilateral BKA)          Home Situation/IADL:   Social/Functional History  Lives With: Spouse  Type of Home: House  Home Layout: One level  Home Access: Stairs to enter with rails  Entrance Stairs - Number of Steps: 2  Entrance Stairs - Rails: Right  Bathroom Shower/Tub: Tub/Shower unit  Bathroom Toilet: Standard  Bathroom Equipment: Grab bars in shower, Grab bars around toilet, Tub transfer bench  Bathroom Accessibility: Accessible  Home Equipment: Rolling walker, diet/nectar thick liquids without clinical evidence of aspiration 100% Goal met, Discontinue  Goal 2: Pt will trial and demonstrate understanding of jack free water protocol with thin liquids by cup sips in 10/10 trials Targeted thins with effortful swallow and throat clear x2 oz. Goal 3: Pt will participate in laryngeal/pharyngeal strengthening exercises in 10/10 trial  Modified shakers completed using red theraband with pt up in chair. Completed head flexions and extensions x10 for 10 sets with resistance. Airway protection exercises were completed this date. Adduction was completed x3 using an effortful grunt x 10 sets. Sustained phonation was completed for 5 secs x 10 sets. Laryngeal elevation was completed via upward and downward glides held at peak for 5 secs x 10 sets. Incentive spirometer was completed x 10 sets with pt achieving 1000 ml  [place] Pressure Instrument was used to target lingual STR at 30kPa goal pressure. 10 Lingual Pulses were completed x 6 sets. 10 sec Sustained holds were completed x 5. Improvement was evident by increased achievement of target goal during session. .    Goal 4: Pt/caregivers will demonstrate comprehension of recommendations/safe feeding protocol/ aspiration precautions    Reviewed effortful swallow and throat clear with liquids and mouth care. Pt continues to require cues. Goal 5: Pt will tolerate trial upgrades of Dys 3/Soft and bite sized textures with good mastication. Pt consumed a sausage sri on bun, salad, and thin liquids. Good mastication and pacing.   Diet advanced to dental soft with nectar with free water protocol                            Alarm placed: []bed [x]chair   []other:   [x]MARVA activated        Barriers to progress:   [] Fatigue        [] Cognitive Deficits   [] Memory Deficits   [] Reduced Attn   [] Self Limiting Behaviors    [] Reduced insight/awareness     [] Visual Deficits   [] Premorbid Conditions  [] Impulsivity     [] Other

## 2020-04-08 NOTE — PROGRESS NOTES
redirection. Dressing:      Upper Body Dressing:  Setup    Lower Body Dressing:  SBA d/t mild unsteadiness in stance    Footwear: N/A (B BKA)    Toileting:   SBA, cues for safety during pants management (not to bend all the way to floor to retrieve clothing and to initiate this while seated instead)      Toilet Transfers:   Close SBA d/t unsteadiness during turn, pt with increased RLE pain as this was performed at beginning of session prior to changing sock configuration   Device Used:    []   Standard Toilet         []   Grab Bars           []  Bedside Commode       []   Elevated Toilet          []   Other:        Tub/Shower Transfer:   SBA c RW  Device Used:    [x]   Shower Bench + grab bar     []   Shower Chair      []   Tub Transfer Bench           []   Bathtub    []   Shower         []   Other:         Bed Mobility:           [x]   Pt received out of bed       Transfers:    Sit--> Stand:  Supervision  Stand --> Sit:   Supervision  Stand-Pivot:   Supervision  Other:    Assistive device required for transfer:   RW      Functional Mobility:    Assistance:  Supervision in hallway ~50 ft at end of session; pt reported reduced RLE pain  Device:   [x]   Rolling Walker     []   Standard Walker []   Wheelchair        []   [place]       []   4-Wheeled Marene Loth         []   Cardiac Walker       []   Other:          Additional Therapeutic activities/exercises completed this date:     [x]   ADL Training   [x]   Balance/Postural training     [x]   Bed/Transfer Training   [x]   Endurance Training   []   Neuromuscular Re-ed   []   Nu-step:  Time:        Level:         #Steps:       []   Rebounder:    []  Seated     []  Standing        []   Supine Ther Ex (reps/sets):     []   Seated Ther Ex (reps/sets):     []   Standing Ther Ex (reps/sets):     []   Other:      Comments: All intervention performed to increase pt's endurance, ax tolerance, balance, and I c ADLs/IADLs and functional transfers/mobility.         Patient/Caregiver Paying/Finance Responsibility: No  Ambulation Assistance: Independent(mod I in the home with B prostheses and RW. states has not used electric scooter much at all)  Transfer Assistance: Independent  Active : No  Mode of Transportation: Car  Occupation: Retired  Type of occupation: [employer]  Leisure & Hobbies: Reading books about the [place]  Additional Comments: Pt sleeps in regular bed. Does not use supplemental o2. Pt reports he doesn't recall any falls, however ED notes state pt fell 2 weeks before admission. Objective                                                                                    Goals:  (Update in navigator)  Short term goals  Time Frame for Short term goals: STGs=LTGs:  Long term goals  Time Frame for Long term goals : ~10 days or until d/c. Long term goal 1: Pt will complete grooming tasks Ind seated. Long term goal 2: Pt will complete total body bathing c S.  Long term goal 3: Pt will complete UB dressing c setup. Long term goal 4: Pt will complete LB dressing c S (including donning/doffing bilateral prostheses). Long term goal 5: Pt will complete toileting c S.   Long term goals 6: Pt will complete functional transfers (bed, chair, shower, toilet) c DME PRN and S.  Long term goal 7: Pt will perform therex/therax to facilitate increased strength/endurance/ax tolerance (c emphasis on dynamic standing balance/tolerance >5 mins and BUE endurance) c SBA.:        Plan of Care                                                                              Times per week: 5 days per week for a minimum of 60 minutes/day plus group as appropriate for 60 minutes.   Treatment to include Plan  Current Treatment Recommendations: Balance Training, Functional Mobility Training, Endurance Training, Safety Education & Training, Patient/Caregiver Education & Training, Equipment Evaluation, Education, & procurement, Self-Care / ADL    Electronically signed by   Carey Bailey MS, OTR/L  License

## 2020-04-09 LAB
GLUCOSE BLD-MCNC: 144 MG/DL (ref 70–99)
GLUCOSE BLD-MCNC: 154 MG/DL (ref 70–99)
GLUCOSE BLD-MCNC: 223 MG/DL (ref 70–99)
GLUCOSE BLD-MCNC: 96 MG/DL (ref 70–99)

## 2020-04-09 PROCEDURE — 6370000000 HC RX 637 (ALT 250 FOR IP): Performed by: INTERNAL MEDICINE

## 2020-04-09 PROCEDURE — 99232 SBSQ HOSP IP/OBS MODERATE 35: CPT | Performed by: PHYSICAL MEDICINE & REHABILITATION

## 2020-04-09 PROCEDURE — 92526 ORAL FUNCTION THERAPY: CPT

## 2020-04-09 PROCEDURE — 97116 GAIT TRAINING THERAPY: CPT

## 2020-04-09 PROCEDURE — 97530 THERAPEUTIC ACTIVITIES: CPT

## 2020-04-09 PROCEDURE — 97110 THERAPEUTIC EXERCISES: CPT

## 2020-04-09 PROCEDURE — 6360000002 HC RX W HCPCS: Performed by: INTERNAL MEDICINE

## 2020-04-09 PROCEDURE — 82962 GLUCOSE BLOOD TEST: CPT

## 2020-04-09 PROCEDURE — 1280000000 HC REHAB R&B

## 2020-04-09 PROCEDURE — 94761 N-INVAS EAR/PLS OXIMETRY MLT: CPT

## 2020-04-09 RX ORDER — MEMANTINE HYDROCHLORIDE 5 MG/1
5 TABLET ORAL DAILY
Qty: 30 TABLET | Refills: 0 | Status: ON HOLD | OUTPATIENT
Start: 2020-04-10 | End: 2020-07-05

## 2020-04-09 RX ORDER — INSULIN GLARGINE 100 [IU]/ML
7 INJECTION, SOLUTION SUBCUTANEOUS NIGHTLY
Qty: 1 VIAL | Refills: 0 | Status: ON HOLD
Start: 2020-04-09 | End: 2020-07-05

## 2020-04-09 RX ORDER — QUETIAPINE FUMARATE 25 MG/1
25 TABLET, FILM COATED ORAL EVERY EVENING
Qty: 30 TABLET | Refills: 0 | Status: ON HOLD | OUTPATIENT
Start: 2020-04-09 | End: 2020-07-05

## 2020-04-09 RX ORDER — MIRTAZAPINE 15 MG/1
15 TABLET, FILM COATED ORAL
Qty: 30 TABLET | Refills: 0 | Status: ON HOLD | OUTPATIENT
Start: 2020-04-09 | End: 2020-07-05

## 2020-04-09 RX ADMIN — DILTIAZEM HYDROCHLORIDE 60 MG: 60 TABLET, FILM COATED ORAL at 20:06

## 2020-04-09 RX ADMIN — QUETIAPINE FUMARATE 25 MG: 25 TABLET ORAL at 18:13

## 2020-04-09 RX ADMIN — CILOSTAZOL 100 MG: 100 TABLET ORAL at 20:03

## 2020-04-09 RX ADMIN — MEMANTINE HYDROCHLORIDE 5 MG: 5 TABLET ORAL at 08:19

## 2020-04-09 RX ADMIN — INSULIN GLARGINE 7 UNITS: 100 INJECTION, SOLUTION SUBCUTANEOUS at 20:18

## 2020-04-09 RX ADMIN — DILTIAZEM HYDROCHLORIDE 60 MG: 60 TABLET, FILM COATED ORAL at 08:19

## 2020-04-09 RX ADMIN — INSULIN LISPRO 1 UNITS: 100 INJECTION, SOLUTION INTRAVENOUS; SUBCUTANEOUS at 08:20

## 2020-04-09 RX ADMIN — ATORVASTATIN CALCIUM 40 MG: 40 TABLET, FILM COATED ORAL at 20:05

## 2020-04-09 RX ADMIN — CILOSTAZOL 100 MG: 100 TABLET ORAL at 10:13

## 2020-04-09 RX ADMIN — INSULIN LISPRO 1 UNITS: 100 INJECTION, SOLUTION INTRAVENOUS; SUBCUTANEOUS at 12:35

## 2020-04-09 RX ADMIN — OXYCODONE HYDROCHLORIDE AND ACETAMINOPHEN 1 TABLET: 5; 325 TABLET ORAL at 20:05

## 2020-04-09 RX ADMIN — CARVEDILOL 6.25 MG: 6.25 TABLET, FILM COATED ORAL at 08:19

## 2020-04-09 RX ADMIN — MIRTAZAPINE 15 MG: 15 TABLET, FILM COATED ORAL at 18:13

## 2020-04-09 RX ADMIN — ASPIRIN 325 MG ORAL TABLET 325 MG: 325 PILL ORAL at 08:20

## 2020-04-09 RX ADMIN — INSULIN LISPRO 2 UNITS: 100 INJECTION, SOLUTION INTRAVENOUS; SUBCUTANEOUS at 18:14

## 2020-04-09 RX ADMIN — ENOXAPARIN SODIUM 40 MG: 40 INJECTION SUBCUTANEOUS at 08:20

## 2020-04-09 RX ADMIN — CARVEDILOL 6.25 MG: 6.25 TABLET, FILM COATED ORAL at 18:08

## 2020-04-09 RX ADMIN — LATANOPROST 1 DROP: 50 SOLUTION OPHTHALMIC at 20:00

## 2020-04-09 ASSESSMENT — PAIN SCALES - GENERAL: PAINLEVEL_OUTOF10: 7

## 2020-04-09 NOTE — PROGRESS NOTES
estimation of bed height at home when standing at EOB. Transfers:    Sit--> Stand:  SBA  Stand --> Sit:   SBA  Chair-->Bed/Bed --> Chair:   SBA  Toilet Transfer (if applicable):  Standing to urinate with 0-1 UE support SB-Supervision  Other:  Standing at sink for hand hygiene, combing hair SB-Supervision   Assistive device required for transfer:  RW    Gait:     Distance:  802'+798'+666'   Assistance: SB-CGA  Device:  RW  Gait Quality:  recip pattern, first amb distance req CGA x 2 due to mild LOB. Once ed to decrease pace, pt demonstrates improved balance. Pt states \"Its hard for me to slow down. \"    Ramp:    Assistance:  CGA ascending, CG-Min A descending x 2 trials, cues for upright posture and smaller steps on descent. Device:   RW      Additional Therapeutic activities/exercises completed this date:     []   Nu-step:  Time:        Level:         #Steps:       []   Rebounder:    []  Seated     []  Standing        []   Balance training         []   Postural training    []   Supine ther ex (reps/sets):     []   Seated ther ex (reps/sets):     []   Standing ther ex (reps/sets):     [x]   Picking up object from floor (standing): Amb around gym picking up items including weighted and flat with SBA for balance. Pt able to demonstrate safety after visual demonstration. [x]   Reacher used   [x]   Other: Practiced amb around obstacles and tight spaces including side stepping with SBA, min cues for problem solving with mechanics of RW with side stepping. Improved technique with repetition.    []   Other:       Patient/Caregiver Education and Training:   []   Role of PT  []   Education about Dx  [x]   Use of call light for assist   []   Wheelchair mobility/management  []   HEP provided and explained   [x]   Treatment plan reviewed  [x]   Home safety  []   Body mechanics  [x]   Positioning  [x]   Bed Mobility/Transfer technique  [x]   Gait technique/sequencing  [x]   Proper use of assistive Secondary  Laundry Responsibility: No  Cleaning Responsibility: Secondary  Bill Paying/Finance Responsibility: No  Ambulation Assistance: Independent(mod I in the home with B prostheses and RW. states has not used electric scooter much at all)  Transfer Assistance: Independent  Active : No  Mode of Transportation: Car  Occupation: Retired  Type of occupation: [employer]  Leisure & Hobbies: Reading books about the [place]  Additional Comments: Pt sleeps in regular bed. Does not use supplemental o2. Pt reports he doesn't recall any falls, however ED notes state pt fell 2 weeks before admission. Objective                                                                                    Goals:  (Update in navigator)  Short term goals  Time Frame for Short term goals: 7 days  Short term goal 1: pt will perform all bed mobiltiy mod I  Short term goal 2: Pt will transfer sit to stand and pivot with 2ww as needed with B prostheses with supervision  Short term goal 3: Pt will ambulate 20' with B prostheses and RW with SBA  Short term goal 4: Pt will ascend/descend 4 steps with R rail with CGA using B prostheses  Short term goal 5: Pt will demonstrate ability to  object from floor with CGA:   :        Plan of Care                                                                              Times per week: 5 days per week for a minimum of 60 minutes/day plus group as appropriate for 60 minutes.   Treatment to include Current Treatment Recommendations: Transfer Training, Neuromuscular Re-education, Patient/Caregiver Education & Training, Functional Mobility Training, Gait Training, Stair training, Safety Education & Training, Home Exercise Program(ability to progress gait and stair training will be directly impacted by pain and skin integrity of R residual limb with possiblity that home modification of ramp would need to be made. )    Electronically signed by   Abdelrahman Ordonez, PTA #[id number]  4/9/2020, 8:43 AM

## 2020-04-09 NOTE — PROGRESS NOTES
Rolling Walker     []   Standard Milon Anchors []   Wheelchair        []   [employer]       []   4-Wheeled Milon Anchors         []   Cardiac Milon Anchors       []   Other:        Homemaking Tasks: Additional Therapeutic activities/exercises completed this date:     []   ADL Training   [x]   Balance/Postural training   Stand bal act x 8 minutes, reaching,wt shifting, steady no LOB    []   Bed/Transfer Training   []   Endurance Training   []   Neuromuscular Re-ed   []   Nu-step:  Time:        Level:         #Steps:       []   Rebounder:    []  Seated     []  Standing        []   Supine Ther Ex (reps/sets):     [x]   Seated Ther Ex (reps/sets):  Huma howell #45 x 10 reps x 5 sets, fatigue noted. #8 ball toss x 10 reps x 5 sets   []   Standing Ther Ex (reps/sets):     []   Other:      Comments:      Patient/Caregiver Education and Training:   []   Adaptive Equipment Use  []   Bed Mobility/Transfer Technique/Safety  [x]   Energy Conservation Tips  []   Family training  [x]   Postural Awareness  [x]   Safety During Functional Activities  [x]   Reinforced Patient's Precautions   []   Progress was updated and reviewed in Rehabtracker with patient and/or family this         date.     Treatment Plan for Next Session:       Assessment:        Treatment/Activity Tolerance:   [x] Tolerated treatment with no adverse effects    [] Patient limited by fatigue  [] Patient limited by pain   [] Patient limited by medical complications:    [] Adverse reaction to Tx:   [] Significant change in status    Safety:       []  bed alarm set    []  chair alarm set    []  Pt refused alarms                []  Telesitter activated      [x]  Gait belt used during tx session      []other:       Number of Minutes/Billable Intervention  Therapeutic Exercise 30   ADL Self-care    Neuro Re-Ed    Therapeutic Activity 30   Group    Other:    TOTAL 60       Social History  Social/Functional History  Lives With: Spouse  Type of Home: House  Home Layout: One level  Home Access: Stairs to enter with rails  Entrance Stairs - Number of Steps: 2  Entrance Stairs - Rails: Right  Bathroom Shower/Tub: Tub/Shower unit  Bathroom Toilet: Standard  Bathroom Equipment: Grab bars in shower, Grab bars around toilet, Tub transfer bench  Bathroom Accessibility: Accessible  Home Equipment: Rolling walker, Electric scooter, [employer]  ADL Assistance: Independent  Homemaking Assistance: Needs assistance(he states he and wife both share cooking and cleaning, but wife does laundry in basement)  Homemaking Responsibilities: Yes  Meal Prep Responsibility: Secondary  Laundry Responsibility: No  Cleaning Responsibility: Secondary  Bill Paying/Finance Responsibility: No  Ambulation Assistance: Independent(mod I in the home with B prostheses and RW. states has not used electric scooter much at all)  Transfer Assistance: Independent  Active : No  Mode of Transportation: Car  Occupation: Retired  Type of occupation: [employer]  Leisure & Hobbies: Reading books about the [facility]  Additional Comments: Pt sleeps in regular bed. Does not use supplemental o2. Pt reports he doesn't recall any falls, however ED notes state pt fell 2 weeks before admission. Objective                                                                                    Goals:  (Update in navigator)  Short term goals  Time Frame for Short term goals: STGs=LTGs:  Long term goals  Time Frame for Long term goals : ~10 days or until d/c. Long term goal 1: Pt will complete grooming tasks Ind seated. Long term goal 2: Pt will complete total body bathing c S.  Long term goal 3: Pt will complete UB dressing c setup. Long term goal 4: Pt will complete LB dressing c S (including donning/doffing bilateral prostheses).   Long term goal 5: Pt will complete toileting c S.   Long term goals 6: Pt will complete functional transfers (bed, chair, shower, toilet) c DME PRN and S.  Long term goal 7: Pt will perform therex/therax to

## 2020-04-09 NOTE — PROGRESS NOTES
[facility]  ACUTE REHAB UNIT  SPEECH/LANGUAGE PATHOLOGY      [x] Daily           [] Discharge    Patient:Dawit Hampton      :1940  PPI:[id number]  Rehab Dx/Hx: Metabolic encephalopathy [U50.08]   Allergies   Allergen Reactions    Byetta 10 Mcg Pen [Exenatide]     Phenylephrine     Sulbactam     Ampicillin Rash    Aricept [Donepezil Hydrochloride] Other (See Comments)     Hallucinations, confusion    Benztropine Other (See Comments)     Hallucinations, confusion    Celebrex [Celecoxib] Other (See Comments)     'causes him to be nervous and jittery\"    Diphenhydramine Hcl Other (See Comments)     nervous and jittery    Diphenhydramine Hcl [Diphenhydramine] Anxiety    Exenatide Other (See Comments)     Causes him to be nervous and jittery    Metoprolol Succinate Other (See Comments)     Hallucinations     Phenergan [Promethazine Hcl] Other (See Comments)     Hallucinations, nervous, jittery    Plavix [Clopidogrel Bisulfate] Other (See Comments)     Causes bleeding in his stomach    Pseudoephedrine Anxiety    Reglan [Metoclopramide Hcl] Other (See Comments)     \"Caused him to just sit in a chair and rock back and forth\"     Precautions: Sit up for all meals and thereafter for 30 minutes, Eat with small bites (1/2 tsp; 1 tsp), Drink from a cup only with small sips, No straws, Swallow hard (effortful swallow), Alternate solids with liquids and Take your medication with apple sauce;  Restrictions/Precautions: Contact Precautions, Fall Risk(MARVA, h/o Bilateral BKA)          Home Situation/IADL:   Social/Functional History  Lives With: Spouse  Type of Home: House  Home Layout: One level  Home Access: Stairs to enter with rails  Entrance Stairs - Number of Steps: 2  Entrance Stairs - Rails: Right  Bathroom Shower/Tub: Tub/Shower unit  Bathroom Toilet: Standard  Bathroom Equipment: Grab bars in shower, Grab bars around toilet, Tub transfer bench  Bathroom Accessibility: patient and/or family this         date. [] Attending Care Conference for pt this date. See Team Patient Care Conference Note for updates.     Interventions used this date:  [] Speech/Language Treatment    [] Instruction in HEP    Group   [x] Dysphagia Treatment   [] Cognitive Skill Castro    Other:         Assessment / Impression                                                          Treatment/Activity Tolerance:   [x] Tolerated Treatment well:     [] Patient limited by fatigue/pain:       [] Patient limited by medical complications:    [] Adverse Reaction to Tx:   [] Significant change in status:      Electronically Signed by  Liz Beck MA, [id number] [facility]    4/9/2020  3:11 PM

## 2020-04-10 LAB
GLUCOSE BLD-MCNC: 131 MG/DL (ref 70–99)
GLUCOSE BLD-MCNC: 168 MG/DL (ref 70–99)
GLUCOSE BLD-MCNC: 258 MG/DL (ref 70–99)
GLUCOSE BLD-MCNC: 263 MG/DL (ref 70–99)

## 2020-04-10 PROCEDURE — 97110 THERAPEUTIC EXERCISES: CPT

## 2020-04-10 PROCEDURE — 6370000000 HC RX 637 (ALT 250 FOR IP): Performed by: INTERNAL MEDICINE

## 2020-04-10 PROCEDURE — 94761 N-INVAS EAR/PLS OXIMETRY MLT: CPT

## 2020-04-10 PROCEDURE — 97530 THERAPEUTIC ACTIVITIES: CPT

## 2020-04-10 PROCEDURE — 97535 SELF CARE MNGMENT TRAINING: CPT

## 2020-04-10 PROCEDURE — 92526 ORAL FUNCTION THERAPY: CPT

## 2020-04-10 PROCEDURE — 1280000000 HC REHAB R&B

## 2020-04-10 PROCEDURE — 94150 VITAL CAPACITY TEST: CPT

## 2020-04-10 PROCEDURE — 99232 SBSQ HOSP IP/OBS MODERATE 35: CPT | Performed by: PHYSICAL MEDICINE & REHABILITATION

## 2020-04-10 PROCEDURE — 97116 GAIT TRAINING THERAPY: CPT

## 2020-04-10 PROCEDURE — 82962 GLUCOSE BLOOD TEST: CPT

## 2020-04-10 RX ADMIN — CILOSTAZOL 100 MG: 100 TABLET ORAL at 21:32

## 2020-04-10 RX ADMIN — LATANOPROST 1 DROP: 50 SOLUTION OPHTHALMIC at 21:33

## 2020-04-10 RX ADMIN — QUETIAPINE FUMARATE 25 MG: 25 TABLET ORAL at 17:00

## 2020-04-10 RX ADMIN — DILTIAZEM HYDROCHLORIDE 60 MG: 60 TABLET, FILM COATED ORAL at 07:56

## 2020-04-10 RX ADMIN — INSULIN LISPRO 3 UNITS: 100 INJECTION, SOLUTION INTRAVENOUS; SUBCUTANEOUS at 12:26

## 2020-04-10 RX ADMIN — DILTIAZEM HYDROCHLORIDE 60 MG: 60 TABLET, FILM COATED ORAL at 21:32

## 2020-04-10 RX ADMIN — OXYCODONE HYDROCHLORIDE AND ACETAMINOPHEN 1 TABLET: 5; 325 TABLET ORAL at 21:32

## 2020-04-10 RX ADMIN — MEMANTINE HYDROCHLORIDE 5 MG: 5 TABLET ORAL at 07:56

## 2020-04-10 RX ADMIN — CILOSTAZOL 100 MG: 100 TABLET ORAL at 09:32

## 2020-04-10 RX ADMIN — INSULIN LISPRO 3 UNITS: 100 INJECTION, SOLUTION INTRAVENOUS; SUBCUTANEOUS at 17:01

## 2020-04-10 RX ADMIN — ACETAMINOPHEN 650 MG: 325 TABLET ORAL at 18:21

## 2020-04-10 RX ADMIN — ATORVASTATIN CALCIUM 40 MG: 40 TABLET, FILM COATED ORAL at 21:32

## 2020-04-10 RX ADMIN — OXYCODONE HYDROCHLORIDE AND ACETAMINOPHEN 1 TABLET: 5; 325 TABLET ORAL at 03:43

## 2020-04-10 RX ADMIN — ASPIRIN 325 MG ORAL TABLET 325 MG: 325 PILL ORAL at 07:56

## 2020-04-10 RX ADMIN — CARVEDILOL 6.25 MG: 6.25 TABLET, FILM COATED ORAL at 17:00

## 2020-04-10 RX ADMIN — INSULIN GLARGINE 7 UNITS: 100 INJECTION, SOLUTION SUBCUTANEOUS at 21:32

## 2020-04-10 RX ADMIN — CARVEDILOL 6.25 MG: 6.25 TABLET, FILM COATED ORAL at 07:56

## 2020-04-10 RX ADMIN — MIRTAZAPINE 15 MG: 15 TABLET, FILM COATED ORAL at 17:00

## 2020-04-10 ASSESSMENT — PAIN SCALES - GENERAL
PAINLEVEL_OUTOF10: 10
PAINLEVEL_OUTOF10: 7
PAINLEVEL_OUTOF10: 3
PAINLEVEL_OUTOF10: 7

## 2020-04-10 ASSESSMENT — PAIN DESCRIPTION - PAIN TYPE: TYPE: PHANTOM PAIN

## 2020-04-10 ASSESSMENT — PAIN DESCRIPTION - DESCRIPTORS: DESCRIPTORS: PATIENT UNABLE TO DESCRIBE

## 2020-04-10 ASSESSMENT — PAIN DESCRIPTION - LOCATION: LOCATION: LEG

## 2020-04-10 ASSESSMENT — PAIN DESCRIPTION - ORIENTATION: ORIENTATION: RIGHT

## 2020-04-10 NOTE — PROGRESS NOTES
Bed Mobility/Transfer Technique/Safety  []   Energy Conservation Tips  []   Family training  [x]   Postural Awareness  [x]   Safety During Functional Activities  []   Reinforced Patient's Precautions   []   Progress was updated and reviewed in Rehabtracker with patient and/or family this         date. Treatment Plan for Next Session: Planned d/c from ARU next date. Assessment:  Pt has progressed well during ARU stay but will benefit from intermittent supervision from wife.         Treatment/Activity Tolerance:   [x] Tolerated treatment with no adverse effects    [] Patient limited by fatigue  [] Patient limited by pain   [] Patient limited by medical complications:    [] Adverse reaction to Tx:   [] Significant change in status    Safety:       []  bed alarm set    [x]  chair alarm set    []  Pt refused alarms                [x]  Telesitter activated      [x]  Gait belt used during tx session      []other:       Number of Minutes/Billable Intervention  Therapeutic Exercise 10   ADL Self-care 50   Neuro Re-Ed    Therapeutic Activity    Group    Other:    TOTAL 60       Social History  Social/Functional History  Lives With: Spouse  Type of Home: House  Home Layout: One level  Home Access: Stairs to enter with rails  Entrance Stairs - Number of Steps: 2  Entrance Stairs - Rails: Right  Bathroom Shower/Tub: Tub/Shower unit  Bathroom Toilet: Standard  Bathroom Equipment: Grab bars in shower, Grab bars around toilet, Tub transfer bench  Bathroom Accessibility: Accessible  Home Equipment: Rolling walker, Electric scooter, [employer]  ADL Assistance: Independent  Homemaking Assistance: Needs assistance(he states he and wife both share cooking and cleaning, but wife does laundry in basement)  Homemaking Responsibilities: Yes  Meal Prep Responsibility: Secondary  Laundry Responsibility: No  Cleaning Responsibility: Secondary  Bill Paying/Finance Responsibility: No  Ambulation Assistance: Independent(mod I in the

## 2020-04-10 NOTE — PROGRESS NOTES
History  Social/Functional History  Lives With: Spouse  Type of Home: House  Home Layout: One level  Home Access: Stairs to enter with rails  Entrance Stairs - Number of Steps: 2  Entrance Stairs - Rails: Right  Bathroom Shower/Tub: Tub/Shower unit  Bathroom Toilet: Standard  Bathroom Equipment: Grab bars in shower, Grab bars around toilet, Tub transfer bench  Bathroom Accessibility: Accessible  Home Equipment: Rolling walker, Electric scooter, [employer]  ADL Assistance: Independent  Homemaking Assistance: Needs assistance(he states he and wife both share cooking and cleaning, but wife does laundry in basement)  Homemaking Responsibilities: Yes  Meal Prep Responsibility: Secondary  Laundry Responsibility: No  Cleaning Responsibility: Secondary  Bill Paying/Finance Responsibility: No  Ambulation Assistance: Independent(mod I in the home with B prostheses and RW. states has not used electric scooter much at all)  Transfer Assistance: Independent  Active : No  Mode of Transportation: Car  Occupation: Retired  Type of occupation: [employer]  Leisure & Hobbies: Reading books about the [facility]  Additional Comments: Pt sleeps in regular bed. Does not use supplemental o2. Pt reports he doesn't recall any falls, however ED notes state pt fell 2 weeks before admission.      Objective                                                                                    Goals:  (Update in navigator)  Short term goals  Time Frame for Short term goals: 7 days  Short term goal 1: pt will perform all bed mobiltiy mod I  Short term goal 2: Pt will transfer sit to stand and pivot with 2ww as needed with B prostheses with supervision  Short term goal 3: Pt will ambulate 20' with B prostheses and RW with SBA  Short term goal 4: Pt will ascend/descend 4 steps with R rail with CGA using B prostheses  Short term goal 5: Pt will demonstrate ability to  object from floor with CGA:   :        Plan of Care

## 2020-04-10 NOTE — PROGRESS NOTES
Rolling walker, Electric scooter, Rita Sabins  ADL Assistance: Independent  Homemaking Assistance: Needs assistance(he states he and wife both share cooking and cleaning, but wife does laundry in basement)  Homemaking Responsibilities: Yes  Meal Prep Responsibility: Secondary  Laundry Responsibility: No  Cleaning Responsibility: Secondary  Bill Paying/Finance Responsibility: No  Ambulation Assistance: Independent(mod I in the home with B prostheses and RW. states has not used electric scooter much at all)  Transfer Assistance: Independent  Active : No  Mode of Transportation: Car  Occupation: Retired  Type of occupation: [employer]  Leisure & Hobbies: Reading books about the [employer]  Additional Comments: Pt sleeps in regular bed. Does not use supplemental o2. Pt reports he doesn't recall any falls, however ED notes state pt fell 2 weeks before admission. Date of Admit: 4/1/2020  Room #: 1028/1028-A     ST Number of Minutes/Billable Intervention  Cog/Memory Deficits     Aphasia/Language     Dysarthria/Speech     Apraxia/Speech     Dysphagia/Swallowing 30   Group     Other    TOTAL Minutes Billed  30              Date: 4/10/2020  Day of ARU Week:  3       SLP Individual Minutes  Time In: 9906  Time Out: 3371  Minutes: 5   SLP Individual Minutes  Time In: 0940  Time Out: 1005  Minutes: 25    Variance/Reason:  [] Refusal due to   [] Medical hold/reason  [] Illness   [] Off Unit for test/procedure  [] Extra time needed to complete task  [] Other (specify)    Activity completed: Pt seen for swallow tx and education    Pain: denies  Current Diet: Dietary Nutrition Supplements: Frozen Oral Supplement  DIET DENTAL SOFT; Mildly Thick (Nectar); GERD  Subjective: alert and cooperative      Goals and POC: Co-treats where appropriate with PT or OT to facilitate patient goals in functional tasks.   LTG                                    Short-term Goals  Timeframe for Short-term Goals: 3xs weekly/ 1-2 weeks  Goal 1: Pt will tolerate dental soft diet/nectar thick liquids without clinical evidence of aspiration 100%  Meeting goal  Goal 2: Pt will trial and demonstrate understanding of jack free water protocol with thin liquids by cup sips in 10/10 trials Supervised with therapy and NSG. Pt not recalling consistently safety process with mouth care and small sips water. Continue with [address] [place] [address] (see info packet sent home with pt)  Goal 3: Pt will participate in laryngeal/pharyngeal strengthening exercises in 10/10 trial Chin tuck against resistance was completed to assist with pharyngeal STR. Pt sitting upright in bed with rolled towel held for 30 secs x 3 sets. Goal 4: Pt/caregivers will demonstrate comprehension of recommendations/safe feeding protocol/ aspiration precautions   Extensive education has been completed on hydration monitoring, aspiration precautions, obtaining thickener, mixing beverages to proper texture, food lists with Gastric dental soft diet suggestions for each mealtime and snacks, comprehensive exercise program, and safe swallow protocol. Following instruction and education, pt was provided with handouts and a bag filled with thickener and pre thickened beverages to start  off at home until able to obtain for him/herself. Pt will continue to need cues and assist to facilitate swallow safety. Reviewed tasks completed in PM few minutes with mod cues needed. Manuel [address] to continue targeting airway protection, swallow timeliness, pharyngeal STR, free water protocol and insure pt is correctly thickening liquids to proper texture. Pt was provided with all exercises that have been completed during his ARU stay. Prognosis for improvement is fair to good. Pt remains motivated and committed to improving. Memory recall is reduced with cues needed. Practical application and repetition does show improvement in carryover.    Pt exhibits a mild pharyngeal dysphagia and is currently consuming a gastric dental soft diet with nectar/mildly thick liquids with no distress. Alarm placed: [x]bed []chair   []other:   [x]MARVA activated        Barriers to progress:   [] Fatigue        [] Cognitive Deficits   [x] Memory Deficits   [] Reduced Attn   [] Self Limiting Behaviors    [] Reduced insight/awareness     [] Visual Deficits   [] Premorbid Conditions  [] Impulsivity     [] Other      Education/Interventions used this date: swallow safety, practicing thickening liquids; pt continues to require assist.    [x]   Progress was updated and reviewed in Rehabtracker with patient and/or family this         date. [] Attending Care Conference for pt this date. See Team Patient Care Conference Note for updates.     Interventions used this date:  [] Speech/Language Treatment    [] Instruction in HEP    Group   [x] Dysphagia Treatment   [] Cognitive Skill Castro    Other:         Assessment / Impression                                                          Treatment/Activity Tolerance:   [x] Tolerated Treatment well:     [] Patient limited by fatigue/pain:       [] Patient limited by medical complications:    [] Adverse Reaction to Tx:   [] Significant change in status:      Electronically Signed by  Ashlyn Daniel MA, CCC-SLP    4/10/2020  2:22 PM

## 2020-04-11 VITALS
HEIGHT: 66 IN | DIASTOLIC BLOOD PRESSURE: 70 MMHG | RESPIRATION RATE: 18 BRPM | SYSTOLIC BLOOD PRESSURE: 151 MMHG | WEIGHT: 142.2 LBS | HEART RATE: 86 BPM | OXYGEN SATURATION: 95 % | BODY MASS INDEX: 22.85 KG/M2 | TEMPERATURE: 97.9 F

## 2020-04-11 LAB
GLUCOSE BLD-MCNC: 167 MG/DL (ref 70–99)
GLUCOSE BLD-MCNC: 189 MG/DL (ref 70–99)
GLUCOSE BLD-MCNC: 200 MG/DL (ref 70–99)

## 2020-04-11 PROCEDURE — 94761 N-INVAS EAR/PLS OXIMETRY MLT: CPT

## 2020-04-11 PROCEDURE — 82962 GLUCOSE BLOOD TEST: CPT

## 2020-04-11 PROCEDURE — 94150 VITAL CAPACITY TEST: CPT

## 2020-04-11 PROCEDURE — 99239 HOSP IP/OBS DSCHRG MGMT >30: CPT | Performed by: PHYSICAL MEDICINE & REHABILITATION

## 2020-04-11 PROCEDURE — 6370000000 HC RX 637 (ALT 250 FOR IP): Performed by: INTERNAL MEDICINE

## 2020-04-11 RX ADMIN — CILOSTAZOL 100 MG: 100 TABLET ORAL at 08:56

## 2020-04-11 RX ADMIN — CARVEDILOL 6.25 MG: 6.25 TABLET, FILM COATED ORAL at 08:56

## 2020-04-11 RX ADMIN — INSULIN LISPRO 1 UNITS: 100 INJECTION, SOLUTION INTRAVENOUS; SUBCUTANEOUS at 12:10

## 2020-04-11 RX ADMIN — INSULIN LISPRO 1 UNITS: 100 INJECTION, SOLUTION INTRAVENOUS; SUBCUTANEOUS at 08:58

## 2020-04-11 RX ADMIN — MEMANTINE HYDROCHLORIDE 5 MG: 5 TABLET ORAL at 08:56

## 2020-04-11 RX ADMIN — DILTIAZEM HYDROCHLORIDE 60 MG: 60 TABLET, FILM COATED ORAL at 08:56

## 2020-04-11 RX ADMIN — ASPIRIN 325 MG ORAL TABLET 325 MG: 325 PILL ORAL at 08:56

## 2020-04-11 NOTE — PROGRESS NOTES
Standing balance was fair-.    Lab Results   Component Value Date    WBC 9.2 04/08/2020    HGB 10.6 (L) 04/08/2020    HCT 33.9 (L) 04/08/2020    MCV 94.4 04/08/2020     04/08/2020     Lab Results   Component Value Date    INR 1.28 03/31/2020    INR 1.09 03/29/2020    INR 0.99 09/05/2019    PROTIME 15.5 (H) 03/31/2020    PROTIME 13.2 03/29/2020    PROTIME 11.5 09/05/2019     Lab Results   Component Value Date    CREATININE 1.2 04/08/2020    BUN 22 04/08/2020     04/08/2020    K 4.5 04/08/2020     04/08/2020    CO2 26 04/08/2020     Lab Results   Component Value Date    ALT 44 (H) 04/08/2020    AST 47 (H) 04/08/2020    ALKPHOS 59 04/08/2020    BILITOT 0.2 04/08/2020       Expected length of stay  prior to a supervised level of function for discharge home with a walker and [facility] OT/PT is 4/11/2020. Recommendations:    1. Metabolic encephalopathy with gait disturbance and dysphagia: Steady improvements and tolerating his daily therapies more regularly. Using his prosthetics purposefully. He tolerates the modified diet for both textures and consistency of his liquids. Dietitian reports adequate oral intake. Continue to provide swallowing exercises, pulmonary hygiene and DVT prophylaxis. 2.  DVT prophylaxis: Lovenox daily. Using his prosthetics, his gait is improving daily. No signs of acute blood loss. This weeks labs were reviewed and seemed steady. 3.  Cholangitis: He is off antibiotics and tolerating his modified diet without nausea, cramping or bloody diarrhea. 4.  Uncontrolled diabetes with hyperglycemia: His oral intake has been steady. Blood sugars are occasionally greater than 170. Requiring Lantus and Humalog. 5.  Hypertension: On Coreg as vital signs of been consistent. We monitor them at rest and with activity. Encouraging oral fluids (of the thickened nature). No laboratory or clinical evidence of any significant dehydration.   6.  Probable baseline dementia: Tolerating

## 2020-04-11 NOTE — PROGRESS NOTES
Saravanan Jamison    : 1940  Acct #: [de-identified]  MRN: [id number]              PM&R Progress Note      Admitting diagnosis: Metabolic encephalopathy     Comorbid diagnoses impacting rehabilitation: Impaired cognition, generalized weakness, CBD stones with cholangitis, abdominal pain, dysphagia, uncontrolled diabetes with hyperglycemia, mature bilateral BKA, hypertension    Chief complaint: No nausea or belly pain. Eating better. Looking forward to discharge. Prior (baseline) level of function: Independent. Current level of function:         Current  IRF-ELIZABETH and Goals:   Hearing, Speech, and Vision  Expression of Ideas and Wants: Some difficulty  Understanding Verbal and Non-Verbal Content: Usually understands  Prior Functioning: Everyday Activities  Self Care: Needed some help  Indoor Mobility (Ambulation):  Independent  Stairs: Independent  Functional Cognition: Needed some help  Prior Device Use: Walker, Orthotics/Prosthetics, Motorized wheelchair and/or scooter    Eating  Assistance Needed: Independent  CARE Score: 6  Discharge Goal: Independent  Oral Hygiene  Assistance Needed: Independent  CARE Score: 6  Discharge Goal: Independent  Toileting Hygiene  Assistance Needed: Independent  Comment: 4(deemed to be usual performance per huddle)  CARE Score: 6  Discharge Goal: Supervision or touching assistance  Shower/Bathe Self  Assistance Needed: Independent  CARE Score: 6  Discharge Goal: Supervision or touching assistance  Upper Body Dressing  Assistance Needed: Independent  CARE Score: 6  Discharge Goal: Supervision or touching assistance  Lower Body Dressing  Assistance Needed: Supervision or touching assistance  CARE Score: 4  Discharge Goal: Supervision or touching assistance  Putting On/Taking Off Footwear  Comment: h/o bilateral BKA  Reason if not Attempted: Not applicable  CARE Score: 9  Discharge Goal: Not Applicable    Roll Left and Right  Assistance Needed: Independent  CARE Score:

## 2020-05-14 ENCOUNTER — TELEPHONE (OUTPATIENT)
Dept: CARDIOLOGY CLINIC | Age: 80
End: 2020-05-14

## 2020-07-02 ENCOUNTER — APPOINTMENT (OUTPATIENT)
Dept: CT IMAGING | Age: 80
DRG: 446 | End: 2020-07-02
Payer: MEDICARE

## 2020-07-02 ENCOUNTER — HOSPITAL ENCOUNTER (INPATIENT)
Age: 80
LOS: 3 days | Discharge: HOME | DRG: 446 | End: 2020-07-05
Attending: EMERGENCY MEDICINE | Admitting: INTERNAL MEDICINE
Payer: MEDICARE

## 2020-07-02 PROBLEM — R10.9 ABDOMINAL PAIN: Status: ACTIVE | Noted: 2020-07-02

## 2020-07-02 LAB
ALBUMIN SERPL-MCNC: 4.1 GM/DL (ref 3.4–5)
ALP BLD-CCNC: 72 IU/L (ref 40–129)
ALT SERPL-CCNC: 14 U/L (ref 10–40)
ANION GAP SERPL CALCULATED.3IONS-SCNC: 13 MMOL/L (ref 4–16)
AST SERPL-CCNC: 25 IU/L (ref 15–37)
BASOPHILS ABSOLUTE: 0.1 K/CU MM
BASOPHILS RELATIVE PERCENT: 1.1 % (ref 0–1)
BILIRUB SERPL-MCNC: 0.9 MG/DL (ref 0–1)
BUN BLDV-MCNC: 13 MG/DL (ref 6–23)
CALCIUM SERPL-MCNC: 10.1 MG/DL (ref 8.3–10.6)
CHLORIDE BLD-SCNC: 100 MMOL/L (ref 99–110)
CO2: 20 MMOL/L (ref 21–32)
CREAT SERPL-MCNC: 1.1 MG/DL (ref 0.9–1.3)
DIFFERENTIAL TYPE: ABNORMAL
EOSINOPHILS ABSOLUTE: 0.4 K/CU MM
EOSINOPHILS RELATIVE PERCENT: 5.6 % (ref 0–3)
GFR AFRICAN AMERICAN: >60 ML/MIN/1.73M2
GFR NON-AFRICAN AMERICAN: >60 ML/MIN/1.73M2
GLUCOSE BLD-MCNC: 80 MG/DL (ref 70–99)
HCT VFR BLD CALC: 38.5 % (ref 42–52)
HEMOGLOBIN: 13 GM/DL (ref 13.5–18)
IMMATURE NEUTROPHIL %: 0.3 % (ref 0–0.43)
LIPASE: 25 IU/L (ref 13–60)
LYMPHOCYTES ABSOLUTE: 2.3 K/CU MM
LYMPHOCYTES RELATIVE PERCENT: 29.3 % (ref 24–44)
MAGNESIUM: 1.9 MG/DL (ref 1.8–2.4)
MCH RBC QN AUTO: 29.7 PG (ref 27–31)
MCHC RBC AUTO-ENTMCNC: 33.8 % (ref 32–36)
MCV RBC AUTO: 88.1 FL (ref 78–100)
MONOCYTES ABSOLUTE: 1.2 K/CU MM
MONOCYTES RELATIVE PERCENT: 14.8 % (ref 0–4)
NUCLEATED RBC %: 0 %
PDW BLD-RTO: 12.7 % (ref 11.7–14.9)
PLATELET # BLD: 233 K/CU MM (ref 140–440)
PMV BLD AUTO: 8.9 FL (ref 7.5–11.1)
POTASSIUM SERPL-SCNC: 3.3 MMOL/L (ref 3.5–5.1)
RBC # BLD: 4.37 M/CU MM (ref 4.6–6.2)
SEGMENTED NEUTROPHILS ABSOLUTE COUNT: 3.8 K/CU MM
SEGMENTED NEUTROPHILS RELATIVE PERCENT: 48.9 % (ref 36–66)
SODIUM BLD-SCNC: 133 MMOL/L (ref 135–145)
TOTAL IMMATURE NEUTOROPHIL: 0.02 K/CU MM
TOTAL NUCLEATED RBC: 0 K/CU MM
TOTAL PROTEIN: 7.3 GM/DL (ref 6.4–8.2)
WBC # BLD: 7.9 K/CU MM (ref 4–10.5)

## 2020-07-02 PROCEDURE — 96374 THER/PROPH/DIAG INJ IV PUSH: CPT

## 2020-07-02 PROCEDURE — 83735 ASSAY OF MAGNESIUM: CPT

## 2020-07-02 PROCEDURE — 74177 CT ABD & PELVIS W/CONTRAST: CPT

## 2020-07-02 PROCEDURE — 80053 COMPREHEN METABOLIC PANEL: CPT

## 2020-07-02 PROCEDURE — 6360000002 HC RX W HCPCS: Performed by: EMERGENCY MEDICINE

## 2020-07-02 PROCEDURE — 6360000004 HC RX CONTRAST MEDICATION: Performed by: EMERGENCY MEDICINE

## 2020-07-02 PROCEDURE — 96375 TX/PRO/DX INJ NEW DRUG ADDON: CPT

## 2020-07-02 PROCEDURE — 99285 EMERGENCY DEPT VISIT HI MDM: CPT

## 2020-07-02 PROCEDURE — 1200000000 HC SEMI PRIVATE

## 2020-07-02 PROCEDURE — 85025 COMPLETE CBC W/AUTO DIFF WBC: CPT

## 2020-07-02 PROCEDURE — 83690 ASSAY OF LIPASE: CPT

## 2020-07-02 RX ORDER — CIPROFLOXACIN 2 MG/ML
400 INJECTION, SOLUTION INTRAVENOUS ONCE
Status: COMPLETED | OUTPATIENT
Start: 2020-07-02 | End: 2020-07-03

## 2020-07-02 RX ORDER — ONDANSETRON 2 MG/ML
4 INJECTION INTRAMUSCULAR; INTRAVENOUS ONCE
Status: COMPLETED | OUTPATIENT
Start: 2020-07-02 | End: 2020-07-02

## 2020-07-02 RX ORDER — POTASSIUM CHLORIDE 7.45 MG/ML
10 INJECTION INTRAVENOUS ONCE
Status: COMPLETED | OUTPATIENT
Start: 2020-07-03 | End: 2020-07-03

## 2020-07-02 RX ORDER — SODIUM CHLORIDE 0.9 % (FLUSH) 0.9 %
10 SYRINGE (ML) INJECTION 2 TIMES DAILY
Status: DISCONTINUED | OUTPATIENT
Start: 2020-07-02 | End: 2020-07-03

## 2020-07-02 RX ORDER — FENTANYL CITRATE 50 UG/ML
50 INJECTION, SOLUTION INTRAMUSCULAR; INTRAVENOUS ONCE
Status: COMPLETED | OUTPATIENT
Start: 2020-07-02 | End: 2020-07-02

## 2020-07-02 RX ADMIN — IOPAMIDOL 75 ML: 612 INJECTION, SOLUTION INTRATHECAL at 22:00

## 2020-07-02 RX ADMIN — FENTANYL CITRATE 50 MCG: 50 INJECTION, SOLUTION INTRAMUSCULAR; INTRAVENOUS at 21:11

## 2020-07-02 RX ADMIN — ONDANSETRON 4 MG: 2 INJECTION INTRAMUSCULAR; INTRAVENOUS at 21:12

## 2020-07-02 ASSESSMENT — ENCOUNTER SYMPTOMS
NAUSEA: 1
VOMITING: 1
COUGH: 0
EYE PAIN: 0
ABDOMINAL PAIN: 1
SORE THROAT: 0
RHINORRHEA: 0
BACK PAIN: 0
EYE DISCHARGE: 0
SHORTNESS OF BREATH: 0

## 2020-07-02 ASSESSMENT — PAIN SCALES - GENERAL
PAINLEVEL_OUTOF10: 4
PAINLEVEL_OUTOF10: 7

## 2020-07-02 ASSESSMENT — PAIN DESCRIPTION - PAIN TYPE: TYPE: ACUTE PAIN

## 2020-07-02 ASSESSMENT — PAIN DESCRIPTION - LOCATION: LOCATION: ABDOMEN

## 2020-07-03 ENCOUNTER — APPOINTMENT (OUTPATIENT)
Dept: MRI IMAGING | Age: 80
DRG: 446 | End: 2020-07-03
Payer: MEDICARE

## 2020-07-03 LAB
ALBUMIN SERPL-MCNC: 4.4 GM/DL (ref 3.4–5)
ALP BLD-CCNC: 81 IU/L (ref 40–128)
ALT SERPL-CCNC: 15 U/L (ref 10–40)
ANION GAP SERPL CALCULATED.3IONS-SCNC: 12 MMOL/L (ref 4–16)
AST SERPL-CCNC: 25 IU/L (ref 15–37)
BASOPHILS ABSOLUTE: 0.1 K/CU MM
BASOPHILS RELATIVE PERCENT: 0.9 % (ref 0–1)
BILIRUB SERPL-MCNC: 1.1 MG/DL (ref 0–1)
BUN BLDV-MCNC: 14 MG/DL (ref 6–23)
CALCIUM SERPL-MCNC: 9.9 MG/DL (ref 8.3–10.6)
CHLORIDE BLD-SCNC: 105 MMOL/L (ref 99–110)
CO2: 23 MMOL/L (ref 21–32)
CREAT SERPL-MCNC: 1.3 MG/DL (ref 0.9–1.3)
DIFFERENTIAL TYPE: ABNORMAL
EOSINOPHILS ABSOLUTE: 0.7 K/CU MM
EOSINOPHILS RELATIVE PERCENT: 8.4 % (ref 0–3)
GFR AFRICAN AMERICAN: >60 ML/MIN/1.73M2
GFR NON-AFRICAN AMERICAN: 53 ML/MIN/1.73M2
GLUCOSE BLD-MCNC: 88 MG/DL (ref 70–99)
HCT VFR BLD CALC: 42.3 % (ref 42–52)
HEMOGLOBIN: 13.9 GM/DL (ref 13.5–18)
IMMATURE NEUTROPHIL %: 0.4 % (ref 0–0.43)
LYMPHOCYTES ABSOLUTE: 2.8 K/CU MM
LYMPHOCYTES RELATIVE PERCENT: 33.9 % (ref 24–44)
MCH RBC QN AUTO: 29.6 PG (ref 27–31)
MCHC RBC AUTO-ENTMCNC: 32.9 % (ref 32–36)
MCV RBC AUTO: 90 FL (ref 78–100)
MONOCYTES ABSOLUTE: 1.2 K/CU MM
MONOCYTES RELATIVE PERCENT: 14.2 % (ref 0–4)
NUCLEATED RBC %: 0 %
PDW BLD-RTO: 12.9 % (ref 11.7–14.9)
PLATELET # BLD: 233 K/CU MM (ref 140–440)
PMV BLD AUTO: 9 FL (ref 7.5–11.1)
POTASSIUM SERPL-SCNC: 3.9 MMOL/L (ref 3.5–5.1)
RBC # BLD: 4.7 M/CU MM (ref 4.6–6.2)
SEGMENTED NEUTROPHILS ABSOLUTE COUNT: 3.5 K/CU MM
SEGMENTED NEUTROPHILS RELATIVE PERCENT: 42.2 % (ref 36–66)
SODIUM BLD-SCNC: 140 MMOL/L (ref 135–145)
TOTAL IMMATURE NEUTOROPHIL: 0.03 K/CU MM
TOTAL NUCLEATED RBC: 0 K/CU MM
TOTAL PROTEIN: 7 GM/DL (ref 6.4–8.2)
WBC # BLD: 8.2 K/CU MM (ref 4–10.5)

## 2020-07-03 PROCEDURE — 36415 COLL VENOUS BLD VENIPUNCTURE: CPT

## 2020-07-03 PROCEDURE — 80053 COMPREHEN METABOLIC PANEL: CPT

## 2020-07-03 PROCEDURE — 2580000003 HC RX 258: Performed by: EMERGENCY MEDICINE

## 2020-07-03 PROCEDURE — 80048 BASIC METABOLIC PNL TOTAL CA: CPT

## 2020-07-03 PROCEDURE — 6360000002 HC RX W HCPCS: Performed by: EMERGENCY MEDICINE

## 2020-07-03 PROCEDURE — 2580000003 HC RX 258: Performed by: INTERNAL MEDICINE

## 2020-07-03 PROCEDURE — 1200000000 HC SEMI PRIVATE

## 2020-07-03 PROCEDURE — 85025 COMPLETE CBC W/AUTO DIFF WBC: CPT

## 2020-07-03 PROCEDURE — 6370000000 HC RX 637 (ALT 250 FOR IP): Performed by: INTERNAL MEDICINE

## 2020-07-03 PROCEDURE — 74181 MRI ABDOMEN W/O CONTRAST: CPT

## 2020-07-03 PROCEDURE — 94761 N-INVAS EAR/PLS OXIMETRY MLT: CPT

## 2020-07-03 PROCEDURE — 6360000002 HC RX W HCPCS: Performed by: INTERNAL MEDICINE

## 2020-07-03 RX ORDER — SODIUM CHLORIDE 0.9 % (FLUSH) 0.9 %
10 SYRINGE (ML) INJECTION PRN
Status: DISCONTINUED | OUTPATIENT
Start: 2020-07-03 | End: 2020-07-05 | Stop reason: HOSPADM

## 2020-07-03 RX ORDER — ACETAMINOPHEN 650 MG/1
650 SUPPOSITORY RECTAL EVERY 6 HOURS PRN
Status: DISCONTINUED | OUTPATIENT
Start: 2020-07-03 | End: 2020-07-05 | Stop reason: HOSPADM

## 2020-07-03 RX ORDER — GABAPENTIN 100 MG/1
100 CAPSULE ORAL 3 TIMES DAILY
Status: DISCONTINUED | OUTPATIENT
Start: 2020-07-03 | End: 2020-07-05 | Stop reason: HOSPADM

## 2020-07-03 RX ORDER — CLONIDINE HYDROCHLORIDE 0.1 MG/1
0.1 TABLET ORAL EVERY 4 HOURS PRN
Status: DISCONTINUED | OUTPATIENT
Start: 2020-07-03 | End: 2020-07-05 | Stop reason: HOSPADM

## 2020-07-03 RX ORDER — TRAMADOL HYDROCHLORIDE 50 MG/1
50 TABLET ORAL 3 TIMES DAILY
Status: DISCONTINUED | OUTPATIENT
Start: 2020-07-03 | End: 2020-07-05 | Stop reason: HOSPADM

## 2020-07-03 RX ORDER — SODIUM CHLORIDE, SODIUM LACTATE, POTASSIUM CHLORIDE, CALCIUM CHLORIDE 600; 310; 30; 20 MG/100ML; MG/100ML; MG/100ML; MG/100ML
INJECTION, SOLUTION INTRAVENOUS CONTINUOUS
Status: DISCONTINUED | OUTPATIENT
Start: 2020-07-03 | End: 2020-07-03

## 2020-07-03 RX ORDER — HEPARIN SODIUM 5000 [USP'U]/ML
5000 INJECTION, SOLUTION INTRAVENOUS; SUBCUTANEOUS EVERY 8 HOURS SCHEDULED
Status: DISCONTINUED | OUTPATIENT
Start: 2020-07-03 | End: 2020-07-05 | Stop reason: HOSPADM

## 2020-07-03 RX ORDER — MIRTAZAPINE 15 MG/1
15 TABLET, FILM COATED ORAL
Status: DISCONTINUED | OUTPATIENT
Start: 2020-07-03 | End: 2020-07-05 | Stop reason: HOSPADM

## 2020-07-03 RX ORDER — ASPIRIN 325 MG
325 TABLET ORAL DAILY
Status: DISCONTINUED | OUTPATIENT
Start: 2020-07-03 | End: 2020-07-05 | Stop reason: HOSPADM

## 2020-07-03 RX ORDER — ONDANSETRON 2 MG/ML
4 INJECTION INTRAMUSCULAR; INTRAVENOUS EVERY 6 HOURS PRN
Status: DISCONTINUED | OUTPATIENT
Start: 2020-07-03 | End: 2020-07-05 | Stop reason: HOSPADM

## 2020-07-03 RX ORDER — ATORVASTATIN CALCIUM 40 MG/1
40 TABLET, FILM COATED ORAL DAILY
Status: DISCONTINUED | OUTPATIENT
Start: 2020-07-03 | End: 2020-07-05 | Stop reason: HOSPADM

## 2020-07-03 RX ORDER — CARVEDILOL 6.25 MG/1
6.25 TABLET ORAL 2 TIMES DAILY WITH MEALS
Status: DISCONTINUED | OUTPATIENT
Start: 2020-07-03 | End: 2020-07-05 | Stop reason: HOSPADM

## 2020-07-03 RX ORDER — PROMETHAZINE HYDROCHLORIDE 12.5 MG/1
12.5 TABLET ORAL EVERY 6 HOURS PRN
Status: DISCONTINUED | OUTPATIENT
Start: 2020-07-03 | End: 2020-07-03

## 2020-07-03 RX ORDER — DILTIAZEM HYDROCHLORIDE 60 MG/1
60 TABLET, FILM COATED ORAL 2 TIMES DAILY
Status: DISCONTINUED | OUTPATIENT
Start: 2020-07-03 | End: 2020-07-05 | Stop reason: HOSPADM

## 2020-07-03 RX ORDER — QUETIAPINE FUMARATE 25 MG/1
25 TABLET, FILM COATED ORAL EVERY EVENING
Status: DISCONTINUED | OUTPATIENT
Start: 2020-07-03 | End: 2020-07-05 | Stop reason: HOSPADM

## 2020-07-03 RX ORDER — PANTOPRAZOLE SODIUM 40 MG/1
40 TABLET, DELAYED RELEASE ORAL
Status: DISCONTINUED | OUTPATIENT
Start: 2020-07-03 | End: 2020-07-05 | Stop reason: HOSPADM

## 2020-07-03 RX ORDER — CIPROFLOXACIN 2 MG/ML
400 INJECTION, SOLUTION INTRAVENOUS EVERY 12 HOURS
Status: DISCONTINUED | OUTPATIENT
Start: 2020-07-03 | End: 2020-07-05

## 2020-07-03 RX ORDER — MEMANTINE HYDROCHLORIDE 5 MG/1
5 TABLET ORAL DAILY
Status: DISCONTINUED | OUTPATIENT
Start: 2020-07-03 | End: 2020-07-05 | Stop reason: HOSPADM

## 2020-07-03 RX ORDER — ACETAMINOPHEN 325 MG/1
650 TABLET ORAL EVERY 6 HOURS PRN
Status: DISCONTINUED | OUTPATIENT
Start: 2020-07-03 | End: 2020-07-05 | Stop reason: HOSPADM

## 2020-07-03 RX ORDER — SODIUM CHLORIDE 0.9 % (FLUSH) 0.9 %
10 SYRINGE (ML) INJECTION EVERY 12 HOURS SCHEDULED
Status: DISCONTINUED | OUTPATIENT
Start: 2020-07-03 | End: 2020-07-05 | Stop reason: HOSPADM

## 2020-07-03 RX ORDER — CILOSTAZOL 100 MG/1
100 TABLET ORAL 2 TIMES DAILY
Status: DISCONTINUED | OUTPATIENT
Start: 2020-07-03 | End: 2020-07-05 | Stop reason: HOSPADM

## 2020-07-03 RX ADMIN — CARVEDILOL 6.25 MG: 6.25 TABLET, FILM COATED ORAL at 08:36

## 2020-07-03 RX ADMIN — CILOSTAZOL 100 MG: 100 TABLET ORAL at 21:16

## 2020-07-03 RX ADMIN — POTASSIUM CHLORIDE 10 MEQ: 7.46 INJECTION, SOLUTION INTRAVENOUS at 03:17

## 2020-07-03 RX ADMIN — TRAMADOL HYDROCHLORIDE 50 MG: 50 TABLET, FILM COATED ORAL at 20:57

## 2020-07-03 RX ADMIN — HEPARIN SODIUM 5000 UNITS: 5000 INJECTION, SOLUTION INTRAVENOUS; SUBCUTANEOUS at 20:57

## 2020-07-03 RX ADMIN — MIRTAZAPINE 15 MG: 15 TABLET, FILM COATED ORAL at 03:17

## 2020-07-03 RX ADMIN — SODIUM CHLORIDE, PRESERVATIVE FREE 10 ML: 5 INJECTION INTRAVENOUS at 08:37

## 2020-07-03 RX ADMIN — QUETIAPINE FUMARATE 25 MG: 25 TABLET ORAL at 18:07

## 2020-07-03 RX ADMIN — SODIUM CHLORIDE, PRESERVATIVE FREE 10 ML: 5 INJECTION INTRAVENOUS at 01:10

## 2020-07-03 RX ADMIN — GABAPENTIN 100 MG: 100 CAPSULE ORAL at 20:57

## 2020-07-03 RX ADMIN — SODIUM CHLORIDE, PRESERVATIVE FREE 10 ML: 5 INJECTION INTRAVENOUS at 20:58

## 2020-07-03 RX ADMIN — DILTIAZEM HYDROCHLORIDE 60 MG: 60 TABLET, FILM COATED ORAL at 08:36

## 2020-07-03 RX ADMIN — MIRTAZAPINE 15 MG: 15 TABLET, FILM COATED ORAL at 18:07

## 2020-07-03 RX ADMIN — ASPIRIN 325 MG ORAL TABLET 325 MG: 325 PILL ORAL at 08:35

## 2020-07-03 RX ADMIN — CLONIDINE HYDROCHLORIDE 0.1 MG: 0.1 TABLET ORAL at 20:57

## 2020-07-03 RX ADMIN — HEPARIN SODIUM 5000 UNITS: 5000 INJECTION, SOLUTION INTRAVENOUS; SUBCUTANEOUS at 13:35

## 2020-07-03 RX ADMIN — SODIUM CHLORIDE, POTASSIUM CHLORIDE, SODIUM LACTATE AND CALCIUM CHLORIDE: 600; 310; 30; 20 INJECTION, SOLUTION INTRAVENOUS at 03:17

## 2020-07-03 RX ADMIN — CARVEDILOL 6.25 MG: 6.25 TABLET, FILM COATED ORAL at 18:07

## 2020-07-03 RX ADMIN — ATORVASTATIN CALCIUM 40 MG: 40 TABLET, FILM COATED ORAL at 08:36

## 2020-07-03 RX ADMIN — DILTIAZEM HYDROCHLORIDE 60 MG: 60 TABLET, FILM COATED ORAL at 20:57

## 2020-07-03 RX ADMIN — MEMANTINE 5 MG: 5 TABLET ORAL at 08:35

## 2020-07-03 RX ADMIN — CIPROFLOXACIN 400 MG: 2 INJECTION, SOLUTION INTRAVENOUS at 01:10

## 2020-07-03 RX ADMIN — CILOSTAZOL 100 MG: 100 TABLET ORAL at 08:36

## 2020-07-03 RX ADMIN — HEPARIN SODIUM 5000 UNITS: 5000 INJECTION, SOLUTION INTRAVENOUS; SUBCUTANEOUS at 05:45

## 2020-07-03 RX ADMIN — CIPROFLOXACIN 400 MG: 2 INJECTION, SOLUTION INTRAVENOUS at 12:52

## 2020-07-03 ASSESSMENT — PAIN SCALES - GENERAL
PAINLEVEL_OUTOF10: 2
PAINLEVEL_OUTOF10: 0
PAINLEVEL_OUTOF10: 2
PAINLEVEL_OUTOF10: 2
PAINLEVEL_OUTOF10: 0

## 2020-07-03 ASSESSMENT — PAIN DESCRIPTION - FREQUENCY
FREQUENCY: INTERMITTENT
FREQUENCY: CONTINUOUS

## 2020-07-03 ASSESSMENT — PAIN DESCRIPTION - LOCATION
LOCATION: ABDOMEN

## 2020-07-03 ASSESSMENT — PAIN DESCRIPTION - ORIENTATION
ORIENTATION: UPPER

## 2020-07-03 ASSESSMENT — PAIN DESCRIPTION - DESCRIPTORS
DESCRIPTORS: DULL;ACHING
DESCRIPTORS: CONSTANT
DESCRIPTORS: CONSTANT

## 2020-07-03 ASSESSMENT — PAIN - FUNCTIONAL ASSESSMENT: PAIN_FUNCTIONAL_ASSESSMENT: PREVENTS OR INTERFERES SOME ACTIVE ACTIVITIES AND ADLS

## 2020-07-03 ASSESSMENT — PAIN DESCRIPTION - PAIN TYPE
TYPE: ACUTE PAIN

## 2020-07-03 ASSESSMENT — PAIN DESCRIPTION - ONSET: ONSET: ON-GOING

## 2020-07-03 NOTE — ED NOTES
Bed: ED-32  Expected date:   Expected time:   Means of arrival:   Comments:  Álvaro Llanes RN  07/02/20 2019

## 2020-07-03 NOTE — CONSULTS
CONSULT DICTATED
were  seen. The patient is clinically doing well, and his abdominal pain has  resolved and the patient is afebrile. The patient is hemodynamically  stable. As above mentioned, the patient has history of recurrent common  bile duct stones and the patient has undergone at least seven ERCPs in  the past, the last two by me on 09/07/2019 and then on 03/31/2020 with  dilatation of the papilla and removal of multiple common bile duct  stones and sludge. The patient also has had esophageal dilatation  performed by Dr. Manuelito Thomas in the past.  The patient has not had a recent  colonoscopy. The patient's code status is DNR-CCA. The patient is  hemodynamically stable. REVIEW OF SYSTEMS:  CENTRAL NERVOUS SYSTEM:  The patient denies headache or focal  sensorimotor symptoms. CARDIOVASCULAR SYSTEM:  No history of chest pain, shortness of breath,  or leg swelling. GENITOURINARY SYSTEM:  No history of dysuria, pyuria, or hematuria. MUSCULOSKELETAL SYSTEM:  No history of aches and pains in muscles and  joints. RESPIRATORY SYSTEM:  No history of cough, hemoptysis, fever, or chills. PAST MEDICAL HISTORY:  Significant for history of hypertension; diabetes  mellitus; hyperlipidemia; coronary artery disease, status post CABG;  kidney stones; osteoarthritis; history of recurrent common bile duct  stones, status post ERCPs x7. FAMILY HISTORY:  The patient's mother and father both were diagnosed  with carcinoma of unknown primary. MEDICATIONS:  Please refer to the chart. SOCIOECONOMIC HISTORY:  No history of EtOH abuse. The patient does not  smoke cigarettes. PAST SURGICAL HISTORY:  The patient has had CABG done, bilateral  below-knee amputations, also had cholecystectomy done. The patient has  had esophageal dilatation in the past also and ERCPs x7 at least.    ALLERGIES:  The patient has multiple allergies, please refer to the  chart.     PHYSICAL EXAMINATION:  GENERAL:  Shows an 65-year-old white gentleman of

## 2020-07-03 NOTE — ED TRIAGE NOTES
Pt arrived via EMS for abd pain and emesis that started today around 1200.  Pt reports x4 episodes of emesis today

## 2020-07-03 NOTE — ED PROVIDER NOTES
[address] ENCOUNTER      Pt Name: Melquiades Stevens  MRN: [id number]  [place] 1940  Date of evaluation: 7/2/2020  Provider: Neto Goss MD    CHIEF COMPLAINT       Chief Complaint   Patient presents with    Abdominal Pain    Emesis         HISTORY OF PRESENT ILLNESS      Melquiades Stevens is a 79 y.o. male who presents to the emergency department  for   Chief Complaint   Patient presents with    Abdominal Pain    Emesis       79 yom presents with epigastric and periumbilical abdominal pain. He has a history of cholangitis and recurrent CBD stones and sludge. He was recently underwent an ERCP with balloon dilation of the papilla and removal of common bile duct stones/sludge with Dr. Gray Washburn in March, 2020. According to records, he has had these recurrent episodes of CBD stones and sludge even in the setting of normal LFTs. He presents today complaining of some abdominal pain as well as several episodes of nausea and vomiting. He states that the symptoms started around lunchtime. Denies any bloody or bilious emesis. Denies any injury to his abdomen. States his bowel movements have been within normal limits. Denies any respiratory symptoms. No chest pain. No fevers, chills other consultation infectious symptoms. No injury to his abdomen. Nursing Notes, Triage Notes & Vital Signs were reviewed. REVIEW OF SYSTEMS    (2-9 systems for level 4, 10 or more for level 5)     Review of Systems   Constitutional: Negative for chills and fever. HENT: Negative for congestion, rhinorrhea and sore throat. Eyes: Negative for pain and discharge. Respiratory: Negative for cough and shortness of breath. Cardiovascular: Negative for chest pain and palpitations. Gastrointestinal: Positive for abdominal pain, nausea and vomiting. Endocrine: Negative for polydipsia and polyuria.    Genitourinary: Negative for dysuria and flank pain. Musculoskeletal: Negative for back pain and neck pain. Skin: Negative for pallor and wound. Neurological: Negative for dizziness, facial asymmetry, light-headedness, numbness and headaches. Psychiatric/Behavioral: Negative for confusion. Except as noted above the remainder of the review of systems was reviewed and negative. PAST MEDICAL HISTORY     Past Medical History:   Diagnosis Date    Arthritis     Biliary stones     CAD (coronary artery disease)     COPD [facility]ch[facility] obstructive [facility] disease) [facility]     Diabetes mellitus ([facility]     History of blood transfusion     Hx of angiography 3/11/2014    Pulmonary Arteries: Small sliding hiatal hernia. Mod coronary artery calcifications. Mild bilat gynecomastia.  Hyperlipidemia     Hypertension     Kidney stone        Prior to Admission medications    Medication Sig Start Date End Date Taking?  Authorizing Provider   mirtazapine (REMERON) 15 MG tablet Take 1 tablet by mouth Daily with supper 20   C Aniceto Brooks MD   insulin glargine (LANTUS) 100 UNIT/ML injection vial Inject 7 Units into the skin nightly 20   C Aniceto Brooks MD   QUEtiapine (SEROQUEL) 25 MG tablet Take 1 tablet by mouth every evening 20   ERIN Brooks MD   memantine [facility]) 5 MG tablet Take 1 tablet by mouth daily 4/10/20   ERIN Brooks MD   pantoprazole (PROTONIX) 40 MG tablet Take 1 tablet by mouth every morning (before breakfast) 9/10/19   Robbie Apgar, MD   atorvastatin (LIPITOR) 40 MG tablet Take 40 mg by mouth daily    Historical Provider, MD   docusate sodium (COLACE) 100 MG capsule Take 100 mg by mouth nightly as needed     Historical Provider, MD   latanoprost (XALATAN) 0.005 % ophthalmic solution 1 drop nightly    Historical Provider, MD   diltiazem (CARDIZEM) 60 MG tablet Take 1 tablet by mouth 2 times daily 3/19/17   Torito Cobb,    Multiple Vitamins-Minerals (MULTIVITAMIN PO) Take 1 tablet by mouth daily. Historical Provider, MD   cilostazol (PLETAL) 100 MG tablet Take 100 mg by mouth 2 times daily. Historical Provider, MD   carvedilol (COREG) 6.25 MG tablet Take 6.25 mg by mouth 2 times daily (with meals). Historical Provider, MD   aspirin 325 MG tablet Take 325 mg by mouth daily. Historical Provider, MD   insulin lispro (HUMALOG) 100 UNIT/ML injection Inject  into the skin 3 times daily (before meals).  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[facility] [facility]    Choledocholithiasis    Generalized abdominal pain    COPD (chronic obstructive pulmonary disease) (HCC)    Chest pain    Abdominal pain, epigastric    Elevated LFTs    Abnormal findings on imaging of biliary tract    Chest pain    Coronary artery disease involving coronary bypass graft of native heart without angina pectoris    Acute encephalopathy    Vomiting    Sepsis ([address]    Nausea vomiting and diarrhea    Fever    Metabolic encephalopathy    Impaired cognition    Generalized weakness    Cholangitis due to bile duct calculus with obstruction    Oropharyngeal dysphagia    S/P BKA (below knee amputation) bilateral (HCC)         SURGICAL HISTORY       Past Surgical History:   Procedure Laterality Date    ABDOMEN SURGERY      stones in bile duct removed x3    CARDIAC SURGERY      CHOLECYSTECTOMY      CORONARY ANGIOPLASTY WITH STENT PLACEMENT      DILATATION, ESOPHAGUS      ERCP  03/13/14    w/ dilation of papilla    ERCP  9/11/14    ERCP  10/24/15    extractions stones, balloon dilatation     ERCP  03/18/2017    stone extraction     ERCP N/A 1/25/2019    ERCP STONE REMOVAL/ DILATATION OF PAPILLA WITH 10-12MM AND 12-15MM BALLOON AND 9-12MM, 12-15MM  EXTRACTOR BALLOON USED FOR REMOVAL OF MULTIPLE  CBD STONES performed by Lorraine Sol MD at [place] [address] ERCP N/A 9/7/2019    ERCP STONE REMOVAL, DILATATION OF PAPILLA WITH 10-12MM BALLOON, AND EXTRACTOR BALLOON 12-15MM USED FOR REMOVAL OF CBD STONE AND SLUDE performed by Rell Hu MD at [place] [address] ERCP N/A 3/31/2020    ERCP DILATION BALLOON to 12  AND SWEEP OF CBD STONE AND SLUDGE performed by Rell Hu MD at [address] [place] [address] Bilateral     OTHER SURGICAL HISTORY  03/13/14    sphincterotomy         CURRENT MEDICATIONS       Previous Medications    ASPIRIN 325 MG TABLET    Take 325 mg by mouth daily. ATORVASTATIN (LIPITOR) 40 MG TABLET    Take 40 mg by mouth daily    CARVEDILOL (COREG) 6.25 MG TABLET    Take 6.25 mg by mouth 2 times daily (with meals). CILOSTAZOL (PLETAL) 100 MG TABLET    Take 100 mg by mouth 2 times daily. DILTIAZEM (CARDIZEM) 60 MG TABLET    Take 1 tablet by mouth 2 times daily    DOCUSATE SODIUM (COLACE) 100 MG CAPSULE    Take 100 mg by mouth nightly as needed     INSULIN GLARGINE (LANTUS) 100 UNIT/ML INJECTION VIAL    Inject 7 Units into the skin nightly    INSULIN LISPRO (HUMALOG) 100 UNIT/ML INJECTION    Inject  into the skin 3 times daily (before meals). Sliding scale     LATANOPROST (XALATAN) 0.005 % OPHTHALMIC SOLUTION    1 drop nightly    MEMANTINE (NAMENDA) 5 MG TABLET    Take 1 tablet by mouth daily    MIRTAZAPINE (REMERON) 15 MG TABLET    Take 1 tablet by mouth Daily with supper    MULTIPLE VITAMINS-MINERALS (MULTIVITAMIN PO)    Take 1 tablet by mouth daily. PANTOPRAZOLE (PROTONIX) 40 MG TABLET    Take 1 tablet by mouth every morning (before breakfast)    QUETIAPINE (SEROQUEL) 25 MG TABLET    Take 1 tablet by mouth every evening       ALLERGIES     Byetta 10 mcg pen [exenatide]; Phenylephrine; Sulbactam; Ampicillin; Aricept [donepezil hydrochloride]; Benztropine; Celebrex [celecoxib];  Diphenhydramine hcl; Diphenhydramine hcl [diphenhydramine]; Exenatide; Metoprolol succinate; Phenergan [promethazine hcl]; Plavix [clopidogrel bisulfate]; Pseudoephedrine; and Reglan [metoclopramide hcl]    FAMILY HISTORY       Family History   Problem Relation Age of Onset    Cancer Mother     Diabetes Mother     Cancer Father           SOCIAL HISTORY       Social History     Socioeconomic History    Marital status:      Spouse name: Judith Fournier Number of children: None    Years of education: None    Highest education level: None   Occupational History    None   Social Needs    Financial resource strain: None    Food insecurity     Worry: None     Inability: None    Transportation needs     Medical: None     Non-medical: None   Tobacco Use    Smoking status: Never Smoker    Smokeless tobacco: Never Used   Substance and Sexual Activity    Alcohol use: No    Drug use: No    Sexual activity: None   Lifestyle    Physical activity     Days per week: None     Minutes per session: None    Stress: None   Relationships    Social connections     Talks on phone: None     Gets together: None     Attends [employer] service: None     Active member of club or organization: None     Attends meetings of clubs or organizations: None     Relationship status: None    Intimate partner violence     Fear of current or ex partner: None     Emotionally abused: None     Physically abused: None     Forced sexual activity: None   Other Topics Concern    None   Social History Narrative    None       SCREENINGS    Wing Coma Scale  Eye Opening: Spontaneous  Best Verbal Response: Oriented  Best Motor Response: Obeys commands  Wing Coma Scale Score: 15          PHYSICAL EXAM    (up to 7 for level 4, 8 or more for level 5)     ED Triage Vitals [07/02/20 2023]   BP Temp Temp Source Pulse Resp SpO2 Height Weight   -- 98.3 °F (36.8 °C) Oral 89 22 100 % 5' 8\" (1.727 m) 147 lb (66.7 kg)       Physical Exam  Vitals signs reviewed.    Constitutional: Appearance: He is not ill-appearing or toxic-appearing. HENT:      Head: Normocephalic and atraumatic. Nose: No congestion or rhinorrhea. Mouth/Throat:      Mouth: Mucous membranes are moist.      Pharynx: No oropharyngeal exudate or posterior oropharyngeal erythema. Eyes:      General:         Right eye: No discharge. Left eye: No discharge. Extraocular Movements: Extraocular movements intact. Pupils: Pupils are equal, round, and reactive to light. Neck:      Musculoskeletal: Neck supple. No muscular tenderness. Cardiovascular:      Rate and Rhythm: Normal rate. Heart sounds: No friction rub. No gallop. Pulmonary:      Comments: Respirations unlabored  No retractions, no increased work of breathing  Abdominal:      Palpations: Abdomen is soft. Tenderness: There is no guarding. Comments: Abdomen soft, no guarding  Tenderness to palpation in epigastrium and periumbilically   Musculoskeletal:         General: No tenderness, deformity or signs of injury. Comments: Extremities atraumatic   Lymphadenopathy:      Cervical: No cervical adenopathy. Skin:     Capillary Refill: Capillary refill takes less than 2 seconds. Findings: No erythema or rash. Neurological:      General: No focal deficit present. Mental Status: He is alert and oriented to person, place, and time.          DIAGNOSTIC RESULTS     Labs Reviewed   COMPREHENSIVE METABOLIC PANEL W/ REFLEX TO MG FOR LOW K - Abnormal; Notable for the following components:       Result Value    Sodium 133 (*)     Potassium 3.3 (*)     CO2 20 (*)     All other components within normal limits   CBC WITH AUTO DIFFERENTIAL - Abnormal; Notable for the following components:    RBC 4.37 (*)     Hemoglobin 13.0 (*)     Hematocrit 38.5 (*)     Monocytes % 14.8 (*)     Eosinophils % 5.6 (*)     Basophils % 1.1 (*)     All other components within normal limits   LIPASE   MAGNESIUM            RADIOLOGY:     Non-plain film images such as CT, Ultrasound and MRI are read by the radiologist. Plain radiographic images are visualized and preliminarily interpreted by the emergency physician. Interpretation per the Radiologist below, if available at the time of this note:    CT ABDOMEN PELVIS W IV CONTRAST Additional Contrast? None   Preliminary Result   1. Stable severe intra and extrahepatic pneumobilia and moderate biliary   dilatation with prior cholecystectomy. There is no acute inflammation or   evidence of retained calculi. 2. Stable mild pancreatic head and neck ductal dilatation. No evidence of a   primary malignancy or acute pancreatitis. 3. No acute abdominal/pelvic process. ED BEDSIDE ULTRASOUND:   Performed by ED Physician Martina Loya MD       LABS:  Labs Reviewed   COMPREHENSIVE METABOLIC PANEL W/ REFLEX TO MG FOR LOW K - Abnormal; Notable for the following components:       Result Value    Sodium 133 (*)     Potassium 3.3 (*)     CO2 20 (*)     All other components within normal limits   CBC WITH AUTO DIFFERENTIAL - Abnormal; Notable for the following components:    RBC 4.37 (*)     Hemoglobin 13.0 (*)     Hematocrit 38.5 (*)     Monocytes % 14.8 (*)     Eosinophils % 5.6 (*)     Basophils % 1.1 (*)     All other components within normal limits   LIPASE   MAGNESIUM       All other labs were within normal range or not returned as of this dictation. EMERGENCY DEPARTMENT COURSE and DIFFERENTIAL DIAGNOSIS/MDM:   Vitals:    Vitals:    07/02/20 2023 07/02/20 2132 07/02/20 2332   BP:  (!) 155/82 (!) 145/91   Pulse: 89  85   Resp: 22  20   Temp: 98.3 °F (36.8 °C)     TempSrc: Oral     SpO2: 100% 95% 98%   Weight: 147 lb (66.7 kg)     Height: 5' 8\" (1.727 m)             MDM  Number of Diagnoses or Management Options  Abdominal pain, unspecified abdominal location:   Diagnosis management comments: 18-year-old male presents with epigastric and periumbilical abdominal pain.   He has a history of cholangitis and recurrent common bile duct stones and sludge. In March, 2020 did undergo an ERCP with dilatation and removal of common bile duct stones and sludge. He does have with a gastroenterologist who does his procedure. He reports that around lunchtime today he developed pain as well as multiple episodes of vomiting. Denies any respiratory symptoms. He presents the emergency department for evaluation. Blood pressures mildly elevated upon presentation. He was afebrile. This is a mild tenderness on exam.  According to notes, he has had these occurrences of these common bile duct stones before without an elevation in his LFTs. Labs are obtained. Abdominal CT scan is obtained. He is treated symptomatically for pain. Labs are unremarkable. Abdominal CT scan does show some chronic pneumobilia and other chronic processes. No acute process is noted. I did speak with patient's gastroenterologist Dr. Harper Forward. He does recommend the patient placed on antibiotics and admitted for GI consult for possible further procedures. Patient is agreeable plan of care. Review of records, he has been placed on ciprofloxacin before when he has had a recurrence of the common bile duct stones and sludge. He was placed on ciprofloxacin. He is admitted to hospital service for further evaluation management. He is agreeable plan of care. CONSULTS:  IP CONSULT TO GI  IP CONSULT TO HOSPITALIST    PROCEDURES:  None performed unless otherwise noted below     Procedures        FINAL IMPRESSION      1. Abdominal pain, unspecified abdominal location          DISPOSITION/PLAN   DISPOSITION Decision To Admit 07/02/2020 11:42:30 PM      PATIENT REFERRED TO:  No follow-up provider specified.     DISCHARGE MEDICATIONS:  New Prescriptions    No medications on file       ED Provider Disposition Time  DISPOSITION Decision To Admit 07/02/2020 11:42:30 PM      Appropriate personal protective equipment was worn during the patient's evaluation. These included surgical, eye protection, surgical mask or in 95 respirator and gloves. The patient was also placed in a surgical mask for the prevention of possible spread of respiratory viral illnesses. The Patient was instructed to read the package inserts with any medication that was prescribed. Major potential reactions and medication interactions were discussed. The Patient understands that there are numerous possible adverse reactions not covered. The patient was also instructed to arrange follow-up with his or her primary care provider for review of any pending labwork or incidental findings on any radiology results that were obtained. All efforts were made to discuss any incidental findings that require further monitoring. Controlled Substances Monitoring:     No flowsheet data found.     (Please note that portions of this note were completed with a voice recognition program.  Efforts were made to edit the dictations but occasionally words are mis-transcribed.)    Tracy Spurling, MD (electronically signed)  Attending Emergency Physician           Tracy Spurling, MD  07/02/20 8760

## 2020-07-03 NOTE — H&P
HISTORY AND PHYSICAL  (Hospitalist, Internal Medicine)  IDENTIFYING INFORMATION   PATIENT:  Imelda Chapa  MRN:  [id number]  ADMIT DATE: 7/2/2020  TIME OF EVALUATION: 7/2/2020 11:40 PM    CHIEF COMPLAINT     Abdominal pain  HISTORY OF PRESENT ILLNESS   Imelda Chapa is a 79 y.o. male admitted for epigastric and periumbilical pain, associated with nausea and NBNB vomiting, but symptoms started around lunchtime today. Patient denies any diarrhea, has no other complaints. Patient denies fevers, chills, confusion, chest pain, cough, or sick contacts. Has no confusion now, last admission was thought to be d/t  Exelon patch. PMH listed below:    PAST MEDICAL, SURGICAL, FAMILY, and SOCIAL HISTORY     Past Medical History:   Diagnosis Date    Arthritis     Biliary stones 2015    CAD (coronary artery disease)     [facility]     Diabetes mellitus [facility]     History of blood transfusion     Hx of angiography 3/11/2014    Pulmonary Arteries: Small sliding hiatal hernia. Mod coronary artery calcifications. Mild bilat gynecomastia.     Hyperlipidemia     Hypertension     Kidney stone      Past Surgical History:   Procedure Laterality Date    ABDOMEN SURGERY      stones in bile duct removed x3    CARDIAC SURGERY      CHOLECYSTECTOMY      CORONARY ANGIOPLASTY WITH STENT PLACEMENT      DILATATION, ESOPHAGUS      ERCP  03/13/14    w/ dilation of papilla    ERCP  9/11/14    ERCP  10/24/15    extractions stones, balloon dilatation     ERCP  03/18/2017    stone extraction     ERCP N/A 1/25/2019    ERCP STONE REMOVAL/ DILATATION OF PAPILLA WITH 10-12MM AND 12-15MM BALLOON AND 9-12MM, 12-15MM  EXTRACTOR BALLOON USED FOR REMOVAL OF MULTIPLE  CBD STONES performed by Romy Palma MD at [place] [address] ERCP N/A 9/7/2019    ERCP STONE REMOVAL, DILATATION OF PAPILLA WITH 10-12MM BALLOON, AND EXTRACTOR BALLOON 12-15MM USED FOR REMOVAL OF CBD STONE AND SLUDE performed by Jacqui Cox MD at [facility] ERCP N/A 3/31/2020    ERCP DILATION BALLOON to 12  AND SWEEP OF CBD STONE AND SLUDGE performed by Jacqui Cox MD at [address] [place] [address]      JOINT REPLACEMENT      LEG AMPUTATION BELOW KNEE Bilateral     OTHER SURGICAL HISTORY  03/13/14    sphincterotomy     Family History   Problem Relation Age of Onset    Cancer Mother     Diabetes Mother     Cancer Father      Family Hx of HTN  Family Hx as reviewed above, otherwise non-contributory  Social History     Socioeconomic History    Marital status:      Spouse name: Chrystal Lutz Number of children: None    Years of education: None    Highest education level: None   Occupational History    None   Social Needs    Financial resource strain: None    Food insecurity     Worry: None     Inability: None    Transportation needs     Medical: None     Non-medical: None   Tobacco Use    Smoking status: Never Smoker    Smokeless tobacco: Never Used   Substance and Sexual Activity    Alcohol use: No    Drug use: No    Sexual activity: None   Lifestyle    Physical activity     Days per week: None     Minutes per session: None    Stress: None   Relationships    Social connections     Talks on phone: None     Gets together: None     Attends Confucianism service: None     Active member of club or organization: None     Attends meetings of clubs or organizations: None     Relationship status: None    Intimate partner violence     Fear of current or ex partner: None     Emotionally abused: None     Physically abused: None     Forced sexual activity: None   Other Topics Concern    None   Social History Narrative    None       MEDICATIONS   Medications Prior to Admission  Not in a hospital admission.     Current Medications  Current Facility-Administered Medications   Medication Dose Route Frequency Provider Last Rate Last Dose    sodium chloride flush 0.9 % injection 10 mL  10 mL Intravenous BID Ralph Baeza MD        ciprofloxacin (CIPRO) IVPB 400 mg  400 mg Intravenous Once Ralph Baeza MD        potassium chloride 10 mEq/100 mL IVPB (Peripheral Line)  10 mEq Intravenous Once Braden Desai MD         Current Outpatient Medications   Medication Sig Dispense Refill    mirtazapine (REMERON) 15 MG tablet Take 1 tablet by mouth Daily with supper 30 tablet 0    insulin glargine (LANTUS) 100 UNIT/ML injection vial Inject 7 Units into the skin nightly 1 vial 0    QUEtiapine (SEROQUEL) 25 MG tablet Take 1 tablet by mouth every evening 30 tablet 0    memantine (NAMENDA) 5 MG tablet Take 1 tablet by mouth daily 30 tablet 0    pantoprazole (PROTONIX) 40 MG tablet Take 1 tablet by mouth every morning (before breakfast) 90 tablet 1    atorvastatin (LIPITOR) 40 MG tablet Take 40 mg by mouth daily      docusate sodium (COLACE) 100 MG capsule Take 100 mg by mouth nightly as needed       latanoprost (XALATAN) 0.005 % ophthalmic solution 1 drop nightly      diltiazem (CARDIZEM) 60 MG tablet Take 1 tablet by mouth 2 times daily 90 tablet 3    Multiple Vitamins-Minerals (MULTIVITAMIN PO) Take 1 tablet by mouth daily.  cilostazol (PLETAL) 100 MG tablet Take 100 mg by mouth 2 times daily.  carvedilol (COREG) 6.25 MG tablet Take 6.25 mg by mouth 2 times daily (with meals).  aspirin 325 MG tablet Take 325 mg by mouth daily.  insulin lispro (HUMALOG) 100 UNIT/ML injection Inject  into the skin 3 times daily (before meals).  Sliding scale            Allergies  Allergies   Allergen Reactions    Byetta 10 Mcg Pen [Exenatide]     Phenylephrine     Sulbactam     Ampicillin Rash    Aricept [Donepezil Hydrochloride] Other (See Comments)     Hallucinations, confusion    Benztropine Other (See Comments)     Hallucinations, confusion    Celebrex [Celecoxib] Other (See Comments)     'causes him to be nervous and jittery\"    Diphenhydramine Hcl Other (See Comments)     nervous and jittery    Diphenhydramine Hcl [Diphenhydramine] Anxiety    Exenatide Other (See Comments)     Causes him to be nervous and jittery    Metoprolol Succinate Other (See Comments)     Hallucinations     Phenergan [Promethazine Hcl] Other (See Comments)     Hallucinations, nervous, jittery    Plavix [Clopidogrel Bisulfate] Other (See Comments)     Causes bleeding in his stomach    Pseudoephedrine Anxiety    Reglan [Metoclopramide Hcl] Other (See Comments)     \"Caused him to just sit in a chair and rock back and forth\"       REVIEW OF SYSTEMS   Within above limitations. 14 point review of systems reviewed. Pertinent positive or negative as per HPI or otherwise negative per 14 point systems review. PHYSICAL EXAM     Wt Readings from Last 3 Encounters:   07/02/20 147 lb (66.7 kg)   04/11/20 142 lb 3.2 oz (64.5 kg)   04/01/20 139 lb 1.6 oz (63.1 kg)       Blood pressure (!) 155/82, pulse 89, temperature 98.3 °F (36.8 °C), temperature source Oral, resp. rate 22, height 5' 8\" (1.727 m), weight 147 lb (66.7 kg), SpO2 95 %. General - AAO x 2, incontinent in his own stretcher, states that he wants help getting cleaned up  Psych - Appropriate affect/speech. No agitation, wriggling in bed, however denied pain  Eyes - Eye lids intact. No scleral icterus  ENT - Lips wnl. External ear clear/dry/intact. No thyromegaly on inspection  Neuro - No gross peripheral or central neuro deficits on inspection  Heart - Sinus. RRR. S1 and S2 present. No added HS/murmurs appreciated. No elevated JVD appreciated  Lung - Adequate air entry b/l, No crackles/wheezes appreciated  GI - Soft. No guarding/rigidity. No hepatosplenomegaly/ascites. BS+ hypoactive   - No CVA/suprapubic tenderness or palpable bladder distension  Skin - Intact. No rash/petechiae/ecchymosis.  Warm extremities  MSK -bilateral lower extremity amputations, with prosthesis bedside not attached      Lines/Drains/Airways/Wounds:  [unfilled]    LABS AND IMAGING CBC  [unfilled]    Last 3 Hemoglobin  Lab Results   Component Value Date    HGB 13.0 07/02/2020    HGB 10.6 04/08/2020    HGB 10.7 04/01/2020     Last 3 WBC/ANC  Lab Results   Component Value Date    WBC 7.9 07/02/2020    WBC 9.2 04/08/2020    WBC 16.0 04/01/2020     No components found for: GRNLOCTYABS  Last 3 Platelets  No results found for: PLATELET  Chemistry  [unfilled]  [unfilled]  No results found for: LDH  Coagulation Studies  Lab Results   Component Value Date    INR 1.28 03/31/2020     Liver Function Studies  Lab Results   Component Value Date    ALT 14 07/02/2020    AST 25 07/02/2020    ALKPHOS 72 07/02/2020       Recent Imaging        Relevant labs and imaging reviewed    ASSESSMENT AND PLAN   Jaspreet Kat is a 79 y.o. male p/w    RUQ pain possibly d/t sludge  - LFT wnl  - lipase neg  - IV fluids   - PRN pain medications  - CT abd with some pneumobilia   - GI consult, consider MRCP/ERCP, or HIDA  -Continue antiemetics  -Serial abdominal exam  -ED provider stated that gastroenterologist recommended antibiotics, will continue ciprofloxacin        HTN/hyperlipidemia  - cardizem and coreg  -Atorvastatin    Type 2 diabetes- uncontrolled  Lantus and ISS, TIDAC finger stick  Hypoglycemic protocol  F/u A1c      hypokalemia  - potassium repletion protocol  - telemetry  - repeat BMP        Case d/w ED provider    DVT ppx: Lovenox  Code status: DNR-CCA     [address], Internal Medicine  7/2/2020 at 11:40 PM

## 2020-07-04 LAB
ALBUMIN SERPL-MCNC: 4.2 GM/DL (ref 3.4–5)
ALP BLD-CCNC: 81 IU/L (ref 40–128)
ALT SERPL-CCNC: 15 U/L (ref 10–40)
AMYLASE: 46 U/L (ref 25–115)
ANION GAP SERPL CALCULATED.3IONS-SCNC: 8 MMOL/L (ref 4–16)
APTT: 30 SECONDS (ref 25.1–37.1)
AST SERPL-CCNC: 30 IU/L (ref 15–37)
BASOPHILS ABSOLUTE: 0.1 K/CU MM
BASOPHILS RELATIVE PERCENT: 1 % (ref 0–1)
BILIRUB SERPL-MCNC: 0.8 MG/DL (ref 0–1)
BUN BLDV-MCNC: 16 MG/DL (ref 6–23)
CALCIUM SERPL-MCNC: 9.7 MG/DL (ref 8.3–10.6)
CHLORIDE BLD-SCNC: 105 MMOL/L (ref 99–110)
CO2: 26 MMOL/L (ref 21–32)
CREAT SERPL-MCNC: 1.4 MG/DL (ref 0.9–1.3)
DIFFERENTIAL TYPE: ABNORMAL
EOSINOPHILS ABSOLUTE: 0.9 K/CU MM
EOSINOPHILS RELATIVE PERCENT: 11.8 % (ref 0–3)
GFR AFRICAN AMERICAN: 59 ML/MIN/1.73M2
GFR NON-AFRICAN AMERICAN: 49 ML/MIN/1.73M2
GLUCOSE BLD-MCNC: 159 MG/DL (ref 70–99)
GLUCOSE BLD-MCNC: 203 MG/DL (ref 70–99)
GLUCOSE BLD-MCNC: 220 MG/DL (ref 70–99)
HCT VFR BLD CALC: 39.7 % (ref 42–52)
HEMOGLOBIN: 12.8 GM/DL (ref 13.5–18)
IMMATURE NEUTROPHIL %: 0.3 % (ref 0–0.43)
INR BLD: 1.08 INDEX
LIPASE: 28 IU/L (ref 13–60)
LYMPHOCYTES ABSOLUTE: 2.1 K/CU MM
LYMPHOCYTES RELATIVE PERCENT: 29.2 % (ref 24–44)
MCH RBC QN AUTO: 29.4 PG (ref 27–31)
MCHC RBC AUTO-ENTMCNC: 32.2 % (ref 32–36)
MCV RBC AUTO: 91.3 FL (ref 78–100)
MONOCYTES ABSOLUTE: 1.2 K/CU MM
MONOCYTES RELATIVE PERCENT: 16 % (ref 0–4)
NUCLEATED RBC %: 0 %
PDW BLD-RTO: 12.8 % (ref 11.7–14.9)
PLATELET # BLD: 207 K/CU MM (ref 140–440)
PMV BLD AUTO: 9.3 FL (ref 7.5–11.1)
POTASSIUM SERPL-SCNC: 4 MMOL/L (ref 3.5–5.1)
PROTHROMBIN TIME: 13.1 SECONDS (ref 11.7–14.5)
RBC # BLD: 4.35 M/CU MM (ref 4.6–6.2)
SEGMENTED NEUTROPHILS ABSOLUTE COUNT: 3 K/CU MM
SEGMENTED NEUTROPHILS RELATIVE PERCENT: 41.7 % (ref 36–66)
SODIUM BLD-SCNC: 139 MMOL/L (ref 135–145)
TOTAL IMMATURE NEUTOROPHIL: 0.02 K/CU MM
TOTAL NUCLEATED RBC: 0 K/CU MM
TOTAL PROTEIN: 6.7 GM/DL (ref 6.4–8.2)
WBC # BLD: 7.2 K/CU MM (ref 4–10.5)

## 2020-07-04 PROCEDURE — 85025 COMPLETE CBC W/AUTO DIFF WBC: CPT

## 2020-07-04 PROCEDURE — 82150 ASSAY OF AMYLASE: CPT

## 2020-07-04 PROCEDURE — 6370000000 HC RX 637 (ALT 250 FOR IP): Performed by: INTERNAL MEDICINE

## 2020-07-04 PROCEDURE — 2580000003 HC RX 258: Performed by: INTERNAL MEDICINE

## 2020-07-04 PROCEDURE — 80053 COMPREHEN METABOLIC PANEL: CPT

## 2020-07-04 PROCEDURE — 36415 COLL VENOUS BLD VENIPUNCTURE: CPT

## 2020-07-04 PROCEDURE — 85610 PROTHROMBIN TIME: CPT

## 2020-07-04 PROCEDURE — 94761 N-INVAS EAR/PLS OXIMETRY MLT: CPT

## 2020-07-04 PROCEDURE — 85730 THROMBOPLASTIN TIME PARTIAL: CPT

## 2020-07-04 PROCEDURE — 82962 GLUCOSE BLOOD TEST: CPT

## 2020-07-04 PROCEDURE — 1200000000 HC SEMI PRIVATE

## 2020-07-04 PROCEDURE — 6360000002 HC RX W HCPCS: Performed by: INTERNAL MEDICINE

## 2020-07-04 PROCEDURE — 83690 ASSAY OF LIPASE: CPT

## 2020-07-04 RX ORDER — NICOTINE POLACRILEX 4 MG
15 LOZENGE BUCCAL PRN
Status: DISCONTINUED | OUTPATIENT
Start: 2020-07-04 | End: 2020-07-05 | Stop reason: HOSPADM

## 2020-07-04 RX ORDER — DEXTROSE MONOHYDRATE 25 G/50ML
12.5 INJECTION, SOLUTION INTRAVENOUS PRN
Status: DISCONTINUED | OUTPATIENT
Start: 2020-07-04 | End: 2020-07-05 | Stop reason: HOSPADM

## 2020-07-04 RX ORDER — INSULIN GLARGINE 100 [IU]/ML
7 INJECTION, SOLUTION SUBCUTANEOUS NIGHTLY
Status: DISCONTINUED | OUTPATIENT
Start: 2020-07-04 | End: 2020-07-05 | Stop reason: HOSPADM

## 2020-07-04 RX ORDER — DEXTROSE MONOHYDRATE 50 MG/ML
100 INJECTION, SOLUTION INTRAVENOUS PRN
Status: DISCONTINUED | OUTPATIENT
Start: 2020-07-04 | End: 2020-07-05 | Stop reason: HOSPADM

## 2020-07-04 RX ADMIN — DILTIAZEM HYDROCHLORIDE 60 MG: 60 TABLET, FILM COATED ORAL at 21:16

## 2020-07-04 RX ADMIN — HEPARIN SODIUM 5000 UNITS: 5000 INJECTION, SOLUTION INTRAVENOUS; SUBCUTANEOUS at 21:14

## 2020-07-04 RX ADMIN — CILOSTAZOL 100 MG: 100 TABLET ORAL at 08:49

## 2020-07-04 RX ADMIN — PANTOPRAZOLE SODIUM 40 MG: 40 TABLET, DELAYED RELEASE ORAL at 06:15

## 2020-07-04 RX ADMIN — TRAMADOL HYDROCHLORIDE 50 MG: 50 TABLET, FILM COATED ORAL at 08:49

## 2020-07-04 RX ADMIN — GABAPENTIN 100 MG: 100 CAPSULE ORAL at 08:49

## 2020-07-04 RX ADMIN — MIRTAZAPINE 15 MG: 15 TABLET, FILM COATED ORAL at 17:14

## 2020-07-04 RX ADMIN — SODIUM CHLORIDE, PRESERVATIVE FREE 10 ML: 5 INJECTION INTRAVENOUS at 21:14

## 2020-07-04 RX ADMIN — HEPARIN SODIUM 5000 UNITS: 5000 INJECTION, SOLUTION INTRAVENOUS; SUBCUTANEOUS at 06:15

## 2020-07-04 RX ADMIN — CARVEDILOL 6.25 MG: 6.25 TABLET, FILM COATED ORAL at 08:50

## 2020-07-04 RX ADMIN — CIPROFLOXACIN 400 MG: 2 INJECTION, SOLUTION INTRAVENOUS at 01:21

## 2020-07-04 RX ADMIN — HEPARIN SODIUM 5000 UNITS: 5000 INJECTION, SOLUTION INTRAVENOUS; SUBCUTANEOUS at 14:46

## 2020-07-04 RX ADMIN — TRAMADOL HYDROCHLORIDE 50 MG: 50 TABLET, FILM COATED ORAL at 21:14

## 2020-07-04 RX ADMIN — SODIUM CHLORIDE, PRESERVATIVE FREE 10 ML: 5 INJECTION INTRAVENOUS at 08:49

## 2020-07-04 RX ADMIN — CARVEDILOL 6.25 MG: 6.25 TABLET, FILM COATED ORAL at 17:14

## 2020-07-04 RX ADMIN — GABAPENTIN 100 MG: 100 CAPSULE ORAL at 14:46

## 2020-07-04 RX ADMIN — ATORVASTATIN CALCIUM 40 MG: 40 TABLET, FILM COATED ORAL at 08:49

## 2020-07-04 RX ADMIN — TRAMADOL HYDROCHLORIDE 50 MG: 50 TABLET, FILM COATED ORAL at 14:46

## 2020-07-04 RX ADMIN — CILOSTAZOL 100 MG: 100 TABLET ORAL at 21:31

## 2020-07-04 RX ADMIN — DILTIAZEM HYDROCHLORIDE 60 MG: 60 TABLET, FILM COATED ORAL at 08:49

## 2020-07-04 RX ADMIN — ASPIRIN 325 MG ORAL TABLET 325 MG: 325 PILL ORAL at 08:49

## 2020-07-04 RX ADMIN — INSULIN GLARGINE 7 UNITS: 100 INJECTION, SOLUTION SUBCUTANEOUS at 23:54

## 2020-07-04 RX ADMIN — QUETIAPINE FUMARATE 25 MG: 25 TABLET ORAL at 17:14

## 2020-07-04 RX ADMIN — MEMANTINE 5 MG: 5 TABLET ORAL at 08:49

## 2020-07-04 RX ADMIN — GABAPENTIN 100 MG: 100 CAPSULE ORAL at 21:16

## 2020-07-04 RX ADMIN — CIPROFLOXACIN 400 MG: 2 INJECTION, SOLUTION INTRAVENOUS at 14:46

## 2020-07-04 ASSESSMENT — PAIN SCALES - GENERAL
PAINLEVEL_OUTOF10: 2
PAINLEVEL_OUTOF10: 0

## 2020-07-04 NOTE — PLAN OF CARE
Plan of care discussed with [facility] and patient is agreeable. No needs at this time.

## 2020-07-04 NOTE — PROGRESS NOTES
Hospitalist Progress Note      Name:  Sami Santiago /Age/Sex: 1940  (79 y.o. male)   MRN & CSN:  [id number] & [id number] Admission Date/Time: 2020  8:18 PM   Location:  [address] [place] [address]- PCP: Nishi Seaman, [address] Day: 2    Assessment and Plan:   Sami Santiago is a 79 y.o.  male  who presents with:    Right upper quadrant pain  -I ordered an MRI today, the report did not mention any stones.  -No ERCP at this time per GI as the abdominal pain resolved. Diet advanced. Hypertension  -Cardizem and Coreg    Hyperlipidemia-atorvastatin    Diabetes mellitus type 2  -Lantus with correction scale    Hypokalemia  -Replete    History of Present Illness:     Patient was doing better this morning. The pain was much better. Objective: Intake/Output Summary (Last 24 hours) at 7/3/2020 2222  Last data filed at 7/3/2020 1540  Gross per 24 hour   Intake 1585.96 ml   Output 1200 ml   Net 385.96 ml      Vitals:   Vitals:    20   BP: (!) 180/93   Pulse: 82   Resp: 18   Temp: 98.7 °F (37.1 °C)   SpO2: 98%     Physical Exam:    Lungs-respiratory effort nonlabored the chest  Neurologic-speech clear  Skin-no cyanosis.     Medications:   Medications:    aspirin  325 mg Oral Daily    atorvastatin  40 mg Oral Daily    carvedilol  6.25 mg Oral BID WC    cilostazol  100 mg Oral BID    dilTIAZem  60 mg Oral BID    memantine  5 mg Oral Daily    mirtazapine  15 mg Oral Dinner    pantoprazole  40 mg Oral QAM AC    QUEtiapine  25 mg Oral QPM    sodium chloride flush  10 mL Intravenous 2 times per day    heparin (porcine)  5,000 Units Subcutaneous 3 times per day    ciprofloxacin  400 mg Intravenous Q12H    gabapentin  100 mg Oral TID    traMADol  50 mg Oral TID      Infusions:   PRN Meds: sodium chloride flush, 10 mL, PRN  acetaminophen, 650 mg, Q6H PRN    Or  acetaminophen, 650 mg, Q6H PRN  magnesium hydroxide, 30 mL, Daily PRN  ondansetron, 4 mg, Q6H PRN  cloNIDine, 0.1 mg, Q4H PRN          Electronically signed by Placido Xavier MD on 7/3/2020 at 10:22 PM

## 2020-07-04 NOTE — PROGRESS NOTES
DOING WELL NO ABD PAIN N/ VOMITING  VITALS STABLE  AFEBRILE  LABS NOTED LFTS NORMAL  WILL ADVANCE DIET  HOME IN AM  IF DOING WELL

## 2020-07-05 VITALS
DIASTOLIC BLOOD PRESSURE: 61 MMHG | OXYGEN SATURATION: 94 % | WEIGHT: 137.2 LBS | HEART RATE: 84 BPM | BODY MASS INDEX: 20.79 KG/M2 | RESPIRATION RATE: 16 BRPM | HEIGHT: 68 IN | TEMPERATURE: 97.7 F | SYSTOLIC BLOOD PRESSURE: 117 MMHG

## 2020-07-05 LAB
ALBUMIN SERPL-MCNC: 3.8 GM/DL (ref 3.4–5)
ALP BLD-CCNC: 74 IU/L (ref 40–128)
ALT SERPL-CCNC: 13 U/L (ref 10–40)
ANION GAP SERPL CALCULATED.3IONS-SCNC: 9 MMOL/L (ref 4–16)
AST SERPL-CCNC: 24 IU/L (ref 15–37)
BASOPHILS ABSOLUTE: 0.1 K/CU MM
BASOPHILS RELATIVE PERCENT: 0.6 % (ref 0–1)
BILIRUB SERPL-MCNC: 0.4 MG/DL (ref 0–1)
BUN BLDV-MCNC: 23 MG/DL (ref 6–23)
CALCIUM SERPL-MCNC: 9.3 MG/DL (ref 8.3–10.6)
CHLORIDE BLD-SCNC: 100 MMOL/L (ref 99–110)
CO2: 26 MMOL/L (ref 21–32)
CREAT SERPL-MCNC: 1.8 MG/DL (ref 0.9–1.3)
DIFFERENTIAL TYPE: ABNORMAL
EOSINOPHILS ABSOLUTE: 1 K/CU MM
EOSINOPHILS RELATIVE PERCENT: 12 % (ref 0–3)
GFR AFRICAN AMERICAN: 44 ML/MIN/1.73M2
GFR NON-AFRICAN AMERICAN: 36 ML/MIN/1.73M2
GLUCOSE BLD-MCNC: 157 MG/DL (ref 70–99)
GLUCOSE BLD-MCNC: 166 MG/DL (ref 70–99)
GLUCOSE BLD-MCNC: 167 MG/DL (ref 70–99)
GLUCOSE BLD-MCNC: 167 MG/DL (ref 70–99)
GLUCOSE BLD-MCNC: 189 MG/DL (ref 70–99)
GLUCOSE BLD-MCNC: 232 MG/DL (ref 70–99)
HCT VFR BLD CALC: 38 % (ref 42–52)
HEMOGLOBIN: 12.5 GM/DL (ref 13.5–18)
IMMATURE NEUTROPHIL %: 0.2 % (ref 0–0.43)
LYMPHOCYTES ABSOLUTE: 2.1 K/CU MM
LYMPHOCYTES RELATIVE PERCENT: 25.5 % (ref 24–44)
MCH RBC QN AUTO: 30 PG (ref 27–31)
MCHC RBC AUTO-ENTMCNC: 32.9 % (ref 32–36)
MCV RBC AUTO: 91.3 FL (ref 78–100)
MONOCYTES ABSOLUTE: 1 K/CU MM
MONOCYTES RELATIVE PERCENT: 12.6 % (ref 0–4)
NUCLEATED RBC %: 0 %
PDW BLD-RTO: 13 % (ref 11.7–14.9)
PLATELET # BLD: 200 K/CU MM (ref 140–440)
PMV BLD AUTO: 9.4 FL (ref 7.5–11.1)
POTASSIUM SERPL-SCNC: 3.5 MMOL/L (ref 3.5–5.1)
RBC # BLD: 4.16 M/CU MM (ref 4.6–6.2)
SEGMENTED NEUTROPHILS ABSOLUTE COUNT: 4 K/CU MM
SEGMENTED NEUTROPHILS RELATIVE PERCENT: 49.1 % (ref 36–66)
SODIUM BLD-SCNC: 135 MMOL/L (ref 135–145)
TOTAL IMMATURE NEUTOROPHIL: 0.02 K/CU MM
TOTAL NUCLEATED RBC: 0 K/CU MM
TOTAL PROTEIN: 6.2 GM/DL (ref 6.4–8.2)
WBC # BLD: 8.2 K/CU MM (ref 4–10.5)

## 2020-07-05 PROCEDURE — 6370000000 HC RX 637 (ALT 250 FOR IP): Performed by: INTERNAL MEDICINE

## 2020-07-05 PROCEDURE — 94761 N-INVAS EAR/PLS OXIMETRY MLT: CPT

## 2020-07-05 PROCEDURE — 6360000002 HC RX W HCPCS: Performed by: INTERNAL MEDICINE

## 2020-07-05 PROCEDURE — 36415 COLL VENOUS BLD VENIPUNCTURE: CPT

## 2020-07-05 PROCEDURE — 6370000000 HC RX 637 (ALT 250 FOR IP): Performed by: NURSE PRACTITIONER

## 2020-07-05 PROCEDURE — 85025 COMPLETE CBC W/AUTO DIFF WBC: CPT

## 2020-07-05 PROCEDURE — 80053 COMPREHEN METABOLIC PANEL: CPT

## 2020-07-05 PROCEDURE — 82962 GLUCOSE BLOOD TEST: CPT

## 2020-07-05 PROCEDURE — 2580000003 HC RX 258: Performed by: INTERNAL MEDICINE

## 2020-07-05 RX ORDER — CIPROFLOXACIN 2 MG/ML
400 INJECTION, SOLUTION INTRAVENOUS EVERY 24 HOURS
Status: DISCONTINUED | OUTPATIENT
Start: 2020-07-06 | End: 2020-07-05 | Stop reason: HOSPADM

## 2020-07-05 RX ORDER — FAMOTIDINE 20 MG/1
20 TABLET, FILM COATED ORAL DAILY
Qty: 60 TABLET | Refills: 3 | Status: ON HOLD
Start: 2020-07-05 | End: 2021-04-09 | Stop reason: HOSPADM

## 2020-07-05 RX ORDER — GABAPENTIN 100 MG/1
100 CAPSULE ORAL NIGHTLY
COMMUNITY

## 2020-07-05 RX ORDER — FAMOTIDINE 20 MG/1
20 TABLET, FILM COATED ORAL 2 TIMES DAILY
Status: ON HOLD | COMMUNITY
End: 2020-07-05 | Stop reason: SDUPTHER

## 2020-07-05 RX ORDER — TRAMADOL HYDROCHLORIDE 50 MG/1
50 TABLET ORAL EVERY 6 HOURS PRN
Status: ON HOLD | COMMUNITY
Start: 2017-05-27 | End: 2021-04-09 | Stop reason: HOSPADM

## 2020-07-05 RX ADMIN — HEPARIN SODIUM 5000 UNITS: 5000 INJECTION, SOLUTION INTRAVENOUS; SUBCUTANEOUS at 12:38

## 2020-07-05 RX ADMIN — GABAPENTIN 100 MG: 100 CAPSULE ORAL at 12:38

## 2020-07-05 RX ADMIN — SODIUM CHLORIDE, PRESERVATIVE FREE 10 ML: 5 INJECTION INTRAVENOUS at 08:19

## 2020-07-05 RX ADMIN — PANTOPRAZOLE SODIUM 40 MG: 40 TABLET, DELAYED RELEASE ORAL at 05:22

## 2020-07-05 RX ADMIN — INSULIN LISPRO 2 UNITS: 100 INJECTION, SOLUTION INTRAVENOUS; SUBCUTANEOUS at 08:20

## 2020-07-05 RX ADMIN — ATORVASTATIN CALCIUM 40 MG: 40 TABLET, FILM COATED ORAL at 08:18

## 2020-07-05 RX ADMIN — ASPIRIN 325 MG ORAL TABLET 325 MG: 325 PILL ORAL at 08:18

## 2020-07-05 RX ADMIN — CIPROFLOXACIN 400 MG: 2 INJECTION, SOLUTION INTRAVENOUS at 12:38

## 2020-07-05 RX ADMIN — CIPROFLOXACIN 400 MG: 2 INJECTION, SOLUTION INTRAVENOUS at 01:07

## 2020-07-05 RX ADMIN — CARVEDILOL 6.25 MG: 6.25 TABLET, FILM COATED ORAL at 17:52

## 2020-07-05 RX ADMIN — GABAPENTIN 100 MG: 100 CAPSULE ORAL at 08:18

## 2020-07-05 RX ADMIN — TRAMADOL HYDROCHLORIDE 50 MG: 50 TABLET, FILM COATED ORAL at 08:18

## 2020-07-05 RX ADMIN — MEMANTINE 5 MG: 5 TABLET ORAL at 08:18

## 2020-07-05 RX ADMIN — HEPARIN SODIUM 5000 UNITS: 5000 INJECTION, SOLUTION INTRAVENOUS; SUBCUTANEOUS at 05:22

## 2020-07-05 RX ADMIN — DILTIAZEM HYDROCHLORIDE 60 MG: 60 TABLET, FILM COATED ORAL at 08:18

## 2020-07-05 RX ADMIN — INSULIN LISPRO 2 UNITS: 100 INJECTION, SOLUTION INTRAVENOUS; SUBCUTANEOUS at 12:35

## 2020-07-05 RX ADMIN — CILOSTAZOL 100 MG: 100 TABLET ORAL at 08:18

## 2020-07-05 RX ADMIN — TRAMADOL HYDROCHLORIDE 50 MG: 50 TABLET, FILM COATED ORAL at 12:38

## 2020-07-05 RX ADMIN — CARVEDILOL 6.25 MG: 6.25 TABLET, FILM COATED ORAL at 08:18

## 2020-07-05 ASSESSMENT — PAIN SCALES - GENERAL
PAINLEVEL_OUTOF10: 0
PAINLEVEL_OUTOF10: 0

## 2020-07-05 NOTE — PROGRESS NOTES
DOING WELL NO ABD COMPLAINTS  VITALS STABLE   LABS NOTED LFTS NORMAL  WILL CPM NO NEED FOR REPEAT ERCP POSSIBLE  D/C TODAY D/W  DR Familia Barker

## 2020-07-05 NOTE — PLAN OF CARE
Problem: Pain:  Goal: Pain level will decrease  Description: Pain level will decrease  Outcome: Ongoing  Goal: Control of acute pain  Description: Control of acute pain  Outcome: Ongoing  Goal: Control of chronic pain  Description: Control of chronic pain  Outcome: Ongoing     Problem: Pain:  Goal: Pain level will decrease  Description: Pain level will decrease  Outcome: Ongoing  Goal: Control of acute pain  Description: Control of acute pain  Outcome: Ongoing  Goal: Control of chronic pain  Description: Control of chronic pain  Outcome: Ongoing     Problem: Safety:  Goal: Free from accidental physical injury  Description: Free from accidental physical injury  Outcome: Ongoing  Goal: Free from intentional harm  Description: Free from intentional harm  Outcome: Ongoing     Problem: Daily Care:  Goal: Daily care needs are met  Description: Daily care needs are met  Outcome: Ongoing     Problem: Skin Integrity:  Goal: Skin integrity will stabilize  Description: Skin integrity will stabilize  Outcome: Ongoing     Problem: Discharge Planning:  Goal: Patients continuum of care needs are met  Description: Patients continuum of care needs are met  Outcome: Ongoing

## 2020-07-09 NOTE — DISCHARGE SUMMARY
Discharge Summary    Name:  Woody Soriano /Age/Sex: 1940  (79 y.o. male)   MRN & CSN:  [id number] & [id number] Admission Date/Time: 2020  8:18 PM   Attending:  No att. providers found Discharging Physician: Km Richard MD     HPI:     Per H&P:  Woody Soriano is a 79 y.o. male admitted for epigastric and periumbilical pain, associated with nausea and NBNB vomiting, but symptoms started around lunchtime today. Patient denies any diarrhea, has no other complaints. Patient denies fevers, chills, confusion, chest pain, cough, or sick contacts. Has no confusion now, last admission was thought to be d/t  Exelon patch. Hospital Course:     Priscila Rene is a pleasant 79-year-old who came into the ED for right upper quadrant pain. He was admitted. He has a history of common bile duct stones and has had ERCPs multiple times. During this admission he had an MRCP which did not show any CBD stones. Amylase and lipase were normal.  He did not have evidence of pancreatitis. His diet was advanced, and he tolerated it well. It is possible that he may have passed a stone or some biliary sludge. He is now back to baseline, and was discharged home in stable condition. Problem list    Right upper quadrant pain. History of multiple ERCPs. He may have possibly passed a stone or sludge. He is now back to baseline and was discharged home in stable condition    Hypertension  Diabetes mellitus type 2  Hypokalemia    The patient expressed appropriate understanding of and agreement with the discharge recommendations, medications, and plan.      Consults this admission:  IP CONSULT TO GI  IP CONSULT TO HOSPITALIST  IP CONSULT TO [address]    Discharge Instruction:   Follow up appointments: GI  Primary care physician:  within 2 weeks    Diet:  diabetic diet   Activity: activity as tolerated  Disposition: Discharged to:   [x]Home, []HHC, []SNF, []Acute Rehab, []Hospice   Condition on discharge: Stable    Discharge Medications:      Jose F Moon   Home Medication Instructions ABT:[id number]    Printed on:07/09/20 0139   Medication Information                      aspirin 81 MG chewable tablet  Take 81 mg by mouth daily              atorvastatin (LIPITOR) 40 MG tablet  Take 40 mg by mouth daily             carvedilol (COREG) 6.25 MG tablet  Take 6.25 mg by mouth 2 times daily (with meals). Cholecalciferol 50 MCG (2000 UT) TABS  Take one tablet daily by mouth             cilostazol (PLETAL) 100 MG tablet  Take 100 mg by mouth 2 times daily. diltiazem (CARDIZEM) 60 MG tablet  Take 1 tablet by mouth 2 times daily             docusate sodium (COLACE) 100 MG capsule  Take 100 mg by mouth nightly as needed              famotidine (PEPCID) 20 MG tablet  Take 1 tablet by mouth daily             gabapentin (NEURONTIN) 100 MG capsule  Take 100 mg by mouth nightly. insulin NPH (HUMULIN N;NOVOLIN N) 100 UNIT/ML injection vial  Inject into the skin 2 times daily (before meals) Take 7  units in am if blood sugar less than 120, and 10 units in am if blood sugar over 120  Take 10 units in pm if blood sugar is less than 300,  and take 15 units in pm if blood sugar over 300             latanoprost (XALATAN) 0.005 % ophthalmic solution  Place 1 drop into both eyes nightly              Multiple Vitamins-Minerals (MULTIVITAMIN PO)  Take 1 tablet by mouth daily. traMADol (ULTRAM) 50 MG tablet  Take 50 mg by mouth every 6 hours as needed. Objective Findings at Discharge:   /61   Pulse 84   Temp 97.7 °F (36.5 °C) (Oral)   Resp 16   Ht 5' 8\" (1.727 m)   Wt 137 lb 3.2 oz (62.2 kg)   SpO2 94%   BMI 20.86 kg/m²            PHYSICAL EXAM   GEN Awake male, sitting upright in bed in no apparent distress. Appears given age.     LABS:    CBC:   Lab Results   Component Value Date    WBC 8.2 07/05/2020    HGB 12.5 07/05/2020    HCT 38.0 07/05/2020    MCV 91.3 07/05/2020     07/05/2020     BMP:   Lab Results   Component Value Date     07/05/2020    K 3.5 07/05/2020     07/05/2020    CO2 26 07/05/2020    PHOS 3.5 10/25/2015    BUN 23 07/05/2020    CREATININE 1.8 07/05/2020    CALCIUM 9.3 07/05/2020     IMAGING:     CT ABDOMEN PELVIS W IV CONTRAST Additional Contrast? None [[id number]] Collected: 07/02/20 2243     Order Status: Completed Updated: 07/03/20 1158     Narrative:       EXAMINATION:  CT OF THE ABDOMEN AND PELVIS WITH CONTRAST 7/2/2020 10:01 pm    TECHNIQUE:  CT of the abdomen and pelvis was performed with the administration of  intravenous contrast. Multiplanar reformatted images are provided for review. Dose modulation, iterative reconstruction, and/or weight based adjustment of  the mA/kV was utilized to reduce the radiation dose to as low as reasonably  achievable. COMPARISON:  CT abdomen/pelvis 03/29/2020. HISTORY:  ORDERING SYSTEM PROVIDED HISTORY: abdominal pain, h/o recurrent common bile  duct stones  TECHNOLOGIST PROVIDED HISTORY:  Reason for exam:->abdominal pain, h/o recurrent common bile duct stones  Additional Contrast?->None  Reason for Exam: abdominal pain, h/o recurrent common bile duct stones  Acuity: Acute  Type of Exam: Initial    FINDINGS:  Thorax base:  Normal heart size with no pericardial effusion. Coronary  artery calcifications. Moderate sliding-type hiatal hernia. The lung bases  demonstrates some mild basilar bronchiolectasis and chronic patchy opacities. No acute consolidation or effusion. Abdomen:  Extensive abdominal aortic and large arterial vascular  calcifications with no aneurysm formation. Symmetric bilateral global renal  atrophy with left renal cyst.  No hydronephrosis or nephrolithiasis. The  bilateral adrenal glands and spleen are normal.  Redemonstration of extensive  pneumobilia and intrahepatic biliary dilatation. Prior cholecystectomy.   There is no focal intrahepatic mass. Stable 1.3 cm distended common bile  duct with extensive pneumobilia. There is fluid distended low insertion of a  cystic duct remnant. No associated calculi identified. There is stable  pancreatic ductal dilatation at the level of the neck and head with no  associated calculi. No mass. The pancreatic parenchyma is otherwise normal.    The stomach and duodenum appear unremarkable. In the distal jejunum and  ileum there is some mild fluid opacification of small bowel loops without  distention or acute inflammation. Fluid opacification of the ascending colon  without distention or acute inflammation. Colonic diverticulosis. No ascites or free air. No abscess. No focal mesenteric or retroperitoneal  inflammatory changes. Pelvis:  Streak artifact related to a right hip arthroplasty limits the  overall exam.  The bladder and rectum are normal.  No ascites. Musculoskeletal structures:  No diffuse body wall edema. Small fat  containing midline supraumbilical ventral hernia. No significant enlarged  inguinal lymph nodes. Normal lumbar spine alignment with lower lumbar  degenerative changes. Prior right hip arthroplasty with anatomic alignment. No acute osseous abnormality. Impression:       1. Stable severe intra and extrahepatic pneumobilia and moderate biliary  dilatation with prior cholecystectomy. There is no acute inflammation or  evidence of retained calculi. 2. Stable mild pancreatic head and neck ductal dilatation. No evidence of a  primary malignancy or acute pancreatitis. 3. No acute abdominal/pelvic process.      MRI ABDOMEN WO CONTRAST MRCP [[id number]] Collected: 07/03/20 1134     Order Status: Completed Updated: 07/03/20 1142     Narrative:       EXAMINATION:  MRI OF THE ABDOMEN WITHOUT CONTRAST AND MRCP 7/3/2020 10:48 am    TECHNIQUE:  Multiplanar multisequence MRI of the abdomen was performed without the  administration of intravenous contrast.  After initial T2 axial and coronal  images, thick slab, thin slab and 3D coronal MRCP sequences were obtained  without the administration of intravenous contrast.  MIP images are provided  for review. COMPARISON:  07/02/2020 CT and 03/30/2020 MRI    HISTORY:  ORDERING SYSTEM PROVIDED HISTORY: Abd pain, hx CBD stone, rule out recurrence  TECHNOLOGIST PROVIDED HISTORY:  Reason for exam:->Abd pain, hx CBD stone, rule out recurrence  Reason for Exam: cbd stone    FINDINGS:  MRCP images are limited by patient motion. Gallbladder: Prior history of cholecystectomy. Bile Ducts: Pneumobilia has been characterized on recent CT. Poor  visualization of the intrahepatic and extrahepatic biliary ducts as result of  motion and pneumobilia. No filling defects can be appreciated. Pancreatic Duct: Slight prominence of the pancreatic duct centrally measuring  up to 4 mm. No filling defects. No inflammatory changes are seen within the  pancreas or surrounding the pancreatic parenchyma. Other:  Sliding-type hiatal hernia noted. No other significant finding. Impression:       1. Technically limited MRCP due to motion and the presence of pneumobilia. 2. Chronic pattern of intrahepatic and extrahepatic biliary dilatation. Caliber is relatively stable from prior MRCP. No filling defect appreciated  although sensitivity is limited in the presence of pneumobilia. 3. Stable mild dilation of the pancreatic duct.      MRI ABDOMEN W WO CONTRAST MRCP [[id number]]      Order Status: Canceled          Discharge Time of 45 minutes    Electronically signed by Ignacio Hayden MD on 7/9/2020 at 1:39 AM

## 2020-11-03 PROBLEM — R07.9 CHEST PAIN: Status: RESOLVED | Noted: 2020-11-03 | Resolved: 2020-11-03

## 2020-11-30 ENCOUNTER — APPOINTMENT (OUTPATIENT)
Dept: CT IMAGING | Age: 80
DRG: 444 | End: 2020-11-30
Payer: MEDICARE

## 2020-11-30 ENCOUNTER — APPOINTMENT (OUTPATIENT)
Dept: GENERAL RADIOLOGY | Age: 80
DRG: 444 | End: 2020-11-30
Payer: MEDICARE

## 2020-11-30 ENCOUNTER — HOSPITAL ENCOUNTER (INPATIENT)
Age: 80
LOS: 6 days | Discharge: HOME | DRG: 444 | End: 2020-12-07
Attending: INTERNAL MEDICINE | Admitting: INTERNAL MEDICINE
Payer: MEDICARE

## 2020-11-30 LAB
ALBUMIN SERPL-MCNC: 3.8 GM/DL (ref 3.4–5)
ALP BLD-CCNC: 73 IU/L (ref 40–129)
ALT SERPL-CCNC: 13 U/L (ref 10–40)
ANION GAP SERPL CALCULATED.3IONS-SCNC: 15 MMOL/L (ref 4–16)
AST SERPL-CCNC: 23 IU/L (ref 15–37)
BASOPHILS ABSOLUTE: 0.1 K/CU MM
BASOPHILS RELATIVE PERCENT: 1.2 % (ref 0–1)
BILIRUB SERPL-MCNC: 1 MG/DL (ref 0–1)
BUN BLDV-MCNC: 18 MG/DL (ref 6–23)
CALCIUM SERPL-MCNC: 10 MG/DL (ref 8.3–10.6)
CHLORIDE BLD-SCNC: 93 MMOL/L (ref 99–110)
CO2: 21 MMOL/L (ref 21–32)
CREAT SERPL-MCNC: 1.3 MG/DL (ref 0.9–1.3)
DIFFERENTIAL TYPE: ABNORMAL
EOSINOPHILS ABSOLUTE: 0.4 K/CU MM
EOSINOPHILS RELATIVE PERCENT: 6 % (ref 0–3)
GFR AFRICAN AMERICAN: >60 ML/MIN/1.73M2
GFR NON-AFRICAN AMERICAN: 53 ML/MIN/1.73M2
GLUCOSE BLD-MCNC: 153 MG/DL (ref 70–99)
HCT VFR BLD CALC: 38.6 % (ref 42–52)
HEMOGLOBIN: 13.3 GM/DL (ref 13.5–18)
IMMATURE NEUTROPHIL %: 0.7 % (ref 0–0.43)
LIPASE: 21 IU/L (ref 13–60)
LYMPHOCYTES ABSOLUTE: 1.1 K/CU MM
LYMPHOCYTES RELATIVE PERCENT: 18.6 % (ref 24–44)
MCH RBC QN AUTO: 30.5 PG (ref 27–31)
MCHC RBC AUTO-ENTMCNC: 34.5 % (ref 32–36)
MCV RBC AUTO: 88.5 FL (ref 78–100)
MONOCYTES ABSOLUTE: 0.8 K/CU MM
MONOCYTES RELATIVE PERCENT: 12.9 % (ref 0–4)
NUCLEATED RBC %: 0 %
PDW BLD-RTO: 12.6 % (ref 11.7–14.9)
PLATELET # BLD: 220 K/CU MM (ref 140–440)
PMV BLD AUTO: 9 FL (ref 7.5–11.1)
POTASSIUM SERPL-SCNC: 4 MMOL/L (ref 3.5–5.1)
RBC # BLD: 4.36 M/CU MM (ref 4.6–6.2)
SEGMENTED NEUTROPHILS ABSOLUTE COUNT: 3.6 K/CU MM
SEGMENTED NEUTROPHILS RELATIVE PERCENT: 60.6 % (ref 36–66)
SODIUM BLD-SCNC: 129 MMOL/L (ref 135–145)
TOTAL IMMATURE NEUTOROPHIL: 0.04 K/CU MM
TOTAL NUCLEATED RBC: 0 K/CU MM
TOTAL PROTEIN: 6.8 GM/DL (ref 6.4–8.2)
WBC # BLD: 5.9 K/CU MM (ref 4–10.5)

## 2020-11-30 PROCEDURE — 85025 COMPLETE CBC W/AUTO DIFF WBC: CPT

## 2020-11-30 PROCEDURE — 83690 ASSAY OF LIPASE: CPT

## 2020-11-30 PROCEDURE — 74177 CT ABD & PELVIS W/CONTRAST: CPT

## 2020-11-30 PROCEDURE — 93005 ELECTROCARDIOGRAM TRACING: CPT | Performed by: PHYSICIAN ASSISTANT

## 2020-11-30 PROCEDURE — 84702 CHORIONIC GONADOTROPIN TEST: CPT

## 2020-11-30 PROCEDURE — 71045 X-RAY EXAM CHEST 1 VIEW: CPT

## 2020-11-30 PROCEDURE — 99285 EMERGENCY DEPT VISIT HI MDM: CPT

## 2020-11-30 PROCEDURE — 6360000004 HC RX CONTRAST MEDICATION: Performed by: PHYSICIAN ASSISTANT

## 2020-11-30 PROCEDURE — 80053 COMPREHEN METABOLIC PANEL: CPT

## 2020-11-30 RX ORDER — 0.9 % SODIUM CHLORIDE 0.9 %
500 INTRAVENOUS SOLUTION INTRAVENOUS ONCE
Status: COMPLETED | OUTPATIENT
Start: 2020-11-30 | End: 2020-12-01

## 2020-11-30 RX ORDER — ONDANSETRON 2 MG/ML
4 INJECTION INTRAMUSCULAR; INTRAVENOUS EVERY 30 MIN PRN
Status: DISCONTINUED | OUTPATIENT
Start: 2020-11-30 | End: 2020-12-01

## 2020-11-30 RX ADMIN — IOPAMIDOL 75 ML: 755 INJECTION, SOLUTION INTRAVENOUS at 21:33

## 2020-12-01 ENCOUNTER — APPOINTMENT (OUTPATIENT)
Dept: ULTRASOUND IMAGING | Age: 80
DRG: 444 | End: 2020-12-01
Payer: MEDICARE

## 2020-12-01 ENCOUNTER — APPOINTMENT (OUTPATIENT)
Dept: CT IMAGING | Age: 80
DRG: 444 | End: 2020-12-01
Payer: MEDICARE

## 2020-12-01 ENCOUNTER — APPOINTMENT (OUTPATIENT)
Dept: MRI IMAGING | Age: 80
DRG: 444 | End: 2020-12-01
Payer: MEDICARE

## 2020-12-01 LAB
AMMONIA: 38 UMOL/L (ref 16–60)
BACTERIA: NEGATIVE /HPF
BILIRUBIN URINE: NEGATIVE MG/DL
BLOOD, URINE: NEGATIVE
CLARITY: CLEAR
COLOR: YELLOW
ESTIMATED AVERAGE GLUCOSE: 180 MG/DL
GLUCOSE BLD-MCNC: 129 MG/DL (ref 70–99)
GLUCOSE BLD-MCNC: 158 MG/DL (ref 70–99)
GLUCOSE, URINE: NEGATIVE MG/DL
HBA1C MFR BLD: 7.9 % (ref 4.2–6.3)
KETONES, URINE: ABNORMAL MG/DL
LEUKOCYTE ESTERASE, URINE: NEGATIVE
NITRITE URINE, QUANTITATIVE: NEGATIVE
PH, URINE: 8 (ref 5–8)
PROTEIN UA: NEGATIVE MG/DL
RBC URINE: ABNORMAL /HPF (ref 0–3)
SARS-COV-2, NAAT: DETECTED
SPECIFIC GRAVITY UA: 1.06 (ref 1–1.03)
SQUAMOUS EPITHELIAL: <1 /HPF
TRICHOMONAS: ABNORMAL /HPF
TROPONIN T: <0.01 NG/ML
UROBILINOGEN, URINE: NORMAL MG/DL (ref 0.2–1)
WBC UA: <1 /HPF (ref 0–2)

## 2020-12-01 PROCEDURE — 6360000002 HC RX W HCPCS: Performed by: SPECIALIST

## 2020-12-01 PROCEDURE — 2580000003 HC RX 258: Performed by: INTERNAL MEDICINE

## 2020-12-01 PROCEDURE — 81001 URINALYSIS AUTO W/SCOPE: CPT

## 2020-12-01 PROCEDURE — 6370000000 HC RX 637 (ALT 250 FOR IP): Performed by: INTERNAL MEDICINE

## 2020-12-01 PROCEDURE — 94761 N-INVAS EAR/PLS OXIMETRY MLT: CPT

## 2020-12-01 PROCEDURE — 1200000000 HC SEMI PRIVATE

## 2020-12-01 PROCEDURE — A9577 INJ MULTIHANCE: HCPCS | Performed by: PSYCHIATRY & NEUROLOGY

## 2020-12-01 PROCEDURE — 70450 CT HEAD/BRAIN W/O DYE: CPT

## 2020-12-01 PROCEDURE — 2580000003 HC RX 258: Performed by: PHYSICIAN ASSISTANT

## 2020-12-01 PROCEDURE — 84484 ASSAY OF TROPONIN QUANT: CPT

## 2020-12-01 PROCEDURE — 82962 GLUCOSE BLOOD TEST: CPT

## 2020-12-01 PROCEDURE — 70553 MRI BRAIN STEM W/O & W/DYE: CPT

## 2020-12-01 PROCEDURE — 80307 DRUG TEST PRSMV CHEM ANLYZR: CPT

## 2020-12-01 PROCEDURE — 6360000004 HC RX CONTRAST MEDICATION: Performed by: PSYCHIATRY & NEUROLOGY

## 2020-12-01 PROCEDURE — 6360000002 HC RX W HCPCS: Performed by: INTERNAL MEDICINE

## 2020-12-01 PROCEDURE — 83036 HEMOGLOBIN GLYCOSYLATED A1C: CPT

## 2020-12-01 PROCEDURE — 36415 COLL VENOUS BLD VENIPUNCTURE: CPT

## 2020-12-01 PROCEDURE — U0002 COVID-19 LAB TEST NON-CDC: HCPCS

## 2020-12-01 PROCEDURE — 82140 ASSAY OF AMMONIA: CPT

## 2020-12-01 RX ORDER — ACETAMINOPHEN 650 MG/1
650 SUPPOSITORY RECTAL EVERY 6 HOURS PRN
Status: DISCONTINUED | OUTPATIENT
Start: 2020-12-01 | End: 2020-12-07 | Stop reason: HOSPADM

## 2020-12-01 RX ORDER — PROMETHAZINE HYDROCHLORIDE 12.5 MG/1
12.5 TABLET ORAL EVERY 6 HOURS PRN
Status: DISCONTINUED | OUTPATIENT
Start: 2020-12-01 | End: 2020-12-01

## 2020-12-01 RX ORDER — ACETAMINOPHEN 325 MG/1
650 TABLET ORAL EVERY 6 HOURS PRN
Status: DISCONTINUED | OUTPATIENT
Start: 2020-12-01 | End: 2020-12-07 | Stop reason: HOSPADM

## 2020-12-01 RX ORDER — DEXTROSE MONOHYDRATE 50 MG/ML
100 INJECTION, SOLUTION INTRAVENOUS PRN
Status: DISCONTINUED | OUTPATIENT
Start: 2020-12-01 | End: 2020-12-07 | Stop reason: HOSPADM

## 2020-12-01 RX ORDER — SODIUM CHLORIDE 0.9 % (FLUSH) 0.9 %
10 SYRINGE (ML) INJECTION EVERY 12 HOURS SCHEDULED
Status: DISCONTINUED | OUTPATIENT
Start: 2020-12-01 | End: 2020-12-07 | Stop reason: HOSPADM

## 2020-12-01 RX ORDER — ONDANSETRON 2 MG/ML
4 INJECTION INTRAMUSCULAR; INTRAVENOUS EVERY 6 HOURS PRN
Status: DISCONTINUED | OUTPATIENT
Start: 2020-12-01 | End: 2020-12-07 | Stop reason: HOSPADM

## 2020-12-01 RX ORDER — SODIUM CHLORIDE 0.9 % (FLUSH) 0.9 %
10 SYRINGE (ML) INJECTION PRN
Status: DISCONTINUED | OUTPATIENT
Start: 2020-12-01 | End: 2020-12-07 | Stop reason: HOSPADM

## 2020-12-01 RX ORDER — CILOSTAZOL 50 MG/1
100 TABLET ORAL 2 TIMES DAILY
Status: DISCONTINUED | OUTPATIENT
Start: 2020-12-01 | End: 2020-12-01

## 2020-12-01 RX ORDER — ATORVASTATIN CALCIUM 40 MG/1
40 TABLET, FILM COATED ORAL NIGHTLY
Status: DISCONTINUED | OUTPATIENT
Start: 2020-12-01 | End: 2020-12-07 | Stop reason: HOSPADM

## 2020-12-01 RX ORDER — LATANOPROST 50 UG/ML
1 SOLUTION/ DROPS OPHTHALMIC NIGHTLY
Status: DISCONTINUED | OUTPATIENT
Start: 2020-12-01 | End: 2020-12-07 | Stop reason: HOSPADM

## 2020-12-01 RX ORDER — GABAPENTIN 100 MG/1
100 CAPSULE ORAL NIGHTLY
Status: DISCONTINUED | OUTPATIENT
Start: 2020-12-01 | End: 2020-12-07 | Stop reason: HOSPADM

## 2020-12-01 RX ORDER — ASPIRIN 81 MG/1
81 TABLET, CHEWABLE ORAL DAILY
Status: DISCONTINUED | OUTPATIENT
Start: 2020-12-01 | End: 2020-12-07 | Stop reason: HOSPADM

## 2020-12-01 RX ORDER — CARVEDILOL 6.25 MG/1
6.25 TABLET ORAL 2 TIMES DAILY WITH MEALS
Status: DISCONTINUED | OUTPATIENT
Start: 2020-12-01 | End: 2020-12-07 | Stop reason: HOSPADM

## 2020-12-01 RX ORDER — DEXTROSE MONOHYDRATE 25 G/50ML
12.5 INJECTION, SOLUTION INTRAVENOUS PRN
Status: DISCONTINUED | OUTPATIENT
Start: 2020-12-01 | End: 2020-12-07 | Stop reason: HOSPADM

## 2020-12-01 RX ORDER — SODIUM CHLORIDE 9 MG/ML
INJECTION, SOLUTION INTRAVENOUS CONTINUOUS
Status: DISCONTINUED | OUTPATIENT
Start: 2020-12-01 | End: 2020-12-07 | Stop reason: HOSPADM

## 2020-12-01 RX ORDER — DILTIAZEM HYDROCHLORIDE 60 MG/1
60 TABLET, FILM COATED ORAL 2 TIMES DAILY
Status: DISCONTINUED | OUTPATIENT
Start: 2020-12-01 | End: 2020-12-07 | Stop reason: HOSPADM

## 2020-12-01 RX ORDER — CIPROFLOXACIN 2 MG/ML
400 INJECTION, SOLUTION INTRAVENOUS EVERY 12 HOURS
Status: DISCONTINUED | OUTPATIENT
Start: 2020-12-01 | End: 2020-12-01

## 2020-12-01 RX ORDER — CILOSTAZOL 50 MG/1
100 TABLET ORAL 2 TIMES DAILY
Status: DISCONTINUED | OUTPATIENT
Start: 2020-12-03 | End: 2020-12-07 | Stop reason: HOSPADM

## 2020-12-01 RX ORDER — NICOTINE POLACRILEX 4 MG
15 LOZENGE BUCCAL PRN
Status: DISCONTINUED | OUTPATIENT
Start: 2020-12-01 | End: 2020-12-07 | Stop reason: HOSPADM

## 2020-12-01 RX ADMIN — DILTIAZEM HYDROCHLORIDE 60 MG: 60 TABLET, FILM COATED ORAL at 20:12

## 2020-12-01 RX ADMIN — CIPROFLOXACIN 400 MG: 2 INJECTION, SOLUTION INTRAVENOUS at 12:11

## 2020-12-01 RX ADMIN — SODIUM CHLORIDE: 9 INJECTION, SOLUTION INTRAVENOUS at 10:01

## 2020-12-01 RX ADMIN — ASPIRIN 81 MG CHEWABLE TABLET 81 MG: 81 TABLET CHEWABLE at 09:07

## 2020-12-01 RX ADMIN — SODIUM CHLORIDE 500 ML: 9 INJECTION, SOLUTION INTRAVENOUS at 00:38

## 2020-12-01 RX ADMIN — CILOSTAZOL 100 MG: 50 TABLET ORAL at 09:07

## 2020-12-01 RX ADMIN — DILTIAZEM HYDROCHLORIDE 60 MG: 60 TABLET, FILM COATED ORAL at 09:07

## 2020-12-01 RX ADMIN — CARVEDILOL 6.25 MG: 6.25 TABLET, FILM COATED ORAL at 09:10

## 2020-12-01 RX ADMIN — GADOBENATE DIMEGLUMINE 11 ML: 529 INJECTION, SOLUTION INTRAVENOUS at 16:28

## 2020-12-01 RX ADMIN — GABAPENTIN 100 MG: 100 CAPSULE ORAL at 20:12

## 2020-12-01 RX ADMIN — SODIUM CHLORIDE, PRESERVATIVE FREE 10 ML: 5 INJECTION INTRAVENOUS at 09:10

## 2020-12-01 RX ADMIN — SODIUM CHLORIDE, PRESERVATIVE FREE 10 ML: 5 INJECTION INTRAVENOUS at 20:16

## 2020-12-01 RX ADMIN — ATORVASTATIN CALCIUM 40 MG: 40 TABLET, FILM COATED ORAL at 20:12

## 2020-12-01 RX ADMIN — ENOXAPARIN SODIUM 40 MG: 40 INJECTION SUBCUTANEOUS at 09:07

## 2020-12-01 NOTE — CONSULTS
[address], [address] [place] [address]                                  CONSULTATION    PATIENT NAME: Stevo Pederson                  :        1940  MED REC NO:   [id number]                          ROOM:       4110  ACCOUNT NO:   [de-identified]                           ADMIT DATE: 2020  PROVIDER:     Joseph Boles MD    CONSULT DATE:  2020    PRIMARY CARE PROVIDER:  Russel Naik MD    CHIEF COMPLAINT:  Abdominal pain, rule out recurrent common bile duct  stones. HISTORY OF PRESENT ILLNESS:  The patient is an 79-year-old white  gentleman, patient known to me from his multiple previous  hospitalizations with past medical history significant for hypertension,  diabetes mellitus, hyperlipidemia, coronary artery disease, status post  CABG, kidney stones, osteoarthritis, history of recurrent common bile  duct stones, status post ERCPs x7 at least, who presented to the  emergency room last night with acute onset of upper abdominal pain along  with nauseated feeling. There is no history of vomiting, fever, chills,  hematemesis, melena, or hematochezia. The patient had a blood workup  done in the emergency room, which comprised of chem profile, LFTs and  CBC, which was unremarkable. CAT scan of the abdomen and pelvis was  negative for acute findings also. The patient was admitted for further  workup of his abdominal pain and possible repeat ERCP to clear the  common bile duct off stones/sludge. The patient is hemodynamically  stable. The patient, as above mentioned, has a history of recurrent  common bile duct stones for which he has undergone multiple ERCPs and  initially the ERCPs were being performed by Dr. Martin Gomez and the last 2  ERCPs were done by me on the 2019, then on 2020, and the  papilla was dilated and the multiple common bile duct stones along with  sludge were removed.   The patient's code status is DNR-CCA. REVIEW OF SYSTEMS:  CENTRAL NERVOUS SYSTEM:  The patient denies headache or focal  sensorimotor symptoms. CARDIOVASCULAR SYSTEM:  No history of chest pain, shortness of breath or  leg swelling. GENITOURINARY SYSTEM:  No history of dysuria, pyuria, or hematuria. MUSCULOSKELETAL SYSTEM:  The patient complains of generalized weakness. RESPIRATORY SYSTEM:  No history of cough, hemoptysis, fever, or chills. PAST MEDICAL HISTORY:  Significant for history of hypertension, diabetes  mellitus, hyperlipidemia, coronary artery disease, status post CABG,  kidney stones, osteoarthritis, history of recurrent common bile duct  stones, status post ERCPs x7. FAMILY HISTORY:  The patient's mother and father both were diagnosed  with carcinoma of unknown primary. MEDICATIONS:  Please refer to chart. SOCIOECONOMIC HISTORY:  No history of EtOH abuse. The patient does not  smoke cigarettes. PAST SURGICAL HISTORY:  The patient has had CABG done, bilateral  below-knee amputations, cholecystectomy, and also had esophageal  dilatation done, and ERCPs at least x7 for recurrent common bile duct  stones/sludge. ALLERGIES:  The patient has multiple allergies, please refer to the  chart. PHYSICAL EXAMINATION:  GENERAL:  Shows an 74-year-old white gentleman of thin build and fair  nutritional status for his age, who is lying flat in bed, in no acute  distress. He is awake, alert, and oriented and pleasant to talk with. VITAL SIGNS:  Stable. Please refer to the chart. HEENT:  Shows skull to be atraumatic. Sclerae are anicteric. NECK:  Supple. CHEST:  Clear. HEART:  S1 and S2 are normal.  ABDOMEN:  Soft, nondistended and nontender. Liver and spleen are not  palpable. Bowel sounds are present. RECTAL:  Deferred. CNS:  Shows the patient to be awake, alert and oriented x1. There are  no focal sensorimotor signs. MUSCULOSKELETAL:  Shows evidence of degenerative joint disease changes.     LABORATORY DATA:  As above mentioned. IMPRESSION:  An 76-year-old white gentleman with multiple comorbidities,  presents with upper to mid abdominal pain with nausea, with normal LFTs  and CAT scan; however, the patient does have history of recurrent common  bile duct stones with normal imaging in the past as well, rule out  recurrent common bile duct stones/sludge. RECOMMENDATIONS:  1. Agree with present management with IV fluids. 2.  Parenteral analgesics and antiemetics as needed. 3.  We will check the patient's CBC, chem profile, amylase, lipase, PT,  PTT, INR in a.m.  4.  We will proceed with repeat ERCP in a.m. to clear the common bile  duct of any small stones/sludge. 5.  We will also empirically start the patient on IV antibiotics from  today. 6.  The case and plan have been discussed with the patient.         Angelica Evans MD    D: 12/01/2020 10:35:35       T: 12/01/2020 13:00:57     [id number]  Job#: [id number]     Doc#: [id number]    CC:  Ankur Palacios MD

## 2020-12-01 NOTE — ED PROVIDER NOTES
The Ekg interpreted by me shows  normal sinus rhythm with a rate of 95  Axis is   Left axis deviation  QTc is  normal  Intervals and Durations are unremarkable.       ST Segments: no acute change  No significant change from prior EKG dated 3-           Trevor Casiano MD  11/30/20 2026

## 2020-12-01 NOTE — PROGRESS NOTES
PTS  COVID-19 TEST CAME BACK POSITIVE  SINCE ERCP IS NEITHER EMERGENT NOR URGENT WITH NO EVIDENCE OF ASCENDING CHOLANGITIS  WILL POSTPONE ERCP   TILL COVID-19  INFECTION RESOLVES

## 2020-12-01 NOTE — CONSULTS
3/31/2020    ERCP DILATION BALLOON to 12  AND SWEEP OF CBD STONE AND SLUDGE performed by Joseph Boles MD at [address] REPLACEMENT      LEG AMPUTATION BELOW KNEE Bilateral     2005 at [address] HISTORY  03/13/14    sphincterotomy     Current Medications:   Current Facility-Administered Medications: 0.9 % sodium chloride infusion, , Intravenous, Continuous  carvedilol (COREG) tablet 6.25 mg, 6.25 mg, Oral, BID WC  atorvastatin (LIPITOR) tablet 40 mg, 40 mg, Oral, Nightly  dilTIAZem (CARDIZEM) tablet 60 mg, 60 mg, Oral, BID  gabapentin (NEURONTIN) capsule 100 mg, 100 mg, Oral, Nightly  aspirin chewable tablet 81 mg, 81 mg, Oral, Daily  latanoprost (XALATAN) 0.005 % ophthalmic solution 1 drop, 1 drop, Both Eyes, Nightly  insulin NPH (HUMULIN N;NOVOLIN N) injection vial 10 Units, 10 Units, Subcutaneous, BID AC  sodium chloride flush 0.9 % injection 10 mL, 10 mL, Intravenous, 2 times per day  sodium chloride flush 0.9 % injection 10 mL, 10 mL, Intravenous, PRN  acetaminophen (TYLENOL) tablet 650 mg, 650 mg, Oral, Q6H PRN **OR** acetaminophen (TYLENOL) suppository 650 mg, 650 mg, Rectal, Q6H PRN  magnesium hydroxide (MILK OF MAGNESIA) 400 MG/5ML suspension 30 mL, 30 mL, Oral, Daily PRN  [DISCONTINUED] promethazine (PHENERGAN) tablet 12.5 mg, 12.5 mg, Oral, Q6H PRN **OR** ondansetron (ZOFRAN) injection 4 mg, 4 mg, Intravenous, Q6H PRN  [START ON 12/3/2020] cilostazol (PLETAL) tablet 100 mg, 100 mg, Oral, BID  [START ON 12/3/2020] enoxaparin (LOVENOX) injection 40 mg, 40 mg, Subcutaneous, Daily  Allergies:  Byetta 10 mcg pen [exenatide]; Phenylephrine; Sulbactam; Ampicillin; Aricept [donepezil hydrochloride]; Benztropine; Celebrex [celecoxib]; Diphenhydramine hcl; Diphenhydramine hcl [diphenhydramine]; Exenatide; Metoprolol succinate; Phenergan [promethazine hcl];  Plavix [clopidogrel bisulfate]; Pseudoephedrine; and Reglan [metoclopramide hcl]    Social History:  TOBACCO:   reports that he has never smoked. He has never used smokeless tobacco.  ETOH:   reports no history of alcohol use. DRUGS:   reports no history of drug use. Family History:       Problem Relation Age of Onset   [facility] Cancer Mother     Diabetes Mother     Cancer Father        REVIEW OF SYSTEMS:  CONSTITUTIONAL:  negative  HEENT:  negative  RESPIRATORY:  negative  CARDIOVASCULAR:  negative  GASTROINTESTINAL:  negative  GENITOURINARY:  negative  MUSCULOSKELETAL:  negative  BEHAVIOR/PSYCH:  Negative    ROS neg    Family hx neg    PHYSICAL EXAM  ------------------------  Vitals:  BP (!) 146/68   Pulse 107   Temp 98.5 °F (36.9 °C) (Oral)   Resp 18   Ht 5' 11\" (1.803 m)   Wt 140 lb (63.5 kg)   SpO2 98%   BMI 19.53 kg/m²      General:  Awake, alert, oriented X 1. Well developed, well nourished, well groomed. No apparent distress. HEENT:  Normocephalic, atraumatic. Pupils equal, round, reactive to light. No scleral icterus. No conjunctival injection. Normal lips, teeth, and gums. No nasal discharge. Neck:  Supple  Heart:  RRR, no murmurs, gallops, rubs  Lungs:  CTA bilaterally, bilat symmetrical expansion, no wheeze, rales, or rhonchi  Abdomen: Bowel sounds present, soft, nontender, no masses, no organomegaly, no peritoneal signs  Extremities:  No clubbing, cyanosis, or edema  Skin:  Warm and dry, no open lesions or rash  Breast: deferred  Rectal: deferred  Genitalia:  deferred    NEUROLOGICAL EXAM  ---------------------------------    Mental Status Exam:             Alert and oriented times one,follows simple commands,speech ok and language confused oriented to person not to other MMSE testing. Cranial [id number] Intact.         Cranial nerve II           Visual acuity:  normal                 Cranial nerve III           Pupils:  equal, round, reactive to light      Cranial nerves III, IV, VI           Extraocular Movements: intact      Cranial nerve V           Facial sensation: intact      Cranial nerve VII           Facial strength: intact      Cranial nerve VIII           Hearing:  intact      Cranial nerve IX           Palate:  intact      Cranial nerve XI         Shoulder shrug:  intact      Cranial nerve XII          Tongue movement:  normal    Motor:    Drift:  absent  Motor exam is symmetrical 5 out of 5 all extremities bilaterally  Tone:  normal  Abnormal Movements:  Absent    DTRs-1+ biceps,triceps,brachioradialis,knee jerks and ankle jerks bilaterally symmetrical.  Toes-downgoing bilaterally            Sensory- could not test sensation              CBC with Differential:    Lab Results   Component Value Date    WBC 5.9 11/30/2020    RBC 4.36 11/30/2020    HGB 13.3 11/30/2020    HCT 38.6 11/30/2020     11/30/2020    MCV 88.5 11/30/2020    MCH 30.5 11/30/2020    MCHC 34.5 11/30/2020    RDW 12.6 11/30/2020    SEGSPCT 60.6 11/30/2020    BANDSPCT 8 09/03/2019    LYMPHOPCT 18.6 11/30/2020    MONOPCT 12.9 11/30/2020    MYELOPCT 1 09/02/2019    BASOPCT 1.2 11/30/2020    MONOSABS 0.8 11/30/2020    LYMPHSABS 1.1 11/30/2020    EOSABS 0.4 11/30/2020    BASOSABS 0.1 11/30/2020    DIFFTYPE AUTOMATED DIFFERENTIAL 11/30/2020     CMP:    Lab Results   Component Value Date     11/30/2020    K 4.0 11/30/2020    CL 93 11/30/2020    CO2 21 11/30/2020    BUN 18 11/30/2020    CREATININE 1.3 11/30/2020    GFRAA >60 11/30/2020    LABGLOM 53 11/30/2020    GLUCOSE 153 11/30/2020    PROT 6.8 11/30/2020    PROT 8.0 08/30/2012    LABALBU 3.8 11/30/2020    CALCIUM 10.0 11/30/2020    BILITOT 1.0 11/30/2020    ALKPHOS 73 11/30/2020    AST 23 11/30/2020    ALT 13 11/30/2020     BMP:    Lab Results   Component Value Date     11/30/2020    K 4.0 11/30/2020    CL 93 11/30/2020    CO2 21 11/30/2020    BUN 18 11/30/2020    LABALBU 3.8 11/30/2020    CREATININE 1.3 11/30/2020    CALCIUM 10.0 11/30/2020    GFRAA >60 11/30/2020    LABGLOM 53 11/30/2020    GLUCOSE 153 11/30/2020     PT/INR:    Lab Results   Component Value Date    PROTIME 13.1 07/04/2020    INR 1.08 07/04/2020     PTT:    Lab Results   Component Value Date    APTT 30.0 07/04/2020   [APTT  U/A:    Lab Results   Component Value Date    COLORU YELLOW 12/01/2020    WBCUA <1 12/01/2020    RBCUA NONE SEEN 12/01/2020    MUCUS RARE 03/29/2020    TRICHOMONAS NONE SEEN 12/01/2020    BACTERIA NEGATIVE 12/01/2020    CLARITYU CLEAR 12/01/2020    SPECGRAV 1.058 12/01/2020    LEUKOCYTESUR NEGATIVE 12/01/2020    UROBILINOGEN NORMAL 12/01/2020    BILIRUBINUR NEGATIVE 12/01/2020    BLOODU NEGATIVE 12/01/2020     TSH:  No results found for: TSH  VITAMIN B12: No components found for: B12  FOLATE:    Lab Results   Component Value Date    FOLATE 16.0 03/31/2020     RPR:  No results found for: RPR  MARTÍN:  No results found for: ANATITER, MARTÍN  Urine Toxicology:  No components found for: IAMMENTA, IBARBIT, IBENZO, ICOCAINE, IMARTHC, IOPIATES, IPHENCYC     IMPRESSION:    Delerium    Metabolic encephalopathy    Hypernatremia ? Dehydration    ? 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 Discussed dx prognosis meds side effects and above with pt and answered all questions. Silverio Levin MD  BOARD CERTIFIED-NEUROLOGY.

## 2020-12-01 NOTE — PROGRESS NOTES
11/30/2020    CALCIUM 10.0 11/30/2020    BILITOT 1.0 11/30/2020    ALKPHOS 73 11/30/2020    AST 23 11/30/2020    ALT 13 11/30/2020     No results for input(s): TROPONINT in the last 72 hours.   Lab Results   Component Value Date    TSHHS 0.600 03/31/2020         sodium chloride        cilostazol  100 mg Oral BID    carvedilol  6.25 mg Oral BID WC    atorvastatin  40 mg Oral Nightly    dilTIAZem  60 mg Oral BID    gabapentin  100 mg Oral Nightly    aspirin  81 mg Oral Daily    latanoprost  1 drop Both Eyes Nightly    insulin NPH  10 Units Subcutaneous BID AC    sodium chloride flush  10 mL Intravenous 2 times per day    enoxaparin  40 mg Subcutaneous Daily         Assessment:       Patient Active Problem List    Diagnosis Date Noted    Coronary artery disease involving coronary bypass graft of native heart without angina pectoris      Priority: High    Generalized abdominal pain 10/21/2015     Priority: High    Choledocholithiasis 03/13/2014     Priority: High    [facility]) [facility] 10/21/2015     Priority: Medium    Uncontrolled type II diabetes with peripheral autonomic neuropathy (HCC)      Priority: Medium    Hypertension, essential      Priority: Medium    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     Priority: Low    Abdominal pain 07/02/2020    Impaired cognition     Generalized weakness     Cholangitis due to bile duct calculus with obstruction     Oropharyngeal dysphagia     S/P BKA (below knee amputation) bilateral (HCC)     Metabolic encephalopathy 28/70/5050    Fever     Nausea vomiting and diarrhea     Sepsis ([facility] 09/01/2019    Vomiting 01/22/2019    Acute encephalopathy 11/02/2018    Chest pain 03/18/2017    Abdominal pain, epigastric     Elevated LFTs     Abnormal findings on imaging of biliary tract        Plan:     Problems being addressed this admission:   RLQ Abdominal Pains  Acute on chronic encephalopathy  Hyponatremia  HTN  DM 2  Meningioma    It is very difficult to evaluate him due to his dementia and confusion. He burciaga shave some discomfort on pressing is right side up and down. He has dilatation of CBD and so may have a stone passed a stone. He does now look toxic and so will consult GI and wait for their recommendations. There is a question of inguinal pains and may need a surgeon to evaluate him. For his confusion will check his NH3 level. His head CT was OK except for a meningioma but no mass effect. His hyponatremia could be due to his dehydration and so will give saline. Will trend. HTN seems stable does fluctuate. His sugar is 129 today will trend and have him on SSI. Check a1c. Check troponin. Change diet to clears. Consultants:  GI    General Orders:  Repeat basic labs again in am.  D/W RN patient is very confused about time and place just knows who he is.    Daryl Zheng MD, [facility]

## 2020-12-01 NOTE — ED PROVIDER NOTES
Triage Chief Complaint:   Abdominal Pain    [place]:  Today in the ED I had the pleasure of caring for Tania Easley who is a 79 y.o. male that presents today to the emergency department complaining of abdominal pain. Patient states abdominal pain is been ongoing times \"a while\". Located in the right lower quadrant. Severity is \"not that bad\" he does endorse nausea but no vomiting. No chest pain no shortness of breath. Family who I spoke on the phone with state they note that in the past he has had similar episodes that have been due to stones in his common bile ducts, has a history of cholecystectomy but still makes stones and has had to have them removed by Dr. Berkley Soto. Family reports that this morning patient appeared confused No urinary symptoms such as pain with urination. No blood in urine. No testicular pain or swelling per patient. I's and O's have been baseline. Patient does have a history of kidney stones. ROS:  REVIEW OF SYSTEMS    At least 10 systems reviewed      All other review of systems are negative  See HPI and nursing notes for additional information       Past Medical History:   Diagnosis Date    Arthritis     Biliary stones 2015    CAD (coronary artery disease)     [facility] [address]     Diabetes mellitus ([facility] [address]     History of blood transfusion     Hx of angiography 3/11/2014    Pulmonary Arteries: Small sliding hiatal hernia. Mod coronary artery calcifications. Mild bilat gynecomastia.     Hyperlipidemia     Hypertension     Kidney stone      Past Surgical History:   Procedure Laterality Date    ABDOMEN SURGERY      stones in bile duct removed x3    CARDIAC SURGERY      CABG x 2 in 2004 at [facility] - Dr. Drake Steedman, ESOPHAGUS      ERCP  03/13/14    w/ dilation of papilla    ERCP  9/11/14    ERCP  10/24/15    extractions stones, balloon dilatation     ERCP 03/18/2017    stone extraction     ERCP N/A 1/25/2019    ERCP STONE REMOVAL/ DILATATION OF PAPILLA WITH 10-12MM AND 12-15MM BALLOON AND 9-12MM, 12-15MM  EXTRACTOR BALLOON USED FOR REMOVAL OF MULTIPLE  CBD STONES performed by Monda Koyanagi, MD at [facility] ERCP N/A 9/7/2019    ERCP STONE REMOVAL, DILATATION OF PAPILLA WITH 10-12MM BALLOON, AND EXTRACTOR BALLOON 12-15MM USED FOR REMOVAL OF CBD STONE AND SLUDE performed by Allen Kamara MD at [facility] ENDOSCOPY    ERCP N/A 3/31/2020    ERCP DILATION BALLOON to 12  AND SWEEP OF CBD STONE AND SLUDGE performed by Allen Kamara MD at [place] [address]      LEG AMPUTATION BELOW KNEE Bilateral     2005 at [address] HISTORY  03/13/14    sphincterotomy     Family History   Problem Relation Age of Onset    Cancer Mother     Diabetes Mother     Cancer Father      Social History     Socioeconomic History    Marital status:      Spouse name:  Georgeanne Leventhal Number of children: Not on file    Years of education: Not on file    Highest education level: Not on file   Occupational History    Not on file   Social Needs    Financial resource strain: Not on file    Food insecurity     Worry: Not on file     Inability: Not on file    Transportation needs     Medical: Not on file     Non-medical: Not on file   Tobacco Use    Smoking status: Never Smoker    Smokeless tobacco: Never Used   Substance and Sexual Activity    Alcohol use: No    Drug use: No    Sexual activity: Not on file   Lifestyle    Physical activity     Days per week: Not on file     Minutes per session: Not on file    Stress: Not on file   Relationships    Social connections     Talks on phone: Not on file     Gets together: Not on file     Attends Taoism service: Not on file     Active member of club or organization: Not on file     Attends meetings of clubs or organizations: Not on file     Relationship status: Not on file    Intimate partner violence     Fear of current or ex partner: Not on file     Emotionally abused: Not on file     Physically abused: Not on file     Forced sexual activity: Not on file   Other Topics Concern    Not on file   Social History Narrative    Not on file     Current Facility-Administered Medications   Medication Dose Route Frequency Provider Last Rate Last Dose    0.9 % sodium chloride infusion   Intravenous Continuous Kelly Blake PA-C        ondansetron (ZOFRAN) injection 4 mg  4 mg Intravenous Q30 Min PRN Abril Newman PA-C         Current Outpatient Medications   Medication Sig Dispense Refill    famotidine (PEPCID) 20 MG tablet Take 1 tablet by mouth daily 60 tablet 3    insulin NPH (HUMULIN N;NOVOLIN N) 100 UNIT/ML injection vial Inject into the skin 2 times daily (before meals) Take 7  units in am if blood sugar less than 120, and 10 units in am if blood sugar over 120  Take 10 units in pm if blood sugar is less than 300,  and take 15 units in pm if blood sugar over 300      gabapentin (NEURONTIN) 100 MG capsule Take 100 mg by mouth nightly.  Cholecalciferol 50 MCG (2000 UT) TABS Take one tablet daily by mouth      traMADol (ULTRAM) 50 MG tablet Take 50 mg by mouth every 6 hours as needed.  atorvastatin (LIPITOR) 40 MG tablet Take 40 mg by mouth daily      docusate sodium (COLACE) 100 MG capsule Take 100 mg by mouth nightly as needed       latanoprost (XALATAN) 0.005 % ophthalmic solution Place 1 drop into both eyes nightly       diltiazem (CARDIZEM) 60 MG tablet Take 1 tablet by mouth 2 times daily 90 tablet 3    Multiple Vitamins-Minerals (MULTIVITAMIN PO) Take 1 tablet by mouth daily.  cilostazol (PLETAL) 100 MG tablet Take 100 mg by mouth 2 times daily.  carvedilol (COREG) 6.25 MG tablet Take 6.25 mg by mouth 2 times daily (with meals).         aspirin 81 MG chewable tablet Take 81 mg by mouth daily        Allergies   Allergen Reactions    Byetta 10 Mcg Pen [Exenatide]     Phenylephrine     Sulbactam     Ampicillin Rash    Aricept [Donepezil Hydrochloride] Other (See Comments)     Hallucinations, confusion    Benztropine Other (See Comments)     Hallucinations, confusion    Celebrex [Celecoxib] Other (See Comments)     'causes him to be nervous and jittery\"    Diphenhydramine Hcl Other (See Comments)     nervous and jittery    Diphenhydramine Hcl [Diphenhydramine] Anxiety    Exenatide Other (See Comments)     Causes him to be nervous and jittery    Metoprolol Succinate Other (See Comments)     Hallucinations     Phenergan [Promethazine Hcl] Other (See Comments)     Hallucinations, nervous, jittery    Plavix [Clopidogrel Bisulfate] Other (See Comments)     Causes bleeding in his stomach    Pseudoephedrine Anxiety    Reglan [Metoclopramide Hcl] Other (See Comments)     \"Caused him to just sit in a chair and rock back and forth\"       Nursing Notes Reviewed    Physical Exam:  ED Triage Vitals   Enc Vitals Group      BP       Pulse       Resp       Temp       Temp src       SpO2       Weight       Height       Head Circumference       Peak Flow       Pain Score       Pain Loc       Pain Edu? Excl. in [address]? General :Patient is awake alert oriented person place and time no acute distress nontoxic appearing  HEENT: Pupils are equally round and reactive to light extraocular motors are intact conjunctivae clear sclerae white there is no injection no icterus. Nose without any rhinorrhea or epistaxis. Oral mucosa is moist no exudate buccal mucosa shows no ulcerations. Uvula is midline    Neck: Neck is supple full range of motion trachea midline thyroid nonpalpable  Cardiac: Heart regular rate rhythm no murmurs rubs clicks or gallops  Lungs: Lungs are clear to auscultation there is no wheezing rhonchi or rales. There is no use of accessory muscles no nasal flaring identified. Chest wall:  There is no tenderness to palpation over the chest wall or over - 44 %    Monocytes % 12.9 (H) 0 - 4 %    Eosinophils % 6.0 (H) 0 - 3 %    Basophils % 1.2 (H) 0 - 1 %    Segs Absolute 3.6 K/CU MM    Lymphocytes Absolute 1.1 K/CU MM    Monocytes Absolute 0.8 K/CU MM    Eosinophils Absolute 0.4 K/CU MM    Basophils Absolute 0.1 K/CU MM    Nucleated RBC % 0.0 %    Total Nucleated RBC 0.0 K/CU MM    Total Immature Neutrophil 0.04 K/CU MM    Immature Neutrophil % 0.7 (H) 0 - 0.43 %   Comprehensive Metabolic Panel   Result Value Ref Range    Sodium 129 (L) 135 - 145 MMOL/L    Potassium 4.0 3.5 - 5.1 MMOL/L    Chloride 93 (L) 99 - 110 mMol/L    CO2 21 21 - 32 MMOL/L    BUN 18 6 - 23 MG/DL    CREATININE 1.3 0.9 - 1.3 MG/DL    Glucose 153 (H) 70 - 99 MG/DL    Calcium 10.0 8.3 - 10.6 MG/DL    Alb 3.8 3.4 - 5.0 GM/DL    Total Protein 6.8 6.4 - 8.2 GM/DL    Total Bilirubin 1.0 0.0 - 1.0 MG/DL    ALT 13 10 - 40 U/L    AST 23 15 - 37 IU/L    Alkaline Phosphatase 73 40 - 129 IU/L    GFR Non- 53 (L) >60 mL/min/1.73m2    GFR African American >60 >60 mL/min/1.73m2    Anion Gap 15 4 - 16   Urinalysis   Result Value Ref Range    Color, UA YELLOW YELLOW    Clarity, UA CLEAR CLEAR    Glucose, Urine NEGATIVE NEGATIVE MG/DL    Bilirubin Urine NEGATIVE NEGATIVE MG/DL    Ketones, Urine MODERATE (A) NEGATIVE MG/DL    Specific Gravity, UA 1.058 (H) 1.001 - 1.035    Blood, Urine NEGATIVE NEGATIVE    pH, Urine 8.0 5.0 - 8.0    Protein, UA NEGATIVE NEGATIVE MG/DL    Urobilinogen, Urine NORMAL 0.2 - 1.0 MG/DL    Nitrite Urine, Quantitative NEGATIVE NEGATIVE    Leukocyte Esterase, Urine NEGATIVE NEGATIVE    RBC, UA NONE SEEN 0 - 3 /HPF    WBC, UA <1 0 - 2 /HPF    Bacteria, UA NEGATIVE NEGATIVE /HPF    Squam Epithel, UA <1 /HPF    Trichomonas, UA NONE SEEN NONE SEEN /HPF   Lipase   Result Value Ref Range    Lipase 21 13 - 60 IU/L   EKG 12 Lead   Result Value Ref Range    Ventricular Rate 95 BPM    Atrial Rate 95 BPM    P-R Interval 140 ms    QRS Duration 76 ms    Q-T Interval 372 ms    QTc Calculation (Bazett) 467 ms    P Axis 47 degrees    R Axis -45 degrees    T Axis 23 degrees    Diagnosis       Normal sinus rhythm  Left axis deviation  Inferior infarct , age undetermined  Abnormal ECG  When compared with ECG of 29-MAR-2020 19:25,  Questionable change in QRS duration  Inferior infarct is now present        Radiographs (if obtained):  [] The following radiograph was interpreted by myself in the absence of a radiologist:   [] Radiologist's Report Reviewed:  CT ABDOMEN PELVIS W IV CONTRAST   Final Result   No acute abnormality. XR CHEST PORTABLE   Final Result   No cardiomegaly, pneumonia or interstitial edema. No significant change has occurred from 03/29/2020. CT HEAD WO CONTRAST    (Results Pending)       EKG (if obtained):   Please See Note of attending physician for EKG interpretation. Chart review shows recent radiograph(s):  No results found. MDM:   Is an elderly male presents today to the ED with abdominal pain his work-up here is essentially unremarkable good metabolic panel CBC lipase. Urinalysis is clean there is moderate ketones noted in patient's urine likely secondary to his inability to tolerate p.o. over the last 1 to 2 days. I did speak to daughter over the phone. She states that patient has a history of biliary common biliary stones. Even though he has a history of cystectomy in the past.  On exam patient is a bit confused. Pleasantly demented. However otherwise he is in no distress. I did speak to on-call gastroenterologist Dr. Nghia Tomas regarding patient. Who advised admission to the hospital for further evaluation. I do believe this to be reasonable as patient will require continued IV fluids, antiemetics pain control and evaluation. On-call physician Was consulted regarding patient.   After thorough discussion regarding patient's history, physical exam.  laboratory values, radiographic evidence (if applicable  theymyself as well as my attending physician agreed Given the patient's presenting concerns, medical history and clinical findings, the patient will be admitted at this time to undergo further evaluation and disposition. . During patient's entire stay in the ED patient remained stable and comfortable. Analgesia is well-controlled. Patient will be admitted for all information regarding ongoing management and care of patient please see note of Admitting physician. All EKG interpretations are performed by Attending physician           I independently managed patient today in the ED        /77   Pulse 93   Temp 98 °F (36.7 °C) (Oral)   Resp 18   Ht 5' 11\" (1.803 m)   Wt 140 lb (63.5 kg)   SpO2 98%   BMI 19.53 kg/m²       Clinical Impression:  1. Inguinal pain, unspecified laterality    2. Nausea and vomiting, intractability of vomiting not specified, unspecified vomiting type    3. Confusion        Disposition referral (if applicable):  Jnig Zavala MD  [address]  [address] [id number]  [phone]    In 2 days      Disposition medications (if applicable):  New Prescriptions    No medications on file         Comment: Please note this report has been produced using speech recognition software and may contain errors related to that system including errors in grammar, punctuation, and spelling, as well as words and phrases that may be inappropriate. If there are any questions or concerns please feel free to contact the dictating provider for clarification.       CAMELIA Joseph PA-C  12/01/20 [address] [facility], [place]  12/01/20 1234

## 2020-12-01 NOTE — ED NOTES
Sav Bonds daughter in regards to discharge and transport. Daughter asking if his surgeon was contacted as this is what happens when he needs gallstones removed. Made aware of negative scan. Daughter states they do not show up on scan and surgeon needs to be contacted. Alton DIXON made aware.       Sakina Yao RN  12/01/20 2230

## 2020-12-01 NOTE — PROGRESS NOTES
SARS CoV 2 naat test is Positive. Test ordered for screening.    Now being transferred to covid unit  Caroline Mccormack MD   hosp med

## 2020-12-01 NOTE — H&P
HISTORY AND PHYSICAL  (Hospitalist, Internal Medicine)  IDENTIFYING INFORMATION   PATIENT:  Uri Simon  MRN:  [id number]  ADMIT DATE: 11/30/2020  TIME OF EVALUATION: 12/1/2020 3:24 AM    CHIEF COMPLAINT     Abdominal pain  HISTORY OF PRESENT ILLNESS   Uri Simon is a 79 y.o. male admitted for epigastric and periumbilical pain, associated with nausea and NBNB vomiting, but symptoms started around lunchtime today. Patient denies any diarrhea, has no other complaints. Patient denies fevers, chills, confusion, chest pain, cough, or sick contacts. However patient is not the most reliable historian, as he is unaware that he is a hospital.  Patient is not oriented to time either, and has poor insight into his condition. Patient is very cooperative, and appears to be answering questions to the best of his abilities. PMH listed below:    PAST MEDICAL, SURGICAL, FAMILY, and SOCIAL HISTORY     Past Medical History:   Diagnosis Date    Arthritis     Biliary stones 2015    CAD (coronary artery disease)     [facility] [address]     Diabetes mellitus ([facility] [address]     History of blood transfusion     Hx of angiography 3/11/2014    Pulmonary Arteries: Small sliding hiatal hernia. Mod coronary artery calcifications. Mild bilat gynecomastia.     Hyperlipidemia     Hypertension     Kidney stone      Past Surgical History:   Procedure Laterality Date    ABDOMEN SURGERY      stones in bile duct removed x3    CARDIAC SURGERY      CABG x 2 in 2004 at [facility] - Dr. Cheyenne Rico, ESOPHAGUS      ERCP  03/13/14    w/ dilation of papilla    ERCP  9/11/14    ERCP  10/24/15    extractions stones, balloon dilatation     ERCP  03/18/2017    stone extraction     ERCP N/A 1/25/2019    ERCP STONE REMOVAL/ DILATATION OF PAPILLA WITH 10-12MM AND 12-15MM BALLOON AND 9-12MM, 12-15MM  EXTRACTOR BALLOON USED FOR REMOVAL OF MULTIPLE  CBD STONES performed by Syd Porras MD at [facility] ERCP N/A 9/7/2019    ERCP STONE REMOVAL, DILATATION OF PAPILLA WITH 10-12MM BALLOON, AND EXTRACTOR BALLOON 12-15MM USED FOR REMOVAL OF CBD STONE AND SLUDE performed by Chris Isabel MD at [facility] ENDOSCOPY    ERCP N/A 3/31/2020    ERCP DILATION BALLOON to 12  AND SWEEP OF CBD STONE AND SLUDGE [place] [facility]      LEG AMPUTATION BELOW KNEE Bilateral     2005 at [address] HISTORY  03/13/14    sphincterotomy     Family History   Problem Relation Age of Onset    Cancer Mother     Diabetes Mother     Cancer Father      Family Hx of HTN  Family Hx as reviewed above, otherwise non-contributory  Social History     Socioeconomic History    Marital status:      Spouse name:  Anugs Beck Number of children: Not on file    Years of education: Not on file    Highest education level: Not on file   Occupational History    Not on file   Social Needs    Financial resource strain: Not on file    Food insecurity     Worry: Not on file     Inability: Not on file    Transportation needs     Medical: Not on file     Non-medical: Not on file   Tobacco Use    Smoking status: Never Smoker    Smokeless tobacco: Never Used   Substance and Sexual Activity    Alcohol use: No    Drug use: No    Sexual activity: Not on file   Lifestyle    Physical activity     Days per week: Not on file     Minutes per session: Not on file    Stress: Not on file   Relationships    Social connections     Talks on phone: Not on file     Gets together: Not on file     Attends Caodaism service: Not on file     Active member of club or organization: Not on file     Attends meetings of clubs or organizations: Not on file     Relationship status: Not on file    Intimate partner violence     Fear of current or ex partner: Not on file     Emotionally abused: Not on file Physically abused: Not on file     Forced sexual activity: Not on file   Other Topics Concern    Not on file   Social History Narrative    Not on file       MEDICATIONS   Medications Prior to Admission  Not in a hospital admission. Current Medications  Current Facility-Administered Medications   Medication Dose Route Frequency Provider Last Rate Last Dose    0.9 % sodium chloride infusion   Intravenous Continuous Erast[facility] [address] CAMELIA Blake        ondansetron [facility]) injection 4 mg  4 mg Intravenous Q30 Min PRN Gina Poole PA-C         Current Outpatient Medications   Medication Sig Dispense Refill    famotidine (PEPCID) 20 MG tablet Take 1 tablet by mouth daily 60 tablet 3    insulin NPH (HUMULIN N;NOVOLIN N) 100 UNIT/ML injection vial Inject into the skin 2 times daily (before meals) Take 7  units in am if blood sugar less than 120, and 10 units in am if blood sugar over 120  Take 10 units in pm if blood sugar is less than 300,  and take 15 units in pm if blood sugar over 300      gabapentin (NEURONTIN) 100 MG capsule Take 100 mg by mouth nightly.  Cholecalciferol 50 MCG (2000 UT) TABS Take one tablet daily by mouth      traMADol (ULTRAM) 50 MG tablet Take 50 mg by mouth every 6 hours as needed.  atorvastatin (LIPITOR) 40 MG tablet Take 40 mg by mouth daily      docusate sodium (COLACE) 100 MG capsule Take 100 mg by mouth nightly as needed       latanoprost (XALATAN) 0.005 % ophthalmic solution Place 1 drop into both eyes nightly       diltiazem (CARDIZEM) 60 MG tablet Take 1 tablet by mouth 2 times daily 90 tablet 3    Multiple Vitamins-Minerals (MULTIVITAMIN PO) Take 1 tablet by mouth daily.  cilostazol (PLETAL) 100 MG tablet Take 100 mg by mouth 2 times daily.  carvedilol (COREG) 6.25 MG tablet Take 6.25 mg by mouth 2 times daily (with meals).         aspirin 81 MG chewable tablet Take 81 mg by mouth daily            Allergies  Allergies   Allergen Reactions    Byetta 10 Mcg Pen [Exenatide]     Phenylephrine     Sulbactam     Ampicillin Rash    Aricept [Donepezil Hydrochloride] Other (See Comments)     Hallucinations, confusion    Benztropine Other (See Comments)     Hallucinations, confusion    Celebrex [Celecoxib] Other (See Comments)     'causes him to be nervous and jittery\"    Diphenhydramine Hcl Other (See Comments)     nervous and jittery    Diphenhydramine Hcl [Diphenhydramine] Anxiety    Exenatide Other (See Comments)     Causes him to be nervous and jittery    Metoprolol Succinate Other (See Comments)     Hallucinations     Phenergan [Promethazine Hcl] Other (See Comments)     Hallucinations, nervous, jittery    Plavix [Clopidogrel Bisulfate] Other (See Comments)     Causes bleeding in his stomach    Pseudoephedrine Anxiety    Reglan [Metoclopramide Hcl] Other (See Comments)     \"Caused him to just sit in a chair and rock back and forth\"       REVIEW OF SYSTEMS   Within above limitations. 14 point review of systems reviewed. Pertinent positive or negative as per HPI or otherwise negative per 14 point systems review. PHYSICAL EXAM     Wt Readings from Last 3 Encounters:   11/30/20 140 lb (63.5 kg)   07/04/20 137 lb 3.2 oz (62.2 kg)   04/11/20 142 lb 3.2 oz (64.5 kg)       Blood pressure 116/77, pulse 93, temperature 98 °F (36.7 °C), temperature source Oral, resp. rate 18, height 5' 11\" (1.803 m), weight 140 lb (63.5 kg), SpO2 98 %. General - AAO x 1  Psych - Appropriate affect/speech. No agitation  Eyes - Eye lids intact. No scleral icterus  ENT - Lips wnl. External ear clear/dry/intact. No thyromegaly on inspection  Neuro - No gross peripheral or central neuro deficits on inspection  Heart - Sinus. RRR. S1 and S2 present. No added HS/murmurs appreciated. No elevated JVD appreciated  Lung - Adequate air entry b/l, No crackles/wheezes appreciated  GI - Soft. No guarding/rigidity. No hepatosplenomegaly/ascites.  BS+    - No CVA/suprapubic tenderness or palpable bladder distension  Skin - Intact. No rash/petechiae/ecchymosis.  Warm extremities  MSK -bilateral lower extremity amputations, with prosthesis bedside not attached      Lines/Drains/Airways/Wounds:  [unfilled]    LABS AND IMAGING   CBC  [unfilled]    Last 3 Hemoglobin  Lab Results   Component Value Date    HGB 13.3 11/30/2020    HGB 12.5 07/05/2020    HGB 12.8 07/04/2020     Last 3 WBC/ANC  Lab Results   Component Value Date    WBC 5.9 11/30/2020    WBC 8.2 07/05/2020    WBC 7.2 07/04/2020     No components found for: GRNLOCTYABS  Last 3 Platelets  No results found for: PLATELET  Chemistry  [unfilled]  [unfilled]  No results found for: LDH  Coagulation Studies  Lab Results   Component Value Date    INR 1.08 07/04/2020     Liver Function Studies  Lab Results   Component Value Date    ALT 13 11/30/2020    AST 23 11/30/2020    ALKPHOS 73 11/30/2020       Recent Imaging        Relevant labs and imaging reviewed    ASSESSMENT AND PLAN   Rina Suarez is a 79 y.o. male p/w    Abd pain with N/V possibly d/t sludge  - LFT wnl  - lipase neg  - IV fluids   - PRN pain medications  - CT abd with no acute finding, however known to GI and family states that he has this d/t biliary issues per conversation w/ ED provider   - GI consult, consider MRCP/ERCP, or HIDA  -Continue antiemetics  -Serial abdominal exam    Confusion in the setting of dementia?  - unclear cause, family states he has no dementia per ED provider, neuro has seen pt earlier this year,   - possible metabolic encephalopathy assoc w/ abdominal pain as per d/w ED provider and family this has been a fq occurrence together   - neurochecks  -Neuro consult  - asked ED provider to obtain CT head scan before admission      HTN/hyperlipidemia  - coreg  -Atorvastatin    Type 2 diabetes- uncontrolled  Lantus and ISS, TIDAC finger stick  Hypoglycemic protocol  F/u A1c      Case d/w ED provider    DVT ppx: Lovenox  Code status: DNR-CCA, per prior admissions, needs to be reconfirmed with family in [address], Internal Medicine  12/1/2020 at 3:24 AM

## 2020-12-02 LAB
ALBUMIN SERPL-MCNC: 3.8 GM/DL (ref 3.4–5)
ALP BLD-CCNC: 69 IU/L (ref 40–129)
ALT SERPL-CCNC: 18 U/L (ref 10–40)
AMYLASE: 80 U/L (ref 25–115)
ANION GAP SERPL CALCULATED.3IONS-SCNC: 16 MMOL/L (ref 4–16)
APTT: 30.9 SECONDS (ref 25.1–37.1)
AST SERPL-CCNC: 47 IU/L (ref 15–37)
BASOPHILS ABSOLUTE: 0 K/CU MM
BASOPHILS RELATIVE PERCENT: 0.6 % (ref 0–1)
BILIRUB SERPL-MCNC: 0.4 MG/DL (ref 0–1)
BUN BLDV-MCNC: 21 MG/DL (ref 6–23)
CALCIUM SERPL-MCNC: 9.2 MG/DL (ref 8.3–10.6)
CHLORIDE BLD-SCNC: 100 MMOL/L (ref 99–110)
CO2: 19 MMOL/L (ref 21–32)
CREAT SERPL-MCNC: 1.3 MG/DL (ref 0.9–1.3)
DIFFERENTIAL TYPE: ABNORMAL
EKG ATRIAL RATE: 95 BPM
EKG DIAGNOSIS: NORMAL
EKG P AXIS: 47 DEGREES
EKG P-R INTERVAL: 140 MS
EKG Q-T INTERVAL: 372 MS
EKG QRS DURATION: 76 MS
EKG QTC CALCULATION (BAZETT): 467 MS
EKG R AXIS: -45 DEGREES
EKG T AXIS: 23 DEGREES
EKG VENTRICULAR RATE: 95 BPM
EOSINOPHILS ABSOLUTE: 0 K/CU MM
EOSINOPHILS RELATIVE PERCENT: 0.4 % (ref 0–3)
GFR AFRICAN AMERICAN: >60 ML/MIN/1.73M2
GFR NON-AFRICAN AMERICAN: 53 ML/MIN/1.73M2
GLUCOSE BLD-MCNC: 129 MG/DL (ref 70–99)
GLUCOSE BLD-MCNC: 158 MG/DL (ref 70–99)
GLUCOSE BLD-MCNC: 170 MG/DL (ref 70–99)
GLUCOSE BLD-MCNC: 196 MG/DL (ref 70–99)
HCT VFR BLD CALC: 40.2 % (ref 42–52)
HEMOGLOBIN: 12.9 GM/DL (ref 13.5–18)
IMMATURE NEUTROPHIL %: 0.4 % (ref 0–0.43)
INR BLD: 1.02 INDEX
LIPASE: 40 IU/L (ref 13–60)
LYMPHOCYTES ABSOLUTE: 1.9 K/CU MM
LYMPHOCYTES RELATIVE PERCENT: 38.8 % (ref 24–44)
MCH RBC QN AUTO: 29.3 PG (ref 27–31)
MCHC RBC AUTO-ENTMCNC: 32.1 % (ref 32–36)
MCV RBC AUTO: 91.2 FL (ref 78–100)
MONOCYTES ABSOLUTE: 1 K/CU MM
MONOCYTES RELATIVE PERCENT: 20.2 % (ref 0–4)
NUCLEATED RBC %: 0 %
PDW BLD-RTO: 12.4 % (ref 11.7–14.9)
PLATELET # BLD: 187 K/CU MM (ref 140–440)
PMV BLD AUTO: 9.4 FL (ref 7.5–11.1)
POTASSIUM SERPL-SCNC: 3.6 MMOL/L (ref 3.5–5.1)
PROTHROMBIN TIME: 12.3 SECONDS (ref 11.7–14.5)
RBC # BLD: 4.41 M/CU MM (ref 4.6–6.2)
SEGMENTED NEUTROPHILS ABSOLUTE COUNT: 2 K/CU MM
SEGMENTED NEUTROPHILS RELATIVE PERCENT: 39.6 % (ref 36–66)
SODIUM BLD-SCNC: 135 MMOL/L (ref 135–145)
TOTAL IMMATURE NEUTOROPHIL: 0.02 K/CU MM
TOTAL NUCLEATED RBC: 0 K/CU MM
TOTAL PROTEIN: 6.3 GM/DL (ref 6.4–8.2)
WBC # BLD: 5 K/CU MM (ref 4–10.5)

## 2020-12-02 PROCEDURE — 80053 COMPREHEN METABOLIC PANEL: CPT

## 2020-12-02 PROCEDURE — 6370000000 HC RX 637 (ALT 250 FOR IP): Performed by: INTERNAL MEDICINE

## 2020-12-02 PROCEDURE — 82962 GLUCOSE BLOOD TEST: CPT

## 2020-12-02 PROCEDURE — 83690 ASSAY OF LIPASE: CPT

## 2020-12-02 PROCEDURE — 85610 PROTHROMBIN TIME: CPT

## 2020-12-02 PROCEDURE — 6360000002 HC RX W HCPCS: Performed by: HOSPITALIST

## 2020-12-02 PROCEDURE — 2580000003 HC RX 258: Performed by: INTERNAL MEDICINE

## 2020-12-02 PROCEDURE — 6360000002 HC RX W HCPCS: Performed by: NURSE PRACTITIONER

## 2020-12-02 PROCEDURE — 85025 COMPLETE CBC W/AUTO DIFF WBC: CPT

## 2020-12-02 PROCEDURE — 1200000000 HC SEMI PRIVATE

## 2020-12-02 PROCEDURE — 82150 ASSAY OF AMYLASE: CPT

## 2020-12-02 PROCEDURE — 80048 BASIC METABOLIC PNL TOTAL CA: CPT

## 2020-12-02 PROCEDURE — 85730 THROMBOPLASTIN TIME PARTIAL: CPT

## 2020-12-02 PROCEDURE — 80307 DRUG TEST PRSMV CHEM ANLYZR: CPT

## 2020-12-02 PROCEDURE — 84300 ASSAY OF URINE SODIUM: CPT

## 2020-12-02 PROCEDURE — 93010 ELECTROCARDIOGRAM REPORT: CPT | Performed by: INTERNAL MEDICINE

## 2020-12-02 PROCEDURE — 83935 ASSAY OF URINE OSMOLALITY: CPT

## 2020-12-02 RX ORDER — ZIPRASIDONE MESYLATE 20 MG/ML
20 INJECTION, POWDER, LYOPHILIZED, FOR SOLUTION INTRAMUSCULAR ONCE
Status: COMPLETED | OUTPATIENT
Start: 2020-12-02 | End: 2020-12-02

## 2020-12-02 RX ADMIN — CARVEDILOL 6.25 MG: 6.25 TABLET, FILM COATED ORAL at 17:53

## 2020-12-02 RX ADMIN — SODIUM CHLORIDE, PRESERVATIVE FREE 10 ML: 5 INJECTION INTRAVENOUS at 10:54

## 2020-12-02 RX ADMIN — INSULIN HUMAN 10 UNITS: 100 INJECTION, SUSPENSION SUBCUTANEOUS at 10:57

## 2020-12-02 RX ADMIN — ASPIRIN 81 MG CHEWABLE TABLET 81 MG: 81 TABLET CHEWABLE at 10:54

## 2020-12-02 RX ADMIN — ZIPRASIDONE MESYLATE 20 MG: 20 INJECTION, POWDER, LYOPHILIZED, FOR SOLUTION INTRAMUSCULAR at 23:49

## 2020-12-02 RX ADMIN — DILTIAZEM HYDROCHLORIDE 60 MG: 60 TABLET, FILM COATED ORAL at 10:54

## 2020-12-02 RX ADMIN — CARVEDILOL 6.25 MG: 6.25 TABLET, FILM COATED ORAL at 10:54

## 2020-12-02 RX ADMIN — SODIUM CHLORIDE, PRESERVATIVE FREE 10 ML: 5 INJECTION INTRAVENOUS at 21:36

## 2020-12-02 NOTE — PROGRESS NOTES
Hospitalist             Daily Progress  Note   Subjective:     Chief Complaint   Patient presents with    Abdominal Pain     -he says his abdomen is doing ok, he states he is tolerating his diet.  Not feeling short of breath    Objective:    BP (!) 164/90   Pulse 85   Temp 97.7 °F (36.5 °C) (Oral)   Resp 18   Ht 5' 11\" (1.803 m)   Wt 139 lb 1.6 oz (63.1 kg)   SpO2 99%   BMI 19.40 kg/m²    No intake or output data in the 24 hours ending 12/02/20 1024   Physical Exam:  Gen: No acute distress  HEENT: NCAT  Heart:  s1s2 rrr  Lungs: seems clear to auscultation, bs+  Abdomen: Soft, nontender, nondistended, no guarding, no rigidity  Extremities: b/l bka  CNS: no gross deficits, aao to person, place, month, year, and president     Labs:  CBC with Differential:    Lab Results   Component Value Date    WBC 5.9 11/30/2020    RBC 4.36 11/30/2020    HGB 13.3 11/30/2020    HCT 38.6 11/30/2020     11/30/2020    MCV 88.5 11/30/2020    MCH 30.5 11/30/2020    MCHC 34.5 11/30/2020    RDW 12.6 11/30/2020    SEGSPCT 60.6 11/30/2020    BANDSPCT 8 09/03/2019    LYMPHOPCT 18.6 11/30/2020    MONOPCT 12.9 11/30/2020    MYELOPCT 1 09/02/2019    BASOPCT 1.2 11/30/2020    MONOSABS 0.8 11/30/2020    LYMPHSABS 1.1 11/30/2020    EOSABS 0.4 11/30/2020    BASOSABS 0.1 11/30/2020    DIFFTYPE AUTOMATED DIFFERENTIAL 11/30/2020     CMP:    Lab Results   Component Value Date     11/30/2020    K 4.0 11/30/2020    CL 93 11/30/2020    CO2 21 11/30/2020    BUN 18 11/30/2020    CREATININE 1.3 11/30/2020    GFRAA >60 11/30/2020    LABGLOM 53 11/30/2020    GLUCOSE 153 11/30/2020    PROT 6.8 11/30/2020    PROT 8.0 08/30/2012    LABALBU 3.8 11/30/2020    CALCIUM 10.0 11/30/2020    BILITOT 1.0 11/30/2020    ALKPHOS 73 11/30/2020    AST 23 11/30/2020    ALT 13 11/30/2020     Recent Labs     12/01/20  1027   TROPONINT <0.010     Lab Results   Component Value Date    TSH 0.600 03/31/2020         sodium chloride 100 mL/hr at 12/01/20 1001    dextrose        carvedilol  6.25 mg Oral BID WC    atorvastatin  40 mg Oral Nightly    dilTIAZem  60 mg Oral BID    gabapentin  100 mg Oral Nightly    aspirin  81 mg Oral Daily    latanoprost  1 drop Both Eyes Nightly    insulin NPH  10 Units Subcutaneous BID AC    sodium chloride flush  10 mL Intravenous 2 times per day    [START ON 12/3/2020] cilostazol  100 mg Oral BID    [START ON 12/3/2020] enoxaparin  40 mg Subcutaneous Daily             Assessment/plan:     RLQ Abdominal Pains  -may have passed a stone. GI recommends performing ERCP as outpatient, when COVID-19 infection resolves since there is no acute indication for it. Acute on chronic metabolic encephalopathy  -MRI brain: No acute process. Checking ammonia level. Check TSH and VBG. Mental status improving. Possibly may have been due to dehydration. Cipro has been stopped. Hyponatremia  -sodium 129. Given IVF. Check urine sodium. UA suggestive of dehydration. Hypertension  -On Coreg, Cardizem. DM2  -A1c 7.9. On NPH. Start SSI. COVID-19 infection 12/1, incidental  -Not requiring oxygen. No steroids needed at this time. Increase Lovenox. Meningioma  -seen on CT head. DVT Prophylaxis: Lovenox  Diet: DIET GENERAL; Carb Control: 5 carb choices (75 gms)/meal  Home O2: None  Code Status: DNR-CCA    Dispo:   Plan is home with home health care upon discharge. Possible dc in 1-2 days.     Rd Jiang MD  12/2/2020   Rd Jiang MD   12/02/20

## 2020-12-02 NOTE — CARE COORDINATION
CM pts wife to initiate a safe discharge plan. Cm into see, introduced self and explained role of CM. Pt lives with his wife in the [place] [address]. Pt has two children both out of town but in [place]. Pt is typically independ. He is able to take own shower. Wife drives and able to provide transportation. Pt DME includes a walker and shower chr. Pt has a PCP. Pt has insurance and is able to afford his medications. CM discussed discharge planning. Cm offered home care and wife is agreeable. Wife has used [facility] and would like them again. CM placed a PS to [facility] with referral.   Discharge plan is home with wife in the [place] [id number]. [facility] to follow. CM provided card and encouraged to call for any needs or concern. CM is available if any needs arise.    [address]

## 2020-12-02 NOTE — PROGRESS NOTES
Comprehensive Nutrition Assessment    Type and Reason for Visit:  Initial(low BMI)    Nutrition Recommendations/Plan:   Add diabetic ONS TID  Please document all po intake  Encourage po intake as able  Will monitor po intake, nutrition status, poc    Nutrition Assessment:  pt admitted for confusion, abdominal discomfort, vomiting, +covid-19, PMH: HTN, HLD, DM, COPD, CAD, Pt currently on carb control diet, no meals documented at this time, no significant wt loss per limited weight history, pt is at high nutrition risk    Malnutrition Assessment:  Malnutrition Status:  Insufficient data    Context:  Acute Illness       Estimated Daily Nutrient Needs:  Energy (kcal):  9894-8729(28-32 kcal/kg); Weight Used for Energy Requirements:  Current     Protein (g):  76-88(1.20-1.4 g/kg);  Weight Used for Protein Requirements:  Current        Fluid (ml/day):  0718-1756; Method Used for Fluid Requirements:  1 ml/kcal      Nutrition Related Findings:  Hemoglobin A1c 7.9      Wounds:  None       Current Nutrition Therapies:    DIET GENERAL; Carb Control: 5 carb choices (75 gms)/meal    Anthropometric Measures:  · Height: 5' 10.98\" (180.3 cm)  · Current Body Weight: 139 lb 1.8 oz (63.1 kg)(unknown weight source)   · Usual Body Weight: 137 lb 2 oz (62.2 kg)((7/2/20) most recent wt hx available)     · Ideal Body Weight: 172 lbs; % Ideal Body Weight 80.9 %   · BMI: 19.4  · BMI Categories: Underweight (BMI less than 22) age over 72       Nutrition Diagnosis:   · Underweight related to inadequate protein-energy intake as evidenced by BMI    Nutrition Interventions:   Food and/or Nutrient Delivery:  Continue Current Diet, Start Oral Nutrition Supplement  Nutrition Education/Counseling:  No recommendation at this time   Coordination of Nutrition Care:  Continue to monitor while inpatient    Goals:  pt will consume greater than 50% of meals and supplements       Nutrition Monitoring and Evaluation:   Food/Nutrient Intake Outcomes:  Food

## 2020-12-02 NOTE — PLAN OF CARE
Problem: Airway Clearance - Ineffective  Goal: Achieve or maintain patent airway  Outcome: Ongoing     Problem: Gas Exchange - Impaired  Goal: Absence of hypoxia  Outcome: Ongoing  Goal: Promote optimal lung function  Outcome: Ongoing     Problem: Breathing Pattern - Ineffective  Goal: Ability to achieve and maintain a regular respiratory rate  Outcome: Ongoing     Problem:  Body Temperature -  Risk of, Imbalanced  Goal: Ability to maintain a body temperature within defined limits  Outcome: Ongoing  Goal: Will regain or maintain usual level of consciousness  Outcome: Ongoing  Goal: Complications related to the disease process, condition or treatment will be avoided or minimized  Outcome: Ongoing     Problem: Isolation Precautions - Risk of Spread of Infection  Goal: Prevent transmission of infection  Outcome: Ongoing     Problem: Nutrition Deficits  Goal: Optimize nutrtional status  Outcome: Ongoing     Problem: Risk for Fluid Volume Deficit  Goal: Maintain normal heart rhythm  Outcome: Ongoing  Goal: Maintain absence of muscle cramping  Outcome: Ongoing  Goal: Maintain normal serum potassium, sodium, calcium, phosphorus, and pH  Outcome: Ongoing     Problem: Loneliness or Risk for Loneliness  Goal: Demonstrate positive use of time alone when socialization is not possible  Outcome: Ongoing     Problem: Patient Education: Go to Patient Education Activity  Goal: Patient/Family Education  Outcome: Ongoing     Problem: Pain:  Goal: Pain level will decrease  Description: Pain level will decrease  Outcome: Ongoing  Goal: Control of acute pain  Description: Control of acute pain  Outcome: Ongoing  Goal: Control of chronic pain  Description: Control of chronic pain  Outcome: Ongoing

## 2020-12-02 NOTE — PROGRESS NOTES
DOING FAIR CONFUSED NO ABD PAIN  VITALS STABLE   LABS  FROM TODAY PENDING  WILL CPM ERCP LATER AS OUTPT

## 2020-12-03 LAB
ALBUMIN SERPL-MCNC: 4.1 GM/DL (ref 3.4–5)
ALP BLD-CCNC: 78 IU/L (ref 40–129)
ALT SERPL-CCNC: 25 U/L (ref 10–40)
AMPHETAMINES: NEGATIVE
ANION GAP SERPL CALCULATED.3IONS-SCNC: 17 MMOL/L (ref 4–16)
AST SERPL-CCNC: 64 IU/L (ref 15–37)
BARBITURATE SCREEN URINE: NEGATIVE
BASOPHILS ABSOLUTE: 0.1 K/CU MM
BASOPHILS RELATIVE PERCENT: 0.7 % (ref 0–1)
BENZODIAZEPINE SCREEN, URINE: NEGATIVE
BILIRUB SERPL-MCNC: 0.3 MG/DL (ref 0–1)
BUN BLDV-MCNC: 26 MG/DL (ref 6–23)
CALCIUM SERPL-MCNC: 9.7 MG/DL (ref 8.3–10.6)
CANNABINOID SCREEN URINE: NEGATIVE
CHLORIDE BLD-SCNC: 102 MMOL/L (ref 99–110)
CO2: 22 MMOL/L (ref 21–32)
COCAINE METABOLITE: NEGATIVE
CREAT SERPL-MCNC: 1.5 MG/DL (ref 0.9–1.3)
DIFFERENTIAL TYPE: ABNORMAL
EOSINOPHILS ABSOLUTE: 0.1 K/CU MM
EOSINOPHILS RELATIVE PERCENT: 0.7 % (ref 0–3)
FERRITIN: 305 NG/ML (ref 30–400)
FOLATE: >20 NG/ML (ref 3.1–17.5)
GFR AFRICAN AMERICAN: 54 ML/MIN/1.73M2
GFR NON-AFRICAN AMERICAN: 45 ML/MIN/1.73M2
GLUCOSE BLD-MCNC: 115 MG/DL (ref 70–99)
GLUCOSE BLD-MCNC: 118 MG/DL (ref 70–99)
GLUCOSE BLD-MCNC: 155 MG/DL (ref 70–99)
GLUCOSE BLD-MCNC: 194 MG/DL (ref 70–99)
GLUCOSE BLD-MCNC: 96 MG/DL (ref 70–99)
HCT VFR BLD CALC: 44.4 % (ref 42–52)
HCT VFR BLD CALC: 45.5 % (ref 42–52)
HEMOGLOBIN: 15 GM/DL (ref 13.5–18)
HEMOGLOBIN: 15.1 GM/DL (ref 13.5–18)
HIGH SENSITIVE C-REACTIVE PROTEIN: 13.6 MG/L
IMMATURE NEUTROPHIL %: 0.4 % (ref 0–0.43)
LACTATE DEHYDROGENASE: 326 IU/L (ref 120–246)
LYMPHOCYTES ABSOLUTE: 2.7 K/CU MM
LYMPHOCYTES RELATIVE PERCENT: 40.7 % (ref 24–44)
MCH RBC QN AUTO: 29.9 PG (ref 27–31)
MCH RBC QN AUTO: 30.3 PG (ref 27–31)
MCHC RBC AUTO-ENTMCNC: 33.2 % (ref 32–36)
MCHC RBC AUTO-ENTMCNC: 33.8 % (ref 32–36)
MCV RBC AUTO: 89.7 FL (ref 78–100)
MCV RBC AUTO: 90.1 FL (ref 78–100)
MONOCYTES ABSOLUTE: 0.7 K/CU MM
MONOCYTES RELATIVE PERCENT: 10 % (ref 0–4)
NUCLEATED RBC %: 0 %
OPIATES, URINE: NEGATIVE
OSMOLALITY URINE: 487 MOS/L (ref 292–1090)
OSMOLALITY: 296 MOS/L (ref 280–300)
OXYCODONE: NEGATIVE
PDW BLD-RTO: 12.7 % (ref 11.7–14.9)
PDW BLD-RTO: 12.7 % (ref 11.7–14.9)
PHENCYCLIDINE, URINE: NEGATIVE
PLATELET # BLD: 181 K/CU MM (ref 140–440)
PLATELET # BLD: 223 K/CU MM (ref 140–440)
PMV BLD AUTO: 9.6 FL (ref 7.5–11.1)
PMV BLD AUTO: 9.6 FL (ref 7.5–11.1)
POTASSIUM SERPL-SCNC: 3.7 MMOL/L (ref 3.5–5.1)
RBC # BLD: 4.95 M/CU MM (ref 4.6–6.2)
RBC # BLD: 5.05 M/CU MM (ref 4.6–6.2)
SEGMENTED NEUTROPHILS ABSOLUTE COUNT: 3.2 K/CU MM
SEGMENTED NEUTROPHILS RELATIVE PERCENT: 47.5 % (ref 36–66)
SODIUM BLD-SCNC: 141 MMOL/L (ref 135–145)
SODIUM URINE: 88 MMOL/L (ref 35–167)
TOTAL CK: 198 IU/L (ref 38–174)
TOTAL IMMATURE NEUTOROPHIL: 0.03 K/CU MM
TOTAL NUCLEATED RBC: 0 K/CU MM
TOTAL PROTEIN: 6.7 GM/DL (ref 6.4–8.2)
TROPONIN T: 0.2 NG/ML
TROPONIN T: 0.28 NG/ML
TSH HIGH SENSITIVITY: 0.78 UIU/ML (ref 0.27–4.2)
VITAMIN B-12: 862.5 PG/ML (ref 211–911)
WBC # BLD: 6.4 K/CU MM (ref 4–10.5)
WBC # BLD: 6.7 K/CU MM (ref 4–10.5)

## 2020-12-03 PROCEDURE — 83930 ASSAY OF BLOOD OSMOLALITY: CPT

## 2020-12-03 PROCEDURE — 2580000003 HC RX 258: Performed by: INTERNAL MEDICINE

## 2020-12-03 PROCEDURE — 6370000000 HC RX 637 (ALT 250 FOR IP): Performed by: INTERNAL MEDICINE

## 2020-12-03 PROCEDURE — 82728 ASSAY OF FERRITIN: CPT

## 2020-12-03 PROCEDURE — 84443 ASSAY THYROID STIM HORMONE: CPT

## 2020-12-03 PROCEDURE — 6370000000 HC RX 637 (ALT 250 FOR IP): Performed by: SPECIALIST

## 2020-12-03 PROCEDURE — 82746 ASSAY OF FOLIC ACID SERUM: CPT

## 2020-12-03 PROCEDURE — 80053 COMPREHEN METABOLIC PANEL: CPT

## 2020-12-03 PROCEDURE — 85027 COMPLETE CBC AUTOMATED: CPT

## 2020-12-03 PROCEDURE — 85025 COMPLETE CBC W/AUTO DIFF WBC: CPT

## 2020-12-03 PROCEDURE — 84484 ASSAY OF TROPONIN QUANT: CPT

## 2020-12-03 PROCEDURE — 82962 GLUCOSE BLOOD TEST: CPT

## 2020-12-03 PROCEDURE — 99222 1ST HOSP IP/OBS MODERATE 55: CPT | Performed by: INTERNAL MEDICINE

## 2020-12-03 PROCEDURE — 6360000002 HC RX W HCPCS: Performed by: HOSPITALIST

## 2020-12-03 PROCEDURE — 2580000003 HC RX 258: Performed by: HOSPITALIST

## 2020-12-03 PROCEDURE — 1200000000 HC SEMI PRIVATE

## 2020-12-03 PROCEDURE — 82607 VITAMIN B-12: CPT

## 2020-12-03 PROCEDURE — 2580000003 HC RX 258: Performed by: PHYSICIAN ASSISTANT

## 2020-12-03 PROCEDURE — 83615 LACTATE (LD) (LDH) ENZYME: CPT

## 2020-12-03 PROCEDURE — 6370000000 HC RX 637 (ALT 250 FOR IP): Performed by: HOSPITALIST

## 2020-12-03 PROCEDURE — 86141 C-REACTIVE PROTEIN HS: CPT

## 2020-12-03 PROCEDURE — 82550 ASSAY OF CK (CPK): CPT

## 2020-12-03 RX ORDER — HEPARIN SODIUM 1000 [USP'U]/ML
60 INJECTION, SOLUTION INTRAVENOUS; SUBCUTANEOUS ONCE
Status: COMPLETED | OUTPATIENT
Start: 2020-12-03 | End: 2020-12-03

## 2020-12-03 RX ORDER — CLOPIDOGREL BISULFATE 75 MG/1
75 TABLET ORAL DAILY
Status: DISCONTINUED | OUTPATIENT
Start: 2020-12-03 | End: 2020-12-07 | Stop reason: HOSPADM

## 2020-12-03 RX ORDER — HEPARIN SODIUM 10000 [USP'U]/100ML
12 INJECTION, SOLUTION INTRAVENOUS CONTINUOUS
Status: DISCONTINUED | OUTPATIENT
Start: 2020-12-03 | End: 2020-12-04

## 2020-12-03 RX ORDER — HEPARIN SODIUM 1000 [USP'U]/ML
60 INJECTION, SOLUTION INTRAVENOUS; SUBCUTANEOUS PRN
Status: DISCONTINUED | OUTPATIENT
Start: 2020-12-03 | End: 2020-12-04

## 2020-12-03 RX ORDER — HEPARIN SODIUM 1000 [USP'U]/ML
30 INJECTION, SOLUTION INTRAVENOUS; SUBCUTANEOUS PRN
Status: DISCONTINUED | OUTPATIENT
Start: 2020-12-03 | End: 2020-12-04

## 2020-12-03 RX ADMIN — CARVEDILOL 6.25 MG: 6.25 TABLET, FILM COATED ORAL at 17:44

## 2020-12-03 RX ADMIN — ATORVASTATIN CALCIUM 40 MG: 40 TABLET, FILM COATED ORAL at 22:32

## 2020-12-03 RX ADMIN — LATANOPROST 1 DROP: 50 SOLUTION/ DROPS OPHTHALMIC at 22:49

## 2020-12-03 RX ADMIN — HEPARIN SODIUM AND DEXTROSE 12 UNITS/KG/HR: 10000; 5 INJECTION INTRAVENOUS at 18:29

## 2020-12-03 RX ADMIN — DILTIAZEM HYDROCHLORIDE 60 MG: 60 TABLET, FILM COATED ORAL at 22:31

## 2020-12-03 RX ADMIN — GABAPENTIN 100 MG: 100 CAPSULE ORAL at 22:31

## 2020-12-03 RX ADMIN — SODIUM CHLORIDE: 9 INJECTION, SOLUTION INTRAVENOUS at 17:45

## 2020-12-03 RX ADMIN — HEPARIN SODIUM 3700 UNITS: 1000 INJECTION, SOLUTION INTRAVENOUS; SUBCUTANEOUS at 18:26

## 2020-12-03 RX ADMIN — ENOXAPARIN SODIUM 30 MG: 30 INJECTION SUBCUTANEOUS at 10:01

## 2020-12-03 RX ADMIN — INSULIN LISPRO 1 UNITS: 100 INJECTION, SOLUTION INTRAVENOUS; SUBCUTANEOUS at 13:23

## 2020-12-03 RX ADMIN — CILOSTAZOL 100 MG: 50 TABLET ORAL at 10:00

## 2020-12-03 RX ADMIN — DEXTROSE MONOHYDRATE 1 G: 5 INJECTION INTRAVENOUS at 17:44

## 2020-12-03 RX ADMIN — ASPIRIN 81 MG CHEWABLE TABLET 81 MG: 81 TABLET CHEWABLE at 10:00

## 2020-12-03 RX ADMIN — CLOPIDOGREL BISULFATE 75 MG: 75 TABLET ORAL at 17:44

## 2020-12-03 RX ADMIN — SODIUM CHLORIDE, PRESERVATIVE FREE 10 ML: 5 INJECTION INTRAVENOUS at 22:49

## 2020-12-03 RX ADMIN — CILOSTAZOL 100 MG: 50 TABLET ORAL at 22:31

## 2020-12-03 RX ADMIN — INSULIN LISPRO 1 UNITS: 100 INJECTION, SOLUTION INTRAVENOUS; SUBCUTANEOUS at 17:52

## 2020-12-03 NOTE — PROGRESS NOTES
Hospitalist             Daily Progress  Note   Subjective:     Chief Complaint   Patient presents with    Abdominal Pain     -he says his abdomen is doing ok, he states he is tolerating his diet.  Not feeling short of breath    Objective:    /87   Pulse 88   Temp 98 °F (36.7 °C) (Oral)   Resp 17   Ht 5' 10.98\" (1.803 m)   Wt 136 lb (61.7 kg)   SpO2 96%   BMI 18.98 kg/m²    No intake or output data in the 24 hours ending 12/03/20 0854   Physical Exam:  Gen: No acute distress, laying in bed  HEENT: NCAT  Heart:  s1s2 rrr  Lungs: seems clear to auscultation, bs+  Abdomen: Soft, nontender, nondistended, no guarding, no rigidity  Extremities: b/l bka  CNS: no gross deficits, aao to person    Labs:  CBC with Differential:    Lab Results   Component Value Date    WBC 5.0 12/02/2020    RBC 4.41 12/02/2020    HGB 12.9 12/02/2020    HCT 40.2 12/02/2020     12/02/2020    MCV 91.2 12/02/2020    MCH 29.3 12/02/2020    MCHC 32.1 12/02/2020    RDW 12.4 12/02/2020    SEGSPCT 39.6 12/02/2020    BANDSPCT 8 09/03/2019    LYMPHOPCT 38.8 12/02/2020    MONOPCT 20.2 12/02/2020    MYELOPCT 1 09/02/2019    BASOPCT 0.6 12/02/2020    MONOSABS 1.0 12/02/2020    LYMPHSABS 1.9 12/02/2020    EOSABS 0.0 12/02/2020    BASOSABS 0.0 12/02/2020    DIFFTYPE AUTOMATED DIFFERENTIAL 12/02/2020     CMP:    Lab Results   Component Value Date     12/02/2020    K 3.6 12/02/2020     12/02/2020    CO2 19 12/02/2020    BUN 21 12/02/2020    CREATININE 1.3 12/02/2020    GFRAA >60 12/02/2020    LABGLOM 53 12/02/2020    GLUCOSE 170 12/02/2020    PROT 6.3 12/02/2020    PROT 8.0 08/30/2012    LABALBU 3.8 12/02/2020    CALCIUM 9.2 12/02/2020    BILITOT 0.4 12/02/2020    ALKPHOS 69 12/02/2020    AST 47 12/02/2020    ALT 18 12/02/2020     Recent Labs     12/01/20  1027   TROPONINT <0.010     Lab Results   Component Value Date    TSH 0.600 03/31/2020         sodium chloride 100 mL/hr at 12/01/20 1001    dextrose        insulin lispro  0-6 Units Subcutaneous TID WC    insulin lispro  0-3 Units Subcutaneous Nightly    enoxaparin  30 mg Subcutaneous BID    carvedilol  6.25 mg Oral BID WC    atorvastatin  40 mg Oral Nightly    dilTIAZem  60 mg Oral BID    gabapentin  100 mg Oral Nightly    aspirin  81 mg Oral Daily    latanoprost  1 drop Both Eyes Nightly    insulin NPH  10 Units Subcutaneous BID AC    sodium chloride flush  10 mL Intravenous 2 times per day    cilostazol  100 mg Oral BID             Assessment/plan:     RLQ Abdominal Pains  -may have passed a stone. GI recommends performing ERCP as outpatient, when COVID-19 infection resolves since there is no acute indication for it. Acute on chronic metabolic encephalopathy  -MRI brain: No acute process. Checking ammonia level. Check TSH and VBG. Mental status improving. Possibly may have been due to dehydration. Cipro has been stopped. -Ammonia: WNL. UDS: Negative. Hyponatremia  -Given IVF. UA suggestive of dehydration.  -Sodium 135. Urine sodium 88    Hypertension  -On Coreg, Cardizem. DM2  -A1c 7.9. On NPH. Start SSI. COVID-19 infection 12/1, incidental  -Not requiring oxygen. No steroids needed at this time. Increase Lovenox. Meningioma  -seen on CT head. DVT Prophylaxis: Lovenox  Diet: DIET GENERAL; Carb Control: 5 carb choices (75 gms)/meal  Dietary Nutrition Supplements: Diabetic Oral Supplement  Home O2: None  Code Status: DNR-CCA    Dispo:   Plan is home with home health care upon discharge. Patient is doing better. Will discuss with family.     Juan Pace MD  12/3/2020

## 2020-12-03 NOTE — CONSULTS
INPATIENT CARDIOLOGY CONSULT NOTE       Reason for consultation: elevated troponins     Referring physician:  Jacklyn Salvador MD     Primary care physician: Agus Lopez MD      Dear Jacklyn Salvador MD Thank you for the consult    Chief Complaint   Patient presents with    Abdominal Pain       History of present illness:Dawit is a 79 y. o.year old who  presents with  Chief Complaint   Patient presents with    Abdominal Pain       79year-old gentleman with prior medical history significant for  CAD status post CABG, hypertension, hyperlipidemia, presents to the hospital with chief complaint of abdominal pain nausea vomiting. Incidentally the patient was found to have elevated cardiac troponin. Cardiologist consulted for possible non-ST relation myocardial infarction. Patient has dementia and currently going through acute on chronic metabolic encephalopathy. I discussed the case with patient's primary hospitalist Dr. Alfie Brooke in detail. Medical records and charts were reviewed at length. EKG shows normal sinus rhythm at 95 bpm    Last echocardiogram was in September 2019 which showed LV ejection fraction of around 50% otherwise unremarkable study. Patient is post CABG with his last left heart cath in 2015 which shows the following. Severe native CAD. Patent left circumflex artery and OM1 with severe disease. Moderate stenosis in ramus artery RCA dominant vessel with ostial stenosis. SVG to left circumflex artery patent  LIMA to LAD patent with a stent patent. No RCA graft were noted. Assessment and plan:      1. Non-ST elevation myocardial infarction. Abdominal pain could be an atypical symptom of angina. First troponin negative second troponin  0.23. EKG unremarkable. Start patient on IV heparin drip for 48 hours. Start patient on Plavix and continue aspirin. Continue with Coreg and Lipitor.   Due to COVID-19 positivity, lack of ongoing symptoms, acute encephalopathy, will defer left heart cath. Medical management with above. follow echocardiogram  2. Abdominal pain: GI consulted, possible viral gastroenteritis from Covid, possible atypical angina. 3. Hypertension: Continue with Coreg, diltiazem. 4. Hyperlipidemia continue with Lipitor 40 mg daily          Past medical history:    has a past medical history of Arthritis, Biliary stones, CAD (coronary artery disease), COPD (chronic obstructive pulmonary disease) ([facility] [address] Diabetes mellitus ([facility] [address] History of blood transfusion, Hx of angiography, Hyperlipidemia, Hypertension, and Kidney stone. Past surgical history:   has a past surgical history that includes Cardiac surgery; joint replacement; Cholecystectomy; Coronary angioplasty with stent; fracture surgery; Leg amputation below knee (Bilateral); ERCP (03/13/14); other surgical history (03/13/14); ERCP (9/11/14); ERCP (10/24/15); ERCP (03/18/2017); Abdomen surgery; Dilatation, esophagus; ERCP (N/A, 1/25/2019); ERCP (N/A, 9/7/2019); and ERCP (N/A, 3/31/2020). Social History:   reports that he has never smoked. He has never used smokeless tobacco. He reports that he does not drink alcohol or use drugs.   Family history:   no family history of CAD, STROKE of DM    Allergies   Allergen Reactions    Byetta 10 Mcg Pen [Exenatide]     Phenylephrine     Sulbactam     Ampicillin Rash    Aricept [Donepezil Hydrochloride] Other (See Comments)     Hallucinations, confusion    Benztropine Other (See Comments)     Hallucinations, confusion    Celebrex [Celecoxib] Other (See Comments)     'causes him to be nervous and jittery\"    Diphenhydramine Hcl Other (See Comments)     nervous and jittery    Diphenhydramine Hcl [Diphenhydramine] Anxiety    Exenatide Other (See Comments)     Causes him to be nervous and jittery    Metoprolol Succinate Other (See Comments)     Hallucinations     Phenergan [Promethazine Hcl] Other (See Comments)     Hallucinations, nervous, jittery    Plavix [Clopidogrel Bisulfate] Other (See Comments)     Causes bleeding in his stomach    Pseudoephedrine Anxiety    Reglan [Metoclopramide Hcl] Other (See Comments)     \"Caused him to just sit in a chair and rock back and forth\"       cefTRIAXone (ROCEPHIN) 1 g IVPB in 50 mL D5W minibag, Q24H  insulin lispro (HUMALOG) injection vial 0-6 Units, TID   insulin lispro (HUMALOG) injection vial 0-3 Units, Nightly  enoxaparin (LOVENOX) injection 30 mg, BID  0.9 % sodium chloride infusion, Continuous  carvedilol (COREG) tablet 6.25 mg, BID WC  atorvastatin (LIPITOR) tablet 40 mg, Nightly  dilTIAZem (CARDIZEM) tablet 60 mg, BID  gabapentin (NEURONTIN) capsule 100 mg, Nightly  aspirin chewable tablet 81 mg, Daily  latanoprost (XALATAN) 0.005 % ophthalmic solution 1 drop, Nightly  insulin NPH (HUMULIN N;NOVOLIN N) injection vial 10 Units, BID AC  sodium chloride flush 0.9 % injection 10 mL, 2 times per day  sodium chloride flush 0.9 % injection 10 mL, PRN  acetaminophen (TYLENOL) tablet 650 mg, Q6H PRN    Or  acetaminophen (TYLENOL) suppository 650 mg, Q6H PRN  magnesium hydroxide (MILK OF MAGNESIA) 400 MG/5ML suspension 30 mL, Daily PRN  ondansetron (ZOFRAN) injection 4 mg, Q6H PRN  cilostazol (PLETAL) tablet 100 mg, BID  glucose (GLUTOSE) 40 % oral gel 15 g, PRN  dextrose 50 % IV solution, PRN  glucagon (rDNA) injection 1 mg, PRN  dextrose 5 % solution, PRN      Current Facility-Administered Medications   Medication Dose Route Frequency Provider Last Rate Last Dose    cefTRIAXone (ROCEPHIN) 1 g IVPB in 50 mL D5W minibag  1 g Intravenous Q24H Damien Phillips MD        insulin lispro (HUMALOG) injection vial 0-6 Units  0-6 Units Subcutaneous TID  Damien Phillips MD   1 Units at 12/03/20 1323    insulin lispro (HUMALOG) injection vial 0-3 Units  0-3 Units Subcutaneous Nightly Damien Phillips MD        enoxaparin (LOVENOX) injection 30 mg  30 mg Subcutaneous BID Damien Phillips MD   30 mg at 12/03/20 1001    0.9 % sodium chloride infusion Intravenous Continuous Vinayak Kruse PA-C 100 mL/hr at 12/01/20 1001      carvedilol (COREG) tablet 6.25 mg  6.25 mg Oral BID WC Bradenjenny Castañeda MD   6.25 mg at 12/02/20 1753    atorvastatin (LIPITOR) tablet 40 mg  40 mg Oral Nightly Braden Janny Castañeda MD   40 mg at 12/01/20 2012    dilTIAZem (CARDIZEM) tablet 60 mg  60 mg Oral BID Braden Janny Castañeda MD   60 mg at 12/02/20 1054    gabapentin (NEURONTIN) capsule 100 mg  100 mg Oral Nightly Braden Janny Castañeda MD   100 mg at 12/01/20 2012    aspirin chewable tablet 81 mg  81 mg Oral Daily Braden Janny Castañeda MD   81 mg at 12/03/20 1000    latanoprost (XALATAN) 0.005 % ophthalmic solution 1 drop  1 drop Both Eyes Nightly Braden Castañeda MD        insulin NPH (HUMULIN N;NOVOLIN N) injection vial 10 Units  10 Units Subcutaneous BID AC Braden Castañeda MD   10 Units at 12/02/20 1057    sodium chloride flush 0.9 % injection 10 mL  10 mL Intravenous 2 times per day Kandace Pendleton MD   10 mL at 12/02/20 2136    sodium chloride flush 0.9 % injection 10 mL  10 mL Intravenous PRN Braden Castañeda MD        acetaminophen (TYLENOL) tablet 650 mg  650 mg Oral Q6H PRN Braden Castañeda MD        Or    acetaminophen (TYLENOL) suppository 650 mg  650 mg Rectal Q6H PRN Braden Castañeda MD        magnesium hydroxide (MILK OF MAGNESIA) 400 MG/5ML suspension 30 mL  30 mL Oral Daily PRN Braden Castañeda MD        ondansetron (ZOFRAN) injection 4 mg  4 mg Intravenous Q6H PRN Braden Castañeda MD        cilostazol (PLETAL) tablet 100 mg  100 mg Oral BID Radha Valladares MD   100 mg at 12/03/20 1000    glucose (GLUTOSE) 40 % oral gel 15 g  15 g Oral PRN Naresh Gtz MD        dextrose 50 % IV solution  12.5 g Intravenous PRN Naresh Gtz MD        glucagon (rDNA) injection 1 mg  1 mg Intramuscular PRN Naresh Gtz MD        dextrose 5 % solution  100 mL/hr Intravenous PRN Naresh Gtz MD             Review of Systems:     Pt is reported confused/encephalopathy        Physical Examination: Vitals:    12/03/20 0947   BP: (!) 102/91   Pulse: 86   Resp: 16   Temp: 97.7 °F (36.5 °C)   SpO2: 99%      Wt Readings from Last 3 Encounters:   12/03/20 136 lb (61.7 kg)   07/04/20 137 lb 3.2 oz (62.2 kg)   04/11/20 142 lb 3.2 oz (64.5 kg)     Body mass index is 18.98 kg/m². Due to the current efforts to prevent transmission of COVID-19 and also the need to preserve PPE for other caregivers, a face-to-face encounter with the patient was not performed. That being said, all relevant records and diagnostic tests were reviewed, including laboratory results and imaging. Please reference any relevant documentation elsewhere. Care will be coordinated with the primary service. Lab Review     Recent Labs     12/03/20  1530   WBC 6.7   HGB 15.1   HCT 45.5         Recent Labs     12/03/20  1530      K 3.7      CO2 22   BUN 26*   CREATININE 1.5*     Recent Labs     12/03/20  1530   AST 64*   ALT 25   BILITOT 0.3   ALKPHOS 78     No results for input(s): TROPONINI in the last 72 hours. Lab Results   Component Value Date    BNP 9 03/11/2014     (H) 08/30/2012     Lab Results   Component Value Date    INR 1.02 12/02/2020    PROTIME 12.3 12/02/2020         All labs, images, EKGs were personally reviewed      Assessment: 79 y. o.year old with PMH of  has a past medical history of Arthritis, Biliary stones, CAD (coronary artery disease), COPD (chronic obstructive pulmonary disease) ([facility] [address] Diabetes mellitus ([facility] [address] History of blood transfusion, Hx of angiography, Hyperlipidemia, Hypertension, and Kidney stone.          Dr. Saldana Organ  12/3/2020 4:42 PM

## 2020-12-03 NOTE — PROGRESS NOTES
PT DOING FAIR CONFUSED ON OCCASIONS NO VOMITING OR GROSS GIT BLEEDING ORAL INTAKE FAIR  CIPRO D/C BY NEUROLOGY  VITALS STABLE  AFEBRILE  LABS NOTED WBC NORMAL CT ABD NEGATIVE FOR AC FINDINGS WITH NO CBD DILATION  D/W PTS DAUGHTER SCOTT PTS NURSE AND DR Sachi Vazquez  WILL CPM  RECOMMEND SHORT COURSE OF ANTIBIOTIC ALSO WILL F/U LABS TX CONSERVATIVELY FOR NOW

## 2020-12-03 NOTE — PROGRESS NOTES
Patient refused all medications and refused care. Patient is confused and not easily reoriented and keeps trying to get out of bed.  is in the room.

## 2020-12-03 NOTE — PROGRESS NOTES
Spoke with patients daughter. She requested Dr. Lyndon Elliott contact her. Perfect serve sent with daughters name and phone number.

## 2020-12-03 NOTE — PROGRESS NOTES
NEUROLOGY NOTE  DR. Araceli Hall MD.  -------------------------------------------------  Subjective:    Pt is doing some better    Doing better. Denies any new symptoms. Denies headache nausea vomiting dizziness    Denies numbness weakness extremities    Denies blurring of vision double vision    Objective:    BP (!) 181/84   Pulse 68   Temp 98 °F (36.7 °C) (Oral)   Resp 16   Ht 5' 10.98\" (1.803 m)   Wt 139 lb 1.6 oz (63.1 kg)   SpO2 97%   BMI 19.41 kg/m²   HEENT nl      Neuro exam    Drowsy but slightly more awake than before  Sluggish oculocephalics pupils 3 mm marcio        RADIOLOGY  -----------------    Ct Head Wo Contrast    Result Date: 12/1/2020  EXAMINATION: CT OF THE HEAD WITHOUT CONTRAST  12/1/2020 4:25 am TECHNIQUE: CT of the head was performed without the administration of intravenous contrast. Dose modulation, iterative reconstruction, and/or weight based adjustment of the mA/kV was utilized to reduce the radiation dose to as low as reasonably achievable. COMPARISON: MRI brain without and with contrast March 30, 2020. CT head scan without contrast March 29, 2020. HISTORY: ORDERING SYSTEM PROVIDED HISTORY: ams TECHNOLOGIST PROVIDED HISTORY: Has a \"code stroke\" or \"stroke alert\" been called? ->No Reason for exam:->ams Reason for Exam: ams FINDINGS: BRAIN/VENTRICLES: Anterior left infratentorial incisura dural-based mass lesion is noted measuring 15 mm in diameter. There is no acute intracranial hemorrhage, mass effect or midline shift. No abnormal extra-axial fluid collection. The gray-white differentiation is maintained without evidence of an acute infarct. There is no evidence of hydrocephalus. Mild diffuse decrease in cerebral volume is noted with corresponding prominence of the sulci and ventricles. Ill-defined hypoattenuation is noted within cerebral white matter consistent with mild microvascular ischemic change.  ORBITS: The visualized portion of the orbits demonstrate no acute abnormality. SINUSES: The visualized paranasal sinuses and mastoid air cells demonstrate no acute abnormality. SOFT TISSUES/SKULL:  No acute abnormality of the visualized skull or soft tissues. No acute intracranial abnormality. Mild cerebral white matter chronic microvascular ischemic disease. Mild diffuse cerebral atrophy. Anterior left infratentorial incisura 15 mm diameter dural-based mass lesion most suggestive of meningioma. Ct Abdomen Pelvis W Iv Contrast    Result Date: 11/30/2020  EXAMINATION: CT OF THE ABDOMEN AND PELVIS WITH CONTRAST 11/30/2020 9:32 pm TECHNIQUE: CT of the abdomen and pelvis was performed with the administration of intravenous contrast. Multiplanar reformatted images are provided for review. Dose modulation, iterative reconstruction, and/or weight based adjustment of the mA/kV was utilized to reduce the radiation dose to as low as reasonably achievable. COMPARISON: 07/02/2020 HISTORY: ORDERING SYSTEM PROVIDED HISTORY: abdominal pain TECHNOLOGIST PROVIDED HISTORY: IV contrast only. Thank you. Reason for exam:->abdominal pain Reason for exam:->IV contrast only. Thank you. Reason for Exam: abdominal pain, pt was confused and not a very good historian for this tech. FINDINGS: Lower Chest:  Visualized portion of the lower chest demonstrates no acute abnormality. There is a small hiatal hernia. Organs: There is extensive pneumobilia. There is moderate bile duct dilation both intrahepatic and extrahepatic. The main pancreatic duct is moderately dilated. There is no acute abnormality of the liver, pancreas, spleen, adrenals, or kidneys. GI/Bowel: Bowel caliber is normal.  There is no evidence of active bowel inflammation. There is no evidence of acute appendicitis. Pelvis: No acute abnormality of the pelvic viscera. Peritoneum/Retroperitoneum: There is no free air or free fluid. Lymph nodes are not enlarged. There are atherosclerotic vascular calcifications.  Bones/Soft Tissues: No acute osseous abnormality. Moderate-to-severe spinal canal stenosis at L4-5 is chronic. No acute abnormality. Xr Chest Portable    Result Date: 11/30/2020  EXAMINATION: ONE XRAY VIEW OF THE CHEST 11/30/2020 7:03 pm COMPARISON: 03/29/2020 HISTORY: ORDERING SYSTEM PROVIDED HISTORY: abdominal pain TECHNOLOGIST PROVIDED HISTORY: Reason for exam:->abdominal pain Reason for Exam: abdominal pain Acuity: Unknown Type of Exam: Initial FINDINGS: Postoperative changes were noted from prior sternotomy. No cardiomegaly, pneumonia, interstitial edema or pleural effusions were noted. No hilar mass was identified. The regional skeleton was unremarkable. No significant change has occurred from 03/29/2020. No cardiomegaly, pneumonia or interstitial edema. No significant change has occurred from 03/29/2020. Mri Brain W Wo Contrast    Result Date: 12/1/2020  EXAMINATION: MRI OF THE BRAIN WITHOUT AND WITH CONTRAST  12/1/2020 4:06 pm TECHNIQUE: Multiplanar multisequence MRI of the head/brain was performed without and with the administration of intravenous contrast. COMPARISON: MRI March 30, 2020 CT brain December 1, 2020 HISTORY: ORDERING SYSTEM PROVIDED HISTORY: r/o cva-mass lesion-acute infarct TECHNOLOGIST PROVIDED HISTORY: Reason for exam:->r/o cva-mass lesion-acute infarct Reason for Exam: COVID CVA OR MASS LESION ACUTE INFARCT GFR 53 11 ML MULTIHANCE FINDINGS: INTRACRANIAL STRUCTURES/VENTRICLES: Stable appearance to enhancing sellar and juxta sellar mass with extension along the left petrous apex into the left ambient cistern. This is identical in size when compared to the prior study. No change in hands from pattern. In there is no evidence for restricted diffusion. No change in enhancement pattern of the cisternal portion or the juxta sellar portion.  No evidence for hemorrhage no evidence for acute infarct no change in appearance of the brain when compared to prior study ORBITS: The visualized portion of the orbits demonstrate no acute abnormality. SINUSES: The visualized paranasal sinuses and mastoid air cells are well aerated. BONES/SOFT TISSUES: The bone marrow signal intensity appears normal. The soft tissues demonstrate no acute abnormality. Stable appearance of the brain. No acute infarct.  No change in sellar and left ambient cisternal mass when compared to the prior study       LAB RESULTS  --------------------    Recent Results (from the past 24 hour(s))   POCT Glucose    Collection Time: 12/02/20 10:56 AM   Result Value Ref Range    POC Glucose 158 (H) 70 - 99 MG/DL   Comprehensive Metabolic Panel w/ Reflex to MG    Collection Time: 12/02/20  3:16 PM   Result Value Ref Range    Sodium 135 135 - 145 MMOL/L    Potassium 3.6 3.5 - 5.1 MMOL/L    Chloride 100 99 - 110 mMol/L    CO2 19 (L) 21 - 32 MMOL/L    BUN 21 6 - 23 MG/DL    CREATININE 1.3 0.9 - 1.3 MG/DL    Glucose 170 (H) 70 - 99 MG/DL    Calcium 9.2 8.3 - 10.6 MG/DL    Alb 3.8 3.4 - 5.0 GM/DL    Total Protein 6.3 (L) 6.4 - 8.2 GM/DL    Total Bilirubin 0.4 0.0 - 1.0 MG/DL    ALT 18 10 - 40 U/L    AST 47 (H) 15 - 37 IU/L    Alkaline Phosphatase 69 40 - 129 IU/L    GFR Non- 53 (L) >60 mL/min/1.73m2    GFR African American >60 >60 mL/min/1.73m2    Anion Gap 16 4 - 16   CBC auto differential    Collection Time: 12/02/20  3:16 PM   Result Value Ref Range    WBC 5.0 4.0 - 10.5 K/CU MM    RBC 4.41 (L) 4.6 - 6.2 M/CU MM    Hemoglobin 12.9 (L) 13.5 - 18.0 GM/DL    Hematocrit 40.2 (L) 42 - 52 %    MCV 91.2 78 - 100 FL    MCH 29.3 27 - 31 PG    MCHC 32.1 32.0 - 36.0 %    RDW 12.4 11.7 - 14.9 %    Platelets 434 498 - 536 K/CU MM    MPV 9.4 7.5 - 11.1 FL    Differential Type AUTOMATED DIFFERENTIAL     Segs Relative 39.6 36 - 66 %    Lymphocytes % 38.8 24 - 44 %    Monocytes % 20.2 (H) 0 - 4 %    Eosinophils % 0.4 0 - 3 %    Basophils % 0.6 0 - 1 %    Segs Absolute 2.0 K/CU MM    Lymphocytes Absolute 1.9 K/CU MM    Monocytes Absolute 1.0 K/CU MM Eosinophils Absolute 0.0 K/CU MM    Basophils Absolute 0.0 K/CU MM    Nucleated RBC % 0.0 %    Total Nucleated RBC 0.0 K/CU MM    Total Immature Neutrophil 0.02 K/CU MM    Immature Neutrophil % 0.4 0 - 0.43 %   Amylase    Collection Time: 12/02/20  3:16 PM   Result Value Ref Range    Amylase 80 25 - 115 U/L   Lipase    Collection Time: 12/02/20  3:16 PM   Result Value Ref Range    Lipase 40 13 - 60 IU/L   Protime/INR & PTT    Collection Time: 12/02/20  3:16 PM   Result Value Ref Range    Protime 12.3 11.7 - 14.5 SECONDS    INR 1.02 INDEX    aPTT 30.9 25.1 - 37.1 SECONDS   POCT Glucose    Collection Time: 12/02/20  4:01 PM   Result Value Ref Range    POC Glucose 196 (H) 70 - 99 MG/DL   POCT Glucose    Collection Time: 12/02/20  9:05 PM   Result Value Ref Range    POC Glucose 129 (H) 70 - 99 MG/DL         Medical problems    Patient Active Problem List:     Uncontrolled type II diabetes with peripheral autonomic neuropathy (HCC)     Hypertension, essential     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)     Choledocholithiasis     Generalized abdominal pain     COPD (chronic obstructive pulmonary disease) (HCC)     Abdominal pain, epigastric     Elevated LFTs     Abnormal findings on imaging of biliary tract     Chest pain     Coronary artery disease involving coronary bypass graft of native heart without angina pectoris     Acute encephalopathy     Vomiting     Sepsis (HCC)     Nausea vomiting and diarrhea     Fever     Metabolic encephalopathy     Impaired cognition     Generalized weakness     Cholangitis due to bile duct calculus with obstruction     Oropharyngeal dysphagia     S/P BKA (below knee amputation) bilateral (HCC)     Abdominal pain      ASSESSMENT:  ---------------------    Delerium     Metabolic encephalopathy     Hypernatremia ?  Dehydration     ? 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     Discussed dx prognosis meds side effects and above with pt and answered all questions.         Electronically signed by Star Cardoso MD on 12/2/2020 at 11:57 PM

## 2020-12-04 LAB
ALBUMIN SERPL-MCNC: 3.5 GM/DL (ref 3.4–5)
ALP BLD-CCNC: 65 IU/L (ref 40–129)
ALT SERPL-CCNC: 20 U/L (ref 10–40)
AMYLASE: 70 U/L (ref 25–115)
ANION GAP SERPL CALCULATED.3IONS-SCNC: 18 MMOL/L (ref 4–16)
APTT: 83.9 SECONDS (ref 25.1–37.1)
APTT: >240 SECONDS (ref 25.1–37.1)
AST SERPL-CCNC: 52 IU/L (ref 15–37)
BASOPHILS ABSOLUTE: 0 K/CU MM
BASOPHILS RELATIVE PERCENT: 0.4 % (ref 0–1)
BILIRUB SERPL-MCNC: 0.3 MG/DL (ref 0–1)
BUN BLDV-MCNC: 30 MG/DL (ref 6–23)
CALCIUM SERPL-MCNC: 8.5 MG/DL (ref 8.3–10.6)
CHLORIDE BLD-SCNC: 107 MMOL/L (ref 99–110)
CO2: 19 MMOL/L (ref 21–32)
CREAT SERPL-MCNC: 1.6 MG/DL (ref 0.9–1.3)
DIFFERENTIAL TYPE: ABNORMAL
EOSINOPHILS ABSOLUTE: 0.1 K/CU MM
EOSINOPHILS RELATIVE PERCENT: 1.4 % (ref 0–3)
FERRITIN: 310 NG/ML (ref 30–400)
GFR AFRICAN AMERICAN: 50 ML/MIN/1.73M2
GFR NON-AFRICAN AMERICAN: 42 ML/MIN/1.73M2
GLUCOSE BLD-MCNC: 105 MG/DL (ref 70–99)
GLUCOSE BLD-MCNC: 166 MG/DL (ref 70–99)
GLUCOSE BLD-MCNC: 182 MG/DL (ref 70–99)
GLUCOSE BLD-MCNC: 195 MG/DL (ref 70–99)
GLUCOSE BLD-MCNC: 89 MG/DL (ref 70–99)
HCT VFR BLD CALC: 40.6 % (ref 42–52)
HEMOGLOBIN: 13 GM/DL (ref 13.5–18)
HIGH SENSITIVE C-REACTIVE PROTEIN: 13.6 MG/L
IMMATURE NEUTROPHIL %: 0.4 % (ref 0–0.43)
INR BLD: 1.09 INDEX
LIPASE: 37 IU/L (ref 13–60)
LV EF: 40 %
LVEF MODALITY: NORMAL
LYMPHOCYTES ABSOLUTE: 2.6 K/CU MM
LYMPHOCYTES RELATIVE PERCENT: 45.3 % (ref 24–44)
MCH RBC QN AUTO: 29 PG (ref 27–31)
MCHC RBC AUTO-ENTMCNC: 32 % (ref 32–36)
MCV RBC AUTO: 90.6 FL (ref 78–100)
MONOCYTES ABSOLUTE: 0.7 K/CU MM
MONOCYTES RELATIVE PERCENT: 12 % (ref 0–4)
NUCLEATED RBC %: 0 %
PDW BLD-RTO: 12.7 % (ref 11.7–14.9)
PLATELET # BLD: 181 K/CU MM (ref 140–440)
PMV BLD AUTO: 9.9 FL (ref 7.5–11.1)
POTASSIUM SERPL-SCNC: 3.1 MMOL/L (ref 3.5–5.1)
PROTHROMBIN TIME: 13.2 SECONDS (ref 11.7–14.5)
RBC # BLD: 4.48 M/CU MM (ref 4.6–6.2)
SEGMENTED NEUTROPHILS ABSOLUTE COUNT: 2.3 K/CU MM
SEGMENTED NEUTROPHILS RELATIVE PERCENT: 40.5 % (ref 36–66)
SODIUM BLD-SCNC: 144 MMOL/L (ref 135–145)
TOTAL CK: 122 IU/L (ref 38–174)
TOTAL IMMATURE NEUTOROPHIL: 0.02 K/CU MM
TOTAL NUCLEATED RBC: 0 K/CU MM
TOTAL PROTEIN: 5.7 GM/DL (ref 6.4–8.2)
TROPONIN T: 0.14 NG/ML
WBC # BLD: 5.7 K/CU MM (ref 4–10.5)

## 2020-12-04 PROCEDURE — 85610 PROTHROMBIN TIME: CPT

## 2020-12-04 PROCEDURE — 82150 ASSAY OF AMYLASE: CPT

## 2020-12-04 PROCEDURE — 82728 ASSAY OF FERRITIN: CPT

## 2020-12-04 PROCEDURE — 2580000003 HC RX 258: Performed by: INTERNAL MEDICINE

## 2020-12-04 PROCEDURE — 83690 ASSAY OF LIPASE: CPT

## 2020-12-04 PROCEDURE — 6370000000 HC RX 637 (ALT 250 FOR IP): Performed by: SPECIALIST

## 2020-12-04 PROCEDURE — 99233 SBSQ HOSP IP/OBS HIGH 50: CPT | Performed by: INTERNAL MEDICINE

## 2020-12-04 PROCEDURE — 80053 COMPREHEN METABOLIC PANEL: CPT

## 2020-12-04 PROCEDURE — 93306 TTE W/DOPPLER COMPLETE: CPT

## 2020-12-04 PROCEDURE — 6370000000 HC RX 637 (ALT 250 FOR IP): Performed by: INTERNAL MEDICINE

## 2020-12-04 PROCEDURE — 1200000000 HC SEMI PRIVATE

## 2020-12-04 PROCEDURE — 82962 GLUCOSE BLOOD TEST: CPT

## 2020-12-04 PROCEDURE — 2580000003 HC RX 258: Performed by: HOSPITALIST

## 2020-12-04 PROCEDURE — 2580000003 HC RX 258: Performed by: PHYSICIAN ASSISTANT

## 2020-12-04 PROCEDURE — 82550 ASSAY OF CK (CPK): CPT

## 2020-12-04 PROCEDURE — 6360000002 HC RX W HCPCS: Performed by: HOSPITALIST

## 2020-12-04 PROCEDURE — 86141 C-REACTIVE PROTEIN HS: CPT

## 2020-12-04 PROCEDURE — 6370000000 HC RX 637 (ALT 250 FOR IP): Performed by: HOSPITALIST

## 2020-12-04 PROCEDURE — 85730 THROMBOPLASTIN TIME PARTIAL: CPT

## 2020-12-04 PROCEDURE — 85025 COMPLETE CBC W/AUTO DIFF WBC: CPT

## 2020-12-04 RX ADMIN — CLOPIDOGREL BISULFATE 75 MG: 75 TABLET ORAL at 10:59

## 2020-12-04 RX ADMIN — INSULIN HUMAN 10 UNITS: 100 INJECTION, SUSPENSION SUBCUTANEOUS at 11:12

## 2020-12-04 RX ADMIN — CILOSTAZOL 100 MG: 50 TABLET ORAL at 20:47

## 2020-12-04 RX ADMIN — GABAPENTIN 100 MG: 100 CAPSULE ORAL at 20:47

## 2020-12-04 RX ADMIN — DEXTROSE MONOHYDRATE 1 G: 5 INJECTION INTRAVENOUS at 18:32

## 2020-12-04 RX ADMIN — ACETAMINOPHEN 650 MG: 325 TABLET ORAL at 11:03

## 2020-12-04 RX ADMIN — CARVEDILOL 6.25 MG: 6.25 TABLET, FILM COATED ORAL at 10:59

## 2020-12-04 RX ADMIN — INSULIN LISPRO 1 UNITS: 100 INJECTION, SOLUTION INTRAVENOUS; SUBCUTANEOUS at 18:32

## 2020-12-04 RX ADMIN — DILTIAZEM HYDROCHLORIDE 60 MG: 60 TABLET, FILM COATED ORAL at 10:59

## 2020-12-04 RX ADMIN — SODIUM CHLORIDE: 9 INJECTION, SOLUTION INTRAVENOUS at 16:07

## 2020-12-04 RX ADMIN — ACETAMINOPHEN 650 MG: 325 TABLET ORAL at 20:48

## 2020-12-04 RX ADMIN — DILTIAZEM HYDROCHLORIDE 60 MG: 60 TABLET, FILM COATED ORAL at 20:47

## 2020-12-04 RX ADMIN — INSULIN HUMAN 10 UNITS: 100 INJECTION, SUSPENSION SUBCUTANEOUS at 18:32

## 2020-12-04 RX ADMIN — SODIUM CHLORIDE, PRESERVATIVE FREE 10 ML: 5 INJECTION INTRAVENOUS at 20:48

## 2020-12-04 RX ADMIN — ATORVASTATIN CALCIUM 40 MG: 40 TABLET, FILM COATED ORAL at 20:47

## 2020-12-04 RX ADMIN — CILOSTAZOL 100 MG: 50 TABLET ORAL at 10:59

## 2020-12-04 RX ADMIN — ASPIRIN 81 MG CHEWABLE TABLET 81 MG: 81 TABLET CHEWABLE at 10:59

## 2020-12-04 ASSESSMENT — PAIN SCALES - GENERAL
PAINLEVEL_OUTOF10: 3

## 2020-12-04 NOTE — PROGRESS NOTES
CARDIOLOGY PROGRESS NOTE                                                  Name:  Bhavani Soriano /Age/Sex: 1940  (79 y.o. male)   MRN & CSN:  [id number] & [id number] Admission Date/Time: 2020  7:56 PM   Location:  [address] [place] [address] PCP: Cornelius Davis MD         Admit Date:  2020  Hospital Day: 5      SUBJECTIVE:   Seen patient as follow up as consultation for elevated troponins     No chest pain today   + shortness of breath  No palpations    TELEMETRY: Sinus       Intake/Output Summary (Last 24 hours) at 2020 1329  Last data filed at 12/3/2020 2242  Gross per 24 hour   Intake --   Output 100 ml   Net -100 ml       Assessment/Plan:           1. Non-ST elevation myocardial infarction. Abdominal pain could be an atypical symptom of angina. First troponin negative second troponin  0.23. EKG unremarkable. s/p IV heparin drip . Start patient on Plavix and continue aspirin. Continue with Coreg and Lipitor. Due to COVID-19 positivity, lack of ongoing symptoms, acute encephalopathy, will defer left heart cath. Medical management with above. follow echocardiogram  2. Abdominal pain: GI consulted, possible viral gastroenteritis from Covid, possible atypical angina. 3. Hypertension: Continue with Coreg, diltiazem. 4. Hyperlipidemia continue with Lipitor 40 mg daily     Discussed with Dr. Lupis Mc   Will stop IV Heparin   Cont DAPT on discharge   Conservative management and outpt follow up       Past medical history:    has a past medical history of Arthritis, Biliary stones, CAD (coronary artery disease), COPD (chronic obstructive pulmonary disease) ([facility] [address] Diabetes mellitus ([facility] [address] History of blood transfusion, Hx of angiography, Hyperlipidemia, Hypertension, and Kidney stone. Past surgical history:   has a past surgical history that includes Cardiac surgery; joint replacement;  Cholecystectomy; Coronary angioplasty with stent; fracture surgery; Leg amputation below knee (Bilateral); ERCP clopidogrel  75 mg Oral Daily    insulin lispro  0-6 Units Subcutaneous TID WC    insulin lispro  0-3 Units Subcutaneous Nightly    carvedilol  6.25 mg Oral BID WC    atorvastatin  40 mg Oral Nightly    dilTIAZem  60 mg Oral BID    gabapentin  100 mg Oral Nightly    aspirin  81 mg Oral Daily    latanoprost  1 drop Both Eyes Nightly    insulin NPH  10 Units Subcutaneous BID AC    sodium chloride flush  10 mL Intravenous 2 times per day    cilostazol  100 mg Oral BID      heparin (PORCINE) Infusion 8 Units/kg/hr (12/04/20 1018)    sodium chloride 100 mL/hr at 12/03/20 1745    dextrose       heparin (porcine), heparin (porcine), sodium chloride flush, acetaminophen **OR** acetaminophen, magnesium hydroxide, [DISCONTINUED] promethazine **OR** ondansetron, glucose, dextrose, glucagon (rDNA), dextrose  Allergies   Allergen Reactions    Byetta 10 Mcg Pen [Exenatide]     Phenylephrine     Sulbactam     Ampicillin Rash    Aricept [Donepezil Hydrochloride] Other (See Comments)     Hallucinations, confusion    Benztropine Other (See Comments)     Hallucinations, confusion    Celebrex [Celecoxib] Other (See Comments)     'causes him to be nervous and jittery\"    Diphenhydramine Hcl Other (See Comments)     nervous and jittery    Diphenhydramine Hcl [Diphenhydramine] Anxiety    Exenatide Other (See Comments)     Causes him to be nervous and jittery    Metoprolol Succinate Other (See Comments)     Hallucinations     Phenergan [Promethazine Hcl] Other (See Comments)     Hallucinations, nervous, jittery    Plavix [Clopidogrel Bisulfate] Other (See Comments)     Causes bleeding in his stomach    Pseudoephedrine Anxiety    Reglan [Metoclopramide Hcl] Other (See Comments)     \"Caused him to just sit in a chair and rock back and forth\"       Lab Data:  CBC:   Recent Labs     12/03/20  1530 12/03/20  1905 12/04/20  0512   WBC 6.7 6.4 5.7   HGB 15.1 15.0 13.0*   HCT 45.5 44.4 40.6*   MCV 90.1 89.7 90.6   PLT 223 181 181     BMP:   Recent Labs     12/02/20  1516 12/03/20  1530 12/04/20  0512    141 144   K 3.6 3.7 3.1*    102 107   CO2 19* 22 19*   BUN 21 26* 30*   CREATININE 1.3 1.5* 1.6*     LIVER PROFILE:   Recent Labs     12/02/20  1516 12/03/20  1530 12/04/20  0512   AST 47* 64* 52*   ALT 18 25 20   LIPASE 40  --  37   BILITOT 0.4 0.3 0.3   ALKPHOS 69 78 65     PT/INR:   Recent Labs     12/02/20  1516 12/04/20  0512   PROTIME 12.3 13.2   INR 1.02 1.09     APTT:   Recent Labs     12/02/20  1516 12/04/20  0010 12/04/20  0512   APTT 30.9 >240.0* 83.9*     BNP:  No results for input(s): BNP in the last 72 hours.       Jordan Wills MD 12/4/2020 1:29 PM

## 2020-12-04 NOTE — PROGRESS NOTES
Spoke with Dr. Mily Galdamez and will start ceftriaxone for possible cholangitis. Spoke with Dr. Silver Castellanos and will place on heparin drip due to concern for NSTEMI and check echo.

## 2020-12-04 NOTE — PROGRESS NOTES
Comprehensive Nutrition Assessment    Type and Reason for Visit:  Reassess    Nutrition Recommendations/Plan:   Continue carb controlled diet per order, no caffeine  Continue to offer diabetic oral nutrition supplement with meals  Encourage consistent intake     Nutrition Assessment:  Remains on carb controlled diet with diabetic supplement offered at meals. Has been on clear liquid/NPO some during stay with eval for recurrent CBD stones. Limited po data, reasonable/adequate meal orders. Remains high nutrition risk at this time. Malnutrition Assessment:  Malnutrition Status:  Insufficient data    Context:  Acute Illness       Estimated Daily Nutrient Needs:  Energy (kcal):  6497-7009(28-32 kcal/kg); Weight Used for Energy Requirements:  Current     Protein (g):  76-88(1.20-1.4 g/kg); Weight Used for Protein Requirements:  Current        Fluid (ml/day):  5365-3026; Method Used for Fluid Requirements:  1 ml/kcal      Nutrition Related Findings:  remains on COVID unit, confused at times      Wounds:  None       Current Nutrition Therapies:    DIET GENERAL; Carb Control: 5 carb choices (75 gms)/meal  Dietary Nutrition Supplements: Diabetic Oral Supplement    Anthropometric Measures:  · Height: 5' 10.98\" (180.3 cm)  · Current Body Weight: 130 lb 8.2 oz (59.2 kg)   · Admission Body Weight:      · Usual Body Weight: 137 lb 2 oz (62.2 kg)((7/2/20) most recent wt hx available)     · Ideal Body Weight: 172 lbs; % Ideal Body Weight 80.9 %   · BMI: 18.2  · Adjusted Body Weight: 145.9;  Amputation   · Adjusted BMI: 20.3    · BMI Categories: Underweight (BMI less than 22) age over 72       Nutrition Diagnosis:   · Underweight related to inadequate protein-energy intake as evidenced by BMI      Nutrition Interventions:   Food and/or Nutrient Delivery:  Continue Current Diet, Continue Oral Nutrition Supplement  Nutrition Education/Counseling:  No recommendation at this time   Coordination of Nutrition Care:  Continue to monitor while inpatient    Goals:  pt will consume greater than 50% of meals and supplements       Nutrition Monitoring and Evaluation:   Behavioral-Environmental Outcomes:  None Identified   Food/Nutrient Intake Outcomes:  Diet Advancement/Tolerance, Food and Nutrient Intake, Supplement Intake  Physical Signs/Symptoms Outcomes:  Biochemical Data, GI Status, Skin, Weight     Discharge Planning:    Continue current diet     Electronically signed by Matt Cook RD, LD on 12/4/20 at 11:31 AM EST    Contact: [phone]

## 2020-12-04 NOTE — PROGRESS NOTES
Hospitalist             Daily Progress  Note   Subjective:     Chief Complaint   Patient presents with    Abdominal Pain     -he feels he is doing ok  -patient was started on heparin drip yesterday due to elevated troponin    Objective:    /78   Pulse 89   Temp 97.7 °F (36.5 °C) (Oral)   Resp 19   Ht 5' 10.98\" (1.803 m)   Wt 130 lb 8 oz (59.2 kg)   SpO2 98%   BMI 18.21 kg/m²      Intake/Output Summary (Last 24 hours) at 12/4/2020 0934  Last data filed at 12/3/2020 2242  Gross per 24 hour   Intake --   Output 100 ml   Net -100 ml      Physical Exam:  Gen: No acute distress, sitting back in bed  HEENT: NCAT  Heart:  s1s2 rrr  Lungs: seems clear to auscultation, bs+  Abdomen: Soft, nontender, nondistended, no guarding, no rigidity  Extremities: b/l bka  CNS: no gross deficits, aao to person    Labs:  CBC with Differential:    Lab Results   Component Value Date    WBC 5.7 12/04/2020    RBC 4.48 12/04/2020    HGB 13.0 12/04/2020    HCT 40.6 12/04/2020     12/04/2020    MCV 90.6 12/04/2020    MCH 29.0 12/04/2020    MCHC 32.0 12/04/2020    RDW 12.7 12/04/2020    SEGSPCT 40.5 12/04/2020    BANDSPCT 8 09/03/2019    LYMPHOPCT 45.3 12/04/2020    MONOPCT 12.0 12/04/2020    MYELOPCT 1 09/02/2019    BASOPCT 0.4 12/04/2020    MONOSABS 0.7 12/04/2020    LYMPHSABS 2.6 12/04/2020    EOSABS 0.1 12/04/2020    BASOSABS 0.0 12/04/2020    DIFFTYPE AUTOMATED DIFFERENTIAL 12/04/2020     CMP:    Lab Results   Component Value Date     12/04/2020    K 3.1 12/04/2020     12/04/2020    CO2 19 12/04/2020    BUN 30 12/04/2020    CREATININE 1.6 12/04/2020    GFRAA 50 12/04/2020    LABGLOM 42 12/04/2020    GLUCOSE 89 12/04/2020    PROT 5.7 12/04/2020    PROT 8.0 08/30/2012    LABALBU 3.5 12/04/2020    CALCIUM 8.5 12/04/2020    BILITOT 0.3 12/04/2020    ALKPHOS 65 12/04/2020    AST 52 12/04/2020    ALT 20 12/04/2020     Recent Labs     12/03/20  0000 12/03/20  1530 12/03/20  1850   TROPONINT 0.143* 0.281* 0.196*     Lab Results   Component Value Date    [facility] 0.777 12/03/2020         heparin (PORCINE) Infusion Stopped (12/04/20 0128)    sodium chloride 100 mL/hr at 12/03/20 1745    dextrose        cefTRIAXone (ROCEPHIN) IV  1 g Intravenous Q24H    clopidogrel  75 mg Oral Daily    insulin lispro  0-6 Units Subcutaneous TID WC    insulin lispro  0-3 Units Subcutaneous Nightly    carvedilol  6.25 mg Oral BID WC    atorvastatin  40 mg Oral Nightly    dilTIAZem  60 mg Oral BID    gabapentin  100 mg Oral Nightly    aspirin  81 mg Oral Daily    latanoprost  1 drop Both Eyes Nightly    insulin NPH  10 Units Subcutaneous BID AC    sodium chloride flush  10 mL Intravenous 2 times per day    cilostazol  100 mg Oral BID             Assessment/plan:     RLQ Abdominal Pains  -may have passed a stone. GI recommends performing ERCP as outpatient, when COVID-19 infection resolves since there is no acute indication for it.  -It is also possible that abdominal pain may have been cardiogenic. D/W GI and place patient on Rocephin for possible occult cholangitis. Acute on chronic metabolic encephalopathy  -MRI brain: No acute process. Check VBG. Mental status improving. Possibly may have been due to dehydration. Cipro has been stopped. -Ammonia: WNL. UDS: Negative. -TSH: WNL. Hyponatremia  -Given IVF. UA suggestive of dehydration.  -Sodium 135. Urine sodium 88    Hypertension  -On Coreg, Cardizem. DM2  -A1c 7.9. On NPH. Start SSI. COVID-19 infection 12/1, incidental  -Not requiring oxygen. No steroids needed at this time. -CRP 13. On heparin drip    Meningioma  -seen on CT head. Acute kidney injury  -Creatinine 1.6. On IVF. Check bladder scan. NSTEMI, history CAD  -elevated troponin. On heparin drip for 48 hours. On aspirin. Started on Plavix. On beta-blocker. On statin. Cardiology plans for conservative medical management. Check echo.         DVT Prophylaxis: Lovenox  Diet: DIET GENERAL; Carb Control: 5 carb choices (75 gms)/meal  Dietary Nutrition Supplements: Diabetic Oral Supplement  Home O2: None  Code Status: DNR-CCA    Dispo:   Plan is home with home health care upon discharge. Patient is doing better. Possible DC this weekend.     Haven Dumont MD  12/4/2020

## 2020-12-04 NOTE — PROGRESS NOTES
NEUROLOGY NOTE  DR. Star Cardoso MD.  -------------------------------------------------  Subjective:    Doing better. Denies any new symptoms. Denies headache nausea vomiting dizziness    Denies numbness weakness extremities    Denies blurring of vision double vision    Objective:    /73   Pulse 78   Temp 97.6 °F (36.4 °C) (Oral)   Resp 18   Ht 5' 10.98\" (1.803 m)   Wt 136 lb (61.7 kg)   SpO2 97%   BMI 18.98 kg/m²   HEENT nl      Neuro exam    Drowsy  Barely folows commsnds        RADIOLOGY  -----------------    Ct Head Wo Contrast    Result Date: 12/1/2020  EXAMINATION: CT OF THE HEAD WITHOUT CONTRAST  12/1/2020 4:25 am TECHNIQUE: CT of the head was performed without the administration of intravenous contrast. Dose modulation, iterative reconstruction, and/or weight based adjustment of the mA/kV was utilized to reduce the radiation dose to as low as reasonably achievable. COMPARISON: MRI brain without and with contrast March 30, 2020. CT head scan without contrast March 29, 2020. HISTORY: ORDERING SYSTEM PROVIDED HISTORY: ams TECHNOLOGIST PROVIDED HISTORY: Has a \"code stroke\" or \"stroke alert\" been called? ->No Reason for exam:->ams Reason for Exam: ams FINDINGS: BRAIN/VENTRICLES: Anterior left infratentorial incisura dural-based mass lesion is noted measuring 15 mm in diameter. There is no acute intracranial hemorrhage, mass effect or midline shift. No abnormal extra-axial fluid collection. The gray-white differentiation is maintained without evidence of an acute infarct. There is no evidence of hydrocephalus. Mild diffuse decrease in cerebral volume is noted with corresponding prominence of the sulci and ventricles. Ill-defined hypoattenuation is noted within cerebral white matter consistent with mild microvascular ischemic change. ORBITS: The visualized portion of the orbits demonstrate no acute abnormality.  SINUSES: The visualized paranasal sinuses and mastoid air cells demonstrate no acute abnormality. SOFT TISSUES/SKULL:  No acute abnormality of the visualized skull or soft tissues. No acute intracranial abnormality. Mild cerebral white matter chronic microvascular ischemic disease. Mild diffuse cerebral atrophy. Anterior left infratentorial incisura 15 mm diameter dural-based mass lesion most suggestive of meningioma. Ct Abdomen Pelvis W Iv Contrast    Result Date: 11/30/2020  EXAMINATION: CT OF THE ABDOMEN AND PELVIS WITH CONTRAST 11/30/2020 9:32 pm TECHNIQUE: CT of the abdomen and pelvis was performed with the administration of intravenous contrast. Multiplanar reformatted images are provided for review. Dose modulation, iterative reconstruction, and/or weight based adjustment of the mA/kV was utilized to reduce the radiation dose to as low as reasonably achievable. COMPARISON: 07/02/2020 HISTORY: ORDERING SYSTEM PROVIDED HISTORY: abdominal pain TECHNOLOGIST PROVIDED HISTORY: IV contrast only. Thank you. Reason for exam:->abdominal pain Reason for exam:->IV contrast only. Thank you. Reason for Exam: abdominal pain, pt was confused and not a very good historian for this tech. FINDINGS: Lower Chest:  Visualized portion of the lower chest demonstrates no acute abnormality. There is a small hiatal hernia. Organs: There is extensive pneumobilia. There is moderate bile duct dilation both intrahepatic and extrahepatic. The main pancreatic duct is moderately dilated. There is no acute abnormality of the liver, pancreas, spleen, adrenals, or kidneys. GI/Bowel: Bowel caliber is normal.  There is no evidence of active bowel inflammation. There is no evidence of acute appendicitis. Pelvis: No acute abnormality of the pelvic viscera. Peritoneum/Retroperitoneum: There is no free air or free fluid. Lymph nodes are not enlarged. There are atherosclerotic vascular calcifications. Bones/Soft Tissues: No acute osseous abnormality. Moderate-to-severe spinal canal stenosis at L4-5 is chronic. No acute abnormality. Xr Chest Portable    Result Date: 11/30/2020  EXAMINATION: ONE XRAY VIEW OF THE CHEST 11/30/2020 7:03 pm COMPARISON: 03/29/2020 HISTORY: ORDERING SYSTEM PROVIDED HISTORY: abdominal pain TECHNOLOGIST PROVIDED HISTORY: Reason for exam:->abdominal pain Reason for Exam: abdominal pain Acuity: Unknown Type of Exam: Initial FINDINGS: Postoperative changes were noted from prior sternotomy. No cardiomegaly, pneumonia, interstitial edema or pleural effusions were noted. No hilar mass was identified. The regional skeleton was unremarkable. No significant change has occurred from 03/29/2020. No cardiomegaly, pneumonia or interstitial edema. No significant change has occurred from 03/29/2020. Mri Brain W Wo Contrast    Result Date: 12/1/2020  EXAMINATION: MRI OF THE BRAIN WITHOUT AND WITH CONTRAST  12/1/2020 4:06 pm TECHNIQUE: Multiplanar multisequence MRI of the head/brain was performed without and with the administration of intravenous contrast. COMPARISON: MRI March 30, 2020 CT brain December 1, 2020 HISTORY: ORDERING SYSTEM PROVIDED HISTORY: r/o cva-mass lesion-acute infarct TECHNOLOGIST PROVIDED HISTORY: Reason for exam:->r/o cva-mass lesion-acute infarct Reason for Exam: COVID CVA OR MASS LESION ACUTE INFARCT GFR 53 11 ML MULTIHANCE FINDINGS: INTRACRANIAL STRUCTURES/VENTRICLES: Stable appearance to enhancing sellar and juxta sellar mass with extension along the left petrous apex into the left ambient cistern. This is identical in size when compared to the prior study. No change in hands from pattern. In there is no evidence for restricted diffusion. No change in enhancement pattern of the cisternal portion or the juxta sellar portion. No evidence for hemorrhage no evidence for acute infarct no change in appearance of the brain when compared to prior study ORBITS: The visualized portion of the orbits demonstrate no acute abnormality.  SINUSES: The visualized paranasal sinuses and mastoid air cells are well aerated. BONES/SOFT TISSUES: The bone marrow signal intensity appears normal. The soft tissues demonstrate no acute abnormality. Stable appearance of the brain. No acute infarct.  No change in sellar and left ambient cisternal mass when compared to the prior study       LAB RESULTS  --------------------    Recent Results (from the past 24 hour(s))   POCT Glucose    Collection Time: 12/03/20  9:56 AM   Result Value Ref Range    POC Glucose 96 70 - 99 MG/DL   POCT Glucose    Collection Time: 12/03/20  1:11 PM   Result Value Ref Range    POC Glucose 194 (H) 70 - 99 MG/DL   CBC Auto Differential    Collection Time: 12/03/20  3:30 PM   Result Value Ref Range    WBC 6.7 4.0 - 10.5 K/CU MM    RBC 5.05 4.6 - 6.2 M/CU MM    Hemoglobin 15.1 13.5 - 18.0 GM/DL    Hematocrit 45.5 42 - 52 %    MCV 90.1 78 - 100 FL    MCH 29.9 27 - 31 PG    MCHC 33.2 32.0 - 36.0 %    RDW 12.7 11.7 - 14.9 %    Platelets 624 973 - 427 K/CU MM    MPV 9.6 7.5 - 11.1 FL    Differential Type AUTOMATED DIFFERENTIAL     Segs Relative 47.5 36 - 66 %    Lymphocytes % 40.7 24 - 44 %    Monocytes % 10.0 (H) 0 - 4 %    Eosinophils % 0.7 0 - 3 %    Basophils % 0.7 0 - 1 %    Segs Absolute 3.2 K/CU MM    Lymphocytes Absolute 2.7 K/CU MM    Monocytes Absolute 0.7 K/CU MM    Eosinophils Absolute 0.1 K/CU MM    Basophils Absolute 0.1 K/CU MM    Nucleated RBC % 0.0 %    Total Nucleated RBC 0.0 K/CU MM    Total Immature Neutrophil 0.03 K/CU MM    Immature Neutrophil % 0.4 0 - 0.43 %   Comprehensive Metabolic Panel    Collection Time: 12/03/20  3:30 PM   Result Value Ref Range    Sodium 141 135 - 145 MMOL/L    Potassium 3.7 3.5 - 5.1 MMOL/L    Chloride 102 99 - 110 mMol/L    CO2 22 21 - 32 MMOL/L    BUN 26 (H) 6 - 23 MG/DL    CREATININE 1.5 (H) 0.9 - 1.3 MG/DL    Glucose 115 (H) 70 - 99 MG/DL    Calcium 9.7 8.3 - 10.6 MG/DL    Alb 4.1 3.4 - 5.0 GM/DL    Total Protein 6.7 6.4 - 8.2 GM/DL    Total Bilirubin 0.3 0.0 - 1.0 MG/DL    ALT 25 10 - 40 U/L    AST 64 (H) 15 - 37 IU/L    Alkaline Phosphatase 78 40 - 129 IU/L    GFR Non- 45 (L) >60 mL/min/1.73m2    GFR  54 (L) >60 mL/min/1.73m2    Anion Gap 17 (H) 4 - 16   CK    Collection Time: 12/03/20  3:30 PM   Result Value Ref Range    Total  (H) 38 - 174 IU/L   C-Reactive Protein    Collection Time: 12/03/20  3:30 PM   Result Value Ref Range    CRP, High Sensitivity 13.6 mg/L   Troponin    Collection Time: 12/03/20  3:30 PM   Result Value Ref Range    Troponin T 0.281 (HH) <0.01 NG/ML   Lactate Dehydrogenase    Collection Time: 12/03/20  3:30 PM   Result Value Ref Range     (H) 120 - 246 IU/L   Ferritin    Collection Time: 12/03/20  3:30 PM   Result Value Ref Range    Ferritin 305 30 - 400 NG/ML   Vitamin B12 & Folate    Collection Time: 12/03/20  3:30 PM   Result Value Ref Range    Vitamin B-12 862.5 211 - 911 pg/ml    Folate >20.0 (H) 3.1 - 17.5 NG/ML   TSH without Reflex    Collection Time: 12/03/20  3:30 PM   Result Value Ref Range    TSH, High Sensitivity 0.777 0.270 - 4.20 uIu/ml   Osmolality    Collection Time: 12/03/20  3:30 PM   Result Value Ref Range    Osmolality 296 280 - 300 MOS/L   POCT Glucose    Collection Time: 12/03/20  5:43 PM   Result Value Ref Range    POC Glucose 155 (H) 70 - 99 MG/DL   Troponin    Collection Time: 12/03/20  6:50 PM   Result Value Ref Range    Troponin T 0.196 (HH) <0.01 NG/ML   CBC    Collection Time: 12/03/20  7:05 PM   Result Value Ref Range    WBC 6.4 4.0 - 10.5 K/CU MM    RBC 4.95 4.6 - 6.2 M/CU MM    Hemoglobin 15.0 13.5 - 18.0 GM/DL    Hematocrit 44.4 42 - 52 %    MCV 89.7 78 - 100 FL    MCH 30.3 27 - 31 PG    MCHC 33.8 32.0 - 36.0 %    RDW 12.7 11.7 - 14.9 %    Platelets 355 766 - 206 K/CU MM    MPV 9.6 7.5 - 11.1 FL   POCT Glucose    Collection Time: 12/03/20 10:25 PM   Result Value Ref Range    POC Glucose 118 (H) 70 - 99 MG/DL         Medical problems    Patient Active Problem List:     Uncontrolled type II diabetes with peripheral autonomic neuropathy (HCC)     Hypertension, essential     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)     Choledocholithiasis     Generalized abdominal pain     COPD (chronic obstructive pulmonary disease) (HCC)     Abdominal pain, epigastric     Elevated LFTs     Abnormal findings on imaging of biliary tract     Chest pain     Coronary artery disease involving coronary bypass graft of native heart without angina pectoris     Acute encephalopathy     Vomiting     Sepsis (HCC)     Nausea vomiting and diarrhea     Fever     Metabolic encephalopathy     Impaired cognition     Generalized weakness     Cholangitis due to bile duct calculus with obstruction     Oropharyngeal dysphagia     S/P BKA (below knee amputation) bilateral (HCC)     Abdominal pain     Inguinal pain      ASSESSMENT:  ---------------------            Electronically signed by Xin Giang MD on 12/4/2020 at 12:24 AM

## 2020-12-05 LAB
ALBUMIN SERPL-MCNC: 3.5 GM/DL (ref 3.4–5)
ALP BLD-CCNC: 55 IU/L (ref 40–128)
ALT SERPL-CCNC: 18 U/L (ref 10–40)
ANION GAP SERPL CALCULATED.3IONS-SCNC: 11 MMOL/L (ref 4–16)
AST SERPL-CCNC: 44 IU/L (ref 15–37)
BILIRUB SERPL-MCNC: 0.2 MG/DL (ref 0–1)
BUN BLDV-MCNC: 31 MG/DL (ref 6–23)
CALCIUM SERPL-MCNC: 8.5 MG/DL (ref 8.3–10.6)
CHLORIDE BLD-SCNC: 109 MMOL/L (ref 99–110)
CO2: 20 MMOL/L (ref 21–32)
CREAT SERPL-MCNC: 1.4 MG/DL (ref 0.9–1.3)
FERRITIN: 247 NG/ML (ref 30–400)
GFR AFRICAN AMERICAN: 59 ML/MIN/1.73M2
GFR NON-AFRICAN AMERICAN: 49 ML/MIN/1.73M2
GLUCOSE BLD-MCNC: 126 MG/DL (ref 70–99)
GLUCOSE BLD-MCNC: 135 MG/DL (ref 70–99)
GLUCOSE BLD-MCNC: 56 MG/DL (ref 70–99)
GLUCOSE BLD-MCNC: 61 MG/DL (ref 70–99)
GLUCOSE BLD-MCNC: 66 MG/DL (ref 70–99)
GLUCOSE BLD-MCNC: 80 MG/DL (ref 70–99)
GLUCOSE BLD-MCNC: 99 MG/DL (ref 70–99)
HIGH SENSITIVE C-REACTIVE PROTEIN: 11.6 MG/L
POTASSIUM SERPL-SCNC: 2.9 MMOL/L (ref 3.5–5.1)
SODIUM BLD-SCNC: 140 MMOL/L (ref 135–145)
TOTAL CK: 109 IU/L (ref 38–174)
TOTAL PROTEIN: 5.3 GM/DL (ref 6.4–8.2)

## 2020-12-05 PROCEDURE — 85730 THROMBOPLASTIN TIME PARTIAL: CPT

## 2020-12-05 PROCEDURE — 82728 ASSAY OF FERRITIN: CPT

## 2020-12-05 PROCEDURE — 87324 CLOSTRIDIUM AG IA: CPT

## 2020-12-05 PROCEDURE — 2580000003 HC RX 258: Performed by: INTERNAL MEDICINE

## 2020-12-05 PROCEDURE — 6370000000 HC RX 637 (ALT 250 FOR IP): Performed by: INTERNAL MEDICINE

## 2020-12-05 PROCEDURE — 2580000003 HC RX 258: Performed by: PHYSICIAN ASSISTANT

## 2020-12-05 PROCEDURE — 2580000003 HC RX 258: Performed by: HOSPITALIST

## 2020-12-05 PROCEDURE — 6360000002 HC RX W HCPCS: Performed by: HOSPITALIST

## 2020-12-05 PROCEDURE — 85025 COMPLETE CBC W/AUTO DIFF WBC: CPT

## 2020-12-05 PROCEDURE — 94761 N-INVAS EAR/PLS OXIMETRY MLT: CPT

## 2020-12-05 PROCEDURE — 82962 GLUCOSE BLOOD TEST: CPT

## 2020-12-05 PROCEDURE — 6370000000 HC RX 637 (ALT 250 FOR IP): Performed by: HOSPITALIST

## 2020-12-05 PROCEDURE — 86141 C-REACTIVE PROTEIN HS: CPT

## 2020-12-05 PROCEDURE — 6370000000 HC RX 637 (ALT 250 FOR IP): Performed by: SPECIALIST

## 2020-12-05 PROCEDURE — 80053 COMPREHEN METABOLIC PANEL: CPT

## 2020-12-05 PROCEDURE — 1200000000 HC SEMI PRIVATE

## 2020-12-05 PROCEDURE — 82550 ASSAY OF CK (CPK): CPT

## 2020-12-05 PROCEDURE — 51798 US URINE CAPACITY MEASURE: CPT

## 2020-12-05 RX ORDER — POTASSIUM CHLORIDE 7.45 MG/ML
10 INJECTION INTRAVENOUS PRN
Status: DISCONTINUED | OUTPATIENT
Start: 2020-12-05 | End: 2020-12-07 | Stop reason: HOSPADM

## 2020-12-05 RX ORDER — POTASSIUM CHLORIDE 20 MEQ/1
40 TABLET, EXTENDED RELEASE ORAL PRN
Status: DISCONTINUED | OUTPATIENT
Start: 2020-12-05 | End: 2020-12-07 | Stop reason: HOSPADM

## 2020-12-05 RX ORDER — CHOLESTYRAMINE LIGHT 4 G/5.7G
4 POWDER, FOR SUSPENSION ORAL 2 TIMES DAILY
Status: DISCONTINUED | OUTPATIENT
Start: 2020-12-05 | End: 2020-12-07 | Stop reason: HOSPADM

## 2020-12-05 RX ORDER — LOPERAMIDE HYDROCHLORIDE 2 MG/1
2 CAPSULE ORAL 4 TIMES DAILY PRN
Status: DISCONTINUED | OUTPATIENT
Start: 2020-12-05 | End: 2020-12-05

## 2020-12-05 RX ORDER — POTASSIUM CHLORIDE 20 MEQ/1
40 TABLET, EXTENDED RELEASE ORAL 2 TIMES DAILY
Status: DISCONTINUED | OUTPATIENT
Start: 2020-12-05 | End: 2020-12-07 | Stop reason: HOSPADM

## 2020-12-05 RX ADMIN — SODIUM CHLORIDE: 9 INJECTION, SOLUTION INTRAVENOUS at 16:35

## 2020-12-05 RX ADMIN — CARVEDILOL 6.25 MG: 6.25 TABLET, FILM COATED ORAL at 09:07

## 2020-12-05 RX ADMIN — SODIUM CHLORIDE, PRESERVATIVE FREE 10 ML: 5 INJECTION INTRAVENOUS at 09:07

## 2020-12-05 RX ADMIN — DILTIAZEM HYDROCHLORIDE 60 MG: 60 TABLET, FILM COATED ORAL at 21:15

## 2020-12-05 RX ADMIN — ATORVASTATIN CALCIUM 40 MG: 40 TABLET, FILM COATED ORAL at 21:15

## 2020-12-05 RX ADMIN — DEXTROSE MONOHYDRATE 1 G: 5 INJECTION INTRAVENOUS at 16:35

## 2020-12-05 RX ADMIN — CLOPIDOGREL BISULFATE 75 MG: 75 TABLET ORAL at 09:07

## 2020-12-05 RX ADMIN — CILOSTAZOL 100 MG: 50 TABLET ORAL at 21:15

## 2020-12-05 RX ADMIN — SODIUM CHLORIDE: 9 INJECTION, SOLUTION INTRAVENOUS at 07:44

## 2020-12-05 RX ADMIN — CARVEDILOL 6.25 MG: 6.25 TABLET, FILM COATED ORAL at 16:35

## 2020-12-05 RX ADMIN — CILOSTAZOL 100 MG: 50 TABLET ORAL at 09:07

## 2020-12-05 RX ADMIN — DILTIAZEM HYDROCHLORIDE 60 MG: 60 TABLET, FILM COATED ORAL at 09:07

## 2020-12-05 RX ADMIN — ASPIRIN 81 MG CHEWABLE TABLET 81 MG: 81 TABLET CHEWABLE at 09:07

## 2020-12-05 RX ADMIN — POTASSIUM CHLORIDE 40 MEQ: 1500 TABLET, EXTENDED RELEASE ORAL at 21:15

## 2020-12-05 RX ADMIN — ENOXAPARIN SODIUM 30 MG: 30 INJECTION SUBCUTANEOUS at 21:15

## 2020-12-05 RX ADMIN — CHOLESTYRAMINE 4 G: 4 POWDER, FOR SUSPENSION ORAL at 21:27

## 2020-12-05 RX ADMIN — GABAPENTIN 100 MG: 100 CAPSULE ORAL at 21:15

## 2020-12-05 RX ADMIN — POTASSIUM CHLORIDE 40 MEQ: 1500 TABLET, EXTENDED RELEASE ORAL at 16:35

## 2020-12-05 ASSESSMENT — PAIN SCALES - GENERAL: PAINLEVEL_OUTOF10: 0

## 2020-12-05 NOTE — PROGRESS NOTES
Hospitalist             Daily Progress  Note   Subjective:     Chief Complaint   Patient presents with    Abdominal Pain     -he states he has been having a lot of diarrhea  -no complaints of abdominal pain    Objective:    /78   Pulse 74   Temp 98 °F (36.7 °C) (Oral)   Resp 17   Ht 5' 10.98\" (1.803 m)   Wt 130 lb 8 oz (59.2 kg)   SpO2 98%   BMI 18.21 kg/m²      Intake/Output Summary (Last 24 hours) at 12/5/2020 1415  Last data filed at 12/4/2020 2021  Gross per 24 hour   Intake 1050 ml   Output --   Net 1050 ml      Physical Exam:  Gen: No acute distress, laying in bed  HEENT: NCAT  Heart:  s1s2 rrr  Lungs: seems clear to auscultation, bs+  Abdomen: Soft, nontender, nondistended, no guarding, no rigidity  Extremities: b/l bka  CNS: no gross deficits, aao to person, place, month, year, president    Labs:  CBC with Differential:    Lab Results   Component Value Date    WBC 5.7 12/04/2020    RBC 4.48 12/04/2020    HGB 13.0 12/04/2020    HCT 40.6 12/04/2020     12/04/2020    MCV 90.6 12/04/2020    MCH 29.0 12/04/2020    MCHC 32.0 12/04/2020    RDW 12.7 12/04/2020    SEGSPCT 40.5 12/04/2020    BANDSPCT 8 09/03/2019    LYMPHOPCT 45.3 12/04/2020    MONOPCT 12.0 12/04/2020    MYELOPCT 1 09/02/2019    BASOPCT 0.4 12/04/2020    MONOSABS 0.7 12/04/2020    LYMPHSABS 2.6 12/04/2020    EOSABS 0.1 12/04/2020    BASOSABS 0.0 12/04/2020    DIFFTYPE AUTOMATED DIFFERENTIAL 12/04/2020     CMP:    Lab Results   Component Value Date     12/05/2020    K 2.9 12/05/2020     12/05/2020    CO2 20 12/05/2020    BUN 31 12/05/2020    CREATININE 1.4 12/05/2020    GFRAA 59 12/05/2020    LABGLOM 49 12/05/2020    GLUCOSE 56 12/05/2020    PROT 5.3 12/05/2020    PROT 8.0 08/30/2012    LABALBU 3.5 12/05/2020    CALCIUM 8.5 12/05/2020    BILITOT 0.2 12/05/2020    ALKPHOS 55 12/05/2020    AST 44 12/05/2020    ALT 18 12/05/2020     Recent Labs     12/03/20  0000 12/03/20  1530 12/03/20  1850   TROPONINT 0.143* 0.281* 0.196*     Lab Results   Component Value Date    TSH 0.777 12/03/2020         sodium chloride 100 mL/hr at 12/05/20 0744    dextrose        potassium chloride  40 mEq Oral BID    cefTRIAXone (ROCEPHIN) IV  1 g Intravenous Q24H    clopidogrel  75 mg Oral Daily    insulin lispro  0-6 Units Subcutaneous TID WC    insulin lispro  0-3 Units Subcutaneous Nightly    carvedilol  6.25 mg Oral BID WC    atorvastatin  40 mg Oral Nightly    dilTIAZem  60 mg Oral BID    gabapentin  100 mg Oral Nightly    aspirin  81 mg Oral Daily    latanoprost  1 drop Both Eyes Nightly    insulin NPH  10 Units Subcutaneous BID AC    sodium chloride flush  10 mL Intravenous 2 times per day    cilostazol  100 mg Oral BID             Assessment/plan:     RLQ Abdominal Pains  -may have passed a stone. GI recommends performing ERCP as outpatient, when COVID-19 infection resolves since there is no acute indication for it.  -It is also possible that abdominal pain may have been cardiogenic. D/W GI and place patient on Rocephin for possible occult cholangitis. Acute on chronic metabolic encephalopathy  -MRI brain: No acute process. Check VBG. Mental status improving. Possibly may have been due to dehydration. Cipro has been stopped. -Ammonia: WNL. UDS: Negative. -TSH: WNL.  -Mental status appears to be back to baseline. Hyponatremia  -Given IVF. UA suggestive of dehydration.  -Urine sodium 88  -Sodium WNL. Hypertension  -On Coreg, Cardizem. DM2  -A1c 7.9. Start SSI. -Stop NPH due to hypoglycemia. COVID-19 infection 12/1, incidental  -Not requiring oxygen. No steroids needed at this time. -Off heparin drip. Resume Lovenox. CRP 11. Meningioma  -seen on CT head. Acute kidney injury  - On IVF. Check bladder scan. -Creatinine 1.4. NSTEMI, history CAD  -elevated troponin. On aspirin. Started on Plavix. On beta-blocker. On statin. Cardiology plans for conservative medical management. -Off heparin drip. Echo: EF 40%, apical ballooning suggestive of stress-induced cardiomyopathy. Systolic cardiomyopathy  -On beta-blocker. Unable to use ACE inhibitor due to MRAY BETH. Will need repeat echo in 2 to 3 weeks with cardiology. Diarrhea  -Likely due to antibiotic use. Check for C. difficile. Start Imodium as needed. Hypokalemia  -Likely due to diarrhea. Start potassium replacement protocol. DVT Prophylaxis: Lovenox  Diet: DIET GENERAL; Carb Control: 5 carb choices (75 gms)/meal  Dietary Nutrition Supplements: Diabetic Oral Supplement  Home O2: None  Code Status: DNR-CCA    Dispo:   Plan is home with home health care upon discharge. Patient is doing better. Possible DC this weekend.     Coreen Green MD  12/5/2020

## 2020-12-05 NOTE — PLAN OF CARE
Glucose 61. Denies s/s of hypoglycemia. Meal delivered as tech checked glucose, patient started eating. Glucose re-check 66 while eating. Patient alert and again denied s/s. Gave 4oz juice.

## 2020-12-06 LAB
ADENOVIRUS F 40 41 PCR: NOT DETECTED
ALBUMIN SERPL-MCNC: 3.5 GM/DL (ref 3.4–5)
ALP BLD-CCNC: 62 IU/L (ref 40–129)
ALT SERPL-CCNC: 19 U/L (ref 10–40)
ANION GAP SERPL CALCULATED.3IONS-SCNC: 14 MMOL/L (ref 4–16)
APTT: 26.5 SECONDS (ref 25.1–37.1)
AST SERPL-CCNC: 45 IU/L (ref 15–37)
ASTROVIRUS PCR: NOT DETECTED
BASE EXCESS: 7 (ref 0–3.3)
BASOPHILS ABSOLUTE: 0 K/CU MM
BASOPHILS RELATIVE PERCENT: 0.5 % (ref 0–1)
BILIRUB SERPL-MCNC: 0.7 MG/DL (ref 0–1)
BUN BLDV-MCNC: 16 MG/DL (ref 6–23)
CALCIUM SERPL-MCNC: 8.8 MG/DL (ref 8.3–10.6)
CAMPYLOBACTER PCR: NOT DETECTED
CHLORIDE BLD-SCNC: 109 MMOL/L (ref 99–110)
CO2: 15 MMOL/L (ref 21–32)
COMMENT: ABNORMAL
CREAT SERPL-MCNC: 1.1 MG/DL (ref 0.9–1.3)
CRYPTOSPORIDIUM PCR: NOT DETECTED
CYCLOSPORA CAYETANENSIS PCR: NOT DETECTED
D DIMER: 210 NG/ML(DDU)
DIFFERENTIAL TYPE: ABNORMAL
E COLI 0157 PCR: NOT DETECTED
E COLI ENTEROAGGREGATIVE PCR: NOT DETECTED
E COLI ENTEROPATHOGENIC PCR: NOT DETECTED
E COLI ENTEROTOXIGENIC PCR: NOT DETECTED
E COLI SHIGA LIKE TOXIN PCR: NOT DETECTED
E COLI SHIGELLA/ENTEROINVASIVE PCR: NOT DETECTED
ENTAMOEBA HISTOLYTICA PCR: NOT DETECTED
EOSINOPHILS ABSOLUTE: 0.2 K/CU MM
EOSINOPHILS RELATIVE PERCENT: 2.9 % (ref 0–3)
FERRITIN: 214 NG/ML (ref 30–400)
FIBRINOGEN LEVEL: 331 MG/DL (ref 196.9–442.1)
GFR AFRICAN AMERICAN: >60 ML/MIN/1.73M2
GFR NON-AFRICAN AMERICAN: >60 ML/MIN/1.73M2
GIARDIA LAMBLIA PCR: NOT DETECTED
GLUCOSE BLD-MCNC: 120 MG/DL (ref 70–99)
GLUCOSE BLD-MCNC: 185 MG/DL (ref 70–99)
GLUCOSE BLD-MCNC: 187 MG/DL (ref 70–99)
GLUCOSE BLD-MCNC: 201 MG/DL (ref 70–99)
HCO3 VENOUS: 15.8 MMOL/L (ref 19–25)
HCT VFR BLD CALC: 35.9 % (ref 42–52)
HEMOGLOBIN: 11.9 GM/DL (ref 13.5–18)
HIGH SENSITIVE C-REACTIVE PROTEIN: 8.6 MG/L
IMMATURE NEUTROPHIL %: 0.3 % (ref 0–0.43)
INR BLD: 0.97 INDEX
LYMPHOCYTES ABSOLUTE: 1.7 K/CU MM
LYMPHOCYTES RELATIVE PERCENT: 29.6 % (ref 24–44)
MCH RBC QN AUTO: 30.4 PG (ref 27–31)
MCHC RBC AUTO-ENTMCNC: 33.1 % (ref 32–36)
MCV RBC AUTO: 91.6 FL (ref 78–100)
MONOCYTES ABSOLUTE: 0.9 K/CU MM
MONOCYTES RELATIVE PERCENT: 15.3 % (ref 0–4)
NOROVIRUS GI GII PCR: NOT DETECTED
NUCLEATED RBC %: 0 %
O2 SAT, VEN: 93.4 % (ref 50–70)
PCO2, VEN: 25 MMHG (ref 38–52)
PDW BLD-RTO: 12.7 % (ref 11.7–14.9)
PH VENOUS: 7.41 (ref 7.32–7.42)
PLATELET # BLD: 150 K/CU MM (ref 140–440)
PLESIOMONAS SHIGELLOIDES PCR: NOT DETECTED
PMV BLD AUTO: 10.2 FL (ref 7.5–11.1)
PO2, VEN: 120 MMHG (ref 28–48)
POTASSIUM SERPL-SCNC: 4.6 MMOL/L (ref 3.5–5.1)
PROTHROMBIN TIME: 11.7 SECONDS (ref 11.7–14.5)
RBC # BLD: 3.92 M/CU MM (ref 4.6–6.2)
ROTAVIRUS A PCR: NOT DETECTED
SALMONELLA PCR: NOT DETECTED
SAPOVIRUS PCR: NOT DETECTED
SEGMENTED NEUTROPHILS ABSOLUTE COUNT: 3 K/CU MM
SEGMENTED NEUTROPHILS RELATIVE PERCENT: 51.4 % (ref 36–66)
SODIUM BLD-SCNC: 138 MMOL/L (ref 135–145)
TOTAL CK: 172 IU/L (ref 38–174)
TOTAL IMMATURE NEUTOROPHIL: 0.02 K/CU MM
TOTAL NUCLEATED RBC: 0 K/CU MM
TOTAL PROTEIN: 5.7 GM/DL (ref 6.4–8.2)
VIBRIO CHOLERAE PCR: NOT DETECTED
VIBRIO PCR: NOT DETECTED
WBC # BLD: 5.8 K/CU MM (ref 4–10.5)
YERSINIA ENTEROCOLITICA PCR: NOT DETECTED

## 2020-12-06 PROCEDURE — 80053 COMPREHEN METABOLIC PANEL: CPT

## 2020-12-06 PROCEDURE — 1200000000 HC SEMI PRIVATE

## 2020-12-06 PROCEDURE — 6370000000 HC RX 637 (ALT 250 FOR IP): Performed by: HOSPITALIST

## 2020-12-06 PROCEDURE — 6360000002 HC RX W HCPCS: Performed by: HOSPITALIST

## 2020-12-06 PROCEDURE — 82728 ASSAY OF FERRITIN: CPT

## 2020-12-06 PROCEDURE — 2580000003 HC RX 258: Performed by: PHYSICIAN ASSISTANT

## 2020-12-06 PROCEDURE — 2580000003 HC RX 258: Performed by: INTERNAL MEDICINE

## 2020-12-06 PROCEDURE — 85610 PROTHROMBIN TIME: CPT

## 2020-12-06 PROCEDURE — 86141 C-REACTIVE PROTEIN HS: CPT

## 2020-12-06 PROCEDURE — 94761 N-INVAS EAR/PLS OXIMETRY MLT: CPT

## 2020-12-06 PROCEDURE — 82962 GLUCOSE BLOOD TEST: CPT

## 2020-12-06 PROCEDURE — 6370000000 HC RX 637 (ALT 250 FOR IP): Performed by: SPECIALIST

## 2020-12-06 PROCEDURE — 85384 FIBRINOGEN ACTIVITY: CPT

## 2020-12-06 PROCEDURE — 87507 IADNA-DNA/RNA PROBE TQ 12-25: CPT

## 2020-12-06 PROCEDURE — 85730 THROMBOPLASTIN TIME PARTIAL: CPT

## 2020-12-06 PROCEDURE — 82550 ASSAY OF CK (CPK): CPT

## 2020-12-06 PROCEDURE — 82805 BLOOD GASES W/O2 SATURATION: CPT

## 2020-12-06 PROCEDURE — 36415 COLL VENOUS BLD VENIPUNCTURE: CPT

## 2020-12-06 PROCEDURE — 85379 FIBRIN DEGRADATION QUANT: CPT

## 2020-12-06 PROCEDURE — 85025 COMPLETE CBC W/AUTO DIFF WBC: CPT

## 2020-12-06 PROCEDURE — 6370000000 HC RX 637 (ALT 250 FOR IP): Performed by: INTERNAL MEDICINE

## 2020-12-06 RX ORDER — CARVEDILOL 3.12 MG/1
3.12 TABLET ORAL 2 TIMES DAILY WITH MEALS
Status: ON HOLD | COMMUNITY
End: 2020-12-07 | Stop reason: HOSPADM

## 2020-12-06 RX ORDER — ONDANSETRON 4 MG/1
4 TABLET, FILM COATED ORAL 2 TIMES DAILY PRN
COMMUNITY

## 2020-12-06 RX ADMIN — CLOPIDOGREL BISULFATE 75 MG: 75 TABLET ORAL at 11:06

## 2020-12-06 RX ADMIN — LATANOPROST 1 DROP: 50 SOLUTION/ DROPS OPHTHALMIC at 21:08

## 2020-12-06 RX ADMIN — CHOLESTYRAMINE 4 G: 4 POWDER, FOR SUSPENSION ORAL at 11:06

## 2020-12-06 RX ADMIN — CARVEDILOL 6.25 MG: 6.25 TABLET, FILM COATED ORAL at 11:06

## 2020-12-06 RX ADMIN — CARVEDILOL 6.25 MG: 6.25 TABLET, FILM COATED ORAL at 18:22

## 2020-12-06 RX ADMIN — INSULIN LISPRO 2 UNITS: 100 INJECTION, SOLUTION INTRAVENOUS; SUBCUTANEOUS at 14:15

## 2020-12-06 RX ADMIN — POTASSIUM CHLORIDE 40 MEQ: 1500 TABLET, EXTENDED RELEASE ORAL at 11:06

## 2020-12-06 RX ADMIN — DILTIAZEM HYDROCHLORIDE 60 MG: 60 TABLET, FILM COATED ORAL at 11:05

## 2020-12-06 RX ADMIN — LATANOPROST 1 DROP: 50 SOLUTION/ DROPS OPHTHALMIC at 00:20

## 2020-12-06 RX ADMIN — SODIUM CHLORIDE, PRESERVATIVE FREE 10 ML: 5 INJECTION INTRAVENOUS at 11:07

## 2020-12-06 RX ADMIN — ATORVASTATIN CALCIUM 40 MG: 40 TABLET, FILM COATED ORAL at 21:06

## 2020-12-06 RX ADMIN — DILTIAZEM HYDROCHLORIDE 60 MG: 60 TABLET, FILM COATED ORAL at 21:05

## 2020-12-06 RX ADMIN — SODIUM CHLORIDE: 9 INJECTION, SOLUTION INTRAVENOUS at 23:43

## 2020-12-06 RX ADMIN — ENOXAPARIN SODIUM 30 MG: 30 INJECTION SUBCUTANEOUS at 21:06

## 2020-12-06 RX ADMIN — CILOSTAZOL 100 MG: 50 TABLET ORAL at 11:06

## 2020-12-06 RX ADMIN — ENOXAPARIN SODIUM 30 MG: 30 INJECTION SUBCUTANEOUS at 11:06

## 2020-12-06 RX ADMIN — GABAPENTIN 100 MG: 100 CAPSULE ORAL at 21:06

## 2020-12-06 RX ADMIN — CILOSTAZOL 100 MG: 50 TABLET ORAL at 21:06

## 2020-12-06 RX ADMIN — SODIUM CHLORIDE: 9 INJECTION, SOLUTION INTRAVENOUS at 14:15

## 2020-12-06 RX ADMIN — POTASSIUM CHLORIDE 40 MEQ: 1500 TABLET, EXTENDED RELEASE ORAL at 21:06

## 2020-12-06 RX ADMIN — ASPIRIN 81 MG CHEWABLE TABLET 81 MG: 81 TABLET CHEWABLE at 11:06

## 2020-12-06 ASSESSMENT — PAIN SCALES - GENERAL
PAINLEVEL_OUTOF10: 0

## 2020-12-06 NOTE — PROGRESS NOTES
Hospitalist             Daily Progress  Note   Subjective:     Chief Complaint   Patient presents with    Abdominal Pain     -Having regular diarrhea  -he had to be placed in restraints because he kept sliding out of bed      Objective:    /75   Pulse 89   Temp 98.4 °F (36.9 °C) (Oral)   Resp 25   Ht 5' 10.98\" (1.803 m)   Wt 130 lb 8 oz (59.2 kg)   SpO2 98%   BMI 18.21 kg/m²    No intake or output data in the 24 hours ending 12/06/20 1007   Physical Exam:  Gen: No acute distress, laying in bed  HEENT: NCAT  Heart:  s1s2 rrr  Lungs: seems clear to auscultation, bs+  Abdomen: Soft, nontender, nondistended, no guarding, no rigidity  Extremities: b/l bka  CNS: no gross deficits, aao to person, month, year, president    Labs:  CBC with Differential:    Lab Results   Component Value Date    WBC 5.8 12/06/2020    RBC 3.92 12/06/2020    HGB 11.9 12/06/2020    HCT 35.9 12/06/2020     12/06/2020    MCV 91.6 12/06/2020    MCH 30.4 12/06/2020    MCHC 33.1 12/06/2020    RDW 12.7 12/06/2020    SEGSPCT 51.4 12/06/2020    BANDSPCT 8 09/03/2019    LYMPHOPCT 29.6 12/06/2020    MONOPCT 15.3 12/06/2020    MYELOPCT 1 09/02/2019    BASOPCT 0.5 12/06/2020    MONOSABS 0.9 12/06/2020    LYMPHSABS 1.7 12/06/2020    EOSABS 0.2 12/06/2020    BASOSABS 0.0 12/06/2020    DIFFTYPE AUTOMATED DIFFERENTIAL 12/06/2020     CMP:    Lab Results   Component Value Date     12/06/2020    K 4.6 12/06/2020     12/06/2020    CO2 15 12/06/2020    BUN 16 12/06/2020    CREATININE 1.1 12/06/2020    GFRAA >60 12/06/2020    LABGLOM >60 12/06/2020    GLUCOSE 187 12/06/2020    PROT 5.7 12/06/2020    PROT 8.0 08/30/2012    LABALBU 3.5 12/06/2020    CALCIUM 8.8 12/06/2020    BILITOT 0.7 12/06/2020    ALKPHOS 62 12/06/2020    AST 45 12/06/2020    ALT 19 12/06/2020     Recent Labs     12/03/20  1530 12/03/20  1850   TROPONINT 0.281* 0.196*     Lab Results   Component Value Date    TSHHS 0.777 12/03/2020         sodium chloride 100 mL/hr at 12/05/20 1635    dextrose        potassium chloride  40 mEq Oral BID    enoxaparin  30 mg Subcutaneous BID    cholestyramine light  4 g Oral BID    clopidogrel  75 mg Oral Daily    insulin lispro  0-6 Units Subcutaneous TID     insulin lispro  0-3 Units Subcutaneous Nightly    carvedilol  6.25 mg Oral BID     atorvastatin  40 mg Oral Nightly    dilTIAZem  60 mg Oral BID    gabapentin  100 mg Oral Nightly    aspirin  81 mg Oral Daily    latanoprost  1 drop Both Eyes Nightly    [Held by provider] insulin NPH  10 Units Subcutaneous BID AC    sodium chloride flush  10 mL Intravenous 2 times per day    cilostazol  100 mg Oral BID             Assessment/plan:     RLQ Abdominal Pains  -may have passed a stone. GI recommends performing ERCP as outpatient, when COVID-19 infection resolves since there is no acute indication for it.  -It is also possible that abdominal pain may have been cardiogenic. D/W GI and placed patient on Rocephin for possible occult cholangitis. -Stopped Rocephin due to diarrhea. Acute on chronic metabolic encephalopathy  -MRI brain: No acute process. Check VBG. Mental status improving. Possibly may have been due to dehydration. Cipro has been stopped. -Ammonia: WNL. UDS: Negative. -TSH: WNL.  -Mental status may be at baseline. Hyponatremia  -Given IVF. UA suggestive of dehydration.  -Urine sodium 88  -Sodium WNL. Hypertension  -On Coreg, Cardizem. DM2  -A1c 7.9. Start SSI. -Stop NPH due to hypoglycemia. COVID-19 infection 12/1, incidental  -Not requiring oxygen. No steroids needed at this time. -Off heparin drip. Resume Lovenox. CRP 11.  -CRP 8. Meningioma  -seen on CT head. Acute kidney injury  - On IVF. Check bladder scan. -Creatinine 1.1. NSTEMI, history CAD  -elevated troponin. On aspirin. Started on Plavix. On beta-blocker. On statin. Cardiology plans for conservative medical management.   -Off heparin drip.   Echo: EF 40%, apical ballooning suggestive of stress-induced cardiomyopathy. Systolic cardiomyopathy  -On beta-blocker. Unable to use ACE inhibitor due to MARY BETH. Will need repeat echo in 2 to 3 weeks with cardiology. Diarrhea  -Likely due to antibiotic use. Check for C. difficile. -GI recommends against Imodium. On Questran. Check GI PCR. Hypokalemia  -Likely due to diarrhea. Start potassium replacement protocol. DVT Prophylaxis: Lovenox  Diet: DIET GENERAL; Carb Control: 5 carb choices (75 gms)/meal  Dietary Nutrition Supplements: Diabetic Oral Supplement  Home O2: None  Code Status: DNR-CCA    Dispo:   Plan is home with home health care upon discharge. Patient is doing better. Will DC home if diarrhea improves.     Stalin Scott MD  12/6/2020

## 2020-12-07 VITALS
BODY MASS INDEX: 18.27 KG/M2 | TEMPERATURE: 97.8 F | HEART RATE: 92 BPM | DIASTOLIC BLOOD PRESSURE: 75 MMHG | OXYGEN SATURATION: 92 % | HEIGHT: 71 IN | WEIGHT: 130.5 LBS | RESPIRATION RATE: 17 BRPM | SYSTOLIC BLOOD PRESSURE: 139 MMHG

## 2020-12-07 LAB
GLUCOSE BLD-MCNC: 105 MG/DL (ref 70–99)
GLUCOSE BLD-MCNC: 160 MG/DL (ref 70–99)

## 2020-12-07 PROCEDURE — 2580000003 HC RX 258: Performed by: PHYSICIAN ASSISTANT

## 2020-12-07 PROCEDURE — 82962 GLUCOSE BLOOD TEST: CPT

## 2020-12-07 PROCEDURE — 94761 N-INVAS EAR/PLS OXIMETRY MLT: CPT

## 2020-12-07 PROCEDURE — 6370000000 HC RX 637 (ALT 250 FOR IP): Performed by: INTERNAL MEDICINE

## 2020-12-07 PROCEDURE — 6370000000 HC RX 637 (ALT 250 FOR IP): Performed by: HOSPITALIST

## 2020-12-07 PROCEDURE — 6360000002 HC RX W HCPCS: Performed by: HOSPITALIST

## 2020-12-07 PROCEDURE — 6370000000 HC RX 637 (ALT 250 FOR IP): Performed by: SPECIALIST

## 2020-12-07 RX ORDER — CARVEDILOL 6.25 MG/1
6.25 TABLET ORAL 2 TIMES DAILY WITH MEALS
Qty: 60 TABLET | Refills: 0 | Status: SHIPPED | OUTPATIENT
Start: 2020-12-07

## 2020-12-07 RX ORDER — CHOLESTYRAMINE LIGHT 4 G/5.7G
4 POWDER, FOR SUSPENSION ORAL 2 TIMES DAILY PRN
Qty: 20 PACKET | Refills: 0 | Status: ON HOLD | OUTPATIENT
Start: 2020-12-07 | End: 2021-04-15

## 2020-12-07 RX ORDER — CLOPIDOGREL BISULFATE 75 MG/1
75 TABLET ORAL DAILY
Qty: 30 TABLET | Refills: 0 | Status: ON HOLD | OUTPATIENT
Start: 2020-12-08 | End: 2021-04-15

## 2020-12-07 RX ADMIN — INSULIN LISPRO 1 UNITS: 100 INJECTION, SOLUTION INTRAVENOUS; SUBCUTANEOUS at 13:58

## 2020-12-07 RX ADMIN — ASPIRIN 81 MG CHEWABLE TABLET 81 MG: 81 TABLET CHEWABLE at 08:26

## 2020-12-07 RX ADMIN — ENOXAPARIN SODIUM 30 MG: 30 INJECTION SUBCUTANEOUS at 08:27

## 2020-12-07 RX ADMIN — CHOLESTYRAMINE 4 G: 4 POWDER, FOR SUSPENSION ORAL at 08:27

## 2020-12-07 RX ADMIN — SODIUM CHLORIDE: 9 INJECTION, SOLUTION INTRAVENOUS at 10:39

## 2020-12-07 RX ADMIN — DILTIAZEM HYDROCHLORIDE 60 MG: 60 TABLET, FILM COATED ORAL at 08:27

## 2020-12-07 RX ADMIN — CILOSTAZOL 100 MG: 50 TABLET ORAL at 08:27

## 2020-12-07 RX ADMIN — CLOPIDOGREL BISULFATE 75 MG: 75 TABLET ORAL at 08:27

## 2020-12-07 RX ADMIN — CARVEDILOL 6.25 MG: 6.25 TABLET, FILM COATED ORAL at 08:26

## 2020-12-07 RX ADMIN — POTASSIUM CHLORIDE 40 MEQ: 1500 TABLET, EXTENDED RELEASE ORAL at 08:27

## 2020-12-07 NOTE — PROGRESS NOTES
Pt trying to get oob mulitple times this am,  alarm has gone off each time. Tried to reorient pt and to tell him to stay in bed. Says he will, but then the izabela alarm goes off again. Talked with dr. Awais Razo, said we can put him in jose raul wrist restraints. Will call family and let them know. Jose Raul wrist restraints placed. No breakdown in skin. Will continue to monitor and document appropriately. ATTENDING CERTIFICATION

## 2020-12-07 NOTE — CARE COORDINATION
CM called pts nurse to discuss pts level of activity. CM informed that pt has been out in the ge and does well. Discharge plan remains the same for pt.   CM called wife and confirmed discharge plan. Wife continues to want pt to return home to her with [facility] following. [facility] The staff at  will check on her. . CM will remain available for any needs or concerns.   1423 CM called [facility] and transport time is set for 445. CM called wife and informed. CM called nursing and informed. [facility] will follow. Wife is ready for return of pt.  [address]

## 2020-12-07 NOTE — PLAN OF CARE

## 2020-12-07 NOTE — DISCHARGE SUMMARY
Hospital Medicine  DISCHARGE SUMMARY  Date: 12/7/2020  Name: Lavonne Ocampo  MRN: [id number]  YOB: 1940     Patient's PCP: Deepthi Apodaca MD  Admit Date: 11/30/2020   Discharge Date: 12/7/2020  Admitting Physician: Anca Joyce MD  Discharge Physician: Stalin Scott MD      Discharge Diagnoses:  RLQ Abdominal Pains  Acute on chronic metabolic encephalopathy  Hyponatremia  Hypertension  DM2  COVID-19 infection 12/1, incidental  Meningioma  Acute kidney injury   NSTEMI, history CAD  Systolic cardiomyopathy  Diarrhea  Hypokalemia      HPI:    Chief Complaint   Patient presents with    Abdominal Pain     Lavonne Ocampo is a 79 y.o. male admitted for epigastric and periumbilical pain, associated with nausea and NBNB vomiting, but symptoms started around lunchtime today. Patient denies any diarrhea, has no other complaints. Patient denies fevers, chills, confusion, chest pain, cough, or sick contacts.     However patient is not the most reliable historian, as he is unaware that he is a hospital.  Patient is not oriented to time either, and has poor insight into his condition. Patient is very cooperative, and appears to be answering questions to the best of his abilities. Hospital Course  Patient came in with right lower quadrant abdominal pain. Patient has a history of biliary issues and stones despite cholecystectomy. Patient's abdominal pain improved. GI recommended performing ERCP, however patient was positive for COVID-19. Patient will need outpatient follow-up for ERCP. Patient was put on Rocephin for possible occult cholangitis, but then he started to develop diarrhea. Rocephin was stopped. Patient was placed on Questran. Diarrhea improved. GI PCR was negative for any infection. Abdominal pain may have also been cardiogenic in nature. Patient had elevated troponins during his stay. He was on aspirin. He was started on Plavix. He was already on a beta-blocker and a statin. Exam:  /75   Pulse 92   Temp 97.8 °F (36.6 °C) (Oral)   Resp 17   Ht 5' 10.98\" (1.803 m)   Wt 130 lb 8 oz (59.2 kg)   SpO2 92%   BMI 18.21 kg/m²   Gen: No acute distress, laying in bed  HEENT: NCAT  Heart:  s1s2 rrr  Lungs: seems clear to auscultation, bs+  Abdomen: Soft, nontender, nondistended, no guarding, no rigidity  Extremities: b/l bka  CNS: no gross deficits, aao to person, place, month, year, president    Consultations  IP CONSULT TO HOSPITALIST  IP CONSULT TO GI  IP CONSULT TO NEUROLOGY  IP CONSULT TO GI  IP CONSULT TO CARDIOLOGY  IP CONSULT TO HOME CARE NEEDS    Invasive procedures:   none    Significant Diagnostic Studies:    Imaging  Ct Head Wo Contrast  Result Date: 12/1/2020  No acute intracranial abnormality. Mild cerebral white matter chronic microvascular ischemic disease. Mild diffuse cerebral atrophy. Anterior left infratentorial incisura 15 mm diameter dural-based mass lesion most suggestive of meningioma. Ct Abdomen Pelvis W Iv Contrast  Result Date: 11/30/2020  No acute abnormality. Xr Chest Portable  Result Date: 11/30/2020  No cardiomegaly, pneumonia or interstitial edema. No significant change has occurred from 03/29/2020. Mri Brain W Wo Contrast  Result Date: 12/1/2020  Stable appearance of the brain. No acute infarct. No change in sellar and left ambient cisternal mass when compared to the prior study       Code Status:  DNR-CCA      Discharge Medications:     Medication List      START taking these medications    cholestyramine light 4 g packet  Take 1 packet by mouth 2 times daily as needed (diarrhea)     clopidogrel 75 MG tablet  Commonly known as:  PLAVIX  Take 1 tablet by mouth daily  Start taking on:  December 8, 2020        CHANGE how you take these medications    carvedilol 6.25 MG tablet  Commonly known as:  COREG  Take 1 tablet by mouth 2 times daily (with meals)  What changed:  Another medication with the same name was removed.  Continue taking this [id number]  [phone]         Medications: see computerized discharge medication list  Activity: activity as tolerated  Diet: cardiac diet and diabetic diet   Disposition: home with home health  Discharged Condition: Stable      The patient was seen and examined on day of discharge and this discharge summary is in conjunction with any daily progress note from day of discharge. Time spent on discharge is 38 minutes in the examination, evaluation, counseling and review of medications and discharge plan.     Alexey Monge MD

## 2020-12-07 NOTE — PROGRESS NOTES
[facility] Liaison aware of discharge & will initiate [facility].

## 2020-12-07 NOTE — PROGRESS NOTES
Attempted to call phlebot to draw AM labs. No answer.       Electronically signed by Femi Lizarraga RN on 12/7/20 at 7:05 AM EST

## 2020-12-07 NOTE — PLAN OF CARE
Problem: Airway Clearance - Ineffective  Goal: Achieve or maintain patent airway  12/7/2020 0812 by Mira Morales LPN  Outcome: Ongoing  12/6/2020 2043 by Gaurang Alvarez RN  Outcome: Ongoing     Problem: Gas Exchange - Impaired  Goal: Absence of hypoxia  12/7/2020 0812 by Mira Morales LPN  Outcome: Ongoing  12/6/2020 2043 by Gaurang Alvarez RN  Outcome: Ongoing  Goal: Promote optimal lung function  12/7/2020 0812 by Mira Morales LPN  Outcome: Ongoing  12/6/2020 2043 by Gaurang Alvarez RN  Outcome: Ongoing     Problem: Breathing Pattern - Ineffective  Goal: Ability to achieve and maintain a regular respiratory rate  12/7/2020 0812 by Mira Morales LPN  Outcome: Ongoing  12/6/2020 2043 by Gaurang Alvarez RN  Outcome: Ongoing     Problem:  Body Temperature -  Risk of, Imbalanced  Goal: Ability to maintain a body temperature within defined limits  12/7/2020 0812 by Mira Morales LPN  Outcome: Ongoing  12/6/2020 2043 by Gaurang Alvarez RN  Outcome: Ongoing  Goal: Will regain or maintain usual level of consciousness  12/7/2020 0812 by Mira Morales LPN  Outcome: Ongoing  12/6/2020 2043 by Gaurang Alvarez RN  Outcome: Ongoing  Goal: Complications related to the disease process, condition or treatment will be avoided or minimized  12/7/2020 0812 by Mira Morales LPN  Outcome: Ongoing  12/6/2020 2043 by Gaurang Alvarez RN  Outcome: Ongoing     Problem: Isolation Precautions - Risk of Spread of Infection  Goal: Prevent transmission of infection  12/7/2020 0812 by Mira Morales LPN  Outcome: Ongoing  12/6/2020 2043 by Gaurang Alvarez RN  Outcome: Ongoing     Problem: Nutrition Deficits  Goal: Optimize nutrtional status  12/7/2020 0812 by Mira Morales LPN  Outcome: Ongoing  12/6/2020 2043 by Gaurang Alvarez RN  Outcome: Ongoing     Problem: Risk for Fluid Volume Deficit  Goal: Maintain normal heart rhythm  12/7/2020 0812 by Mira Morales LPN  Outcome: Ongoing  12/6/2020 2043 by Gaurang Alvarez RN  Outcome: Ongoing  Goal: Maintain absence of muscle cramping  12/7/2020 0812 by Jessica De La Torre LPN  Outcome: Ongoing  12/6/2020 2043 by Catalino Shah RN  Outcome: Ongoing  Goal: Maintain normal serum potassium, sodium, calcium, phosphorus, and pH  12/7/2020 0812 by Jessica De La Torre LPN  Outcome: Ongoing  12/6/2020 2043 by Catalino Shah RN  Outcome: Ongoing     Problem: Loneliness or Risk for Loneliness  Goal: Demonstrate positive use of time alone when socialization is not possible  12/7/2020 0812 by Jessica De La Torre LPN  Outcome: Ongoing  12/6/2020 2043 by Catalino Shah RN  Outcome: Ongoing     Problem: Fatigue  Goal: Verbalize increase energy and improved vitality  12/7/2020 0812 by Jessica De La Torre LPN  Outcome: Ongoing  12/6/2020 2043 by Catalino Shah RN  Outcome: Ongoing     Problem: Patient Education: Go to Patient Education Activity  Goal: Patient/Family Education  12/7/2020 [id number] by Jessica De La Torre LPN  Outcome: Ongoing  12/6/2020 2043 by Catalino Shah RN  Outcome: Ongoing     Problem: Pain:  Goal: Pain level will decrease  Description: Pain level will decrease  12/7/2020 0812 by Jessica De La Torre LPN  Outcome: Ongoing  12/6/2020 2043 by Catalino Shah RN  Outcome: Ongoing  Goal: Control of acute pain  Description: Control of acute pain  12/7/2020 0812 by Jessica De La Torre LPN  Outcome: Ongoing  12/6/2020 2043 by Catalino Shah RN  Outcome: Ongoing  Goal: Control of chronic pain  Description: Control of chronic pain  12/7/2020 0812 by Jessica De La Torre LPN  Outcome: Ongoing  12/6/2020 2043 by Catalino Shah RN  Outcome: Ongoing     Problem: Skin Integrity:  Goal: Will show no infection signs and symptoms  Description: Will show no infection signs and symptoms  12/7/2020 0812 by Jessica De La Torre LPN  Outcome: Ongoing  12/6/2020 2043 by Catalino Shah RN  Outcome: Ongoing  Goal: Absence of new skin breakdown  Description: Absence of new skin breakdown  12/7/2020 0812 by Jessica De La Torre LPN  Outcome: Ongoing  12/6/2020 2043 by Shemar Puentes RN  Outcome: Ongoing     Problem: Falls - Risk of:  Goal: Will remain free from falls  Description: Will remain free from falls  12/7/2020 0812 by Melva Saldivar LPN  Outcome: Ongoing  12/6/2020 2043 by Shemar Puentes RN  Outcome: Ongoing  Goal: Absence of physical injury  Description: Absence of physical injury  12/7/2020 5024 by Melva Saldivar LPN  Outcome: Ongoing  12/6/2020 2043 by Shemar Puentes RN  Outcome: Ongoing     Problem: Restraint Use - Nonviolent/Non-Self-Destructive Behavior:  Goal: Absence of restraint indications  Description: Absence of restraint indications  12/7/2020 2650 by Melva Saldivar LPN  Outcome: Ongoing  12/6/2020 2043 by Shemar Puentes RN  Outcome: Ongoing  Goal: Absence of restraint-related injury  Description: Absence of restraint-related injury  12/7/2020 0099 by Melva Saldivar LPN  Outcome: Ongoing  12/6/2020 2043 by Shemar Puentes RN  Outcome: Ongoing

## 2020-12-14 ENCOUNTER — HOSPITAL ENCOUNTER (OUTPATIENT)
Age: 80
Setting detail: SPECIMEN
Discharge: HOME | End: 2020-12-14
Payer: MEDICARE

## 2020-12-14 LAB
ANION GAP SERPL CALCULATED.3IONS-SCNC: 5 MMOL/L (ref 4–16)
BASOPHILS ABSOLUTE: 0 K/CU MM
BASOPHILS RELATIVE PERCENT: 0.7 % (ref 0–1)
BUN BLDV-MCNC: 9 MG/DL (ref 6–23)
CALCIUM SERPL-MCNC: 9.3 MG/DL (ref 8.3–10.6)
CHLORIDE BLD-SCNC: 102 MMOL/L (ref 99–110)
CO2: 27 MMOL/L (ref 21–32)
CREAT SERPL-MCNC: 1 MG/DL (ref 0.9–1.3)
DIFFERENTIAL TYPE: ABNORMAL
EOSINOPHILS ABSOLUTE: 0.6 K/CU MM
EOSINOPHILS RELATIVE PERCENT: 10.1 % (ref 0–3)
GFR AFRICAN AMERICAN: >60 ML/MIN/1.73M2
GFR NON-AFRICAN AMERICAN: >60 ML/MIN/1.73M2
GLUCOSE BLD-MCNC: 142 MG/DL (ref 70–99)
HCT VFR BLD CALC: 32.6 % (ref 42–52)
HEMOGLOBIN: 10.7 GM/DL (ref 13.5–18)
IMMATURE NEUTROPHIL %: 0.4 % (ref 0–0.43)
LYMPHOCYTES ABSOLUTE: 1.8 K/CU MM
LYMPHOCYTES RELATIVE PERCENT: 32.1 % (ref 24–44)
MCH RBC QN AUTO: 29.9 PG (ref 27–31)
MCHC RBC AUTO-ENTMCNC: 32.8 % (ref 32–36)
MCV RBC AUTO: 91.1 FL (ref 78–100)
MONOCYTES ABSOLUTE: 0.7 K/CU MM
MONOCYTES RELATIVE PERCENT: 12.1 % (ref 0–4)
NUCLEATED RBC %: 0 %
PDW BLD-RTO: 13.1 % (ref 11.7–14.9)
PLATELET # BLD: 252 K/CU MM (ref 140–440)
PMV BLD AUTO: 10.7 FL (ref 7.5–11.1)
POTASSIUM SERPL-SCNC: 4.4 MMOL/L (ref 3.5–5.1)
RBC # BLD: 3.58 M/CU MM (ref 4.6–6.2)
SEGMENTED NEUTROPHILS ABSOLUTE COUNT: 2.5 K/CU MM
SEGMENTED NEUTROPHILS RELATIVE PERCENT: 44.6 % (ref 36–66)
SODIUM BLD-SCNC: 134 MMOL/L (ref 135–145)
TOTAL IMMATURE NEUTOROPHIL: 0.02 K/CU MM
TOTAL NUCLEATED RBC: 0 K/CU MM
WBC # BLD: 5.6 K/CU MM (ref 4–10.5)

## 2020-12-14 PROCEDURE — 80048 BASIC METABOLIC PNL TOTAL CA: CPT

## 2020-12-14 PROCEDURE — 85025 COMPLETE CBC W/AUTO DIFF WBC: CPT

## 2020-12-18 NOTE — PROGRESS NOTES
Physician Progress Note      PATIENT:               Mary Peres  CSN #:                  [id number]  :                       1940  ADMIT DATE:       2020 7:56 PM  [address] DATE:        2020 12:40 PM  RESPONDING  PROVIDER #:        Pepper Casiano MD          QUERY TEXT:    Hospitalists,    Patient admitted with abdominal pain and COVID. If possible, please document   in progress notes and discharge summary the suspected cause f the abdominal   pain. The medical record reflects the following:  Risk Factors: poss cholangitis, COVID, NSTEMI  Clinical Indicators: documentation of COVID, abdominal pain w/ possible   cholangitis w/ biliary issues despite cholecystectomy, and abd pain could be   due to cardiogenic nature. Treatment: Cardiology consult, labs, imaging, IV Rocephin    Thank you,  Meg Aleman RN CDS  [phone]  Options provided:  -- Abdominal pain due to cholangitis: This patient has abdominal pain due to   cholangitis.   -- Abdominal pain due to NSTEMI  -- Abdominal pain due to COVID  -- Other - I will add my own diagnosis  -- Disagree - Not applicable / Not valid  -- Disagree - Clinically unable to determine / Unknown  -- Refer to Clinical Documentation Reviewer    PROVIDER RESPONSE TEXT:    Abdominal pain possibly due to cholangitis    Query created by: Lance Davis on 12/15/2020 11:36 AM      Electronically signed by:  Pepper Casiano MD 2020 7:48 AM

## 2020-12-22 ENCOUNTER — HOSPITAL ENCOUNTER (OUTPATIENT)
Age: 80
Setting detail: SPECIMEN
Discharge: HOME | End: 2020-12-22
Payer: MEDICARE

## 2020-12-22 LAB
ANION GAP SERPL CALCULATED.3IONS-SCNC: 9 MMOL/L (ref 4–16)
BASOPHILS ABSOLUTE: 0.1 K/CU MM
BASOPHILS RELATIVE PERCENT: 0.9 % (ref 0–1)
BUN BLDV-MCNC: 15 MG/DL (ref 6–23)
CALCIUM SERPL-MCNC: 8.8 MG/DL (ref 8.3–10.6)
CHLORIDE BLD-SCNC: 97 MMOL/L (ref 99–110)
CO2: 26 MMOL/L (ref 21–32)
CREAT SERPL-MCNC: 1.2 MG/DL (ref 0.9–1.3)
DIFFERENTIAL TYPE: ABNORMAL
EOSINOPHILS ABSOLUTE: 0.6 K/CU MM
EOSINOPHILS RELATIVE PERCENT: 11.3 % (ref 0–3)
GFR AFRICAN AMERICAN: >60 ML/MIN/1.73M2
GFR NON-AFRICAN AMERICAN: 58 ML/MIN/1.73M2
GLUCOSE BLD-MCNC: 254 MG/DL (ref 70–99)
HCT VFR BLD CALC: 32.7 % (ref 42–52)
HEMOGLOBIN: 10.2 GM/DL (ref 13.5–18)
IMMATURE NEUTROPHIL %: 0.2 % (ref 0–0.43)
LYMPHOCYTES ABSOLUTE: 1.6 K/CU MM
LYMPHOCYTES RELATIVE PERCENT: 28.6 % (ref 24–44)
MCH RBC QN AUTO: 29.2 PG (ref 27–31)
MCHC RBC AUTO-ENTMCNC: 31.2 % (ref 32–36)
MCV RBC AUTO: 93.7 FL (ref 78–100)
MONOCYTES ABSOLUTE: 0.9 K/CU MM
MONOCYTES RELATIVE PERCENT: 15.9 % (ref 0–4)
NUCLEATED RBC %: 0 %
PDW BLD-RTO: 13.2 % (ref 11.7–14.9)
PLATELET # BLD: 196 K/CU MM (ref 140–440)
PMV BLD AUTO: 9.7 FL (ref 7.5–11.1)
POTASSIUM SERPL-SCNC: 4.1 MMOL/L (ref 3.5–5.1)
RBC # BLD: 3.49 M/CU MM (ref 4.6–6.2)
SEGMENTED NEUTROPHILS ABSOLUTE COUNT: 2.4 K/CU MM
SEGMENTED NEUTROPHILS RELATIVE PERCENT: 43.1 % (ref 36–66)
SODIUM BLD-SCNC: 132 MMOL/L (ref 135–145)
TOTAL IMMATURE NEUTOROPHIL: 0.01 K/CU MM
TOTAL NUCLEATED RBC: 0 K/CU MM
WBC # BLD: 5.6 K/CU MM (ref 4–10.5)

## 2020-12-22 PROCEDURE — 85025 COMPLETE CBC W/AUTO DIFF WBC: CPT

## 2020-12-22 PROCEDURE — 80048 BASIC METABOLIC PNL TOTAL CA: CPT

## 2021-01-07 ENCOUNTER — HOSPITAL ENCOUNTER (OUTPATIENT)
Age: 81
Setting detail: SPECIMEN
Discharge: HOME | End: 2021-01-07
Payer: MEDICARE

## 2021-01-07 LAB
ANION GAP SERPL CALCULATED.3IONS-SCNC: 11 MMOL/L (ref 4–16)
BASOPHILS ABSOLUTE: 0.1 K/CU MM
BASOPHILS RELATIVE PERCENT: 1.3 % (ref 0–1)
BUN BLDV-MCNC: 17 MG/DL (ref 6–23)
CALCIUM SERPL-MCNC: 9 MG/DL (ref 8.3–10.6)
CHLORIDE BLD-SCNC: 104 MMOL/L (ref 99–110)
CO2: 22 MMOL/L (ref 21–32)
CREAT SERPL-MCNC: 1.3 MG/DL (ref 0.9–1.3)
DIFFERENTIAL TYPE: ABNORMAL
EOSINOPHILS ABSOLUTE: 1.2 K/CU MM
EOSINOPHILS RELATIVE PERCENT: 19 % (ref 0–3)
GFR AFRICAN AMERICAN: >60 ML/MIN/1.73M2
GFR NON-AFRICAN AMERICAN: 53 ML/MIN/1.73M2
GLUCOSE BLD-MCNC: 199 MG/DL (ref 70–99)
HCT VFR BLD CALC: 37.3 % (ref 42–52)
HEMOGLOBIN: 12 GM/DL (ref 13.5–18)
IMMATURE NEUTROPHIL %: 0.2 % (ref 0–0.43)
LYMPHOCYTES ABSOLUTE: 1.7 K/CU MM
LYMPHOCYTES RELATIVE PERCENT: 27.9 % (ref 24–44)
MCH RBC QN AUTO: 30 PG (ref 27–31)
MCHC RBC AUTO-ENTMCNC: 32.2 % (ref 32–36)
MCV RBC AUTO: 93.3 FL (ref 78–100)
MONOCYTES ABSOLUTE: 0.7 K/CU MM
MONOCYTES RELATIVE PERCENT: 10.7 % (ref 0–4)
OVALOCYTES: ABNORMAL
PDW BLD-RTO: 13.3 % (ref 11.7–14.9)
PLATELET # BLD: 225 K/CU MM (ref 140–440)
PMV BLD AUTO: 9.9 FL (ref 7.5–11.1)
POTASSIUM SERPL-SCNC: 4.2 MMOL/L (ref 3.5–5.1)
RBC # BLD: 4 M/CU MM (ref 4.6–6.2)
SEGMENTED NEUTROPHILS ABSOLUTE COUNT: 2.5 K/CU MM
SEGMENTED NEUTROPHILS RELATIVE PERCENT: 40.9 % (ref 36–66)
SODIUM BLD-SCNC: 137 MMOL/L (ref 135–145)
TOTAL IMMATURE NEUTOROPHIL: 0.01 K/CU MM
WBC # BLD: 6.2 K/CU MM (ref 4–10.5)

## 2021-01-07 PROCEDURE — 80048 BASIC METABOLIC PNL TOTAL CA: CPT

## 2021-01-07 PROCEDURE — 85025 COMPLETE CBC W/AUTO DIFF WBC: CPT

## 2021-02-17 ENCOUNTER — APPOINTMENT (OUTPATIENT)
Dept: GENERAL RADIOLOGY | Age: 81
DRG: 480 | End: 2021-02-17
Payer: MEDICARE

## 2021-02-17 ENCOUNTER — APPOINTMENT (OUTPATIENT)
Dept: CT IMAGING | Age: 81
DRG: 480 | End: 2021-02-17
Payer: MEDICARE

## 2021-02-17 ENCOUNTER — HOSPITAL ENCOUNTER (INPATIENT)
Age: 81
LOS: 6 days | Discharge: NURSING, REHAB OR LONG-TERM CARE | DRG: 480 | End: 2021-02-23
Attending: FAMILY MEDICINE | Admitting: FAMILY MEDICINE
Payer: MEDICARE

## 2021-02-17 DIAGNOSIS — S72.002A CLOSED FRACTURE OF LEFT HIP, INITIAL ENCOUNTER (HCC): Primary | ICD-10-CM

## 2021-02-17 LAB
ALBUMIN SERPL-MCNC: 3.7 GM/DL (ref 3.4–5)
ALP BLD-CCNC: 62 IU/L (ref 40–129)
ALT SERPL-CCNC: 13 U/L (ref 10–40)
ANION GAP SERPL CALCULATED.3IONS-SCNC: 12 MMOL/L (ref 4–16)
APTT: 27 SECONDS (ref 25.1–37.1)
AST SERPL-CCNC: 23 IU/L (ref 15–37)
BASOPHILS ABSOLUTE: 0.1 K/CU MM
BASOPHILS RELATIVE PERCENT: 0.7 % (ref 0–1)
BILIRUB SERPL-MCNC: 0.7 MG/DL (ref 0–1)
BUN BLDV-MCNC: 17 MG/DL (ref 6–23)
CALCIUM SERPL-MCNC: 9.3 MG/DL (ref 8.3–10.6)
CHLORIDE BLD-SCNC: 98 MMOL/L (ref 99–110)
CO2: 21 MMOL/L (ref 21–32)
CREAT SERPL-MCNC: 1.1 MG/DL (ref 0.9–1.3)
DIFFERENTIAL TYPE: ABNORMAL
EOSINOPHILS ABSOLUTE: 1 K/CU MM
EOSINOPHILS RELATIVE PERCENT: 10.2 % (ref 0–3)
GFR AFRICAN AMERICAN: >60 ML/MIN/1.73M2
GFR NON-AFRICAN AMERICAN: >60 ML/MIN/1.73M2
GLUCOSE BLD-MCNC: 342 MG/DL (ref 70–99)
GLUCOSE BLD-MCNC: 343 MG/DL (ref 70–99)
HCT VFR BLD CALC: 35.6 % (ref 42–52)
HEMOGLOBIN: 11.7 GM/DL (ref 13.5–18)
IMMATURE NEUTROPHIL %: 0.4 % (ref 0–0.43)
INR BLD: 1.02 INDEX
LYMPHOCYTES ABSOLUTE: 1.3 K/CU MM
LYMPHOCYTES RELATIVE PERCENT: 13.3 % (ref 24–44)
MCH RBC QN AUTO: 29.3 PG (ref 27–31)
MCHC RBC AUTO-ENTMCNC: 32.9 % (ref 32–36)
MCV RBC AUTO: 89.2 FL (ref 78–100)
MONOCYTES ABSOLUTE: 0.8 K/CU MM
MONOCYTES RELATIVE PERCENT: 8.1 % (ref 0–4)
NUCLEATED RBC %: 0 %
PDW BLD-RTO: 12.6 % (ref 11.7–14.9)
PLATELET # BLD: 193 K/CU MM (ref 140–440)
PMV BLD AUTO: 9.6 FL (ref 7.5–11.1)
POTASSIUM SERPL-SCNC: 3.9 MMOL/L (ref 3.5–5.1)
PROTHROMBIN TIME: 12.4 SECONDS (ref 11.7–14.5)
RBC # BLD: 3.99 M/CU MM (ref 4.6–6.2)
SEGMENTED NEUTROPHILS ABSOLUTE COUNT: 6.5 K/CU MM
SEGMENTED NEUTROPHILS RELATIVE PERCENT: 67.3 % (ref 36–66)
SODIUM BLD-SCNC: 131 MMOL/L (ref 135–145)
TOTAL IMMATURE NEUTOROPHIL: 0.04 K/CU MM
TOTAL NUCLEATED RBC: 0 K/CU MM
TOTAL PROTEIN: 6.3 GM/DL (ref 6.4–8.2)
WBC # BLD: 9.6 K/CU MM (ref 4–10.5)

## 2021-02-17 PROCEDURE — 85610 PROTHROMBIN TIME: CPT

## 2021-02-17 PROCEDURE — 72125 CT NECK SPINE W/O DYE: CPT

## 2021-02-17 PROCEDURE — 80053 COMPREHEN METABOLIC PANEL: CPT

## 2021-02-17 PROCEDURE — 6360000002 HC RX W HCPCS: Performed by: NURSE PRACTITIONER

## 2021-02-17 PROCEDURE — 6370000000 HC RX 637 (ALT 250 FOR IP): Performed by: NURSE PRACTITIONER

## 2021-02-17 PROCEDURE — 85730 THROMBOPLASTIN TIME PARTIAL: CPT

## 2021-02-17 PROCEDURE — 1200000000 HC SEMI PRIVATE

## 2021-02-17 PROCEDURE — 85025 COMPLETE CBC W/AUTO DIFF WBC: CPT

## 2021-02-17 PROCEDURE — 2580000003 HC RX 258: Performed by: NURSE PRACTITIONER

## 2021-02-17 PROCEDURE — 99285 EMERGENCY DEPT VISIT HI MDM: CPT

## 2021-02-17 PROCEDURE — 94761 N-INVAS EAR/PLS OXIMETRY MLT: CPT

## 2021-02-17 PROCEDURE — 71045 X-RAY EXAM CHEST 1 VIEW: CPT

## 2021-02-17 PROCEDURE — 36415 COLL VENOUS BLD VENIPUNCTURE: CPT

## 2021-02-17 PROCEDURE — 73502 X-RAY EXAM HIP UNI 2-3 VIEWS: CPT

## 2021-02-17 PROCEDURE — 6360000002 HC RX W HCPCS: Performed by: PHYSICIAN ASSISTANT

## 2021-02-17 PROCEDURE — 82962 GLUCOSE BLOOD TEST: CPT

## 2021-02-17 PROCEDURE — 70450 CT HEAD/BRAIN W/O DYE: CPT

## 2021-02-17 PROCEDURE — 73560 X-RAY EXAM OF KNEE 1 OR 2: CPT

## 2021-02-17 RX ORDER — ACETAMINOPHEN 650 MG/1
650 SUPPOSITORY RECTAL EVERY 6 HOURS PRN
Status: DISCONTINUED | OUTPATIENT
Start: 2021-02-17 | End: 2021-02-23 | Stop reason: HOSPADM

## 2021-02-17 RX ORDER — SODIUM CHLORIDE 0.9 % (FLUSH) 0.9 %
10 SYRINGE (ML) INJECTION EVERY 12 HOURS SCHEDULED
Status: DISCONTINUED | OUTPATIENT
Start: 2021-02-17 | End: 2021-02-23 | Stop reason: HOSPADM

## 2021-02-17 RX ORDER — MORPHINE SULFATE 4 MG/ML
4 INJECTION, SOLUTION INTRAMUSCULAR; INTRAVENOUS EVERY 30 MIN PRN
Status: DISCONTINUED | OUTPATIENT
Start: 2021-02-17 | End: 2021-02-17 | Stop reason: SDUPTHER

## 2021-02-17 RX ORDER — DILTIAZEM HYDROCHLORIDE 60 MG/1
60 TABLET, FILM COATED ORAL 2 TIMES DAILY
Status: DISCONTINUED | OUTPATIENT
Start: 2021-02-17 | End: 2021-02-23 | Stop reason: HOSPADM

## 2021-02-17 RX ORDER — ATORVASTATIN CALCIUM 40 MG/1
40 TABLET, FILM COATED ORAL DAILY
Status: DISCONTINUED | OUTPATIENT
Start: 2021-02-17 | End: 2021-02-23 | Stop reason: HOSPADM

## 2021-02-17 RX ORDER — POLYETHYLENE GLYCOL 3350 17 G/17G
17 POWDER, FOR SOLUTION ORAL DAILY PRN
Status: DISCONTINUED | OUTPATIENT
Start: 2021-02-17 | End: 2021-02-23 | Stop reason: HOSPADM

## 2021-02-17 RX ORDER — DEXTROSE MONOHYDRATE 25 G/50ML
12.5 INJECTION, SOLUTION INTRAVENOUS PRN
Status: DISCONTINUED | OUTPATIENT
Start: 2021-02-17 | End: 2021-02-23 | Stop reason: HOSPADM

## 2021-02-17 RX ORDER — MORPHINE SULFATE 2 MG/ML
2 INJECTION, SOLUTION INTRAMUSCULAR; INTRAVENOUS EVERY 4 HOURS PRN
Status: DISCONTINUED | OUTPATIENT
Start: 2021-02-17 | End: 2021-02-23 | Stop reason: HOSPADM

## 2021-02-17 RX ORDER — SODIUM CHLORIDE 0.9 % (FLUSH) 0.9 %
10 SYRINGE (ML) INJECTION PRN
Status: DISCONTINUED | OUTPATIENT
Start: 2021-02-17 | End: 2021-02-23 | Stop reason: HOSPADM

## 2021-02-17 RX ORDER — DEXTROSE MONOHYDRATE 50 MG/ML
100 INJECTION, SOLUTION INTRAVENOUS PRN
Status: DISCONTINUED | OUTPATIENT
Start: 2021-02-17 | End: 2021-02-23 | Stop reason: HOSPADM

## 2021-02-17 RX ORDER — ONDANSETRON 2 MG/ML
4 INJECTION INTRAMUSCULAR; INTRAVENOUS EVERY 6 HOURS PRN
Status: DISCONTINUED | OUTPATIENT
Start: 2021-02-17 | End: 2021-02-23 | Stop reason: HOSPADM

## 2021-02-17 RX ORDER — NICOTINE POLACRILEX 4 MG
15 LOZENGE BUCCAL PRN
Status: DISCONTINUED | OUTPATIENT
Start: 2021-02-17 | End: 2021-02-23 | Stop reason: HOSPADM

## 2021-02-17 RX ORDER — SODIUM CHLORIDE 9 MG/ML
INJECTION, SOLUTION INTRAVENOUS CONTINUOUS
Status: DISCONTINUED | OUTPATIENT
Start: 2021-02-17 | End: 2021-02-21

## 2021-02-17 RX ORDER — GABAPENTIN 100 MG/1
100 CAPSULE ORAL NIGHTLY
Status: DISCONTINUED | OUTPATIENT
Start: 2021-02-17 | End: 2021-02-23 | Stop reason: HOSPADM

## 2021-02-17 RX ORDER — LATANOPROST 50 UG/ML
1 SOLUTION/ DROPS OPHTHALMIC NIGHTLY
Status: DISCONTINUED | OUTPATIENT
Start: 2021-02-17 | End: 2021-02-23 | Stop reason: HOSPADM

## 2021-02-17 RX ORDER — FAMOTIDINE 20 MG/1
20 TABLET, FILM COATED ORAL DAILY
Status: DISCONTINUED | OUTPATIENT
Start: 2021-02-17 | End: 2021-02-23 | Stop reason: HOSPADM

## 2021-02-17 RX ORDER — ACETAMINOPHEN 325 MG/1
650 TABLET ORAL EVERY 6 HOURS PRN
Status: DISCONTINUED | OUTPATIENT
Start: 2021-02-17 | End: 2021-02-18

## 2021-02-17 RX ADMIN — SODIUM CHLORIDE, PRESERVATIVE FREE 10 ML: 5 INJECTION INTRAVENOUS at 22:28

## 2021-02-17 RX ADMIN — MORPHINE SULFATE 4 MG: 4 INJECTION, SOLUTION INTRAMUSCULAR; INTRAVENOUS at 18:17

## 2021-02-17 RX ADMIN — MORPHINE SULFATE 2 MG: 2 INJECTION, SOLUTION INTRAMUSCULAR; INTRAVENOUS at 22:27

## 2021-02-17 RX ADMIN — ONDANSETRON 4 MG: 2 INJECTION INTRAMUSCULAR; INTRAVENOUS at 18:17

## 2021-02-17 RX ADMIN — MORPHINE SULFATE 4 MG: 4 INJECTION, SOLUTION INTRAMUSCULAR; INTRAVENOUS at 16:56

## 2021-02-17 RX ADMIN — DILTIAZEM HYDROCHLORIDE 60 MG: 60 TABLET, FILM COATED ORAL at 22:30

## 2021-02-17 RX ADMIN — GABAPENTIN 100 MG: 100 CAPSULE ORAL at 22:30

## 2021-02-17 RX ADMIN — ATORVASTATIN CALCIUM 40 MG: 40 TABLET, FILM COATED ORAL at 22:31

## 2021-02-17 RX ADMIN — FAMOTIDINE 20 MG: 20 TABLET ORAL at 22:30

## 2021-02-17 RX ADMIN — SODIUM CHLORIDE: 9 INJECTION, SOLUTION INTRAVENOUS at 22:29

## 2021-02-17 ASSESSMENT — PAIN SCALES - GENERAL
PAINLEVEL_OUTOF10: 7
PAINLEVEL_OUTOF10: 6
PAINLEVEL_OUTOF10: 10
PAINLEVEL_OUTOF10: 10

## 2021-02-17 ASSESSMENT — PAIN DESCRIPTION - DESCRIPTORS: DESCRIPTORS: ACHING

## 2021-02-17 NOTE — ED NOTES
Attempted to call wife for update. No answer and not able to leave voicemail due to mailbox not being set up.       Denis Del Toro RN  02/17/21 [id number]

## 2021-02-17 NOTE — ED NOTES
Mark rainbow (extra green) from patient's right wrist.     Performed 2 venipuncture procedures in total. 1st in patient's left median AC (successful), 2nd in right forearm (successful).       Lina Benedict  02/17/21 [id number]

## 2021-02-17 NOTE — ED NOTES
Bed: ED-30  Expected date:   Expected time:   Means of arrival:   Comments:  MF 2, 80M fall ^ hip pain poss dislocation     Tucker Talamantes, RN  02/17/21 [id number]

## 2021-02-17 NOTE — ED PROVIDER NOTES
Patient Identification  Abner Porter is a 79 y.o. male    Chief Complaint  Fall and Hip Pain      HPI  (History provided by patient)  This is a 79 y.o. male who was brought in by EMS for chief complaint of fall, hip pain. Reports he slipped and had a mechanical fall at home, landed on left hip, has 10 out of 10 pain in left hip and knee. It is worse with movement. He thinks he might of hit his head, is not sure if he is in on any blood thinners. Denies headache. No other injuries. Has history of diabetes, bilateral below-knee amputations with prosthetics, right hip replacement with Dr. Nikky Powell in 2005. REVIEW OF SYSTEMS    Constitutional:  Denies fever, chills  HENT:  Denies sore throat or ear pain   Eyes: Denies vision changes, eye pain  Cardiovascular:  Denies chest pain, syncope  Respiratory:  Denies shortness of breath, cough   GI:  Denies abdominal pain, nausea, vomiting  :  Denies dysuria, discharge  Musculoskeletal:  Denies back pain.  + hip pain  Skin:  Denies rash, pruritis  Neurologic:  Denies headache, focal weakness, or sensory changes     See HPI and nursing notes for additional information     I have reviewed the following nursing documentation:  Allergies:    Allergies   Allergen Reactions    Byetta 10 Mcg Pen [Exenatide]     Phenylephrine     Sulbactam     Ampicillin Rash    Aricept [Donepezil Hydrochloride] Other (See Comments)     Hallucinations, confusion    Benztropine Other (See Comments)     Hallucinations, confusion    Celebrex [Celecoxib] Other (See Comments)     'causes him to be nervous and jittery\"    Diphenhydramine Hcl Other (See Comments)     nervous and jittery    Diphenhydramine Hcl [Diphenhydramine] Anxiety    Exenatide Other (See Comments)     Causes him to be nervous and jittery    Metoprolol Succinate Other (See Comments)     Hallucinations     Phenergan [Promethazine Hcl] Other (See Comments)     Hallucinations, nervous, jittery    Plavix [Clopidogrel Bisulfate] Other (See Comments)     Causes bleeding in his stomach    Pseudoephedrine Anxiety    Reglan [Metoclopramide Hcl] Other (See Comments)     \"Caused him to just sit in a chair and rock back and forth\"       Past medical history:  has a past medical history of Arthritis, Biliary stones (2015), CAD (coronary artery disease), COPD (chronic obstructive pulmonary disease) ([facility] [address] Diabetes mellitus ([facility] [address] History of blood transfusion, angiography (3/11/2014), Hyperlipidemia, Hypertension, and Kidney stone. Past surgical history:  has a past surgical history that includes Cardiac surgery; joint replacement; Cholecystectomy; Coronary angioplasty with stent; fracture surgery; Leg amputation below knee (Bilateral); ERCP (03/13/14); other surgical history (03/13/14); ERCP (9/11/14); ERCP (10/24/15); ERCP (03/18/2017); Abdomen surgery; Dilatation, esophagus; ERCP (N/A, 1/25/2019); ERCP (N/A, 9/7/2019); and ERCP (N/A, 3/31/2020). Home medications:   Prior to Admission medications    Medication Sig Start Date End Date Taking? Authorizing Provider   carvedilol (COREG) 6.25 MG tablet Take 1 tablet by mouth 2 times daily (with meals) 12/7/20   Che Shelton MD   cholestyramine light 4 g packet Take 1 packet by mouth 2 times daily as needed (diarrhea) 12/7/20   Che Shelton MD   clopidogrel (PLAVIX) 75 MG tablet Take 1 tablet by mouth daily 12/8/20   Che Shelton MD   ondansetron (ZOFRAN) 4 MG tablet Take 4 mg by mouth 2 times daily as needed for Nausea or Vomiting    Historical Provider, MD   famotidine (PEPCID) 20 MG tablet Take 1 tablet by mouth daily 7/5/20   Alysha Truong MD   gabapentin (NEURONTIN) 100 MG capsule Take 100 mg by mouth nightly. Historical Provider, MD   Cholecalciferol 50 MCG (2000 UT) TABS Take one tablet daily by mouth    Historical Provider, MD   traMADol (ULTRAM) 50 MG tablet Take 50 mg by mouth every 6 hours as needed.  5/27/17   Historical Provider, MD   atorvastatin (LIPITOR) 40 MG tablet Take 40 mg by mouth daily    Historical Provider, MD   docusate sodium (COLACE) 100 MG capsule Take 100 mg by mouth nightly as needed     Historical Provider, MD   latanoprost (XALATAN) 0.005 % ophthalmic solution Place 1 drop into both eyes nightly     Historical Provider, MD   diltiazem (CARDIZEM) 60 MG tablet Take 1 tablet by mouth 2 times daily 3/19/17   Safia Rosin, DO   Multiple Vitamins-Minerals (MULTIVITAMIN PO) Take 1 tablet by mouth daily. Historical Provider, MD   aspirin 81 MG chewable tablet Take 81 mg by mouth daily     Historical Provider, MD       Social history:  reports that he has never smoked. He has never used smokeless tobacco. He reports that he does not drink alcohol or use drugs. Family history:    Family History   Problem Relation Age of Onset    Cancer Mother     Diabetes Mother     Cancer Father          Exam  BP (!) 172/75   Pulse 81   Temp 97.6 °F (36.4 °C) (Oral)   Resp 16   SpO2 100%   Nursing note and vitals reviewed. Constitutional: Well developed, well nourished. Mild distress due to pain. HENT:      Head: Normocephalic and atraumatic. Ears: External ears normal.      Nose: Nose normal.     Mouth: Membrane mucosa moist and pink. No posterior oropharynx erythema or tonsillar edema  Eyes: Anicteric sclera. No discharge, PERRL  Neck: Supple. Trachea midline. Cardiovascular: RRR, no murmurs, rubs, or gallops, radial pulses 2+ bilaterally. Pulmonary/Chest: Effort normal. No respiratory distress. CTAB. No stridor. No wheezes. No rales. Abdominal: Soft. Nontender to palpation. No distension. No guarding, rebound tenderness, or evidence of ascites. : No CVA tenderness. Musculoskeletal: There is mild deformity of the left hip, tender to palpation over the lateral greater trochanter, minimal tenderness over left inguinal crease, pain is exacerbated with attempted range of motion of the hip. No tenderness palpation of the knee.   He has bilateral BKA's. Capillary refill is brisk in the distal stumps. Neurological: Alert and oriented to person, place, and time. Normal muscle tone. Skin: Warm and dry. No rash. Psychiatric: Anxious      Radiographs (if obtained):  [] The following radiograph was interpreted by myself in the absence of a radiologist:   [x] Radiologist's Report Reviewed:  XR CHEST PORTABLE   Final Result   No acute abnormality. XR KNEE LEFT (1-2 VIEWS)   Preliminary Result   1. Acute comminuted mildly displaced intertrochanteric fracture of the   proximal left femur. 2. Patient status is post left below-knee amputation, without acute osseous   abnormality at the left knee. XR HIP 2-3 VW W PELVIS LEFT   Preliminary Result   1. Acute comminuted mildly displaced intertrochanteric fracture of the   proximal left femur. 2. Patient status is post left below-knee amputation, without acute osseous   abnormality at the left knee. CT CERVICAL SPINE WO CONTRAST   Final Result   No acute abnormality of the cervical spine. CT HEAD WO CONTRAST   Final Result   No acute intracranial abnormality. 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                 Labs  Results for orders placed or performed during the hospital encounter of 02/17/21   CBC Auto Differential   Result Value Ref Range    WBC 9.6 4.0 - 10.5 K/CU MM    RBC 3.99 (L) 4.6 - 6.2 M/CU MM    Hemoglobin 11.7 (L) 13.5 - 18.0 GM/DL    Hematocrit 35.6 (L) 42 - 52 %    MCV 89.2 78 - 100 FL    MCH 29.3 27 - 31 PG    MCHC 32.9 32.0 - 36.0 %    RDW 12.6 11.7 - 14.9 %    Platelets 978 701 - 906 K/CU MM    MPV 9.6 7.5 - 11.1 FL    Differential Type AUTOMATED DIFFERENTIAL     Segs Relative 67.3 (H) 36 - 66 %    Lymphocytes % 13.3 (L) 24 - 44 %    Monocytes % 8.1 (H) 0 - 4 %    Eosinophils % 10.2 (H) 0 - 3 %    Basophils % 0.7 0 - 1 %    Segs Absolute 6.5 K/CU MM    Lymphocytes Absolute 1.3 K/CU MM    Monocytes Absolute 0.8 K/CU MM

## 2021-02-17 NOTE — H&P
History and Physical      Name:  Brandi Weiner /Age/Sex: 1940  (79 y.o. male)   MRN & CSN:  [id number] & [id number] Admission Date/Time: 2021  1:42 PM   Location:  [facility] PCP: Tara Acosta MD       Hospital Day: 1        Admitting Physician: Dr. Raghu Daley and Plan:   Brandi Weiner is a 79 y.o. male who presents with Fall and Hip Pain     Closed left hip fracture, initial encounter 2/2 mechanical fall. XR: \"Acute comminuted mildly displaced intertrochanteric fracture of the proximal left femur\"    Admit MedSurg   PRN pain control   Orthopedic surgery consulted emergency room   N/V checks   N.p.o. 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 Last A1C 7.9 (2020)              Monitor FSBS and cover with medium dose SSI   Hold PO medications while inpatient     Essential hypertension- continue home antihypertensive regimen- Monitor BP trends.  CADholding DAPT for now   Cardiology consult for preoperative evaluation     COPD-stable   Continue bronchodilators       Patient case discussed with ED provider    Diet Diet NPO, After Midnight   DVT Prophylaxis [x] Lovenox, []  Heparin, [] SCDs, [] Ambulation  [] Long term AC   GI Prophylaxis [] PPI,  [x] H2 Blocker,  [] Carafate,  [] Diet/Tube Feeds   Code Status Full     Disposition Admit to inpatient. Patient would likely need skilled rehab upon discharge   MDM [] Low, [] Moderate,[x]  High     -Patient assessment and plan discussed and reviewed with admitting physician: Raul Vega MD.       History of Present Illness:     Chief Complaint: Fall and Hip Pain    Brandi Weiner is a 79 y.o. male who presents after fall with hip pain. Patient reports ambulating down kitchen hallway states stumbling and fell into the wall and on left hip.   He does think he hit his head on his doorway but denies any injury/loss of consciousness. He was nonambulatory post fall. Currently ports severe 10/10 pain. He has bilateral BKA's and thinks this fall is mechanical nature. Denies any dizziness or syncopal type episodes. Ten point ROS: reviewed negative, unless as noted in above HPI. Objective:   No intake or output data in the 24 hours ending 02/17/21 1723     Vitals:   Vitals:    02/17/21 1502 02/17/21 1601 02/17/21 1632 02/17/21 1703   BP: (!) 149/76 (!) 172/75 (!) 150/73 (!) 171/75   Pulse: 83 81 76 84   Resp:       Temp:       TempSrc:       SpO2: 100% 100%  98%       Physical Exam: 02/17/21     GEN -Awake uncomfortable appearing male, sitting upright in bed , NAD. Normal body habitus. Appears given age. EYES -PERRLA. No scleral erythema, discharge, or conjunctivitis. HENT -MM are moist. Oral pharynx without exudates, no evidence of thrush. NECK -Supple, no apparent thyromegaly or masses. RESP -CTA, no wheezes, rales or rhonchi. Symmetric chest movement while on RA.   C/V -S1/S2 auscultated. RRR without appreciable M/R/G. No JVD or carotid bruits. Peripheral pulses equal bilaterally and palpable. Cap refill <3 sec. no peripheral edema. GI -Abdomen is soft non distended and without significant TTP. + BS. No masses or guarding. Rectal exam deferred. No HSM   -No CVA/ flank tenderness. Renee catheter is not present. LYMPH-No palpable cervical lymphadenopathy and no hepatosplenomegaly. No petechiae or ecchymoses. MS -No gross joint deformities. Bilateral BKA  SKIN -Normal coloration, warm, dry. NEURO-Cranial nerves appear grossly intact, normal speech, no lateralizing weakness. PSYC-Awake, alert, oriented x 4- person, place, time, situation,  Appropriate affect.     Past Medical History:      Past Medical History:   Diagnosis Date    Arthritis     Biliary stones 2015    CAD (coronary artery disease)     COPD (chronic obstructive pulmonary disease) (HCC)     Diabetes mellitus ([facility] [address]     TOBACCO:   reports that he has never smoked. He has never used smokeless tobacco.  ETOH:   reports no history of alcohol use. Drugs:  reports no history of drug use. Allergies: Allergies   Allergen Reactions    Byetta 10 Mcg Pen [Exenatide]     Phenylephrine     Sulbactam     Ampicillin Rash    Aricept [Donepezil Hydrochloride] Other (See Comments)     Hallucinations, confusion    Benztropine Other (See Comments)     Hallucinations, confusion    Celebrex [Celecoxib] Other (See Comments)     'causes him to be nervous and jittery\"    Diphenhydramine Hcl Other (See Comments)     nervous and jittery    Diphenhydramine Hcl [Diphenhydramine] Anxiety    Exenatide Other (See Comments)     Causes him to be nervous and jittery    Metoprolol Succinate Other (See Comments)     Hallucinations     Phenergan [Promethazine Hcl] Other (See Comments)     Hallucinations, nervous, jittery    Plavix [Clopidogrel Bisulfate] Other (See Comments)     Causes bleeding in his stomach    Pseudoephedrine Anxiety    Reglan [Metoclopramide Hcl] Other (See Comments)     \"Caused him to just sit in a chair and rock back and forth\"       Home Medications:     Prior to Admission medications    Medication Sig Start Date End Date Taking? Authorizing Provider   carvedilol (COREG) 6.25 MG tablet Take 1 tablet by mouth 2 times daily (with meals) 12/7/20   Peggy Arvizu MD   cholestyramine light 4 g packet Take 1 packet by mouth 2 times daily as needed (diarrhea) 12/7/20   Peggy Arvizu MD   clopidogrel (PLAVIX) 75 MG tablet Take 1 tablet by mouth daily 12/8/20   Peggy Arvizu MD   ondansetron (ZOFRAN) 4 MG tablet Take 4 mg by mouth 2 times daily as needed for Nausea or Vomiting    Historical Provider, MD   famotidine (PEPCID) 20 MG tablet Take 1 tablet by mouth daily 7/5/20   Angy Inman MD   gabapentin (NEURONTIN) 100 MG capsule Take 100 mg by mouth nightly.     Historical Provider, MD   Cholecalciferol 50 MCG (2000 UT) TABS Take one tablet daily by mouth    Historical Provider, MD   traMADol (ULTRAM) 50 MG tablet Take 50 mg by mouth every 6 hours as needed. 5/27/17   Historical Provider, MD   atorvastatin (LIPITOR) 40 MG tablet Take 40 mg by mouth daily    Historical Provider, MD   docusate sodium (COLACE) 100 MG capsule Take 100 mg by mouth nightly as needed     Historical Provider, MD   latanoprost (XALATAN) 0.005 % ophthalmic solution Place 1 drop into both eyes nightly     Historical Provider, MD   diltiazem (CARDIZEM) 60 MG tablet Take 1 tablet by mouth 2 times daily 3/19/17   Daryle Capers, DO   Multiple Vitamins-Minerals (MULTIVITAMIN PO) Take 1 tablet by mouth daily. Historical Provider, MD   aspirin 81 MG chewable tablet Take 81 mg by mouth daily     Historical Provider, MD         Medications:   Medications:    Infusions:   PRN Meds:     morphine, 4 mg, Q30 Min PRN        Data:     Laboratory this visit:  Reviewed  Recent Labs     02/17/21  1506   WBC 9.6   HGB 11.7*   HCT 35.6*         Recent Labs     02/17/21  1506   *   K 3.9   CL 98*   CO2 21   BUN 17   CREATININE 1.1     Recent Labs     02/17/21  1506   AST 23   ALT 13   BILITOT 0.7   ALKPHOS 62     Recent Labs     02/17/21  1506   INR 1.02         Radiology this visit:  Reviewed.     Xr Knee Left (1-2 Views)    Result Date: 2/17/2021  EXAMINATION: ONE XRAY VIEW OF THE PELVIS AND TWO XRAY VIEWS LEFT HIP; TWO XRAY VIEWS OF THE LEFT KNEE 2/17/2021 2:12 pm COMPARISON: CT abdomen/pelvis 11/30/2020 HISTORY: ORDERING SYSTEM PROVIDED HISTORY: trauma TECHNOLOGIST PROVIDED HISTORY: Reason for exam:->trauma Reason for Exam: left hip fracture Acuity: Acute Type of Exam: Initial Mechanism of Injury: fall Relevant Medical/Surgical History: diabetes, cad, copd ; ORDERING SYSTEM PROVIDED HISTORY: trauma TECHNOLOGIST PROVIDED HISTORY: Reason for exam:->trauma Reason for Exam: left leg pain Acuity: Acute Type of Exam: Initial Mechanism of Injury: fall Relevant Medical/Surgical History: diabetes FINDINGS: A single frontal view of the pelvis as well as frontal and cross-table lateral views of the left hip were performed. There is an acute comminuted mildly displaced intertrochanteric fracture of the proximal left femur. No additional fracture is identified. The left femoral head is well seated at the left acetabulum, without evidence of dislocation. There is mild left hip osteoarthritis. There is an intact right hip arthroplasty, without evidence of orthopedic hardware complication. Pelvic ring is intact. The sacrum and SI joints are unremarkable. There is diffuse osteopenia. Enthesopathic changes are evident. Atherosclerotic calcifications are noted. Frontal and cross-table lateral views of the left knee were performed. The patient is status post below-knee amputation. There is no acute fracture or dislocation. There is diffuse osteopenia. A joint effusion is not identified. There is mild tricompartmental osteoarthritis. 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 Atherosclerotic calcifications are evident. 1. Acute comminuted mildly displaced intertrochanteric fracture of the proximal left femur. 2. Patient status is post left below-knee amputation, without acute osseous abnormality at the left knee. Ct Head Wo Contrast    Result Date: 2/17/2021  EXAMINATION: CT OF THE HEAD WITHOUT CONTRAST  2/17/2021 11:14 am TECHNIQUE: CT of the head was performed without the administration of intravenous contrast. Dose modulation, iterative reconstruction, and/or weight based adjustment of the mA/kV was utilized to reduce the radiation dose to as low as reasonably achievable. COMPARISON: Brain MRI, 12/01/2020 HISTORY: ORDERING SYSTEM PROVIDED HISTORY: trauma TECHNOLOGIST PROVIDED HISTORY: Has a \"code stroke\" or \"stroke alert\" been called? 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 No other masses or hemorrhages are seen within the brain parenchyma. No acute loss of the gray-white matter differentiation is identified to suggest acute or subacute infarct. No midline shift. Periventricular low-attenuation is identified, moderate degree, most compatible with chronic small vessel ischemic disease. Mild diffuse atrophy noted. Intracranial vasculature is unremarkable for age. ORBITS: The visualized portion of the orbits demonstrate no acute abnormality. SINUSES: Postsurgical changes noted from previous partial ethmoidectomy and antral window placement on the right. Paranasal sinuses are otherwise unremarkable. The mastoid air cells are clear. SOFT TISSUES/SKULL:  No acute abnormality of the visualized skull or soft tissues. No acute intracranial abnormality. 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 Ct Cervical Spine Wo Contrast    Result Date: 2/17/2021  EXAMINATION: CT OF THE CERVICAL SPINE WITHOUT CONTRAST 2/17/2021 2:15 pm TECHNIQUE: CT of the cervical spine was performed without the administration of intravenous contrast. Multiplanar reformatted images are provided for review. Dose modulation, iterative reconstruction, and/or weight based adjustment of the mA/kV was utilized to reduce the radiation dose to as low as reasonably achievable. COMPARISON: None. HISTORY: ORDERING SYSTEM PROVIDED HISTORY:  Trauma TECHNOLOGIST PROVIDED HISTORY: Reason for exam:  Trauma Decision Support Exception:  Emergency Medical Condition (MA) Reason for Exam: trauma Acuity: Acute Type of Exam: Initial Mechanism of Injury: Fall FINDINGS: BONES/ALIGNMENT: There is exaggeration of the normal cervical lordosis. A fracture is not identified. DEGENERATIVE CHANGES: There is mild multilevel degenerative disc disease.  There is moderate multilevel facet degenerative change. SOFT TISSUES: There is no apical pneumothorax. The prevertebral soft tissues are within normal limits. Atherosclerosis is noted. No acute abnormality of the cervical spine. Xr Chest Portable    Result Date: 2/17/2021  EXAMINATION: ONE XRAY VIEW OF THE CHEST 2/17/2021 2:12 pm COMPARISON: None. HISTORY: ORDERING SYSTEM PROVIDED HISTORY: trauma TECHNOLOGIST PROVIDED HISTORY: Reason for exam:->trauma Reason for Exam: left hip fx Acuity: Acute Type of Exam: Initial Mechanism of Injury: fall Relevant Medical/Surgical History: na FINDINGS: The lungs are clear. The mediastinum and cardiac silhouette are unremarkable. There is no evidence of pneumothorax. No displaced rib fractures are identified. No acute abnormality. Xr Hip 2-3 Vw W Pelvis Left    Result Date: 2/17/2021  EXAMINATION: ONE XRAY VIEW OF THE PELVIS AND TWO XRAY VIEWS LEFT HIP; TWO XRAY VIEWS OF THE LEFT KNEE 2/17/2021 2:12 pm COMPARISON: CT abdomen/pelvis 11/30/2020 HISTORY: ORDERING SYSTEM PROVIDED HISTORY: trauma TECHNOLOGIST PROVIDED HISTORY: Reason for exam:->trauma Reason for Exam: left hip fracture Acuity: Acute Type of Exam: Initial Mechanism of Injury: fall Relevant Medical/Surgical History: diabetes, cad, copd ; ORDERING SYSTEM PROVIDED HISTORY: trauma TECHNOLOGIST PROVIDED HISTORY: Reason for exam:->trauma Reason for Exam: left leg pain Acuity: Acute Type of Exam: Initial Mechanism of Injury: fall Relevant Medical/Surgical History: diabetes FINDINGS: A single frontal view of the pelvis as well as frontal and cross-table lateral views of the left hip were performed. There is an acute comminuted mildly displaced intertrochanteric fracture of the proximal left femur. No additional fracture is identified. The left femoral head is well seated at the left acetabulum, without evidence of dislocation. There is mild left hip osteoarthritis.   There is an intact right hip arthroplasty, without evidence of

## 2021-02-17 NOTE — CARE COORDINATION
CM met with patient to begin discharge planning. CM introduced self and CM role. Patient states he presents to ER with c/o left hip pain following a fall earlier today. Patient denies head injury or loss of consciousness. Patient has a PCP and insurance which assists with medication affordability. Patient uses the following DME: wheeled walker with seat, life alert button. Patient does not drive, but depends on his spouse for transportation to and from [address] [place] [address]. Patient states he resides at [facility] with his spouse Elza Exon. Patient states he has a history of previous right hip fracture. Patient states after right hip fracture he was transferred to [facility] for rehab. CM discussed possible SNF placement upon discharge from hospital. Patient agreeable to SNF placement at [facility]. Patient states \"I don't want to return to [facility]\". 1819 CM placed telephone call to Antonietta Sears with [facility]. States patient is an independent living resident at [facility]. CM advised Antonietta Sears that patient would be interested in SNF placement at [facility]. Demographics and payor information e faxed to [facility]. CM will need PT/OT evaluation, COVID testing and pre cert. Patient plan [facility] SNF upon discharge.

## 2021-02-18 ENCOUNTER — ANESTHESIA EVENT (OUTPATIENT)
Dept: OPERATING ROOM | Age: 81
DRG: 480 | End: 2021-02-18
Payer: MEDICARE

## 2021-02-18 ENCOUNTER — APPOINTMENT (OUTPATIENT)
Dept: GENERAL RADIOLOGY | Age: 81
DRG: 480 | End: 2021-02-18
Payer: MEDICARE

## 2021-02-18 ENCOUNTER — ANESTHESIA (OUTPATIENT)
Dept: OPERATING ROOM | Age: 81
DRG: 480 | End: 2021-02-18
Payer: MEDICARE

## 2021-02-18 VITALS
OXYGEN SATURATION: 100 % | SYSTOLIC BLOOD PRESSURE: 126 MMHG | TEMPERATURE: 98.6 F | DIASTOLIC BLOOD PRESSURE: 65 MMHG | RESPIRATION RATE: 18 BRPM

## 2021-02-18 LAB
ALBUMIN SERPL-MCNC: 3.9 GM/DL (ref 3.4–5)
ALP BLD-CCNC: 61 IU/L (ref 40–128)
ALT SERPL-CCNC: 12 U/L (ref 10–40)
ANION GAP SERPL CALCULATED.3IONS-SCNC: 10 MMOL/L (ref 4–16)
AST SERPL-CCNC: 21 IU/L (ref 15–37)
BASOPHILS ABSOLUTE: 0 K/CU MM
BASOPHILS RELATIVE PERCENT: 0.3 % (ref 0–1)
BILIRUB SERPL-MCNC: 0.9 MG/DL (ref 0–1)
BUN BLDV-MCNC: 19 MG/DL (ref 6–23)
CALCIUM SERPL-MCNC: 9.1 MG/DL (ref 8.3–10.6)
CHLORIDE BLD-SCNC: 106 MMOL/L (ref 99–110)
CO2: 25 MMOL/L (ref 21–32)
CREAT SERPL-MCNC: 1.3 MG/DL (ref 0.9–1.3)
DIFFERENTIAL TYPE: ABNORMAL
EOSINOPHILS ABSOLUTE: 0 K/CU MM
EOSINOPHILS RELATIVE PERCENT: 0.3 % (ref 0–3)
GFR AFRICAN AMERICAN: >60 ML/MIN/1.73M2
GFR NON-AFRICAN AMERICAN: 53 ML/MIN/1.73M2
GLUCOSE BLD-MCNC: 170 MG/DL (ref 70–99)
GLUCOSE BLD-MCNC: 230 MG/DL (ref 70–99)
GLUCOSE BLD-MCNC: 235 MG/DL (ref 70–99)
GLUCOSE BLD-MCNC: 240 MG/DL (ref 70–99)
GLUCOSE BLD-MCNC: 281 MG/DL (ref 70–99)
GLUCOSE BLD-MCNC: 376 MG/DL (ref 70–99)
HCT VFR BLD CALC: 35.4 % (ref 42–52)
HEMOGLOBIN: 11.2 GM/DL (ref 13.5–18)
IMMATURE NEUTROPHIL %: 0.2 % (ref 0–0.43)
LYMPHOCYTES ABSOLUTE: 0.9 K/CU MM
LYMPHOCYTES RELATIVE PERCENT: 6.6 % (ref 24–44)
MCH RBC QN AUTO: 29.7 PG (ref 27–31)
MCHC RBC AUTO-ENTMCNC: 31.6 % (ref 32–36)
MCV RBC AUTO: 93.9 FL (ref 78–100)
MONOCYTES ABSOLUTE: 1 K/CU MM
MONOCYTES RELATIVE PERCENT: 7.2 % (ref 0–4)
NUCLEATED RBC %: 0 %
PDW BLD-RTO: 13 % (ref 11.7–14.9)
PLATELET # BLD: 183 K/CU MM (ref 140–440)
PMV BLD AUTO: 10 FL (ref 7.5–11.1)
POTASSIUM SERPL-SCNC: 4.1 MMOL/L (ref 3.5–5.1)
RBC # BLD: 3.77 M/CU MM (ref 4.6–6.2)
SARS-COV-2, NAAT: NOT DETECTED
SEGMENTED NEUTROPHILS ABSOLUTE COUNT: 11.5 K/CU MM
SEGMENTED NEUTROPHILS RELATIVE PERCENT: 85.4 % (ref 36–66)
SODIUM BLD-SCNC: 141 MMOL/L (ref 135–145)
SOURCE: NORMAL
TOTAL IMMATURE NEUTOROPHIL: 0.03 K/CU MM
TOTAL NUCLEATED RBC: 0 K/CU MM
TOTAL PROTEIN: 6 GM/DL (ref 6.4–8.2)
WBC # BLD: 13.4 K/CU MM (ref 4–10.5)

## 2021-02-18 PROCEDURE — 80053 COMPREHEN METABOLIC PANEL: CPT

## 2021-02-18 PROCEDURE — 3700000000 HC ANESTHESIA ATTENDED CARE: Performed by: ORTHOPAEDIC SURGERY

## 2021-02-18 PROCEDURE — 87635 SARS-COV-2 COVID-19 AMP PRB: CPT

## 2021-02-18 PROCEDURE — 3600000014 HC SURGERY LEVEL 4 ADDTL 15MIN: Performed by: ORTHOPAEDIC SURGERY

## 2021-02-18 PROCEDURE — P9045 ALBUMIN (HUMAN), 5%, 250 ML: HCPCS | Performed by: NURSE ANESTHETIST, CERTIFIED REGISTERED

## 2021-02-18 PROCEDURE — 2580000003 HC RX 258: Performed by: ORTHOPAEDIC SURGERY

## 2021-02-18 PROCEDURE — 2580000003 HC RX 258: Performed by: NURSE PRACTITIONER

## 2021-02-18 PROCEDURE — 2500000003 HC RX 250 WO HCPCS: Performed by: INTERNAL MEDICINE

## 2021-02-18 PROCEDURE — 99221 1ST HOSP IP/OBS SF/LOW 40: CPT | Performed by: ORTHOPAEDIC SURGERY

## 2021-02-18 PROCEDURE — 76000 FLUOROSCOPY <1 HR PHYS/QHP: CPT

## 2021-02-18 PROCEDURE — 2780000010 HC IMPLANT OTHER: Performed by: ORTHOPAEDIC SURGERY

## 2021-02-18 PROCEDURE — 27245 TREAT THIGH FRACTURE: CPT | Performed by: PHYSICIAN ASSISTANT

## 2021-02-18 PROCEDURE — 6370000000 HC RX 637 (ALT 250 FOR IP): Performed by: ANESTHESIOLOGY

## 2021-02-18 PROCEDURE — 6360000002 HC RX W HCPCS: Performed by: ORTHOPAEDIC SURGERY

## 2021-02-18 PROCEDURE — 1200000000 HC SEMI PRIVATE

## 2021-02-18 PROCEDURE — 2500000003 HC RX 250 WO HCPCS: Performed by: NURSE ANESTHETIST, CERTIFIED REGISTERED

## 2021-02-18 PROCEDURE — 6360000002 HC RX W HCPCS: Performed by: NURSE ANESTHETIST, CERTIFIED REGISTERED

## 2021-02-18 PROCEDURE — 82962 GLUCOSE BLOOD TEST: CPT

## 2021-02-18 PROCEDURE — 36415 COLL VENOUS BLD VENIPUNCTURE: CPT

## 2021-02-18 PROCEDURE — 7100000000 HC PACU RECOVERY - FIRST 15 MIN: Performed by: ORTHOPAEDIC SURGERY

## 2021-02-18 PROCEDURE — 7100000001 HC PACU RECOVERY - ADDTL 15 MIN: Performed by: ORTHOPAEDIC SURGERY

## 2021-02-18 PROCEDURE — 2720000010 HC SURG SUPPLY STERILE: Performed by: ORTHOPAEDIC SURGERY

## 2021-02-18 PROCEDURE — 3600000004 HC SURGERY LEVEL 4 BASE: Performed by: ORTHOPAEDIC SURGERY

## 2021-02-18 PROCEDURE — 0QS906Z REPOSITION LEFT FEMORAL SHAFT WITH INTRAMEDULLARY INTERNAL FIXATION DEVICE, OPEN APPROACH: ICD-10-PCS | Performed by: ORTHOPAEDIC SURGERY

## 2021-02-18 PROCEDURE — 93010 ELECTROCARDIOGRAM REPORT: CPT | Performed by: INTERNAL MEDICINE

## 2021-02-18 PROCEDURE — 6360000002 HC RX W HCPCS: Performed by: NURSE PRACTITIONER

## 2021-02-18 PROCEDURE — 27245 TREAT THIGH FRACTURE: CPT | Performed by: ORTHOPAEDIC SURGERY

## 2021-02-18 PROCEDURE — 99222 1ST HOSP IP/OBS MODERATE 55: CPT | Performed by: INTERNAL MEDICINE

## 2021-02-18 PROCEDURE — 85025 COMPLETE CBC W/AUTO DIFF WBC: CPT

## 2021-02-18 PROCEDURE — 2580000003 HC RX 258: Performed by: INTERNAL MEDICINE

## 2021-02-18 PROCEDURE — 6370000000 HC RX 637 (ALT 250 FOR IP): Performed by: ORTHOPAEDIC SURGERY

## 2021-02-18 PROCEDURE — 2709999900 HC NON-CHARGEABLE SUPPLY: Performed by: ORTHOPAEDIC SURGERY

## 2021-02-18 PROCEDURE — 93005 ELECTROCARDIOGRAM TRACING: CPT | Performed by: INTERNAL MEDICINE

## 2021-02-18 PROCEDURE — 3700000001 HC ADD 15 MINUTES (ANESTHESIA): Performed by: ORTHOPAEDIC SURGERY

## 2021-02-18 RX ORDER — SODIUM CHLORIDE, SODIUM LACTATE, POTASSIUM CHLORIDE, CALCIUM CHLORIDE 600; 310; 30; 20 MG/100ML; MG/100ML; MG/100ML; MG/100ML
INJECTION, SOLUTION INTRAVENOUS CONTINUOUS
Status: DISCONTINUED | OUTPATIENT
Start: 2021-02-18 | End: 2021-02-18

## 2021-02-18 RX ORDER — HYDROMORPHONE HCL 110MG/55ML
0.5 PATIENT CONTROLLED ANALGESIA SYRINGE INTRAVENOUS EVERY 5 MIN PRN
Status: DISCONTINUED | OUTPATIENT
Start: 2021-02-18 | End: 2021-02-18 | Stop reason: HOSPADM

## 2021-02-18 RX ORDER — PROMETHAZINE HYDROCHLORIDE 25 MG/ML
6.25 INJECTION, SOLUTION INTRAMUSCULAR; INTRAVENOUS
Status: DISCONTINUED | OUTPATIENT
Start: 2021-02-18 | End: 2021-02-18 | Stop reason: HOSPADM

## 2021-02-18 RX ORDER — MEPERIDINE HYDROCHLORIDE 25 MG/ML
12.5 INJECTION INTRAMUSCULAR; INTRAVENOUS; SUBCUTANEOUS EVERY 5 MIN PRN
Status: DISCONTINUED | OUTPATIENT
Start: 2021-02-18 | End: 2021-02-18 | Stop reason: HOSPADM

## 2021-02-18 RX ORDER — 0.9 % SODIUM CHLORIDE 0.9 %
500 INTRAVENOUS SOLUTION INTRAVENOUS
Status: DISCONTINUED | OUTPATIENT
Start: 2021-02-18 | End: 2021-02-18 | Stop reason: HOSPADM

## 2021-02-18 RX ORDER — SODIUM CHLORIDE 0.9 % (FLUSH) 0.9 %
10 SYRINGE (ML) INJECTION PRN
Status: DISCONTINUED | OUTPATIENT
Start: 2021-02-18 | End: 2021-02-18 | Stop reason: HOSPADM

## 2021-02-18 RX ORDER — HYDROCODONE BITARTRATE AND ACETAMINOPHEN 5; 325 MG/1; MG/1
1 TABLET ORAL PRN
Status: DISCONTINUED | OUTPATIENT
Start: 2021-02-18 | End: 2021-02-18 | Stop reason: HOSPADM

## 2021-02-18 RX ORDER — SODIUM CHLORIDE 0.9 % (FLUSH) 0.9 %
10 SYRINGE (ML) INJECTION EVERY 12 HOURS SCHEDULED
Status: DISCONTINUED | OUTPATIENT
Start: 2021-02-18 | End: 2021-02-18 | Stop reason: HOSPADM

## 2021-02-18 RX ORDER — DEXAMETHASONE SODIUM PHOSPHATE 4 MG/ML
INJECTION, SOLUTION INTRA-ARTICULAR; INTRALESIONAL; INTRAMUSCULAR; INTRAVENOUS; SOFT TISSUE PRN
Status: DISCONTINUED | OUTPATIENT
Start: 2021-02-18 | End: 2021-02-18 | Stop reason: SDUPTHER

## 2021-02-18 RX ORDER — SENNA AND DOCUSATE SODIUM 50; 8.6 MG/1; MG/1
1 TABLET, FILM COATED ORAL 2 TIMES DAILY
Status: DISCONTINUED | OUTPATIENT
Start: 2021-02-18 | End: 2021-02-23 | Stop reason: HOSPADM

## 2021-02-18 RX ORDER — SODIUM CHLORIDE, SODIUM LACTATE, POTASSIUM CHLORIDE, CALCIUM CHLORIDE 600; 310; 30; 20 MG/100ML; MG/100ML; MG/100ML; MG/100ML
INJECTION, SOLUTION INTRAVENOUS CONTINUOUS
Status: DISCONTINUED | OUTPATIENT
Start: 2021-02-18 | End: 2021-02-21

## 2021-02-18 RX ORDER — OXYCODONE HYDROCHLORIDE 10 MG/1
10 TABLET ORAL EVERY 4 HOURS PRN
Status: DISCONTINUED | OUTPATIENT
Start: 2021-02-18 | End: 2021-02-23 | Stop reason: HOSPADM

## 2021-02-18 RX ORDER — CEFAZOLIN SODIUM 2 G/50ML
2000 SOLUTION INTRAVENOUS EVERY 8 HOURS
Status: COMPLETED | OUTPATIENT
Start: 2021-02-18 | End: 2021-02-19

## 2021-02-18 RX ORDER — ACETAMINOPHEN 500 MG
1000 TABLET ORAL ONCE
Status: COMPLETED | OUTPATIENT
Start: 2021-02-18 | End: 2021-02-18

## 2021-02-18 RX ORDER — LIDOCAINE HYDROCHLORIDE 20 MG/ML
INJECTION, SOLUTION INTRAVENOUS PRN
Status: DISCONTINUED | OUTPATIENT
Start: 2021-02-18 | End: 2021-02-18 | Stop reason: SDUPTHER

## 2021-02-18 RX ORDER — SODIUM CHLORIDE 0.9 % (FLUSH) 0.9 %
10 SYRINGE (ML) INJECTION EVERY 12 HOURS SCHEDULED
Status: DISCONTINUED | OUTPATIENT
Start: 2021-02-18 | End: 2021-02-23 | Stop reason: HOSPADM

## 2021-02-18 RX ORDER — FENTANYL CITRATE 50 UG/ML
50 INJECTION, SOLUTION INTRAMUSCULAR; INTRAVENOUS EVERY 5 MIN PRN
Status: DISCONTINUED | OUTPATIENT
Start: 2021-02-18 | End: 2021-02-18 | Stop reason: HOSPADM

## 2021-02-18 RX ORDER — ALBUMIN, HUMAN INJ 5% 5 %
SOLUTION INTRAVENOUS PRN
Status: DISCONTINUED | OUTPATIENT
Start: 2021-02-18 | End: 2021-02-18 | Stop reason: SDUPTHER

## 2021-02-18 RX ORDER — LABETALOL HYDROCHLORIDE 5 MG/ML
5 INJECTION, SOLUTION INTRAVENOUS EVERY 10 MIN PRN
Status: DISCONTINUED | OUTPATIENT
Start: 2021-02-18 | End: 2021-02-18 | Stop reason: HOSPADM

## 2021-02-18 RX ORDER — DILTIAZEM HYDROCHLORIDE 5 MG/ML
10 INJECTION INTRAVENOUS ONCE
Status: DISCONTINUED | OUTPATIENT
Start: 2021-02-18 | End: 2021-02-18 | Stop reason: CLARIF

## 2021-02-18 RX ORDER — SUCCINYLCHOLINE/SOD CL,ISO/PF 100 MG/5ML
SYRINGE (ML) INTRAVENOUS PRN
Status: DISCONTINUED | OUTPATIENT
Start: 2021-02-18 | End: 2021-02-18 | Stop reason: SDUPTHER

## 2021-02-18 RX ORDER — OXYCODONE HYDROCHLORIDE 5 MG/1
5 TABLET ORAL EVERY 4 HOURS PRN
Status: DISCONTINUED | OUTPATIENT
Start: 2021-02-18 | End: 2021-02-23 | Stop reason: HOSPADM

## 2021-02-18 RX ORDER — ONDANSETRON 2 MG/ML
INJECTION INTRAMUSCULAR; INTRAVENOUS PRN
Status: DISCONTINUED | OUTPATIENT
Start: 2021-02-18 | End: 2021-02-18 | Stop reason: SDUPTHER

## 2021-02-18 RX ORDER — ACETAMINOPHEN 325 MG/1
650 TABLET ORAL EVERY 4 HOURS PRN
Status: DISCONTINUED | OUTPATIENT
Start: 2021-02-18 | End: 2021-02-23 | Stop reason: HOSPADM

## 2021-02-18 RX ORDER — ROCURONIUM BROMIDE 10 MG/ML
INJECTION, SOLUTION INTRAVENOUS PRN
Status: DISCONTINUED | OUTPATIENT
Start: 2021-02-18 | End: 2021-02-18 | Stop reason: SDUPTHER

## 2021-02-18 RX ORDER — HYDRALAZINE HYDROCHLORIDE 20 MG/ML
5 INJECTION INTRAMUSCULAR; INTRAVENOUS EVERY 10 MIN PRN
Status: DISCONTINUED | OUTPATIENT
Start: 2021-02-18 | End: 2021-02-18 | Stop reason: HOSPADM

## 2021-02-18 RX ORDER — HYDROCODONE BITARTRATE AND ACETAMINOPHEN 5; 325 MG/1; MG/1
2 TABLET ORAL EVERY 6 HOURS PRN
Status: DISCONTINUED | OUTPATIENT
Start: 2021-02-18 | End: 2021-02-18 | Stop reason: HOSPADM

## 2021-02-18 RX ORDER — DIPHENHYDRAMINE HYDROCHLORIDE 50 MG/ML
12.5 INJECTION INTRAMUSCULAR; INTRAVENOUS
Status: DISCONTINUED | OUTPATIENT
Start: 2021-02-18 | End: 2021-02-18 | Stop reason: HOSPADM

## 2021-02-18 RX ORDER — FENTANYL CITRATE 50 UG/ML
INJECTION, SOLUTION INTRAMUSCULAR; INTRAVENOUS PRN
Status: DISCONTINUED | OUTPATIENT
Start: 2021-02-18 | End: 2021-02-18 | Stop reason: SDUPTHER

## 2021-02-18 RX ORDER — SODIUM CHLORIDE 0.9 % (FLUSH) 0.9 %
10 SYRINGE (ML) INJECTION PRN
Status: DISCONTINUED | OUTPATIENT
Start: 2021-02-18 | End: 2021-02-23 | Stop reason: HOSPADM

## 2021-02-18 RX ORDER — HYDRALAZINE HYDROCHLORIDE 20 MG/ML
10 INJECTION INTRAMUSCULAR; INTRAVENOUS EVERY 6 HOURS PRN
Status: DISCONTINUED | OUTPATIENT
Start: 2021-02-18 | End: 2021-02-18 | Stop reason: CLARIF

## 2021-02-18 RX ORDER — PROPOFOL 10 MG/ML
INJECTION, EMULSION INTRAVENOUS PRN
Status: DISCONTINUED | OUTPATIENT
Start: 2021-02-18 | End: 2021-02-18 | Stop reason: SDUPTHER

## 2021-02-18 RX ORDER — ONDANSETRON 2 MG/ML
4 INJECTION INTRAMUSCULAR; INTRAVENOUS
Status: DISCONTINUED | OUTPATIENT
Start: 2021-02-18 | End: 2021-02-18 | Stop reason: HOSPADM

## 2021-02-18 RX ADMIN — ONDANSETRON 4 MG: 2 INJECTION INTRAMUSCULAR; INTRAVENOUS at 08:02

## 2021-02-18 RX ADMIN — SODIUM CHLORIDE, PRESERVATIVE FREE 10 ML: 5 INJECTION INTRAVENOUS at 22:34

## 2021-02-18 RX ADMIN — ASPIRIN 325 MG: 325 TABLET, COATED ORAL at 22:34

## 2021-02-18 RX ADMIN — MORPHINE SULFATE 2 MG: 2 INJECTION, SOLUTION INTRAMUSCULAR; INTRAVENOUS at 22:43

## 2021-02-18 RX ADMIN — PHENYLEPHRINE HYDROCHLORIDE 200 MCG: 10 INJECTION INTRAVENOUS at 14:40

## 2021-02-18 RX ADMIN — MORPHINE SULFATE 2 MG: 2 INJECTION, SOLUTION INTRAMUSCULAR; INTRAVENOUS at 02:34

## 2021-02-18 RX ADMIN — MORPHINE SULFATE 2 MG: 2 INJECTION, SOLUTION INTRAMUSCULAR; INTRAVENOUS at 06:34

## 2021-02-18 RX ADMIN — CEFAZOLIN SODIUM 2 G: 10 INJECTION, POWDER, FOR SOLUTION INTRAVENOUS at 13:57

## 2021-02-18 RX ADMIN — INSULIN LISPRO 2 UNITS: 100 INJECTION, SOLUTION INTRAVENOUS; SUBCUTANEOUS at 15:58

## 2021-02-18 RX ADMIN — GABAPENTIN 100 MG: 100 CAPSULE ORAL at 22:33

## 2021-02-18 RX ADMIN — SODIUM CHLORIDE, PRESERVATIVE FREE 10 ML: 5 INJECTION INTRAVENOUS at 22:36

## 2021-02-18 RX ADMIN — PHENYLEPHRINE HYDROCHLORIDE 100 MCG: 10 INJECTION INTRAVENOUS at 14:39

## 2021-02-18 RX ADMIN — SODIUM CHLORIDE, POTASSIUM CHLORIDE, SODIUM LACTATE AND CALCIUM CHLORIDE: 600; 310; 30; 20 INJECTION, SOLUTION INTRAVENOUS at 11:37

## 2021-02-18 RX ADMIN — ALBUMIN (HUMAN) 12.5 G: 12.5 INJECTION, SOLUTION INTRAVENOUS at 14:46

## 2021-02-18 RX ADMIN — ONDANSETRON 4 MG: 2 INJECTION INTRAMUSCULAR; INTRAVENOUS at 02:34

## 2021-02-18 RX ADMIN — DEXAMETHASONE SODIUM PHOSPHATE 4 MG: 4 INJECTION, SOLUTION INTRAMUSCULAR; INTRAVENOUS at 13:58

## 2021-02-18 RX ADMIN — SODIUM CHLORIDE, POTASSIUM CHLORIDE, SODIUM LACTATE AND CALCIUM CHLORIDE: 600; 310; 30; 20 INJECTION, SOLUTION INTRAVENOUS at 13:51

## 2021-02-18 RX ADMIN — PROPOFOL 70 MG: 10 INJECTION, EMULSION INTRAVENOUS at 13:55

## 2021-02-18 RX ADMIN — DILTIAZEM HYDROCHLORIDE: 5 INJECTION INTRAVENOUS at 12:01

## 2021-02-18 RX ADMIN — CEFAZOLIN SODIUM 2000 MG: 2 SOLUTION INTRAVENOUS at 22:35

## 2021-02-18 RX ADMIN — ACETAMINOPHEN 1000 MG: 500 TABLET ORAL at 11:37

## 2021-02-18 RX ADMIN — Medication 100 MG: at 13:55

## 2021-02-18 RX ADMIN — ROCURONIUM BROMIDE 20 MG: 10 INJECTION INTRAVENOUS at 14:16

## 2021-02-18 RX ADMIN — FENTANYL CITRATE 50 MCG: 50 INJECTION INTRAMUSCULAR; INTRAVENOUS at 13:55

## 2021-02-18 RX ADMIN — PHENYLEPHRINE HYDROCHLORIDE 200 MCG: 10 INJECTION INTRAVENOUS at 14:23

## 2021-02-18 RX ADMIN — LATANOPROST 1 DROP: 50 SOLUTION/ DROPS OPHTHALMIC at 22:46

## 2021-02-18 RX ADMIN — SODIUM CHLORIDE, PRESERVATIVE FREE 10 ML: 5 INJECTION INTRAVENOUS at 08:02

## 2021-02-18 RX ADMIN — DILTIAZEM HYDROCHLORIDE 60 MG: 60 TABLET, FILM COATED ORAL at 22:33

## 2021-02-18 RX ADMIN — SUGAMMADEX 100 MG: 100 INJECTION, SOLUTION INTRAVENOUS at 14:51

## 2021-02-18 RX ADMIN — DOCUSATE SODIUM 50 MG AND SENNOSIDES 8.6 MG 1 TABLET: 8.6; 5 TABLET, FILM COATED ORAL at 22:34

## 2021-02-18 RX ADMIN — PHENYLEPHRINE HYDROCHLORIDE 200 MCG: 10 INJECTION INTRAVENOUS at 14:00

## 2021-02-18 RX ADMIN — LIDOCAINE HYDROCHLORIDE 100 MG: 20 INJECTION, SOLUTION INTRAVENOUS at 13:55

## 2021-02-18 RX ADMIN — ONDANSETRON 4 MG: 2 INJECTION INTRAMUSCULAR; INTRAVENOUS at 14:16

## 2021-02-18 RX ADMIN — PHENYLEPHRINE HYDROCHLORIDE 200 MCG: 10 INJECTION INTRAVENOUS at 14:12

## 2021-02-18 ASSESSMENT — PULMONARY FUNCTION TESTS
PIF_VALUE: 16
PIF_VALUE: 17
PIF_VALUE: 21
PIF_VALUE: 17
PIF_VALUE: 15
PIF_VALUE: 16
PIF_VALUE: 18
PIF_VALUE: 16
PIF_VALUE: 17
PIF_VALUE: 16
PIF_VALUE: 7
PIF_VALUE: 16
PIF_VALUE: 17
PIF_VALUE: 16
PIF_VALUE: 16
PIF_VALUE: 21
PIF_VALUE: 16
PIF_VALUE: 19
PIF_VALUE: 17
PIF_VALUE: 19
PIF_VALUE: 16
PIF_VALUE: 16
PIF_VALUE: 19
PIF_VALUE: 16
PIF_VALUE: 18
PIF_VALUE: 12
PIF_VALUE: 16
PIF_VALUE: 2
PIF_VALUE: 16
PIF_VALUE: 18
PIF_VALUE: 5
PIF_VALUE: 16
PIF_VALUE: 9
PIF_VALUE: 16
PIF_VALUE: 15
PIF_VALUE: 17

## 2021-02-18 ASSESSMENT — PAIN SCALES - GENERAL
PAINLEVEL_OUTOF10: 8
PAINLEVEL_OUTOF10: 0
PAINLEVEL_OUTOF10: 0
PAINLEVEL_OUTOF10: 7
PAINLEVEL_OUTOF10: 7

## 2021-02-18 ASSESSMENT — ENCOUNTER SYMPTOMS
COLOR CHANGE: 0
CHEST TIGHTNESS: 0
BACK PAIN: 0

## 2021-02-18 NOTE — ANESTHESIA POSTPROCEDURE EVALUATION
Department of Anesthesiology  Postprocedure Note    Patient: Juana Powell  MRN: [id number]  YOB: 1940  Date of evaluation: 2/18/2021  Time:  4:24 PM     Procedure Summary     Date: 02/18/21 Room / Location: [place] [address] / [facility]    Anesthesia Start: 6862 Anesthesia Stop: 1519    Procedure: LEFT FEMUR IM NAIL VIANEY INSERTION (Left Hip) Diagnosis: (LEFT HIP FRACTURE)    Surgeons: Lindsay Kruse DO Responsible Provider: Airam Rome MD    Anesthesia Type: general ASA Status: 4          Anesthesia Type: general    Prachi Phase I: Prachi Score: 8    Prachi Phase II:      Last vitals: Reviewed and per EMR flowsheets.        Anesthesia Post Evaluation    Patient location during evaluation: PACU  Patient participation: complete - patient participated  Level of consciousness: awake  Pain score: 3  Airway patency: patent  Nausea & Vomiting: no vomiting and no nausea  Complications: no  Cardiovascular status: blood pressure returned to baseline and hemodynamically stable  Respiratory status: acceptable  Hydration status: stable

## 2021-02-18 NOTE — OP NOTE
reduction was  obtained, the left lower extremity was prepped and draped in the usual  sterile fashion. I then made a small longitudinal incision just proximal to the tip of the  greater trochanter and carried sharp dissection down to the greater  trochanter. I then positioned the tip of the awl at the appropriate  turning point along the greater trochanter and confirmed the starting  position under fluoroscopy in AP and lateral planes. Once satisfactory  starting position was obtained, I then proceeded the awl into the proximal  femur. I then inserted the guidewire into the femoral canal through the  awl. I then confirmed positioning of the guidewire under fluoroscopy in  the AP and lateral planes. I then removed the awl. I then reamed the  proximal femur with a 15 mm reamer followed by reaming the femoral canal  with the 11.5 mm reamer. I then measured the length of the implant to be 420  mm of length. I then inserted the CTF 11 mm femoral  nail with 420 mm of length over the guidewire until it was firmly seated. I then removed the guidewire. I then confirmed positioning of the implant  under fluoroscopy. I then turned my attention to the lag screw fixation. I inserted a cannula  for the lag screw and made a small longitudinal incision overlying the  lateral aspect of the proximal femur. I then eventually cannulated it down  to the bone surface. I then inserted the guide pin into the femoral head  in the center-center head position just to the tip of the subchondral bone. I then confirmed positioning of the guidewire under fluoroscopy in the AP  and lateral planes. I then measured the length of the implant to be 95 mm  of length. I then reamed to a depth of 95 mm. I then inserted 95 mm lag  screw until it was firmly seated. The insertion jig and cannula were then  removed.   With all hardware in place, I confirmed maintenance of reduction  as well as positioning of all hardware in the AP and lateral planes under  fluoroscopy. The wounds were then irrigated with sterile normal saline and  reapproximated the IT band using 0 Vicryl suture in a figure-of-eight  fashion. I then closed subcutaneous tissue using 2-0 Vicryl suture  followed by skin closure with staples. Adequate hemostasis was maintained  at all times. I then applied a sterile soft dressing to the left lower  extremity. The patient was then awakened from anesthesia and transported  to PACU in stable condition. He appeared to have tolerated the procedure  well. PROGNOSIS:  At this point, he will be admitted back to the hospital for  postoperative pain control, rehabilitation, and medical monitoring. Hospitalist will continue with medical management and physical therapy will  be consulted for gait training and he could be weightbearing as tolerated  on the left leg. He will receive antibiotic prophylaxis and DVT  prophylaxis during his hospitalization. Once he is discharged, I will see him  back in the office in 2 weeks for staple removal and will continue to monitor  his progress in the outpatient setting for radiographic evidence of healing and  resolution of his symptoms.         Eva 97, DO

## 2021-02-18 NOTE — PROGRESS NOTES
Hospitalist Progress Note      Name:  Lianet Navarro /Age/Sex: 1940  (79 y.o. male)   MRN & CSN:  [id number] & [id number] Admission Date/Time: 2021  1:42 PM   Location:  [facility] PCP: Yaron Puri, [employer] [address] Day: 2    History of Present Illness:     Chief Complaint: Left femur fracture  Lianet Navarro is a 79 y.o.  male  who presents with Fall     patient seen and examined at bed side. He says pain with movement of left hip. Denies any chest pain or SOB. Ten point ROS reviewed negative, unless as noted above    Objective:        Intake/Output Summary (Last 24 hours) at 2021 1155  Last data filed at 2021 0507  Gross per 24 hour   Intake    Output 400 ml   Net -400 ml      Vitals:   Vitals:    21 0803   BP: (!) 176/87   Pulse: 108   Resp: 20   Temp: 98.2 °F (36.8 °C)   SpO2: 98%     Physical Exam:   General Appearance: alert and oriented to person, place and time, in no acute distress  Cardiovascular: normal rate, regular rhythm, normal S1 and S2  Pulmonary/Chest: clear to auscultation bilaterally  Abdomen: soft, non-tender, non-distended, normal bowel sounds, no masses   Extremities: Bilateral BKA  Skin: warm and dry, no rash or erythema  Head: normocephalic and atraumatic  Eyes: pupils equal, round, and reactive to light  Neck: supple and non-tender without mass, no thyromegaly   Musculoskeletal: Bilateral BKA with difficulty in moving left lower extremity  Neurological: alert, oriented, normal speech, no focal findings or movement disorder noted    Medications:   Medications:    sodium chloride flush  10 mL Intravenous 2 times per day    ceFAZolin (ANCEF) IVPB  2,000 mg Intravenous On Call to OR    IVPB builder   Intravenous Once    sodium chloride flush  10 mL Intravenous 2 times per day    insulin lispro  0-12 Units Subcutaneous TID     insulin lispro  0-6 Units Subcutaneous Nightly    [Held by provider] enoxaparin  40 mg Subcutaneous Daily    atorvastatin  40 mg Oral Daily    dilTIAZem  60 mg Oral BID    famotidine  20 mg Oral Daily    gabapentin  100 mg Oral Nightly    latanoprost  1 drop Both Eyes Nightly      Infusions:    lactated ringers 125 mL/hr at 02/18/21 1137    sodium chloride 75 mL/hr at 02/17/21 2229    dextrose       PRN Meds:     sodium chloride flush, 10 mL, PRN      hydrALAZINE (APRESOLINE) ivpb, 10 mg, Q6H PRN      sodium chloride flush, 10 mL, PRN      polyethylene glycol, 17 g, Daily PRN      acetaminophen, 650 mg, Q6H PRN    Or      acetaminophen, 650 mg, Q6H PRN      ondansetron, 4 mg, Q6H PRN      glucose, 15 g, PRN      dextrose, 12.5 g, PRN      glucagon (rDNA), 1 mg, PRN      dextrose, 100 mL/hr, PRN      morphine, 2 mg, Q4H PRN            Pertinent New Labs & Imaging Studies     CBC with Differential:    Lab Results   Component Value Date    WBC 13.4 02/18/2021    RBC 3.77 02/18/2021    HGB 11.2 02/18/2021    HCT 35.4 02/18/2021     02/18/2021    MCV 93.9 02/18/2021    MCH 29.7 02/18/2021    MCHC 31.6 02/18/2021    RDW 13.0 02/18/2021    SEGSPCT 85.4 02/18/2021    BANDSPCT 8 09/03/2019    LYMPHOPCT 6.6 02/18/2021    MONOPCT 7.2 02/18/2021    MYELOPCT 1 09/02/2019    BASOPCT 0.3 02/18/2021    MONOSABS 1.0 02/18/2021    LYMPHSABS 0.9 02/18/2021    EOSABS 0.0 02/18/2021    BASOSABS 0.0 02/18/2021    DIFFTYPE AUTOMATED DIFFERENTIAL 02/18/2021     CMP:    Lab Results   Component Value Date     02/18/2021    K 4.1 02/18/2021     02/18/2021    CO2 25 02/18/2021    BUN 19 02/18/2021    CREATININE 1.3 02/18/2021    GFRAA >60 02/18/2021    LABGLOM 53 02/18/2021    GLUCOSE 235 02/18/2021    PROT 6.0 02/18/2021    PROT 8.0 08/30/2012    LABALBU 3.9 02/18/2021    CALCIUM 9.1 02/18/2021    BILITOT 0.9 02/18/2021    ALKPHOS 61 02/18/2021    AST 21 02/18/2021    ALT 12 02/18/2021     Xr Knee Left (1-2 Views)    Result Date: 2/17/2021  EXAMINATION: ONE XRAY VIEW OF THE PELVIS AND TWO XRAY VIEWS LEFT HIP; TWO XRAY VIEWS OF THE LEFT KNEE 2/17/2021 2:12 pm COMPARISON: CT abdomen/pelvis 11/30/2020 HISTORY: ORDERING SYSTEM PROVIDED HISTORY: trauma TECHNOLOGIST PROVIDED HISTORY: Reason for exam:->trauma Reason for Exam: left hip fracture Acuity: Acute Type of Exam: Initial Mechanism of Injury: fall Relevant Medical/Surgical History: diabetes, cad, copd ; ORDERING SYSTEM PROVIDED HISTORY: trauma TECHNOLOGIST PROVIDED HISTORY: Reason for exam:->trauma Reason for Exam: left leg pain Acuity: Acute Type of Exam: Initial Mechanism of Injury: fall Relevant Medical/Surgical History: diabetes FINDINGS: A single frontal view of the pelvis as well as frontal and cross-table lateral views of the left hip were performed. There is an acute comminuted mildly displaced intertrochanteric fracture of the proximal left femur. No additional fracture is identified. The left femoral head is well seated at the left acetabulum, without evidence of dislocation. There is mild left hip osteoarthritis. There is an intact right hip arthroplasty, without evidence of orthopedic hardware complication. The pelvic ring is intact. The sacrum and SI joints are unremarkable. There is diffuse osteopenia. Enthesopathic changes are evident. Atherosclerotic calcifications are noted. Frontal and cross-table lateral views of the left knee were performed. The patient is status post below-knee amputation. There is no acute fracture or dislocation. There is diffuse osteopenia. A joint effusion is not identified. There is mild tricompartmental osteoarthritis. 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 Atherosclerotic calcifications are evident. 1. Acute comminuted mildly displaced intertrochanteric fracture of the proximal left femur. 2. Patient status post left below-knee amputation, without acute osseous abnormality at the left knee.      Ct Head Wo Contrast    Result Date: 2/17/2021  EXAMINATION: CT OF THE HEAD WITHOUT CONTRAST  2/17/2021 11:14 am TECHNIQUE: CT of the head was performed without the administration of intravenous contrast. Dose modulation, iterative reconstruction, and/or weight based adjustment of the mA/kV was utilized to reduce the radiation dose to as low as reasonably achievable. COMPARISON: Brain MRI, 12/01/2020 HISTORY: ORDERING SYSTEM PROVIDED HISTORY: trauma TECHNOLOGIST PROVIDED HISTORY: Has a \"code stroke\" or \"stroke alert\" been called? 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 No other masses or hemorrhages are seen within the brain parenchyma. No acute loss of the gray-white matter differentiation is identified to suggest acute or subacute infarct. No midline shift. Periventricular low-attenuation is identified, moderate degree, most compatible with chronic small vessel ischemic disease. Mild diffuse atrophy noted. Intracranial vasculature is unremarkable for age. ORBITS: The visualized portion of the orbits demonstrate no acute abnormality. SINUSES: Postsurgical changes noted from previous partial ethmoidectomy and antral window placement on the right. Paranasal sinuses are otherwise unremarkable. The mastoid air cells are clear. SOFT TISSUES/SKULL:  No acute abnormality of the visualized skull or soft tissues. No acute intracranial abnormality. 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 Ct Cervical Spine Wo Contrast    Result Date: 2/17/2021  EXAMINATION: CT OF THE CERVICAL SPINE WITHOUT CONTRAST 2/17/2021 2:15 pm TECHNIQUE: CT of the cervical spine was performed without the administration of intravenous contrast. Multiplanar reformatted images are provided for review.  Dose modulation, iterative reconstruction, and/or weight based adjustment of the mA/kV was utilized to reduce the radiation dose to as low as reasonably achievable. COMPARISON: None. HISTORY: ORDERING SYSTEM PROVIDED HISTORY:  Trauma TECHNOLOGIST PROVIDED HISTORY: Reason for exam:  Trauma Decision Support Exception:  Emergency Medical Condition (MA) Reason for Exam: trauma Acuity: Acute Type of Exam: Initial Mechanism of Injury: Fall FINDINGS: BONES/ALIGNMENT: There is exaggeration of the normal cervical lordosis. A fracture is not identified. DEGENERATIVE CHANGES: There is mild multilevel degenerative disc disease. There is moderate multilevel facet degenerative change. SOFT TISSUES: There is no apical pneumothorax. The prevertebral soft tissues are within normal limits. Atherosclerosis is noted. No acute abnormality of the cervical spine. Xr Chest Portable    Result Date: 2/17/2021  EXAMINATION: ONE XRAY VIEW OF THE CHEST 2/17/2021 2:12 pm COMPARISON: None. HISTORY: ORDERING SYSTEM PROVIDED HISTORY: trauma TECHNOLOGIST PROVIDED HISTORY: Reason for exam:->trauma Reason for Exam: left hip fx Acuity: Acute Type of Exam: Initial Mechanism of Injury: fall Relevant Medical/Surgical History: na FINDINGS: The lungs are clear. The mediastinum and cardiac silhouette are unremarkable. There is no evidence of pneumothorax. No displaced rib fractures are identified. No acute abnormality.      Xr Hip 2-3 Vw W Pelvis Left    Result Date: 2/17/2021  EXAMINATION: ONE XRAY VIEW OF THE PELVIS AND TWO XRAY VIEWS LEFT HIP; TWO XRAY VIEWS OF THE LEFT KNEE 2/17/2021 2:12 pm COMPARISON: CT abdomen/pelvis 11/30/2020 HISTORY: ORDERING SYSTEM PROVIDED HISTORY: trauma TECHNOLOGIST PROVIDED HISTORY: Reason for exam:->trauma Reason for Exam: left hip fracture Acuity: Acute Type of Exam: Initial Mechanism of Injury: fall Relevant Medical/Surgical History: diabetes, cad, copd ; ORDERING SYSTEM PROVIDED HISTORY: trauma TECHNOLOGIST PROVIDED HISTORY: Reason for exam:->trauma Reason for Exam: left leg pain Acuity: Acute Type of Exam: Initial Mechanism of Injury: fall Relevant Medical/Surgical History: diabetes FINDINGS: A single frontal view of the pelvis as well as frontal and cross-table lateral views of the left hip were performed. There is an acute comminuted mildly displaced intertrochanteric fracture of the proximal left femur. No additional fracture is identified. The left femoral head is well seated at the left acetabulum, without evidence of dislocation. There is mild left hip osteoarthritis. There is an intact right hip arthroplasty, without evidence of orthopedic hardware complication. The pelvic ring is intact. The sacrum and SI joints are unremarkable. There is diffuse osteopenia. Enthesopathic changes are evident. Atherosclerotic calcifications are noted. Frontal and cross-table lateral views of the left knee were performed. The patient is status post below-knee amputation. There is no acute fracture or dislocation. There is diffuse osteopenia. A joint effusion is not identified. There is mild tricompartmental osteoarthritis. 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 Atherosclerotic calcifications are evident. 1. Acute comminuted mildly displaced intertrochanteric fracture of the proximal left femur. 2. Patient status post left below-knee amputation, without acute osseous abnormality at the left knee.        Assessment and Plan:   Jr Centeno is a 79 y.o.  male  who presents with Left femur fracture     Closed left hip fracture secondary to mechanical fall  x-ray of left hip showed acute comminuted mildly displaced intertrochanteric fracture of the left proximal femur  adequate pain control   continue n.p.o.  orthopedic following-plan for surgery today    Type 2 diabetes  continue sliding-scale insulin  hypoglycemia precautions    Essential hypertension  continue home medications    CAD  will resume DAPT after surgery  Cardiology following, deemed moderate cardiac risk for procedure    COPD-Stable  Continue bronchodilators    Hx of bilateral BKA for PAD    Diet Diet NPO Effective Now   DVT Prophylaxis [x] Lovenox, []  Heparin, [] SCDs, [] Ambulation   GI Prophylaxis [] PPI,  [x] H2 Blocker,  [] Carafate,  [] Diet/Tube Feeds   Code Status Full Code   Disposition Patient requires continued admission due to Left femur fracture   MDM [] Low, [] Moderate,[]  High     Electronically signed by Mónica Dick MD on 2/18/2021 at 11:55 AM

## 2021-02-18 NOTE — CONSULTS
Chart reviewed  Pt examined  Pt at moderate cardiac risk for hip surgery  Will follw up  Full note to follow                      Name:  Brandi Weiner /Age/Sex: 1940  (79 y.o. male)   MRN & CSN:  [id number] & [id number] Admission Date/Time: 2021  1:42 PM   Location:  [address] [place] [address] PCP: Tara Acosta, [facility] Greyson Bains Day: 2          Referring physician:  Raul Vega MD         Reason for consultation:  preop        Thanks for referral.    Information source: patient    CC;  Rt hip pain      HPI:   Thank you for involving me in taking  care of Brandi Weiner who  is a 79 y. o.year  Old male  Presents with  H/o CAD, PVD, CKD,  Htn, hyperlipidimea,  Now with a fall , has tt hip pain , no cardiac symptoms, at present in severe pain. Past medical history:    has a past medical history of Arthritis, Biliary stones, CAD (coronary artery disease), COPD (chronic obstructive pulmonary disease) ([facility] [address] Diabetes mellitus ([facility] [address] History of blood transfusion, Hx of angiography, Hyperlipidemia, Hypertension, and Kidney stone. Past surgical history:   has a past surgical history that includes Cardiac surgery; joint replacement; Cholecystectomy; Coronary angioplasty with stent; fracture surgery; Leg amputation below knee (Bilateral); ERCP (14); other surgical history (14); ERCP (14); ERCP (10/24/15); ERCP (2017); Abdomen surgery; Dilatation, esophagus; ERCP (N/A, 2019); ERCP (N/A, 2019); and ERCP (N/A, 3/31/2020). Social History:   reports that he has never smoked. He has never used smokeless tobacco. He reports that he does not drink alcohol or use drugs. Family history:  family history includes Cancer in his father and mother; Diabetes in his mother.     Allergies   Allergen Reactions    Byetta 10 Mcg Pen [Exenatide]     Phenylephrine     Sulbactam     Ampicillin Rash    Aricept [Donepezil Hydrochloride] Other (See Comments)     Hallucinations, confusion  Benztropine Other (See Comments)     Hallucinations, confusion    Celebrex [Celecoxib] Other (See Comments)     'causes him to be nervous and jittery\"    Diphenhydramine Hcl Other (See Comments)     nervous and jittery    Diphenhydramine Hcl [Diphenhydramine] Anxiety    Exenatide Other (See Comments)     Causes him to be nervous and jittery    Metoprolol Succinate Other (See Comments)     Hallucinations     Phenergan [Promethazine Hcl] Other (See Comments)     Hallucinations, nervous, jittery    Plavix [Clopidogrel Bisulfate] Other (See Comments)     Causes bleeding in his stomach    Pseudoephedrine Anxiety    Reglan [Metoclopramide Hcl] Other (See Comments)     \"Caused him to just sit in a chair and rock back and forth\"           acetaminophen (TYLENOL) tablet 1,000 mg, Once      lactated ringers infusion, Continuous      sodium chloride flush 0.9 % injection 10 mL, 2 times per day      sodium chloride flush 0.9 % injection 10 mL, PRN      ceFAZolin (ANCEF) 2000 mg in dextrose 5 % 100 mL IVPB, On Call to OR      hydrALAZINE (APRESOLINE) 10 mg in sodium chloride 0.9 % 50 mL ivpb, Q6H PRN      0.9 % sodium chloride infusion, Continuous      sodium chloride flush 0.9 % injection 10 mL, 2 times per day      sodium chloride flush 0.9 % injection 10 mL, PRN      polyethylene glycol (GLYCOLAX) packet 17 g, Daily PRN      acetaminophen (TYLENOL) tablet 650 mg, Q6H PRN    Or      acetaminophen (TYLENOL) suppository 650 mg, Q6H PRN      insulin lispro (HUMALOG) injection vial 0-12 Units, TID WC      insulin lispro (HUMALOG) injection vial 0-6 Units, Nightly      ondansetron (ZOFRAN) injection 4 mg, Q6H PRN      [Held by provider] enoxaparin (LOVENOX) injection 40 mg, Daily      glucose (GLUTOSE) 40 % oral gel 15 g, PRN      dextrose 50 % IV solution, PRN      glucagon (rDNA) injection 1 mg, PRN      dextrose 5 % solution, PRN      atorvastatin (LIPITOR) tablet 40 mg, Daily      dilTIAZem (CARDIZEM) tablet 60 mg, BID      famotidine (PEPCID) tablet 20 mg, Daily      gabapentin (NEURONTIN) capsule 100 mg, Nightly      latanoprost (XALATAN) 0.005 % ophthalmic solution 1 drop, Nightly      morphine (PF) injection 2 mg, Q4H PRN      Current Facility-Administered Medications   Medication Dose Route Frequency Provider Last Rate Last Admin    acetaminophen (TYLENOL) tablet 1,000 mg  1,000 mg Oral Once Raynaldo Border, DO        lactated ringers infusion   Intravenous Continuous Raynaldo Border, DO        sodium chloride flush 0.9 % injection 10 mL  10 mL Intravenous 2 times per day Raynaldo Border, DO        sodium chloride flush 0.9 % injection 10 mL  10 mL Intravenous PRN Raynaldo Border, DO        ceFAZolin (ANCEF) 2000 mg in dextrose 5 % 100 mL IVPB  2,000 mg Intravenous On Call to [facility], DO        hydrALAZINE (APRESOLINE) 10 mg in sodium chloride 0.9 % 50 mL ivpb  10 mg Intravenous Q6H PRN Mayte Rodriguez MD        0.9 % sodium chloride infusion   Intravenous Continuous MOLLY Hussein - CNP 75 mL/hr at 02/17/21 2229 New Bag at 02/17/21 2229    sodium chloride flush 0.9 % injection 10 mL  10 mL Intravenous 2 times per day MOLLY Hussein - CNP   10 mL at 02/18/21 0802    sodium chloride flush 0.9 % injection 10 mL  10 mL Intravenous PRN MOLLY Hussein - CNP        polyethylene glycol (GLYCOLAX) packet 17 g  17 g Oral Daily PRN MOLLY Hussein - CNP        acetaminophen (TYLENOL) tablet 650 mg  650 mg Oral Q6H PRN MOLLY Hussein - CNP        Or    acetaminophen (TYLENOL) suppository 650 mg  650 mg Rectal Q6H PRN MOLLY Hussein - CNP        insulin lispro (HUMALOG) injection vial 0-12 Units  0-12 Units Subcutaneous TID WC MOLLY Hussein - CNP        insulin lispro (HUMALOG) injection vial 0-6 Units  0-6 Units Subcutaneous Nightly MOLLY Hussein - CNP   4 Units at 02/17/21 2236    ondansetron (ZOFRAN) injection 4 mg  4 mg Intravenous Q6H PRN Extremities:  Trace Edema clubbing ,   no cyanosis    SKIN: Warm and well perfused, no pallor or cyanosis    Vascular exam:  Pedal Pulses:  LK AKA    Abdomen:  No masses or tenderness. No organomegaly noted. Neurological:  Oriented to time, place, and person   No focal neurological deficit noted. Psychiatric:normal mood, no anxiety    Lab Review   Recent Labs     02/17/21  1506   WBC 9.6   HGB 11.7*   HCT 35.6*         Recent Labs     02/17/21  1506   *   K 3.9   CL 98*   CO2 21   BUN 17   CREATININE 1.1     Recent Labs     02/17/21  1506   AST 23   ALT 13   BILITOT 0.7   ALKPHOS 62     No results for input(s): TROPONINI in the last 72 hours.   Lab Results   Component Value Date    BNP 9 03/11/2014     (H) 08/30/2012     Lab Results   Component Value Date    INR 1.02 02/17/2021    PROTIME 12.4 02/17/2021           Assessment/Recommendations:     - Moderate cardiac risk  - CAD stable has no CP  - risk factor modification  - EF 40% on last echo, will recheck         Tiffany Guerrero MD, 2/18/2021 9:02 AM

## 2021-02-18 NOTE — PROGRESS NOTES
1510 - transferred from OR on bed, monitor applied, alarms on and verified, bedside handoff provided by Kandy Aleman and Jodi Miller  [phone] - turned, repositioned, linens changed; patient tolerated well  1545 - tolerating ice chips  1610 - phase one care complete; report called to [facility]  2712 - transferred to room 1104 on oxygen

## 2021-02-18 NOTE — ANESTHESIA PRE PROCEDURE
Department of Anesthesiology  Preprocedure Note       Name:  Leobardo Billings   Age:  79 y.o.  :  1940                                          MRN:  [id number]         Date:  2021      Surgeon: Lo Trevino):  Bita Erickson DO    Procedure: LEFT FEMUR IM NAIL VIANEY INSERTION (Left )    Medications prior to admission:   Prior to Admission medications    Medication Sig Start Date End Date Taking? Authorizing Provider   carvedilol (COREG) 6.25 MG tablet Take 1 tablet by mouth 2 times daily (with meals) 20   Kobe Pienda MD   cholestyramine light 4 g packet Take 1 packet by mouth 2 times daily as needed (diarrhea) 20   Kobe Pineda MD   clopidogrel (PLAVIX) 75 MG tablet Take 1 tablet by mouth daily 20   Kobe Pineda MD   ondansetron (ZOFRAN) 4 MG tablet Take 4 mg by mouth 2 times daily as needed for Nausea or Vomiting    Historical Provider, MD   famotidine (PEPCID) 20 MG tablet Take 1 tablet by mouth daily 20   Kayla Bach MD   gabapentin (NEURONTIN) 100 MG capsule Take 100 mg by mouth nightly. Historical Provider, MD   Cholecalciferol 50 MCG ( UT) TABS Take one tablet daily by mouth    Historical Provider, MD   traMADol (ULTRAM) 50 MG tablet Take 50 mg by mouth every 6 hours as needed. 17   Historical Provider, MD   atorvastatin (LIPITOR) 40 MG tablet Take 40 mg by mouth daily    Historical Provider, MD   docusate sodium (COLACE) 100 MG capsule Take 100 mg by mouth nightly as needed     Historical Provider, MD   latanoprost (XALATAN) 0.005 % ophthalmic solution Place 1 drop into both eyes nightly     Historical Provider, MD   diltiazem (CARDIZEM) 60 MG tablet Take 1 tablet by mouth 2 times daily 3/19/17   Safia Duran DO   Multiple Vitamins-Minerals (MULTIVITAMIN PO) Take 1 tablet by mouth daily.     Historical Provider, MD   aspirin 81 MG chewable tablet Take 81 mg by mouth daily     Historical Provider, MD       Current medications:    No current facility-administered medications for this visit. No current outpatient medications on file.      Facility-Administered Medications Ordered in Other Visits   Medication Dose Route Frequency Provider Last Rate Last Admin    acetaminophen (TYLENOL) tablet 1,000 mg  1,000 mg Oral Once Madelon Dre, DO        lactated ringers infusion   Intravenous Continuous Madelon Fulton, DO        sodium chloride flush 0.9 % injection 10 mL  10 mL Intravenous 2 times per day Madelon Fulton, DO        sodium chloride flush 0.9 % injection 10 mL  10 mL Intravenous PRN Madelon Fulton, DO        ceFAZolin (ANCEF) 2000 mg in dextrose 5 % 100 mL IVPB  2,000 mg Intravenous On Call to ige Minesh 10, DO        hydrALAZINE (APRESOLINE) 10 mg in sodium chloride 0.9 % 50 mL ivpb  10 mg Intravenous Q6H PRN Nayeli Ortiz MD        0.9 % sodium chloride infusion   Intravenous Continuous Tia Junes, APRN - CNP 75 mL/hr at 02/17/21 2229 New Bag at 02/17/21 2229    sodium chloride flush 0.9 % injection 10 mL  10 mL Intravenous 2 times per day Tia Junes, APRN - CNP   10 mL at 02/18/21 0802    sodium chloride flush 0.9 % injection 10 mL  10 mL Intravenous PRN Tia Junes, APRN - CNP        polyethylene glycol (GLYCOLAX) packet 17 g  17 g Oral Daily PRN Tia Junes, APRN - CNP        acetaminophen (TYLENOL) tablet 650 mg  650 mg Oral Q6H PRN Tia Junes, APRN - CNP        Or    acetaminophen (TYLENOL) suppository 650 mg  650 mg Rectal Q6H PRN Tia Junes, APRN - CNP        insulin lispro (HUMALOG) injection vial 0-12 Units  0-12 Units Subcutaneous TID WC Tia Junes, APRN - CNP        insulin lispro (HUMALOG) injection vial 0-6 Units  0-6 Units Subcutaneous Nightly Tia Junes, APRN - CNP   4 Units at 02/17/21 2236    ondansetron (ZOFRAN) injection 4 mg  4 mg Intravenous Q6H PRN Tia Junes, APRN - CNP   4 mg at 02/18/21 0802    [Held by provider] enoxaparin (LOVENOX) injection 40 mg  40 mg Subcutaneous Daily Stas Hotter, APRN - CNP        glucose (GLUTOSE) 40 % oral gel 15 g  15 g Oral PRN Stas Hotter, APRN - CNP        dextrose 50 % IV solution  12.5 g Intravenous PRN Stas Hotter, APRN - CNP        glucagon (rDNA) injection 1 mg  1 mg Intramuscular PRN Stas Hotter, APRN - CNP        dextrose 5 % solution  100 mL/hr Intravenous PRN Stas Hotter, APRN - CNP        atorvastatin (LIPITOR) tablet 40 mg  40 mg Oral Daily Stas Hotter, APRN - CNP   Stopped at 02/18/21 0745    dilTIAZem (CARDIZEM) tablet 60 mg  60 mg Oral BID Stas Hotter, APRN - CNP   Stopped at 02/18/21 0746    famotidine (PEPCID) tablet 20 mg  20 mg Oral Daily Stas Hotter, APRN - CNP   Stopped at 02/18/21 0746    gabapentin (NEURONTIN) capsule 100 mg  100 mg Oral Nightly Stas Hotter, APRN - CNP   100 mg at 02/17/21 2230    latanoprost (XALATAN) 0.005 % ophthalmic solution 1 drop  1 drop Both Eyes Nightly Stas Hotter, APRN - CNP        morphine (PF) injection 2 mg  2 mg Intravenous Q4H PRN Stas Hotter, APRN - CNP   2 mg at 02/18/21 1078       Allergies:     Allergies   Allergen Reactions    Byetta 10 Mcg Pen [Exenatide]     Phenylephrine     Sulbactam     Ampicillin Rash    Aricept [Donepezil Hydrochloride] Other (See Comments)     Hallucinations, confusion    Benztropine Other (See Comments)     Hallucinations, confusion    Celebrex [Celecoxib] Other (See Comments)     'causes him to be nervous and jittery\"    Diphenhydramine Hcl Other (See Comments)     nervous and jittery    Diphenhydramine Hcl [Diphenhydramine] Anxiety    Exenatide Other (See Comments)     Causes him to be nervous and jittery    Metoprolol Succinate Other (See Comments)     Hallucinations     Phenergan [Promethazine Hcl] Other (See Comments)     Hallucinations, nervous, jittery    Plavix [Clopidogrel Bisulfate] Other (See Comments)     Causes bleeding in his stomach    Pseudoephedrine Anxiety    Reglan [Metoclopramide Hcl] Other (See Comments)     \"Caused him to just sit in a chair and rock back and forth\"       Problem List:    Patient Active Problem List   Diagnosis Code    Uncontrolled type II diabetes with peripheral autonomic neuropathy ([facility] E11.43, E11.65    Hypertension, essential I10    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[facility] 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    Choledocholithiasis K80.50    Generalized abdominal pain R10.84    COPD (chronic obstructive pulmonary disease) ([facility] J44.9    Abdominal pain, epigastric R10.13    Elevated LFTs R79.89    Abnormal findings on imaging of biliary tract R93.2    Chest pain R07.9    Coronary artery disease involving coronary bypass graft of native heart without angina pectoris I25.810    Acute encephalopathy G93.40    Vomiting R11.10    Sepsis (HCC) A41.9    Nausea vomiting and diarrhea R11.2, R19.7    Fever I54.5    Metabolic encephalopathy W40.50    Impaired cognition R41.89    Generalized weakness R53.1    Cholangitis due to bile duct calculus with obstruction K80.31    Oropharyngeal dysphagia R13.12    S/P BKA (below knee amputation) bilateral (HCC) Z89.512, Z89.511    Abdominal pain R10.9    Inguinal pain R10.30    Closed left hip fracture, initial encounter ([facility] S72.002A       Past Medical History:        Diagnosis Date    Arthritis     Biliary stones 2015    CAD (coronary artery disease)     COPD (chronic obstructive pulmonary disease) ([facility]     Diabetes mellitus ([facility]     History of blood transfusion     Hx of angiography 3/11/2014    Pulmonary Arteries: Small sliding hiatal hernia. Mod coronary artery calcifications. Mild bilat gynecomastia.     Hyperlipidemia     Hypertension     Kidney stone        Past Surgical History:        Procedure Laterality Date    ABDOMEN SURGERY      stones in bile duct removed x3    CARDIAC SURGERY      CABG x 2 in 2004 at [facility] - Dr. Marlys Kumar, ESOPHAGUS      ERCP  03/13/14    w/ dilation of papilla    ERCP  9/11/14    ERCP  10/24/15    extractions stones, balloon dilatation     ERCP  03/18/2017    stone extraction     ERCP N/A 1/25/2019    ERCP STONE REMOVAL/ DILATATION OF PAPILLA WITH 10-12MM AND 12-15MM BALLOON AND 9-12MM, 12-15MM  EXTRACTOR BALLOON USED FOR REMOVAL OF MULTIPLE  CBD STONES performed by Soraya Bello MD at [place] [address] ERCP N/A 9/7/2019    ERCP STONE REMOVAL, DILATATION OF PAPILLA WITH 10-12MM BALLOON, AND EXTRACTOR BALLOON 12-15MM USED FOR REMOVAL OF CBD STONE AND SLUDE performed by Sarah Lux MD at [place] [address] ERCP N/A 3/31/2020    ERCP DILATION BALLOON to 12  AND SWEEP OF CBD STONE AND SLUDGE performed by Sarah Lux MD at [address] [place] [address] at [address] [place] [address]  03/13/14    sphincterotomy       Social History:    Social History     Tobacco Use    Smoking status: Never Smoker    Smokeless tobacco: Never Used   Substance Use Topics    Alcohol use: No                                Counseling given: Not Answered      Vital Signs (Current): There were no vitals filed for this visit.                                            BP Readings from Last 3 Encounters:   02/18/21 (!) 176/87   12/07/20 139/75   07/05/20 117/61       NPO Status:                                                                                 BMI:   Wt Readings from Last 3 Encounters:   02/18/21 144 lb 2.9 oz (65.4 kg)   12/04/20 130 lb 8 oz (59.2 kg)   07/04/20 137 lb 3.2 oz (62.2 kg)     There is no height or weight on file to calculate BMI.    CBC:   Lab Results   Component Value Date    WBC 9.6 02/17/2021    RBC 3.99 02/17/2021    HGB 11.7 02/17/2021    HCT 35.6 02/17/2021    MCV 89.2 02/17/2021    RDW 12.6 02/17/2021     02/17/2021       CMP:   Lab Results   Component Value Date     02/17/2021    K 3.9 02/17/2021    CL 98 02/17/2021    CO2 21 02/17/2021    BUN 17 02/17/2021    CREATININE 1.1 02/17/2021    GFRAA >60 02/17/2021    LABGLOM >60 02/17/2021    GLUCOSE 343 02/17/2021    PROT 6.3 02/17/2021    PROT 8.0 08/30/2012    CALCIUM 9.3 02/17/2021    BILITOT 0.7 02/17/2021    ALKPHOS 62 02/17/2021    AST 23 02/17/2021    ALT 13 02/17/2021       POC Tests:   Recent Labs     02/18/21  0653   POCGLU 170*       Coags:   Lab Results   Component Value Date    PROTIME 12.4 02/17/2021    INR 1.02 02/17/2021    APTT 27.0 02/17/2021       HCG (If Applicable): No results found for: PREGTESTUR, PREGSERUM, HCG, HCGQUANT     ABGs:   Lab Results   Component Value Date    PO2ART 77 03/30/2020    NIG4CYV 32.0 03/30/2020    UDM3OHT 20.3 03/30/2020        Type & Screen (If Applicable):  No results found for: LABABO, [address]    Anesthesia Evaluation  Patient summary reviewed and Nursing notes reviewed  Airway: Mallampati: II  TM distance: >3 FB   Neck ROM: full  Mouth opening: > = 3 FB Dental:    (+) upper dentures      Pulmonary:   (+) COPD:  decreased breath sounds,                             Cardiovascular:  Exercise tolerance: poor (<4 METS),   (+) hypertension: moderate, CAD:, CABG/stent:, hyperlipidemia      ECG reviewed  Rhythm: regular  Rate: normal  Echocardiogram reviewed               ROS comment: Echo 9/2019  Summary   Pt. unable to lay flat done in partial sitting position. Technically difficult examination patient uncooperative. Left ventricular function is low normal, EF is estimated at 50%. Grade II diastolic dysfunction. Sclerotic, but non-stenotic aortic valve. Mitral annular calcification is present. No significant valvular regurgitation noted. No evidence of pericardial effusion.       Normal sinus rhythm   Left axis deviation   Inferior infarct , age undetermined   Abnormal ECG   When compared with ECG of 29-MAR-2020 19:25,   Questionable change in QRS duration   Inferior infarct is now present   Confirmed by Kaylie Olson 63 ([id number]) on 12/2/2020 5:09:43 PM

## 2021-02-19 ENCOUNTER — APPOINTMENT (OUTPATIENT)
Dept: GENERAL RADIOLOGY | Age: 81
DRG: 480 | End: 2021-02-19
Payer: MEDICARE

## 2021-02-19 LAB
BANDED NEUTROPHILS ABSOLUTE COUNT: 5.15 K/CU MM
BANDED NEUTROPHILS RELATIVE PERCENT: 36 % (ref 5–11)
DIFFERENTIAL TYPE: ABNORMAL
DOHLE BODIES: PRESENT
GLUCOSE BLD-MCNC: 118 MG/DL (ref 70–99)
GLUCOSE BLD-MCNC: 196 MG/DL (ref 70–99)
GLUCOSE BLD-MCNC: 249 MG/DL (ref 70–99)
GLUCOSE BLD-MCNC: 256 MG/DL (ref 70–99)
GLUCOSE BLD-MCNC: 259 MG/DL (ref 70–99)
HCT VFR BLD CALC: 23.4 % (ref 42–52)
HCT VFR BLD CALC: 24.8 % (ref 42–52)
HEMOGLOBIN: 7.6 GM/DL (ref 13.5–18)
HEMOGLOBIN: 7.9 GM/DL (ref 13.5–18)
LYMPHOCYTES ABSOLUTE: 1.4 K/CU MM
LYMPHOCYTES RELATIVE PERCENT: 10 % (ref 24–44)
MCH RBC QN AUTO: 30.2 PG (ref 27–31)
MCHC RBC AUTO-ENTMCNC: 31.9 % (ref 32–36)
MCV RBC AUTO: 94.7 FL (ref 78–100)
MONOCYTES ABSOLUTE: 0.3 K/CU MM
MONOCYTES RELATIVE PERCENT: 2 % (ref 0–4)
OVALOCYTES: ABNORMAL
PDW BLD-RTO: 13.1 % (ref 11.7–14.9)
PLATELET # BLD: 126 K/CU MM (ref 140–440)
PMV BLD AUTO: 9.7 FL (ref 7.5–11.1)
RBC # BLD: 2.62 M/CU MM (ref 4.6–6.2)
RBC # BLD: ABNORMAL 10*6/UL
SEGMENTED NEUTROPHILS ABSOLUTE COUNT: 7.5 K/CU MM
SEGMENTED NEUTROPHILS RELATIVE PERCENT: 52 % (ref 36–66)
TOXIC GRANULATION: PRESENT
WBC # BLD: 14.3 K/CU MM (ref 4–10.5)
WBC # BLD: ABNORMAL 10*3/UL

## 2021-02-19 PROCEDURE — 36430 TRANSFUSION BLD/BLD COMPNT: CPT

## 2021-02-19 PROCEDURE — 2580000003 HC RX 258: Performed by: ORTHOPAEDIC SURGERY

## 2021-02-19 PROCEDURE — 86901 BLOOD TYPING SEROLOGIC RH(D): CPT

## 2021-02-19 PROCEDURE — 97530 THERAPEUTIC ACTIVITIES: CPT

## 2021-02-19 PROCEDURE — 2700000000 HC OXYGEN THERAPY PER DAY

## 2021-02-19 PROCEDURE — 99231 SBSQ HOSP IP/OBS SF/LOW 25: CPT | Performed by: INTERNAL MEDICINE

## 2021-02-19 PROCEDURE — 94664 DEMO&/EVAL PT USE INHALER: CPT

## 2021-02-19 PROCEDURE — 82962 GLUCOSE BLOOD TEST: CPT

## 2021-02-19 PROCEDURE — 97116 GAIT TRAINING THERAPY: CPT

## 2021-02-19 PROCEDURE — 97166 OT EVAL MOD COMPLEX 45 MIN: CPT

## 2021-02-19 PROCEDURE — 86922 COMPATIBILITY TEST ANTIGLOB: CPT

## 2021-02-19 PROCEDURE — 6370000000 HC RX 637 (ALT 250 FOR IP): Performed by: ORTHOPAEDIC SURGERY

## 2021-02-19 PROCEDURE — 1200000000 HC SEMI PRIVATE

## 2021-02-19 PROCEDURE — 94150 VITAL CAPACITY TEST: CPT

## 2021-02-19 PROCEDURE — 6360000002 HC RX W HCPCS: Performed by: ORTHOPAEDIC SURGERY

## 2021-02-19 PROCEDURE — 85018 HEMOGLOBIN: CPT

## 2021-02-19 PROCEDURE — 85027 COMPLETE CBC AUTOMATED: CPT

## 2021-02-19 PROCEDURE — 97162 PT EVAL MOD COMPLEX 30 MIN: CPT

## 2021-02-19 PROCEDURE — 6360000002 HC RX W HCPCS: Performed by: INTERNAL MEDICINE

## 2021-02-19 PROCEDURE — 6370000000 HC RX 637 (ALT 250 FOR IP): Performed by: INTERNAL MEDICINE

## 2021-02-19 PROCEDURE — 97535 SELF CARE MNGMENT TRAINING: CPT

## 2021-02-19 PROCEDURE — 86850 RBC ANTIBODY SCREEN: CPT

## 2021-02-19 PROCEDURE — 86900 BLOOD TYPING SEROLOGIC ABO: CPT

## 2021-02-19 PROCEDURE — 36415 COLL VENOUS BLD VENIPUNCTURE: CPT

## 2021-02-19 PROCEDURE — 71045 X-RAY EXAM CHEST 1 VIEW: CPT

## 2021-02-19 PROCEDURE — P9016 RBC LEUKOCYTES REDUCED: HCPCS

## 2021-02-19 PROCEDURE — 85007 BL SMEAR W/DIFF WBC COUNT: CPT

## 2021-02-19 PROCEDURE — 85014 HEMATOCRIT: CPT

## 2021-02-19 PROCEDURE — 94761 N-INVAS EAR/PLS OXIMETRY MLT: CPT

## 2021-02-19 RX ORDER — SODIUM CHLORIDE 9 MG/ML
INJECTION, SOLUTION INTRAVENOUS PRN
Status: DISCONTINUED | OUTPATIENT
Start: 2021-02-19 | End: 2021-02-23 | Stop reason: HOSPADM

## 2021-02-19 RX ORDER — LEVOFLOXACIN 500 MG/1
750 TABLET, FILM COATED ORAL EVERY OTHER DAY
Status: DISCONTINUED | OUTPATIENT
Start: 2021-02-19 | End: 2021-02-23 | Stop reason: HOSPADM

## 2021-02-19 RX ORDER — LANOLIN ALCOHOL/MO/W.PET/CERES
9 CREAM (GRAM) TOPICAL NIGHTLY PRN
Status: DISCONTINUED | OUTPATIENT
Start: 2021-02-19 | End: 2021-02-23 | Stop reason: HOSPADM

## 2021-02-19 RX ORDER — FUROSEMIDE 10 MG/ML
20 INJECTION INTRAMUSCULAR; INTRAVENOUS ONCE
Status: COMPLETED | OUTPATIENT
Start: 2021-02-19 | End: 2021-02-19

## 2021-02-19 RX ORDER — CEFDINIR 300 MG/1
300 CAPSULE ORAL EVERY 12 HOURS SCHEDULED
Status: DISCONTINUED | OUTPATIENT
Start: 2021-02-19 | End: 2021-02-19

## 2021-02-19 RX ADMIN — LEVOFLOXACIN 750 MG: 500 TABLET, FILM COATED ORAL at 11:22

## 2021-02-19 RX ADMIN — SODIUM CHLORIDE, PRESERVATIVE FREE 10 ML: 5 INJECTION INTRAVENOUS at 21:40

## 2021-02-19 RX ADMIN — OXYCODONE HYDROCHLORIDE 5 MG: 10 TABLET ORAL at 03:06

## 2021-02-19 RX ADMIN — DOCUSATE SODIUM 50 MG AND SENNOSIDES 8.6 MG 1 TABLET: 8.6; 5 TABLET, FILM COATED ORAL at 21:40

## 2021-02-19 RX ADMIN — SODIUM CHLORIDE, PRESERVATIVE FREE 10 ML: 5 INJECTION INTRAVENOUS at 11:14

## 2021-02-19 RX ADMIN — FUROSEMIDE 20 MG: 10 INJECTION, SOLUTION INTRAVENOUS at 11:22

## 2021-02-19 RX ADMIN — CEFAZOLIN SODIUM 2000 MG: 2 SOLUTION INTRAVENOUS at 06:44

## 2021-02-19 RX ADMIN — ATORVASTATIN CALCIUM 40 MG: 40 TABLET, FILM COATED ORAL at 11:14

## 2021-02-19 RX ADMIN — ASPIRIN 325 MG: 325 TABLET, COATED ORAL at 21:39

## 2021-02-19 RX ADMIN — SODIUM CHLORIDE, PRESERVATIVE FREE 10 ML: 5 INJECTION INTRAVENOUS at 11:15

## 2021-02-19 RX ADMIN — ASPIRIN 325 MG: 325 TABLET, COATED ORAL at 11:13

## 2021-02-19 RX ADMIN — OXYCODONE HYDROCHLORIDE 5 MG: 10 TABLET ORAL at 11:14

## 2021-02-19 RX ADMIN — DILTIAZEM HYDROCHLORIDE 60 MG: 60 TABLET, FILM COATED ORAL at 11:13

## 2021-02-19 RX ADMIN — SODIUM CHLORIDE: 9 INJECTION, SOLUTION INTRAVENOUS at 05:51

## 2021-02-19 RX ADMIN — DOCUSATE SODIUM 50 MG AND SENNOSIDES 8.6 MG 1 TABLET: 8.6; 5 TABLET, FILM COATED ORAL at 11:12

## 2021-02-19 RX ADMIN — FAMOTIDINE 20 MG: 20 TABLET ORAL at 11:13

## 2021-02-19 RX ADMIN — LATANOPROST 1 DROP: 50 SOLUTION/ DROPS OPHTHALMIC at 21:40

## 2021-02-19 RX ADMIN — OXYCODONE HYDROCHLORIDE 10 MG: 10 TABLET ORAL at 21:48

## 2021-02-19 RX ADMIN — GABAPENTIN 100 MG: 100 CAPSULE ORAL at 21:40

## 2021-02-19 RX ADMIN — DILTIAZEM HYDROCHLORIDE 60 MG: 60 TABLET, FILM COATED ORAL at 21:39

## 2021-02-19 ASSESSMENT — PAIN SCALES - GENERAL
PAINLEVEL_OUTOF10: 6
PAINLEVEL_OUTOF10: 8
PAINLEVEL_OUTOF10: 0
PAINLEVEL_OUTOF10: 0

## 2021-02-19 NOTE — PROGRESS NOTES
Physical Therapy  [facility] ACUTE CARE PHYSICAL THERAPY EVALUATION  Lelon Sandifer, 1940, 1104/1104-A, 2/19/2021    History  [place]:  The encounter diagnosis was Closed fracture of left hip, initial encounter ([facility] Patient  has a past medical history of Arthritis, Biliary stones, CAD (coronary artery disease), COPD (chronic obstructive pulmonary disease) ([facility] Diabetes mellitus ([facility] History of blood transfusion, Hx of angiography, Hyperlipidemia, Hypertension, and Kidney stone. Patient  has a past surgical history that includes Cardiac surgery; joint replacement; Cholecystectomy; Coronary angioplasty with stent; fracture surgery; Leg amputation below knee (Bilateral); ERCP (03/13/14); other surgical history (03/13/14); ERCP (9/11/14); ERCP (10/24/15); ERCP (03/18/2017); Abdomen surgery; Dilatation, esophagus; ERCP (N/A, 1/25/2019); ERCP (N/A, 9/7/2019); and ERCP (N/A, 3/31/2020). Subjective:  Patient states:  \"I give all the credit to Marva Marin. She got me moving again\"   Pain:  Reported discomfort to left hip but did not quantify   Communication with other providers:   co-eval with Zoila JUSTIN   Restrictions: WBAT LLE, contact precautions, falls     Home Setup/Prior level of function  Social/Functional History  Lives With: Spouse  Type of Home: Facility([facility] [place] )  Home Layout: One level  Home Access: Level entry  Bathroom Shower/Tub: Walk-in shower  Bathroom Equipment: Shower chair  Home Equipment: Rolling walker, [employer]  ADL Assistance: Independent  Ambulation Assistance: Independent(with RW)  Transfer Assistance: Independent  Additional Comments: Pt is a questionable historian. Should confirm with family/facility    Examination of body systems (includes body structures/functions, activity/participation limitations):  · Observation:  Supine in bed upon arrival. Cooperative to work with therapy.  Easily distracted and tangential.   · [facility]  · Hearing:  [facility] transition sit><stand CGA from all surfaces  Short term goal 3: Pt will transfer between surfaces CGA  Short term goal 4: Pt will ambulate 75ft with RW CGA  Short term goal 5: Pt will perform standing light dynamic activity without UE support x 3 minutes CGA       Treatment plan:  Bed mobility, transfers, balance, gait, TA, TX, WC     Recommendations for NURSING mobility: ambulate to the bathroom with RW and BLE prostheses     Time:   Time in: 0944  Time out: 1024  Timed treatment minutes: 25  Total time: 40    Electronically signed by:    Marion Wallace, [id number]  2/19/2021, 12:06 PM

## 2021-02-19 NOTE — PROGRESS NOTES
Pt requiring 3.5 liters of O2 to maintain 93%. Pt states he does not wear home oxygen but has some intermittent confusion. Dr. Tru Stanford notified.   Micael Sacks, RN   5:44 PM  2/19/2021

## 2021-02-19 NOTE — PROGRESS NOTES
unable to completed in standing this time)  · UB bathing: SBA   · LB bathing: Min A (dynamic standing balance with reaching bottom)  · UB dressing: CGA (light dynamic sitting balance with donning robe seated EOB)  · LB dressing: Min A (anticipate assist for mgmt of clothing to hips in standing; able to don BL LE prostheses sitting EOB with CGA for dynamic sitting balance with increased time/effort required)  · Toileting: Max A (anticipate assist for clothing mgmt both directions at this time, anticipate able to complete seated bryan care without assist)    Cognitive and Psychosocial Functioning:  · Overall cognitive status: Mildly impaired (pt very pleasant, follows commands, and converses appropriately but disoriented to place and full situation~thought he was at \"[place] house\" of [place] facility, not a fully reliable historian regarding living situation/PLOF)  · Affect: Normal     Balance:   · Sitting: SBA static sitting, CGA with light dynamic sitting tasks   · Standing: CGA with RW and BL LE prostheses     Functional Mobility:  · Bed Mobility: Mod A supine to sitting EOB (HOB elevated, able to scoot LEs to edge but mod trunk boost required for uprighting)  · Transfers: Min A to and from bed and recliner (min cues for safe hand placement each direction)  · Ambulation: CGA to Min A with RW 15 ft in room; antalgic gait, decreased speed, min cues for safe RW mgmt      AM-PAC 6 click short form for inpatient daily activity:   How much help from another person does the patient currently need. .. Unable  Dep A Lot  Max A A Lot   Mod A A Little  Min A A Little   CGA  SBA None   Mod I  Indep  Sup   1. Putting on and taking off regular lower body clothing? [] 1    [] 2   [] 2   [x] 3   [] 3   [] 4      2. Bathing (including washing, rinsing, drying)? [] 1   [] 2   [] 2 [x] 3 [] 3 [] 4   3. Toileting, which includes using toilet, bedpan, or urinal? [] 1    [x] 2   [] 2   [] 3   [] 3   [] 4     4.  Putting on and taking off regular upper body clothing? [] 1   [] 2   [] 2   [] 3   [x] 3    [] 4      5. Taking care of personal grooming such as brushing teeth? [] 1   [] 2    [] 2 [] 3    [x] 3   [] 4      6. Eating meals? [] 1   [] 2   [] 2   [] 3   [] 3   [x] 4      Raw Score:  18   [24=0% impaired(CH), 23=1-19%(CI), 20-22=20-39%(CJ), 15-19=40-59%(CK), 10-14=60-79%(CL), 7-9=80-99%(CM), 6=100%(CN)]     Treatment:  Therapeutic Activity Training:   Therapeutic activity training was instructed today. Cues were given for safety, sequence, UE/LE placement, awareness, and balance. Activities performed today included bed mobility training, transfer training, [facility] mobility with RW, education on role of OT, POC, WBAT status Lt LE, importance of OOB activity, d/c planning  Self Care Training:   Cues were given for safety, sequence, UE/LE placement, visual cues, and balance. Activities performed today included UB/LB dressing tasks including donning LE prostheses, facial hygiene, oral hygiene routine      Safety Measures: Gait belt used, Left in Chair, Alarm in place    Assessment:  Pt is an 79year old male with a past medical history of Arthritis, Biliary stones, CAD (coronary artery disease), COPD (chronic obstructive pulmonary disease) ([facility] Diabetes mellitus ([facility] History of blood transfusion, Hx of angiography, Hyperlipidemia, Hypertension, and Kidney stone. Pt admitted following a fall and diagnosed with a Lt hip fracture. Pt underwent closed reduction with IM nailing on 2-18. Pt with history of BL BKAs with BL LE prostheses. Pt lives with his spouse at [facility] independent living and reports at baseline he is independent with ADLs and ambulates mod I with a RW. Pt currently presents with the above impairments. Recommend continued OT services in inpatient rehab setting at discharge. Complexity: Moderate  Prognosis: Good  Plan: 4x/week      Goals:  1. Pt will complete all aspects of bed mobility for EOB/OOB ADLs CGA  2.  Pt will complete UB/LB bathing CGA with setup  3. Pt will complete all aspects of LB dressing including donning LE prostheses CGA with setup  4. Pt will complete all functional transfers to and from bed, chair, toilet, shower chair SBA/good safe  5. Pt will ambulate HH distance to bathroom for toileting SBA with RW  6. Pt will complete all aspects of toileting task CGA  7. Pt will complete oral hygiene/grooming routine in standing at sink SBA with setup  8.  Pt will complete ther ex/ther act with focus on UE strengthening, functional standing tolerance >6 minutes, cognitive re-orientation      Time:   Time in: 944  Time out: 1023  Timed treatment minutes: 24  Total time: 39      Electronically signed by:    Gerhardt Schmid, OTR/L, [address] [place] [address], .[id number]

## 2021-02-19 NOTE — PROGRESS NOTES
Karina Coreas is a 79 y.o. male patient. 1. Closed fracture of left hip, initial encounter [facility])      Past Medical History:   Diagnosis Date    Arthritis     Biliary stones 2015    CAD (coronary artery disease)     COPD (chronic obstructive pulmonary disease) (HCC)     Diabetes mellitus ([address]     History of blood transfusion     Hx of angiography 3/11/2014    Pulmonary Arteries: Small sliding hiatal hernia. Mod coronary artery calcifications. Mild bilat gynecomastia.     Hyperlipidemia     Hypertension     Kidney stone      Past Surgical History Pertinent Negatives:   Procedure Date Noted    APPENDECTOMY 11/02/2018    BACK SURGERY 11/02/2018    COLONOSCOPY 11/02/2018    COSMETIC SURGERY 11/02/2018    ENDOSCOPY, COLON, DIAGNOSTIC 11/02/2018    EYE SURGERY 08/30/2012    HERNIA REPAIR 11/02/2018    KIDNEY TRANSPLANT 11/02/2018    PACEMAKER PLACEMENT 11/02/2018    SKIN BIOPSY 11/02/2018    VASCULAR SURGERY 11/02/2018     Scheduled Meds:   levoFLOXacin  750 mg Oral Every Other Day    enoxaparin  40 mg Subcutaneous Daily    sodium chloride flush  10 mL Intravenous 2 times per day    sennosides-docusate sodium  1 tablet Oral BID    aspirin  325 mg Oral BID    sodium chloride flush  10 mL Intravenous 2 times per day    insulin lispro  0-12 Units Subcutaneous TID WC    insulin lispro  0-6 Units Subcutaneous Nightly    atorvastatin  40 mg Oral Daily    dilTIAZem  60 mg Oral BID    famotidine  20 mg Oral Daily    gabapentin  100 mg Oral Nightly    latanoprost  1 drop Both Eyes Nightly     Continuous Infusions:   sodium chloride      lactated ringers      sodium chloride 75 mL/hr at 02/19/21 0551    dextrose       PRN Meds:sodium chloride, hydrALAZINE (APRESOLINE) ivpb, sodium chloride flush, acetaminophen, oxyCODONE **OR** oxyCODONE, magnesium hydroxide, sodium chloride flush, polyethylene glycol, [DISCONTINUED] acetaminophen **OR** acetaminophen, ondansetron, glucose, dextrose, glucagon (rDNA), dextrose, morphine    Allergies   Allergen Reactions    Byetta 10 Mcg Pen [Exenatide]     Sulbactam     Ampicillin Rash    Aricept [Donepezil Hydrochloride] Other (See Comments)     Hallucinations, confusion    Benztropine Other (See Comments)     Hallucinations, confusion    Celebrex [Celecoxib] Other (See Comments)     'causes him to be nervous and jittery\"    Diphenhydramine Hcl Other (See Comments)     nervous and jittery    Diphenhydramine Hcl [Diphenhydramine] Anxiety    Exenatide Other (See Comments)     Causes him to be nervous and jittery    Metoprolol Succinate Other (See Comments)     Hallucinations     Phenergan [Promethazine Hcl] Other (See Comments)     Hallucinations, nervous, jittery    Plavix [Clopidogrel Bisulfate] Other (See Comments)     Causes bleeding in his stomach    Pseudoephedrine Anxiety    Reglan [Metoclopramide Hcl] Other (See Comments)     \"Caused him to just sit in a chair and rock back and forth\"     Active Problems:    Closed fracture of left hip (HCC)  Resolved Problems:    * No resolved hospital problems. *    Blood pressure 130/62, pulse 96, temperature 98.2 °F (36.8 °C), temperature source Oral, resp. rate 17, height 5' 7\" (1.702 m), weight 144 lb (65.3 kg), SpO2 92 %. Subjective   Patient seen and examined, resting in chair comfortably, pain controlled, no new complaints. Already up with physical therapy walking with his prosthetics, feeling much better today. Objective   LLE - Dressing clean, dry, intact, no calf TTP, compartments soft, able to flex and extend the left knee and hip     Assessment & Plan  POD #1 L hip IMN, Doing well postoperatively    Continue weight bearing as tolerated.   Continue range of motion exercises  Pain control  DVT prophylaxis  Continue PT/OT  Discharge planning    Eva 97, DO  2/19/2021

## 2021-02-19 NOTE — PROGRESS NOTES
CARDIOLOGY  NOTE        Name:  Braxton Puri /Age/Sex: 1940  (79 y.o. male)   MRN & CSN:  [id number] & [id number] Admission Date/Time: 2021  1:42 PM   [facility]  [facility] PCP: Gilbert Castro, [address] [facility] Day: 3        PLAN FROM CARDIOLOGY FOR TODAY:   CPM       - cardiology consult is for:  preop    -  Interval history:  Had hip surgery    · ASSESSMENT/ PLAN:  1. CAD stable CPM  No CP. 2. Uneventful surgery  3. Risk factor modification  4. Low Hgb post op  5. Resume antiplatelet once OK with surgery  6. STABLE CARDIAC STATUS  7. WILL; FU AS NEEDED          Subjective: Todays complain: Patient  No CP    HPI:  Antonia Ruff is a 79 y. o.year old who and presents with had concerns including Fall and Hip Pain. Chief Complaint   Patient presents with    Fall    Hip Pain           Objective: Temperature:  Current - Temp: 97.6 °F (36.4 °C);  Max - Temp  Av.1 °F (36.7 °C)  Min: 96.8 °F (36 °C)  Max: 98.8 °F (37.1 °C)    Respiratory Rate : Resp  Avg: 10.2  Min: 1  Max: 20    Pulse Range: Pulse  Av.7  Min: 92  Max: 107    Blood Presuure Range:  Systolic (13OUL), CLN:833 , Min:63 , IEN:981   ; Diastolic (01BGK), YWI:17, Min:39, Max:72      Pulse ox Range: SpO2  Av.8 %  Min: 77 %  Max: 100 %    24hr I & O:      Intake/Output Summary (Last 24 hours) at 2021 1205  Last data filed at 2021 3526  Gross per 24 hour   Intake 2119 ml   Output 625 ml   Net 1494 ml         /65   Pulse 102   Temp 97.6 °F (36.4 °C) (Axillary)   Resp 18   Ht 5' 7\" (1.702 m)   Wt 144 lb (65.3 kg)   SpO2 94%   BMI 22.55 kg/m²           Review of Systems:  All 14 systems reviewed, all negative       TELEMETRY: Sinus    has a past medical history of Arthritis, Biliary stones, CAD (coronary artery disease), COPD (chronic obstructive pulmonary disease) ([address] Diabetes mellitus ([facility] History of blood transfusion, Hx of angiography, Hyperlipidemia, Hypertension, and Kidney stone. has a past surgical history that includes Cardiac surgery; joint replacement; Cholecystectomy; Coronary angioplasty with stent; fracture surgery; Leg amputation below knee (Bilateral); ERCP (03/13/14); other surgical history (03/13/14); ERCP (9/11/14); ERCP (10/24/15); ERCP (03/18/2017); Abdomen surgery; Dilatation, esophagus; ERCP (N/A, 1/25/2019); ERCP (N/A, 9/7/2019); and ERCP (N/A, 3/31/2020).   Physical Exam:  General:  Awake, alert, NAD  Head:normal  Eye:normal  Neck:  No JVD   Chest:  Clear to auscultation, respiration easy  Cardiovascular:  RRR S1S2  Abdomen:   nontender  Extremities:  no edema  Pulses; palpable  Neuro: grossly normal    Medications:    levoFLOXacin  750 mg Oral Every Other Day    enoxaparin  40 mg Subcutaneous Daily    sodium chloride flush  10 mL Intravenous 2 times per day    sennosides-docusate sodium  1 tablet Oral BID    aspirin  325 mg Oral BID    sodium chloride flush  10 mL Intravenous 2 times per day    insulin lispro  0-12 Units Subcutaneous TID WC    insulin lispro  0-6 Units Subcutaneous Nightly    atorvastatin  40 mg Oral Daily    dilTIAZem  60 mg Oral BID    famotidine  20 mg Oral Daily    gabapentin  100 mg Oral Nightly    latanoprost  1 drop Both Eyes Nightly      sodium chloride      lactated ringers      sodium chloride 75 mL/hr at 02/19/21 0551    dextrose       sodium chloride, hydrALAZINE (APRESOLINE) ivpb, sodium chloride flush, acetaminophen, oxyCODONE **OR** oxyCODONE, magnesium hydroxide, sodium chloride flush, polyethylene glycol, [DISCONTINUED] acetaminophen **OR** acetaminophen, ondansetron, glucose, dextrose, glucagon (rDNA), dextrose, morphine    Lab Data:  CBC:   Recent Labs     02/17/21  1506 02/18/21  1000 02/19/21  0421 02/19/21  0923   WBC 9.6 13.4* 14.3*  --    HGB 11.7* 11.2* 7.9* 7.6*   HCT 35.6* 35.4* 24.8* 23.4*   MCV 89.2 93.9 94.7  --     183 126*  --      BMP:   Recent Labs

## 2021-02-19 NOTE — CARE COORDINATION
Chart reviewed and per previous CM notes, pt is from [address] and wishes to return skilled at discharge. CM called [facility] admissions, to update her that therapy should see pt today. Cm also updated Umm Arthur that preop COVID was negative, Umm Arthur stated that pt will NOT need a repeat test at discharge. Pt will need a precert prior to admission to SNF. CM following for therapy evaluations.

## 2021-02-19 NOTE — PROGRESS NOTES
Hospitalist Progress Note      Name:  Jr Centeno /Age/Sex: 1940  (79 y.o. male)   MRN & CSN:  [id number] & [id number] Admission Date/Time: 2021  1:42 PM   Location:  [address] [place] [address] PCP: Harsha Huerta, [address] Day: 3    History of Present Illness:     Chief Complaint: Left femur fracture  Jr Centeno is a 79 y.o.  male  who presents with Fall     patient seen and examined at bed side. He says pain with movement of left hip. Denies any chest pain or SOB. Ten point ROS reviewed negative, unless as noted above    Objective:        Intake/Output Summary (Last 24 hours) at 2021 1055  Last data filed at 2021 0335  Gross per 24 hour   Intake 2119 ml   Output 625 ml   Net 1494 ml      Vitals:   Vitals:    21 0844   BP:    Pulse:    Resp:    Temp:    SpO2: 94%     Physical Exam:   General Appearance: alert and oriented to person, place and time, in no acute distress  Cardiovascular: normal rate, regular rhythm, normal S1 and S2  Pulmonary/Chest: clear to auscultation bilaterally  Abdomen: soft, non-tender, non-distended, normal bowel sounds, no masses   Extremities: Bilateral BKA  Skin: warm and dry, no rash or erythema  Head: normocephalic and atraumatic  Eyes: pupils equal, round, and reactive to light  Neck: supple and non-tender without mass, no thyromegaly   Musculoskeletal: Bilateral BKA with difficulty in moving left lower extremity  Neurological: alert, oriented, normal speech, no focal findings or movement disorder noted    Medications:   Medications:    levoFLOXacin  750 mg Oral Every Other Day    furosemide  20 mg Intravenous Once    enoxaparin  40 mg Subcutaneous Daily    sodium chloride flush  10 mL Intravenous 2 times per day    sennosides-docusate sodium  1 tablet Oral BID    aspirin  325 mg Oral BID    sodium chloride flush  10 mL Intravenous 2 times per day    insulin lispro  0-12 Units Subcutaneous TID     insulin lispro  0-6 Units Subcutaneous Nightly    atorvastatin  40 mg Oral Daily    dilTIAZem  60 mg Oral BID    famotidine  20 mg Oral Daily    gabapentin  100 mg Oral Nightly    latanoprost  1 drop Both Eyes Nightly      Infusions:    sodium chloride      lactated ringers      sodium chloride 75 mL/hr at 02/19/21 0551    dextrose       PRN Meds:     sodium chloride, , PRN      hydrALAZINE (APRESOLINE) ivpb, 10 mg, Q6H PRN      sodium chloride flush, 10 mL, PRN      acetaminophen, 650 mg, Q4H PRN      oxyCODONE, 5 mg, Q4H PRN    Or      oxyCODONE, 10 mg, Q4H PRN      magnesium hydroxide, 30 mL, Daily PRN      sodium chloride flush, 10 mL, PRN      polyethylene glycol, 17 g, Daily PRN      acetaminophen, 650 mg, Q6H PRN      ondansetron, 4 mg, Q6H PRN      glucose, 15 g, PRN      dextrose, 12.5 g, PRN      glucagon (rDNA), 1 mg, PRN      dextrose, 100 mL/hr, PRN      morphine, 2 mg, Q4H PRN            Pertinent New Labs & Imaging Studies     CBC with Differential:    Lab Results   Component Value Date    WBC 14.3 02/19/2021    RBC 2.62 02/19/2021    HGB 7.6 02/19/2021    HCT 23.4 02/19/2021     02/19/2021    MCV 94.7 02/19/2021    MCH 30.2 02/19/2021    MCHC 31.9 02/19/2021    RDW 13.1 02/19/2021    SEGSPCT 52.0 02/19/2021    BANDSPCT 36 02/19/2021    LYMPHOPCT 10.0 02/19/2021    MONOPCT 2.0 02/19/2021    MYELOPCT 1 09/02/2019    BASOPCT 0.3 02/18/2021    MONOSABS 0.3 02/19/2021    LYMPHSABS 1.4 02/19/2021    EOSABS 0.0 02/18/2021    BASOSABS 0.0 02/18/2021    DIFFTYPE MANUAL DIFFERENTIAL 02/19/2021     CMP:    Lab Results   Component Value Date     02/18/2021    K 4.1 02/18/2021     02/18/2021    CO2 25 02/18/2021    BUN 19 02/18/2021    CREATININE 1.3 02/18/2021    GFRAA >60 02/18/2021    LABGLOM 53 02/18/2021    GLUCOSE 235 02/18/2021    PROT 6.0 02/18/2021    PROT 8.0 08/30/2012    LABALBU 3.9 02/18/2021    CALCIUM 9.1 02/18/2021    BILITOT 0.9 02/18/2021    ALKPHOS 61 02/18/2021    AST 21 02/18/2021    ALT 12 02/18/2021     Xr Knee Left (1-2 Views)    Result Date: 2/17/2021  EXAMINATION: ONE XRAY VIEW OF THE PELVIS AND TWO XRAY VIEWS LEFT HIP; TWO XRAY VIEWS OF THE LEFT KNEE 2/17/2021 2:12 pm COMPARISON: CT abdomen/pelvis 11/30/2020 HISTORY: ORDERING SYSTEM PROVIDED HISTORY: trauma TECHNOLOGIST PROVIDED HISTORY: Reason for exam:->trauma Reason for Exam: left hip fracture Acuity: Acute Type of Exam: Initial Mechanism of Injury: fall Relevant Medical/Surgical History: diabetes, cad, copd ; ORDERING SYSTEM PROVIDED HISTORY: trauma TECHNOLOGIST PROVIDED HISTORY: Reason for exam:->trauma Reason for Exam: left leg pain Acuity: Acute Type of Exam: Initial Mechanism of Injury: fall Relevant Medical/Surgical History: diabetes FINDINGS: A single frontal view of the pelvis as well as frontal and cross-table lateral views of the left hip were performed. There is an acute comminuted mildly displaced intertrochanteric fracture of the proximal left femur. No additional fracture is identified. The left femoral head is well seated at the left acetabulum, without evidence of dislocation. There is mild left hip osteoarthritis. There is an intact right hip arthroplasty, without evidence of orthopedic hardware complication. The pelvic ring is intact. The sacrum and SI joints are unremarkable. There is diffuse osteopenia. Enthesopathic changes are evident. Atherosclerotic calcifications are noted. Frontal and cross-table lateral views of the left knee were performed. The patient is status post below-knee amputation. There is no acute fracture or dislocation. There is diffuse osteopenia. A joint effusion is not identified. There is mild tricompartmental osteoarthritis. 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 Atherosclerotic calcifications are evident. 1. Acute comminuted mildly displaced intertrochanteric fracture of the proximal left femur.  2. Patient status post left below-knee amputation, without acute osseous abnormality at the left knee. Ct Head Wo Contrast    Result Date: 2/17/2021  EXAMINATION: CT OF THE HEAD WITHOUT CONTRAST  2/17/2021 11:14 am TECHNIQUE: CT of the head was performed without the administration of intravenous contrast. Dose modulation, iterative reconstruction, and/or weight based adjustment of the mA/kV was utilized to reduce the radiation dose to as low as reasonably achievable. COMPARISON: Brain MRI, 12/01/2020 HISTORY: ORDERING SYSTEM PROVIDED HISTORY: trauma TECHNOLOGIST PROVIDED HISTORY: Has a \"code stroke\" or \"stroke alert\" been called? 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 No other masses or hemorrhages are seen within the brain parenchyma. No acute loss of the gray-white matter differentiation is identified to suggest acute or subacute infarct. No midline shift. Periventricular low-attenuation is identified, moderate degree, most compatible with chronic small vessel ischemic disease. Mild diffuse atrophy noted. Intracranial vasculature is unremarkable for age. ORBITS: The visualized portion of the orbits demonstrate no acute abnormality. SINUSES: Postsurgical changes noted from previous partial ethmoidectomy and antral window placement on the right. Paranasal sinuses are otherwise unremarkable. The mastoid air cells are clear. SOFT TISSUES/SKULL:  No acute abnormality of the visualized skull or soft tissues. No acute intracranial abnormality. 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      Ct Cervical Spine Wo Contrast    Result Date: 2/17/2021  EXAMINATION: CT OF THE CERVICAL SPINE WITHOUT CONTRAST 2/17/2021 2:15 pm TECHNIQUE: CT of the cervical spine was performed without the administration of intravenous contrast. Multiplanar reformatted images are provided for review. Dose modulation, iterative reconstruction, and/or weight based adjustment of the mA/kV was utilized to reduce the radiation dose to as low as reasonably achievable. COMPARISON: None. HISTORY: ORDERING SYSTEM PROVIDED HISTORY:  Trauma TECHNOLOGIST PROVIDED HISTORY: Reason for exam:  Trauma Decision Support Exception:  Emergency Medical Condition (MA) Reason for Exam: trauma Acuity: Acute Type of Exam: Initial Mechanism of Injury: Fall FINDINGS: BONES/ALIGNMENT: There is exaggeration of the normal cervical lordosis. A fracture is not identified. DEGENERATIVE CHANGES: There is mild multilevel degenerative disc disease. There is moderate multilevel facet degenerative change. SOFT TISSUES: There is no apical pneumothorax. The prevertebral soft tissues are within normal limits. Atherosclerosis is noted. No acute abnormality of the cervical spine. Xr Chest Portable    Result Date: 2/17/2021  EXAMINATION: ONE XRAY VIEW OF THE CHEST 2/17/2021 2:12 pm COMPARISON: None. HISTORY: ORDERING SYSTEM PROVIDED HISTORY: trauma TECHNOLOGIST PROVIDED HISTORY: Reason for exam:->trauma Reason for Exam: left hip fx Acuity: Acute Type of Exam: Initial Mechanism of Injury: fall Relevant Medical/Surgical History: na FINDINGS: The lungs are clear. The mediastinum and cardiac silhouette are unremarkable. There is no evidence of pneumothorax. No displaced rib fractures are identified. No acute abnormality.      Xr Hip 2-3 Vw W Pelvis Left    Result Date: 2/17/2021  EXAMINATION: ONE XRAY VIEW OF THE PELVIS AND TWO XRAY VIEWS LEFT HIP; TWO XRAY VIEWS OF THE LEFT KNEE 2/17/2021 2:12 pm COMPARISON: CT abdomen/pelvis 11/30/2020 HISTORY: ORDERING SYSTEM PROVIDED HISTORY: trauma TECHNOLOGIST PROVIDED HISTORY: Reason for exam:->trauma Reason for Exam: left hip fracture Acuity: Acute Type of Exam: Initial Mechanism of Injury: fall Relevant Medical/Surgical History: diabetes, cad, copd ; ORDERING SYSTEM PROVIDED HISTORY: trauma TECHNOLOGIST PROVIDED HISTORY: Reason for exam:->trauma Reason for Exam: left leg pain Acuity: Acute Type of Exam: Initial Mechanism of Injury: fall Relevant Medical/Surgical History: diabetes FINDINGS: A single frontal view of the pelvis as well as frontal and cross-table lateral views of the left hip were performed. There is an acute comminuted mildly displaced intertrochanteric fracture of the proximal left femur. No additional fracture is identified. The left femoral head is well seated at the left acetabulum, without evidence of dislocation. There is mild left hip osteoarthritis. There is an intact right hip arthroplasty, without evidence of orthopedic hardware complication. The pelvic ring is intact. The sacrum and SI joints are unremarkable. There is diffuse osteopenia. Enthesopathic changes are evident. Atherosclerotic calcifications are noted. Frontal and cross-table lateral views of the left knee were performed. The patient is status post below-knee amputation. There is no acute fracture or dislocation. There is diffuse osteopenia. A joint effusion is not identified. There is mild tricompartmental osteoarthritis. 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 Atherosclerotic calcifications are evident. 1. Acute comminuted mildly displaced intertrochanteric fracture of the proximal left femur. 2. Patient status post left below-knee amputation, without acute osseous abnormality at the left knee.        Assessment and Plan:   Aruna Granger is a 79 y.o.  male  who presents with Left femur fracture     Closed left hip fracture secondary to mechanical fall  S/p closed reduction and intramedullary nail fixation  x-ray of left hip showed acute comminuted mildly displaced intertrochanteric fracture of the left proximal femur  adequate pain control   orthopedic recommendations appreciated    Acute hypoxemic respiratory failure  Likely aspiration PNA  Increased O2 demands with anemia  Chest X ray with right lower lobe infiltrate, Pneumonia     Acute post op anemia  Hb dropped to 7.6 today  Ordered 1 unit blood transfusion as there is increased O2 demands,    Type 2 diabetes  continue sliding-scale insulin  hypoglycemia precautions    Essential hypertension  continue home medications    CAD  will resume DAPT after surgery  Cardiology following, deemed moderate cardiac risk for procedure    COPD-Stable  Continue bronchodilators    Hx of bilateral BKA for PAD    Diet DIET GENERAL;  Dietary Nutrition Supplements: Standard High Calorie Oral Supplement   DVT Prophylaxis [x] Lovenox, []  Heparin, [] SCDs, [] Ambulation   GI Prophylaxis [] PPI,  [x] H2 Blocker,  [] Carafate,  [] Diet/Tube Feeds   Code Status Full Code   Disposition Patient requires continued admission due to Hypoxemia   MDM [] Low, [x] Moderate,[]  High     Electronically signed by Marlon Laird MD on 2/19/2021 at 10:55 AM

## 2021-02-20 LAB
BASOPHILS ABSOLUTE: 0 K/CU MM
BASOPHILS RELATIVE PERCENT: 0.3 % (ref 0–1)
DIFFERENTIAL TYPE: ABNORMAL
EOSINOPHILS ABSOLUTE: 0.5 K/CU MM
EOSINOPHILS RELATIVE PERCENT: 5.4 % (ref 0–3)
GLUCOSE BLD-MCNC: 116 MG/DL (ref 70–99)
GLUCOSE BLD-MCNC: 124 MG/DL (ref 70–99)
GLUCOSE BLD-MCNC: 170 MG/DL (ref 70–99)
GLUCOSE BLD-MCNC: 193 MG/DL (ref 70–99)
GLUCOSE BLD-MCNC: 302 MG/DL (ref 70–99)
GLUCOSE BLD-MCNC: 70 MG/DL (ref 70–99)
HCT VFR BLD CALC: 26.1 % (ref 42–52)
HCT VFR BLD CALC: 27.4 % (ref 42–52)
HEMOGLOBIN: 8.6 GM/DL (ref 13.5–18)
HEMOGLOBIN: 9.2 GM/DL (ref 13.5–18)
IMMATURE NEUTROPHIL %: 0.7 % (ref 0–0.43)
LYMPHOCYTES ABSOLUTE: 1.5 K/CU MM
LYMPHOCYTES RELATIVE PERCENT: 15.1 % (ref 24–44)
MCH RBC QN AUTO: 30.8 PG (ref 27–31)
MCHC RBC AUTO-ENTMCNC: 33 % (ref 32–36)
MCV RBC AUTO: 93.5 FL (ref 78–100)
MONOCYTES ABSOLUTE: 0.9 K/CU MM
MONOCYTES RELATIVE PERCENT: 9.5 % (ref 0–4)
NUCLEATED RBC %: 0 %
PDW BLD-RTO: 13.2 % (ref 11.7–14.9)
PLATELET # BLD: 130 K/CU MM (ref 140–440)
PMV BLD AUTO: 10.1 FL (ref 7.5–11.1)
RBC # BLD: 2.79 M/CU MM (ref 4.6–6.2)
SEGMENTED NEUTROPHILS ABSOLUTE COUNT: 6.8 K/CU MM
SEGMENTED NEUTROPHILS RELATIVE PERCENT: 69 % (ref 36–66)
TOTAL IMMATURE NEUTOROPHIL: 0.07 K/CU MM
TOTAL NUCLEATED RBC: 0 K/CU MM
WBC # BLD: 9.8 K/CU MM (ref 4–10.5)

## 2021-02-20 PROCEDURE — 2700000000 HC OXYGEN THERAPY PER DAY

## 2021-02-20 PROCEDURE — 6360000002 HC RX W HCPCS: Performed by: ORTHOPAEDIC SURGERY

## 2021-02-20 PROCEDURE — 6370000000 HC RX 637 (ALT 250 FOR IP): Performed by: ORTHOPAEDIC SURGERY

## 2021-02-20 PROCEDURE — 82962 GLUCOSE BLOOD TEST: CPT

## 2021-02-20 PROCEDURE — 36415 COLL VENOUS BLD VENIPUNCTURE: CPT

## 2021-02-20 PROCEDURE — 1200000000 HC SEMI PRIVATE

## 2021-02-20 PROCEDURE — 85025 COMPLETE CBC W/AUTO DIFF WBC: CPT

## 2021-02-20 PROCEDURE — 97530 THERAPEUTIC ACTIVITIES: CPT

## 2021-02-20 PROCEDURE — 2580000003 HC RX 258: Performed by: ORTHOPAEDIC SURGERY

## 2021-02-20 PROCEDURE — 94150 VITAL CAPACITY TEST: CPT

## 2021-02-20 PROCEDURE — 6370000000 HC RX 637 (ALT 250 FOR IP): Performed by: INTERNAL MEDICINE

## 2021-02-20 PROCEDURE — 85014 HEMATOCRIT: CPT

## 2021-02-20 PROCEDURE — 85018 HEMOGLOBIN: CPT

## 2021-02-20 PROCEDURE — 94761 N-INVAS EAR/PLS OXIMETRY MLT: CPT

## 2021-02-20 RX ORDER — CLOPIDOGREL BISULFATE 75 MG/1
75 TABLET ORAL DAILY
Status: DISCONTINUED | OUTPATIENT
Start: 2021-02-20 | End: 2021-02-23 | Stop reason: HOSPADM

## 2021-02-20 RX ADMIN — MORPHINE SULFATE 2 MG: 2 INJECTION, SOLUTION INTRAMUSCULAR; INTRAVENOUS at 01:54

## 2021-02-20 RX ADMIN — MORPHINE SULFATE 2 MG: 2 INJECTION, SOLUTION INTRAMUSCULAR; INTRAVENOUS at 13:52

## 2021-02-20 RX ADMIN — GABAPENTIN 100 MG: 100 CAPSULE ORAL at 20:56

## 2021-02-20 RX ADMIN — SODIUM CHLORIDE: 9 INJECTION, SOLUTION INTRAVENOUS at 19:55

## 2021-02-20 RX ADMIN — ASPIRIN 325 MG: 325 TABLET, COATED ORAL at 10:49

## 2021-02-20 RX ADMIN — SODIUM CHLORIDE, PRESERVATIVE FREE 10 ML: 5 INJECTION INTRAVENOUS at 10:50

## 2021-02-20 RX ADMIN — ENOXAPARIN SODIUM 40 MG: 40 INJECTION SUBCUTANEOUS at 10:49

## 2021-02-20 RX ADMIN — FAMOTIDINE 20 MG: 20 TABLET ORAL at 10:49

## 2021-02-20 RX ADMIN — ATORVASTATIN CALCIUM 40 MG: 40 TABLET, FILM COATED ORAL at 10:50

## 2021-02-20 RX ADMIN — OXYCODONE HYDROCHLORIDE 10 MG: 10 TABLET ORAL at 10:52

## 2021-02-20 RX ADMIN — LATANOPROST 1 DROP: 50 SOLUTION/ DROPS OPHTHALMIC at 20:56

## 2021-02-20 RX ADMIN — ASPIRIN 325 MG: 325 TABLET, COATED ORAL at 20:56

## 2021-02-20 RX ADMIN — DOCUSATE SODIUM 50 MG AND SENNOSIDES 8.6 MG 1 TABLET: 8.6; 5 TABLET, FILM COATED ORAL at 10:49

## 2021-02-20 RX ADMIN — DILTIAZEM HYDROCHLORIDE 60 MG: 60 TABLET, FILM COATED ORAL at 20:56

## 2021-02-20 RX ADMIN — DOCUSATE SODIUM 50 MG AND SENNOSIDES 8.6 MG 1 TABLET: 8.6; 5 TABLET, FILM COATED ORAL at 20:56

## 2021-02-20 RX ADMIN — MORPHINE SULFATE 2 MG: 2 INJECTION, SOLUTION INTRAMUSCULAR; INTRAVENOUS at 20:04

## 2021-02-20 RX ADMIN — DILTIAZEM HYDROCHLORIDE 60 MG: 60 TABLET, FILM COATED ORAL at 10:49

## 2021-02-20 RX ADMIN — CLOPIDOGREL BISULFATE 75 MG: 75 TABLET ORAL at 11:23

## 2021-02-20 ASSESSMENT — PAIN DESCRIPTION - DESCRIPTORS: DESCRIPTORS: ACHING;SHARP

## 2021-02-20 ASSESSMENT — PAIN DESCRIPTION - LOCATION
LOCATION: HIP
LOCATION: HIP

## 2021-02-20 ASSESSMENT — PAIN SCALES - GENERAL
PAINLEVEL_OUTOF10: 7
PAINLEVEL_OUTOF10: 4
PAINLEVEL_OUTOF10: 2
PAINLEVEL_OUTOF10: 7
PAINLEVEL_OUTOF10: 8

## 2021-02-20 ASSESSMENT — PAIN DESCRIPTION - ORIENTATION
ORIENTATION: LEFT

## 2021-02-20 ASSESSMENT — PAIN DESCRIPTION - PAIN TYPE
TYPE: SURGICAL PAIN
TYPE: SURGICAL PAIN
TYPE: OTHER (COMMENT)

## 2021-02-20 ASSESSMENT — PAIN DESCRIPTION - FREQUENCY: FREQUENCY: INTERMITTENT

## 2021-02-20 NOTE — PROGRESS NOTES
Juana Powell is a 79 y.o. male patient. 1. Closed fracture of left hip, initial encounter [facility])      Past Medical History:   Diagnosis Date    Arthritis     Biliary stones 2015    CAD (coronary artery disease)     COPD (chronic obstructive pulmonary disease) (HCC)     Diabetes mellitus ([address]     History of blood transfusion     Hx of angiography 3/11/2014    Pulmonary Arteries: Small sliding hiatal hernia. Mod coronary artery calcifications. Mild bilat gynecomastia.     Hyperlipidemia     Hypertension     Kidney stone      Past Surgical History Pertinent Negatives:   Procedure Date Noted    APPENDECTOMY 11/02/2018    BACK SURGERY 11/02/2018    COLONOSCOPY 11/02/2018    COSMETIC SURGERY 11/02/2018    ENDOSCOPY, COLON, DIAGNOSTIC 11/02/2018    EYE SURGERY 08/30/2012    HERNIA REPAIR 11/02/2018    KIDNEY TRANSPLANT 11/02/2018    PACEMAKER PLACEMENT 11/02/2018    SKIN BIOPSY 11/02/2018    VASCULAR SURGERY 11/02/2018     Scheduled Meds:   levoFLOXacin  750 mg Oral Every Other Day    enoxaparin  40 mg Subcutaneous Daily    sodium chloride flush  10 mL Intravenous 2 times per day    sennosides-docusate sodium  1 tablet Oral BID    aspirin  325 mg Oral BID    sodium chloride flush  10 mL Intravenous 2 times per day    insulin lispro  0-12 Units Subcutaneous TID WC    insulin lispro  0-6 Units Subcutaneous Nightly    atorvastatin  40 mg Oral Daily    dilTIAZem  60 mg Oral BID    famotidine  20 mg Oral Daily    gabapentin  100 mg Oral Nightly    latanoprost  1 drop Both Eyes Nightly     Continuous Infusions:   sodium chloride      lactated ringers      sodium chloride 75 mL/hr at 02/19/21 0551    dextrose       PRN Meds:sodium chloride, melatonin, hydrALAZINE (APRESOLINE) ivpb, sodium chloride flush, acetaminophen, oxyCODONE **OR** oxyCODONE, magnesium hydroxide, sodium chloride flush, polyethylene glycol, [DISCONTINUED] acetaminophen **OR** acetaminophen, ondansetron, glucose, dextrose, glucagon (rDNA), dextrose, morphine    Allergies   Allergen Reactions    Byetta 10 Mcg Pen [Exenatide]     Sulbactam     Ampicillin Rash    Aricept [Donepezil Hydrochloride] Other (See Comments)     Hallucinations, confusion    Benztropine Other (See Comments)     Hallucinations, confusion    Celebrex [Celecoxib] Other (See Comments)     'causes him to be nervous and jittery\"    Diphenhydramine Hcl Other (See Comments)     nervous and jittery    Diphenhydramine Hcl [Diphenhydramine] Anxiety    Exenatide Other (See Comments)     Causes him to be nervous and jittery    Metoprolol Succinate Other (See Comments)     Hallucinations     Phenergan [Promethazine Hcl] Other (See Comments)     Hallucinations, nervous, jittery    Plavix [Clopidogrel Bisulfate] Other (See Comments)     Causes bleeding in his stomach    Pseudoephedrine Anxiety    Reglan [Metoclopramide Hcl] Other (See Comments)     \"Caused him to just sit in a chair and rock back and forth\"     Active Problems:    Closed fracture of left hip (HCC)  Resolved Problems:    * No resolved hospital problems. *    Blood pressure (!) 160/67, pulse 99, temperature 98.5 °F (36.9 °C), temperature source Oral, resp. rate 16, height 5' 7\" (1.702 m), weight 137 lb 5.6 oz (62.3 kg), SpO2 97 %. Subjective   Patient seen and examined, resting in bed comfortably, pain controlled, no new complaints. Pain doing better. Objective:  Vital signs (most recent): Blood pressure (!) 160/67, pulse 99, temperature 98.5 °F (36.9 °C), temperature source Oral, resp. rate 16, height 5' 7\" (1.702 m), weight 137 lb 5.6 oz (62.3 kg), SpO2 97 %. LLE -dressing removed, incisions clean, dry, intact, no calf TTP, compartments soft, able to flex and extend the left knee and hip   Mild pain with range of motion of the left hip. Assessment & Plan  POD #2 L hip IMN, Doing well postoperatively    Continue weight bearing as tolerated.   Continue range of motion exercises  Pain control  DVT prophylaxis  Continue PT/OT  Ortho stable for discharge, follow-up in office in 2 weeks.       Selene Martinez,   2/20/2021

## 2021-02-20 NOTE — PROGRESS NOTES
Hospitalist Progress Note      Name:  Yaa Gilliam /Age/Sex: 1940  (79 y.o. male)   MRN & CSN:  [id number] & [id number] Admission Date/Time: 2021  1:42 PM   Location:  [facility] PCP: Debra Gallardo, [employer] [address] Day: 4    History of Present Illness:     Chief Complaint: Left femur fracture  Yaa Gilliam is a 79 y.o.  male  who presents with Fall     patient seen and examined at bed side. He says pain with movement of left hip. Denies any chest pain or SOB. Per nurses he is been confused overnight and on tele sitter now. Ten point ROS reviewed negative, unless as noted above    Objective:        Intake/Output Summary (Last 24 hours) at 2021 1047  Last data filed at 2021 7850  Gross per 24 hour   Intake 706.25 ml   Output 1275 ml   Net -568.75 ml      Vitals:   Vitals:    21 1015   BP: 130/61   Pulse: 99   Resp: 16   Temp: 98.1 °F (36.7 °C)   SpO2: 92%     Physical Exam:   General Appearance: alert and oriented to person and self but not place, in no acute distress  Cardiovascular: normal rate, regular rhythm, normal S1 and S2  Pulmonary/Chest: clear to auscultation bilaterally  Abdomen: soft, non-tender, non-distended, normal bowel sounds, no masses   Extremities: Bilateral BKA  Skin: warm and dry, no rash or erythema  Head: normocephalic and atraumatic  Eyes: pupils equal, round, and reactive to light  Neck: supple and non-tender without mass, no thyromegaly   Musculoskeletal: Bilateral BKA with difficulty in moving left lower extremity  Neurological: alert, oriented to self but not to place, normal speech, no focal findings or movement disorder noted    Medications:   Medications:    levoFLOXacin  750 mg Oral Every Other Day    enoxaparin  40 mg Subcutaneous Daily    sodium chloride flush  10 mL Intravenous 2 times per day    sennosides-docusate sodium  1 tablet Oral BID    aspirin  325 mg Oral BID    sodium chloride flush  10 mL Intravenous 2 times per day  insulin lispro  0-12 Units Subcutaneous TID     insulin lispro  0-6 Units Subcutaneous Nightly    atorvastatin  40 mg Oral Daily    dilTIAZem  60 mg Oral BID    famotidine  20 mg Oral Daily    gabapentin  100 mg Oral Nightly    latanoprost  1 drop Both Eyes Nightly      Infusions:    sodium chloride      lactated ringers      sodium chloride 75 mL/hr at 02/19/21 0551    dextrose       PRN Meds:     sodium chloride, , PRN      melatonin, 9 mg, Nightly PRN      hydrALAZINE (APRESOLINE) ivpb, 10 mg, Q6H PRN      sodium chloride flush, 10 mL, PRN      acetaminophen, 650 mg, Q4H PRN      oxyCODONE, 5 mg, Q4H PRN    Or      oxyCODONE, 10 mg, Q4H PRN      magnesium hydroxide, 30 mL, Daily PRN      sodium chloride flush, 10 mL, PRN      polyethylene glycol, 17 g, Daily PRN      acetaminophen, 650 mg, Q6H PRN      ondansetron, 4 mg, Q6H PRN      glucose, 15 g, PRN      dextrose, 12.5 g, PRN      glucagon (rDNA), 1 mg, PRN      dextrose, 100 mL/hr, PRN      morphine, 2 mg, Q4H PRN            Pertinent New Labs & Imaging Studies     CBC with Differential:    Lab Results   Component Value Date    WBC 9.8 02/20/2021    RBC 2.79 02/20/2021    HGB 8.6 02/20/2021    HCT 26.1 02/20/2021     02/20/2021    MCV 93.5 02/20/2021    MCH 30.8 02/20/2021    MCHC 33.0 02/20/2021    RDW 13.2 02/20/2021    SEGSPCT 69.0 02/20/2021    BANDSPCT 36 02/19/2021    LYMPHOPCT 15.1 02/20/2021    MONOPCT 9.5 02/20/2021    MYELOPCT 1 09/02/2019    BASOPCT 0.3 02/20/2021    MONOSABS 0.9 02/20/2021    LYMPHSABS 1.5 02/20/2021    EOSABS 0.5 02/20/2021    BASOSABS 0.0 02/20/2021    DIFFTYPE AUTOMATED DIFFERENTIAL 02/20/2021     CMP:    Lab Results   Component Value Date     02/18/2021    K 4.1 02/18/2021     02/18/2021    CO2 25 02/18/2021    BUN 19 02/18/2021    CREATININE 1.3 02/18/2021    GFRAA >60 02/18/2021    LABGLOM 53 02/18/2021    GLUCOSE 235 02/18/2021    PROT 6.0 02/18/2021    PROT 8.0 08/30/2012 LABALBU 3.9 02/18/2021    CALCIUM 9.1 02/18/2021    BILITOT 0.9 02/18/2021    ALKPHOS 61 02/18/2021    AST 21 02/18/2021    ALT 12 02/18/2021     Xr Knee Left (1-2 Views)    Result Date: 2/17/2021  EXAMINATION: ONE XRAY VIEW OF THE PELVIS AND TWO XRAY VIEWS LEFT HIP; TWO XRAY VIEWS OF THE LEFT KNEE 2/17/2021 2:12 pm COMPARISON: CT abdomen/pelvis 11/30/2020 HISTORY: ORDERING SYSTEM PROVIDED HISTORY: trauma TECHNOLOGIST PROVIDED HISTORY: Reason for exam:->trauma Reason for Exam: left hip fracture Acuity: Acute Type of Exam: Initial Mechanism of Injury: fall Relevant Medical/Surgical History: diabetes, cad, copd ; ORDERING SYSTEM PROVIDED HISTORY: trauma TECHNOLOGIST PROVIDED HISTORY: Reason for exam:->trauma Reason for Exam: left leg pain Acuity: Acute Type of Exam: Initial Mechanism of Injury: fall Relevant Medical/Surgical History: diabetes FINDINGS: A single frontal view of the pelvis as well as frontal and cross-table lateral views of the left hip were performed. There is an acute comminuted mildly displaced intertrochanteric fracture of the proximal left femur. No additional fracture is identified. The left femoral head is well seated at the left acetabulum, without evidence of dislocation. There is mild left hip osteoarthritis. There is an intact right hip arthroplasty, without evidence of orthopedic hardware complication. The pelvic ring is intact. The sacrum and SI joints are unremarkable. There is diffuse osteopenia. Enthesopathic changes are evident. Atherosclerotic calcifications are noted. Frontal and cross-table lateral views of the left knee were performed. The patient is status post below-knee amputation. There is no acute fracture or dislocation. There is diffuse osteopenia. A joint effusion is not identified. There is mild tricompartmental osteoarthritis. 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   Atherosclerotic calcifications are evident. 1. Acute comminuted mildly displaced intertrochanteric fracture of the proximal left femur. 2. Patient status post left below-knee amputation, without acute osseous abnormality at the left knee. Ct Head Wo Contrast    Result Date: 2/17/2021  EXAMINATION: CT OF THE HEAD WITHOUT CONTRAST  2/17/2021 11:14 am TECHNIQUE: CT of the head was performed without the administration of intravenous contrast. Dose modulation, iterative reconstruction, and/or weight based adjustment of the mA/kV was utilized to reduce the radiation dose to as low as reasonably achievable. COMPARISON: Brain MRI, 12/01/2020 HISTORY: ORDERING SYSTEM PROVIDED HISTORY: trauma TECHNOLOGIST PROVIDED HISTORY: Has a \"code stroke\" or \"stroke alert\" been called? 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 No other masses or hemorrhages are seen within the brain parenchyma. No acute loss of the gray-white matter differentiation is identified to suggest acute or subacute infarct. No midline shift. Periventricular low-attenuation is identified, moderate degree, most compatible with chronic small vessel ischemic disease. Mild diffuse atrophy noted. Intracranial vasculature is unremarkable for age. ORBITS: The visualized portion of the orbits demonstrate no acute abnormality. SINUSES: Postsurgical changes noted from previous partial ethmoidectomy and antral window placement on the right. Paranasal sinuses are otherwise unremarkable. The mastoid air cells are clear. SOFT TISSUES/SKULL:  No acute abnormality of the visualized skull or soft tissues. No acute intracranial abnormality. 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      Ct Cervical Spine Wo Contrast    Result Date: 2/17/2021  EXAMINATION: CT OF THE CERVICAL SPINE WITHOUT CONTRAST 2/17/2021 2:15 pm TECHNIQUE: CT of the cervical spine was performed without the administration of intravenous contrast. Multiplanar reformatted images are provided for review. Dose modulation, iterative reconstruction, and/or weight based adjustment of the mA/kV was utilized to reduce the radiation dose to as low as reasonably achievable. COMPARISON: None. HISTORY: ORDERING SYSTEM PROVIDED HISTORY:  Trauma TECHNOLOGIST PROVIDED HISTORY: Reason for exam:  Trauma Decision Support Exception:  Emergency Medical Condition (MA) Reason for Exam: trauma Acuity: Acute Type of Exam: Initial Mechanism of Injury: Fall FINDINGS: BONES/ALIGNMENT: There is exaggeration of the normal cervical lordosis. A fracture is not identified. DEGENERATIVE CHANGES: There is mild multilevel degenerative disc disease. There is moderate multilevel facet degenerative change. SOFT TISSUES: There is no apical pneumothorax. The prevertebral soft tissues are within normal limits. Atherosclerosis is noted. No acute abnormality of the cervical spine. Xr Chest Portable    Result Date: 2/17/2021  EXAMINATION: ONE XRAY VIEW OF THE CHEST 2/17/2021 2:12 pm COMPARISON: None. HISTORY: ORDERING SYSTEM PROVIDED HISTORY: trauma TECHNOLOGIST PROVIDED HISTORY: Reason for exam:->trauma Reason for Exam: left hip fx Acuity: Acute Type of Exam: Initial Mechanism of Injury: fall Relevant Medical/Surgical History: na FINDINGS: The lungs are clear. The mediastinum and cardiac silhouette are unremarkable. There is no evidence of pneumothorax. No displaced rib fractures are identified. No acute abnormality.      Xr Hip 2-3 Vw W Pelvis Left    Result Date: 2/17/2021  EXAMINATION: ONE XRAY VIEW OF THE PELVIS AND TWO XRAY VIEWS LEFT HIP; TWO XRAY VIEWS OF THE LEFT KNEE 2/17/2021 2:12 pm COMPARISON: CT abdomen/pelvis 11/30/2020 HISTORY: ORDERING SYSTEM PROVIDED HISTORY: trauma TECHNOLOGIST PROVIDED HISTORY: Reason for exam:->trauma Reason for Exam: left hip fracture Acuity: Acute Type of Exam: Initial Mechanism of Injury: fall Relevant Medical/Surgical History: diabetes, cad, copd ; ORDERING SYSTEM PROVIDED HISTORY: trauma TECHNOLOGIST PROVIDED HISTORY: Reason for exam:->trauma Reason for Exam: left leg pain Acuity: Acute Type of Exam: Initial Mechanism of Injury: fall Relevant Medical/Surgical History: diabetes FINDINGS: A single frontal view of the pelvis as well as frontal and cross-table lateral views of the left hip were performed. There is an acute comminuted mildly displaced intertrochanteric fracture of the proximal left femur. No additional fracture is identified. The left femoral head is well seated at the left acetabulum, without evidence of dislocation. There is mild left hip osteoarthritis. There is an intact right hip arthroplasty, without evidence of orthopedic hardware complication. The pelvic ring is intact. The sacrum and SI joints are unremarkable. There is diffuse osteopenia. Enthesopathic changes are evident. Atherosclerotic calcifications are noted. Frontal and cross-table lateral views of the left knee were performed. The patient is status post below-knee amputation. There is no acute fracture or dislocation. There is diffuse osteopenia. A joint effusion is not identified. There is mild tricompartmental osteoarthritis. 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 Atherosclerotic calcifications are evident. 1. Acute comminuted mildly displaced intertrochanteric fracture of the proximal left femur. 2. Patient status post left below-knee amputation, without acute osseous abnormality at the left knee.        Assessment and Plan:   Byron Nowak is a 79 y.o.  male  who presents with Left femur fracture     Closed left hip fracture secondary to mechanical fall  S/p closed reduction and intramedullary nail fixation  x-ray of left hip showed acute comminuted mildly displaced intertrochanteric fracture of the left proximal femur  adequate pain control   orthopedic recommendations appreciated    Acute hypoxemic respiratory failure  Likely aspiration PNA  Increased O2 demands with anemia  Chest X ray with right lower lobe infiltrate, Pneumonia   Continue Levaquin  Wean off O2 as tolerated    Acute Hospital Acquired delirium  Safety precautions for fall  Ambulation    Acute post op anemia  Hb improved appropriately after blood transfusion  Ordered 1 unit blood transfusion as there is increased O2 demands,    Type 2 diabetes  continue sliding-scale insulin  hypoglycemia precautions    Essential hypertension  continue home medications    CAD  Resumed DAPT  Cardiology following, deemed moderate cardiac risk for procedure    COPD-Stable  Continue bronchodilators    Hx of bilateral BKA for PAD    PT/OT recommendations noted  Diet DIET GENERAL;  Dietary Nutrition Supplements: Standard High Calorie Oral Supplement   DVT Prophylaxis [x] Lovenox, []  Heparin, [] SCDs, [] Ambulation   GI Prophylaxis [] PPI,  [x] H2 Blocker,  [] Carafate,  [] Diet/Tube Feeds   Code Status Full Code   Disposition Placement pending   MDM [] Low, [x] Moderate,[]  High     Electronically signed by Winferd Sandhoff, MD on 2/20/2021 at 10:47 AM

## 2021-02-20 NOTE — PROGRESS NOTES
Physical Therapy    Physical Therapy Treatment Note  Name: Abner Porter MRN: [id number] :   1940   Date:  2021   Admission Date: 2021 Room:  [address] [place] [address]   Restrictions/Precautions:        WBAT LLE, contact precautions, falls   Communication with other providers:  Per nurse ok to tx and notified during tx that pt c/o being very painful. Subjective:  Patient states:  Pt pleasant but very confused today. Pt stating over and over \"that hurts\" and \"that is so painful\"  Pain:   Location, Type, Intensity (0/10 to 10/10):  Pt unable to give rating  But nurse notified pt appears in distress and checking on pain meds. Objective:    Observation:  Alert but very confused. Treatment, including education/measures:  Transfers sup<=>sit min assist and cues. Pt was able to sit EOB and attempted to don protheses x 2 but unable to get them to lock/click into place. Pt stated \" I don't think I can do this\" and requesting to return to sup. Safety  Patient left safely in the bed, with call light/phone in reach with alarm and  applied. Gait belt and mask were used for transfers and gait. Assessment / Impression:       Patient's tolerance of treatment:  poor  Adverse Reaction: na  Significant change in status and impact: na  Barriers to improvement:  Pt very confused and painful. Plan for Next Session:    Cont. POC  Time in:  1525  Time out:  1350  Timed treatment minutes:  25  Total treatment time:  25    Previously filed items:  Social/Functional History  Lives With: Spouse  Type of Home: Facility(longterm vs [place]?? )  Home Layout: One level  Home Access: Level entry  Bathroom Shower/Tub: Walk-in shower  Bathroom Equipment: Shower chair  Home Equipment: Rolling walker, [place]  ADL Assistance: Independent  Ambulation Assistance: Independent(with RW)  Transfer Assistance: Independent  Additional Comments: Pt is a questionable historian.  Should confirm with family/facility  Short term goals  Time Frame for Short term goals: 1 week  Short term goal 1: Pt will perform sit><supine [place]  Short term goal 2: Pt will transition sit><stand CGA from all surfaces  Short term goal 3: Pt will transfer between surfaces CGA  Short term goal 4: Pt will ambulate 75ft with RW CGA  Short term goal 5: Pt will perform standing light dynamic activity without UE support x 3 minutes CGA       Electronically signed by:    Carlos Hayes PTA  2/20/2021, 8:24 AM

## 2021-02-21 LAB
BANDED NEUTROPHILS ABSOLUTE COUNT: 0.39 K/CU MM
BANDED NEUTROPHILS RELATIVE PERCENT: 4 % (ref 5–11)
DIFFERENTIAL TYPE: ABNORMAL
EOSINOPHILS ABSOLUTE: 0.7 K/CU MM
EOSINOPHILS RELATIVE PERCENT: 7 % (ref 0–3)
GLUCOSE BLD-MCNC: 131 MG/DL (ref 70–99)
GLUCOSE BLD-MCNC: 132 MG/DL (ref 70–99)
GLUCOSE BLD-MCNC: 136 MG/DL (ref 70–99)
GLUCOSE BLD-MCNC: 208 MG/DL (ref 70–99)
HCT VFR BLD CALC: 33.5 % (ref 42–52)
HEMOGLOBIN: 9.3 GM/DL (ref 13.5–18)
LYMPHOCYTES ABSOLUTE: 2.3 K/CU MM
LYMPHOCYTES RELATIVE PERCENT: 24 % (ref 24–44)
MACROCYTES: ABNORMAL
MCH RBC QN AUTO: 30.4 PG (ref 27–31)
MCHC RBC AUTO-ENTMCNC: 27.8 % (ref 32–36)
MCV RBC AUTO: 109.5 FL (ref 78–100)
MONOCYTES ABSOLUTE: 0.4 K/CU MM
MONOCYTES RELATIVE PERCENT: 4 % (ref 0–4)
NUCLEATED RED BLOOD CELLS: 1
PDW BLD-RTO: 13.2 % (ref 11.7–14.9)
PLATELET # BLD: 146 K/CU MM (ref 140–440)
PMV BLD AUTO: 9.6 FL (ref 7.5–11.1)
POLYCHROMASIA: ABNORMAL
RBC # BLD: 3.06 M/CU MM (ref 4.6–6.2)
SEGMENTED NEUTROPHILS ABSOLUTE COUNT: 5.9 K/CU MM
SEGMENTED NEUTROPHILS RELATIVE PERCENT: 61 % (ref 36–66)
WBC # BLD: 9.7 K/CU MM (ref 4–10.5)

## 2021-02-21 PROCEDURE — 6360000002 HC RX W HCPCS: Performed by: ORTHOPAEDIC SURGERY

## 2021-02-21 PROCEDURE — 1200000000 HC SEMI PRIVATE

## 2021-02-21 PROCEDURE — 6370000000 HC RX 637 (ALT 250 FOR IP): Performed by: ORTHOPAEDIC SURGERY

## 2021-02-21 PROCEDURE — 85027 COMPLETE CBC AUTOMATED: CPT

## 2021-02-21 PROCEDURE — 36415 COLL VENOUS BLD VENIPUNCTURE: CPT

## 2021-02-21 PROCEDURE — 82962 GLUCOSE BLOOD TEST: CPT

## 2021-02-21 PROCEDURE — 2580000003 HC RX 258: Performed by: ORTHOPAEDIC SURGERY

## 2021-02-21 PROCEDURE — 6370000000 HC RX 637 (ALT 250 FOR IP): Performed by: INTERNAL MEDICINE

## 2021-02-21 PROCEDURE — 85007 BL SMEAR W/DIFF WBC COUNT: CPT

## 2021-02-21 RX ADMIN — DILTIAZEM HYDROCHLORIDE 60 MG: 60 TABLET, FILM COATED ORAL at 08:50

## 2021-02-21 RX ADMIN — SODIUM CHLORIDE, PRESERVATIVE FREE 10 ML: 5 INJECTION INTRAVENOUS at 21:37

## 2021-02-21 RX ADMIN — FAMOTIDINE 20 MG: 20 TABLET ORAL at 08:50

## 2021-02-21 RX ADMIN — GABAPENTIN 100 MG: 100 CAPSULE ORAL at 21:36

## 2021-02-21 RX ADMIN — CLOPIDOGREL BISULFATE 75 MG: 75 TABLET ORAL at 08:50

## 2021-02-21 RX ADMIN — DOCUSATE SODIUM 50 MG AND SENNOSIDES 8.6 MG 1 TABLET: 8.6; 5 TABLET, FILM COATED ORAL at 21:36

## 2021-02-21 RX ADMIN — ASPIRIN 325 MG: 325 TABLET, COATED ORAL at 21:36

## 2021-02-21 RX ADMIN — ENOXAPARIN SODIUM 40 MG: 40 INJECTION SUBCUTANEOUS at 08:50

## 2021-02-21 RX ADMIN — SODIUM CHLORIDE, PRESERVATIVE FREE 10 ML: 5 INJECTION INTRAVENOUS at 08:51

## 2021-02-21 RX ADMIN — ASPIRIN 325 MG: 325 TABLET, COATED ORAL at 08:50

## 2021-02-21 RX ADMIN — ATORVASTATIN CALCIUM 40 MG: 40 TABLET, FILM COATED ORAL at 08:50

## 2021-02-21 RX ADMIN — LATANOPROST 1 DROP: 50 SOLUTION/ DROPS OPHTHALMIC at 21:36

## 2021-02-21 RX ADMIN — MORPHINE SULFATE 2 MG: 2 INJECTION, SOLUTION INTRAMUSCULAR; INTRAVENOUS at 22:17

## 2021-02-21 RX ADMIN — DILTIAZEM HYDROCHLORIDE 60 MG: 60 TABLET, FILM COATED ORAL at 21:36

## 2021-02-21 RX ADMIN — HYDRALAZINE HYDROCHLORIDE 10 MG: 20 INJECTION INTRAMUSCULAR; INTRAVENOUS at 19:49

## 2021-02-21 RX ADMIN — DOCUSATE SODIUM 50 MG AND SENNOSIDES 8.6 MG 1 TABLET: 8.6; 5 TABLET, FILM COATED ORAL at 08:50

## 2021-02-21 RX ADMIN — MORPHINE SULFATE 2 MG: 2 INJECTION, SOLUTION INTRAMUSCULAR; INTRAVENOUS at 12:09

## 2021-02-21 RX ADMIN — OXYCODONE HYDROCHLORIDE 5 MG: 10 TABLET ORAL at 19:37

## 2021-02-21 RX ADMIN — LEVOFLOXACIN 750 MG: 500 TABLET, FILM COATED ORAL at 08:50

## 2021-02-21 RX ADMIN — OXYCODONE HYDROCHLORIDE 10 MG: 10 TABLET ORAL at 04:34

## 2021-02-21 ASSESSMENT — PAIN SCALES - GENERAL
PAINLEVEL_OUTOF10: 9
PAINLEVEL_OUTOF10: 7
PAINLEVEL_OUTOF10: 0

## 2021-02-21 ASSESSMENT — PAIN DESCRIPTION - ORIENTATION
ORIENTATION: LEFT
ORIENTATION: LEFT

## 2021-02-21 ASSESSMENT — PAIN DESCRIPTION - PAIN TYPE: TYPE: SURGICAL PAIN

## 2021-02-21 ASSESSMENT — PAIN DESCRIPTION - LOCATION
LOCATION: HIP
LOCATION: HIP

## 2021-02-21 ASSESSMENT — PAIN DESCRIPTION - FREQUENCY: FREQUENCY: INTERMITTENT

## 2021-02-21 NOTE — PROGRESS NOTES
Hospitalist Progress Note      Name:  Michael Rodrigues /Age/Sex: 1940  (79 y.o. male)   MRN & CSN:  [id number] & [id number] Admission Date/Time: 2021  1:42 PM   Location:  [address] [place] [address] PCP: Camden Keen, [address] Day: 5    History of Present Illness:     Chief Complaint: Left femur fracture  Michael Rodrigues is a 79 y.o.  male  who presents with Fall     patient seen and examined at bed side. He says pain with movement of left hip. Denies any chest pain or SOB. He appears to be confused. Per on tele sitter. Ten point ROS reviewed negative, unless as noted above    Objective: Intake/Output Summary (Last 24 hours) at 2021 1037  Last data filed at 2021 0853  Gross per 24 hour   Intake 708 ml   Output 1857 ml   Net -1149 ml      Vitals:   Vitals:    21 0744   BP: (!) 159/72   Pulse: 89   Resp: 17   Temp: 98.4 °F (36.9 °C)   SpO2: 97%     Physical Exam:   General Appearance: alert and oriented to person and self but not place, in no acute distress. Off and on confusion  Cardiovascular: normal rate, regular rhythm, normal S1 and S2  Pulmonary/Chest: clear to auscultation bilaterally  Abdomen: soft, non-tender, non-distended, normal bowel sounds, no masses   Extremities: Bilateral BKA  Skin: warm and dry, no rash or erythema  Head: normocephalic and atraumatic  Eyes: pupils equal, round, and reactive to light  Neck: supple and non-tender without mass, no thyromegaly   Musculoskeletal: Bilateral BKA with difficulty in moving left lower extremity  Neurological: alert, oriented to self but not to place, normal speech, no focal findings or movement disorder noted.  Confused    Medications:   Medications:    clopidogrel  75 mg Oral Daily    levoFLOXacin  750 mg Oral Every Other Day    enoxaparin  40 mg Subcutaneous Daily    sodium chloride flush  10 mL Intravenous 2 times per day    sennosides-docusate sodium  1 tablet Oral BID    aspirin  325 mg Oral BID    sodium chloride flush  10 mL Intravenous 2 times per day    insulin lispro  0-12 Units Subcutaneous TID     insulin lispro  0-6 Units Subcutaneous Nightly    atorvastatin  40 mg Oral Daily    dilTIAZem  60 mg Oral BID    famotidine  20 mg Oral Daily    gabapentin  100 mg Oral Nightly    latanoprost  1 drop Both Eyes Nightly      Infusions:    sodium chloride      lactated ringers      sodium chloride 75 mL/hr at 02/20/21 1955    dextrose       PRN Meds:     sodium chloride, , PRN      melatonin, 9 mg, Nightly PRN      hydrALAZINE (APRESOLINE) ivpb, 10 mg, Q6H PRN      sodium chloride flush, 10 mL, PRN      acetaminophen, 650 mg, Q4H PRN      oxyCODONE, 5 mg, Q4H PRN    Or      oxyCODONE, 10 mg, Q4H PRN      magnesium hydroxide, 30 mL, Daily PRN      sodium chloride flush, 10 mL, PRN      polyethylene glycol, 17 g, Daily PRN      acetaminophen, 650 mg, Q6H PRN      ondansetron, 4 mg, Q6H PRN      glucose, 15 g, PRN      dextrose, 12.5 g, PRN      glucagon (rDNA), 1 mg, PRN      dextrose, 100 mL/hr, PRN      morphine, 2 mg, Q4H PRN            Pertinent New Labs & Imaging Studies     CBC with Differential:    Lab Results   Component Value Date    WBC 9.7 02/21/2021    RBC 3.06 02/21/2021    HGB 9.3 02/21/2021    HCT 33.5 02/21/2021     02/21/2021    .5 02/21/2021    MCH 30.4 02/21/2021    MCHC 27.8 02/21/2021    RDW 13.2 02/21/2021    NRBC 1 02/21/2021    SEGSPCT 61.0 02/21/2021    BANDSPCT 4 02/21/2021    LYMPHOPCT 24.0 02/21/2021    MONOPCT 4.0 02/21/2021    MYELOPCT 1 09/02/2019    BASOPCT 0.3 02/20/2021    MONOSABS 0.4 02/21/2021    LYMPHSABS 2.3 02/21/2021    EOSABS 0.7 02/21/2021    BASOSABS 0.0 02/20/2021    DIFFTYPE MANUAL DIFFERENTIAL 02/21/2021     CMP:    Lab Results   Component Value Date     02/18/2021    K 4.1 02/18/2021     02/18/2021    CO2 25 02/18/2021    BUN 19 02/18/2021    CREATININE 1.3 02/18/2021    GFRAA >60 02/18/2021    LABGLOM 53 02/18/2021 GLUCOSE 235 02/18/2021    PROT 6.0 02/18/2021    PROT 8.0 08/30/2012    LABALBU 3.9 02/18/2021    CALCIUM 9.1 02/18/2021    BILITOT 0.9 02/18/2021    ALKPHOS 61 02/18/2021    AST 21 02/18/2021    ALT 12 02/18/2021     Xr Knee Left (1-2 Views)    Result Date: 2/17/2021  EXAMINATION: ONE XRAY VIEW OF THE PELVIS AND TWO XRAY VIEWS LEFT HIP; TWO XRAY VIEWS OF THE LEFT KNEE 2/17/2021 2:12 pm COMPARISON: CT abdomen/pelvis 11/30/2020 HISTORY: ORDERING SYSTEM PROVIDED HISTORY: trauma TECHNOLOGIST PROVIDED HISTORY: Reason for exam:->trauma Reason for Exam: left hip fracture Acuity: Acute Type of Exam: Initial Mechanism of Injury: fall Relevant Medical/Surgical History: diabetes, cad, copd ; ORDERING SYSTEM PROVIDED HISTORY: trauma TECHNOLOGIST PROVIDED HISTORY: Reason for exam:->trauma Reason for Exam: left leg pain Acuity: Acute Type of Exam: Initial Mechanism of Injury: fall Relevant Medical/Surgical History: diabetes FINDINGS: A single frontal view of the pelvis as well as frontal and cross-table lateral views of the left hip were performed. There is an acute comminuted mildly displaced intertrochanteric fracture of the proximal left femur. No additional fracture is identified. The left femoral head is well seated at the left acetabulum, without evidence of dislocation. There is mild left hip osteoarthritis. There is an intact right hip arthroplasty, without evidence of orthopedic hardware complication. The pelvic ring is intact. The sacrum and SI joints are unremarkable. There is diffuse osteopenia. Enthesopathic changes are evident. Atherosclerotic calcifications are noted. Frontal and cross-table lateral views of the left knee were performed. The patient is status post below-knee amputation. There is no acute fracture or dislocation. There is diffuse osteopenia. A joint effusion is not identified. There is mild tricompartmental osteoarthritis.   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 Atherosclerotic calcifications are evident. 1. Acute comminuted mildly displaced intertrochanteric fracture of the proximal left femur. 2. Patient status post left below-knee amputation, without acute osseous abnormality at the left knee. Ct Head Wo Contrast    Result Date: 2/17/2021  EXAMINATION: CT OF THE HEAD WITHOUT CONTRAST  2/17/2021 11:14 am TECHNIQUE: CT of the head was performed without the administration of intravenous contrast. Dose modulation, iterative reconstruction, and/or weight based adjustment of the mA/kV was utilized to reduce the radiation dose to as low as reasonably achievable. COMPARISON: Brain MRI, 12/01/2020 HISTORY: ORDERING SYSTEM PROVIDED HISTORY: trauma TECHNOLOGIST PROVIDED HISTORY: Has a \"code stroke\" or \"stroke alert\" been called? 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 No other masses or hemorrhages are seen within the brain parenchyma. No acute loss of the gray-white matter differentiation is identified to suggest acute or subacute infarct. No midline shift. Periventricular low-attenuation is identified, moderate degree, most compatible with chronic small vessel ischemic disease. Mild diffuse atrophy noted. Intracranial vasculature is unremarkable for age. ORBITS: The visualized portion of the orbits demonstrate no acute abnormality. SINUSES: Postsurgical changes noted from previous partial ethmoidectomy and antral window placement on the right. Paranasal sinuses are otherwise unremarkable. The mastoid air cells are clear. SOFT TISSUES/SKULL:  No acute abnormality of the visualized skull or soft tissues. No acute intracranial abnormality. 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      Ct Cervical Spine Wo Contrast    Result Date: 2/17/2021  EXAMINATION: CT OF THE CERVICAL SPINE WITHOUT CONTRAST 2/17/2021 2:15 pm TECHNIQUE: CT of the cervical spine was performed without the administration of intravenous contrast. Multiplanar reformatted images are provided for review. Dose modulation, iterative reconstruction, and/or weight based adjustment of the mA/kV was utilized to reduce the radiation dose to as low as reasonably achievable. COMPARISON: None. HISTORY: ORDERING SYSTEM PROVIDED HISTORY:  Trauma TECHNOLOGIST PROVIDED HISTORY: Reason for exam:  Trauma Decision Support Exception:  Emergency Medical Condition (MA) Reason for Exam: trauma Acuity: Acute Type of Exam: Initial Mechanism of Injury: Fall FINDINGS: BONES/ALIGNMENT: There is exaggeration of the normal cervical lordosis. A fracture is not identified. DEGENERATIVE CHANGES: There is mild multilevel degenerative disc disease. There is moderate multilevel facet degenerative change. SOFT TISSUES: There is no apical pneumothorax. The prevertebral soft tissues are within normal limits. Atherosclerosis is noted. No acute abnormality of the cervical spine. Xr Chest Portable    Result Date: 2/17/2021  EXAMINATION: ONE XRAY VIEW OF THE CHEST 2/17/2021 2:12 pm COMPARISON: None. HISTORY: ORDERING SYSTEM PROVIDED HISTORY: trauma TECHNOLOGIST PROVIDED HISTORY: Reason for exam:->trauma Reason for Exam: left hip fx Acuity: Acute Type of Exam: Initial Mechanism of Injury: fall Relevant Medical/Surgical History: na FINDINGS: The lungs are clear. The mediastinum and cardiac silhouette are unremarkable. There is no evidence of pneumothorax. No displaced rib fractures are identified. No acute abnormality.      Xr Hip 2-3 Vw W Pelvis Left    Result Date: 2/17/2021  EXAMINATION: ONE XRAY VIEW OF THE PELVIS AND TWO XRAY VIEWS LEFT HIP; TWO XRAY VIEWS OF THE LEFT KNEE 2/17/2021 2:12 pm COMPARISON: CT abdomen/pelvis 11/30/2020 HISTORY: ORDERING SYSTEM PROVIDED HISTORY: trauma TECHNOLOGIST PROVIDED HISTORY: Reason for exam:->trauma Reason for Exam: left hip fracture Acuity: Acute Type of Exam: Initial Mechanism of Injury: fall Relevant Medical/Surgical History: diabetes, cad, copd ; ORDERING SYSTEM PROVIDED HISTORY: trauma TECHNOLOGIST PROVIDED HISTORY: Reason for exam:->trauma Reason for Exam: left leg pain Acuity: Acute Type of Exam: Initial Mechanism of Injury: fall Relevant Medical/Surgical History: diabetes FINDINGS: A single frontal view of the pelvis as well as frontal and cross-table lateral views of the left hip were performed. There is an acute comminuted mildly displaced intertrochanteric fracture of the proximal left femur. No additional fracture is identified. The left femoral head is well seated at the left acetabulum, without evidence of dislocation. There is mild left hip osteoarthritis. There is an intact right hip arthroplasty, without evidence of orthopedic hardware complication. The pelvic ring is intact. The sacrum and SI joints are unremarkable. There is diffuse osteopenia. Enthesopathic changes are evident. Atherosclerotic calcifications are noted. Frontal and cross-table lateral views of the left knee were performed. The patient is status post below-knee amputation. There is no acute fracture or dislocation. There is diffuse osteopenia. A joint effusion is not identified. There is mild tricompartmental osteoarthritis. 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 Atherosclerotic calcifications are evident. 1. Acute comminuted mildly displaced intertrochanteric fracture of the proximal left femur. 2. Patient status post left below-knee amputation, without acute osseous abnormality at the left knee.        Assessment and Plan:   Abner Porter is a 79 y.o.  male  who presents with Left femur fracture     Closed left hip fracture secondary to mechanical fall  S/p closed reduction and intramedullary nail fixation  x-ray of left hip showed acute comminuted mildly displaced intertrochanteric fracture of the left proximal femur  adequate pain control   orthopedic recommendations appreciated  PT/OT following    Acute hypoxemic respiratory failure  Likely aspiration PNA  Increased O2 demands with anemia  Chest X ray with right lower lobe infiltrate, Pneumonia   Continue Levaquin  Wean off O2 as tolerated    Acute Hospital Acquired delirium  Safety precautions for fall  Ambulation  Tele sitter    Acute post op anemia  Hb improved appropriately after blood transfusion  Ordered 1 unit blood transfusion as there is increased O2 demands    Type 2 diabetes  continue sliding-scale insulin  hypoglycemia precautions    Essential hypertension  continue home medications    CAD  Resumed DAPT  Cardiology following, deemed moderate cardiac risk for procedure    COPD-Stable  Continue bronchodilators     Hx of bilateral BKA for PAD    PT/OT recommendations noted  Diet DIET GENERAL;  Dietary Nutrition Supplements: Standard High Calorie Oral Supplement   DVT Prophylaxis [x] Lovenox, []  Heparin, [] SCDs, [] Ambulation   GI Prophylaxis [] PPI,  [x] H2 Blocker,  [] Carafate,  [] Diet/Tube Feeds   Code Status Full Code   Disposition Placement pending   MDM [] Low, [x] Moderate,[]  High     Electronically signed by Kj Graves MD on 2/21/2021 at 10:37 AM

## 2021-02-22 LAB
BASOPHILS ABSOLUTE: 0.1 K/CU MM
BASOPHILS RELATIVE PERCENT: 0.6 % (ref 0–1)
DIFFERENTIAL TYPE: ABNORMAL
EOSINOPHILS ABSOLUTE: 0.5 K/CU MM
EOSINOPHILS RELATIVE PERCENT: 4.7 % (ref 0–3)
GLUCOSE BLD-MCNC: 135 MG/DL (ref 70–99)
GLUCOSE BLD-MCNC: 159 MG/DL (ref 70–99)
GLUCOSE BLD-MCNC: 201 MG/DL (ref 70–99)
GLUCOSE BLD-MCNC: 226 MG/DL (ref 70–99)
GLUCOSE BLD-MCNC: 231 MG/DL (ref 70–99)
HCT VFR BLD CALC: 29.5 % (ref 42–52)
HEMOGLOBIN: 9.7 GM/DL (ref 13.5–18)
IMMATURE NEUTROPHIL %: 1.3 % (ref 0–0.43)
LYMPHOCYTES ABSOLUTE: 1.4 K/CU MM
LYMPHOCYTES RELATIVE PERCENT: 14 % (ref 24–44)
MCH RBC QN AUTO: 30.5 PG (ref 27–31)
MCHC RBC AUTO-ENTMCNC: 32.9 % (ref 32–36)
MCV RBC AUTO: 92.8 FL (ref 78–100)
MONOCYTES ABSOLUTE: 1.2 K/CU MM
MONOCYTES RELATIVE PERCENT: 12.4 % (ref 0–4)
NUCLEATED RBC %: 0 %
PDW BLD-RTO: 13.1 % (ref 11.7–14.9)
PLATELET # BLD: 207 K/CU MM (ref 140–440)
PMV BLD AUTO: 9.5 FL (ref 7.5–11.1)
RBC # BLD: 3.18 M/CU MM (ref 4.6–6.2)
SEGMENTED NEUTROPHILS ABSOLUTE COUNT: 6.6 K/CU MM
SEGMENTED NEUTROPHILS RELATIVE PERCENT: 67 % (ref 36–66)
TOTAL IMMATURE NEUTOROPHIL: 0.13 K/CU MM
TOTAL NUCLEATED RBC: 0 K/CU MM
WBC # BLD: 9.8 K/CU MM (ref 4–10.5)

## 2021-02-22 PROCEDURE — 2580000003 HC RX 258: Performed by: ORTHOPAEDIC SURGERY

## 2021-02-22 PROCEDURE — 36415 COLL VENOUS BLD VENIPUNCTURE: CPT

## 2021-02-22 PROCEDURE — 94761 N-INVAS EAR/PLS OXIMETRY MLT: CPT

## 2021-02-22 PROCEDURE — 6370000000 HC RX 637 (ALT 250 FOR IP): Performed by: ORTHOPAEDIC SURGERY

## 2021-02-22 PROCEDURE — 97530 THERAPEUTIC ACTIVITIES: CPT

## 2021-02-22 PROCEDURE — 6370000000 HC RX 637 (ALT 250 FOR IP): Performed by: INTERNAL MEDICINE

## 2021-02-22 PROCEDURE — 85025 COMPLETE CBC W/AUTO DIFF WBC: CPT

## 2021-02-22 PROCEDURE — 97116 GAIT TRAINING THERAPY: CPT

## 2021-02-22 PROCEDURE — 97110 THERAPEUTIC EXERCISES: CPT

## 2021-02-22 PROCEDURE — 97535 SELF CARE MNGMENT TRAINING: CPT

## 2021-02-22 PROCEDURE — 1200000000 HC SEMI PRIVATE

## 2021-02-22 PROCEDURE — 2700000000 HC OXYGEN THERAPY PER DAY

## 2021-02-22 PROCEDURE — 6360000002 HC RX W HCPCS: Performed by: ORTHOPAEDIC SURGERY

## 2021-02-22 PROCEDURE — 94150 VITAL CAPACITY TEST: CPT

## 2021-02-22 PROCEDURE — 82962 GLUCOSE BLOOD TEST: CPT

## 2021-02-22 RX ADMIN — FAMOTIDINE 20 MG: 20 TABLET ORAL at 08:39

## 2021-02-22 RX ADMIN — OXYCODONE HYDROCHLORIDE 5 MG: 10 TABLET ORAL at 15:41

## 2021-02-22 RX ADMIN — GABAPENTIN 100 MG: 100 CAPSULE ORAL at 21:25

## 2021-02-22 RX ADMIN — DOCUSATE SODIUM 50 MG AND SENNOSIDES 8.6 MG 1 TABLET: 8.6; 5 TABLET, FILM COATED ORAL at 21:25

## 2021-02-22 RX ADMIN — DILTIAZEM HYDROCHLORIDE 60 MG: 60 TABLET, FILM COATED ORAL at 21:25

## 2021-02-22 RX ADMIN — OXYCODONE HYDROCHLORIDE 5 MG: 10 TABLET ORAL at 10:04

## 2021-02-22 RX ADMIN — SODIUM CHLORIDE, PRESERVATIVE FREE 10 ML: 5 INJECTION INTRAVENOUS at 21:26

## 2021-02-22 RX ADMIN — SODIUM CHLORIDE, PRESERVATIVE FREE 10 ML: 5 INJECTION INTRAVENOUS at 11:55

## 2021-02-22 RX ADMIN — DOCUSATE SODIUM 50 MG AND SENNOSIDES 8.6 MG 1 TABLET: 8.6; 5 TABLET, FILM COATED ORAL at 09:11

## 2021-02-22 RX ADMIN — ENOXAPARIN SODIUM 40 MG: 40 INJECTION SUBCUTANEOUS at 08:38

## 2021-02-22 RX ADMIN — ASPIRIN 325 MG: 325 TABLET, COATED ORAL at 21:25

## 2021-02-22 RX ADMIN — ACETAMINOPHEN 650 MG: 325 TABLET ORAL at 12:24

## 2021-02-22 RX ADMIN — OXYCODONE HYDROCHLORIDE 10 MG: 10 TABLET ORAL at 23:50

## 2021-02-22 RX ADMIN — OXYCODONE HYDROCHLORIDE 5 MG: 10 TABLET ORAL at 05:12

## 2021-02-22 RX ADMIN — SODIUM CHLORIDE, PRESERVATIVE FREE 10 ML: 5 INJECTION INTRAVENOUS at 08:40

## 2021-02-22 RX ADMIN — DILTIAZEM HYDROCHLORIDE 60 MG: 60 TABLET, FILM COATED ORAL at 08:39

## 2021-02-22 RX ADMIN — ASPIRIN 325 MG: 325 TABLET, COATED ORAL at 08:39

## 2021-02-22 RX ADMIN — HYDRALAZINE HYDROCHLORIDE 10 MG: 20 INJECTION INTRAMUSCULAR; INTRAVENOUS at 04:20

## 2021-02-22 RX ADMIN — LATANOPROST 1 DROP: 50 SOLUTION/ DROPS OPHTHALMIC at 21:29

## 2021-02-22 RX ADMIN — CLOPIDOGREL BISULFATE 75 MG: 75 TABLET ORAL at 08:39

## 2021-02-22 RX ADMIN — ATORVASTATIN CALCIUM 40 MG: 40 TABLET, FILM COATED ORAL at 08:39

## 2021-02-22 ASSESSMENT — PAIN DESCRIPTION - ORIENTATION
ORIENTATION: LEFT
ORIENTATION: LEFT

## 2021-02-22 ASSESSMENT — PAIN DESCRIPTION - LOCATION
LOCATION: HIP
LOCATION: HIP

## 2021-02-22 ASSESSMENT — PAIN DESCRIPTION - PAIN TYPE
TYPE: SURGICAL PAIN
TYPE: SURGICAL PAIN

## 2021-02-22 ASSESSMENT — PAIN DESCRIPTION - FREQUENCY: FREQUENCY: INTERMITTENT

## 2021-02-22 ASSESSMENT — PAIN SCALES - GENERAL
PAINLEVEL_OUTOF10: 6
PAINLEVEL_OUTOF10: 0

## 2021-02-22 ASSESSMENT — PAIN DESCRIPTION - DESCRIPTORS: DESCRIPTORS: ACHING;SORE;STABBING

## 2021-02-22 NOTE — PROGRESS NOTES
Pt left positioned for comfort in chair with all lines intact, all needs within reach, and chair alarm on. Assessment / Impression:    Patient's tolerance of treatment: Well  Adverse Reaction: None  Significant change in status and impact: Improved bed mobility and OOB activity tolerance this date  Barriers to improvement: Mild cognitive deficits       Plan for Next Session:    Continue per OT POC. Continue to recommend inpatient rehab at discharge.       Time in: 959  Time out: 1026  Timed treatment minutes: 27  Total treatment time: 27      Electronically signed by:    Reynold Alcazar OTR/L, [address] [place] [phone]

## 2021-02-22 NOTE — PROGRESS NOTES
Physical Therapy    Physical Therapy Treatment Note  Name: Tomas Singer MRN: [id number] :   1940   Date:  2021   Admission Date: 2021 Room:  [address] [place] [address]   Restrictions/Precautions:        WBAT LLE, contact precautions, falls  Communication with other providers:  Nurse in room at start of tx and gave pain meds. Requested co-tx with OT due to pt having more difficult and pain in last tx. Subjective:  Patient states:  Pt pleasant and agreeable to tx  Pain:   Location, Type, Intensity (0/10 to 10/10): Pt reports left hip sore but did not rate. Objective:    Observation:  Pt alert and oriented to self and place and on 1.5 liters O2. Treatment, including education/measures:  Sup to sit sba with bed rail  Pt was able to sit EOB with sba to cga/min having some posterior LOB when working on donning socks. Pt was able to scoot to EOB and did appear to do better but still needing sba for safety while donning prothesis. Sit<=>stand cga to min assist. Pt did have one LOB with second stand. Pt was able to stand and pull up prothesis socks without LOB without UE support during first stand. Pt amb to 20' x 2 with rw sba/cga of 2  Pt left working with OT at end of tx session. Assessment / Impression:       Patient's tolerance of treatment:  good  Adverse Reaction: na  Significant change in status and impact:  na  Barriers to improvement:  strength and safety  Plan for Next Session:    Cont. POC  Time in: 1000  Time out:  1024  Timed treatment minutes:  24  Total treatment time:  24    Previously filed items:  Social/Functional History  Lives With: Spouse  [place]  Home Layout: One level  Home Access: Level entry  Bathroom Shower/Tub: Walk-in shower  Bathroom Equipment: Shower chair  Home Equipment: Rolling walker, [employer]  ADL Assistance: Independent  Ambulation Assistance: Independent(with RW)  Transfer Assistance: Independent  Additional Comments: Pt is a questionable historian.  Should confirm with family/facility  Short term goals  Time Frame for Short term goals: 1 week  Short term goal 1: Pt will perform sit><supine Tucker  Short term goal 2: Pt will transition sit><stand CGA from all surfaces  Short term goal 3: Pt will transfer between surfaces CGA  Short term goal 4: Pt will ambulate 75ft with RW CGA  Short term goal 5: Pt will perform standing light dynamic activity without UE support x 3 minutes CGA       Electronically signed by:    Cindy Brito PTA  2/22/2021, 8:27 AM

## 2021-02-22 NOTE — CARE COORDINATION
CM rec'd message from Javan Amador stating that [facility] is out of network is out of network with pt insurance. CM met w/ pt for second choice for SNF. Pt stated he is unsure where and CM should call his wife. CM called his wife, Bailey Hung, and discussed SNF choice with her. Bailey Hung chose [facility] as first and [facility] as second. Referral called to [facility], at [facility] at this time. Reina to update this CM once available.

## 2021-02-22 NOTE — PROGRESS NOTES
Hospitalist Progress Note      Name:  Joseph Wadsworth /Age/Sex: 1940  (79 y.o. male)   MRN & CSN:  [id number] & [id number] Admission Date/Time: 2021  1:42 PM   Location:  [address] [place] [address] PCP: Fredrick Lynne, [address] Day: 6    History of Present Illness:     Chief Complaint: Left femur fracture  Joseph Wadsworth is a 79 y.o.  male  who presents with Fall     patient seen and examined at bed side. He says pain with movement of left hip. Denies any chest pain or SOB. He appears to be confused. Per on tele sitter. Ten point ROS reviewed negative, unless as noted above    Objective: Intake/Output Summary (Last 24 hours) at 2021 1335  Last data filed at 2021 0800  Gross per 24 hour   Intake 170 ml   Output 1500 ml   Net -1330 ml      Vitals:   Vitals:    21 1203   BP: (!) 118/55   Pulse: 66   Resp: 14   Temp: 98.2 °F (36.8 °C)   SpO2: 94%     Physical Exam:   General Appearance: alert and oriented to person and self but not place, in no acute distress. Off and on confusion  Cardiovascular: normal rate, regular rhythm, normal S1 and S2  Pulmonary/Chest: clear to auscultation bilaterally  Abdomen: soft, non-tender, non-distended, normal bowel sounds, no masses   Extremities: Bilateral BKA  Skin: warm and dry, no rash or erythema  Head: normocephalic and atraumatic  Eyes: pupils equal, round, and reactive to light  Neck: supple and non-tender without mass, no thyromegaly   Musculoskeletal: Bilateral BKA with difficulty in moving left lower extremity  Neurological: alert, oriented to self but not to place, normal speech, no focal findings or movement disorder noted.  Confused    Medications:   Medications:    clopidogrel  75 mg Oral Daily    levoFLOXacin  750 mg Oral Every Other Day    enoxaparin  40 mg Subcutaneous Daily    sodium chloride flush  10 mL Intravenous 2 times per day    sennosides-docusate sodium  1 tablet Oral BID    aspirin  325 mg Oral BID    sodium chloride flush  10 mL Intravenous 2 times per day    insulin lispro  0-12 Units Subcutaneous TID     insulin lispro  0-6 Units Subcutaneous Nightly    atorvastatin  40 mg Oral Daily    dilTIAZem  60 mg Oral BID    famotidine  20 mg Oral Daily    gabapentin  100 mg Oral Nightly    latanoprost  1 drop Both Eyes Nightly      Infusions:    sodium chloride      dextrose       PRN Meds:     sodium chloride, , PRN      melatonin, 9 mg, Nightly PRN      hydrALAZINE (APRESOLINE) ivpb, 10 mg, Q6H PRN      sodium chloride flush, 10 mL, PRN      acetaminophen, 650 mg, Q4H PRN      oxyCODONE, 5 mg, Q4H PRN    Or      oxyCODONE, 10 mg, Q4H PRN      magnesium hydroxide, 30 mL, Daily PRN      sodium chloride flush, 10 mL, PRN      polyethylene glycol, 17 g, Daily PRN      acetaminophen, 650 mg, Q6H PRN      ondansetron, 4 mg, Q6H PRN      glucose, 15 g, PRN      dextrose, 12.5 g, PRN      glucagon (rDNA), 1 mg, PRN      dextrose, 100 mL/hr, PRN      morphine, 2 mg, Q4H PRN            Pertinent New Labs & Imaging Studies     CBC with Differential:    Lab Results   Component Value Date    WBC 9.8 02/22/2021    RBC 3.18 02/22/2021    HGB 9.7 02/22/2021    HCT 29.5 02/22/2021     02/22/2021    MCV 92.8 02/22/2021    MCH 30.5 02/22/2021    MCHC 32.9 02/22/2021    RDW 13.1 02/22/2021    NRBC 1 02/21/2021    SEGSPCT 67.0 02/22/2021    BANDSPCT 4 02/21/2021    LYMPHOPCT 14.0 02/22/2021    MONOPCT 12.4 02/22/2021    MYELOPCT 1 09/02/2019    BASOPCT 0.6 02/22/2021    MONOSABS 1.2 02/22/2021    LYMPHSABS 1.4 02/22/2021    EOSABS 0.5 02/22/2021    BASOSABS 0.1 02/22/2021    DIFFTYPE AUTOMATED DIFFERENTIAL 02/22/2021     CMP:    Lab Results   Component Value Date     02/18/2021    K 4.1 02/18/2021     02/18/2021    CO2 25 02/18/2021    BUN 19 02/18/2021    CREATININE 1.3 02/18/2021    GFRAA >60 02/18/2021    LABGLOM 53 02/18/2021    GLUCOSE 235 02/18/2021    PROT 6.0 02/18/2021    PROT 8.0 08/30/2012    LABALBU 3.9 02/18/2021    CALCIUM 9.1 02/18/2021    BILITOT 0.9 02/18/2021    ALKPHOS 61 02/18/2021    AST 21 02/18/2021    ALT 12 02/18/2021     Xr Knee Left (1-2 Views)    Result Date: 2/17/2021  EXAMINATION: ONE XRAY VIEW OF THE PELVIS AND TWO XRAY VIEWS LEFT HIP; TWO XRAY VIEWS OF THE LEFT KNEE 2/17/2021 2:12 pm COMPARISON: CT abdomen/pelvis 11/30/2020 HISTORY: ORDERING SYSTEM PROVIDED HISTORY: trauma TECHNOLOGIST PROVIDED HISTORY: Reason for exam:->trauma Reason for Exam: left hip fracture Acuity: Acute Type of Exam: Initial Mechanism of Injury: fall Relevant Medical/Surgical History: diabetes, cad, copd ; ORDERING SYSTEM PROVIDED HISTORY: trauma TECHNOLOGIST PROVIDED HISTORY: Reason for exam:->trauma Reason for Exam: left leg pain Acuity: Acute Type of Exam: Initial Mechanism of Injury: fall Relevant Medical/Surgical History: diabetes FINDINGS: A single frontal view of the pelvis as well as frontal and cross-table lateral views of the left hip were performed. There is an acute comminuted mildly displaced intertrochanteric fracture of the proximal left femur. No additional fracture is identified. The left femoral head is well seated at the left acetabulum, without evidence of dislocation. There is mild left hip osteoarthritis. There is an intact right hip arthroplasty, without evidence of orthopedic hardware complication. The pelvic ring is intact. The sacrum and SI joints are unremarkable. There is diffuse osteopenia. Enthesopathic changes are evident. Atherosclerotic calcifications are noted. Frontal and cross-table lateral views of the left knee were performed. The patient is status post below-knee amputation. There is no acute fracture or dislocation. There is diffuse osteopenia. A joint effusion is not identified. There is mild tricompartmental osteoarthritis. 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   Atherosclerotic CONTRAST 2/17/2021 2:15 pm TECHNIQUE: CT of the cervical spine was performed without the administration of intravenous contrast. Multiplanar reformatted images are provided for review. Dose modulation, iterative reconstruction, and/or weight based adjustment of the mA/kV was utilized to reduce the radiation dose to as low as reasonably achievable. COMPARISON: None. HISTORY: ORDERING SYSTEM PROVIDED HISTORY:  Trauma TECHNOLOGIST PROVIDED HISTORY: Reason for exam:  Trauma Decision Support Exception:  Emergency Medical Condition (MA) Reason for Exam: trauma Acuity: Acute Type of Exam: Initial Mechanism of Injury: Fall FINDINGS: BONES/ALIGNMENT: There is exaggeration of the normal cervical lordosis. A fracture is not identified. DEGENERATIVE CHANGES: There is mild multilevel degenerative disc disease. There is moderate multilevel facet degenerative change. SOFT TISSUES: There is no apical pneumothorax. The prevertebral soft tissues are within normal limits. Atherosclerosis is noted. No acute abnormality of the cervical spine. Xr Chest Portable    Result Date: 2/17/2021  EXAMINATION: ONE XRAY VIEW OF THE CHEST 2/17/2021 2:12 pm COMPARISON: None. HISTORY: ORDERING SYSTEM PROVIDED HISTORY: trauma TECHNOLOGIST PROVIDED HISTORY: Reason for exam:->trauma Reason for Exam: left hip fx Acuity: Acute Type of Exam: Initial Mechanism of Injury: fall Relevant Medical/Surgical History: na FINDINGS: The lungs are clear. The mediastinum and cardiac silhouette are unremarkable. There is no evidence of pneumothorax. No displaced rib fractures are identified. No acute abnormality.      Xr Hip 2-3 Vw W Pelvis Left    Result Date: 2/17/2021  EXAMINATION: ONE XRAY VIEW OF THE PELVIS AND TWO XRAY VIEWS LEFT HIP; TWO XRAY VIEWS OF THE LEFT KNEE 2/17/2021 2:12 pm COMPARISON: CT abdomen/pelvis 11/30/2020 HISTORY: ORDERING SYSTEM PROVIDED HISTORY: trauma TECHNOLOGIST PROVIDED HISTORY: Reason for exam:->trauma Reason for Exam: left hip fracture Acuity: Acute Type of Exam: Initial Mechanism of Injury: fall Relevant Medical/Surgical History: diabetes, cad, copd ; ORDERING SYSTEM PROVIDED HISTORY: trauma TECHNOLOGIST PROVIDED HISTORY: Reason for exam:->trauma Reason for Exam: left leg pain Acuity: Acute Type of Exam: Initial Mechanism of Injury: fall Relevant Medical/Surgical History: diabetes FINDINGS: A single frontal view of the pelvis as well as frontal and cross-table lateral views of the left hip were performed. There is an acute comminuted mildly displaced intertrochanteric fracture of the proximal left femur. No additional fracture is identified. The left femoral head is well seated at the left acetabulum, without evidence of dislocation. There is mild left hip osteoarthritis. There is an intact right hip arthroplasty, without evidence of orthopedic hardware complication. The pelvic ring is intact. The sacrum and SI joints are unremarkable. There is diffuse osteopenia. Enthesopathic changes are evident. Atherosclerotic calcifications are noted. Frontal and cross-table lateral views of the left knee were performed. The patient is status post below-knee amputation. There is no acute fracture or dislocation. There is diffuse osteopenia. A joint effusion is not identified. There is mild tricompartmental osteoarthritis. 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 Atherosclerotic calcifications are evident. 1. Acute comminuted mildly displaced intertrochanteric fracture of the proximal left femur. 2. Patient status post left below-knee amputation, without acute osseous abnormality at the left knee.        Assessment and Plan:   Mihai Ortiz is a 79 y.o.  male  who presents with Left femur fracture     Closed left hip fracture secondary to mechanical fall  S/p closed reduction and intramedullary nail fixation  x-ray of left hip showed acute comminuted mildly displaced intertrochanteric fracture of the left proximal femur  adequate pain control   orthopedic recommendations appreciated  PT/OT following    Acute hypoxemic respiratory failure  Likely aspiration PNA  Increased O2 demands with anemia  Chest X ray with right lower lobe infiltrate, Pneumonia   Continue Levaquin  Wean off O2 as tolerated    Acute Hospital Acquired delirium  Still slightly confused but improved  Safety precautions for fall  Ambulation  Tele sitter    Acute post op anemia  Hb improved appropriately after blood transfusion  Given 1 unit blood transfusion as there is increased O2 demands    Type 2 diabetes  continue sliding-scale insulin  hypoglycemia precautions    Essential hypertension  continue home medications    CAD  Resumed DAPT  Cardiology following, deemed moderate cardiac risk for procedure    COPD-Stable  Continue bronchodilators     Hx of bilateral BKA for PAD    PT/OT recommendations noted  Diet DIET GENERAL;  Dietary Nutrition Supplements: Standard High Calorie Oral Supplement   DVT Prophylaxis [x] Lovenox, []  Heparin, [] SCDs, [] Ambulation   GI Prophylaxis [] PPI,  [x] H2 Blocker,  [] Carafate,  [] Diet/Tube Feeds   Code Status Full Code   Disposition Placement pending   MDM [] Low, [x] Moderate,[]  High     Electronically signed by Leoncio Marquez MD on 2/22/2021 at 1:35 PM

## 2021-02-23 VITALS
TEMPERATURE: 98.5 F | BODY MASS INDEX: 22.04 KG/M2 | SYSTOLIC BLOOD PRESSURE: 132 MMHG | OXYGEN SATURATION: 92 % | HEART RATE: 76 BPM | WEIGHT: 140.43 LBS | DIASTOLIC BLOOD PRESSURE: 62 MMHG | HEIGHT: 67 IN | RESPIRATION RATE: 16 BRPM

## 2021-02-23 LAB
ABO/RH: NORMAL
ANTIBODY SCREEN: NEGATIVE
BASOPHILS ABSOLUTE: 0.1 K/CU MM
BASOPHILS RELATIVE PERCENT: 0.6 % (ref 0–1)
COMPONENT: NORMAL
COMPONENT: NORMAL
CROSSMATCH RESULT: NORMAL
CROSSMATCH RESULT: NORMAL
DIFFERENTIAL TYPE: ABNORMAL
EOSINOPHILS ABSOLUTE: 1.4 K/CU MM
EOSINOPHILS RELATIVE PERCENT: 13.1 % (ref 0–3)
GLUCOSE BLD-MCNC: 138 MG/DL (ref 70–99)
GLUCOSE BLD-MCNC: 195 MG/DL (ref 70–99)
GLUCOSE BLD-MCNC: 252 MG/DL (ref 70–99)
GLUCOSE BLD-MCNC: 283 MG/DL (ref 70–99)
HCT VFR BLD CALC: 27 % (ref 42–52)
HEMOGLOBIN: 8.7 GM/DL (ref 13.5–18)
IMMATURE NEUTROPHIL %: 2.3 % (ref 0–0.43)
LYMPHOCYTES ABSOLUTE: 1.7 K/CU MM
LYMPHOCYTES RELATIVE PERCENT: 15.6 % (ref 24–44)
MCH RBC QN AUTO: 30.4 PG (ref 27–31)
MCHC RBC AUTO-ENTMCNC: 32.2 % (ref 32–36)
MCV RBC AUTO: 94.4 FL (ref 78–100)
MONOCYTES ABSOLUTE: 1.4 K/CU MM
MONOCYTES RELATIVE PERCENT: 13.2 % (ref 0–4)
NUCLEATED RBC %: 0 %
PDW BLD-RTO: 13.3 % (ref 11.7–14.9)
PLATELET # BLD: 215 K/CU MM (ref 140–440)
PMV BLD AUTO: 9.3 FL (ref 7.5–11.1)
RBC # BLD: 2.86 M/CU MM (ref 4.6–6.2)
SARS-COV-2, NAAT: NOT DETECTED
SEGMENTED NEUTROPHILS ABSOLUTE COUNT: 5.9 K/CU MM
SEGMENTED NEUTROPHILS RELATIVE PERCENT: 55.2 % (ref 36–66)
STATUS: NORMAL
STATUS: NORMAL
TOTAL IMMATURE NEUTOROPHIL: 0.25 K/CU MM
TOTAL NUCLEATED RBC: 0 K/CU MM
TRANSFUSION STATUS: NORMAL
TRANSFUSION STATUS: NORMAL
UNIT DIVISION: 0
UNIT DIVISION: 0
UNIT NUMBER: NORMAL
UNIT NUMBER: NORMAL
WBC # BLD: 10.8 K/CU MM (ref 4–10.5)

## 2021-02-23 PROCEDURE — 36415 COLL VENOUS BLD VENIPUNCTURE: CPT

## 2021-02-23 PROCEDURE — 2580000003 HC RX 258: Performed by: ORTHOPAEDIC SURGERY

## 2021-02-23 PROCEDURE — 94761 N-INVAS EAR/PLS OXIMETRY MLT: CPT

## 2021-02-23 PROCEDURE — 82962 GLUCOSE BLOOD TEST: CPT

## 2021-02-23 PROCEDURE — 6370000000 HC RX 637 (ALT 250 FOR IP): Performed by: ORTHOPAEDIC SURGERY

## 2021-02-23 PROCEDURE — 97116 GAIT TRAINING THERAPY: CPT

## 2021-02-23 PROCEDURE — 6360000002 HC RX W HCPCS: Performed by: ORTHOPAEDIC SURGERY

## 2021-02-23 PROCEDURE — 97530 THERAPEUTIC ACTIVITIES: CPT

## 2021-02-23 PROCEDURE — 6370000000 HC RX 637 (ALT 250 FOR IP): Performed by: INTERNAL MEDICINE

## 2021-02-23 PROCEDURE — 85025 COMPLETE CBC W/AUTO DIFF WBC: CPT

## 2021-02-23 PROCEDURE — 87635 SARS-COV-2 COVID-19 AMP PRB: CPT

## 2021-02-23 RX ORDER — LEVOFLOXACIN 750 MG/1
750 TABLET ORAL EVERY OTHER DAY
Qty: 5 TABLET | Refills: 0 | Status: SHIPPED | OUTPATIENT
Start: 2021-02-25 | End: 2021-03-07

## 2021-02-23 RX ORDER — RIVAROXABAN 10 MG/1
10 TABLET, FILM COATED ORAL
Qty: 15 TABLET | Refills: 0 | Status: ON HOLD | OUTPATIENT
Start: 2021-02-23 | End: 2021-04-14 | Stop reason: HOSPADM

## 2021-02-23 RX ORDER — OXYCODONE HYDROCHLORIDE 5 MG/1
5 TABLET ORAL EVERY 4 HOURS PRN
Qty: 15 TABLET | Refills: 0 | Status: SHIPPED | OUTPATIENT
Start: 2021-02-23 | End: 2021-02-28

## 2021-02-23 RX ADMIN — SODIUM CHLORIDE, PRESERVATIVE FREE 10 ML: 5 INJECTION INTRAVENOUS at 11:00

## 2021-02-23 RX ADMIN — DOCUSATE SODIUM 50 MG AND SENNOSIDES 8.6 MG 1 TABLET: 8.6; 5 TABLET, FILM COATED ORAL at 10:53

## 2021-02-23 RX ADMIN — SODIUM CHLORIDE, PRESERVATIVE FREE 10 ML: 5 INJECTION INTRAVENOUS at 11:01

## 2021-02-23 RX ADMIN — CLOPIDOGREL BISULFATE 75 MG: 75 TABLET ORAL at 10:54

## 2021-02-23 RX ADMIN — ASPIRIN 325 MG: 325 TABLET, COATED ORAL at 10:53

## 2021-02-23 RX ADMIN — LEVOFLOXACIN 750 MG: 500 TABLET, FILM COATED ORAL at 10:54

## 2021-02-23 RX ADMIN — ATORVASTATIN CALCIUM 40 MG: 40 TABLET, FILM COATED ORAL at 10:54

## 2021-02-23 RX ADMIN — OXYCODONE HYDROCHLORIDE 5 MG: 10 TABLET ORAL at 10:54

## 2021-02-23 RX ADMIN — DILTIAZEM HYDROCHLORIDE 60 MG: 60 TABLET, FILM COATED ORAL at 10:54

## 2021-02-23 RX ADMIN — ENOXAPARIN SODIUM 40 MG: 40 INJECTION SUBCUTANEOUS at 10:55

## 2021-02-23 RX ADMIN — FAMOTIDINE 20 MG: 20 TABLET ORAL at 10:54

## 2021-02-23 ASSESSMENT — PAIN SCALES - GENERAL: PAINLEVEL_OUTOF10: 9

## 2021-02-23 ASSESSMENT — PAIN DESCRIPTION - PROGRESSION
CLINICAL_PROGRESSION: NOT CHANGED
CLINICAL_PROGRESSION: NOT CHANGED

## 2021-02-23 NOTE — DISCHARGE INSTR - COC
Continuity of Care Form    Patient Name: Tyrone Allison   :  1940  MRN:  [id number]    Admit date:  2021  Discharge date:  2021    Code Status Order: Full Code   Advance Directives:   Advance Care Flowsheet Documentation       Date/Time Healthcare Directive Type of Healthcare Directive Copy in [address] Agent's Name Healthcare Agent's Phone Number    21 1015  No, patient does not have an advance directive for healthcare treatment -- -- -- -- --            Admitting Physician:  No admitting provider for patient encounter.   PCP: Maria C Vinson MD    Discharging Nurse: Lisa Paula. Unit/Room#: 1104/1104-A  Discharging Unit Phone Number: [phone]    Emergency Contact:   Extended Emergency Contact Information  Primary Emergency Contact: Penelope Elkins  Address: [address] [place] [address] Phone: [phone]  Relation: Spouse  Secondary Emergency Contact: Greg Chino [facility] Phone: [phone]  Mobile Phone: [phone]  Relation: Child    Past Surgical History:  Past Surgical History:   Procedure Laterality Date    ABDOMEN SURGERY      stones in bile duct removed x3    CARDIAC SURGERY      CABG x 2 in 2004 at [facility] - Dr. Beasley Head, ESOPHAGUS      ERCP  14    w/ dilation of papilla    ERCP  14    ERCP  10/24/15    extractions stones, balloon dilatation     ERCP  2017    stone extraction     ERCP N/A 2019    ERCP STONE REMOVAL/ DILATATION OF PAPILLA WITH 10-12MM AND 12-15MM BALLOON AND 9-12MM, 12-15MM  EXTRACTOR BALLOON USED FOR REMOVAL OF MULTIPLE  CBD STONES performed by Amanda Norton MD at [place] [address] ERCP N/A 2019    ERCP STONE REMOVAL, DILATATION OF PAPILLA WITH 10-12MM BALLOON, AND EXTRACTOR BALLOON 12-15MM USED FOR REMOVAL OF CBD [address] [facility] performed by Emma Ash MD at [facility] ERCP N/A 3/31/2020    ERCP DILATION BALLOON to 12  AND SWEEP OF CBD STONE AND SLUDGE performed by Emma Ash MD at [address] Left 2/18/2021    LEFT FEMUR IM NAIL VIANEY INSERTION performed by Estephanie Saunders DO at [address]      JOINT REPLACEMENT      LEG AMPUTATION BELOW KNEE Bilateral     2005 at [address] [facility]  03/13/14    sphincterotomy       Immunization History:   Immunization History   Administered Date(s) Administered    Influenza Virus Vaccine 09/11/2013, 09/13/2014    Pneumococcal Conjugate 7-valent (Josephus Mac) 02/22/2012       Active Problems:  Patient Active Problem List   Diagnosis Code    Uncontrolled type II diabetes with peripheral autonomic neuropathy ([facility]) E11.43, E11.65    Hypertension, essential I10    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    Choledocholithiasis K80.50    Generalized abdominal pain R10.84    COPD (chronic obstructive pulmonary disease) ([facility]) J44.9    Abdominal pain, epigastric R10.13    Elevated LFTs R79.89    Abnormal findings on imaging of biliary tract R93.2    Chest pain R07.9    Coronary artery disease involving coronary bypass graft of native heart without angina pectoris I25.810    Acute encephalopathy G93.40    Vomiting R11.10    Sepsis ([facility]) A41.9    Nausea vomiting and diarrhea R11.2, R19.7    Fever S70.8    Metabolic encephalopathy Q53.68    Impaired cognition R41.89    Generalized weakness R53.1    Cholangitis due to bile duct calculus with obstruction K80.31    Oropharyngeal dysphagia R13.12    S/P BKA (below knee amputation) bilateral ([facility]) Z89.512, Z89.511    Abdominal pain R10.9    Inguinal pain R10.30    Closed fracture of left hip ([facility]) S72.002A       Isolation/Infection:   Isolation            No Isolation          Patient Infection Status       Infection Onset Added Last Indicated Last Indicated By Review Planned Expiration Resolved Resolved By    None active    Resolved    C-diff Rule Out 20 C difficile Molecular/PCR (Ordered)   21 Theron Doan LPN    [id number] 20 COVID-19   12/15/20     MRSA 19 Wound culture   20 Theron Doan LPN            Nurse Assessment:  Last Vital Signs: BP (!) 151/69   Pulse 81   Temp 97.8 °F (36.6 °C) (Oral)   Resp (!) 165   Ht 5' 7\" (1.702 m)   Wt 140 lb 6.9 oz (63.7 kg)   SpO2 93%   BMI 21.99 kg/m²     Last documented pain score (0-10 scale): Pain Level: 9  Last Weight:   Wt Readings from Last 1 Encounters:   21 140 lb 6.9 oz (63.7 kg)     Mental Status:  oriented and alert    IV Access:  - None    Nursing Mobility/ADLs:  Walking   Dependent  Transfer  Dependent  Bathing  Dependent  Dressing  Dependent  Toileting  Dependent  Feeding  Independent  Med Admin  Dependent  Med Delivery   whole    Wound Care Documentation and Therapy:  Wound 20 Arm Proximal;Right;Dorsal skin tare (Active)   Number of days: 326       Wound 21 Elbow Anterior; Left scab (Active)   Dressing Status Clean;Dry; Intact 21 1015   Wound Cleansed Cleansed with saline 21 1043   Dressing/Treatment Foam 21 0815   Drainage Amount None 21 1015   Odor None 21 1015   Margins Defined edges; Attached edges 21 1015   Number of days: 5        Elimination:  Continence:   · Bowel: Yes  · Bladder: No  Urinary Catheter: Insertion Date: 2021   Colostomy/Ileostomy/Ileal Conduit: No       Date of Last BM: 2021    Intake/Output Summary (Last 24 hours) at 2021 1228  Last data filed at 2021 3912  Gross per 24 hour   Intake 537 ml   Output 725 ml   Net -188 ml     I/O last 3 completed shifts:   In: 26 [P.O.:657]  Out: 850 [Urine:850]    Safety Concerns:     History of Falls (last 30 days) and At Risk for Falls    Impairments/Disabilities:          Nutrition Therapy:  Current Nutrition Therapy:   - Oral Diet:  Carb Control 3 carbs/meal (1500kcals/day)    Routes of Feeding: Oral  Liquids: Thin Liquids  Daily Fluid Restriction: no  Last Modified Barium Swallow with Video (Video Swallowing Test): not done    Treatments at the Time of Hospital Discharge:   Respiratory Treatments: ***  Oxygen Therapy:  is on oxygen at 2 L/min per nasal cannula. Ventilator:    - No ventilator support    Rehab Therapies: Physical Therapy, Occupational Therapy and Orthotics/Prosthetics  Weight Bearing Status/Restrictions: No weight bearing restirctions  Other Medical Equipment (for information only, NOT a DME order):  walker  Other Treatments: ***    Patient's personal belongings (please select all that are sent with patient):  Glasses, Dentures upper and lower    RN SIGNATURE:  Electronically signed by Neil [place] on 2/23/21 at 6:48 PM EST    CASE MANAGEMENT/SOCIAL WORK SECTION    Inpatient Status Date: ***    Readmission Risk Assessment Score:  Readmission Risk              Risk of Unplanned Readmission:        23           Discharging to Facility/ Agency   · Name:   · Address:  · Phone:  · Fax:    Dialysis Facility (if applicable)   · Name:  · Address:  · Dialysis Schedule:  · Phone:  · Fax:    / signature: {Esignature:[id number]:::0}    PHYSICIAN SECTION    Prognosis: Good    Condition at Discharge: Stable    Rehab Potential (if transferring to Rehab): Good    Recommended Labs or Other Treatments After Discharge: Follow up with Orthopedic surgery in 2 weeks - continue with Xarelto for 2 weeks     Physician Certification: I certify the above information and transfer of Abner Porter  is necessary for the continuing treatment of the diagnosis listed and that he requires [facility] for less 30 days.      Update Admission H&P: No change in H&P    PHYSICIAN SIGNATURE:  Electronically signed by Denzel Lemos MD on 2/23/21 at 12:29 PM EST

## 2021-02-23 NOTE — CARE COORDINATION
CM spoke with Lc Justice, [facility] admissions, and precert remains pending at this time. Reina to update this CM once available. 12:18 PM Rec'd update from Lc Justice that pt has been approved for admission once medically ready. Perfect Serve sent to Dr. Adrienne Simpson with update. Pt will need a repeat COVID test prior to discharge. 1:47 PM Transport set up w/ Tamera Schmid for 6:45pm. Updated primary RN, Abdelrahman Barth with time. CM called pt wife, Rebecca Zamorano, and updated her that pt will be discharging today to [facility]. Message left for Reina at [facility].     2:09 PM PASR completed. Discharge packet prepared.

## 2021-02-23 NOTE — PLAN OF CARE
Care Plan still ongoing
Problem: Falls - Risk of:  Goal: Will remain free from falls  Description: Will remain free from falls  Outcome: Ongoing  Goal: Absence of physical injury  Description: Absence of physical injury  Outcome: Ongoing     Problem: Skin Integrity:  Goal: Will show no infection signs and symptoms  Description: Will show no infection signs and symptoms  Outcome: Ongoing  Goal: Absence of new skin breakdown  Description: Absence of new skin breakdown  Outcome: Ongoing     Problem: Pain:  Goal: Pain level will decrease  Description: Pain level will decrease  Outcome: Ongoing  Goal: Control of acute pain  Description: Control of acute pain  Outcome: Ongoing  Goal: Control of chronic pain  Description: Control of chronic pain  Outcome: Ongoing     Problem: Safety:  Goal: Free from accidental physical injury  Description: Free from accidental physical injury  Outcome: Ongoing  Goal: Free from intentional harm  Description: Free from intentional harm  Outcome: Ongoing     Problem: Daily Care:  Goal: Daily care needs are met  Description: Daily care needs are met  Outcome: Ongoing     Problem: Daily Care:  Goal: Daily care needs are met  Description: Daily care needs are met  Outcome: Ongoing     Problem: Discharge Planning:  Goal: Patients continuum of care needs are met  Description: Patients continuum of care needs are met  Outcome: Ongoing
Problem: Falls - Risk of:  Goal: Will remain free from falls  Description: Will remain free from falls  Outcome: Ongoing  Goal: Absence of physical injury  Description: Absence of physical injury  Outcome: Ongoing     Problem: Skin Integrity:  Goal: Will show no infection signs and symptoms  Description: Will show no infection signs and symptoms  Outcome: Ongoing  Goal: Absence of new skin breakdown  Description: Absence of new skin breakdown  Outcome: Ongoing     Problem: Pain:  Goal: Pain level will decrease  Description: Pain level will decrease  Outcome: Ongoing  Goal: Control of acute pain  Description: Control of acute pain  Outcome: Ongoing  Goal: Control of chronic pain  Description: Control of chronic pain  Outcome: Ongoing     Problem: Safety:  Goal: Free from accidental physical injury  Description: Free from accidental physical injury  Outcome: Ongoing  Goal: Free from intentional harm  Description: Free from intentional harm  Outcome: Ongoing     Problem: Daily Care:  Goal: Daily care needs are met  Description: Daily care needs are met  Outcome: Ongoing     Problem: Discharge Planning:  Goal: Patients continuum of care needs are met  Description: Patients continuum of care needs are met  Outcome: Ongoing
JESSICA Hager  Outcome: Ongoing     Problem: Discharge Planning:  Goal: Patients continuum of care needs are met  Description: Patients continuum of care needs are met  2/19/2021 1627 by Teofilo Godinez  Outcome: Ongoing  2/19/2021 0308 by Glen Johnson RN  Outcome: Ongoing

## 2021-02-23 NOTE — DISCHARGE SUMMARY
[address] Hospitalist     Discharge Summary    Name:  Alvarez Persaud /Age/Sex: 1940  (79 y.o. male)   MRN & CSN:  [id number] & [id number] Admission Date/Time: 2021  1:42 PM   Attending:  Isidra James MD Discharging Physician: Isidra James MD     HPI:     Please, see admission HPI in [address] and patient's hospital course below    Hospital Course:   Alvarez Persaud is a 79 y.o.  male  who presents with Left femur fracture and the following assessments below, reflect the patient's hospital course      Closed left hip fracture secondary to mechanical fall  S/p closed reduction and intramedullary nail fixation 21    x-ray of left hip showed acute comminuted mildly displaced intertrochanteric fracture of the left proximal femur  adequate pain control   Prescribed with Xarelto 10 mg prophylactic dose for 2 weeks  orthopedic follow-up as an outpatient in 2 weeks  PT/OT at the [facility] nursing facility     Acute hypoxemic respiratory failure-resolving  Likely aspiration PNA    Increased O2 demands with anemia -resolving  Chest X ray with right lower lobe infiltrate, Pneumonia   Continue Levaquin -complete 10-day course     Acute Hospital Acquired delirium - improving     Still slightly confused but improved  Safety precautions for fall, Ambulation     Acute post op anemia    Hb improved appropriately after blood transfusion  Given 1 unit blood transfusion as there is increased O2 demands  Hb improved to 8.7 from 7.6      Type 2 diabetes - A1c 7.9 on 2020 - continue sliding-scale insulin, hypoglycemia precautions     Essential hypertension - continue home medications     CAD  Resumed DAPT  Cardiology following, deemed moderate cardiac risk for procedure     COPD-Stable  Continue bronchodilators      Hx of bilateral BKA for PAD    Patient is hemodynamically stable for DC to SNF    The patient expressed appropriate understanding of and agreement with the discharge recommendations, medications, and plan. Consults this admission:  IP CONSULT  [address] TO HOSPITALIST  IP CONSULT TO CARDIOLOGY  IP CONSULT TO SOCIAL WORK    Discharge Instruction:   Follow up appointments: orthopedic surgery   Primary care physician:  within 1 to 2 weeks    Diet:  diabetic diet   Activity: activity as tolerated  Disposition: Discharged to:   []Home, [][facility], [x]SNF, []Acute Rehab, []Hospice   Condition on discharge: Stable    Discharge Medications:      [facility] Medication Instructions [facility]:[id number]    Printed on:02/23/21 1249   Medication Information                      aspirin 81 MG chewable tablet  Take 81 mg by mouth daily              atorvastatin (LIPITOR) 40 MG tablet  Take 40 mg by mouth daily             carvedilol (COREG) 6.25 MG tablet  Take 1 tablet by mouth 2 times daily (with meals)             Cholecalciferol 50 MCG (2000 UT) TABS  Take one tablet daily by mouth             cholestyramine light 4 g packet  Take 1 packet by mouth 2 times daily as needed (diarrhea)             clopidogrel (PLAVIX) 75 MG tablet  Take 1 tablet by mouth daily             diltiazem (CARDIZEM) 60 MG tablet  Take 1 tablet by mouth 2 times daily             docusate sodium (COLACE) 100 MG capsule  Take 100 mg by mouth nightly as needed              famotidine (PEPCID) 20 MG tablet  Take 1 tablet by mouth daily             gabapentin (NEURONTIN) 100 MG capsule  Take 100 mg by mouth nightly.              insulin lispro (HUMALOG) 100 UNIT/ML injection vial  Inject 0-12 Units into the skin 3 times daily (with meals) **Medium Dose Corrective Algorithm**  Glucose: Dose:  If <139             No Insulin  140-199 2 Units  200-249 4 Units  250-299 6 Units  300-349 8 Units  350-400 10 Units  Above 400       12 Units             latanoprost (XALATAN) 0.005 % ophthalmic solution  Place 1 drop into both eyes nightly              levoFLOXacin (LEVAQUIN) 750 MG tablet  Take 1 tablet by mouth every other day for 10 days             Multiple Vitamins-Minerals (MULTIVITAMIN PO)  Take 1 tablet by mouth daily. ondansetron (ZOFRAN) 4 MG tablet  Take 4 mg by mouth 2 times daily as needed for Nausea or Vomiting             oxyCODONE (ROXICODONE) 5 MG immediate release tablet  Take 1 tablet by mouth every 4 hours as needed for Pain for up to 5 days. rivaroxaban (XARELTO) 10 MG TABS tablet  Take 1 tablet by mouth daily (with breakfast)             traMADol (ULTRAM) 50 MG tablet  Take 50 mg by mouth every 6 hours as needed. Subjective _ resting in bed with no distress -denies any nausea or vomiting, pain is well controlled    Objective Findings at Discharge:   BP (!) 151/69   Pulse 81   Temp 97.8 °F (36.6 °C) (Oral)   Resp (!) 165   Ht 5' 7\" (1.702 m)   Wt 140 lb 6.9 oz (63.7 kg)   SpO2 93%   BMI 21.99 kg/m²            PHYSICAL EXAM   GEN Awake male, resting in bed in no apparent distress. Appears given age. RESP Clear to auscultation, no wheezes, rales or rhonchi. CARDIO/VAS - S1/S2 auscultated. Regular rate without appreciable murmurs, rubs, or gallops. Peripheral pulses equal bilaterally and palpable. No peripheral edema. GI Abdomen is soft without significant tenderness, masses, or guarding. Bowel sounds are normoactive  MSK No gross joint deformities. Spontaneous movement of all extremities  SKIN Normal coloration, warm, dry. NEURO Cranial nerves appear grossly intact, normal speech, no lateralizing weakness.     BMP/CBC  Recent Labs     02/21/21  0555 02/22/21  0701 02/23/21  0926   WBC 9.7 9.8 10.8*   HCT 33.5* 29.5* 27.0*    207 215         Discharge Time of 35 minutes    Electronically signed by Guillaume Friedman MD on 2/23/2021 at 12:41 PM

## 2021-02-23 NOTE — PROGRESS NOTES
Physical Therapy    Physical Therapy Treatment Note  Name: Melva Johnson MRN: [id number] :   1940   Date:  2021   Admission Date: 2021 Room:  [address] [place] [address]   Restrictions/Precautions:        WBAT LLE, contact precautions, falls  Communication with other providers:  Per nurse ok to tx and gave pain meds during tx session. Subjective:  Patient states:  Pt agreeable to tx   Pain:   Location, Type, Intensity (0/10 to 10/10):  Pt rating pain 8 in LLE. Objective:    Observation:  Alert and oriented to self on 1 liter O2  Treatment, including education/measures:  Sup to sit sba using bed rail and bed flat. Pt was able to sit EOB to don socks and prostheses sba with no LOB today. Sit<=>stand from bed and chair sba/cga  amb with rw 60' cga of 1. Safety  Patient left safely in the chair, with call light/phone in reach with alarm applied. Gait belt and mask were used for transfers and gait. Assessment / Impression:       Patient's tolerance of treatment:  good  Adverse Reaction: na  Significant change in status and impact:  na  Barriers to improvement:  Pt c/o pain with gt. Plan for Next Session:    Cont. POC  Time in:  1040  Time out:  1110  Timed treatment minutes:  30  Total treatment time:  30    Previously filed items:  Social/Functional History  Lives With: Spouse  Type of Home: Facility([place]idential vs [place]?? )  Home Layout: One level  Home Access: Level entry  Bathroom Shower/Tub: Walk-in shower  Bathroom Equipment: Shower chair  Home Equipment: Rolling walker, [employer]  ADL Assistance: Independent  Ambulation Assistance: Independent(with RW)  Transfer Assistance: Independent  Additional Comments: Pt is a questionable historian.  Should confirm with family/facility  Short term goals  Time Frame for Short term goals: 1 week  Short term goal 1: Pt will perform sit><supine [place]  Short term goal 2: Pt will transition sit><stand CGA from all surfaces  Short term goal 3: Pt will transfer between surfaces CGA  Short term goal 4: Pt will ambulate 75ft with RW CGA  Short term goal 5: Pt will perform standing light dynamic activity without UE support x 3 minutes CGA       Electronically signed by:    Rizwan Sol PTA  2/23/2021, 8:20 AM

## 2021-02-24 LAB
EKG ATRIAL RATE: 109 BPM
EKG DIAGNOSIS: NORMAL
EKG P AXIS: 42 DEGREES
EKG P-R INTERVAL: 160 MS
EKG Q-T INTERVAL: 330 MS
EKG QRS DURATION: 102 MS
EKG QTC CALCULATION (BAZETT): 444 MS
EKG R AXIS: -18 DEGREES
EKG T AXIS: 162 DEGREES
EKG VENTRICULAR RATE: 109 BPM

## 2021-03-08 ENCOUNTER — OFFICE VISIT (OUTPATIENT)
Dept: ORTHOPEDIC SURGERY | Age: 81
End: 2021-03-08

## 2021-03-08 VITALS
HEIGHT: 67 IN | HEART RATE: 76 BPM | RESPIRATION RATE: 16 BRPM | OXYGEN SATURATION: 98 % | BODY MASS INDEX: 21.97 KG/M2 | WEIGHT: 140 LBS

## 2021-03-08 DIAGNOSIS — S72.142A DISPLACED INTERTROCHANTERIC FRACTURE OF LEFT FEMUR, INITIAL ENCOUNTER FOR CLOSED FRACTURE (HCC): ICD-10-CM

## 2021-03-08 DIAGNOSIS — Z09 POSTOP CHECK: Primary | ICD-10-CM

## 2021-03-08 PROCEDURE — 99024 POSTOP FOLLOW-UP VISIT: CPT | Performed by: PHYSICIAN ASSISTANT

## 2021-03-08 RX ORDER — UBIDECARENONE 100 MG
CAPSULE ORAL
Status: ON HOLD | COMMUNITY
End: 2021-04-15

## 2021-03-08 RX ORDER — CLOTRIMAZOLE AND BETAMETHASONE DIPROPIONATE 10; .64 MG/G; MG/G
CREAM TOPICAL
Status: ON HOLD | COMMUNITY
Start: 2021-01-30 | End: 2021-04-22 | Stop reason: HOSPADM

## 2021-03-08 RX ORDER — SIMVASTATIN 20 MG
1 TABLET ORAL DAILY
Status: ON HOLD | COMMUNITY
End: 2021-04-09 | Stop reason: HOSPADM

## 2021-03-08 RX ORDER — CILOSTAZOL 100 MG/1
TABLET ORAL
Status: ON HOLD | COMMUNITY
Start: 2021-02-16 | End: 2021-04-22 | Stop reason: HOSPADM

## 2021-03-08 RX ORDER — GLIMEPIRIDE 4 MG/1
TABLET ORAL
Status: ON HOLD | COMMUNITY
End: 2021-04-15

## 2021-03-08 RX ORDER — TESTOSTERONE CYPIONATE 200 MG/ML
INJECTION INTRAMUSCULAR
Status: ON HOLD | COMMUNITY
End: 2021-04-15

## 2021-03-08 RX ORDER — OMEPRAZOLE 20 MG/1
1 CAPSULE, DELAYED RELEASE ORAL DAILY
Status: ON HOLD | COMMUNITY
End: 2021-04-15

## 2021-03-08 NOTE — PROGRESS NOTES
Date of surgery:   February 17th   Surgeon: Dr. Kenna Carreon  History:  Mr. Jeevan Sultana is here in follow up regarding his left Femur IM Nail  He is doing rather well. He is having 4/10 pain. He denies chest pain, SOB, calf pain,fever,wound drainage. No other issues. Physical:   Vitals:    03/08/21 1024   Pulse: 76   Resp: 16   SpO2: 98%   Weight: 140 lb (63.5 kg)   Height: 5' 7\" (1.702 m)     Incisions over the lateral thigh are healing with staples in place and no erythema and no active drainage. Imaging studies:  2 views of the left femur show postoperative changes regarding the intertrochanteric femur fracture with a long IM nail in place and no obvious complication seen. Fracture is in near anatomic alignment with no evidence of cut out of the femoral head screw. The official read and interpretation of these x-rays will be done by the the [facility]       Impression:    Diagnosis Orders   1. Postop check      Left intertrochanteric femur fracture status post IM nail   2. Displaced intertrochanteric fracture of left femur, initial encounter for closed fracture [facility])           Plan:   Patient Instructions   Continue weight bear as tolerated  Continue range of motion and exercises as instruction  Ice and elevate as needed  Tylenol or Motrin for pain  Follow up in 3 weeks with Dr. Maximo Calvo  Please remove staples in the SNF at [facility] today.

## 2021-03-08 NOTE — PATIENT INSTRUCTIONS
Continue weight bear as tolerated  Continue range of motion and exercises as instruction  Ice and elevate as needed  Tylenol or Motrin for pain  Follow up in 3 weeks with Dr. Cristina Edward  Please remove staples in the SNF at [facility] today.

## 2021-03-23 ENCOUNTER — TELEPHONE (OUTPATIENT)
Dept: CARDIOLOGY CLINIC | Age: 81
End: 2021-03-23

## 2021-04-04 ENCOUNTER — APPOINTMENT (OUTPATIENT)
Dept: CT IMAGING | Age: 81
DRG: 444 | End: 2021-04-04
Payer: MEDICARE

## 2021-04-04 ENCOUNTER — HOSPITAL ENCOUNTER (INPATIENT)
Age: 81
LOS: 10 days | Discharge: NURSING, REHAB OR LONG-TERM CARE | DRG: 444 | End: 2021-04-15
Attending: INTERNAL MEDICINE | Admitting: INTERNAL MEDICINE
Payer: MEDICARE

## 2021-04-04 ENCOUNTER — APPOINTMENT (OUTPATIENT)
Dept: GENERAL RADIOLOGY | Age: 81
DRG: 444 | End: 2021-04-04
Payer: MEDICARE

## 2021-04-04 DIAGNOSIS — N39.0 URINARY TRACT INFECTION WITHOUT HEMATURIA, SITE UNSPECIFIED: ICD-10-CM

## 2021-04-04 DIAGNOSIS — R11.2 NAUSEA AND VOMITING, INTRACTABILITY OF VOMITING NOT SPECIFIED, UNSPECIFIED VOMITING TYPE: ICD-10-CM

## 2021-04-04 DIAGNOSIS — R09.02 HYPOXIA: ICD-10-CM

## 2021-04-04 DIAGNOSIS — R41.0 CONFUSION: Primary | ICD-10-CM

## 2021-04-04 LAB
ALBUMIN SERPL-MCNC: 4 GM/DL (ref 3.4–5)
ALP BLD-CCNC: 121 IU/L (ref 40–128)
ALT SERPL-CCNC: 10 U/L (ref 10–40)
AMORPHOUS: ABNORMAL /HPF
ANION GAP SERPL CALCULATED.3IONS-SCNC: 10 MMOL/L (ref 4–16)
AST SERPL-CCNC: 23 IU/L (ref 15–37)
BACTERIA: ABNORMAL /HPF
BASOPHILS ABSOLUTE: 0.1 K/CU MM
BASOPHILS RELATIVE PERCENT: 0.6 % (ref 0–1)
BILIRUB SERPL-MCNC: 0.6 MG/DL (ref 0–1)
BILIRUBIN URINE: NEGATIVE MG/DL
BLOOD, URINE: NEGATIVE
BUN BLDV-MCNC: 14 MG/DL (ref 6–23)
CALCIUM SERPL-MCNC: 9.9 MG/DL (ref 8.3–10.6)
CHLORIDE BLD-SCNC: 96 MMOL/L (ref 99–110)
CLARITY: CLEAR
CO2: 27 MMOL/L (ref 21–32)
COLOR: YELLOW
CREAT SERPL-MCNC: 1.1 MG/DL (ref 0.9–1.3)
DIFFERENTIAL TYPE: ABNORMAL
EOSINOPHILS ABSOLUTE: 0.3 K/CU MM
EOSINOPHILS RELATIVE PERCENT: 3.2 % (ref 0–3)
GFR AFRICAN AMERICAN: >60 ML/MIN/1.73M2
GFR NON-AFRICAN AMERICAN: >60 ML/MIN/1.73M2
GLUCOSE BLD-MCNC: 120 MG/DL (ref 70–99)
GLUCOSE BLD-MCNC: 81 MG/DL (ref 70–99)
GLUCOSE, URINE: NEGATIVE MG/DL
HCT VFR BLD CALC: 38.7 % (ref 42–52)
HEMOGLOBIN: 12.3 GM/DL (ref 13.5–18)
IMMATURE NEUTROPHIL %: 0.5 % (ref 0–0.43)
KETONES, URINE: ABNORMAL MG/DL
LACTATE: 1.1 MMOL/L (ref 0.4–2)
LEUKOCYTE ESTERASE, URINE: ABNORMAL
LYMPHOCYTES ABSOLUTE: 1.7 K/CU MM
LYMPHOCYTES RELATIVE PERCENT: 19.8 % (ref 24–44)
MCH RBC QN AUTO: 29.4 PG (ref 27–31)
MCHC RBC AUTO-ENTMCNC: 31.8 % (ref 32–36)
MCV RBC AUTO: 92.6 FL (ref 78–100)
MONOCYTES ABSOLUTE: 1.3 K/CU MM
MONOCYTES RELATIVE PERCENT: 14.7 % (ref 0–4)
NITRITE URINE, QUANTITATIVE: NEGATIVE
NUCLEATED RBC %: 0 %
PDW BLD-RTO: 13.3 % (ref 11.7–14.9)
PH, URINE: 7 (ref 5–8)
PLATELET # BLD: 258 K/CU MM (ref 140–440)
PMV BLD AUTO: 9.1 FL (ref 7.5–11.1)
POTASSIUM SERPL-SCNC: 4 MMOL/L (ref 3.5–5.1)
PRO-BNP: 891.1 PG/ML
PROTEIN UA: 30 MG/DL
RBC # BLD: 4.18 M/CU MM (ref 4.6–6.2)
RBC URINE: <1 /HPF (ref 0–3)
SEGMENTED NEUTROPHILS ABSOLUTE COUNT: 5.3 K/CU MM
SEGMENTED NEUTROPHILS RELATIVE PERCENT: 61.2 % (ref 36–66)
SODIUM BLD-SCNC: 133 MMOL/L (ref 135–145)
SPECIFIC GRAVITY UA: 1.01 (ref 1–1.03)
TOTAL IMMATURE NEUTOROPHIL: 0.04 K/CU MM
TOTAL NUCLEATED RBC: 0 K/CU MM
TOTAL PROTEIN: 7.6 GM/DL (ref 6.4–8.2)
TRICHOMONAS: ABNORMAL /HPF
TROPONIN T: <0.01 NG/ML
UROBILINOGEN, URINE: NEGATIVE MG/DL (ref 0.2–1)
WBC # BLD: 8.7 K/CU MM (ref 4–10.5)
WBC CLUMP: ABNORMAL /HPF
WBC UA: 26 /HPF (ref 0–2)

## 2021-04-04 PROCEDURE — 6360000002 HC RX W HCPCS: Performed by: PHYSICIAN ASSISTANT

## 2021-04-04 PROCEDURE — 82962 GLUCOSE BLOOD TEST: CPT

## 2021-04-04 PROCEDURE — 99285 EMERGENCY DEPT VISIT HI MDM: CPT

## 2021-04-04 PROCEDURE — 71275 CT ANGIOGRAPHY CHEST: CPT

## 2021-04-04 PROCEDURE — 81001 URINALYSIS AUTO W/SCOPE: CPT

## 2021-04-04 PROCEDURE — 74177 CT ABD & PELVIS W/CONTRAST: CPT

## 2021-04-04 PROCEDURE — 87077 CULTURE AEROBIC IDENTIFY: CPT

## 2021-04-04 PROCEDURE — 87086 URINE CULTURE/COLONY COUNT: CPT

## 2021-04-04 PROCEDURE — 96365 THER/PROPH/DIAG IV INF INIT: CPT

## 2021-04-04 PROCEDURE — 80053 COMPREHEN METABOLIC PANEL: CPT

## 2021-04-04 PROCEDURE — 96375 TX/PRO/DX INJ NEW DRUG ADDON: CPT

## 2021-04-04 PROCEDURE — 83605 ASSAY OF LACTIC ACID: CPT

## 2021-04-04 PROCEDURE — 71045 X-RAY EXAM CHEST 1 VIEW: CPT

## 2021-04-04 PROCEDURE — 87186 SC STD MICRODIL/AGAR DIL: CPT

## 2021-04-04 PROCEDURE — 70450 CT HEAD/BRAIN W/O DYE: CPT

## 2021-04-04 PROCEDURE — 85025 COMPLETE CBC W/AUTO DIFF WBC: CPT

## 2021-04-04 PROCEDURE — 84484 ASSAY OF TROPONIN QUANT: CPT

## 2021-04-04 PROCEDURE — 83880 ASSAY OF NATRIURETIC PEPTIDE: CPT

## 2021-04-04 PROCEDURE — 93005 ELECTROCARDIOGRAM TRACING: CPT | Performed by: PHYSICIAN ASSISTANT

## 2021-04-04 PROCEDURE — 2580000003 HC RX 258: Performed by: PHYSICIAN ASSISTANT

## 2021-04-04 PROCEDURE — 96376 TX/PRO/DX INJ SAME DRUG ADON: CPT

## 2021-04-04 RX ORDER — ONDANSETRON 2 MG/ML
4 INJECTION INTRAMUSCULAR; INTRAVENOUS ONCE
Status: COMPLETED | OUTPATIENT
Start: 2021-04-04 | End: 2021-04-04

## 2021-04-04 RX ORDER — SODIUM CHLORIDE 0.9 % (FLUSH) 0.9 %
10 SYRINGE (ML) INJECTION 2 TIMES DAILY
Status: DISCONTINUED | OUTPATIENT
Start: 2021-04-04 | End: 2021-04-15 | Stop reason: HOSPADM

## 2021-04-04 RX ADMIN — CEFTRIAXONE SODIUM 1000 MG: 1 INJECTION, POWDER, FOR SOLUTION INTRAMUSCULAR; INTRAVENOUS at 23:35

## 2021-04-04 RX ADMIN — ONDANSETRON 4 MG: 2 INJECTION INTRAMUSCULAR; INTRAVENOUS at 23:27

## 2021-04-04 NOTE — ED PROVIDER NOTES
Emergency [address] [facility] EMERGENCY DEPARTMENT    Patient: Chela Urban  MRN: [id number]  : 1940  Date of Evaluation: 2021  ED Provider: Jaylen Rollins PA-C    Chief Complaint       Chief Complaint   Patient presents with    Hypertension    Hypoglycemia    Altered Mental Status       MERI Urban is a 79 y.o. male who presents to the emergency department for confusion. Patient is from [facility]. Per report, patient is a little more confused than his baseline. This has been going on for 3 days now. Patient himself complains of feeling generally weak. He denies any pain. Denies fever, headache, dizziness, cough, congestion, cp, sob, n/v/d. He denies any falls. Denies any known sick contacts. ROS     CONSTITUTIONAL:  Denies fever.  + generalized weakness. EYES:  Denies visual changes. HEAD:  Denies headache. ENT:  Denies earache, nasal congestion, sore throat. NECK:  Denies neck pain. RESPIRATORY:  Denies any shortness of breath. CARDIOVASCULAR:  Denies chest pain. GI:  Denies nausea or vomiting. :  Denies urinary symptoms. MUSCULOSKELETAL:  Denies extremity pain or swelling. BACK:  Denies back pain. INTEGUMENT:  Denies skin changes. LYMPHATIC:  Denies lymphadenopathy. NEUROLOGIC:  + confusion. Past History     Past Medical History:   Diagnosis Date    Arthritis     Biliary stones     CAD (coronary artery disease)     COPD (chronic obstructive pulmonary disease) (HCC)     Diabetes mellitus (HCC)     History of blood transfusion     Hx of angiography 3/11/2014    Pulmonary Arteries: Small sliding hiatal hernia. Mod coronary artery calcifications. Mild bilat gynecomastia.     Hyperlipidemia     Hypertension     Kidney stone      Past Surgical History:   Procedure Laterality Date    ABDOMEN SURGERY      stones in bile duct removed x3    CARDIAC SURGERY      CABG x 2 in  at [facility] - Dr. Riana Gore, ESOPHAGUS      ERCP  03/13/14    w/ dilation of papilla    ERCP  9/11/14    ERCP  10/24/15    extractions stones, balloon dilatation     ERCP  03/18/2017    stone extraction     ERCP N/A 1/25/2019    ERCP STONE REMOVAL/ DILATATION OF PAPILLA WITH 10-12MM AND 12-15MM BALLOON AND 9-12MM, 12-15MM  EXTRACTOR BALLOON USED FOR REMOVAL OF MULTIPLE  CBD STONES performed by Shante Delgado MD at [place] [address] ERCP N/A 9/7/2019    ERCP STONE REMOVAL, DILATATION OF PAPILLA WITH 10-12MM BALLOON, AND EXTRACTOR BALLOON 12-15MM USED FOR REMOVAL OF CBD STONE AND SLUDE performed by Timbo Alcaraz MD at [place] [address] ERCP N/A 3/31/2020    ERCP DILATION BALLOON to 12  AND SWEEP OF CBD STONE AND SLUDGE performed by Timbo Alcaraz MD at [address] Left 2/18/2021    LEFT FEMUR IM NAIL VIANEY INSERTION performed by Eloisa Cantu DO at [address]      JOINT REPLACEMENT      LEG AMPUTATION BELOW KNEE Bilateral     [place] [address] [facility]  [place]13[place]14    sphincterotomy     Social History     Socioeconomic History    Marital status:      Spouse name:  Nehemias Smallwood Number of children: Not on file    Years of education: Not on file    Highest education level: Not on file   Occupational History    Not on file   Social Needs    Financial resource strain: Not on file    Food insecurity     Worry: Not on file     Inability: Not on file    Transportation needs     Medical: Not on file     Non-medical: Not on file   Tobacco Use    Smoking status: Never Smoker    Smokeless tobacco: Never Used   Substance and Sexual Activity    Alcohol use: No    Drug use: No    Sexual activity: Not on file   Lifestyle    Physical activity     Days per week: Not on file     Minutes per session: Not on file    Stress: Not on file   Relationships    Social connections Talks on phone: Not on file     Gets together: Not on file     Attends Tenriism service: Not on file     Active member of club or organization: Not on file     Attends meetings of clubs or organizations: Not on file     Relationship status: Not on file    Intimate partner violence     Fear of current or ex partner: Not on file     Emotionally abused: Not on file     Physically abused: Not on file     Forced sexual activity: Not on file   Other Topics Concern    Not on file   Social History Narrative    Not on file       Medications/Allergies     Previous Medications    ASPIRIN 81 MG CHEWABLE TABLET    Take 81 mg by mouth daily     ATORVASTATIN (LIPITOR) 40 MG TABLET    Take 40 mg by mouth daily    CARVEDILOL (COREG) 6.25 MG TABLET    Take 1 tablet by mouth 2 times daily (with meals)    CHOLECALCIFEROL 50 MCG (2000 UT) TABS    Take one tablet daily by mouth    CHOLESTYRAMINE LIGHT 4 G PACKET    Take 1 packet by mouth 2 times daily as needed (diarrhea)    CILOSTAZOL (PLETAL) 100 MG TABLET    TAKE 1 TABLET BY MOUTH TWO  TIMES A DAY 1/2 HOUR BEFORE OR 2 HOURS AFTER BREAKFAST  AND DINNER    CLOPIDOGREL (PLAVIX) 75 MG TABLET    Take 1 tablet by mouth daily    CLOTRIMAZOLE-BETAMETHASONE (LOTRISONE) 1-0.05 % CREAM        COENZYME Q10 100 MG CAPS CAPSULE    As Directed    DILTIAZEM (CARDIZEM) 60 MG TABLET    Take 1 tablet by mouth 2 times daily    DOCUSATE SODIUM (COLACE) 100 MG CAPSULE    Take 100 mg by mouth nightly as needed     FAMOTIDINE (PEPCID) 20 MG TABLET    Take 1 tablet by mouth daily    GABAPENTIN (NEURONTIN) 100 MG CAPSULE    Take 100 mg by mouth nightly.     GLIMEPIRIDE (AMARYL) 4 MG TABLET    Take by mouth    INSULIN DETEMIR (LEVEMIR FLEXTOUCH SC)    Inject 10 Units into the skin nightly    INSULIN LISPRO (HUMALOG) 100 UNIT/ML INJECTION VIAL    Inject 0-12 Units into the skin 3 times daily (with meals) **Medium Dose Corrective Algorithm**  Glucose: Dose:  If <139             No Insulin  140-199 2 Units  200-249 4 Units  250-299 6 Units  300-349 8 Units  350-400 10 Units  Above 400       12 Units    LATANOPROST (XALATAN) 0.005 % OPHTHALMIC SOLUTION    Place 1 drop into both eyes nightly     MULTIPLE VITAMINS-MINERALS (MULTIVITAMIN PO)    Take 1 tablet by mouth daily. OMEPRAZOLE (PRILOSEC) 20 MG DELAYED RELEASE CAPSULE    Take 1 capsule by mouth daily    ONDANSETRON (ZOFRAN) 4 MG TABLET    Take 4 mg by mouth 2 times daily as needed for Nausea or Vomiting    RIVAROXABAN (XARELTO) 10 MG TABS TABLET    Take 1 tablet by mouth daily (with breakfast)    SIMVASTATIN (ZOCOR) 20 MG TABLET    Take 1 tablet by mouth daily    TESTOSTERONE CYPIONATE (DEPOTESTOTERONE CYPIONATE) 200 MG/ML INJECTION    Inject into the muscle. TRAMADOL (ULTRAM) 50 MG TABLET    Take 50 mg by mouth every 6 hours as needed.      Allergies   Allergen Reactions    Byetta 10 Mcg Pen [Exenatide]     Sulbactam     Ampicillin Rash    Aricept [Donepezil Hydrochloride] Other (See Comments)     Hallucinations, confusion    Benztropine Other (See Comments)     Hallucinations, confusion    Celebrex [Celecoxib] Other (See Comments)     'causes him to be nervous and jittery\"    Diphenhydramine Hcl Other (See Comments)     nervous and jittery    Diphenhydramine Hcl [Diphenhydramine] Anxiety    Exenatide Other (See Comments)     Causes him to be nervous and jittery    Metoprolol Succinate Other (See Comments)     Hallucinations     Phenergan [Promethazine Hcl] Other (See Comments)     Hallucinations, nervous, jittery    Plavix [Clopidogrel Bisulfate] Other (See Comments)     Causes bleeding in his stomach    Pseudoephedrine Anxiety    Reglan [Metoclopramide Hcl] Other (See Comments)     \"Caused him to just sit in a chair and rock back and forth\"        Physical Exam       ED Triage Vitals   BP Temp Temp src Pulse Resp SpO2 Height Weight   -- -- -- -- -- -- -- --     GENERAL APPEARANCE:  Well-developed, well-nourished elderly male in no acute distress. HEAD:  NC/AT. EYES:  Sclera anicteric. ENT:  Ears, nose, mouth normal.     NECK:  Supple. CARDIO:  RRR. LUNGS:   CTAB. Respirations unlabored. ABDOMEN:  Soft, non-distended, non-tender. BS active. EXTREMITIES:  No acute deformities. Bilateral BKAs with prostheses. SKIN:  Warm and dry. NEUROLOGICAL:  Alert and oriented. PSYCHIATRIC:  Normal mood.      Diagnostics     Labs:  Labs Reviewed   CBC WITH AUTO DIFFERENTIAL - Abnormal; Notable for the following components:       Result Value    RBC 4.18 (*)     Hemoglobin 12.3 (*)     Hematocrit 38.7 (*)     MCHC 31.8 (*)     Lymphocytes % 19.8 (*)     Monocytes % 14.7 (*)     Eosinophils % 3.2 (*)     Immature Neutrophil % 0.5 (*)     All other components within normal limits   COMPREHENSIVE METABOLIC PANEL W/ REFLEX TO MG FOR LOW K - Abnormal; Notable for the following components:    Sodium 133 (*)     Chloride 96 (*)     All other components within normal limits   BRAIN NATRIURETIC PEPTIDE - Abnormal; Notable for the following components:    Pro-.1 (*)     All other components within normal limits   URINE RT REFLEX TO CULTURE - Abnormal; Notable for the following components:    Ketones, Urine SMALL (*)     Protein, UA 30 (*)     Leukocyte Esterase, Urine SMALL (*)     WBC, UA 26 (*)     Bacteria, UA RARE (*)     All other components within normal limits   BLOOD GAS, ARTERIAL - Abnormal; Notable for the following components:    pH, Bld 7.48 (*)     pCO2, Arterial 28.0 (*)     pO2, Arterial 44 (*)     O2 Sat 83.7 (*)     All other components within normal limits   POCT GLUCOSE - Abnormal; Notable for the following components:    POC Glucose 120 (*)     All other components within normal limits   COVID-19, RAPID   CULTURE, URINE   TROPONIN   LACTIC ACID, PLASMA   BASIC METABOLIC PANEL W/ REFLEX TO MG FOR LOW K   CBC WITH AUTO DIFFERENTIAL   POCT GLUCOSE   POCT GLUCOSE   POCT GLUCOSE       Radiographs:  Xr Femur Left (min 2 Views)    Result Date: 3/8/2021  EXAMINATION: 2 XRAY VIEWS OF THE LEFT FEMUR 3/8/2021 2:54 pm COMPARISON: Left hip radiograph dated 02/17/2021. left knee radiograph dated 02/17/2021. HISTORY: ORDERING SYSTEM PROVIDED HISTORY: Postop check FINDINGS: There is an intramedullary joshua, proximal dynamic screw affixing previously seen intertrochanteric fracture of the left femur with the fracture fragments situated in gross anatomic alignment. No specific imaging features of hardware complication is seen. Chronic appearing deformity of the of the left knee and changes of below-knee amputation are incompletely evaluated on this study. The bones appear demineralized. Severe vascular calcification changes are seen. There are lateral skin staples, reflecting recent surgery. Postsurgical changes of orthopedic fixation of left intertrochanteric fracture of the femur without specific imaging features of hardware complication seen. The bones appear demineralized. Ct Head Wo Contrast    Result Date: 4/4/2021  EXAMINATION: CT OF THE HEAD WITHOUT CONTRAST  4/4/2021 8:03 pm TECHNIQUE: CT of the head was performed without the administration of intravenous contrast. Dose modulation, iterative reconstruction, and/or weight based adjustment of the mA/kV was utilized to reduce the radiation dose to as low as reasonably achievable. COMPARISON: CT brain 02/17/2021. MRI brain 12/01/2020. HISTORY: ORDERING SYSTEM PROVIDED HISTORY: ams TECHNOLOGIST PROVIDED HISTORY: Reason for exam:->ams Has a \"code stroke\" or \"stroke alert\" been called? ->No Decision Support Exception->Emergency Medical Condition (MA) Reason for Exam: ams Acuity: Acute Type of Exam: Initial Additional signs and symptoms: Hypertension; Hypoglycemia; Altered Mental Status Relevant Medical/Surgical History: weakness FINDINGS: BRAIN/VENTRICLES: A large mass within the sella with extension into the left parasellar space anterior to the trang is again seen without significant change.   No hydrocephalus or herniation. Mild generalized parenchymal volume loss and chronic periventricular white matter hypoattenuation are seen. No evidence of acute territorial infarction or acute intracranial hemorrhage. ORBITS: The visualized portion of the orbits demonstrate no acute abnormality. SINUSES: The visualized paranasal sinuses and mastoid air cells demonstrate no acute abnormality. SOFT TISSUES/SKULL:  No acute abnormality of the visualized skull or soft tissues. 1. No acute intracranial abnormality. 2. Grossly unchanged large sellar/parasellar mass. Ct Abdomen Pelvis W Iv Contrast Additional Contrast? None    1. No acute findings. 2. Pneumobilia is noted without significant change since the prior examination of November 30, 2020. Findings could be related to prior biliary intervention. 3. Dilated common bile duct and pancreatic duct are noted without significant change since the prior examination of July 2, 2020. If there is concern for biliary obstruction, MRCP can be obtained. Correlate with laboratory values. 4. Small hiatal hernia. Xr Chest Portable    Result Date: 4/4/2021  EXAMINATION: ONE XRAY VIEW OF THE CHEST 4/4/2021 6:22 pm COMPARISON: Chest radiograph 02/19/2021. HISTORY: ORDERING SYSTEM PROVIDED HISTORY: ams TECHNOLOGIST PROVIDED HISTORY: Reason for exam:->ams FINDINGS: Status post median sternotomy. The cardiomediastinal silhouette is unchanged. No pneumothorax, vascular congestion, consolidation, or pleural effusion is identified. No acute osseous abnormality. No acute process. Cta Pulmonary W Contrast    The pulmonary arteries are suboptimally opacified with contrast.  Motion artifact limits evaluation of the pulmonary arteries. There is no gross large or central pulmonary emboli identified. Smaller more peripheral pulmonary emboli cannot be entirely excluded. Bilateral lower lobe atelectasis. Small hiatal hernia.        Procedures/EKG:   Please see Dr. Desai Learn

## 2021-04-04 NOTE — ED PROVIDER NOTES
Twelve-lead EKG interpreted by me in the absence of a cardiologist.  There is no criteria ST elevation or reciprocal change. There are no hyperacute T wave changes. There is no sign of acute ischemia or infarction. This tracing shows a normal sinus rhythm with generalized T wave flattening. Rate and intervals are 81 beats per minute, IA interval 152 milliseconds, QRS duration 86 milliseconds, QTc interval 506 milliseconds, and R axis normal at -28 degrees. There is no acute change compared with the most recent EKG dated February 18, 2021.         Shantal Manzanares MD  04/04/21 9972

## 2021-04-04 NOTE — ED TRIAGE NOTES
Pt arrives via ems from [facility], pt had just returned about 3 weeks ago from a skilled nursing facility, d/t left hip fracture needing PT/OT. Pt has a baseline confusion, unsure of year, date, day of week, month etc, does not recognize faces. Was able to tell me where he was but unable to tell me why he was here. Per medics, pt was last completely normal on Friday, but is normally confused. Wife didn't think he was acting right. Skin in tact, pt states he has been walking with assistance after returning home.

## 2021-04-05 ENCOUNTER — APPOINTMENT (OUTPATIENT)
Dept: GENERAL RADIOLOGY | Age: 81
DRG: 444 | End: 2021-04-05
Payer: MEDICARE

## 2021-04-05 PROBLEM — R41.82 ACUTE ALTERATION IN MENTAL STATUS: Status: ACTIVE | Noted: 2021-04-05

## 2021-04-05 LAB
ANION GAP SERPL CALCULATED.3IONS-SCNC: 12 MMOL/L (ref 4–16)
BASE EXCESS: 1 (ref 0–3.3)
BASOPHILS ABSOLUTE: 0 K/CU MM
BASOPHILS RELATIVE PERCENT: 0.2 % (ref 0–1)
BUN BLDV-MCNC: 21 MG/DL (ref 6–23)
CALCIUM SERPL-MCNC: 8.9 MG/DL (ref 8.3–10.6)
CARBON MONOXIDE, BLOOD: 2.6 % (ref 0–5)
CHLORIDE BLD-SCNC: 96 MMOL/L (ref 99–110)
CO2 CONTENT: 21.8 MMOL/L (ref 19–24)
CO2: 24 MMOL/L (ref 21–32)
COMMENT: ABNORMAL
CREAT SERPL-MCNC: 1.3 MG/DL (ref 0.9–1.3)
DIFFERENTIAL TYPE: ABNORMAL
EOSINOPHILS ABSOLUTE: 0 K/CU MM
EOSINOPHILS RELATIVE PERCENT: 0.1 % (ref 0–3)
GFR AFRICAN AMERICAN: >60 ML/MIN/1.73M2
GFR NON-AFRICAN AMERICAN: 53 ML/MIN/1.73M2
GLUCOSE BLD-MCNC: 131 MG/DL (ref 70–99)
GLUCOSE BLD-MCNC: 280 MG/DL (ref 70–99)
GLUCOSE BLD-MCNC: 29 MG/DL (ref 70–99)
GLUCOSE BLD-MCNC: 374 MG/DL (ref 70–99)
GLUCOSE BLD-MCNC: 383 MG/DL (ref 70–99)
GLUCOSE BLD-MCNC: 407 MG/DL (ref 70–99)
GLUCOSE BLD-MCNC: 416 MG/DL (ref 70–99)
GLUCOSE BLD-MCNC: 80 MG/DL (ref 70–99)
HCO3 ARTERIAL: 20.9 MMOL/L (ref 18–23)
HCT VFR BLD CALC: 34.4 % (ref 42–52)
HEMOGLOBIN: 11.1 GM/DL (ref 13.5–18)
IMMATURE NEUTROPHIL %: 0.6 % (ref 0–0.43)
LYMPHOCYTES ABSOLUTE: 1.3 K/CU MM
LYMPHOCYTES RELATIVE PERCENT: 9 % (ref 24–44)
MCH RBC QN AUTO: 29.9 PG (ref 27–31)
MCHC RBC AUTO-ENTMCNC: 32.3 % (ref 32–36)
MCV RBC AUTO: 92.7 FL (ref 78–100)
METHEMOGLOBIN ARTERIAL: 1.3 %
MONOCYTES ABSOLUTE: 1.3 K/CU MM
MONOCYTES RELATIVE PERCENT: 8.6 % (ref 0–4)
NUCLEATED RBC %: 0 %
O2 SATURATION: 83.7 % (ref 96–97)
PCO2 ARTERIAL: 28 MMHG (ref 32–45)
PDW BLD-RTO: 13.6 % (ref 11.7–14.9)
PH BLOOD: 7.48 (ref 7.34–7.45)
PLATELET # BLD: 219 K/CU MM (ref 140–440)
PMV BLD AUTO: 9.2 FL (ref 7.5–11.1)
PO2 ARTERIAL: 44 MMHG (ref 75–100)
POTASSIUM SERPL-SCNC: 4.1 MMOL/L (ref 3.5–5.1)
RBC # BLD: 3.71 M/CU MM (ref 4.6–6.2)
SARS-COV-2, NAAT: NOT DETECTED
SEGMENTED NEUTROPHILS ABSOLUTE COUNT: 11.8 K/CU MM
SEGMENTED NEUTROPHILS RELATIVE PERCENT: 81.5 % (ref 36–66)
SODIUM BLD-SCNC: 132 MMOL/L (ref 135–145)
SOURCE: NORMAL
TOTAL IMMATURE NEUTOROPHIL: 0.08 K/CU MM
TOTAL NUCLEATED RBC: 0 K/CU MM
WBC # BLD: 14.5 K/CU MM (ref 4–10.5)

## 2021-04-05 PROCEDURE — 97163 PT EVAL HIGH COMPLEX 45 MIN: CPT

## 2021-04-05 PROCEDURE — 92610 EVALUATE SWALLOWING FUNCTION: CPT

## 2021-04-05 PROCEDURE — 2580000003 HC RX 258: Performed by: INTERNAL MEDICINE

## 2021-04-05 PROCEDURE — 97530 THERAPEUTIC ACTIVITIES: CPT

## 2021-04-05 PROCEDURE — 85025 COMPLETE CBC W/AUTO DIFF WBC: CPT

## 2021-04-05 PROCEDURE — 97166 OT EVAL MOD COMPLEX 45 MIN: CPT

## 2021-04-05 PROCEDURE — 2500000003 HC RX 250 WO HCPCS: Performed by: INTERNAL MEDICINE

## 2021-04-05 PROCEDURE — 82803 BLOOD GASES ANY COMBINATION: CPT

## 2021-04-05 PROCEDURE — 97535 SELF CARE MNGMENT TRAINING: CPT

## 2021-04-05 PROCEDURE — 80048 BASIC METABOLIC PNL TOTAL CA: CPT

## 2021-04-05 PROCEDURE — 87635 SARS-COV-2 COVID-19 AMP PRB: CPT

## 2021-04-05 PROCEDURE — 36415 COLL VENOUS BLD VENIPUNCTURE: CPT

## 2021-04-05 PROCEDURE — 6370000000 HC RX 637 (ALT 250 FOR IP): Performed by: INTERNAL MEDICINE

## 2021-04-05 PROCEDURE — 6360000002 HC RX W HCPCS: Performed by: INTERNAL MEDICINE

## 2021-04-05 PROCEDURE — 1200000000 HC SEMI PRIVATE

## 2021-04-05 PROCEDURE — 94761 N-INVAS EAR/PLS OXIMETRY MLT: CPT

## 2021-04-05 PROCEDURE — 6360000004 HC RX CONTRAST MEDICATION: Performed by: PHYSICIAN ASSISTANT

## 2021-04-05 PROCEDURE — 97116 GAIT TRAINING THERAPY: CPT

## 2021-04-05 PROCEDURE — 71045 X-RAY EXAM CHEST 1 VIEW: CPT

## 2021-04-05 PROCEDURE — 6360000002 HC RX W HCPCS: Performed by: PHYSICIAN ASSISTANT

## 2021-04-05 PROCEDURE — 2580000003 HC RX 258: Performed by: PHYSICIAN ASSISTANT

## 2021-04-05 PROCEDURE — C9113 INJ PANTOPRAZOLE SODIUM, VIA: HCPCS | Performed by: INTERNAL MEDICINE

## 2021-04-05 PROCEDURE — 82962 GLUCOSE BLOOD TEST: CPT

## 2021-04-05 RX ORDER — ONDANSETRON 2 MG/ML
4 INJECTION INTRAMUSCULAR; INTRAVENOUS ONCE
Status: COMPLETED | OUTPATIENT
Start: 2021-04-05 | End: 2021-04-05

## 2021-04-05 RX ORDER — PANTOPRAZOLE SODIUM 40 MG/10ML
40 INJECTION, POWDER, LYOPHILIZED, FOR SOLUTION INTRAVENOUS DAILY
Status: DISCONTINUED | OUTPATIENT
Start: 2021-04-05 | End: 2021-04-13

## 2021-04-05 RX ORDER — LATANOPROST 50 UG/ML
1 SOLUTION/ DROPS OPHTHALMIC NIGHTLY
Status: DISCONTINUED | OUTPATIENT
Start: 2021-04-05 | End: 2021-04-15 | Stop reason: HOSPADM

## 2021-04-05 RX ORDER — ONDANSETRON 2 MG/ML
4 INJECTION INTRAMUSCULAR; INTRAVENOUS EVERY 6 HOURS PRN
Status: DISCONTINUED | OUTPATIENT
Start: 2021-04-05 | End: 2021-04-15 | Stop reason: HOSPADM

## 2021-04-05 RX ORDER — SODIUM CHLORIDE 0.9 % (FLUSH) 0.9 %
10 SYRINGE (ML) INJECTION EVERY 12 HOURS SCHEDULED
Status: DISCONTINUED | OUTPATIENT
Start: 2021-04-05 | End: 2021-04-15 | Stop reason: HOSPADM

## 2021-04-05 RX ORDER — NICOTINE POLACRILEX 4 MG
15 LOZENGE BUCCAL PRN
Status: DISCONTINUED | OUTPATIENT
Start: 2021-04-05 | End: 2021-04-15 | Stop reason: HOSPADM

## 2021-04-05 RX ORDER — ATORVASTATIN CALCIUM 40 MG/1
40 TABLET, FILM COATED ORAL DAILY
Status: DISCONTINUED | OUTPATIENT
Start: 2021-04-05 | End: 2021-04-15 | Stop reason: HOSPADM

## 2021-04-05 RX ORDER — DEXTROSE MONOHYDRATE 50 MG/ML
100 INJECTION, SOLUTION INTRAVENOUS PRN
Status: DISCONTINUED | OUTPATIENT
Start: 2021-04-05 | End: 2021-04-15 | Stop reason: HOSPADM

## 2021-04-05 RX ORDER — SODIUM CHLORIDE 9 MG/ML
25 INJECTION, SOLUTION INTRAVENOUS PRN
Status: DISCONTINUED | OUTPATIENT
Start: 2021-04-05 | End: 2021-04-15 | Stop reason: HOSPADM

## 2021-04-05 RX ORDER — DEXTROSE, SODIUM CHLORIDE, SODIUM LACTATE, POTASSIUM CHLORIDE, AND CALCIUM CHLORIDE 5; .6; .31; .03; .02 G/100ML; G/100ML; G/100ML; G/100ML; G/100ML
INJECTION, SOLUTION INTRAVENOUS CONTINUOUS
Status: DISCONTINUED | OUTPATIENT
Start: 2021-04-05 | End: 2021-04-05

## 2021-04-05 RX ORDER — SODIUM CHLORIDE 0.9 % (FLUSH) 0.9 %
10 SYRINGE (ML) INJECTION PRN
Status: DISCONTINUED | OUTPATIENT
Start: 2021-04-05 | End: 2021-04-15 | Stop reason: HOSPADM

## 2021-04-05 RX ORDER — CIPROFLOXACIN 2 MG/ML
400 INJECTION, SOLUTION INTRAVENOUS EVERY 12 HOURS
Status: DISCONTINUED | OUTPATIENT
Start: 2021-04-05 | End: 2021-04-07

## 2021-04-05 RX ORDER — ACETAMINOPHEN 650 MG/1
650 SUPPOSITORY RECTAL EVERY 6 HOURS PRN
Status: DISCONTINUED | OUTPATIENT
Start: 2021-04-05 | End: 2021-04-15 | Stop reason: HOSPADM

## 2021-04-05 RX ORDER — GABAPENTIN 100 MG/1
100 CAPSULE ORAL NIGHTLY
Status: DISCONTINUED | OUTPATIENT
Start: 2021-04-05 | End: 2021-04-15 | Stop reason: HOSPADM

## 2021-04-05 RX ORDER — ACETAMINOPHEN 325 MG/1
650 TABLET ORAL EVERY 6 HOURS PRN
Status: DISCONTINUED | OUTPATIENT
Start: 2021-04-05 | End: 2021-04-15 | Stop reason: HOSPADM

## 2021-04-05 RX ORDER — SODIUM CHLORIDE, SODIUM LACTATE, POTASSIUM CHLORIDE, CALCIUM CHLORIDE 600; 310; 30; 20 MG/100ML; MG/100ML; MG/100ML; MG/100ML
INJECTION, SOLUTION INTRAVENOUS CONTINUOUS
Status: DISCONTINUED | OUTPATIENT
Start: 2021-04-05 | End: 2021-04-06 | Stop reason: ALTCHOICE

## 2021-04-05 RX ORDER — CILOSTAZOL 100 MG/1
100 TABLET ORAL
Status: CANCELLED | OUTPATIENT
Start: 2021-04-05

## 2021-04-05 RX ORDER — ASPIRIN 81 MG/1
81 TABLET, CHEWABLE ORAL DAILY
Status: DISCONTINUED | OUTPATIENT
Start: 2021-04-05 | End: 2021-04-15 | Stop reason: HOSPADM

## 2021-04-05 RX ORDER — DILTIAZEM HYDROCHLORIDE 60 MG/1
60 TABLET, FILM COATED ORAL 2 TIMES DAILY
Status: DISCONTINUED | OUTPATIENT
Start: 2021-04-05 | End: 2021-04-15 | Stop reason: HOSPADM

## 2021-04-05 RX ORDER — DEXTROSE MONOHYDRATE 25 G/50ML
12.5 INJECTION, SOLUTION INTRAVENOUS PRN
Status: DISCONTINUED | OUTPATIENT
Start: 2021-04-05 | End: 2021-04-15 | Stop reason: HOSPADM

## 2021-04-05 RX ORDER — CHOLESTYRAMINE LIGHT 4 G/5.7G
4 POWDER, FOR SUSPENSION ORAL 2 TIMES DAILY PRN
Status: DISCONTINUED | OUTPATIENT
Start: 2021-04-05 | End: 2021-04-15 | Stop reason: HOSPADM

## 2021-04-05 RX ORDER — POLYETHYLENE GLYCOL 3350 17 G/17G
17 POWDER, FOR SOLUTION ORAL DAILY PRN
Status: DISCONTINUED | OUTPATIENT
Start: 2021-04-05 | End: 2021-04-15 | Stop reason: HOSPADM

## 2021-04-05 RX ORDER — CARVEDILOL 6.25 MG/1
6.25 TABLET ORAL 2 TIMES DAILY WITH MEALS
Status: DISCONTINUED | OUTPATIENT
Start: 2021-04-05 | End: 2021-04-15 | Stop reason: HOSPADM

## 2021-04-05 RX ADMIN — INSULIN LISPRO 12 UNITS: 100 INJECTION, SOLUTION INTRAVENOUS; SUBCUTANEOUS at 17:23

## 2021-04-05 RX ADMIN — ATORVASTATIN CALCIUM 40 MG: 40 TABLET, FILM COATED ORAL at 08:53

## 2021-04-05 RX ADMIN — SODIUM CHLORIDE, PRESERVATIVE FREE 10 ML: 5 INJECTION INTRAVENOUS at 09:22

## 2021-04-05 RX ADMIN — DEXTROSE MONOHYDRATE 100 ML/HR: 50 INJECTION, SOLUTION INTRAVENOUS at 23:55

## 2021-04-05 RX ADMIN — METRONIDAZOLE 500 MG: 500 INJECTION, SOLUTION INTRAVENOUS at 23:56

## 2021-04-05 RX ADMIN — ONDANSETRON 4 MG: 2 INJECTION INTRAMUSCULAR; INTRAVENOUS at 00:59

## 2021-04-05 RX ADMIN — CARVEDILOL 6.25 MG: 6.25 TABLET, FILM COATED ORAL at 08:53

## 2021-04-05 RX ADMIN — METRONIDAZOLE 500 MG: 500 INJECTION, SOLUTION INTRAVENOUS at 16:42

## 2021-04-05 RX ADMIN — CARVEDILOL 6.25 MG: 6.25 TABLET, FILM COATED ORAL at 16:41

## 2021-04-05 RX ADMIN — ASPIRIN 81 MG CHEWABLE TABLET 81 MG: 81 TABLET CHEWABLE at 08:53

## 2021-04-05 RX ADMIN — PANTOPRAZOLE SODIUM 40 MG: 40 INJECTION, POWDER, FOR SOLUTION INTRAVENOUS at 08:50

## 2021-04-05 RX ADMIN — DEXTROSE MONOHYDRATE 12.5 G: 25 INJECTION, SOLUTION INTRAVENOUS at 21:33

## 2021-04-05 RX ADMIN — SODIUM CHLORIDE, PRESERVATIVE FREE 10 ML: 5 INJECTION INTRAVENOUS at 23:55

## 2021-04-05 RX ADMIN — SODIUM CHLORIDE, PRESERVATIVE FREE 10 ML: 5 INJECTION INTRAVENOUS at 00:08

## 2021-04-05 RX ADMIN — LATANOPROST 1 DROP: 50 SOLUTION/ DROPS OPHTHALMIC at 23:55

## 2021-04-05 RX ADMIN — DEXTROSE MONOHYDRATE 100 ML/HR: 50 INJECTION, SOLUTION INTRAVENOUS at 23:30

## 2021-04-05 RX ADMIN — IOPAMIDOL 80 ML: 755 INJECTION, SOLUTION INTRAVENOUS at 00:08

## 2021-04-05 RX ADMIN — SODIUM CHLORIDE, SODIUM LACTATE, POTASSIUM CHLORIDE, CALCIUM CHLORIDE AND DEXTROSE MONOHYDRATE: 5; 600; 310; 30; 20 INJECTION, SOLUTION INTRAVENOUS at 06:30

## 2021-04-05 RX ADMIN — DILTIAZEM HYDROCHLORIDE 60 MG: 60 TABLET, FILM COATED ORAL at 08:53

## 2021-04-05 RX ADMIN — METRONIDAZOLE 500 MG: 500 INJECTION, SOLUTION INTRAVENOUS at 08:54

## 2021-04-05 RX ADMIN — CIPROFLOXACIN 400 MG: 2 INJECTION, SOLUTION INTRAVENOUS at 19:09

## 2021-04-05 RX ADMIN — CIPROFLOXACIN 400 MG: 2 INJECTION, SOLUTION INTRAVENOUS at 06:22

## 2021-04-05 ASSESSMENT — PAIN SCALES - GENERAL
PAINLEVEL_OUTOF10: 0

## 2021-04-05 NOTE — PROGRESS NOTES
Physical Therapy    Facility/Department: [facility]  Initial Assessment    NAME: Samira Iglesias  : 1940  MRN: [id number]    Date of Service: 2021    Discharge Recommendations:  IP Rehab        Assessment   Assessment: Pt is an 79 y.o. male with medical history, surgical history, co-morbidities, and personal factors including Arthritis, Biliary stones, CAD (coronary artery disease), COPD (chronic obstructive pulmonary disease) ([facility] [address] Diabetes mellitus ([facility] [address] History of blood transfusion, Hx of angiography, Hyperlipidemia, Hypertension, Kidney stone, Cardiac surgery; joint replacement; Cholecystectomy; Coronary angioplasty with stent; fracture surgery; Leg amputation below knee (Bilateral); ERCP (14); other surgical history (14); ERCP (14); ERCP (10/24/15); ERCP (2017); Abdomen surgery; Dilatation, esophagus; ERCP (N/A, 2019); ERCP (N/A, 2019); ERCP (N/A, 3/31/2020); and Femur fracture surgery (Left, 2021) with admission for Confusion, Urinary tract infection without hematuria, Hypoxia, and Nausea and vomiting. At his baseline, patient was modified independent with functional mobility and ADLs. Examination of body systems reveals decreased strength, decreased balance, decreased endurance, impaired cognition, and decreased independence with functional mobility. Prognosis: Good  Decision Making: High Complexity  Clinical Presentation: unpredictable characteristics  PT Education: Goals;PT Role;General Safety;Plan of Care;Gait Training;Equipment; Functional Mobility Training;Transfer Training;Orientation  REQUIRES PT FOLLOW UP: Yes  Activity Tolerance  Activity Tolerance: Patient Tolerated treatment well         Restrictions  Restrictions/Precautions  Restrictions/Precautions: General Precautions, Weight Bearing, Fall Risk  Lower Extremity Weight Bearing Restrictions  Left Lower Extremity Weight Bearing: Weight Bearing As Tolerated  Vision/Hearing  Vision: Within reach back, and sit slowly)  Ambulation  Ambulation?: Yes  Ambulation 1  Surface: level tile  Device: Rolling Walker  Assistance: Contact guard assistance  Quality of Gait: decreased gait speed, decreased step length bilaterally, unsteady gait  Distance: 50 feet + 50 feet  Comments: with verbal and tactile cues for BLE placement, walker placement, and sequence throughout; with verbal and tactile cues to maintain upright posture in order to avoid COM shifting outside of SHAD; with verbal cues for directions in order to successfully navigate hallway and return to correct room     Balance  Posture: Fair(forward head, rounded shoulders)  Sitting - Static: Good  Sitting - Dynamic: Good  Standing - Static: Fair;-  Standing - Dynamic: Poor;+      Gait Training:  Cues were given for safety, sequence, device management, balance, posture, awareness, path. Therapeutic Activity Training:   Therapeutic activity training was instructed today. Cues were given for safety, sequence, UE/LE placement, awareness, and balance. Activities performed today included bed mobility training, sup-sit, sit-stand. Plan   Plan  Times per week: 3+  Current Treatment Recommendations: Strengthening, ROM, Balance Training, Functional Mobility Training, Transfer Training, ADL/Self-care Training, Gait Training, Patient/Caregiver Education & Training, IADL Training, Stair training, Neuromuscular Re-education, Pain Management, Equipment Evaluation, Education, & procurement, Home Exercise Program, Positioning, Endurance Training, Safety Education & Training, Cognitive/Perceptual Training, Cognitive Reorientation  Safety Devices  Type of devices:  All fall risk precautions in place, Left in chair, Call light within reach, Chair alarm in place, Nurse notified, Gait belt, Patient at risk for falls      AM-PAC Score  -PAC Inpatient Mobility Raw Score : 16 (04/05/21 1737)  AM-PAC Inpatient T-Scale Score : 40.78 (04/05/21 1737)  Mobility Inpatient CMS 0-100% Score: 54.16 (04/05/21 1737)  Mobility Inpatient CMS G-Code Modifier : CK (04/05/21 1737)          Goals  Long term goals  Time Frame for Long term goals :  In one week:  Long term goal 1: Pt will complete all bed mobility with supervision  Long term goal 2: Pt will complete sit <> stand transfers with supervision  Long term goal 3: Pt will ambulate 400 feet with supervision with Ree Natter term goal 4: Pt will independently complete 3 sets of 10 reps of BLE AROM exercises in available and allowed ROM       Time In: 1640  Time Out: 1718  Total Treatment Time: 38  Timed Code Treatment Minutes: [facility] [address] [facility], PT, DPT  License #: [id number]

## 2021-04-05 NOTE — CONSULTS
[address] [place] [address], [address] [facility]                                  CONSULTATION    PATIENT NAME: Teofilo Gilbert                  :        1940  MED REC NO:   [id number]                          ROOM:       1115  ACCOUNT NO:   [de-identified]                           ADMIT DATE: 2021  PROVIDER:     Tramaine Gallagher MD    CONSULT DATE:  2021    CHIEF COMPLAINT:  History of recurrent common bile duct stones with  acute onset of confusion, rule out recurrent common bile duct  stone/cholangitis. HISTORY OF PRESENT ILLNESS:  The patient is an 48-year-old white  gentleman, a patient known to me from his previous hospitalization, who  was initially being followed up by Dr. Higinio Hyman, but has seen me on about  three occasions in the past and last seen in 2020, with past medical  history significant for hypertension, diabetes mellitus, hyperlipidemia,  coronary artery disease status post CABG, kidney stones, osteoarthritis,  history of recurrent common bile duct stones status post ERCP x7 at  least, who presented to the emergency room with acute onset of  confusion. The patient denied abdominal pain, nausea, vomiting,  hematemesis, melena, hematochezia, fever, or chills. In the emergency  room, the patient had a blood workup done, which comprised of chem  profile, which was unremarkable, LFTs were within normal limits, CBC  showed a WBC count of 8.7, hemoglobin 12.3, platelet count of 950, 000. The patient had CT scan done of the abdomen and pelvis today, which  showed pneumobilia, which was present before also and it is secondary to  prior endoscopic sphincterotomy and dilated common bile duct and  pancreatic ducts were noted without significant change and no definite  stones were seen in the common bile duct, small sliding hiatal hernia  was noted as well.   The patient was admitted for further workup and  management of his acute confusional state and is on IV antibiotics. The  patient is hemodynamically stable. Swallowing eval is in order. The patient initially was being followed by Dr. Ariadne Bettencourt, from  Gastroenterology and for common bile duct stones and the patient has had  at least seven ERCPs done for recurrent common bile duct stone. The  patient has had endoscopic sphincterotomy done with sweeping of the  common bile duct and removal of multiple common bile duct stones and  sludge in the past.  The last two ERCPs were done by me on the  09/07/2019, and then again on the 03/31/2020, and the common bile duct  was markedly dilated and multiple common bile duct stones along with  sludge was removed. The patient is full code at present. The patient  is hemodynamically stable and there is no evidence of gross GI bleeding. PAST MEDICAL HISTORY:  Significant for history of hypertension, diabetes  mellitus, hyperlipidemia, coronary artery disease status post CABG,  kidney stones, osteoarthritis, history of recurrent common bile duct  stones status post ERCP x7 with endoscopic sphincterotomy and removal of  multiple common bile duct stones and sludge. PAST SURGICAL HISTORY:  The patient has had CABG done, bilateral below  knee amputations, cholecystectomy, and esophageal dilatation done, ERCP  x7 for recurrent common bile duct stones and sludge. FAMILY HISTORY:  The patient's mother and father both were diagnosed  with carcinoma of unknown primary. SOCIOECONOMIC HISTORY:  No history of EtOH abuse. The patient does not  smoke cigarettes. CURRENT MEDICATIONS:  Please refer to the chart; however, the patient is  on Plavix, Xarelto, aspirin, and Pletal.    ALLERGIES:  The patient has multiple allergies, please refer to the  chart. REVIEW OF SYSTEMS:  CENTRAL NERVOUS SYSTEM:  The patient is slightly confused otherwise  denies focal sensorimotor symptoms.   CARDIOVASCULAR SYSTEM:  No history of chest pain, shortness of breath,  or leg swelling. GENITOURINARY SYSTEM:  No history of dysuria, pyuria, or hematuria. MUSCULOSKELETAL SYSTEM:  The patient complains of generalized weakness. RESPIRATORY SYSTEM:  No history of cough, hemoptysis, fever, or chills. PHYSICAL EXAMINATION:  GENERAL:  Shows 26-year-old white gentleman of thin built and fair  nutritional status, who is lying comfortably flat on bed, in no acute  distress. He is awake and alert, but somewhat confused, but does  respond to verbal commands. VITAL SIGNS:  Please refer to the chart (they are stable and the patient  is afebrile). HEENT:  Shows skull to be atraumatic. NECK:  Supple. CHEST:  Clear. CARDIOVASCULAR:  S1 and S2 are normal.  ABDOMEN:  Soft, nontender, nondistended. Liver and spleen are not  palpable. Bowel sounds are present. RECTAL:  Deferred. CNS:  Shows the patient to be awake, alert, and oriented. There are no  focal sensorimotor signs. MUSCULOSKELETAL SYSTEM:  Exam shows status post bilateral below knee  amputations. LABORATORY DATA:  As above mentioned. IMPRESSION:  An 26-year-old white gentleman, who had multiple  comorbidities, admitted with acute onset of confusion with history of  recurrent common bile duct stone/sludge, rule out recurrent common bile  duct stone/cholangitis; however, the patient denies abdominal pain, and  is afebrile with normal WBC count and LFTs. RECOMMENDATIONS:  1. Agree with present management with IV fluids. 2.  Parenteral analgesics and antiemetics as needed. 3.  We will check CBC, chem profile, amylase, lipase, PT/PTT, INR in  a.m.  4.  We will hold anticoagulants for now. 5.  We will discuss the case with the patient's daughter Oswald Francis and the  patient will probably need a repeat ERCP to sweep the common bile duct  off of any stones/sludge. 6.  We will continue the patient empirically on IV antibiotics.   7.  The case and plan has been discussed with the patient as

## 2021-04-05 NOTE — PROGRESS NOTES
Comprehensive Nutrition Assessment    Type and Reason for Visit:  Initial, Positive Nutrition Screen(lower BMI for age)    Nutrition Recommendations/Plan:   · Advance to Dysphagia Soft and Bite Sized Diet as timely as able  · Carb Control 4, Low Fat and Cardiac Diets available/appropriate as needed  · Low perri high protein oral nutrition supplement appropriate, once diet advanced    Nutrition Assessment:  Pt admitted from SNF with confusion, UTI, N/V, hypoglycemia. H/O CAD, CABG. SLP eval complete, soft and bite sized diet/thin liquids recommended. Diet advanced today to Clear Liquids, feeding self and tolerating, with GI consult pending, biliary duct dilatation noted. Pt is receiving care during my visit. No recent wt loss noted. Will continue to follow as high nutrition risk. Malnutrition Assessment:  Malnutrition Status: At risk for malnutrition     Context:  Acute Illness       Estimated Daily Nutrient Needs:  Energy (kcal):  2533-2693 (25-30 kcal/kg); Weight Used for Energy Requirements:  Current     Protein (g):  63-76 (1-1.3 g/kg); Weight Used for Protein Requirements:  Current        Fluid (ml/day):  2630-9821; Method Used for Fluid Requirements:  1 ml/kcal      Nutrition Related Findings:  A1C 7.9, Glu 383, Hgb 11.1      Wounds:  None       Current Nutrition Therapies:    DIET CLEAR LIQUID; Anthropometric Measures:  · Height: 5' 7\" (170.2 cm)(per Epic history 5' 5\" to 5' 11\")  · Current Body Weight: 138 lb 3.2 oz (62.7 kg)   · Admission Body Weight: 138 lb 3.2 oz (62.7 kg)    · Usual Body Weight: 142 lb 3.2 oz (64.5 kg)(4/1/20)     · Ideal Body Weight: 148 lbs; % Ideal Body Weight 93.4 %   · BMI: 21.6  · Adjusted Body Weight: 154.5;  Amputation   · Adjusted BMI: 24.1    · BMI Categories: Underweight (BMI less than 22) age over 72       Nutrition Diagnosis:   · Inadequate protein-energy intake related to cognitive or neurological impairment as evidenced by NPO or clear liquid status due to medical condition    Nutrition Interventions:   Food and/or Nutrient Delivery:  Continue Current Diet  Nutrition Education/Counseling:  No recommendation at this time   Coordination of Nutrition Care:  Continue to monitor while inpatient, Feeding Assistance/Environment Change, Speech Therapy, Swallow Evaluation    Goals:  Pt will toerate diet avancement within 24-48 hr       Nutrition Monitoring and Evaluation:   Behavioral-Environmental Outcomes:  None Identified   Food/Nutrient Intake Outcomes:  Diet Advancement/Tolerance, Food and Nutrient Intake  Physical Signs/Symptoms Outcomes:  Biochemical Data, GI Status, Nausea or Vomiting, Meal Time Behavior, Nutrition Focused Physical Findings, Weight     Discharge Planning:     Too soon to determine     Electronically signed by Sofia Bustamante RD, LD on 4/5/21 at 2:07 PM EDT    Contact: [phone]

## 2021-04-05 NOTE — H&P
significant change, dilated common bile duct and pancreatic duct without significant change since prior examination-July 2, 2020. Patient received IV Zofran, ceftriaxone in ER. PAST MEDICAL HISTORY PAST SURGICAL HISTORY   coronary artery disease s/p CABG, peripheral arterial disease, diabetes mellitus type 2, hypertension, history of renal calculi, history of choledocholithiasis, COPD, hyperlipidemia, pituitary adenoma, dementia, recent admission for left hip fracture, Cholecystectomy, bilateral below-knee amputation   FAMILY HISTORY SOCIAL HISTORY    Reviewed and noncontributory   no smoking, alcohol, illicit drug abuse   MEDICATIONS ALLERGIES    As per recent discharge summary-aspirin 81 mg daily, atorvastatin 40 mg nightly, carvedilol 6.25 mg twice daily, cholecalciferol 50 MCG daily, cholestyramine 4 g pack twice daily as needed, Plavix 70 mg daily, Cardizem 60 mg twice daily, famotidine 20 mg daily, gabapentin 100 mg nightly, insulin lispro sliding scale, multivitamins, tramadol every 6 hours as needed   ampicillin, sulbactam, diphenhydramine, pseudoephedrine, Aricept, benztropine, Celebrex, exenatide, metoprolol succinate, Phenergan, Plavix, Reglan. PAST MEDICAL, SURGICAL, FAMILY, and SOCIAL HISTORY         Past Medical History:   Diagnosis Date    Arthritis     Biliary stones 2015    CAD (coronary artery disease)     COPD [facility] obstructive [facility]) [facility] [address]     Diabetes mellitus ([facility] [address] [facility]     History of blood transfusion     Hx of angiography 3/11/2014    Pulmonary Arteries: Small sliding hiatal hernia. Mod coronary artery calcifications. Mild bilat gynecomastia.     Hyperlipidemia     Hypertension     Kidney stone      Past Surgical History:   Procedure Laterality Date    ABDOMEN SURGERY      stones in bile duct removed x3    CARDIAC SURGERY      CABG x 2 in 2004 at [facility] - Dr. Brent Short, ESOPHAGUS      ERCP  03/13/14    w/ dilation of papilla    ERCP  9/11/14    ERCP  10/24/15    extractions stones, balloon dilatation     ERCP  03/18/2017    stone extraction     ERCP N/A 1/25/2019    ERCP STONE REMOVAL/ DILATATION OF PAPILLA WITH 10-12MM AND 12-15MM BALLOON AND 9-12MM, 12-15MM  EXTRACTOR BALLOON USED FOR REMOVAL OF MULTIPLE  CBD STONES performed by Edwin Ganser, MD at [facility] ERCP N/A 9/7/2019    ERCP STONE REMOVAL, DILATATION OF PAPILLA WITH 10-12MM BALLOON, AND EXTRACTOR BALLOON 12-15MM USED FOR REMOVAL OF CBD STONE AND SLUDE performed by Sydney Aguilera MD at [facility] ERCP N/A 3/31/2020    ERCP DILATION BALLOON to 12  AND SWEEP OF CBD STONE AND SLUDGE performed by Sydney Aguilera MD at [address] Left 2/18/2021    LEFT FEMUR IM NAIL VIANEY INSERTION performed by Dotty Ibarra DO at [address]      JOINT REPLACEMENT      LEG AMPUTATION BELOW KNEE Bilateral     2005 at [address] [facility]  03/13/14    sphincterotomy     Family History   Problem Relation Age of Onset    Cancer Mother     Diabetes Mother     Cancer Father      Family Hx of HTN  Family Hx as reviewed above, otherwise non-contributory  Social History     Socioeconomic History    Marital status:      Spouse name:  Serafin Stout Number of children: None    Years of education: None    Highest education level: None   Occupational History    None   Social Needs    Financial resource strain: None    Food insecurity     Worry: None     Inability: None    Transportation needs     Medical: None     Non-medical: None   Tobacco Use    Smoking status: Never Smoker    Smokeless tobacco: Never Used   Substance and Sexual Activity    Alcohol use: No    Drug use: No    Sexual activity: None   Lifestyle    Physical activity     Days per week: None     Minutes per session: None    Stress: None   Relationships    Social connections     Talks on phone: None     Gets together: None     Attends Bahai service: None     Active member of club or organization: None     Attends meetings of clubs or organizations: None     Relationship status: None    Intimate partner violence     Fear of current or ex partner: None     Emotionally abused: None     Physically abused: None     Forced sexual activity: None   Other Topics Concern    None   Social History Narrative    None       MEDICATIONS   Medications Prior to Admission  Not in a hospital admission. Current Medications  Current Facility-Administered Medications   Medication Dose Route Frequency Provider Last Rate Last Admin    sodium chloride flush 0.9 % injection 10 mL  10 mL Intravenous BID Sixto Aguilar MD   10 mL at 04/05/21 0008     Current Outpatient Medications   Medication Sig Dispense Refill    glimepiride (AMARYL) 4 MG tablet Take by mouth      omeprazole (PRILOSEC) 20 MG delayed release capsule Take 1 capsule by mouth daily      testosterone cypionate (DEPOTESTOTERONE CYPIONATE) 200 MG/ML injection Inject into the muscle.       simvastatin (ZOCOR) 20 MG tablet Take 1 tablet by mouth daily      coenzyme Q10 100 MG CAPS capsule As Directed      clotrimazole-betamethasone (LOTRISONE) 1-0.05 % cream       cilostazol (PLETAL) 100 MG tablet TAKE 1 TABLET BY MOUTH TWO  TIMES A DAY 1/2 HOUR BEFORE OR 2 HOURS AFTER BREAKFAST  AND DINNER      Insulin Detemir (LEVEMIR FLEXTOUCH SC) Inject 10 Units into the skin nightly      rivaroxaban (XARELTO) 10 MG TABS tablet Take 1 tablet by mouth daily (with breakfast) 15 tablet 0    insulin lispro (HUMALOG) 100 UNIT/ML injection vial Inject 0-12 Units into the skin 3 times daily (with meals) **Medium Dose Corrective Algorithm**  Glucose: Dose:  If <139             No Insulin  140-199 2 Units  200-249 4 Units  250-299 6 Units  300-349 8 Units  350-400 10 Units  Above 400       12 Units 1 vial 3    carvedilol (COREG) 6.25 MG tablet Take 1 tablet by mouth 2 times daily (with meals) 60 tablet 0    cholestyramine light 4 g packet Take 1 packet by mouth 2 times daily as needed (diarrhea) 20 packet 0    clopidogrel (PLAVIX) 75 MG tablet Take 1 tablet by mouth daily 30 tablet 0    ondansetron (ZOFRAN) 4 MG tablet Take 4 mg by mouth 2 times daily as needed for Nausea or Vomiting      famotidine (PEPCID) 20 MG tablet Take 1 tablet by mouth daily 60 tablet 3    gabapentin (NEURONTIN) 100 MG capsule Take 100 mg by mouth nightly.  Cholecalciferol 50 MCG (2000 UT) TABS Take one tablet daily by mouth      traMADol (ULTRAM) 50 MG tablet Take 50 mg by mouth every 6 hours as needed.  atorvastatin (LIPITOR) 40 MG tablet Take 40 mg by mouth daily      docusate sodium (COLACE) 100 MG capsule Take 100 mg by mouth nightly as needed       latanoprost (XALATAN) 0.005 % ophthalmic solution Place 1 drop into both eyes nightly       diltiazem (CARDIZEM) 60 MG tablet Take 1 tablet by mouth 2 times daily 90 tablet 3    Multiple Vitamins-Minerals (MULTIVITAMIN PO) Take 1 tablet by mouth daily.       aspirin 81 MG chewable tablet Take 81 mg by mouth daily            Allergies  Allergies   Allergen Reactions    Byetta 10 Mcg Pen [Exenatide]     Sulbactam     Ampicillin Rash    Aricept [Donepezil Hydrochloride] Other (See Comments)     Hallucinations, confusion    Benztropine Other (See Comments)     Hallucinations, confusion    Celebrex [Celecoxib] Other (See Comments)     'causes him to be nervous and jittery\"    Diphenhydramine Hcl Other (See Comments)     nervous and jittery    Diphenhydramine Hcl [Diphenhydramine] Anxiety    Exenatide Other (See Comments)     Causes him to be nervous and jittery    Metoprolol Succinate Other (See Comments)     Hallucinations     Phenergan [Promethazine Hcl] Other (See Comments)     Hallucinations, nervous, jittery    Plavix [Clopidogrel Bisulfate] Other (See Comments)     Causes bleeding in his stomach    Pseudoephedrine Small hiatal hernia.      CT Head WO Contrast [[id number]] Collected: 04/04/21 2033     Order Status: Completed Updated: 04/04/21 2040     Narrative:       EXAMINATION:   CT OF THE HEAD WITHOUT CONTRAST  4/4/2021 8:03 pm     TECHNIQUE:   CT of the head was performed without the administration of intravenous   contrast. Dose modulation, iterative reconstruction, and/or weight based   adjustment of the mA/kV was utilized to reduce the radiation dose to as low   as reasonably achievable. COMPARISON:   CT brain 02/17/2021.  MRI brain 12/01/2020. HISTORY:   ORDERING SYSTEM PROVIDED HISTORY: ams   TECHNOLOGIST PROVIDED HISTORY:   Reason for exam:->ams   Has a \"code stroke\" or \"stroke alert\" been called? ->No   Decision Support Exception->Emergency Medical Condition (MA)   Reason for Exam: ams   Acuity: Acute   Type of Exam: Initial   Additional signs and symptoms: Hypertension; Hypoglycemia; Altered Mental   Status   Relevant Medical/Surgical History: weakness     FINDINGS:   BRAIN/VENTRICLES: A large mass within the sella with extension into the left   parasellar space anterior to the trang is again seen without significant   change.  No hydrocephalus or herniation.  Mild generalized parenchymal volume   loss and chronic periventricular white matter hypoattenuation are seen.  No   evidence of acute territorial infarction or acute intracranial hemorrhage. ORBITS: The visualized portion of the orbits demonstrate no acute abnormality. SINUSES: The visualized paranasal sinuses and mastoid air cells demonstrate   no acute abnormality. SOFT TISSUES/SKULL:  No acute abnormality of the visualized skull or soft   tissues.      Impression:       1. No acute intracranial abnormality.    2. Grossly unchanged large sellar/parasellar mass.      XR CHEST PORTABLE [[id number]] Collected: 04/04/21 1854     Order Status: Completed Updated: 04/04/21 1857     Narrative:       EXAMINATION:   ONE XRAY VIEW OF THE CHEST 4/4/2021 6:22 pm     COMPARISON:   Chest radiograph 02/19/2021. HISTORY:   ORDERING SYSTEM PROVIDED HISTORY: ams   TECHNOLOGIST PROVIDED HISTORY:   Reason for exam:->ams     FINDINGS:   Status post median sternotomy.  The cardiomediastinal silhouette is   unchanged.  No pneumothorax, vascular congestion, consolidation, or pleural   effusion is identified.  No acute osseous abnormality.      Impression:       No acute process.          Relevant labs and imaging reviewed    ASSESSMENT AND PLAN     #. Acute respiratory failure with hypoxia:  -Patient's oxygen saturation was down to 85% in ER. -Chest x-ray, CTA chest-no acute process.  -Patient had nausea, vomiting, unclear if patient aspirated. We will repeat a chest x-ray. -Monitor and wean off of oxygen as tolerated. #.  Nausea/vomiting/abdominal pain:  -CT abdomen/pelvis-no acute process.  -Patient had similar episodes in the past and had CBD stones-requiring ERCP. -Consult GI.  -Patient allergic to ampicillin, sulbactam.  Will start ciprofloxacin. #.  Acute metabolic encephalopathy:  -Could be secondary to above. -UA-small ketones, leukocyte esterase small, WBC 26, bacteria rare. CT head-grossly unchanged large sellar/parasellar mass. Patient received ceftriaxone in ER.  -Monitor for improvement. -Rapid Covid negative. #. coronary artery disease s/p CABG, peripheral arterial disease  -Patient on aspirin, Plavix- hold. #. diabetes mellitus type 2  -Patient on insulin sliding scale  -Hypoglycemia protocol ordered. #. Hypertension-continue carvedilol    #. history of renal calculi  #. history of choledocholithiasis    #. COPD    #. Hyperlipidemia    #. pituitary adenoma  #. Dementia    #. recent admission for left hip fracture-discharged 2/23/2021. Was on Xarelto for 2 weeks for DVT prophylaxis.       DVT Prophylaxis: Hold chemical prophylaxis for anticipated procedure/GI evaluation  GI Prophylaxis: Protonix  Code Status: FULL.      Case d/w ED physician    Eugene Saunders MD  Hospitalist, Internal Medicine  4/5/2021 at 2:45 AM

## 2021-04-05 NOTE — CARE COORDINATION
CM consult per Sandra DENISE to initiate discharge planning. CM reviewed pt chart but was not sure if pt was still in SNF rehab or back in independent living with wife. CM visited pt who was confused, poor historian. Pt did give this CM permission to contact his wife/ Luisa Odom. Call to Luisa Odom [phone], she tells that pt has been back with her in their independent living apt at  for approx 3 weeks. Today pt returns to ER due to wife having concern, pt was sleeping all day. Luisa Odom tells me pt had an indwelling hart cath while he was in SNF rehab.for hip fracture rehab. Pt has been incontinent of urine, increased confusion. Once pt is medically clear, she/wife plans for pt to come back home to their apt(independent living at    ). Wife did ask if pt would stay here for the night even if he was cleared to come home. Explained he would return home if discharged from ER tonight regardless of the time of day or night, she was concerned of how he would get home, CM explained we would call medical transport to bring pt home. Update to [facility] who was not exactly sure why pt was sent to ER today. Results still pending for POC.  RICARDORN/IMTIAZ

## 2021-04-05 NOTE — PROGRESS NOTES
Occupational Therapy    [facility] ACUTE CARE OCCUPATIONAL THERAPY EVALUATION  Selwyn Roper, 1940, 1115/1115-A, 4/5/2021    History  [place]:  The primary encounter diagnosis was Confusion. Diagnoses of Urinary tract infection without hematuria, site unspecified, Hypoxia, and Nausea and vomiting, intractability of vomiting not specified, unspecified vomiting type were also pertinent to this visit. Patient  has a past medical history of Arthritis, Biliary stones, CAD (coronary artery disease), COPD (chronic obstructive pulmonary disease) ([address] Diabetes mellitus ([facility] [address] History of blood transfusion, Hx of angiography, Hyperlipidemia, Hypertension, and Kidney stone. Patient  has a past surgical history that includes Cardiac surgery; joint replacement; Cholecystectomy; Coronary angioplasty with stent; fracture surgery; Leg amputation below knee (Bilateral); ERCP (03/13/14); other surgical history (03/13/14); ERCP (9/11/14); ERCP (10/24/15); ERCP (03/18/2017); Abdomen surgery; Dilatation, esophagus; ERCP (N/A, 1/25/2019); ERCP (N/A, 9/7/2019); ERCP (N/A, 3/31/2020); and Femur fracture surgery (Left, 2/18/2021). Subjective:  Patient states:  Pt confused    Pain:  No.    Communication with other providers:  Handoff to RN  Restrictions: WBAT on LLE (taken from old OT evaluation 2/19/2021, General Precautions, [address] level of function    Social/Functional History  Lives With: Spouse  Type of Home: Facility ([address] per CM note)  Home Layout: One level  Home Access: Level entry  Bathroom Shower/Tub: Walk-in shower  Bathroom Equipment: Shower chair  Home Equipment: Rolling walker, [employer]  ADL Assistance: Independent  Homemaking Tasks: Needs assistance  Ambulation Assistance: Independent (with RW and LE prostheses)  Transfer Assistance: Independent  Active : No  Additional Comments: Pt is a questionable historian.  Should confirm with family/facility Above information taken from 2/19/2021 Pt had recent femur fracture surgery on L 2/18/2021. Examination of body systems (includes body structures/functions, activity/participation limitations):  · Observation:  Supine in bed upon arrival, BKA bilaterally  · Vision:  Readers  · Hearing:  Slightly [place]  · Cardiopulmonary:  5L of 02      Body Systems and functions:  · ROM R/L:  WFL. · Strength R/L:  4+/5,   · Sensation: WFL  · Tone: Normal  · Coordination: WFL  · Perception: WNL    Activities of Daily Living (ADLs):  · Feeding: Gisela  · Grooming: Setup/CGA (oral care in stand at sink, washing hands/face w/ warm washcloth)  · UB bathing: SBA  · LB bathing: CGA  · UB dressing: SBA  · LB dressing: modA (donning socks sitting EOB)  · Toileting: Tucker    Cognitive and Psychosocial Functioning:  · Overall cognitive status: AxO to self only, decreased short term memory, decreased problem solving, re-orientation to task  · Affect: Pleasantly confused       Mobility:  · Supine to sit:  SBA  · Transfers: Tucker from EOB up to RW, modA from reclining chair up to RW w/ 2 STS at reclining chair due to increased dizziness during oral care, dizziness disappeared  · Sitting balance:  SBA. · Standing balance:  Tucker w/ RW due to posterior lean  · . Functional Mobility: Tucker w/ RW due to posterior lean ~15 feet to sink in room  · Toilet/Shower Transfers: DNT             AM-PAC Daily Activity Inpatient   How much help for putting on and taking off regular lower body clothing?: A Lot  How much help for Bathing?: A Little  How much help for Toileting?: A Little  How much help for putting on and taking off regular upper body clothing?: A Little  How much help for taking care of personal grooming?: A Little  How much help for eating meals?: None  AM-PAC Inpatient Daily Activity Raw Score: 18  AM-PAC Inpatient ADL T-Scale Score : 38.66  ADL Inpatient CMS 0-100% Score: 46.65  ADL Inpatient CMS G-Code Modifier : CK    Treatment:  Self Care Training:   Cues were given for safety, sequence, UE/LE placement, visual cues, and balance. Activities performed today included grooming, LB dressing    Therapeutic Activity Training:   Therapeutic activity training was instructed today. Cues were given for safety, sequence, UE/LE placement, awareness, and balance. Activities performed today included bed mobility training, sup-sit, sit-stand, functional mobility, stand to sit      Safety: patient left in chair with chair alarm, call light within reach, RN notified, gait belt used. Assessment:  Pt is a 78 yo male admitted from home for acute alteration in mental status. Pt at baseline is Independent for ADLs needs assistance for high level IADLs and is Independent for functional transfers/mobility w/ AD. Pt currently presents w/ deficits in ADL and high level IADL independence, functional activity tolerance, dynamic sitting and standing balance and tolerance and functional transfers, BUE strength and cognition Pt would benefit from continued acute care OT services w/ discharge to SNF  Complexity: Moderate  Prognosis: Good, no significant barriers to participation at this time.    Plan  Times per week: 4x+  Times per day: Daily  Current Treatment Recommendations: Strengthening, ROM, Endurance Training, Functional Mobility Training, Cognitive Reorientation, Patient/Caregiver Education & Training, Positioning, Home Management Training, Pain Management, Equipment Evaluation, Education, & procurement, Cognitive/Perceptual Training, Balance Training, Safety Education & Training, Self-Care / ADL     Equipment: defer    Goals:  Pt goal: go home  Time Frame for STGs: discharge  Goal 1: Pt will perform UE ADLs Independent  Goal 2: Pt will perform LE ADLs SBA  Goal 3: Pt will perform toileting SBA  Goal 4: Pt will perform functional transfer w/ AD SBA  Goal 5: Pt will perform functional mobility w/ AD SBA  Goal 6: Pt will perform therex/theract in order to increase functional activity tolerance and dynamic standing balance    Treatment plan:  Pt will perform functional task in stand reaching in all 3 planes in order to increase dynamic standing balance and functional activity tolerance    Recommendations for NURSING activity: Up to chair for all 3 meals and up to standard commode for all toileting needs    Time:   Time in: 1129  Time out: 1204  Timed treatment minutes: 25 minutes  Total time: 35 minutes    Electronically signed by:    Esteban NGUYEN/L [id number]  12:09 PM,4/5/2021

## 2021-04-05 NOTE — ED NOTES
spo2 84% on room air. Pt placed on 4L via NC. Pt  Began to vomit. ZACK Flores notified.       Elder Edouard RN  04/04/21 Mehrdad Oconnor RN  04/04/21 2960

## 2021-04-05 NOTE — PROGRESS NOTES
Speech Language Pathology  Facility/Department: [address] [place] [address]   CLINICAL BEDSIDE SWALLOW EVALUATION    NAME: Doug Feng  : 1940  MRN: [id number]    IMPRESSIONS: Doug Feng was referred for a bedside swallow evaluation after being admitted to [facility] with acute respiratory failure with hypoxia, nausea/vomiting/abdominal pain, acute metabolic encephalopathy. Medical hx includes CAD, DM, HTN, COPD, dementia. Pt does have a history of dysphagia; he was last evaluated by SLP about 1 year ago and was recommended dysphagia 2 diet/nectar thick liquids at that time. Pt seen for today's evaluation seated upright in bed, alert, confused, cooperative. Oral mechanism examination WFL/no asymmetry. He was presented with PO trials of ice chips, thin liquids via tsp/cup/straw, puree, and regular solids. Oral stage mildly impaired with prolonged mastication, adequate AP transit/oral clearance. Suspect mild pharyngeal dysphagia with slowed swallow initiation, intact laryngeal elevation and no s/s aspiration across all trials. Recommend initiation of soft and bite-sized diet with thin liquids, general aspiration precautions. SLP will follow briefly. ADMISSION DATE: 2021  ADMITTING DIAGNOSIS: has Uncontrolled type II diabetes with peripheral autonomic neuropathy ([address] Hypertension, essential; 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[address] Choledocholithiasis; Generalized abdominal pain; COPD (chronic obstructive pulmonary disease) ([address] Abdominal pain, epigastric; Elevated LFTs; Abnormal findings on imaging of biliary tract; Chest pain; Coronary artery disease involving coronary bypass graft of native heart without angina pectoris; Acute encephalopathy; Vomiting; Sepsis ([address] Nausea vomiting and diarrhea; Fever; Metabolic encephalopathy; Impaired cognition; Generalized weakness;  Cholangitis due to bile duct calculus with obstruction; Oropharyngeal dysphagia; S/P BKA (below knee amputation) bilateral ([facility] Abdominal pain; Inguinal pain; Closed fracture of left hip ([address] and Acute alteration in mental status on their problem list.  ONSET DATE: this admission    Recent Chest Xray/CT of Chest: see chart    Date of Eval: 4/5/2021  Evaluating Therapist: Dandre Salcido    Current Diet level:  Current Diet : NPO  Current Liquid Diet : NPO      Primary Complaint  Patient Complaint: does not state    Pain:  Pain Assessment  Pain Assessment: 0-10  Pain Level: 0  Patient's Stated Pain Goal: No pain    Reason for Referral  Chela Urban was referred for a bedside swallow evaluation to assess the efficiency of his swallow function, identify signs and symptoms of aspiration and make recommendations regarding safe dietary consistencies, effective compensatory strategies, and safe eating environment. Impression  Dysphagia Diagnosis: Mild pharyngeal stage dysphagia;Mild to moderate oral stage dysphagia  Dysphagia Outcome Severity Scale: Level 4: Mild moderate dysphagia- Intermittent supervision/cueing. One - two diet consistencies restricted     Treatment Plan  Requires SLP Intervention: Yes  Duration/Frequency of Treatment: monitor 1-2x/LOS  D/C Recommendations: To be determined;24 hour supervision/assistance       Recommended Diet and Intervention  Diet Solids Recommendation: Dysphagia Soft and Bite-Sized (Dysphagia III)  Liquid Consistency Recommendation: Thin  Recommended Form of Meds: PO     Therapeutic Interventions: Diet tolerance monitoring;Patient/Family education; Therapeutic PO trials with SLP    Compensatory Swallowing Strategies  Compensatory Swallowing Strategies: Upright as possible for all oral intake;Assist feed    Treatment/Goals  Short-term Goals  Timeframe for Short-term Goals: length of admission  Goal 1: Pt will tolerate soft and bite-sized diet/thin liquids with adequate oral manipulation/clearance and no s/s aspiration.   Goal 2: Pt/caregivers will indicate understanding of all recommendations. General  Chart Reviewed: Yes  Behavior/Cognition: Alert; Cooperative;Confused  Respiratory Status: Room air  O2 Device: None (Room air)  Communication Observation: (cognitive communication deficits)  Follows Directions: Simple  Dentition: Adequate  Patient Positioning: Upright in bed  Baseline Vocal Quality: Normal  Prior Dysphagia History: yes  Consistencies Administered: Reg solid; Dysphagia Pureed (Dysphagia I); Thin - cup; Thin - straw; Ice Chips; Thin - teaspoon           Vision/Hearing  Vision  Vision: Within Functional Limits  Hearing  Hearing: Within functional limits    Oral Motor Deficits  Oral/Motor  Oral Motor: Within functional limits    Oral Phase Dysfunction  Oral Phase  Oral Phase: Exceptions     Indicators of Pharyngeal Phase Dysfunction   Pharyngeal Phase  Pharyngeal Phase: Exceptions    Prognosis  Prognosis  Prognosis for safe diet advancement: guarded  Barriers/Prognosis Comment: suspect LRD  Individuals consulted  Consulted and agree with results and recommendations: RN    Education  Patient Education: recommendations, plan  Patient Education Response: No evidence of learning  Safety Devices in place: Yes  Type of devices:  All fall risk precautions in place       Therapy Time  SLP Individual Minutes  Time In: 1000  Time Out: 2264  Minutes: Miya [address] [place] [address] Road  4/5/2021 11:02 AM

## 2021-04-06 LAB
ALBUMIN SERPL-MCNC: 3.6 GM/DL (ref 3.4–5)
ALP BLD-CCNC: 99 IU/L (ref 40–128)
ALT SERPL-CCNC: 10 U/L (ref 10–40)
AMYLASE: 49 U/L (ref 25–115)
ANION GAP SERPL CALCULATED.3IONS-SCNC: 11 MMOL/L (ref 4–16)
APTT: 36.5 SECONDS (ref 25.1–37.1)
AST SERPL-CCNC: 21 IU/L (ref 15–37)
BASOPHILS ABSOLUTE: 0.1 K/CU MM
BASOPHILS RELATIVE PERCENT: 0.4 % (ref 0–1)
BILIRUB SERPL-MCNC: 0.5 MG/DL (ref 0–1)
BUN BLDV-MCNC: 19 MG/DL (ref 6–23)
CALCIUM SERPL-MCNC: 9.5 MG/DL (ref 8.3–10.6)
CHLORIDE BLD-SCNC: 99 MMOL/L (ref 99–110)
CO2: 24 MMOL/L (ref 21–32)
CREAT SERPL-MCNC: 1.2 MG/DL (ref 0.9–1.3)
DIFFERENTIAL TYPE: ABNORMAL
EOSINOPHILS ABSOLUTE: 0.7 K/CU MM
EOSINOPHILS RELATIVE PERCENT: 6 % (ref 0–3)
GFR AFRICAN AMERICAN: >60 ML/MIN/1.73M2
GFR NON-AFRICAN AMERICAN: 58 ML/MIN/1.73M2
GLUCOSE BLD-MCNC: 110 MG/DL (ref 70–99)
GLUCOSE BLD-MCNC: 123 MG/DL (ref 70–99)
GLUCOSE BLD-MCNC: 143 MG/DL (ref 70–99)
GLUCOSE BLD-MCNC: 149 MG/DL (ref 70–99)
GLUCOSE BLD-MCNC: 155 MG/DL (ref 70–99)
GLUCOSE BLD-MCNC: 277 MG/DL (ref 70–99)
HCT VFR BLD CALC: 33.5 % (ref 42–52)
HEMOGLOBIN: 11.1 GM/DL (ref 13.5–18)
IMMATURE NEUTROPHIL %: 0.5 % (ref 0–0.43)
INR BLD: 1.18 INDEX
LIPASE: 21 IU/L (ref 13–60)
LYMPHOCYTES ABSOLUTE: 1.6 K/CU MM
LYMPHOCYTES RELATIVE PERCENT: 13.6 % (ref 24–44)
MCH RBC QN AUTO: 30.4 PG (ref 27–31)
MCHC RBC AUTO-ENTMCNC: 33.1 % (ref 32–36)
MCV RBC AUTO: 91.8 FL (ref 78–100)
MONOCYTES ABSOLUTE: 1.3 K/CU MM
MONOCYTES RELATIVE PERCENT: 10.8 % (ref 0–4)
NUCLEATED RBC %: 0 %
PDW BLD-RTO: 13.3 % (ref 11.7–14.9)
PLATELET # BLD: 236 K/CU MM (ref 140–440)
PMV BLD AUTO: 9 FL (ref 7.5–11.1)
POTASSIUM SERPL-SCNC: 3.5 MMOL/L (ref 3.5–5.1)
PROTHROMBIN TIME: 14.3 SECONDS (ref 11.7–14.5)
RBC # BLD: 3.65 M/CU MM (ref 4.6–6.2)
SEGMENTED NEUTROPHILS ABSOLUTE COUNT: 8.3 K/CU MM
SEGMENTED NEUTROPHILS RELATIVE PERCENT: 68.7 % (ref 36–66)
SODIUM BLD-SCNC: 134 MMOL/L (ref 135–145)
TOTAL IMMATURE NEUTOROPHIL: 0.06 K/CU MM
TOTAL NUCLEATED RBC: 0 K/CU MM
TOTAL PROTEIN: 6 GM/DL (ref 6.4–8.2)
WBC # BLD: 12.1 K/CU MM (ref 4–10.5)

## 2021-04-06 PROCEDURE — 97530 THERAPEUTIC ACTIVITIES: CPT

## 2021-04-06 PROCEDURE — 6360000002 HC RX W HCPCS: Performed by: INTERNAL MEDICINE

## 2021-04-06 PROCEDURE — C9113 INJ PANTOPRAZOLE SODIUM, VIA: HCPCS | Performed by: INTERNAL MEDICINE

## 2021-04-06 PROCEDURE — 82150 ASSAY OF AMYLASE: CPT

## 2021-04-06 PROCEDURE — 80053 COMPREHEN METABOLIC PANEL: CPT

## 2021-04-06 PROCEDURE — 2580000003 HC RX 258: Performed by: INTERNAL MEDICINE

## 2021-04-06 PROCEDURE — 6370000000 HC RX 637 (ALT 250 FOR IP): Performed by: INTERNAL MEDICINE

## 2021-04-06 PROCEDURE — 85730 THROMBOPLASTIN TIME PARTIAL: CPT

## 2021-04-06 PROCEDURE — 36415 COLL VENOUS BLD VENIPUNCTURE: CPT

## 2021-04-06 PROCEDURE — 93010 ELECTROCARDIOGRAM REPORT: CPT | Performed by: INTERNAL MEDICINE

## 2021-04-06 PROCEDURE — 85025 COMPLETE CBC W/AUTO DIFF WBC: CPT

## 2021-04-06 PROCEDURE — 97116 GAIT TRAINING THERAPY: CPT

## 2021-04-06 PROCEDURE — 83690 ASSAY OF LIPASE: CPT

## 2021-04-06 PROCEDURE — 85610 PROTHROMBIN TIME: CPT

## 2021-04-06 PROCEDURE — 1200000000 HC SEMI PRIVATE

## 2021-04-06 PROCEDURE — 2500000003 HC RX 250 WO HCPCS: Performed by: INTERNAL MEDICINE

## 2021-04-06 RX ORDER — DEXTROSE MONOHYDRATE 100 MG/ML
INJECTION, SOLUTION INTRAVENOUS CONTINUOUS
Status: DISCONTINUED | OUTPATIENT
Start: 2021-04-06 | End: 2021-04-07

## 2021-04-06 RX ORDER — FUROSEMIDE 40 MG/1
40 TABLET ORAL ONCE
Status: COMPLETED | OUTPATIENT
Start: 2021-04-06 | End: 2021-04-06

## 2021-04-06 RX ORDER — LISINOPRIL 5 MG/1
5 TABLET ORAL DAILY
Status: DISCONTINUED | OUTPATIENT
Start: 2021-04-06 | End: 2021-04-15 | Stop reason: HOSPADM

## 2021-04-06 RX ORDER — HEPARIN SODIUM 5000 [USP'U]/ML
5000 INJECTION, SOLUTION INTRAVENOUS; SUBCUTANEOUS 3 TIMES DAILY
Status: DISCONTINUED | OUTPATIENT
Start: 2021-04-06 | End: 2021-04-13

## 2021-04-06 RX ADMIN — LISINOPRIL 5 MG: 5 TABLET ORAL at 15:27

## 2021-04-06 RX ADMIN — HEPARIN SODIUM 5000 UNITS: 5000 INJECTION INTRAVENOUS; SUBCUTANEOUS at 15:27

## 2021-04-06 RX ADMIN — METRONIDAZOLE 500 MG: 500 INJECTION, SOLUTION INTRAVENOUS at 08:27

## 2021-04-06 RX ADMIN — PANTOPRAZOLE SODIUM 40 MG: 40 INJECTION, POWDER, FOR SOLUTION INTRAVENOUS at 08:28

## 2021-04-06 RX ADMIN — CARVEDILOL 6.25 MG: 6.25 TABLET, FILM COATED ORAL at 16:19

## 2021-04-06 RX ADMIN — CIPROFLOXACIN 400 MG: 2 INJECTION, SOLUTION INTRAVENOUS at 16:45

## 2021-04-06 RX ADMIN — CARVEDILOL 6.25 MG: 6.25 TABLET, FILM COATED ORAL at 08:27

## 2021-04-06 RX ADMIN — CIPROFLOXACIN 400 MG: 2 INJECTION, SOLUTION INTRAVENOUS at 05:58

## 2021-04-06 RX ADMIN — METRONIDAZOLE 500 MG: 500 INJECTION, SOLUTION INTRAVENOUS at 16:16

## 2021-04-06 RX ADMIN — SODIUM CHLORIDE, PRESERVATIVE FREE 10 ML: 5 INJECTION INTRAVENOUS at 10:51

## 2021-04-06 RX ADMIN — DEXTROSE MONOHYDRATE: 100 INJECTION, SOLUTION INTRAVENOUS at 01:07

## 2021-04-06 RX ADMIN — DILTIAZEM HYDROCHLORIDE 60 MG: 60 TABLET, FILM COATED ORAL at 08:28

## 2021-04-06 RX ADMIN — FUROSEMIDE 40 MG: 40 TABLET ORAL at 15:27

## 2021-04-06 RX ADMIN — ASPIRIN 81 MG CHEWABLE TABLET 81 MG: 81 TABLET CHEWABLE at 08:27

## 2021-04-06 RX ADMIN — ATORVASTATIN CALCIUM 40 MG: 40 TABLET, FILM COATED ORAL at 08:28

## 2021-04-06 ASSESSMENT — PAIN SCALES - GENERAL: PAINLEVEL_OUTOF10: 0

## 2021-04-06 NOTE — PROGRESS NOTES
Patient had D5% with lactated ringers continuous fluids running. Patient had high blood sugars all day per shift change nurse. Patient was changed to NS continuous via md palla. Patient was consulted with endocrinology. Patient just declined from 383 bs at 11am to 29 blood sugar currently. Given D50% iv push and patient regained consciousness at 131 blood sugar. Patient was speaking post treatment and md maravilla bedside. Patient placed on D5% iv continuous fluids via taiwo quinteros. Recheck blood sugar in 30mins. Patient was not alert when found and was seizing in bed with arms ridgid. Patient is asymptomatic and alert and talking will continue  To follow and assess.

## 2021-04-06 NOTE — PROGRESS NOTES
Physical Therapy    Physical Therapy Treatment Note  Name: Araceli Bueno MRN: [id number] :   1940   Date:  2021   Admission Date: 2021 Room:  [address] [place] [address]   Restrictions/Precautions:  Restrictions/Precautions  Restrictions/Precautions: General Precautions, Weight Bearing, Fall Risk Lower Extremity Weight Bearing Restrictions  Left Lower Extremity Weight Bearing: Weight Bearing As Tolerated       Subjective:  Patient states: \"Thank you guys for helping me\"  Pain:   Location, Type, Intensity (0/10 to 10/10): Denies; 0/10    Objective:    Observation:  Pt supine in bed upon entry    Treatment, including education/measures:  Pt agreeable to participating in therapy at this time. Therapeutic Activity Training:   Therapeutic activity training was instructed today. Cues were given for safety, sequence, UE/LE placement, awareness, and balance. Activities performed today included bed mobility training, sup-sit, sit-stand. Pt completed supine to sit with CGAx1/minAx1 (briefly for trunk support) with HOB elevated and with increased time for task completion. Pt completed sit to stand with minAx1 with verbal cues to push through bed and avoid pulling on walker. Pt completed stand to sit onto chair with CGAx1 with verbal cues to feel chair against back of legs, reach back, and sit slowly; poor eccentric control noted. Gait Training:  Cues were given for safety, sequence, device management, balance, posture, awareness, path. Pt ambulated 75 feet + 75 feet with CGAx1 with a front wheeled walker with a decreased gait speed, a decreased step length bilaterally, and an intermittent unsteady gait. Pt provided with verbal and tactile cues for BLE placement, walker placement, and sequence throughout ambulation (especially when changing direction). Pt provided with verbal cues for directions in order to successfully navigate hallway and return to correct room.     Safety  Patient left safely in the chair, with call light/phone in reach with alarm applied. Gait belt and mask were used for transfers and gait. Assessment / Impression:       Patient's tolerance of treatment:  Good; patient tolerated ambulation in hallway today  Adverse Reaction: none  Significant change in status and impact:  none  Barriers to improvement:  Decreased endurance    Plan for Next Session:    Continue progressing toward goals per plan of care. Progress independence with transfers and ambulation as tolerated and appropriate. Progress ambulation distance as tolerated and appropriate. Time in:  1115  Time out:  1156  Timed treatment minutes:  41  Total treatment time:  41    Previously filed items:  Social/Functional History  Lives With: Spouse  Type of Home: Assisted living  Home Layout: One level  Home Access: Level entry  Bathroom Shower/Tub: Walk-in shower  Bathroom Toilet: Handicap height  Bathroom Equipment: Shower chair  Home Equipment: Rolling walker(bilateral lower extremity prosthetics (bilateral BKA))  ADL Assistance: Independent  Ambulation Assistance: Independent(mod I with RW)  Transfer Assistance: Independent     Long term goals  Time Frame for Long term goals :  In one week:  Long term goal 1: Pt will complete all bed mobility with supervision  Long term goal 2: Pt will complete sit <> stand transfers with supervision  Long term goal 3: Pt will ambulate 400 feet with supervision with Rajinder Garduno term goal 4: Pt will independently complete 3 sets of 10 reps of BLE AROM exercises in available and allowed ROM    Electronically signed by:      Armond Shaver PT, DPT  License #: [id number]

## 2021-04-06 NOTE — PROGRESS NOTES
Patient was on 5% dextrose continuous fluids and since has went from 131 blood sugar to 80 blood sugar. Called md maravilla and updated on status patient and patient was increased to 10% dextrose continuous fluids. Since patient has remained stable at 155 blood sugar check approx 3am. Declined coverage and patient was now alert oriented and stable.  Will continue to assess an eval patient

## 2021-04-06 NOTE — CONSULTS
Endocrinology   Consult Note      Dear Doctor  Meño Sutton for the Consult     Pt. Was Admitted for : Confusion nodules    Reason for Consult: Better control of blood glucose      History Obtained From:  Patient/ EMR       HISTORY OF PRESENT ILLNESS:                The patient is a 79 y.o. male with significant past medical history of which CAD, CABG, MS 2, angioplasty with stent PAD diabetes mellitus tension renal calculi bladder stone disease COPD, multiple ERCP procedures gallbladder stones hyperlipidemia history of pituitary adenoma dementia was [facility] to ER for worsening of confusion this evening his blood glucose was read to be 29 was confused. His initial blood glucose earlier was over 400 treated with bili correction bolus Humalog insulin has been running D5. Not been eating much as ordered for clear fluid diet has been running low blood glucose even at nursing home patient has has CT scan done and has suprasellar and Parasellar mass with pituitary tumor. I was  consulted for better control of blood glucose. ROS:   Pt's ROS done in detail. Abnormal ROS are noted in Medical and Surgical History Section below: Other Medical History:        Diagnosis Date    Arthritis     Biliary stones 2015    CAD (coronary artery disease)     COPD (chronic obstructive pulmonary disease) (HCC)     Diabetes mellitus (HCC)     History of blood transfusion     Hx of angiography 3/11/2014    Pulmonary Arteries: Small sliding hiatal hernia. Mod coronary artery calcifications. Mild bilat gynecomastia.     Hyperlipidemia     Hypertension     Kidney stone      Surgical History:        Procedure Laterality Date    ABDOMEN SURGERY      stones in bile duct removed x3    CARDIAC SURGERY      CABG x 2 in 2004 at [facility] - Dr. Brent Short, ESOPHAGUS      ERCP  03/13/14    w/ dilation of papilla    ERCP noted without significant change since the prior examination of November 30, 2020. Findings could be related to prior biliary intervention. 3. Dilated common bile duct and pancreatic duct are noted without significant change since the prior examination of July 2, 2020. If there is concern for biliary obstruction, an MRCP can be obtained. Correlate with laboratory values. 4. Small hiatal hernia. Xr Chest Portable    Result Date: 4/5/2021  EXAMINATION: ONE XRAY VIEW OF THE CHEST 4/5/2021 7:51 am COMPARISON: 04/04/2020 HISTORY: ORDERING SYSTEM PROVIDED HISTORY: hypoxia, ?aspiration TECHNOLOGIST PROVIDED HISTORY: Reason for exam:->hypoxia, ?aspiration Reason for Exam: hypoxia, ?aspiration Acuity: Acute FINDINGS: The patient is status post sternotomy. The heart size is normal.  There is no pneumothorax. There is minimal left basilar atelectasis or scarring. The bony thorax is grossly intact. No evidence of acute cardiopulmonary disease. Minimal left basilar atelectasis or scarring. Xr Chest Portable    Result Date: 4/4/2021  EXAMINATION: ONE XRAY VIEW OF THE CHEST 4/4/2021 6:22 pm COMPARISON: Chest radiograph 02/19/2021. HISTORY: ORDERING SYSTEM PROVIDED HISTORY: ams TECHNOLOGIST PROVIDED HISTORY: Reason for exam:->ams FINDINGS: Status post median sternotomy. The cardiomediastinal silhouette is unchanged. No pneumothorax, vascular congestion, consolidation, or pleural effusion is identified. No acute osseous abnormality. No acute process. Cta Pulmonary W Contrast    Result Date: 4/5/2021  EXAMINATION: CTA OF THE CHEST, 4/5/2021 12:07 am     The pulmonary arteries are suboptimally opacified with contrast.  Motion artifact limits evaluation of the pulmonary arteries. There is no gross large or central pulmonary emboli identified. Smaller more peripheral pulmonary emboli cannot be entirely excluded. Bilateral lower lobe atelectasis. Small hiatal hernia.        Scheduled Medicines   Medications:  sodium chloride flush  10 mL Intravenous 2 times per day    aspirin  81 mg Oral Daily    atorvastatin  40 mg Oral Daily    carvedilol  6.25 mg Oral BID WC    dilTIAZem  60 mg Oral BID    gabapentin  100 mg Oral Nightly    latanoprost  1 drop Both Eyes Nightly    pantoprazole  40 mg Intravenous Daily    ciprofloxacin  400 mg Intravenous Q12H    metroNIDAZOLE  500 mg Intravenous Q8H    [START ON 4/6/2021] insulin lispro  0-12 Units Subcutaneous 2 times per day    [START ON 4/6/2021] insulin lispro  0-12 Units Subcutaneous TID WC    sodium chloride flush  10 mL Intravenous BID      Infusions:    sodium chloride      dextrose      lactated ringers           IMPRESSION    Patient Active Problem List   Diagnosis    Uncontrolled type II diabetes with peripheral autonomic neuropathy (HCC)    Hypertension, essential    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)    Choledocholithiasis    Generalized abdominal pain    COPD (chronic obstructive pulmonary disease) (HCC)    Abdominal pain, epigastric    Elevated LFTs    Abnormal findings on imaging of biliary tract    Chest pain    Coronary artery disease involving coronary bypass graft of native heart without angina pectoris    Acute encephalopathy    Vomiting    Sepsis (HCC)    Nausea vomiting and diarrhea    Fever    Metabolic encephalopathy    Impaired cognition    Generalized weakness    Cholangitis due to bile duct calculus with obstruction    Oropharyngeal dysphagia    S/P BKA (below knee amputation) bilateral (HCC)    Abdominal pain    Inguinal pain    Closed fracture of left hip (HCC)    Acute alteration in mental status         RECOMMENDATIONS:      1. Reviewed POC blood glucose . Labs and X ray results   2. Reviewed Home and Current Medicines   3. Will Start On Correction bolus Humalog  Insulin regime   4. Monitor Blood glucose frequently   5.  Modify  the dose of Insulin  as needed        Will follow with you  Again thank you for sharing pt's care with me.      Truly yours,       Virgen Smith MD

## 2021-04-06 NOTE — PROGRESS NOTES
DOING WELL STILL CONFUSED NO ABD COMPLAINTS  VITALS STABLE   LABS NOTED LFTS OK  WILL CPM ADVANCE DIET D/W DAUGHTER AND PLAN FOR ERCP ON 04/08/21

## 2021-04-06 NOTE — PROGRESS NOTES
Nightly    latanoprost  1 drop Both Eyes Nightly    pantoprazole  40 mg Intravenous Daily    ciprofloxacin  400 mg Intravenous Q12H    metroNIDAZOLE  500 mg Intravenous Q8H    insulin lispro  0-12 Units Subcutaneous 2 times per day    insulin lispro  0-12 Units Subcutaneous TID     sodium chloride flush  10 mL Intravenous BID      Infusions:    dextrose 75 mL/hr at 04/06/21 0107    sodium chloride      dextrose Stopped (04/06/21 0039)     PRN Meds: sodium chloride flush, 10 mL, PRN  sodium chloride, 25 mL, PRN  polyethylene glycol, 17 g, Daily PRN  acetaminophen, 650 mg, Q6H PRN    Or  acetaminophen, 650 mg, Q6H PRN  ondansetron, 4 mg, Q6H PRN  cholestyramine light, 4 g, BID PRN  glucose, 15 g, PRN  dextrose, 12.5 g, PRN  glucagon (rDNA), 1 mg, PRN  dextrose, 100 mL/hr, PRN            Pertinent New Labs & Imaging Studies     CBC with Differential:    Lab Results   Component Value Date    WBC 12.1 04/06/2021    RBC 3.65 04/06/2021    HGB 11.1 04/06/2021    HCT 33.5 04/06/2021     04/06/2021    MCV 91.8 04/06/2021    MCH 30.4 04/06/2021    MCHC 33.1 04/06/2021    RDW 13.3 04/06/2021    NRBC 1 02/21/2021    SEGSPCT 68.7 04/06/2021    BANDSPCT 4 02/21/2021    LYMPHOPCT 13.6 04/06/2021    MONOPCT 10.8 04/06/2021    MYELOPCT 1 09/02/2019    BASOPCT 0.4 04/06/2021    MONOSABS 1.3 04/06/2021    LYMPHSABS 1.6 04/06/2021    EOSABS 0.7 04/06/2021    BASOSABS 0.1 04/06/2021    DIFFTYPE AUTOMATED DIFFERENTIAL 04/06/2021     CMP:    Lab Results   Component Value Date     04/06/2021    K 3.5 04/06/2021    CL 99 04/06/2021    CO2 24 04/06/2021    BUN 19 04/06/2021    CREATININE 1.2 04/06/2021    GFRAA >60 04/06/2021    LABGLOM 58 04/06/2021    GLUCOSE 149 04/06/2021    PROT 6.0 04/06/2021    PROT 8.0 08/30/2012    LABALBU 3.6 04/06/2021    CALCIUM 9.5 04/06/2021    BILITOT 0.5 04/06/2021    ALKPHOS 99 04/06/2021    AST 21 04/06/2021    ALT 10 04/06/2021     Ct Head Wo Contrast    Result Date: 4/4/2021  EXAMINATION: CT OF THE HEAD WITHOUT CONTRAST  4/4/2021 8:03 pm TECHNIQUE: CT of the head was performed without the administration of intravenous contrast. Dose modulation, iterative reconstruction, and/or weight based adjustment of the mA/kV was utilized to reduce the radiation dose to as low as reasonably achievable. COMPARISON: CT brain 02/17/2021. MRI brain 12/01/2020. HISTORY: ORDERING SYSTEM PROVIDED HISTORY: ams TECHNOLOGIST PROVIDED HISTORY: Reason for exam:->ams Has a \"code stroke\" or \"stroke alert\" been called? ->No Decision Support Exception->Emergency Medical Condition (MA) Reason for Exam: ams Acuity: Acute Type of Exam: Initial Additional signs and symptoms: Hypertension; Hypoglycemia; Altered Mental Status Relevant Medical/Surgical History: weakness FINDINGS: BRAIN/VENTRICLES: A large mass within the sella with extension into the left parasellar space anterior to the trang is again seen without significant change. No hydrocephalus or herniation. Mild generalized parenchymal volume loss and chronic periventricular white matter hypoattenuation are seen. No evidence of acute territorial infarction or acute intracranial hemorrhage. ORBITS: The visualized portion of the orbits demonstrate no acute abnormality. SINUSES: The visualized paranasal sinuses and mastoid air cells demonstrate no acute abnormality. SOFT TISSUES/SKULL:  No acute abnormality of the visualized skull or soft tissues. 1. No acute intracranial abnormality. 2. Grossly unchanged large sellar/parasellar mass. Ct Abdomen Pelvis W Iv Contrast Additional Contrast? None    Result Date: 4/5/2021  EXAMINATION: CT OF THE ABDOMEN AND PELVIS WITH CONTRAST 4/5/2021 12:07 am TECHNIQUE: CT of the abdomen and pelvis was performed with the administration of intravenous contrast. Multiplanar reformatted images are provided for review.  Dose modulation, iterative reconstruction, and/or weight based adjustment of the mA/kV was utilized to reduce the radiation dose to as low as reasonably achievable. COMPARISON: CT abdomen pelvis dated November 30, 2020 HISTORY: ORDERING SYSTEM PROVIDED HISTORY: vomiting TECHNOLOGIST PROVIDED HISTORY: Reason for exam:->vomiting Additional Contrast?->None Decision Support Exception->Emergency Medical Condition (MA) Reason for Exam: abd pain vomiting Acuity: Acute Type of Exam: Initial Additional signs and symptoms: ams Relevant Medical/Surgical History: isovue 370 80 ml FINDINGS: Lower Chest: Bibasilar atelectasis is present. There is a small hiatal hernia. Organs: There has been a cholecystectomy. Pneumobilia is noted. Gas is seen within the common bile duct. Findings could be related to biliary intervention. The spleen, adrenal glands, and kidneys are unremarkable. The pancreatic duct is dilated. The pancreatic duct measures 5 mm and is not significantly changed since the prior examination. The common bile duct is also dilated and measures 9 mm. This is not significantly changed since the prior study. GI/Bowel: There is no evidence of bowel obstruction. The appendix is normal. Pelvis: The bladder is unremarkable. There is no free fluid. Peritoneum/Retroperitoneum: There is no free air or lymphadenopathy. Bones/Soft Tissues: No destructive osseous lesions are identified. 1. No acute findings. 2. Pneumobilia is noted without significant change since the prior examination of November 30, 2020. Findings could be related to prior biliary intervention. 3. Dilated common bile duct and pancreatic duct are noted without significant change since the prior examination of July 2, 2020. If there is concern for biliary obstruction, an MRCP can be obtained. Correlate with laboratory values. 4. Small hiatal hernia.      Xr Chest Portable    Result Date: 4/6/2021  EXAMINATION: ONE XRAY VIEW OF THE CHEST 4/5/2021 7:51 am COMPARISON: 04/04/2020 HISTORY: ORDERING SYSTEM PROVIDED HISTORY: hypoxia, ?aspiration TECHNOLOGIST PROVIDED HISTORY: Reason for exam:->hypoxia, ?aspiration Reason for Exam: hypoxia, ?aspiration Acuity: Acute FINDINGS: The patient is status post sternotomy. The heart size is normal.  There is no pneumothorax. There is minimal left basilar atelectasis or scarring. The bony thorax is grossly intact. No evidence of acute cardiopulmonary disease. Minimal left basilar atelectasis or scarring. Xr Chest Portable    Result Date: 4/4/2021  EXAMINATION: ONE XRAY VIEW OF THE CHEST 4/4/2021 6:22 pm COMPARISON: Chest radiograph 02/19/2021. HISTORY: ORDERING SYSTEM PROVIDED HISTORY: ams TECHNOLOGIST PROVIDED HISTORY: Reason for exam:->ams FINDINGS: Status post median sternotomy. The cardiomediastinal silhouette is unchanged. No pneumothorax, vascular congestion, consolidation, or pleural effusion is identified. No acute osseous abnormality. No acute process. Cta Pulmonary W Contrast    Result Date: 4/5/2021  EXAMINATION: CTA OF THE CHEST, 4/5/2021 12:07 am TECHNIQUE: CTA of the chest was performed after the administration of intravenous contrast.  Multiplanar reformatted images are provided for review. MIP images are provided for review. Dose modulation, iterative reconstruction, and/or weight based adjustment of the mA/kV was utilized to reduce the radiation dose to as low as reasonably achievable.  COMPARISON: None HISTORY: ORDERING SYSTEM PROVIDED HISTORY:  Hypoxia TECHNOLOGIST PROVIDED HISTORY: Reason for exam:  Hypoxia Decision Support Exception:  Emergency Medical Condition (MA) Reason for Exam:  Hypoxia Acuity:  Acute Type of Exam:  Initial Additional signs and symptoms:  Pt is confused Relevant Medical/Surgical History:  Isovue 370 80 mL ORDERING SYSTEM PROVIDED HISTORY:  Vomiting TECHNOLOGIST PROVIDED HISTORY: Reason for exam:  Vomiting Additional Contrast?   None Decision Support Exception:  Emergency Medical Condition (MA) Reason for Exam:  Abd pain vomiting Acuity:  Acute Type of Exam:  Initial Additional signs and symptoms:  AMS Relevant Medical/Surgical History:  Isovue 370 80 ml FINDINGS: Pulmonary Arteries: The pulmonary arteries are suboptimally opacified with contrast.  Motion artifact limits evaluation of the pulmonary arteries. There is no gross large or central pulmonary emboli identified. Smaller more peripheral pulmonary emboli cannot be entirely excluded. Mediastinum: No evidence of mediastinal lymphadenopathy. The heart and pericardium demonstrate no acute abnormality. There is no acute abnormality of the thoracic aorta. Atherosclerotic disease of the aorta is seen. Coronary artery calcifications are noted. There is a small hiatal hernia. Lungs/pleura: Bilateral lower lobe atelectasis is seen. Motion artifact limits evaluation of the lung parenchyma. There is no evidence of a pneumothorax or pleural effusion. Upper Abdomen: Please see CT abdomen and pelvis report from the same date. Soft Tissues/Bones: No acute bone or soft tissue abnormality. The pulmonary arteries are suboptimally opacified with contrast.  Motion artifact limits evaluation of the pulmonary arteries. There is no gross large or central pulmonary emboli identified. Smaller more peripheral pulmonary emboli cannot be entirely excluded. Bilateral lower lobe atelectasis. Small hiatal hernia.        Assessment and Plan:   Lissa Dodson is a 79 y.o.  male  who presents with Confusion    Acute metabolic/septic encephalopathy  Per daughter he gets off-and-on confused with biliary sludge  Likely UTI given elevated white count  Elevated WBC, currently getting better  Rapid Covid test negative  Monitor BMP    Hypoglycemia  Type 2 diabetes  Hemoglobin dropped in the evening after receiving short acting insulin  Monitor blood glucose closely  Endocrinology recommendations appreciated-currently on dextrose drip  Per daughter patient has similar issues with elevated blood sugars in the morning and going down at evening    Acute respiratory failure with hypoxia  ? Aspiration  Currently on 4 L of oxygen  Chest x-ray and CTA chest with no acute process  CTA ruled out PE  proBNP slightly elevated  Echo on 11/30/2020 showed low EF with 40%.   There is evidence of apical ballooning, suggestive of stress-induced cardiomyopathy  Trial Lasix  Continue Cipro and added Flagyl    UTI  Pyuria  Urine culture pending  Continue Cipro    Abdominal pain  CT with no acute process  LFT within normal limits  Patient had similar episode in the past and had CBD stones, requiring ERCP  GI recommendations appreciated-plan for possible ERCP to sweep the bile duct  Continue Cipro and Flagyl for now    Heart failure with reduced EF  Continue Coreg  Continue aspirin  Continue Cardizem  Add Low dose Lisinopril     HTN  Continue Cardizem, Coreg and added lisinopril    COPD  Hyperlipidemia  Pituitary tumor  Possible dementia    Diet DIET GENERAL; Carb Control: 5 carb choices (75 gms)/meal   DVT Prophylaxis [] Lovenox, [x]  Heparin, [] SCDs, [] Ambulation   GI Prophylaxis [x] PPI,  [] H2 Blocker,  [] Carafate,  [] Diet/Tube Feeds   Code Status Full Code   Disposition Patient requires continued admission due to Acute encephalopathy   MDM [] Low, [x] Moderate,[]  High     Electronically signed by Jessica Wallace MD on 4/6/2021 at 12:46 PM

## 2021-04-06 NOTE — PROGRESS NOTES
Progress Note( Dr. Zuhair Gonzalez)  4/6/2021  Subjective:   Admit Date: 4/4/2021  PCP: Jas Salazar MD    Admitted For : Confusion    Consulted For: Better control of blood glucose    Interval History: Feels better on IV dextrose infusion more awake and alert    Denies any chest pains,   Mild SOB . Denies nausea or vomiting. No new bowel or bladder symptoms. Intake/Output Summary (Last 24 hours) at 4/6/2021 0630  Last data filed at 4/5/2021 1811  Gross per 24 hour   Intake 418.33 ml   Output 200 ml   Net 218.33 ml       DATA    CBC:   Recent Labs     04/04/21 1912 04/05/21  1221   WBC 8.7 14.5*   HGB 12.3* 11.1*    219    CMP:  Recent Labs     04/04/21 1912 04/05/21  1221   * 132*   K 4.0 4.1   CL 96* 96*   CO2 27 24   BUN 14 21   CREATININE 1.1 1.3   CALCIUM 9.9 8.9   PROT 7.6  --    LABALBU 4.0  --    BILITOT 0.6  --    ALKPHOS 121  --    AST 23  --    ALT 10  --      Lipids:   Lab Results   Component Value Date    CHOL 144 11/12/2018    HDL 62 03/15/2019    TRIG 101 11/12/2018     Glucose:  Recent Labs     04/05/21  2350 04/06/21  0107 04/06/21  0305   POCGLU 80 110* 155*     [id number]:  Lab Results   Component Value Date    LABA1C 7.9 12/01/2020     High Sensitivity TSH:   Lab Results   Component Value Date    TSHHS 0.777 12/03/2020     Free T3:   Lab Results   Component Value Date    FT3 2.7 10/21/2015     Free T4:  Lab Results   Component Value Date    T4FREE 1.26 11/05/2018       Ct Head Wo Contrast    Result Date: 4/4/2021  EXAMINATION: CT OF THE HEAD WITHOUT CONTRAST  4/4/2021 8:03 pm TECHNIQUE: CT of the head was performed without the administration of intravenous contrast. Dose modulation, iterative reconstruction, and/or weight based adjustment of the mA/kV was utilized to reduce the radiation dose to as low as reasonably achievable. COMPARISON: CT brain 02/17/2021. MRI brain 12/01/2020.  HISTORY: ORDERING SYSTEM PROVIDED HISTORY: ams TECHNOLOGIST PROVIDED HISTORY: Reason for 100 mg Oral Nightly    latanoprost  1 drop Both Eyes Nightly    pantoprazole  40 mg Intravenous Daily    ciprofloxacin  400 mg Intravenous Q12H    metroNIDAZOLE  500 mg Intravenous Q8H    insulin lispro  0-12 Units Subcutaneous 2 times per day    insulin lispro  0-12 Units Subcutaneous TID     sodium chloride flush  10 mL Intravenous BID      Infusions:    dextrose 75 mL/hr at 04/06/21 0107    sodium chloride      dextrose Stopped (04/06/21 0039)         Objective:   Vitals: BP (!) 155/70   Pulse 82   Temp 97.8 °F (36.6 °C) (Oral)   Resp 16   Ht 5' 7\" (1.702 m) Comment: per Epic history 5' 5\" to 5' 11\"  Wt 138 lb 3.2 oz (62.7 kg)   SpO2 96%   BMI 21.65 kg/m²   General appearance: alert and cooperative with exam  Neck: no JVD or bruit  Thyroid : Normal lobes   Lungs: Has Vesicular Breath sounds   Heart:  regular rate and rhythm  Abdomen: soft, non-tender; bowel sounds normal; no masses,  no organomegaly  Musculoskeletal: Normal  Extremities: extremities normal, , no edema  Neurologic:  Awake, alert, oriented to name, place and time. Cranial nerves II-XII are grossly intact. Motor is  intact. Sensory is intact. ,  and gait is normal.    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)     Choledocholithiasis     Generalized abdominal pain     COPD (chronic obstructive pulmonary disease) (HCC)     Abdominal pain, epigastric     Elevated LFTs     Abnormal findings on imaging of biliary tract     Chest pain     Coronary artery disease involving coronary bypass graft of native heart without angina pectoris     Acute encephalopathy     Vomiting     Sepsis (HCC)     Nausea vomiting and diarrhea     Fever     Metabolic encephalopathy     Impaired cognition     Generalized weakness     Cholangitis due to bile duct calculus with obstruction     Oropharyngeal dysphagia     S/P BKA (below knee amputation) bilateral (HCC)     Abdominal pain     Inguinal pain     Closed fracture of left hip (HCC)     Acute alteration in mental status      Plan:     1. Reviewed POC blood glucose . Labs and X ray results   2. Reviewed Current Medicines   3. Now on 10 IV and blood glucose is stable  4. On Correction bolus Humalog  Insulin regime    5. Monitor Blood glucose frequently   6. Modified  the dose of Insulin/ other medicines as needed   7. Will follow     .      Kulwinder Beckham MD

## 2021-04-06 NOTE — PROGRESS NOTES
Occupational Therapy      Occupational Therapy Treatment Note  Name: Britni Brantley MRN: [id number] :   1940   Date:  2021   Admission Date: 2021 Room:  [address] [place] [address]   Restrictions/Precautions:  Restrictions/Precautions  Restrictions/Precautions: General Precautions, Weight Bearing, Fall Risk  WBAT on LLE  Communication with other providers:  Nursing handoff  Subjective:  Patient states:  Pt confused, not wanting to participate after standing due to thinking people would think he was stealing  Pain:   Location, Type, Intensity (0/10 to 10/10):  No  Objective:    Observation:  Pt received sitting upright in chair, agreeable to therapy  Objective Measures:  WFL  Treatment, including education:  Therapeutic Activity Training:   Therapeutic activity training was instructed today. Cues were given for safety, sequence, UE/LE placement, awareness, and balance. Activities performed today included STS    STS from reclining chair up to sink modA w/ 2 trials.  Pt returned to reclining chair, chair alarm on, call light at side, nursing notified    Assessment / Impression:        Patient's tolerance of treatment:  Pt tolerated treatment well   Adverse Reaction: none  Significant change in status and impact:  none  Barriers to improvement:  None    Plan for Next Session:    Pt will perform functional task in stand reaching in all 3 planes to increase dynamic standing balance    Time in:  1306  Time out:  1320  Timed treatment minutes:  14 minutes  Total treatment time:  14 minutes  Electronically signed by:    Demarcus NGUYEN/IONA [id number]  2:16 PM,2021     Previously filed values:    Goals:  Pt goal: go home  Time Frame for STGs: discharge  Goal 1: Pt will perform UE ADLs Independent  Goal 2: Pt will perform LE ADLs SBA  Goal 3: Pt will perform toileting SBA  Goal 4: Pt will perform functional transfer w/ AD SBA  Goal 5: Pt will perform functional mobility w/ AD SBA  Goal 6: Pt will perform therex/theract in order to increase functional activity tolerance and dynamic standing balance

## 2021-04-07 ENCOUNTER — ANESTHESIA EVENT (OUTPATIENT)
Dept: ENDOSCOPY | Age: 81
DRG: 444 | End: 2021-04-07
Payer: MEDICARE

## 2021-04-07 ENCOUNTER — APPOINTMENT (OUTPATIENT)
Dept: GENERAL RADIOLOGY | Age: 81
DRG: 444 | End: 2021-04-07
Payer: MEDICARE

## 2021-04-07 LAB
ALBUMIN SERPL-MCNC: 3.4 GM/DL (ref 3.4–5)
ALP BLD-CCNC: 92 IU/L (ref 40–128)
ALT SERPL-CCNC: 10 U/L (ref 10–40)
AMYLASE: 40 U/L (ref 25–115)
ANION GAP SERPL CALCULATED.3IONS-SCNC: 11 MMOL/L (ref 4–16)
APTT: 36 SECONDS (ref 25.1–37.1)
AST SERPL-CCNC: 22 IU/L (ref 15–37)
BASOPHILS ABSOLUTE: 0 K/CU MM
BASOPHILS RELATIVE PERCENT: 0.5 % (ref 0–1)
BILIRUB SERPL-MCNC: 0.4 MG/DL (ref 0–1)
BUN BLDV-MCNC: 14 MG/DL (ref 6–23)
CALCIUM SERPL-MCNC: 8.7 MG/DL (ref 8.3–10.6)
CHLORIDE BLD-SCNC: 96 MMOL/L (ref 99–110)
CO2: 26 MMOL/L (ref 21–32)
CREAT SERPL-MCNC: 1.2 MG/DL (ref 0.9–1.3)
CULTURE: ABNORMAL
CULTURE: ABNORMAL
DIFFERENTIAL TYPE: ABNORMAL
EOSINOPHILS ABSOLUTE: 0.8 K/CU MM
EOSINOPHILS RELATIVE PERCENT: 9.9 % (ref 0–3)
GFR AFRICAN AMERICAN: >60 ML/MIN/1.73M2
GFR NON-AFRICAN AMERICAN: 58 ML/MIN/1.73M2
GLUCOSE BLD-MCNC: 114 MG/DL (ref 70–99)
GLUCOSE BLD-MCNC: 124 MG/DL (ref 70–99)
GLUCOSE BLD-MCNC: 166 MG/DL (ref 70–99)
GLUCOSE BLD-MCNC: 171 MG/DL (ref 70–99)
GLUCOSE BLD-MCNC: 176 MG/DL (ref 70–99)
GLUCOSE BLD-MCNC: 220 MG/DL (ref 70–99)
GLUCOSE BLD-MCNC: 268 MG/DL (ref 70–99)
HCT VFR BLD CALC: 31.8 % (ref 42–52)
HEMOGLOBIN: 10.8 GM/DL (ref 13.5–18)
IMMATURE NEUTROPHIL %: 0.2 % (ref 0–0.43)
INR BLD: 1.23 INDEX
LIPASE: 28 IU/L (ref 13–60)
LYMPHOCYTES ABSOLUTE: 1.6 K/CU MM
LYMPHOCYTES RELATIVE PERCENT: 19.3 % (ref 24–44)
Lab: ABNORMAL
MCH RBC QN AUTO: 30.9 PG (ref 27–31)
MCHC RBC AUTO-ENTMCNC: 34 % (ref 32–36)
MCV RBC AUTO: 90.9 FL (ref 78–100)
MONOCYTES ABSOLUTE: 1.1 K/CU MM
MONOCYTES RELATIVE PERCENT: 12.9 % (ref 0–4)
NUCLEATED RBC %: 0 %
PDW BLD-RTO: 13.2 % (ref 11.7–14.9)
PLATELET # BLD: 240 K/CU MM (ref 140–440)
PMV BLD AUTO: 9 FL (ref 7.5–11.1)
POTASSIUM SERPL-SCNC: 3.2 MMOL/L (ref 3.5–5.1)
PROTHROMBIN TIME: 14.9 SECONDS (ref 11.7–14.5)
RBC # BLD: 3.5 M/CU MM (ref 4.6–6.2)
SEGMENTED NEUTROPHILS ABSOLUTE COUNT: 4.6 K/CU MM
SEGMENTED NEUTROPHILS RELATIVE PERCENT: 57.2 % (ref 36–66)
SODIUM BLD-SCNC: 133 MMOL/L (ref 135–145)
SPECIMEN: ABNORMAL
TOTAL IMMATURE NEUTOROPHIL: 0.02 K/CU MM
TOTAL NUCLEATED RBC: 0 K/CU MM
TOTAL PROTEIN: 5.7 GM/DL (ref 6.4–8.2)
WBC # BLD: 8.1 K/CU MM (ref 4–10.5)

## 2021-04-07 PROCEDURE — 97530 THERAPEUTIC ACTIVITIES: CPT

## 2021-04-07 PROCEDURE — 2500000003 HC RX 250 WO HCPCS: Performed by: INTERNAL MEDICINE

## 2021-04-07 PROCEDURE — 85025 COMPLETE CBC W/AUTO DIFF WBC: CPT

## 2021-04-07 PROCEDURE — 2580000003 HC RX 258: Performed by: INTERNAL MEDICINE

## 2021-04-07 PROCEDURE — 85730 THROMBOPLASTIN TIME PARTIAL: CPT

## 2021-04-07 PROCEDURE — 97116 GAIT TRAINING THERAPY: CPT

## 2021-04-07 PROCEDURE — 6370000000 HC RX 637 (ALT 250 FOR IP): Performed by: INTERNAL MEDICINE

## 2021-04-07 PROCEDURE — 6360000002 HC RX W HCPCS: Performed by: INTERNAL MEDICINE

## 2021-04-07 PROCEDURE — 2580000003 HC RX 258: Performed by: SPECIALIST

## 2021-04-07 PROCEDURE — 92526 ORAL FUNCTION THERAPY: CPT

## 2021-04-07 PROCEDURE — 1200000000 HC SEMI PRIVATE

## 2021-04-07 PROCEDURE — 94761 N-INVAS EAR/PLS OXIMETRY MLT: CPT

## 2021-04-07 PROCEDURE — 83690 ASSAY OF LIPASE: CPT

## 2021-04-07 PROCEDURE — 80053 COMPREHEN METABOLIC PANEL: CPT

## 2021-04-07 PROCEDURE — 71045 X-RAY EXAM CHEST 1 VIEW: CPT

## 2021-04-07 PROCEDURE — 6360000002 HC RX W HCPCS: Performed by: SPECIALIST

## 2021-04-07 PROCEDURE — 82150 ASSAY OF AMYLASE: CPT

## 2021-04-07 PROCEDURE — 2700000000 HC OXYGEN THERAPY PER DAY

## 2021-04-07 PROCEDURE — 82962 GLUCOSE BLOOD TEST: CPT

## 2021-04-07 PROCEDURE — 85610 PROTHROMBIN TIME: CPT

## 2021-04-07 PROCEDURE — 36415 COLL VENOUS BLD VENIPUNCTURE: CPT

## 2021-04-07 PROCEDURE — C9113 INJ PANTOPRAZOLE SODIUM, VIA: HCPCS | Performed by: INTERNAL MEDICINE

## 2021-04-07 RX ORDER — AMPICILLIN 500 MG/1
500 CAPSULE ORAL EVERY 6 HOURS SCHEDULED
Status: DISCONTINUED | OUTPATIENT
Start: 2021-04-07 | End: 2021-04-07 | Stop reason: ALTCHOICE

## 2021-04-07 RX ORDER — CIPROFLOXACIN 2 MG/ML
400 INJECTION, SOLUTION INTRAVENOUS EVERY 12 HOURS
Status: DISCONTINUED | OUTPATIENT
Start: 2021-04-07 | End: 2021-04-11

## 2021-04-07 RX ORDER — AMOXICILLIN 500 MG/1
500 CAPSULE ORAL EVERY 8 HOURS SCHEDULED
Status: DISCONTINUED | OUTPATIENT
Start: 2021-04-07 | End: 2021-04-07

## 2021-04-07 RX ORDER — FUROSEMIDE 10 MG/ML
40 INJECTION INTRAMUSCULAR; INTRAVENOUS ONCE
Status: COMPLETED | OUTPATIENT
Start: 2021-04-07 | End: 2021-04-07

## 2021-04-07 RX ORDER — SODIUM CHLORIDE, SODIUM LACTATE, POTASSIUM CHLORIDE, CALCIUM CHLORIDE 600; 310; 30; 20 MG/100ML; MG/100ML; MG/100ML; MG/100ML
INJECTION, SOLUTION INTRAVENOUS CONTINUOUS
Status: DISCONTINUED | OUTPATIENT
Start: 2021-04-07 | End: 2021-04-08

## 2021-04-07 RX ADMIN — CIPROFLOXACIN 400 MG: 2 INJECTION, SOLUTION INTRAVENOUS at 05:17

## 2021-04-07 RX ADMIN — CIPROFLOXACIN 400 MG: 2 INJECTION, SOLUTION INTRAVENOUS at 18:30

## 2021-04-07 RX ADMIN — HEPARIN SODIUM 5000 UNITS: 5000 INJECTION INTRAVENOUS; SUBCUTANEOUS at 01:15

## 2021-04-07 RX ADMIN — METRONIDAZOLE 500 MG: 500 INJECTION, SOLUTION INTRAVENOUS at 01:04

## 2021-04-07 RX ADMIN — ASPIRIN 81 MG CHEWABLE TABLET 81 MG: 81 TABLET CHEWABLE at 10:07

## 2021-04-07 RX ADMIN — METRONIDAZOLE 500 MG: 500 INJECTION, SOLUTION INTRAVENOUS at 10:28

## 2021-04-07 RX ADMIN — ATORVASTATIN CALCIUM 40 MG: 40 TABLET, FILM COATED ORAL at 10:07

## 2021-04-07 RX ADMIN — CARVEDILOL 6.25 MG: 6.25 TABLET, FILM COATED ORAL at 10:27

## 2021-04-07 RX ADMIN — PANTOPRAZOLE SODIUM 40 MG: 40 INJECTION, POWDER, FOR SOLUTION INTRAVENOUS at 10:07

## 2021-04-07 RX ADMIN — SODIUM CHLORIDE, PRESERVATIVE FREE 10 ML: 5 INJECTION INTRAVENOUS at 01:14

## 2021-04-07 RX ADMIN — GABAPENTIN 100 MG: 100 CAPSULE ORAL at 20:38

## 2021-04-07 RX ADMIN — METRONIDAZOLE 500 MG: 500 INJECTION, SOLUTION INTRAVENOUS at 23:42

## 2021-04-07 RX ADMIN — DILTIAZEM HYDROCHLORIDE 60 MG: 60 TABLET, FILM COATED ORAL at 20:38

## 2021-04-07 RX ADMIN — SODIUM CHLORIDE, POTASSIUM CHLORIDE, SODIUM LACTATE AND CALCIUM CHLORIDE: 600; 310; 30; 20 INJECTION, SOLUTION INTRAVENOUS at 16:57

## 2021-04-07 RX ADMIN — FUROSEMIDE 40 MG: 10 INJECTION, SOLUTION INTRAMUSCULAR; INTRAVENOUS at 10:28

## 2021-04-07 RX ADMIN — DILTIAZEM HYDROCHLORIDE 60 MG: 60 TABLET, FILM COATED ORAL at 10:07

## 2021-04-07 RX ADMIN — DILTIAZEM HYDROCHLORIDE 60 MG: 60 TABLET, FILM COATED ORAL at 01:03

## 2021-04-07 RX ADMIN — LISINOPRIL 5 MG: 5 TABLET ORAL at 10:07

## 2021-04-07 RX ADMIN — HEPARIN SODIUM 5000 UNITS: 5000 INJECTION INTRAVENOUS; SUBCUTANEOUS at 20:38

## 2021-04-07 RX ADMIN — HEPARIN SODIUM 5000 UNITS: 5000 INJECTION INTRAVENOUS; SUBCUTANEOUS at 13:35

## 2021-04-07 RX ADMIN — SODIUM CHLORIDE, PRESERVATIVE FREE 10 ML: 5 INJECTION INTRAVENOUS at 20:39

## 2021-04-07 RX ADMIN — GABAPENTIN 100 MG: 100 CAPSULE ORAL at 01:13

## 2021-04-07 RX ADMIN — METRONIDAZOLE 500 MG: 500 INJECTION, SOLUTION INTRAVENOUS at 16:59

## 2021-04-07 RX ADMIN — AMOXICILLIN 500 MG: 500 CAPSULE ORAL at 13:37

## 2021-04-07 RX ADMIN — DEXTROSE MONOHYDRATE: 100 INJECTION, SOLUTION INTRAVENOUS at 10:28

## 2021-04-07 RX ADMIN — HEPARIN SODIUM 5000 UNITS: 5000 INJECTION INTRAVENOUS; SUBCUTANEOUS at 10:28

## 2021-04-07 ASSESSMENT — PAIN SCALES - GENERAL
PAINLEVEL_OUTOF10: 0

## 2021-04-07 NOTE — PROGRESS NOTES
Progress Note( Dr. Nathen Persaud)  4/7/2021  Subjective:   Admit Date: 4/4/2021  PCP: Isaura Landry MD    Admitted For : Confusion     Consulted For: Better control of blood glucose    Interval History: Alert awake feels much better    Denies any chest pains,   Denies SOB . Denies nausea or vomiting. Not eating much  No new bowel or bladder symptoms.        Intake/Output Summary (Last 24 hours) at 4/7/2021 0710  Last data filed at 4/6/2021 1022  Gross per 24 hour   Intake 480 ml   Output 200 ml   Net 280 ml       DATA    CBC:   Recent Labs     04/04/21 1912 04/05/21  1221 04/06/21  0714   WBC 8.7 14.5* 12.1*   HGB 12.3* 11.1* 11.1*    219 236    CMP:  Recent Labs     04/04/21 1912 04/05/21  1221 04/06/21  0714   * 132* 134*   K 4.0 4.1 3.5   CL 96* 96* 99   CO2 27 24 24   BUN 14 21 19   CREATININE 1.1 1.3 1.2   CALCIUM 9.9 8.9 9.5   PROT 7.6  --  6.0*   LABALBU 4.0  --  3.6   BILITOT 0.6  --  0.5   ALKPHOS 121  --  99   AST 23  --  21   ALT 10  --  10     Lipids:   Lab Results   Component Value Date    CHOL 144 11/12/2018    HDL 62 03/15/2019    TRIG 101 11/12/2018     Glucose:  Recent Labs     04/07/21  0044 04/07/21  0259 04/07/21  0626   POCGLU 171* 114* 176*     [id number]:  Lab Results   Component Value Date    LABA1C 7.9 12/01/2020     High Sensitivity TSH:   Lab Results   Component Value Date    TSHHS 0.777 12/03/2020     Free T3:   Lab Results   Component Value Date    FT3 2.7 10/21/2015     Free T4:  Lab Results   Component Value Date    T4FREE 1.26 11/05/2018          Scheduled Medicines   Medications:    lisinopril  5 mg Oral Daily    heparin (porcine)  5,000 Units Subcutaneous TID    sodium chloride flush  10 mL Intravenous 2 times per day    aspirin  81 mg Oral Daily    atorvastatin  40 mg Oral Daily    carvedilol  6.25 mg Oral BID WC    dilTIAZem  60 mg Oral BID    gabapentin  100 mg Oral Nightly    latanoprost  1 drop Both Eyes Nightly    pantoprazole  40 mg Intravenous Daily  ciprofloxacin  400 mg Intravenous Q12H    metroNIDAZOLE  500 mg Intravenous Q8H    insulin lispro  0-12 Units Subcutaneous 2 times per day    insulin lispro  0-12 Units Subcutaneous TID     sodium chloride flush  10 mL Intravenous BID      Infusions:    dextrose Stopped (04/06/21 2016)    sodium chloride      dextrose Stopped (04/06/21 0039)         Objective:   Vitals: BP (!) 118/58   Pulse 79   Temp 98.1 °F (36.7 °C) (Oral)   Resp 16   Ht 5' 7\" (1.702 m) Comment: per Epic history 5' 5\" to 5' 11\"  Wt 138 lb 3.2 oz (62.7 kg)   SpO2 93%   BMI 21.65 kg/m²   General appearance: alert and cooperative with exam  Neck: no JVD or bruit  Thyroid : Normal lobes   Lungs: Has Vesicular Breath sounds   Heart:  regular rate and rhythm  Abdomen: soft, non-tender; bowel sounds normal; no masses,  no organomegaly  Musculoskeletal: Normal  Extremities: extremities normal, , no edema bilateral below-knee amputation  Neurologic:  Awake, alert, oriented to name, place and time. Cra paresthesia s intact. ,  and gait is abnormal.  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)     Choledocholithiasis     Generalized abdominal pain     COPD (chronic obstructive pulmonary disease) (HCC)     Abdominal pain, epigastric     Elevated LFTs     Abnormal findings on imaging of biliary tract     Chest pain     Coronary artery disease involving coronary bypass graft of native heart without angina pectoris     Acute encephalopathy     Vomiting     Sepsis (HCC)     Nausea vomiting and diarrhea     Fever     Metabolic encephalopathy     Impaired cognition     Generalized weakness     Cholangitis due to bile duct calculus with obstruction     Oropharyngeal dysphagia     S/P BKA (below knee amputation) bilateral (HCC)     Abdominal pain     Inguinal pain     Closed fracture of left hip (HCC)     Acute alteration in mental status      Plan:     1. Reviewed POC blood glucose . Labs and X ray results   2. Reviewed Current Medicines   3. Now on 10 IV and blood glucose is stable  4. On Correction bolus Humalog  Insulin regime    5. Monitor Blood glucose frequently   6. Modified  the dose of Insulin/ other medicines as needed   7. Will follow     .      Katie Franz MD

## 2021-04-07 NOTE — CARE COORDINATION
Received referral for ARU. Will review patients clinicals and PT/OT notes. Thank you for the referral.     Reviewed clinicals and therapy notes. Patients primary insurance is [facility] Medicare, per [facility] Medicare guidelines patient does not meet ARU criteria. Discussed with Maria Esther Snow. Informed Governor Doom that pre-cert can be initiated, but expedited appeal will most likely need to be performed. Per Governor Doom she'll discuss with patients spouse and let me know. ARU will continue to follow.

## 2021-04-07 NOTE — PROGRESS NOTES
PT DOING FAIR A BIT CONFUSED BU T NO ABD PAIN N/ VOMITING  VITALS STABLE   LABS NOTED   D/W WIFE YESTERDAY WILL DO ERCP IN AM SWEEP CBD OF ANY STONES OR SLUDGE

## 2021-04-07 NOTE — PROGRESS NOTES
[facility]  DEPARTMENT OF SPEECH/LANGUAGE PATHOLOGY  DAILY PROGRESS NOTE  Estevan Howard  4/7/2021  [id number]  Confusion [R41.0]  Hypoxia [R09.02]  Urinary tract infection without hematuria, site unspecified [N39.0]  Nausea and vomiting, intractability of vomiting not specified, unspecified vomiting type [R11.2]  Acute alteration in mental status [R41.82]  Allergies   Allergen Reactions    Byetta 10 Mcg Pen [Exenatide]     Sulbactam     Ampicillin Rash    Aricept [Donepezil Hydrochloride] Other (See Comments)     Hallucinations, confusion    Benztropine Other (See Comments)     Hallucinations, confusion    Celebrex [Celecoxib] Other (See Comments)     'causes him to be nervous and jittery\"    Diphenhydramine Hcl Other (See Comments)     nervous and jittery    Diphenhydramine Hcl [Diphenhydramine] Anxiety    Exenatide Other (See Comments)     Causes him to be nervous and jittery    Metoprolol Succinate Other (See Comments)     Hallucinations     Phenergan [Promethazine Hcl] Other (See Comments)     Hallucinations, nervous, jittery    Plavix [Clopidogrel Bisulfate] Other (See Comments)     Causes bleeding in his stomach    Pseudoephedrine Anxiety    Reglan [Metoclopramide Hcl] Other (See Comments)     \"Caused him to just sit in a chair and rock back and forth\"         Pt was seen this date for dysphagia treatment. IMPRESSION AND RECOMMENDATIONS: Estevan Howard was seen for dysphagia follow-up seated upright in chair, alert, confused, cooperative. RN denied concerns for tolerance of regular diet. Pt seen taking pills whole with water via straw with intact oral clearance and no s/s aspiration. He was presented with additional trials of thin liquids and regular solids. Oropharyngeal swallow appears grossly intact with slow/adequate mastication, intact AP transit/clearance, and no s/s aspiration. Recommend continued regular diet/thin liquids.  No further acute SLP

## 2021-04-07 NOTE — PROGRESS NOTES
Hospitalist Progress Note      Name:  Sixto Rehman /Age/Sex: 1940  (79 y.o. male)   MRN & CSN:  [id number] & [id number] Admission Date/Time: 2021  6:07 PM   Location:  [facility] PCP: Donnell Schafer, [address] Day: 4    History of Present Illness:     Chief Complaint: Abdominal pain and consfusion  Sixto Rehman is a 79 y.o.  male  who presents with abdominal pain and confusion     The patient seen and examined at bedside. He currently denies having any symptoms of abdominal pain, nausea or vomiting. He seems to be little bit confused but more alert and awake.     Ten point ROS reviewed negative, unless as noted above    Objective:     No intake or output data in the 24 hours ending 21 1120   Vitals:   Vitals:    21 0757   BP: (!) 104/55   Pulse: 79   Resp: 16   Temp: 98.5 °F (36.9 °C)   SpO2: 92%     Physical Exam:   General Appearance: alert and oriented to self, in no acute distress   Cardiovascular: normal rate, regular rhythm, normal S1 and S2  Pulmonary/Chest: clear to auscultation bilaterally  Abdomen: soft, positive for mild epigastric tenderness , non-distended, normal bowel sounds, no masses   Extremities: no cyanosis, clubbing or edema, pulse   Skin: warm and dry, no rash or erythema  Head: normocephalic and atraumatic  Eyes: pupils equal, round, and reactive to light  Neck: supple and non-tender without mass, no thyromegaly   Musculoskeletal: normal range of motion, no joint swelling, deformity or tenderness  Neurological: alert, oriented to self, normal speech, no focal findings or movement disorder noted    Medications:   Medications:    lisinopril  5 mg Oral Daily    heparin (porcine)  5,000 Units Subcutaneous TID    sodium chloride flush  10 mL Intravenous 2 times per day    aspirin  81 mg Oral Daily    atorvastatin  40 mg Oral Daily    carvedilol  6.25 mg Oral BID WC    dilTIAZem  60 mg Oral BID    gabapentin  100 mg Oral Nightly    latanoprost 1 drop Both Eyes Nightly    pantoprazole  40 mg Intravenous Daily    ciprofloxacin  400 mg Intravenous Q12H    metroNIDAZOLE  500 mg Intravenous Q8H    insulin lispro  0-12 Units Subcutaneous 2 times per day    insulin lispro  0-12 Units Subcutaneous TID     sodium chloride flush  10 mL Intravenous BID      Infusions:    dextrose 75 mL/hr at 04/07/21 1028    sodium chloride      dextrose Stopped (04/06/21 0039)     PRN Meds: sodium chloride flush, 10 mL, PRN  sodium chloride, 25 mL, PRN  polyethylene glycol, 17 g, Daily PRN  acetaminophen, 650 mg, Q6H PRN    Or  acetaminophen, 650 mg, Q6H PRN  ondansetron, 4 mg, Q6H PRN  cholestyramine light, 4 g, BID PRN  glucose, 15 g, PRN  dextrose, 12.5 g, PRN  glucagon (rDNA), 1 mg, PRN  dextrose, 100 mL/hr, PRN            Pertinent New Labs & Imaging Studies     CBC with Differential:    Lab Results   Component Value Date    WBC 8.1 04/07/2021    RBC 3.50 04/07/2021    HGB 10.8 04/07/2021    HCT 31.8 04/07/2021     04/07/2021    MCV 90.9 04/07/2021    MCH 30.9 04/07/2021    MCHC 34.0 04/07/2021    RDW 13.2 04/07/2021    NRBC 1 02/21/2021    SEGSPCT 57.2 04/07/2021    BANDSPCT 4 02/21/2021    LYMPHOPCT 19.3 04/07/2021    MONOPCT 12.9 04/07/2021    MYELOPCT 1 09/02/2019    BASOPCT 0.5 04/07/2021    MONOSABS 1.1 04/07/2021    LYMPHSABS 1.6 04/07/2021    EOSABS 0.8 04/07/2021    BASOSABS 0.0 04/07/2021    DIFFTYPE AUTOMATED DIFFERENTIAL 04/07/2021     CMP:    Lab Results   Component Value Date     04/07/2021    K 3.2 04/07/2021    CL 96 04/07/2021    CO2 26 04/07/2021    BUN 14 04/07/2021    CREATININE 1.2 04/07/2021    GFRAA >60 04/07/2021    LABGLOM 58 04/07/2021    GLUCOSE 166 04/07/2021    PROT 5.7 04/07/2021    PROT 8.0 08/30/2012    LABALBU 3.4 04/07/2021    CALCIUM 8.7 04/07/2021    BILITOT 0.4 04/07/2021    ALKPHOS 92 04/07/2021    AST 22 04/07/2021    ALT 10 04/07/2021     Ct Head Wo Contrast    Result Date: 4/4/2021  EXAMINATION: CT OF THE HEAD reasonably achievable. COMPARISON: CT abdomen pelvis dated November 30, 2020 HISTORY: ORDERING SYSTEM PROVIDED HISTORY: vomiting TECHNOLOGIST PROVIDED HISTORY: Reason for exam:->vomiting Additional Contrast?->None Decision Support Exception->Emergency Medical Condition (MA) Reason for Exam: abd pain vomiting Acuity: Acute Type of Exam: Initial Additional signs and symptoms: ams Relevant Medical/Surgical History: isovue 370 80 ml FINDINGS: Lower Chest: Bibasilar atelectasis is present. There is a small hiatal hernia. Organs: There has been a cholecystectomy. Pneumobilia is noted. Gas is seen within the common bile duct. Findings could be related to biliary intervention. The spleen, adrenal glands, and kidneys are unremarkable. The pancreatic duct is dilated. The pancreatic duct measures 5 mm and is not significantly changed since the prior examination. The common bile duct is also dilated and measures 9 mm. This is not significantly changed since the prior study. GI/Bowel: There is no evidence of bowel obstruction. The appendix is normal. Pelvis: The bladder is unremarkable. There is no free fluid. Peritoneum/Retroperitoneum: There is no free air or lymphadenopathy. Bones/Soft Tissues: No destructive osseous lesions are identified. 1. No acute findings. 2. Pneumobilia is noted without significant change since the prior examination of November 30, 2020. Findings could be related to prior biliary intervention. 3. Dilated common bile duct and pancreatic duct are noted without significant change since the prior examination of July 2, 2020. If there is concern for biliary obstruction, an MRCP can be obtained. Correlate with laboratory values. 4. Small hiatal hernia.      Xr Chest Portable    Result Date: 4/6/2021  EXAMINATION: ONE XRAY VIEW OF THE CHEST 4/5/2021 7:51 am COMPARISON: 04/04/2020 HISTORY: ORDERING SYSTEM PROVIDED HISTORY: hypoxia, ?aspiration TECHNOLOGIST PROVIDED HISTORY: Reason for exam:->hypoxia, ?aspiration Reason for Exam: hypoxia, ?aspiration Acuity: Acute FINDINGS: The patient is status post sternotomy. The heart size is normal.  There is no pneumothorax. There is minimal left basilar atelectasis or scarring. The bony thorax is grossly intact. No evidence of acute cardiopulmonary disease. Minimal left basilar atelectasis or scarring. Xr Chest Portable    Result Date: 4/4/2021  EXAMINATION: ONE XRAY VIEW OF THE CHEST 4/4/2021 6:22 pm COMPARISON: Chest radiograph 02/19/2021. HISTORY: ORDERING SYSTEM PROVIDED HISTORY: ams TECHNOLOGIST PROVIDED HISTORY: Reason for exam:->ams FINDINGS: Status post median sternotomy. The cardiomediastinal silhouette is unchanged. No pneumothorax, vascular congestion, consolidation, or pleural effusion is identified. No acute osseous abnormality. No acute process. Cta Pulmonary W Contrast    Result Date: 4/5/2021  EXAMINATION: CTA OF THE CHEST, 4/5/2021 12:07 am TECHNIQUE: CTA of the chest was performed after the administration of intravenous contrast.  Multiplanar reformatted images are provided for review. MIP images are provided for review. Dose modulation, iterative reconstruction, and/or weight based adjustment of the mA/kV was utilized to reduce the radiation dose to as low as reasonably achievable.  COMPARISON: None HISTORY: ORDERING SYSTEM PROVIDED HISTORY:  Hypoxia TECHNOLOGIST PROVIDED HISTORY: Reason for exam:  Hypoxia Decision Support Exception:  Emergency Medical Condition (MA) Reason for Exam:  Hypoxia Acuity:  Acute Type of Exam:  Initial Additional signs and symptoms:  Pt is confused Relevant Medical/Surgical History:  Isovue 370 80 mL ORDERING SYSTEM PROVIDED HISTORY:  Vomiting TECHNOLOGIST PROVIDED HISTORY: Reason for exam:  Vomiting Additional Contrast?   None Decision Support Exception:  Emergency Medical Condition (MA) Reason for Exam:  Abd pain vomiting Acuity:  Acute Type of Exam:  Initial Additional signs and symptoms:  AMS Relevant Medical/Surgical History:  Isovue 370 80 ml FINDINGS: Pulmonary Arteries: The pulmonary arteries are suboptimally opacified with contrast.  Motion artifact limits evaluation of the pulmonary arteries. There is no gross large or central pulmonary emboli identified. Smaller more peripheral pulmonary emboli cannot be entirely excluded. Mediastinum: No evidence of mediastinal lymphadenopathy. The heart and pericardium demonstrate no acute abnormality. There is no acute abnormality of the thoracic aorta. Atherosclerotic disease of the aorta is seen. Coronary artery calcifications are noted. There is a small hiatal hernia. Lungs/pleura: Bilateral lower lobe atelectasis is seen. Motion artifact limits evaluation of the lung parenchyma. There is no evidence of a pneumothorax or pleural effusion. Upper Abdomen: Please see CT abdomen and pelvis report from the same date. Soft Tissues/Bones: No acute bone or soft tissue abnormality. The pulmonary arteries are suboptimally opacified with contrast.  Motion artifact limits evaluation of the pulmonary arteries. There is no gross large or central pulmonary emboli identified. Smaller more peripheral pulmonary emboli cannot be entirely excluded. Bilateral lower lobe atelectasis. Small hiatal hernia.        Assessment and Plan:   Gabrielle Fernández is a 79 y.o.  male  who presents with Confusion    Acute metabolic/septic encephalopathy  Per daughter he gets off-and-on confused with biliary sludge  Likely UTI given elevated white count  Elevated WBC, currently getting better  Rapid Covid test negative  Monitor BMP    Hypoglycemia  Type 2 diabetes, BG is stable now  Hemoglobin dropped in the evening after receiving short acting insulin  Monitor blood glucose closely  Holding D10 drip  Endocrinology recommendations appreciated-currently on dextrose drip  Per daughter patient has similar issues with elevated blood sugars in the morning and going down at evening    Acute respiratory failure with hypoxia  ? Aspiration  Currently on 5 L of oxygen  Chest x-ray and CTA chest with no acute process  Repeat x ray ordered for today  CTA ruled out PE  proBNP slightly elevated  Echo on 11/30/2020 showed low EF with 40%. There is evidence of apical ballooning, suggestive of stress-induced cardiomyopathy  Continue Lasix  Continue Cipro and added Flagyl    UTI  Pyuria  Urine culture with Enterococcus  Ampicillin oral    Abdominal pain  CT with no acute process  LFT within normal limits  Patient had similar episode in the past and had CBD stones, requiring ERCP  GI recommendations appreciated-plan for ERCP tomorrow to sweep the bile duct  DC Cipro and continue Flagyl for now.  Added Ampicillin    Heart failure with reduced EF  Continue Coreg  Continue aspirin  Continue Cardizem  Low dose Lisinopril     HTN  Continue Cardizem, Coreg and added lisinopril    COPD  Hyperlipidemia  Pituitary tumor  Possible dementia    Diet DIET GENERAL; Carb Control: 5 carb choices (75 gms)/meal   DVT Prophylaxis [] Lovenox, [x]  Heparin, [] SCDs, [] Ambulation   GI Prophylaxis [x] PPI,  [] H2 Blocker,  [] Carafate,  [] Diet/Tube Feeds   Code Status Full Code   Disposition Patient requires continued admission due to Acute encephalopathy   MDM [] Low, [x] Moderate,[]  High     Electronically signed by Jessica Wallace MD on 4/7/2021 at 11:20 AM

## 2021-04-07 NOTE — PROGRESS NOTES
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 Subjective:  Patient states:  \"I am tired, I didn't sleep well last night. I would like to take a walk. \"  Pain:   Location, Type, Intensity (0/10 to 10/10): Denies; 0/10 pain    Objective:    Observation:  Pt supine in bed upon entry. Treatment, including education/measures:  Pt agreeable to participating in therapy at this time. Therapeutic Activity Training:   Therapeutic activity training was instructed today. Cues were given for safety, sequence, UE/LE placement, awareness, and balance. Activities performed today included bed mobility training, sup-sit, sit-stand, SPT. Pt completed sup to sit with CGAx1 and HOB elevated. Verbal cues provided for BUE and BLE placement and sequencing throughout bed mobility. Pt sat at EOB with SBAx1 for 6 minutes while donning prosthesis bilaterally (BL BKA). Pt completed sit <> stand from EOB with CGAx1 and use of FWW. Verbal cues provided for bilateral UE and bilateral LE placement, walker placement, and sequencing throughout to push through bed and not pull through walker. Pt completed SPT from bed to chair with CGAx1 and use of FWW. Verbal cues for bilateral UE and bilateral LE placement, room navigation, and sequencing throughout.  Verbal cues to feel chair against back of legs, reach back, and sit slowly; poor eccentric control noted. Gait Training:  Cues were given for safety, sequence, device management, balance, posture, awareness, path. Pt ambulated 5 feet with FWW and CGAx1. Verbal cues provided for bilateral UE and bilateral LE placement, room navigation, and sequencing throughout. Pt demonstrated decreased gait speed and decreased step length bilaterally. Ambulation distance limited today secondary to bilateral prosthesis not fitting correctly because socks were soiled and unable to be used. Safety  Patient left safely in the chair, with call light/phone in reach with alarm applied. Gait belt and mask were used for transfers and gait. Assessment / Impression:    Patient's tolerance of treatment:  Good; pt agreeable to transfer to chair for lunch today. Adverse Reaction: None  Significant change in status and impact:  None  Barriers to improvement: Limited socks for use with prosthesis bilaterally, decreased aerobic endurance    Plan for Next Session:    Continue to progress toward goals per the plan of care. Progress ambulation distance and jean-pierre as appropriate and toelrated. Progress independence with all bed mobility and transfers as appropriate and tolerated. Time in:  1040  Time out:  1137  Timed treatment minutes:  57  Total treatment time:  62    Previously filed items:  Social/Functional History  Lives With: Spouse  Type of Home: Assisted living  Home Layout: One level  Home Access: Level entry  Bathroom Shower/Tub: Walk-in shower  Bathroom Toilet: Handicap height  Bathroom Equipment: Shower chair  Home Equipment: Rolling walker(bilateral lower extremity prosthetics (bilateral BKA))  ADL Assistance: Independent  Ambulation Assistance: Independent(mod I with RW)  Transfer Assistance: Independent     Long term goals  Time Frame for Long term goals :  In one week:  Long term goal 1: Pt will complete all bed mobility with supervision  Long term goal 2: Pt will complete sit <> stand transfers with supervision  Long term goal 3: Pt will ambulate 400 feet with supervision with Damian Goodell term goal 4: Pt will independently complete 3 sets of 10 reps of BLE AROM exercises in available and allowed ROM    Electronically signed by:   Carmita Llanos PT, DPT  License #: [id number]

## 2021-04-08 ENCOUNTER — ANESTHESIA (OUTPATIENT)
Dept: ENDOSCOPY | Age: 81
DRG: 444 | End: 2021-04-08
Payer: MEDICARE

## 2021-04-08 ENCOUNTER — APPOINTMENT (OUTPATIENT)
Dept: GENERAL RADIOLOGY | Age: 81
DRG: 444 | End: 2021-04-08
Payer: MEDICARE

## 2021-04-08 VITALS
TEMPERATURE: 98.6 F | RESPIRATION RATE: 14 BRPM | DIASTOLIC BLOOD PRESSURE: 61 MMHG | OXYGEN SATURATION: 99 % | SYSTOLIC BLOOD PRESSURE: 149 MMHG

## 2021-04-08 LAB
ALBUMIN SERPL-MCNC: 3.1 GM/DL (ref 3.4–5)
ALP BLD-CCNC: 93 IU/L (ref 40–129)
ALT SERPL-CCNC: 9 U/L (ref 10–40)
AMYLASE: 57 U/L (ref 25–115)
ANION GAP SERPL CALCULATED.3IONS-SCNC: 11 MMOL/L (ref 4–16)
APTT: 29.8 SECONDS (ref 25.1–37.1)
AST SERPL-CCNC: 23 IU/L (ref 15–37)
BASOPHILS ABSOLUTE: 0.1 K/CU MM
BASOPHILS RELATIVE PERCENT: 1 % (ref 0–1)
BILIRUB SERPL-MCNC: 0.3 MG/DL (ref 0–1)
BUN BLDV-MCNC: 20 MG/DL (ref 6–23)
CALCIUM SERPL-MCNC: 8.8 MG/DL (ref 8.3–10.6)
CHLORIDE BLD-SCNC: 100 MMOL/L (ref 99–110)
CO2: 24 MMOL/L (ref 21–32)
CREAT SERPL-MCNC: 1.4 MG/DL (ref 0.9–1.3)
DIFFERENTIAL TYPE: ABNORMAL
EOSINOPHILS ABSOLUTE: 1.1 K/CU MM
EOSINOPHILS RELATIVE PERCENT: 12.6 % (ref 0–3)
GFR AFRICAN AMERICAN: 59 ML/MIN/1.73M2
GFR NON-AFRICAN AMERICAN: 49 ML/MIN/1.73M2
GLUCOSE BLD-MCNC: 105 MG/DL (ref 70–99)
GLUCOSE BLD-MCNC: 112 MG/DL (ref 70–99)
GLUCOSE BLD-MCNC: 114 MG/DL (ref 70–99)
GLUCOSE BLD-MCNC: 152 MG/DL (ref 70–99)
GLUCOSE BLD-MCNC: 175 MG/DL (ref 70–99)
GLUCOSE BLD-MCNC: 178 MG/DL (ref 70–99)
GLUCOSE BLD-MCNC: 303 MG/DL (ref 70–99)
GLUCOSE BLD-MCNC: 303 MG/DL (ref 70–99)
GLUCOSE BLD-MCNC: 79 MG/DL (ref 70–99)
HCT VFR BLD CALC: 36.6 % (ref 42–52)
HEMOGLOBIN: 10.9 GM/DL (ref 13.5–18)
IMMATURE NEUTROPHIL %: 0.5 % (ref 0–0.43)
INR BLD: 1.11 INDEX
LIPASE: 48 IU/L (ref 13–60)
LYMPHOCYTES ABSOLUTE: 2.1 K/CU MM
LYMPHOCYTES RELATIVE PERCENT: 24.8 % (ref 24–44)
MCH RBC QN AUTO: 29.4 PG (ref 27–31)
MCHC RBC AUTO-ENTMCNC: 29.8 % (ref 32–36)
MCV RBC AUTO: 98.7 FL (ref 78–100)
MONOCYTES ABSOLUTE: 1.1 K/CU MM
MONOCYTES RELATIVE PERCENT: 12.7 % (ref 0–4)
NUCLEATED RBC %: 0 %
PDW BLD-RTO: 13.2 % (ref 11.7–14.9)
PLATELET # BLD: 246 K/CU MM (ref 140–440)
PMV BLD AUTO: 8.8 FL (ref 7.5–11.1)
POTASSIUM SERPL-SCNC: 3.4 MMOL/L (ref 3.5–5.1)
PROTHROMBIN TIME: 13.4 SECONDS (ref 11.7–14.5)
RBC # BLD: 3.71 M/CU MM (ref 4.6–6.2)
SEGMENTED NEUTROPHILS ABSOLUTE COUNT: 4.1 K/CU MM
SEGMENTED NEUTROPHILS RELATIVE PERCENT: 48.4 % (ref 36–66)
SODIUM BLD-SCNC: 135 MMOL/L (ref 135–145)
TOTAL IMMATURE NEUTOROPHIL: 0.04 K/CU MM
TOTAL NUCLEATED RBC: 0 K/CU MM
TOTAL PROTEIN: 5.3 GM/DL (ref 6.4–8.2)
WBC # BLD: 8.4 K/CU MM (ref 4–10.5)

## 2021-04-08 PROCEDURE — 7100000000 HC PACU RECOVERY - FIRST 15 MIN: Performed by: SPECIALIST

## 2021-04-08 PROCEDURE — 36415 COLL VENOUS BLD VENIPUNCTURE: CPT

## 2021-04-08 PROCEDURE — 85025 COMPLETE CBC W/AUTO DIFF WBC: CPT

## 2021-04-08 PROCEDURE — 6370000000 HC RX 637 (ALT 250 FOR IP): Performed by: INTERNAL MEDICINE

## 2021-04-08 PROCEDURE — 2580000003 HC RX 258: Performed by: ANESTHESIOLOGY

## 2021-04-08 PROCEDURE — 6360000004 HC RX CONTRAST MEDICATION: Performed by: SPECIALIST

## 2021-04-08 PROCEDURE — 2500000003 HC RX 250 WO HCPCS: Performed by: INTERNAL MEDICINE

## 2021-04-08 PROCEDURE — 6360000002 HC RX W HCPCS: Performed by: ANESTHESIOLOGY

## 2021-04-08 PROCEDURE — 3700000000 HC ANESTHESIA ATTENDED CARE: Performed by: SPECIALIST

## 2021-04-08 PROCEDURE — 6360000002 HC RX W HCPCS: Performed by: NURSE ANESTHETIST, CERTIFIED REGISTERED

## 2021-04-08 PROCEDURE — 94761 N-INVAS EAR/PLS OXIMETRY MLT: CPT

## 2021-04-08 PROCEDURE — 0FC98ZZ EXTIRPATION OF MATTER FROM COMMON BILE DUCT, VIA NATURAL OR ARTIFICIAL OPENING ENDOSCOPIC: ICD-10-PCS | Performed by: SPECIALIST

## 2021-04-08 PROCEDURE — 6360000002 HC RX W HCPCS: Performed by: NURSE PRACTITIONER

## 2021-04-08 PROCEDURE — 7100000001 HC PACU RECOVERY - ADDTL 15 MIN: Performed by: SPECIALIST

## 2021-04-08 PROCEDURE — 1200000000 HC SEMI PRIVATE

## 2021-04-08 PROCEDURE — C1769 GUIDE WIRE: HCPCS | Performed by: SPECIALIST

## 2021-04-08 PROCEDURE — 6370000000 HC RX 637 (ALT 250 FOR IP): Performed by: ANESTHESIOLOGY

## 2021-04-08 PROCEDURE — C1726 CATH, BAL DIL, NON-VASCULAR: HCPCS | Performed by: SPECIALIST

## 2021-04-08 PROCEDURE — 3700000001 HC ADD 15 MINUTES (ANESTHESIA): Performed by: SPECIALIST

## 2021-04-08 PROCEDURE — 76000 FLUOROSCOPY <1 HR PHYS/QHP: CPT

## 2021-04-08 PROCEDURE — 82150 ASSAY OF AMYLASE: CPT

## 2021-04-08 PROCEDURE — 82962 GLUCOSE BLOOD TEST: CPT

## 2021-04-08 PROCEDURE — 2709999900 HC NON-CHARGEABLE SUPPLY: Performed by: SPECIALIST

## 2021-04-08 PROCEDURE — 80053 COMPREHEN METABOLIC PANEL: CPT

## 2021-04-08 PROCEDURE — 83690 ASSAY OF LIPASE: CPT

## 2021-04-08 PROCEDURE — 85610 PROTHROMBIN TIME: CPT

## 2021-04-08 PROCEDURE — 0F798ZZ DILATION OF COMMON BILE DUCT, VIA NATURAL OR ARTIFICIAL OPENING ENDOSCOPIC: ICD-10-PCS | Performed by: SPECIALIST

## 2021-04-08 PROCEDURE — 6360000002 HC RX W HCPCS: Performed by: SPECIALIST

## 2021-04-08 PROCEDURE — 2580000003 HC RX 258: Performed by: INTERNAL MEDICINE

## 2021-04-08 PROCEDURE — 6360000002 HC RX W HCPCS: Performed by: INTERNAL MEDICINE

## 2021-04-08 PROCEDURE — 2700000000 HC OXYGEN THERAPY PER DAY

## 2021-04-08 PROCEDURE — 85730 THROMBOPLASTIN TIME PARTIAL: CPT

## 2021-04-08 PROCEDURE — 3609014300 HC ERCP BALLOON DILATE BILIARY/PANC DUCT/AMPULLA EA: Performed by: SPECIALIST

## 2021-04-08 RX ORDER — MEPERIDINE HYDROCHLORIDE 25 MG/ML
12.5 INJECTION INTRAMUSCULAR; INTRAVENOUS; SUBCUTANEOUS EVERY 5 MIN PRN
Status: DISCONTINUED | OUTPATIENT
Start: 2021-04-08 | End: 2021-04-08 | Stop reason: HOSPADM

## 2021-04-08 RX ORDER — FENTANYL CITRATE 50 UG/ML
50 INJECTION, SOLUTION INTRAMUSCULAR; INTRAVENOUS EVERY 5 MIN PRN
Status: DISCONTINUED | OUTPATIENT
Start: 2021-04-08 | End: 2021-04-08 | Stop reason: HOSPADM

## 2021-04-08 RX ORDER — PROPOFOL 10 MG/ML
INJECTION, EMULSION INTRAVENOUS PRN
Status: DISCONTINUED | OUTPATIENT
Start: 2021-04-08 | End: 2021-04-08 | Stop reason: SDUPTHER

## 2021-04-08 RX ORDER — DEXAMETHASONE SODIUM PHOSPHATE 4 MG/ML
INJECTION, SOLUTION INTRA-ARTICULAR; INTRALESIONAL; INTRAMUSCULAR; INTRAVENOUS; SOFT TISSUE PRN
Status: DISCONTINUED | OUTPATIENT
Start: 2021-04-08 | End: 2021-04-08 | Stop reason: SDUPTHER

## 2021-04-08 RX ORDER — HYDROCODONE BITARTRATE AND ACETAMINOPHEN 5; 325 MG/1; MG/1
2 TABLET ORAL PRN
Status: DISCONTINUED | OUTPATIENT
Start: 2021-04-08 | End: 2021-04-08 | Stop reason: HOSPADM

## 2021-04-08 RX ORDER — LABETALOL HYDROCHLORIDE 5 MG/ML
5 INJECTION, SOLUTION INTRAVENOUS EVERY 10 MIN PRN
Status: DISCONTINUED | OUTPATIENT
Start: 2021-04-08 | End: 2021-04-08 | Stop reason: HOSPADM

## 2021-04-08 RX ORDER — SUCCINYLCHOLINE/SOD CL,ISO/PF 100 MG/5ML
SYRINGE (ML) INTRAVENOUS PRN
Status: DISCONTINUED | OUTPATIENT
Start: 2021-04-08 | End: 2021-04-08 | Stop reason: SDUPTHER

## 2021-04-08 RX ORDER — HYDRALAZINE HYDROCHLORIDE 20 MG/ML
5 INJECTION INTRAMUSCULAR; INTRAVENOUS EVERY 10 MIN PRN
Status: DISCONTINUED | OUTPATIENT
Start: 2021-04-08 | End: 2021-04-08 | Stop reason: HOSPADM

## 2021-04-08 RX ORDER — SODIUM CHLORIDE, SODIUM LACTATE, POTASSIUM CHLORIDE, CALCIUM CHLORIDE 600; 310; 30; 20 MG/100ML; MG/100ML; MG/100ML; MG/100ML
INJECTION, SOLUTION INTRAVENOUS CONTINUOUS
Status: DISCONTINUED | OUTPATIENT
Start: 2021-04-08 | End: 2021-04-10

## 2021-04-08 RX ORDER — ONDANSETRON 2 MG/ML
INJECTION INTRAMUSCULAR; INTRAVENOUS PRN
Status: DISCONTINUED | OUTPATIENT
Start: 2021-04-08 | End: 2021-04-08 | Stop reason: SDUPTHER

## 2021-04-08 RX ORDER — HYDROMORPHONE HCL 110MG/55ML
0.5 PATIENT CONTROLLED ANALGESIA SYRINGE INTRAVENOUS EVERY 5 MIN PRN
Status: DISCONTINUED | OUTPATIENT
Start: 2021-04-08 | End: 2021-04-08 | Stop reason: HOSPADM

## 2021-04-08 RX ORDER — HYDROCODONE BITARTRATE AND ACETAMINOPHEN 5; 325 MG/1; MG/1
1 TABLET ORAL PRN
Status: DISCONTINUED | OUTPATIENT
Start: 2021-04-08 | End: 2021-04-08 | Stop reason: HOSPADM

## 2021-04-08 RX ORDER — LORAZEPAM 2 MG/ML
1 INJECTION INTRAMUSCULAR ONCE
Status: COMPLETED | OUTPATIENT
Start: 2021-04-08 | End: 2021-04-08

## 2021-04-08 RX ADMIN — CARVEDILOL 6.25 MG: 6.25 TABLET, FILM COATED ORAL at 18:03

## 2021-04-08 RX ADMIN — SODIUM CHLORIDE, POTASSIUM CHLORIDE, SODIUM LACTATE AND CALCIUM CHLORIDE: 600; 310; 30; 20 INJECTION, SOLUTION INTRAVENOUS at 12:15

## 2021-04-08 RX ADMIN — CIPROFLOXACIN 400 MG: 2 INJECTION, SOLUTION INTRAVENOUS at 19:22

## 2021-04-08 RX ADMIN — CIPROFLOXACIN 400 MG: 2 INJECTION, SOLUTION INTRAVENOUS at 05:03

## 2021-04-08 RX ADMIN — HEPARIN SODIUM 5000 UNITS: 5000 INJECTION INTRAVENOUS; SUBCUTANEOUS at 23:29

## 2021-04-08 RX ADMIN — PROPOFOL 60 MG: 10 INJECTION, EMULSION INTRAVENOUS at 14:11

## 2021-04-08 RX ADMIN — ONDANSETRON 4 MG: 2 INJECTION INTRAMUSCULAR; INTRAVENOUS at 14:18

## 2021-04-08 RX ADMIN — LORAZEPAM 1 MG: 2 INJECTION INTRAMUSCULAR; INTRAVENOUS at 23:44

## 2021-04-08 RX ADMIN — METRONIDAZOLE 500 MG: 500 INJECTION, SOLUTION INTRAVENOUS at 09:16

## 2021-04-08 RX ADMIN — Medication 100 MG: at 14:11

## 2021-04-08 RX ADMIN — SODIUM CHLORIDE, PRESERVATIVE FREE 10 ML: 5 INJECTION INTRAVENOUS at 23:39

## 2021-04-08 RX ADMIN — DILTIAZEM HYDROCHLORIDE 60 MG: 60 TABLET, FILM COATED ORAL at 23:23

## 2021-04-08 RX ADMIN — DEXAMETHASONE SODIUM PHOSPHATE 8 MG: 4 INJECTION, SOLUTION INTRAMUSCULAR; INTRAVENOUS at 14:18

## 2021-04-08 RX ADMIN — GABAPENTIN 100 MG: 100 CAPSULE ORAL at 23:23

## 2021-04-08 RX ADMIN — SODIUM CHLORIDE: 9 INJECTION, SOLUTION INTRAVENOUS at 14:04

## 2021-04-08 RX ADMIN — METRONIDAZOLE 500 MG: 500 INJECTION, SOLUTION INTRAVENOUS at 18:04

## 2021-04-08 RX ADMIN — METRONIDAZOLE 500 MG: 500 INJECTION, SOLUTION INTRAVENOUS at 23:44

## 2021-04-08 RX ADMIN — SODIUM CHLORIDE, PRESERVATIVE FREE 10 ML: 5 INJECTION INTRAVENOUS at 23:38

## 2021-04-08 RX ADMIN — GLUCAGON HYDROCHLORIDE 1 MG: KIT at 14:20

## 2021-04-08 RX ADMIN — FENTANYL CITRATE 50 MCG: 50 INJECTION, SOLUTION INTRAMUSCULAR; INTRAVENOUS at 15:22

## 2021-04-08 RX ADMIN — INSULIN HUMAN 4 UNITS: 100 INJECTION, SOLUTION PARENTERAL at 15:46

## 2021-04-08 ASSESSMENT — PULMONARY FUNCTION TESTS
PIF_VALUE: 18
PIF_VALUE: 20
PIF_VALUE: 4
PIF_VALUE: 17
PIF_VALUE: 18
PIF_VALUE: 18
PIF_VALUE: 17
PIF_VALUE: 0
PIF_VALUE: 18
PIF_VALUE: 1
PIF_VALUE: 18
PIF_VALUE: 17
PIF_VALUE: 18
PIF_VALUE: 17
PIF_VALUE: 17
PIF_VALUE: 1

## 2021-04-08 ASSESSMENT — PAIN SCALES - GENERAL
PAINLEVEL_OUTOF10: 0
PAINLEVEL_OUTOF10: 0

## 2021-04-08 ASSESSMENT — PAIN - FUNCTIONAL ASSESSMENT: PAIN_FUNCTIONAL_ASSESSMENT: 0-10

## 2021-04-08 NOTE — CARE COORDINATION
Went and visited patient, and introduced myself. Soon after that, nurse came in and informed me the patient was leaving for a procedure. Informed patient I will follow up with him after.

## 2021-04-08 NOTE — CARE COORDINATION
F/u with Pt spouse about the discharge plan and the possibility of a denial and the need for an expedited appeal. Pt spouse wants the Pt to go to ARU. Call to Berkley/LILIA and asked for her to start the precert. Therapy at [facility]. Pt bilateral prosthesis socks are soiled and not able to be used. LSW call to Pt dgt and asked if she or her mother can bring in more socks. Pt dgt stated that he has a lot of socks at home and will bring some in.

## 2021-04-08 NOTE — PROGRESS NOTES
Hospitalist Progress Note      Name:  Emily Khan /Age/Sex: 1940  (79 y.o. male)   MRN & CSN:  [id number] & [id number] Admission Date/Time: 2021  6:07 PM   Location:  [facility] PCP: Lizeth Borjas, [address] Day: 5    History of Present Illness:     Chief Complaint: Abdominal pain and consfusion  Emily Khan is a 79 y.o.  male  who presents with abdominal pain and confusion     The patient seen and examined at bedside. He currently denies having any symptoms of abdominal pain, nausea or vomiting. He continues to be little bit confused but more alert and awake. Ten point ROS reviewed negative, unless as noted above    Objective:        Intake/Output Summary (Last 24 hours) at 2021 1220  Last data filed at 2021 1212  Gross per 24 hour   Intake --   Output 200 ml   Net -200 ml      Vitals:   Vitals:    21 0432   BP: (!) 122/58   Pulse: 65   Resp: 22   Temp: 98.6 °F (37 °C)   SpO2: 99%     Physical Exam:   General Appearance: alert and oriented to self, in no acute distress   Cardiovascular: normal rate, regular rhythm, normal S1 and S2  Pulmonary/Chest: clear to auscultation bilaterally  Abdomen: soft, positive for mild epigastric tenderness , non-distended, normal bowel sounds, no masses   Extremities: no cyanosis, clubbing or edema, pulse   Skin: warm and dry, no rash or erythema  Head: normocephalic and atraumatic  Eyes: pupils equal, round, and reactive to light  Neck: supple and non-tender without mass, no thyromegaly   Musculoskeletal: Bilateral BKA  Neurological: alert, oriented to self, normal speech, no focal findings or movement disorder noted    Medications:   Medications:    insulin lispro  0-6 Units Subcutaneous TID     insulin lispro  0-3 Units Subcutaneous 2 times per day    ciprofloxacin  400 mg Intravenous Q12H    indomethacin  100 mg Rectal Once    lisinopril  5 mg Oral Daily    heparin (porcine)  5,000 Units Subcutaneous TID    sodium chloride flush  10 mL Intravenous 2 times per day    aspirin  81 mg Oral Daily    atorvastatin  40 mg Oral Daily    carvedilol  6.25 mg Oral BID WC    dilTIAZem  60 mg Oral BID    gabapentin  100 mg Oral Nightly    latanoprost  1 drop Both Eyes Nightly    pantoprazole  40 mg Intravenous Daily    metroNIDAZOLE  500 mg Intravenous Q8H    sodium chloride flush  10 mL Intravenous BID      Infusions:    lactated ringers 50 mL/hr at 04/08/21 1215    lactated ringers 75 mL/hr at 04/07/21 1657    sodium chloride      dextrose Stopped (04/06/21 0039)     PRN Meds: sodium chloride flush, 10 mL, PRN  sodium chloride, 25 mL, PRN  polyethylene glycol, 17 g, Daily PRN  acetaminophen, 650 mg, Q6H PRN    Or  acetaminophen, 650 mg, Q6H PRN  ondansetron, 4 mg, Q6H PRN  cholestyramine light, 4 g, BID PRN  glucose, 15 g, PRN  dextrose, 12.5 g, PRN  glucagon (rDNA), 1 mg, PRN  dextrose, 100 mL/hr, PRN            Pertinent New Labs & Imaging Studies     CBC with Differential:    Lab Results   Component Value Date    WBC 8.4 04/08/2021    RBC 3.71 04/08/2021    HGB 10.9 04/08/2021    HCT 36.6 04/08/2021     04/08/2021    MCV 98.7 04/08/2021    MCH 29.4 04/08/2021    MCHC 29.8 04/08/2021    RDW 13.2 04/08/2021    NRBC 1 02/21/2021    SEGSPCT 48.4 04/08/2021    BANDSPCT 4 02/21/2021    LYMPHOPCT 24.8 04/08/2021    MONOPCT 12.7 04/08/2021    MYELOPCT 1 09/02/2019    BASOPCT 1.0 04/08/2021    MONOSABS 1.1 04/08/2021    LYMPHSABS 2.1 04/08/2021    EOSABS 1.1 04/08/2021    BASOSABS 0.1 04/08/2021    DIFFTYPE AUTOMATED DIFFERENTIAL 04/08/2021     CMP:    Lab Results   Component Value Date     04/08/2021    K 3.4 04/08/2021     04/08/2021    CO2 24 04/08/2021    BUN 20 04/08/2021    CREATININE 1.4 04/08/2021    GFRAA 59 04/08/2021    LABGLOM 49 04/08/2021    GLUCOSE 105 04/08/2021    PROT 5.3 04/08/2021    PROT 8.0 08/30/2012    LABALBU 3.1 04/08/2021    CALCIUM 8.8 04/08/2021    BILITOT 0.3 04/08/2021    ALKPHOS 93 04/08/2021    AST 23 04/08/2021    ALT 9 04/08/2021     Ct Head Wo Contrast    Result Date: 4/4/2021  EXAMINATION: CT OF THE HEAD WITHOUT CONTRAST  4/4/2021 8:03 pm TECHNIQUE: CT of the head was performed without the administration of intravenous contrast. Dose modulation, iterative reconstruction, and/or weight based adjustment of the mA/kV was utilized to reduce the radiation dose to as low as reasonably achievable. COMPARISON: CT brain 02/17/2021. MRI brain 12/01/2020. HISTORY: ORDERING SYSTEM PROVIDED HISTORY: ams TECHNOLOGIST PROVIDED HISTORY: Reason for exam:->ams Has a \"code stroke\" or \"stroke alert\" been called? ->No Decision Support Exception->Emergency Medical Condition (MA) Reason for Exam: ams Acuity: Acute Type of Exam: Initial Additional signs and symptoms: Hypertension; Hypoglycemia; Altered Mental Status Relevant Medical/Surgical History: weakness FINDINGS: BRAIN/VENTRICLES: A large mass within the sella with extension into the left parasellar space anterior to the trang is again seen without significant change. No hydrocephalus or herniation. Mild generalized parenchymal volume loss and chronic periventricular white matter hypoattenuation are seen. No evidence of acute territorial infarction or acute intracranial hemorrhage. ORBITS: The visualized portion of the orbits demonstrate no acute abnormality. SINUSES: The visualized paranasal sinuses and mastoid air cells demonstrate no acute abnormality. SOFT TISSUES/SKULL:  No acute abnormality of the visualized skull or soft tissues. 1. No acute intracranial abnormality. 2. Grossly unchanged large sellar/parasellar mass. Ct Abdomen Pelvis W Iv Contrast Additional Contrast? None    Result Date: 4/5/2021  EXAMINATION: CT OF THE ABDOMEN AND PELVIS WITH CONTRAST 4/5/2021 12:07 am TECHNIQUE: CT of the abdomen and pelvis was performed with the administration of intravenous contrast. Multiplanar reformatted images are provided for review.  Dose modulation, iterative reconstruction, and/or weight based adjustment of the mA/kV was utilized to reduce the radiation dose to as low as reasonably achievable. COMPARISON: CT abdomen pelvis dated November 30, 2020 HISTORY: ORDERING SYSTEM PROVIDED HISTORY: vomiting TECHNOLOGIST PROVIDED HISTORY: Reason for exam:->vomiting Additional Contrast?->None Decision Support Exception->Emergency Medical Condition (MA) Reason for Exam: abd pain vomiting Acuity: Acute Type of Exam: Initial Additional signs and symptoms: ams Relevant Medical/Surgical History: isovue 370 80 ml FINDINGS: Lower Chest: Bibasilar atelectasis is present. There is a small hiatal hernia. Organs: There has been a cholecystectomy. Pneumobilia is noted. Gas is seen within the common bile duct. Findings could be related to biliary intervention. The spleen, adrenal glands, and kidneys are unremarkable. The pancreatic duct is dilated. The pancreatic duct measures 5 mm and is not significantly changed since the prior examination. The common bile duct is also dilated and measures 9 mm. This is not significantly changed since the prior study. GI/Bowel: There is no evidence of bowel obstruction. The appendix is normal. Pelvis: The bladder is unremarkable. There is no free fluid. Peritoneum/Retroperitoneum: There is no free air or lymphadenopathy. Bones/Soft Tissues: No destructive osseous lesions are identified. 1. No acute findings. 2. Pneumobilia is noted without significant change since the prior examination of November 30, 2020. Findings could be related to prior biliary intervention. 3. Dilated common bile duct and pancreatic duct are noted without significant change since the prior examination of July 2, 2020. If there is concern for biliary obstruction, an MRCP can be obtained. Correlate with laboratory values. 4. Small hiatal hernia.      Xr Chest Portable    Result Date: 4/6/2021  EXAMINATION: ONE XRAY VIEW OF THE CHEST 4/5/2021 7:51 am COMPARISON: 04/04/2020 HISTORY: ORDERING SYSTEM PROVIDED HISTORY: hypoxia, ?aspiration TECHNOLOGIST PROVIDED HISTORY: Reason for exam:->hypoxia, ?aspiration Reason for Exam: hypoxia, ?aspiration Acuity: Acute FINDINGS: The patient is status post sternotomy. The heart size is normal.  There is no pneumothorax. There is minimal left basilar atelectasis or scarring. The bony thorax is grossly intact. No evidence of acute cardiopulmonary disease. Minimal left basilar atelectasis or scarring. Xr Chest Portable    Result Date: 4/4/2021  EXAMINATION: ONE XRAY VIEW OF THE CHEST 4/4/2021 6:22 pm COMPARISON: Chest radiograph 02/19/2021. HISTORY: ORDERING SYSTEM PROVIDED HISTORY: ams TECHNOLOGIST PROVIDED HISTORY: Reason for exam:->ams FINDINGS: Status post median sternotomy. The cardiomediastinal silhouette is unchanged. No pneumothorax, vascular congestion, consolidation, or pleural effusion is identified. No acute osseous abnormality. No acute process. Cta Pulmonary W Contrast    Result Date: 4/5/2021  EXAMINATION: CTA OF THE CHEST, 4/5/2021 12:07 am TECHNIQUE: CTA of the chest was performed after the administration of intravenous contrast.  Multiplanar reformatted images are provided for review. MIP images are provided for review. Dose modulation, iterative reconstruction, and/or weight based adjustment of the mA/kV was utilized to reduce the radiation dose to as low as reasonably achievable.  COMPARISON: None HISTORY: ORDERING SYSTEM PROVIDED HISTORY:  Hypoxia TECHNOLOGIST PROVIDED HISTORY: Reason for exam:  Hypoxia Decision Support Exception:  Emergency Medical Condition (MA) Reason for Exam:  Hypoxia Acuity:  Acute Type of Exam:  Initial Additional signs and symptoms:  Pt is confused Relevant Medical/Surgical History:  Isovue 370 80 mL ORDERING SYSTEM PROVIDED HISTORY:  Vomiting TECHNOLOGIST PROVIDED HISTORY: Reason for exam:  Vomiting Additional Contrast?   None Decision Support Exception: Emergency Medical Condition (MA) Reason for Exam:  Abd pain vomiting Acuity:  Acute Type of Exam:  Initial Additional signs and symptoms:  AMS Relevant Medical/Surgical History:  Isovue 370 80 ml FINDINGS: Pulmonary Arteries: The pulmonary arteries are suboptimally opacified with contrast.  Motion artifact limits evaluation of the pulmonary arteries. There is no gross large or central pulmonary emboli identified. Smaller more peripheral pulmonary emboli cannot be entirely excluded. Mediastinum: No evidence of mediastinal lymphadenopathy. The heart and pericardium demonstrate no acute abnormality. There is no acute abnormality of the thoracic aorta. Atherosclerotic disease of the aorta is seen. Coronary artery calcifications are noted. There is a small hiatal hernia. Lungs/pleura: Bilateral lower lobe atelectasis is seen. Motion artifact limits evaluation of the lung parenchyma. There is no evidence of a pneumothorax or pleural effusion. Upper Abdomen: Please see CT abdomen and pelvis report from the same date. Soft Tissues/Bones: No acute bone or soft tissue abnormality. The pulmonary arteries are suboptimally opacified with contrast.  Motion artifact limits evaluation of the pulmonary arteries. There is no gross large or central pulmonary emboli identified. Smaller more peripheral pulmonary emboli cannot be entirely excluded. Bilateral lower lobe atelectasis. Small hiatal hernia.        Assessment and Plan:   Rishabh Bonilla is a 79 y.o.  male  who presents with Confusion    Acute metabolic/septic encephalopathy  Per daughter he gets off-and-on confused with biliary sludge  Likely UTI given elevated white count  Elevated WBC, currently getting better  Rapid Covid test negative  Monitor BMP    Hypoglycemia  Type 2 diabetes, BG is stable now  Hemoglobin dropped in the evening after receiving short acting insulin  Monitor blood glucose closely  Discontinued D10 drip  Endocrinology recommendations appreciated-currently off dextrose drip  Per daughter patient has similar issues with elevated blood sugars in the morning and going down at evening    Acute respiratory failure with hypoxia-Improved  ? Aspiration  Currently on off oxygen at bed side  Chest x-ray and CTA chest with no acute process  Repeat x ray ordered for today  CTA ruled out PE  proBNP slightly elevated  Echo on 11/30/2020 showed low EF with 40%. There is evidence of apical ballooning, suggestive of stress-induced cardiomyopathy  Continue Lasix  Cipro and added Flagyl discontinued, continue Ampicillin    UTI  Pyuria  Urine culture with Enterococcus  Ampicillin oral    Abdominal pain  CT with no acute process  LFT within normal limits  Patient had similar episode in the past and had CBD stones, requiring ERCP  GI recommendations appreciated-plan for ERCP today to sweep the bile duct  DC Cipro and continue Flagyl for now.  Continue Ampicillin    Hypokalemia  Replace as needed  Monitor BMP    Heart failure with reduced EF  Continue Coreg  Continue aspirin  Continue Cardizem  Low dose Lisinopril     HTN  Continue Cardizem, Coreg and added lisinopril    COPD  Hyperlipidemia  Pituitary tumor  Possible dementia    Diet Diet NPO Time Specified   DVT Prophylaxis [] Lovenox, [x]  Heparin, [] SCDs, [] Ambulation   GI Prophylaxis [x] PPI,  [] H2 Blocker,  [] Carafate,  [] Diet/Tube Feeds   Code Status Full Code   Disposition Patient requires continued admission due to Acute encephalopathy   MDM [] Low, [x] Moderate,[]  High     Electronically signed by Funmilayo Joaquin MD on 4/8/2021 at 12:20 PM

## 2021-04-08 NOTE — PROGRESS NOTES
1456: Patient arrived to PACU from Endo. Oral airway in place. Report obtained from [facility], [facility] and [address] [place] [address].   0648: Oral airway removed. Patient tolerated well. 1525: Dr. Arslan Gustafson called for patients BS of 303, new orders received. 1550: Report called to Zara Cazares for room 78 [address] [place] [address].   1454: Patient transferred to room 78 [address] [place] [address] by transport staff.

## 2021-04-08 NOTE — CARE COORDINATION
Per Deneen Dean patients spouse requesting ARU to initiate pre-cert with [facility] Medicare. ARU pre-cert currently pending at this time. Reference #[id number]. Will continue to follow for determination.

## 2021-04-08 NOTE — ANESTHESIA PRE PROCEDURE
Department of Anesthesiology  Preprocedure Note       Name:  Beatriz Ardon   Age:  79 y.o.  :  1940                                          MRN:  [id number]         Date:  2021      Surgeon: Barrie Sadler):  Jourdan Coombs MD    Procedure: Procedure(s):  ERCP DIAGNOSTIC    Medications prior to admission:   Prior to Admission medications    Medication Sig Start Date End Date Taking? Authorizing Provider   glimepiride (AMARYL) 4 MG tablet Take by mouth    Historical Provider, MD   omeprazole (PRILOSEC) 20 MG delayed release capsule Take 1 capsule by mouth daily    Historical Provider, MD   testosterone cypionate (DEPOTESTOTERONE CYPIONATE) 200 MG/ML injection Inject into the muscle.     Historical Provider, MD   simvastatin (ZOCOR) 20 MG tablet Take 1 tablet by mouth daily    Historical Provider, MD   coenzyme Q10 100 MG CAPS capsule As Directed    Historical Provider, MD   clotrimazole-betamethasone (Emily Ya) 1-0.05 % cream  21   Historical Provider, MD   cilostazol (PLETAL) 100 MG tablet TAKE 1 TABLET BY MOUTH TWO  TIMES A DAY 1/2 HOUR BEFORE OR 2 HOURS AFTER BREAKFAST  AND DINNER 21   Historical Provider, MD   Insulin Detemir (LEVEMIR FLEXTOUCH SC) Inject 10 Units into the skin nightly    Historical Provider, MD   rivaroxaban (XARELTO) 10 MG TABS tablet Take 1 tablet by mouth daily (with breakfast) 21   Bryan Lang MD   insulin lispro (HUMALOG) 100 UNIT/ML injection vial Inject 0-12 Units into the skin 3 times daily (with meals) **Medium Dose Corrective Algorithm**  Glucose: Dose:  If <139             No Insulin  140-199 2 Units  200-249 4 Units  250-299 6 Units  300-349 8 Units  350-400 10 Units  Above 400       12 Units 21   Bryan Lang MD   carvedilol (COREG) 6.25 MG tablet Take 1 tablet by mouth 2 times daily (with meals) 20   Delonte Radford MD   cholestyramine light 4 g packet Take 1 packet by mouth 2 times daily as needed (diarrhea) 20   Delonte Radford MD clopidogrel (PLAVIX) 75 MG tablet Take 1 tablet by mouth daily 12/8/20   Constantino Landa MD   ondansetron (ZOFRAN) 4 MG tablet Take 4 mg by mouth 2 times daily as needed for Nausea or Vomiting    Historical Provider, MD   famotidine (PEPCID) 20 MG tablet Take 1 tablet by mouth daily 7/5/20   Vero Savage MD   gabapentin (NEURONTIN) 100 MG capsule Take 100 mg by mouth nightly. Historical Provider, MD   Cholecalciferol 50 MCG (2000 UT) TABS Take one tablet daily by mouth    Historical Provider, MD   traMADol (ULTRAM) 50 MG tablet Take 50 mg by mouth every 6 hours as needed. 5/27/17   Historical Provider, MD   atorvastatin (LIPITOR) 40 MG tablet Take 40 mg by mouth daily    Historical Provider, MD   docusate sodium (COLACE) 100 MG capsule Take 100 mg by mouth nightly as needed     Historical Provider, MD   latanoprost (XALATAN) 0.005 % ophthalmic solution Place 1 drop into both eyes nightly     Historical Provider, MD   diltiazem (CARDIZEM) 60 MG tablet Take 1 tablet by mouth 2 times daily 3/19/17   Lissette Proper, DO   Multiple Vitamins-Minerals (MULTIVITAMIN PO) Take 1 tablet by mouth daily.     Historical Provider, MD   aspirin 81 MG chewable tablet Take 81 mg by mouth daily     Historical Provider, MD       Current medications:    Current Facility-Administered Medications   Medication Dose Route Frequency Provider Last Rate Last Admin    lactated ringers infusion   Intravenous Continuous Rodríguez Slater MD 75 mL/hr at 04/07/21 1657 New Bag at 04/07/21 1657    ciprofloxacin (CIPRO) IVPB 400 mg  400 mg Intravenous Q12H Rodríguez Slater MD   Stopped at 04/07/21 1929    [START ON 4/8/2021] indomethacin (INDOCIN) 50 MG suppository 100 mg  100 mg Rectal Once Rodríguez Slater MD        lisinopril (PRINIVIL;ZESTRIL) tablet 5 mg  5 mg Oral Daily Diallo Pena MD   5 mg at 04/07/21 1007    heparin (porcine) injection 5,000 Units  5,000 Units Subcutaneous TID Diallo Pena MD   5,000 Units at 04/07/21 2038  sodium chloride flush 0.9 % injection 10 mL  10 mL Intravenous 2 times per day Teresa Rodriguez MD   10 mL at 04/07/21 2039    sodium chloride flush 0.9 % injection 10 mL  10 mL Intravenous PRN Teresa Rodriguez MD        0.9 % sodium chloride infusion  25 mL Intravenous PRN Teresa Rodriguez MD        polyethylene glycol (GLYCOLAX) packet 17 g  17 g Oral Daily PRN Teresa Rodriguez MD        acetaminophen (TYLENOL) tablet 650 mg  650 mg Oral Q6H PRN Teresa Rodriguez MD        Or   Wilhelminia Blazing acetaminophen (TYLENOL) suppository 650 mg  650 mg Rectal Q6H PRN Teresa Rodriguez MD        ondansetron [facility]) injection 4 mg  4 mg Intravenous Q6H PRN Teresa Rodriguez MD        aspirin chewable tablet 81 mg  81 mg Oral Daily Teresa Rodriguez MD   81 mg at 04/07/21 1007    atorvastatin (LIPITOR) tablet 40 mg  40 mg Oral Daily Teresa Rodriguez MD   40 mg at 04/07/21 1007    carvedilol (COREG) tablet 6.25 mg  6.25 mg Oral BID  Cole Wyman MD   6.25 mg at 04/07/21 1027    cholestyramine light packet 4 g  4 g Oral BID PRN Teresa Rodriguez MD        dilTIAZem (CARDIZEM) tablet 60 mg  60 mg Oral BID Teresa Rodriguez MD   60 mg at 04/07/21 2038    gabapentin (NEURONTIN) capsule 100 mg  100 mg Oral Nightly Teresa Rodriguez MD   100 mg at 04/07/21 2038    latanoprost (XALATAN) 0.005 % ophthalmic solution 1 drop  1 drop Both Eyes Nightly Teresa Rodriguez MD   1 drop at 04/05/21 2355    pantoprazole (PROTONIX) injection 40 mg  40 mg Intravenous Daily Teresa Rodriguez MD   40 mg at 04/07/21 1007    glucose (GLUTOSE) 40 % oral gel 15 g  15 g Oral PRN Teresa Rodriguez MD        dextrose 50 % IV solution  12.5 g Intravenous PRN Teresa Rodriguez MD   12.5 g at 04/05/21 2133    glucagon (rDNA) injection 1 mg  1 mg Intramuscular PRN Teresa Rodriguez MD        dextrose 5 % solution  100 mL/hr Intravenous PRN Teresa Rodriguez MD   Stopped at 04/06/21 1497    metronidazole (FLAGYL) 500 mg in NaCl 100 mL IVPB premix  500 mg Intravenous Q8H Ericka Stevens MD   Stopped at 04/07/21 1759    insulin lispro (HUMALOG) injection vial 0-12 Units  0-12 Units Subcutaneous 2 times per day Albert Osorio MD   4 Units at 04/07/21 2038    insulin lispro (HUMALOG) injection vial 0-12 Units  0-12 Units Subcutaneous TID WC Albert Osorio MD   6 Units at 04/07/21 1205    sodium chloride flush 0.9 % injection 10 mL  10 mL Intravenous BID Sussy Carrera MD   10 mL at 04/06/21 1051       Allergies:     Allergies   Allergen Reactions    Byetta 10 Mcg Pen [Exenatide]     Sulbactam     Ampicillin Rash    Aricept [Donepezil Hydrochloride] Other (See Comments)     Hallucinations, confusion    Benztropine Other (See Comments)     Hallucinations, confusion    Celebrex [Celecoxib] Other (See Comments)     'causes him to be nervous and jittery\"    Diphenhydramine Hcl Other (See Comments)     nervous and jittery    Diphenhydramine Hcl [Diphenhydramine] Anxiety    Exenatide Other (See Comments)     Causes him to be nervous and jittery    Metoprolol Succinate Other (See Comments)     Hallucinations     Phenergan [Promethazine Hcl] Other (See Comments)     Hallucinations, nervous, jittery    Plavix [Clopidogrel Bisulfate] Other (See Comments)     Causes bleeding in his stomach    Pseudoephedrine Anxiety    Reglan [Metoclopramide Hcl] Other (See Comments)     \"Caused him to just sit in a chair and rock back and forth\"       Problem List:    Patient Active Problem List   Diagnosis Code    Uncontrolled type II diabetes with peripheral autonomic neuropathy (HCC) E11.43, E11.65    Hypertension, essential I10    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[facility] [address] 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    Choledocholithiasis K80.50    Generalized abdominal pain R10.84    COPD (chronic obstructive pulmonary disease) ([facility] J44.9    Abdominal pain, epigastric R10.13    Elevated LFTs R79.89    Abnormal findings on imaging of biliary tract R93.2    Chest pain R07.9    Coronary artery disease involving coronary bypass graft of native heart without angina pectoris I25.810    Acute encephalopathy G93.40    Vomiting R11.10    Sepsis (HCC) A41.9    Nausea vomiting and diarrhea R11.2, R19.7    Fever F14.1    Metabolic encephalopathy L75.91    Impaired cognition R41.89    Generalized weakness R53.1    Cholangitis due to bile duct calculus with obstruction K80.31    Oropharyngeal dysphagia R13.12    S/P BKA (below knee amputation) bilateral (HCC) Z89.512, Z89.511    Abdominal pain R10.9    Inguinal pain R10.30    Closed fracture of left hip ([facility]) S72.002A    Acute alteration in mental status R41.82       Past Medical History:        Diagnosis Date    Arthritis     Biliary stones 2015    CAD (coronary artery disease)     COPD (chronic obstructive pulmonary disease) (HCC)     Diabetes mellitus ([facility] [address]     History of blood transfusion     Hx of angiography 3/11/2014    Pulmonary Arteries: Small sliding hiatal hernia. Mod coronary artery calcifications. Mild bilat gynecomastia.     Hyperlipidemia     Hypertension     Kidney stone        Past Surgical History:        Procedure Laterality Date    ABDOMEN SURGERY      stones in bile duct removed x3    CARDIAC SURGERY      CABG x 2 in 2004 at [facility] Dr. Beverley Contreras, ESOPHAGUS      ERCP  03/13/14    w/ dilation of papilla    ERCP  9/11/14    ERCP  10/24/15    extractions stones, balloon dilatation     ERCP  03/18/2017    stone extraction     ERCP N/A 1/25/2019    ERCP STONE REMOVAL/ DILATATION OF PAPILLA WITH 10-12MM AND 12-15MM BALLOON AND 9-12MM, 12-15MM  EXTRACTOR BALLOON USED FOR REMOVAL OF MULTIPLE  CBD STONES performed by Miguel Mcmahan MD at [place] [address] ERCP N/A 9/7/2019    ERCP STONE REMOVAL, DILATATION OF PAPILLA WITH 10-12MM BALLOON, AND EXTRACTOR BALLOON 12-15MM USED FOR REMOVAL OF CBD STONE AND SLUDE [facility] ERCP N/A 3/31/2020    ERCP DILATION BALLOON to 12  AND SWEEP OF CBD STONE AND SLUDGE performed by Donnell Jimenez MD at [address] Left 2/18/2021    LEFT FEMUR IM NAIL VIANEY INSERTION performed by Dorian Nye DO at [address]      JOINT REPLACEMENT      LEG AMPUTATION BELOW KNEE Bilateral     2005 at [address] [facility]  03/13/14    sphincterotomy       Social History:    Social History     Tobacco Use    Smoking status: Never Smoker    Smokeless tobacco: Never Used   Substance Use Topics    Alcohol use: No                                Counseling given: Not Answered      Vital Signs (Current):   Vitals:    04/07/21 0505 04/07/21 0757 04/07/21 1423 04/07/21 2047   BP: (!) 118/58 (!) 104/55 (!) 82/44 127/60   Pulse: 79 79 72 81   Resp: 16 16 17 18   Temp: 36.7 °C (98.1 °F) 36.9 °C (98.5 °F) 36.5 °C (97.7 °F) 36.9 °C (98.5 °F)   TempSrc: Oral Oral Oral Oral   SpO2: 93% 92% 97% 98%   Weight:       Height:                                                  BP Readings from Last 3 Encounters:   04/07/21 127/60   02/23/21 132/62   02/18/21 126/65       NPO Status:                                                                                 BMI:   Wt Readings from Last 3 Encounters:   04/05/21 138 lb 3.2 oz (62.7 kg)   03/08/21 140 lb (63.5 kg)   02/22/21 140 lb 6.9 oz (63.7 kg)     Body mass index is 21.65 kg/m².     CBC:   Lab Results   Component Value Date    WBC 8.1 04/07/2021    RBC 3.50 04/07/2021    HGB 10.8 04/07/2021    HCT 31.8 04/07/2021    MCV 90.9 04/07/2021    RDW 13.2 04/07/2021     04/07/2021       CMP:   Lab Results   Component Value Date     04/07/2021    K 3.2 04/07/2021    CL 96 04/07/2021    CO2 26 04/07/2021    BUN 14 04/07/2021    CREATININE 1.2 04/07/2021    GFRAA >60 04/07/2021    LABGLOM 58 04/07/2021 GLUCOSE 166 04/07/2021    PROT 5.7 04/07/2021    PROT 8.0 08/30/2012    CALCIUM 8.7 04/07/2021    BILITOT 0.4 04/07/2021    ALKPHOS 92 04/07/2021    AST 22 04/07/2021    ALT 10 04/07/2021       POC Tests:   Recent Labs     04/07/21 2037   POCGLU 220*       Coags:   Lab Results   Component Value Date    PROTIME 14.9 04/07/2021    INR 1.23 04/07/2021    APTT 36.0 04/07/2021       HCG (If Applicable): No results found for: PREGTESTUR, PREGSERUM, HCG, HCGQUANT     ABGs:   Lab Results   Component Value Date    PO2ART 44 04/05/2021    WQA4GXS 28.0 04/05/2021    DGQ1OOA 20.9 04/05/2021        Type & Screen (If Applicable):  No results found for: LABABO, LABRH    Drug/Infectious Status (If Applicable):  No results found for: HIV, HEPCAB    COVID-19 Screening (If Applicable):   Lab Results   Component Value Date    COVID19 NOT DETECTED 04/05/2021           Anesthesia Evaluation  Patient summary reviewed and Nursing notes reviewed no history of anesthetic complications:   Airway: Mallampati: II  TM distance: >3 FB   Neck ROM: full  Mouth opening: > = 3 FB Dental:    (+) upper dentures      Pulmonary: breath sounds clear to auscultation  (+) COPD:                             Cardiovascular:  Exercise tolerance: poor (<4 METS),   (+) hypertension:, CAD:, CABG/stent: no interval change, hyperlipidemia        Rhythm: regular  Rate: normal           Beta Blocker:  Dose within 24 Hrs      ROS comment: Angiography: Pulmonary Arteries: Small sliding hiatal hernia. Mod coronary artery calcifications. Mild bilat gynecomastia. Neuro/Psych:   (+) neuromuscular disease:, psychiatric history:             ROS comment: Peripheral neuropathy  Acute encephalopathy  Neoplasm of uncertain behavior of brain  GI/Hepatic/Renal:   (+) renal disease: kidney stones,          ROS comment: Abnormal findings on imaging of biliary tract  Cholangitis due to bile duct calculus with obstruction.    Endo/Other:    (+) DiabetesType II DM, poorly controlled, , blood dyscrasia: anticoagulation therapy, arthritis: OA., .                  ROS comment: Elevated LFTs    S/P BKA (below knee amputation) bilateral  Abdominal:           Vascular:   + PVD, aortic or cerebral, . Anesthesia Plan      general     ASA 3       Induction: intravenous. MIPS: Postoperative opioids intended and Prophylactic antiemetics administered. Anesthetic plan and risks discussed with patient. Plan discussed with CRNA. Pre Anesthesia Assessment complete. Chart reviewed on 4/7/2021      MOLLY Amaya - CRNA   4/7/2021      Pre Anesthesia Evaluation complete. Anesthesia plan, risks, benefits, alternatives, and personnel discussed with patient and/or legal guardian. Patient and/or legal guardian verbalized an understanding and agreed to proceed. Anesthesia plan discussed with care team members and agreed upon.   MOLLY Mesa - NADIYA  4/8/2021

## 2021-04-08 NOTE — ANESTHESIA POSTPROCEDURE EVALUATION
Department of Anesthesiology  Postprocedure Note    Patient: Nehemiah Hughes  MRN: [id number]  YOB: 1940  Date of evaluation: 4/8/2021  Time:  3:24 PM     Procedure Summary     Date: 04/08/21 Room / Location: [address]    Anesthesia Start: 5942 Anesthesia Stop: 0353    Procedure: ERCP WITH BALLOON DILATATION 10-12 MM BALLOON( TO 12 MM) OF PAPILLA, , BALLOON SWEEP  WITH REMOVAL OF CBD STONES AND SPJFDU(47-96 MM BALLOON) (N/A ) Diagnosis: (-)    Surgeons: Sydney Aguilera MD Responsible Provider: Daniela Greenfield MD    Anesthesia Type: general ASA Status: 3          Anesthesia Type: general    Prachi Phase I: Prachi Score: 6    Prachi Phase II:      Last vitals: Reviewed and per EMR flowsheets.        Anesthesia Post Evaluation    Patient location during evaluation: PACU  Patient participation: complete - patient participated  Level of consciousness: sleepy but conscious  Airway patency: patent  Nausea & Vomiting: no nausea  Complications: no  Cardiovascular status: hemodynamically stable  Respiratory status: acceptable  Hydration status: euvolemic

## 2021-04-09 ENCOUNTER — APPOINTMENT (OUTPATIENT)
Dept: GENERAL RADIOLOGY | Age: 81
DRG: 444 | End: 2021-04-09
Payer: MEDICARE

## 2021-04-09 LAB
ALBUMIN SERPL-MCNC: 3.2 GM/DL (ref 3.4–5)
ALP BLD-CCNC: 95 IU/L (ref 40–129)
ALT SERPL-CCNC: 9 U/L (ref 10–40)
ANION GAP SERPL CALCULATED.3IONS-SCNC: 10 MMOL/L (ref 4–16)
AST SERPL-CCNC: 16 IU/L (ref 15–37)
BASOPHILS ABSOLUTE: 0 K/CU MM
BASOPHILS RELATIVE PERCENT: 0.2 % (ref 0–1)
BILIRUB SERPL-MCNC: 0.2 MG/DL (ref 0–1)
BUN BLDV-MCNC: 21 MG/DL (ref 6–23)
CALCIUM SERPL-MCNC: 9.3 MG/DL (ref 8.3–10.6)
CHLORIDE BLD-SCNC: 103 MMOL/L (ref 99–110)
CO2: 25 MMOL/L (ref 21–32)
CREAT SERPL-MCNC: 1.1 MG/DL (ref 0.9–1.3)
DIFFERENTIAL TYPE: ABNORMAL
EOSINOPHILS ABSOLUTE: 0 K/CU MM
EOSINOPHILS RELATIVE PERCENT: 0 % (ref 0–3)
GFR AFRICAN AMERICAN: >60 ML/MIN/1.73M2
GFR NON-AFRICAN AMERICAN: >60 ML/MIN/1.73M2
GLUCOSE BLD-MCNC: 153 MG/DL (ref 70–99)
GLUCOSE BLD-MCNC: 159 MG/DL (ref 70–99)
GLUCOSE BLD-MCNC: 226 MG/DL (ref 70–99)
GLUCOSE BLD-MCNC: 238 MG/DL (ref 70–99)
GLUCOSE BLD-MCNC: 272 MG/DL (ref 70–99)
GLUCOSE BLD-MCNC: 277 MG/DL (ref 70–99)
HCT VFR BLD CALC: 36.4 % (ref 42–52)
HEMOGLOBIN: 11.5 GM/DL (ref 13.5–18)
IMMATURE NEUTROPHIL %: 0.7 % (ref 0–0.43)
LYMPHOCYTES ABSOLUTE: 1.4 K/CU MM
LYMPHOCYTES RELATIVE PERCENT: 13.9 % (ref 24–44)
MCH RBC QN AUTO: 29.3 PG (ref 27–31)
MCHC RBC AUTO-ENTMCNC: 31.6 % (ref 32–36)
MCV RBC AUTO: 92.9 FL (ref 78–100)
MONOCYTES ABSOLUTE: 0.5 K/CU MM
MONOCYTES RELATIVE PERCENT: 4.7 % (ref 0–4)
NUCLEATED RBC %: 0 %
PDW BLD-RTO: 13.1 % (ref 11.7–14.9)
PLATELET # BLD: 249 K/CU MM (ref 140–440)
PMV BLD AUTO: 9 FL (ref 7.5–11.1)
POTASSIUM SERPL-SCNC: 3.7 MMOL/L (ref 3.5–5.1)
RBC # BLD: 3.92 M/CU MM (ref 4.6–6.2)
SEGMENTED NEUTROPHILS ABSOLUTE COUNT: 8 K/CU MM
SEGMENTED NEUTROPHILS RELATIVE PERCENT: 80.5 % (ref 36–66)
SODIUM BLD-SCNC: 138 MMOL/L (ref 135–145)
TOTAL IMMATURE NEUTOROPHIL: 0.07 K/CU MM
TOTAL NUCLEATED RBC: 0 K/CU MM
TOTAL PROTEIN: 5.5 GM/DL (ref 6.4–8.2)
WBC # BLD: 9.9 K/CU MM (ref 4–10.5)

## 2021-04-09 PROCEDURE — 97116 GAIT TRAINING THERAPY: CPT

## 2021-04-09 PROCEDURE — 6360000002 HC RX W HCPCS: Performed by: SPECIALIST

## 2021-04-09 PROCEDURE — 36415 COLL VENOUS BLD VENIPUNCTURE: CPT

## 2021-04-09 PROCEDURE — 6360000002 HC RX W HCPCS: Performed by: INTERNAL MEDICINE

## 2021-04-09 PROCEDURE — 82962 GLUCOSE BLOOD TEST: CPT

## 2021-04-09 PROCEDURE — 2500000003 HC RX 250 WO HCPCS: Performed by: INTERNAL MEDICINE

## 2021-04-09 PROCEDURE — 85025 COMPLETE CBC W/AUTO DIFF WBC: CPT

## 2021-04-09 PROCEDURE — 80053 COMPREHEN METABOLIC PANEL: CPT

## 2021-04-09 PROCEDURE — 2580000003 HC RX 258: Performed by: SPECIALIST

## 2021-04-09 PROCEDURE — 97530 THERAPEUTIC ACTIVITIES: CPT

## 2021-04-09 PROCEDURE — 71045 X-RAY EXAM CHEST 1 VIEW: CPT

## 2021-04-09 PROCEDURE — C9113 INJ PANTOPRAZOLE SODIUM, VIA: HCPCS | Performed by: INTERNAL MEDICINE

## 2021-04-09 PROCEDURE — 6370000000 HC RX 637 (ALT 250 FOR IP): Performed by: INTERNAL MEDICINE

## 2021-04-09 PROCEDURE — 94761 N-INVAS EAR/PLS OXIMETRY MLT: CPT

## 2021-04-09 PROCEDURE — 1200000000 HC SEMI PRIVATE

## 2021-04-09 PROCEDURE — 2580000003 HC RX 258: Performed by: INTERNAL MEDICINE

## 2021-04-09 RX ORDER — LISINOPRIL 5 MG/1
5 TABLET ORAL DAILY
Qty: 30 TABLET | Refills: 3 | Status: ON HOLD | OUTPATIENT
Start: 2021-04-10 | End: 2021-04-15

## 2021-04-09 RX ADMIN — HEPARIN SODIUM 5000 UNITS: 5000 INJECTION INTRAVENOUS; SUBCUTANEOUS at 13:58

## 2021-04-09 RX ADMIN — SODIUM CHLORIDE, PRESERVATIVE FREE 10 ML: 5 INJECTION INTRAVENOUS at 22:50

## 2021-04-09 RX ADMIN — METRONIDAZOLE 500 MG: 500 INJECTION, SOLUTION INTRAVENOUS at 22:58

## 2021-04-09 RX ADMIN — PANTOPRAZOLE SODIUM 40 MG: 40 INJECTION, POWDER, FOR SOLUTION INTRAVENOUS at 08:52

## 2021-04-09 RX ADMIN — HEPARIN SODIUM 5000 UNITS: 5000 INJECTION INTRAVENOUS; SUBCUTANEOUS at 08:44

## 2021-04-09 RX ADMIN — LISINOPRIL 5 MG: 5 TABLET ORAL at 08:55

## 2021-04-09 RX ADMIN — HEPARIN SODIUM 5000 UNITS: 5000 INJECTION INTRAVENOUS; SUBCUTANEOUS at 22:51

## 2021-04-09 RX ADMIN — DILTIAZEM HYDROCHLORIDE 60 MG: 60 TABLET, FILM COATED ORAL at 08:55

## 2021-04-09 RX ADMIN — CIPROFLOXACIN 400 MG: 2 INJECTION, SOLUTION INTRAVENOUS at 04:45

## 2021-04-09 RX ADMIN — INSULIN LISPRO 2 UNITS: 100 INJECTION, SOLUTION INTRAVENOUS; SUBCUTANEOUS at 12:46

## 2021-04-09 RX ADMIN — SODIUM CHLORIDE, PRESERVATIVE FREE 10 ML: 5 INJECTION INTRAVENOUS at 08:56

## 2021-04-09 RX ADMIN — CIPROFLOXACIN 400 MG: 2 INJECTION, SOLUTION INTRAVENOUS at 18:51

## 2021-04-09 RX ADMIN — METRONIDAZOLE 500 MG: 500 INJECTION, SOLUTION INTRAVENOUS at 08:52

## 2021-04-09 RX ADMIN — SODIUM CHLORIDE, POTASSIUM CHLORIDE, SODIUM LACTATE AND CALCIUM CHLORIDE: 600; 310; 30; 20 INJECTION, SOLUTION INTRAVENOUS at 17:13

## 2021-04-09 RX ADMIN — ATORVASTATIN CALCIUM 40 MG: 40 TABLET, FILM COATED ORAL at 08:55

## 2021-04-09 RX ADMIN — METRONIDAZOLE 500 MG: 500 INJECTION, SOLUTION INTRAVENOUS at 17:08

## 2021-04-09 RX ADMIN — ASPIRIN 81 MG CHEWABLE TABLET 81 MG: 81 TABLET CHEWABLE at 08:55

## 2021-04-09 RX ADMIN — INSULIN LISPRO 1 UNITS: 100 INJECTION, SOLUTION INTRAVENOUS; SUBCUTANEOUS at 08:45

## 2021-04-09 RX ADMIN — CARVEDILOL 6.25 MG: 6.25 TABLET, FILM COATED ORAL at 08:39

## 2021-04-09 RX ADMIN — INSULIN LISPRO 2 UNITS: 100 INJECTION, SOLUTION INTRAVENOUS; SUBCUTANEOUS at 17:03

## 2021-04-09 RX ADMIN — GABAPENTIN 100 MG: 100 CAPSULE ORAL at 22:51

## 2021-04-09 RX ADMIN — CARVEDILOL 6.25 MG: 6.25 TABLET, FILM COATED ORAL at 17:10

## 2021-04-09 RX ADMIN — DILTIAZEM HYDROCHLORIDE 60 MG: 60 TABLET, FILM COATED ORAL at 22:51

## 2021-04-09 ASSESSMENT — PAIN SCALES - GENERAL
PAINLEVEL_OUTOF10: 0

## 2021-04-09 NOTE — PROGRESS NOTES
DOING WELL NO ABD COMPLAINTS  VITALS STABLE   LABS FROM AM NOTED  WILL ADVANCE DIET D/C SOON TO REHAB  D/W WIFE ABOUT THE ERCP PROCEDURE YESTERDAY

## 2021-04-09 NOTE — CARE COORDINATION
ARU pre-cert currently still pending with [place] [employer] at this time. Will continue to follow for determination.

## 2021-04-09 NOTE — OP NOTE
[address], [address] [place] [address]                                OPERATIVE REPORT    PATIENT NAME: Teofilo Gilbert                  :        1940  MED REC NO:   [id number]                          ROOM:       5203  ACCOUNT NO:   [de-identified]                           ADMIT DATE: 2021  PROVIDER:     Tramaine Gallagher MD    DATE OF PROCEDURE:  2021    LOCATION:  The patient is in room 1115. OPERATION PERFORMED:  ERCP with balloon dilation of the papilla, and  removal of multiple common bile duct stones along with sludge. PREOPERATIVE DIAGNOSIS/CHIEF COMPLAINT:  The patient is an 42-year-old  gentleman who was admitted to the hospital with abdominal pain, nausea,  vomiting, and acute confusional state. The patient has history of  common bile duct stones with multiple ERCPs done in the past and the  present procedure is being performed to sweep the common bile duct off  of any stone and/sludge because of possible retained stone/cholangitis. Indications, risks, alternatives, and complications have been discussed  with the patient and informed consent has been signed. The patient is  clinically stable to undergo the above procedure. PREMEDICATION:  Please refer to the anesthesiologist's notes. OPERATIVE PROCEDURE:  The patient was placed in the left lateral  decubitus position and the therapeutic video Olympus duodenoscope was  introduced in the back of the throat and was advanced into the  esophagus, then stomach, and finally second portion of the duodenum. The papilla of Vater was easily identified in position and was normal in  location and was fairly wide and patent from prior sphincterotomy.     The common bile duct was very easily selectively cannulated with the  help of a sphincterotome and the guidewire, and upon injecting, it was  slightly dilated and multiple filling defect were noted suggestive of  CBD stones. The films were reviewed and it was decided to dilate the  papilla      The sphincterotome was removed and the papilla was dilated with the balloon size  10, 11, and 12 mm respectively. After dilating the papilla, there was  no evidence of bleeding. After the papilla was dilated, the balloon was  removed and was replaced with a 15 mm common bile duct stone retrieval  balloon, which was advanced over the guidewire and inflated in the  common bile duct. The common bile duct was swept on multiple occasions  and multiple large 10-12 mm size stones along with large amount of  sludge was removed. Also, the common bile duct was flushed with 20 mL  of sterile normal saline and again a large amount of sludge was removed. At the end of the procedure, the common bile duct was swept again and no  further stones or sludge was noted in the common bile duct. The pancreatic duct was never cannulated nor injected during the  procedure. POSTOPERATIVE DIAGNOSES:  1. Post sphincterotomy changes noted at the papilla with widely open  papilla of Vater. 2.  Dilated common bile duct containing multiple filling defects  suggestive of CBD stones. 3.  Status post dilatation of the papilla with CRE balloon size 10, 11,  and 12 mm respectively. 4.  Removal of multiple common bile duct stones, 10-12 mm in size along  with sludge with the balloon. 5.  The pancreatic duct was never cannulated nor injected. RECOMMENDATIONS:  1. We will continue present management with IV fluids. 2.  We continue patient IV antibiotics. 3.  Monitor the patient's CBC and CMP in a.m.  4.  Monitor the patient for any post ERCP complications. The patient tolerated the procedure very well. There were no immediate  postprocedure complications. The blood loss during the procedure was nil.         Alf Lugo MD    D: 04/08/2021 15:00:29       T: 04/08/2021 15:09:22     [email or web address]  Job#: [id number]     Doc#: [id number]    CC:

## 2021-04-09 NOTE — PROGRESS NOTES
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 supportive psychotherapy session addressed issues related to goals of therapy  as listed below.   Patient's reaction: Action in the context of mental status appropriate for ambulatory setting.  Progress: good  Plan: RTC 4 weeks or sooner if needed  Psychotherapy services during this visit included  myself and the patient.   TREATMENT  PLAN          SYMPTOMS; PROBLEMS   MEASURABLE GOALS;    FUNCTIONAL IMPROVEMENT INTERVENTIONS;   GAINS MADE DISCHARGE CRITERIA   Psychosis: auditory hallucinations and tactile hallucinations, thought disorganization   Reduce frequency and intensity of psychosis symptoms medications   psycho-education   psychotherapy marked symptom improvement   Depression: depressed mood and anhedonia   reduce depressive symptoms  medications   psychotherapy marked symptom improvement

## 2021-04-09 NOTE — PROGRESS NOTES
Comprehensive Nutrition Assessment    Type and Reason for Visit:  Reassess    Nutrition Recommendations/Plan:   Advance from clear liquid diet as tolerates  Continue carb controlled diet, soft with bite-sized diet   Assist with meals as needed, encourage intake   Will continue to follow up to monitor diet progression and tolerance    Nutrition Assessment:  Remains on clear liquid diet. s/p ERCP with CBD stone removal yesterday. Breakfast today clear liquid meal, intake %. Will continue to follow at high nutriton risk with mainly clear liquid diet during stay. Noted recommendation for soft and bite-sized diet per speech therapy when able to advance from liquid diet. Malnutrition Assessment:  Malnutrition Status: At risk for malnutrition (Comment)    Context:  Acute Illness       Estimated Daily Nutrient Needs:  Energy (kcal):  3604-3060 (25-30 kcal/kg); Weight Used for Energy Requirements:  Current     Protein (g):  63-76 (1-1.3 g/kg); Weight Used for Protein Requirements:  Current        Fluid (ml/day):  1007-3249; Method Used for Fluid Requirements:  1 ml/kcal      Nutrition Related Findings:  glucose POCT at times over 200 mg/dL, receiving care on visit      Wounds:  None       Current Nutrition Therapies:    DIET CLEAR LIQUID; Carb Control: 5 carb choices (75 gms)/meal    Anthropometric Measures:  · Height: 5' 8\" (172.7 cm)  · Current Body Weight: 147 lb 14.9 oz (67.1 kg)   · Admission Body Weight: 138 lb 3.2 oz (62.7 kg)    · Usual Body Weight: 142 lb 3.2 oz (64.5 kg)(4/1/20)     · Ideal Body Weight: 154 lbs; % Ideal Body Weight 93.4 %   · BMI: 22.5  · Adjusted Body Weight: 154.5;  Amputation   · Adjusted BMI: 24.1    · BMI Categories: Normal Weight (BMI 22.0 to 24.9) age over 72       Nutrition Diagnosis:   · Inadequate protein-energy intake related to cognitive or neurological impairment as evidenced by NPO or clear liquid status due to medical condition      Nutrition Interventions:   Food and/or Nutrient Delivery:  Continue Current Diet, Start Oral Nutrition Supplement  Nutrition Education/Counseling:  No recommendation at this time   Coordination of Nutrition Care:  Continue to monitor while inpatient, Feeding Assistance/Environment Change    Goals:  Pt will toerate diet avancement within 24-48 hr       Nutrition Monitoring and Evaluation:   Behavioral-Environmental Outcomes:  None Identified   Food/Nutrient Intake Outcomes:  Diet Advancement/Tolerance, Food and Nutrient Intake  Physical Signs/Symptoms Outcomes:  Biochemical Data, Meal Time Behavior, Skin, Weight, GI Status     Discharge Planning:     Too soon to determine     Electronically signed by Yvette Schwarz RD, LD on 4/9/21 at 9:52 AM EDT    Contact: [phone]

## 2021-04-09 NOTE — PROGRESS NOTES
Progress Note( Dr. Parish Thorpe)    Subjective:   Admit Date: 4/4/2021  PCP: Melina Addison MD    Admitted For : Confusion     Consulted For: Better control of blood glucose    Interval History: Alert awake feels much better    Patient to have ERCP to be done later in the day    Denies any chest pains,   Denies SOB . Denies nausea or vomiting. Now eating better     no new bowel or bladder symptoms.        Intake/Output Summary (Last 24 hours) at 4/9/2021 1842  Last data filed at 4/9/2021 1609  Gross per 24 hour   Intake 1106.25 ml   Output 400 ml   Net 706.25 ml       DATA    CBC:   Recent Labs     04/07/21  0641 04/08/21  0659 04/09/21  0600   WBC 8.1 8.4 9.9   HGB 10.8* 10.9* 11.5*    246 249    CMP:  Recent Labs     04/07/21  0641 04/08/21  0659 04/09/21  0811   * 135 138   K 3.2* 3.4* 3.7   CL 96* 100 103   CO2 26 24 25   BUN 14 20 21   CREATININE 1.2 1.4* 1.1   CALCIUM 8.7 8.8 9.3   PROT 5.7* 5.3* 5.5*   LABALBU 3.4 3.1* 3.2*   BILITOT 0.4 0.3 0.2   ALKPHOS 92 93 95   AST 22 23 16   ALT 10 9* 9*     Lipids:   Lab Results   Component Value Date    CHOL 144 11/12/2018    HDL 62 03/15/2019    TRIG 101 11/12/2018     Glucose:  Recent Labs     04/09/21  0635 04/09/21  1127 04/09/21  1628   POCGLU 159* 226* 238*     [id number]:  Lab Results   Component Value Date    LABA1C 7.9 12/01/2020     High Sensitivity TSH:   Lab Results   Component Value Date    TSHHS 0.777 12/03/2020     Free T3:   Lab Results   Component Value Date    FT3 2.7 10/21/2015     Free T4:  Lab Results   Component Value Date    T4FREE 1.26 11/05/2018          Scheduled Medicines   Medications:    insulin lispro  0-6 Units Subcutaneous TID WC    insulin lispro  0-3 Units Subcutaneous 2 times per day    ciprofloxacin  400 mg Intravenous Q12H    indomethacin  100 mg Rectal Once    lisinopril  5 mg Oral Daily    heparin (porcine)  5,000 Units Subcutaneous TID    sodium chloride flush  10 mL Intravenous 2 times per day    aspirin  81 mg Oral Daily    atorvastatin  40 mg Oral Daily    carvedilol  6.25 mg Oral BID WC    dilTIAZem  60 mg Oral BID    gabapentin  100 mg Oral Nightly    latanoprost  1 drop Both Eyes Nightly    pantoprazole  40 mg Intravenous Daily    metroNIDAZOLE  500 mg Intravenous Q8H    sodium chloride flush  10 mL Intravenous BID      Infusions:    lactated ringers 50 mL/hr at 04/09/21 1713    sodium chloride      dextrose Stopped (04/06/21 0039)         Objective:   Vitals: BP (!) 146/64   Pulse 66   Temp 98 °F (36.7 °C) (Oral)   Resp 16   Ht 5' 8\" (1.727 m)   Wt 148 lb (67.1 kg)   SpO2 96%   BMI 22.50 kg/m²   General appearance: alert and cooperative with exam  Neck: no JVD or bruit  Thyroid : Normal lobes   Lungs: Has Vesicular Breath sounds   Heart:  regular rate and rhythm  Abdomen: soft, non-tender; bowel sounds normal; no masses,  no organomegaly  Musculoskeletal: Normal  Extremities: extremities normal, , no edema bilateral below-knee amputation  Neurologic:  Awake, alert, oriented to name, place and time. Cra paresthesia s intact. ,  and gait is abnormal.  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)     Choledocholithiasis     Generalized abdominal pain     COPD (chronic obstructive pulmonary disease) (HCC)     Abdominal pain, epigastric     Elevated LFTs     Abnormal findings on imaging of biliary tract     Chest pain     Coronary artery disease involving coronary bypass graft of native heart without angina pectoris     Acute encephalopathy     Vomiting     Sepsis (HCC)     Nausea vomiting and diarrhea     Fever     Metabolic encephalopathy     Impaired cognition     Generalized weakness     Cholangitis due to bile duct calculus with obstruction     Oropharyngeal dysphagia     S/P BKA (below knee amputation) bilateral ([address] Abdominal pain     Inguinal pain     Closed fracture of left hip (HCC)     Acute alteration in mental status      Plan:     1. Reviewed POC blood glucose . Labs and X ray results   2. Reviewed Current Medicines   3. Discontinued 10 IV as blood glucose is stable  4. On Correction bolus Humalog  Insulin regime    5. Monitor Blood glucose frequently   6. Modified  the dose of Insulin/ other medicines as needed  7. Going for ERCP remove the gallbladder duct stones  8. Will follow     .      Jamarcus Mullins MD

## 2021-04-09 NOTE — PLAN OF CARE
Problem: Falls - Risk of:  Goal: Will remain free from falls  Description: Will remain free from falls  4/9/2021 0257 by Goran Rader RN  Outcome: Ongoing  4/9/2021 0257 by Goran Rader RN  Outcome: Ongoing  4/8/2021 1852 by Wendy Carbone RN  Outcome: Ongoing  Goal: Absence of physical injury  Description: Absence of physical injury  4/9/2021 0257 by Goran Rader RN  Outcome: Ongoing  4/9/2021 0257 by Goran Rader RN  Outcome: Ongoing  4/8/2021 1852 by Wendy Carbone RN  Outcome: Ongoing     Problem: Infection:  Goal: Will remain free from infection  Description: Will remain free from infection  4/9/2021 0257 by Goran Rader RN  Outcome: Ongoing  4/9/2021 0257 by Goran Rader RN  Outcome: Ongoing  4/8/2021 1852 by Wendy Carbone RN  Outcome: Ongoing     Problem: Safety:  Goal: Free from accidental physical injury  Description: Free from accidental physical injury  4/9/2021 0257 by Goran Rader RN  Outcome: Ongoing  4/9/2021 0257 by Goran Rader RN  Outcome: Ongoing  4/8/2021 1852 by Wendy Carbone RN  Outcome: Ongoing  Goal: Free from intentional harm  Description: Free from intentional harm  4/9/2021 0257 by Goran Rader RN  Outcome: Ongoing  4/9/2021 0257 by Goran Rader RN  Outcome: Ongoing  4/8/2021 1852 by Wendy Carbone RN  Outcome: Ongoing  Goal: Ability to remain free from injury will improve  Description: Ability to remain free from injury will improve  Outcome: Ongoing     Problem: Daily Care:  Goal: Daily care needs are met  Description: Daily care needs are met  4/9/2021 0257 by Goran Rader RN  Outcome: Ongoing  4/9/2021 0257 by Goran Rader RN  Outcome: Ongoing  4/8/2021 1852 by Wendy Carbone RN  Outcome: Ongoing     Problem: Pain:  Goal: Patient's pain/discomfort is manageable  Description: Patient's pain/discomfort is manageable  4/9/2021 0257 by Goran Rader RN  Outcome: Ongoing  4/9/2021 0257 by Goran Rader, RN  Outcome: Ongoing  4/8/2021 5339 by Gloria Ogden RN  Outcome: Ongoing     Problem: Skin Integrity:  Goal: Skin integrity will stabilize  Description: Skin integrity will stabilize  4/9/2021 0257 by Drew Jacques RN  Outcome: Ongoing  4/9/2021 0257 by Drew Jacques RN  Outcome: Ongoing  4/8/2021 1852 by Gloria Ogden RN  Outcome: Ongoing     Problem: Discharge Planning:  Goal: Patients continuum of care needs are met  Description: Patients continuum of care needs are met  4/9/2021 0257 by Drew Jacques RN  Outcome: Ongoing  4/9/2021 0257 by Drew Jacques RN  Outcome: Ongoing  4/8/2021 1852 by Gloria Ogden RN  Outcome: Ongoing     Problem: Coping:  Goal: Ability to remain calm will improve  Description: Ability to remain calm will improve  Outcome: Ongoing     Problem: Self-Care:  Goal: Ability to participate in self-care as condition permits will improve  Description: Ability to participate in self-care as condition permits will improve  Outcome: Ongoing

## 2021-04-09 NOTE — PROGRESS NOTES
Physical Therapy    Physical Therapy Treatment Note  Name: Kvng Ford MRN: [id number] :   1940   Date:  2021   Admission Date: 2021 Room:  [address] [place] [address]   Restrictions/Precautions:  Restrictions/Precautions  Restrictions/Precautions: General Precautions, Weight Bearing, Fall Risk Lower Extremity Weight Bearing Restrictions  Left Lower Extremity Weight Bearing: Weight Bearing As Tolerated       Subjective:  Patient states:  \"I saw my wife yesterday; I would like to walk\"  Pain:   Location, Type, Intensity (0/10 to 10/10): Denies; 0/10    Objective:    Observation:  Pt supine in bed upon entry    Treatment, including education/measures:  Pt agreeable to participating in therapy at this time. Therapeutic Activity Training:   Therapeutic activity training was instructed today. Cues were given for safety, sequence, UE/LE placement, awareness, and balance. Activities performed today included bed mobility training, sup-sit, sit-stand. Pt completed supine to sit with modAx1 with verbal and tactile cues for BUE and BLE placement throughout transfer. Pt provided with assist varying from CGAx1 to minAx1 throughout static and dynamic sitting balance (while donning sleeves and prostheses bilaterally)  Pt completed sit to stand with minAx1 with verbal cues to push through bed and avoid pulling on walker. Pt completed stand to sit onto chair with minAx1 with verbal cues to feel chair against back of legs, reach back, and sit slowly; poor eccentric control noted. Increased time required for all task completion. Gait Training:  Cues were given for safety, sequence, device management, balance, posture, awareness, path. Pt ambulated 70 feet + 70 feet with assist varying from CGAx1 to minAx1 with a front wheeled walker with a decreased gait speed, a decreased step length bilaterally, and an intermittent unsteady gait.    Pt provided with verbal and tactile cues to maintain upright posture in order to avoid COM shifting outside of SHAD. Pt provided with verbal and tactile cues for BLE placement, walker placement, and sequence throughout ambulation. Pt provided with verbal cues for directions in order to successfully navigate hallway and return to correct room. Safety  Patient left safely in the chair, with call light/phone in reach with alarm applied. Gait belt and mask were used for transfers and gait. Assessment / Impression:       Patient's tolerance of treatment:  Good; patient tolerated ambulation in hallway today  Adverse Reaction: none  Significant change in status and impact:  none  Barriers to improvement:  Generalized weakness; decreased safety awareness    Plan for Next Sesson:    Continue progressing toward goals per plan of care. Progress independence with transfers and ambulation as tolerated and le    Time in:  1230  Time out:  1325  Timed treatment minutes:  55  Total treatment time:  54    Previously filed items:  Social/Functional History  Lives With: Spouse  Type of Home: Assisted living  Home Layout: One level  Home Access: Level entry  Bathroom Shower/Tub: Walk-in shower  Bathroom Toilet: Handicap height  Bathroom Equipment: Shower chair  Home Equipment: Rolling walker(bilateral lower extremity prosthetics (bilateral BKA))  ADL Assistance: Independent  Ambulation Assistance: Independent(mod I with RW)  Transfer Assistance: Independent     Long term goals  Time Frame for Long term goals :  In one week:  Long term goal 1: Pt will complete all bed mobility with supervision  Long term goal 2: Pt will complete sit <> stand transfers with supervision  Long term goal 3: Pt will ambulate 400 feet with supervision with Astrid Du term goal 4: Pt will independently complete 3 sets of 10 reps of BLE AROM exercises in available and allowed ROM    Electronically signed by:      Yossi Santiago PT, DPT  License #: [id number]

## 2021-04-09 NOTE — PROGRESS NOTES
Intravenous Q12H    indomethacin  100 mg Rectal Once    lisinopril  5 mg Oral Daily    heparin (porcine)  5,000 Units Subcutaneous TID    sodium chloride flush  10 mL Intravenous 2 times per day    aspirin  81 mg Oral Daily    atorvastatin  40 mg Oral Daily    carvedilol  6.25 mg Oral BID WC    dilTIAZem  60 mg Oral BID    gabapentin  100 mg Oral Nightly    latanoprost  1 drop Both Eyes Nightly    pantoprazole  40 mg Intravenous Daily    metroNIDAZOLE  500 mg Intravenous Q8H    sodium chloride flush  10 mL Intravenous BID      Infusions:    lactated ringers 50 mL/hr at 04/09/21 1713    sodium chloride      dextrose Stopped (04/06/21 0039)         Objective:   Vitals: BP (!) 146/64   Pulse 66   Temp 98 °F (36.7 °C) (Oral)   Resp 16   Ht 5' 8\" (1.727 m)   Wt 148 lb (67.1 kg)   SpO2 96%   BMI 22.50 kg/m²   General appearance: alert and cooperative with exam  Neck: no JVD or bruit  Thyroid : Normal lobes   Lungs: Has Vesicular Breath sounds   Heart:  regular rate and rhythm  Abdomen: soft, non-tender; bowel sounds normal; no masses,  no organomegaly  Musculoskeletal: Normal  Extremities: extremities normal, , no edema bilateral below-knee amputation  Neurologic:  Awake, alert, oriented to name, place and time. Cra paresthesia s intact. ,  and gait is abnormal.  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)     Choledocholithiasis     Generalized abdominal pain     COPD (chronic obstructive pulmonary disease) (HCC)     Abdominal pain, epigastric     Elevated LFTs     Abnormal findings on imaging of biliary tract     Chest pain     Coronary artery disease involving coronary bypass graft of native heart without angina pectoris     Acute encephalopathy     Vomiting     Sepsis (HCC)     Nausea vomiting and diarrhea     Fever Metabolic encephalopathy     Impaired cognition     Generalized weakness     Cholangitis due to bile duct calculus with obstruction     Oropharyngeal dysphagia     S/P BKA (below knee amputation) bilateral (HCC)     Abdominal pain     Inguinal pain     Closed fracture of left hip (HCC)     Acute alteration in mental status      Plan:     1. Reviewed POC blood glucose . Labs and X ray results   2. Reviewed Current Medicines   3. Discontinued 10 IV as blood glucose is stable  4. On Correction bolus Humalog  Insulin regime    5. Monitor Blood glucose frequently   6. Modified  the dose of Insulin/ other medicines as needed   7. Will follow     .      Katie Franz MD

## 2021-04-10 LAB
ALBUMIN SERPL-MCNC: 3.5 GM/DL (ref 3.4–5)
ALP BLD-CCNC: 91 IU/L (ref 40–128)
ALT SERPL-CCNC: 11 U/L (ref 10–40)
ANION GAP SERPL CALCULATED.3IONS-SCNC: 10 MMOL/L (ref 4–16)
AST SERPL-CCNC: 21 IU/L (ref 15–37)
BASOPHILS ABSOLUTE: 0 K/CU MM
BASOPHILS RELATIVE PERCENT: 0.2 % (ref 0–1)
BILIRUB SERPL-MCNC: 0.2 MG/DL (ref 0–1)
BUN BLDV-MCNC: 18 MG/DL (ref 6–23)
CALCIUM SERPL-MCNC: 9.2 MG/DL (ref 8.3–10.6)
CHLORIDE BLD-SCNC: 100 MMOL/L (ref 99–110)
CO2: 26 MMOL/L (ref 21–32)
CREAT SERPL-MCNC: 1 MG/DL (ref 0.9–1.3)
DIFFERENTIAL TYPE: ABNORMAL
EOSINOPHILS ABSOLUTE: 0.1 K/CU MM
EOSINOPHILS RELATIVE PERCENT: 0.7 % (ref 0–3)
GFR AFRICAN AMERICAN: >60 ML/MIN/1.73M2
GFR NON-AFRICAN AMERICAN: >60 ML/MIN/1.73M2
GLUCOSE BLD-MCNC: 124 MG/DL (ref 70–99)
GLUCOSE BLD-MCNC: 130 MG/DL (ref 70–99)
GLUCOSE BLD-MCNC: 179 MG/DL (ref 70–99)
GLUCOSE BLD-MCNC: 207 MG/DL (ref 70–99)
GLUCOSE BLD-MCNC: 227 MG/DL (ref 70–99)
GLUCOSE BLD-MCNC: 254 MG/DL (ref 70–99)
HCT VFR BLD CALC: 33.9 % (ref 42–52)
HEMOGLOBIN: 11.1 GM/DL (ref 13.5–18)
IMMATURE NEUTROPHIL %: 0.6 % (ref 0–0.43)
LYMPHOCYTES ABSOLUTE: 1.6 K/CU MM
LYMPHOCYTES RELATIVE PERCENT: 14.8 % (ref 24–44)
MCH RBC QN AUTO: 30.2 PG (ref 27–31)
MCHC RBC AUTO-ENTMCNC: 32.7 % (ref 32–36)
MCV RBC AUTO: 92.1 FL (ref 78–100)
MONOCYTES ABSOLUTE: 0.8 K/CU MM
MONOCYTES RELATIVE PERCENT: 7.5 % (ref 0–4)
NUCLEATED RBC %: 0 %
PDW BLD-RTO: 13.2 % (ref 11.7–14.9)
PLATELET # BLD: 266 K/CU MM (ref 140–440)
PMV BLD AUTO: 8.9 FL (ref 7.5–11.1)
POTASSIUM SERPL-SCNC: 3.4 MMOL/L (ref 3.5–5.1)
RBC # BLD: 3.68 M/CU MM (ref 4.6–6.2)
SEGMENTED NEUTROPHILS ABSOLUTE COUNT: 8 K/CU MM
SEGMENTED NEUTROPHILS RELATIVE PERCENT: 76.2 % (ref 36–66)
SODIUM BLD-SCNC: 136 MMOL/L (ref 135–145)
TOTAL IMMATURE NEUTOROPHIL: 0.06 K/CU MM
TOTAL NUCLEATED RBC: 0 K/CU MM
TOTAL PROTEIN: 5.8 GM/DL (ref 6.4–8.2)
WBC # BLD: 10.5 K/CU MM (ref 4–10.5)

## 2021-04-10 PROCEDURE — 6360000002 HC RX W HCPCS: Performed by: SPECIALIST

## 2021-04-10 PROCEDURE — 82962 GLUCOSE BLOOD TEST: CPT

## 2021-04-10 PROCEDURE — 85025 COMPLETE CBC W/AUTO DIFF WBC: CPT

## 2021-04-10 PROCEDURE — 2580000003 HC RX 258: Performed by: INTERNAL MEDICINE

## 2021-04-10 PROCEDURE — 36415 COLL VENOUS BLD VENIPUNCTURE: CPT

## 2021-04-10 PROCEDURE — 6360000002 HC RX W HCPCS: Performed by: INTERNAL MEDICINE

## 2021-04-10 PROCEDURE — 6370000000 HC RX 637 (ALT 250 FOR IP): Performed by: INTERNAL MEDICINE

## 2021-04-10 PROCEDURE — 94761 N-INVAS EAR/PLS OXIMETRY MLT: CPT

## 2021-04-10 PROCEDURE — 2500000003 HC RX 250 WO HCPCS: Performed by: INTERNAL MEDICINE

## 2021-04-10 PROCEDURE — C9113 INJ PANTOPRAZOLE SODIUM, VIA: HCPCS | Performed by: INTERNAL MEDICINE

## 2021-04-10 PROCEDURE — 80053 COMPREHEN METABOLIC PANEL: CPT

## 2021-04-10 PROCEDURE — 1200000000 HC SEMI PRIVATE

## 2021-04-10 RX ORDER — NITROFURANTOIN 25; 75 MG/1; MG/1
100 CAPSULE ORAL EVERY 12 HOURS SCHEDULED
Status: DISCONTINUED | OUTPATIENT
Start: 2021-04-10 | End: 2021-04-15

## 2021-04-10 RX ADMIN — METRONIDAZOLE 500 MG: 500 INJECTION, SOLUTION INTRAVENOUS at 22:53

## 2021-04-10 RX ADMIN — LATANOPROST 1 DROP: 50 SOLUTION/ DROPS OPHTHALMIC at 21:31

## 2021-04-10 RX ADMIN — ATORVASTATIN CALCIUM 40 MG: 40 TABLET, FILM COATED ORAL at 09:42

## 2021-04-10 RX ADMIN — ACETAMINOPHEN 650 MG: 325 TABLET ORAL at 16:40

## 2021-04-10 RX ADMIN — CIPROFLOXACIN 400 MG: 2 INJECTION, SOLUTION INTRAVENOUS at 16:40

## 2021-04-10 RX ADMIN — LISINOPRIL 5 MG: 5 TABLET ORAL at 09:42

## 2021-04-10 RX ADMIN — ASPIRIN 81 MG CHEWABLE TABLET 81 MG: 81 TABLET CHEWABLE at 09:41

## 2021-04-10 RX ADMIN — METRONIDAZOLE 500 MG: 500 INJECTION, SOLUTION INTRAVENOUS at 14:59

## 2021-04-10 RX ADMIN — CIPROFLOXACIN 400 MG: 2 INJECTION, SOLUTION INTRAVENOUS at 07:01

## 2021-04-10 RX ADMIN — HEPARIN SODIUM 5000 UNITS: 5000 INJECTION INTRAVENOUS; SUBCUTANEOUS at 22:54

## 2021-04-10 RX ADMIN — DILTIAZEM HYDROCHLORIDE 60 MG: 60 TABLET, FILM COATED ORAL at 09:41

## 2021-04-10 RX ADMIN — PANTOPRAZOLE SODIUM 40 MG: 40 INJECTION, POWDER, FOR SOLUTION INTRAVENOUS at 09:48

## 2021-04-10 RX ADMIN — DILTIAZEM HYDROCHLORIDE 60 MG: 60 TABLET, FILM COATED ORAL at 20:41

## 2021-04-10 RX ADMIN — HEPARIN SODIUM 5000 UNITS: 5000 INJECTION INTRAVENOUS; SUBCUTANEOUS at 09:42

## 2021-04-10 RX ADMIN — CARVEDILOL 6.25 MG: 6.25 TABLET, FILM COATED ORAL at 09:41

## 2021-04-10 RX ADMIN — GABAPENTIN 100 MG: 100 CAPSULE ORAL at 20:41

## 2021-04-10 RX ADMIN — SODIUM CHLORIDE, PRESERVATIVE FREE 10 ML: 5 INJECTION INTRAVENOUS at 20:45

## 2021-04-10 RX ADMIN — CARVEDILOL 6.25 MG: 6.25 TABLET, FILM COATED ORAL at 16:40

## 2021-04-10 RX ADMIN — NITROFURANTOIN MONOHYDRATE/MACROCRYSTALLINE 100 MG: 25; 75 CAPSULE ORAL at 21:45

## 2021-04-10 RX ADMIN — METRONIDAZOLE 500 MG: 500 INJECTION, SOLUTION INTRAVENOUS at 09:40

## 2021-04-10 RX ADMIN — HEPARIN SODIUM 5000 UNITS: 5000 INJECTION INTRAVENOUS; SUBCUTANEOUS at 15:00

## 2021-04-10 NOTE — PROGRESS NOTES
Hamzah Toribio MD, [address]                Internal Medicine Hospitalist             Daily Progress  Note   Subjective:     Chief Complaint   Patient presents with    Hypertension    Hypoglycemia    Altered Mental Status     Mr. Delos Goldberg Complains of nil, pleasantly  confused. Objective:    BP (!) 170/92   Pulse 74   Temp 98.1 °F (36.7 °C) (Oral)   Resp 16   Ht 5' 8\" (1.727 m)   Wt 148 lb (67.1 kg)   SpO2 95%   BMI 22.50 kg/m²      Intake/Output Summary (Last 24 hours) at 4/10/2021 1553  Last data filed at 4/9/2021 1808  Gross per 24 hour   Intake 840 ml   Output 200 ml   Net 640 ml      Physical Exam:  Heart:  Regular rate and rhythm, normal S1 and S2 in all 4 auscultatory areas. No rubs  Murmurs or gallops heard. Lungs: Mostly clear to auscultation, decreased breath sounds at bases. No wheezes appreciated no crackles heard. Abdomen: Soft, non distended. Bowel sounds appreciated. No obvious liver or spleen enlargement. Non tender, no rebound noted. CNS: Grossly intact.     Labs:  CBC with Differential:    Lab Results   Component Value Date    WBC 10.5 04/10/2021    RBC 3.68 04/10/2021    HGB 11.1 04/10/2021    HCT 33.9 04/10/2021     04/10/2021    MCV 92.1 04/10/2021    MCH 30.2 04/10/2021    MCHC 32.7 04/10/2021    RDW 13.2 04/10/2021    NRBC 1 02/21/2021    SEGSPCT 76.2 04/10/2021    BANDSPCT 4 02/21/2021    LYMPHOPCT 14.8 04/10/2021    MONOPCT 7.5 04/10/2021    MYELOPCT 1 09/02/2019    BASOPCT 0.2 04/10/2021    MONOSABS 0.8 04/10/2021    LYMPHSABS 1.6 04/10/2021    EOSABS 0.1 04/10/2021    BASOSABS 0.0 04/10/2021    DIFFTYPE AUTOMATED DIFFERENTIAL 04/10/2021     CMP:    Lab Results   Component Value Date     04/10/2021    K 3.4 04/10/2021     04/10/2021    CO2 26 04/10/2021    BUN 18 04/10/2021    CREATININE 1.0 04/10/2021    GFRAA >60 04/10/2021    LABGLOM >60 04/10/2021    GLUCOSE 227 04/10/2021    PROT 5.8 04/10/2021    PROT 8.0 08/30/2012    LABALBU 3.5 04/10/2021 04/01/2020    Fever     Nausea vomiting and diarrhea     Sepsis ([facility] [address] 09/01/2019    Vomiting 01/22/2019    Acute encephalopathy 11/02/2018    Chest pain 03/18/2017    Abdominal pain, epigastric     Elevated LFTs     Abnormal findings on imaging of biliary tract        Plan:     Problems being addressed this admission:   Acute metabolic encephalopathy  Acute Abdominal pain / CBD stones s/p ERCP  Acute Respiratory failure  UTI / enterococcus fecalis  HTN  DM 2    He presented with confusion but he could be confused at base line as well, will continue to monitor. RN will check with GI if he needs to stay on Cipro and Flagyl. His resp failure seems to have been resolved. For his UTI unable to start ampicillin as has allergy to it so started on Macro bid. His b/p is 170/92, does fluctuate and so continue to monitor. Endocrine on for his diabetes. Continue as before. Check a1c. Low K will repeat. Consultants:  GI  Endocrine    General Orders:  Repeat basic labs again in am.  I have explained to the patient and discussed with him/her the treatment plan.   Nohelia Headley MD, [address]

## 2021-04-10 NOTE — PLAN OF CARE
Problem: Falls - Risk of:  Goal: Will remain free from falls  Description: Will remain free from falls  Outcome: Ongoing  Goal: Absence of physical injury  Description: Absence of physical injury  Outcome: Ongoing     Problem: Infection:  Goal: Will remain free from infection  Description: Will remain free from infection  Outcome: Ongoing     Problem: Safety:  Goal: Free from accidental physical injury  Description: Free from accidental physical injury  Outcome: Ongoing  Goal: Free from intentional harm  Description: Free from intentional harm  Outcome: Ongoing  Goal: Ability to remain free from injury will improve  Description: Ability to remain free from injury will improve  Outcome: Ongoing     Problem: Daily Care:  Goal: Daily care needs are met  Description: Daily care needs are met  Outcome: Ongoing     Problem: Pain:  Goal: Patient's pain/discomfort is manageable  Description: Patient's pain/discomfort is manageable  Outcome: Ongoing     Problem: Skin Integrity:  Goal: Skin integrity will stabilize  Description: Skin integrity will stabilize  Outcome: Ongoing     Problem: Discharge Planning:  Goal: Patients continuum of care needs are met  Description: Patients continuum of care needs are met  Outcome: Ongoing     Problem: Coping:  Goal: Ability to remain calm will improve  Description: Ability to remain calm will improve  Outcome: Ongoing     Problem: Self-Care:  Goal: Ability to participate in self-care as condition permits will improve  Description: Ability to participate in self-care as condition permits will improve  Outcome: Ongoing     Problem: Skin Integrity:  Goal: Will show no infection signs and symptoms  Description: Will show no infection signs and symptoms  Outcome: Ongoing  Goal: Absence of new skin breakdown  Description: Absence of new skin breakdown  Outcome: Ongoing

## 2021-04-10 NOTE — PROGRESS NOTES
DOING WELL CONFUSED NO ABD COMPLAINTS  ORAL INTAKE GOOD  VITALS STABLE   LABS NOTED LFTS OK   WILL D/C IV FLUIDS   AWAITING REHAB PLACEMENT

## 2021-04-10 NOTE — PROGRESS NOTES
Progress Note( Dr. Mele Be)  4/10/2021  Subjective:   Admit Date: 4/4/2021  PCP: Megan Pratt MD    Admitted For : Confusion     Consulted For: Better control of blood glucose    Interval History: Alert awake feels much better though still somewhat confused    Patient had following procedure done yesterday 4/8/2021 Dr. Soumya Ortiz    ERCP with balloon dilation of the papilla, and  removal of multiple common bile duct stones along with sludge. This is 1 of several procedures ERCP done before her bile duct stone    Denies any chest pains,   Denies SOB . Denies nausea or vomiting. Now eating better     no new bowel or bladder symptoms.        Intake/Output Summary (Last 24 hours) at 4/10/2021 1423  Last data filed at 4/9/2021 1808  Gross per 24 hour   Intake 1240 ml   Output 200 ml   Net 1040 ml       DATA    CBC:   Recent Labs     04/08/21  0659 04/09/21  0600 04/10/21  0824   WBC 8.4 9.9 10.5   HGB 10.9* 11.5* 11.1*    249 266    CMP:  Recent Labs     04/08/21  0659 04/09/21  0811 04/10/21  0824    138 136   K 3.4* 3.7 3.4*    103 100   CO2 24 25 26   BUN 20 21 18   CREATININE 1.4* 1.1 1.0   CALCIUM 8.8 9.3 9.2   PROT 5.3* 5.5* 5.8*   LABALBU 3.1* 3.2* 3.5   BILITOT 0.3 0.2 0.2   ALKPHOS 93 95 91   AST 23 16 21   ALT 9* 9* 11     Lipids:   Lab Results   Component Value Date    CHOL 144 11/12/2018    HDL 62 03/15/2019    TRIG 101 11/12/2018     Glucose:  Recent Labs     04/10/21  0203 04/10/21  0702 04/10/21  1139   POCGLU 179* 130* 124*     [id number]:  Lab Results   Component Value Date    LABA1C 7.9 12/01/2020     High Sensitivity TSH:   Lab Results   Component Value Date    TSHHS 0.777 12/03/2020     Free T3:   Lab Results   Component Value Date    FT3 2.7 10/21/2015     Free T4:  Lab Results   Component Value Date    T4FREE 1.26 11/05/2018          Scheduled Medicines   Medications:    insulin lispro  0-6 Units Subcutaneous 2 times per day    insulin lispro  0-6 Units Subcutaneous TID WC    ciprofloxacin  400 mg Intravenous Q12H    lisinopril  5 mg Oral Daily    heparin (porcine)  5,000 Units Subcutaneous TID    sodium chloride flush  10 mL Intravenous 2 times per day    aspirin  81 mg Oral Daily    atorvastatin  40 mg Oral Daily    carvedilol  6.25 mg Oral BID WC    dilTIAZem  60 mg Oral BID    gabapentin  100 mg Oral Nightly    latanoprost  1 drop Both Eyes Nightly    pantoprazole  40 mg Intravenous Daily    metroNIDAZOLE  500 mg Intravenous Q8H    sodium chloride flush  10 mL Intravenous BID      Infusions:    sodium chloride      dextrose Stopped (04/06/21 0039)         Objective:   Vitals: BP (!) 170/92   Pulse 74   Temp 98.1 °F (36.7 °C) (Oral)   Resp 16   Ht 5' 8\" (1.727 m)   Wt 148 lb (67.1 kg)   SpO2 95%   BMI 22.50 kg/m²   General appearance: alert and cooperative with exam  Neck: no JVD or bruit  Thyroid : Normal lobes   Lungs: Has Vesicular Breath sounds   Heart:  regular rate and rhythm  Abdomen: soft, non-tender; bowel sounds normal; no masses,  no organomegaly  Musculoskeletal: Normal  Extremities: extremities normal, , no edema bilateral below-knee amputation  Neurologic:  Awake, alert, oriented to name, place and time. Cra paresthesia s intact. ,  and gait is abnormal.  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)     Choledocholithiasis     Generalized abdominal pain     COPD (chronic obstructive pulmonary disease) (HCC)     Abdominal pain, epigastric     Elevated LFTs     Abnormal findings on imaging of biliary tract     Chest pain     Coronary artery disease involving coronary bypass graft of native heart without angina pectoris     Acute encephalopathy     Vomiting     Sepsis (HCC)     Nausea vomiting and diarrhea     Fever     Metabolic encephalopathy     Impaired cognition     Generalized weakness     Cholangitis due to bile duct calculus with obstruction     Oropharyngeal dysphagia     S/P BKA (below knee amputation) bilateral (HCC)     Abdominal pain     Inguinal pain     Closed fracture of left hip (HCC)     Acute alteration in mental status      Plan:     1. Reviewed POC blood glucose . Labs and X ray results   2. Reviewed Current Medicines   3. Discontinued 10 IV as blood glucose is stable  4. On Correction bolus Humalog  Insulin regime    5. Monitor Blood glucose frequently   6. Modified  the dose of Insulin/ other medicines as needed   7. Will follow     .      Kulwinder Beckham MD

## 2021-04-11 ENCOUNTER — APPOINTMENT (OUTPATIENT)
Dept: GENERAL RADIOLOGY | Age: 81
DRG: 444 | End: 2021-04-11
Payer: MEDICARE

## 2021-04-11 LAB
ANION GAP SERPL CALCULATED.3IONS-SCNC: 5 MMOL/L (ref 4–16)
BASOPHILS ABSOLUTE: 0 K/CU MM
BASOPHILS RELATIVE PERCENT: 0.4 % (ref 0–1)
BUN BLDV-MCNC: 20 MG/DL (ref 6–23)
CALCIUM SERPL-MCNC: 8.7 MG/DL (ref 8.3–10.6)
CHLORIDE BLD-SCNC: 102 MMOL/L (ref 99–110)
CO2: 28 MMOL/L (ref 21–32)
CREAT SERPL-MCNC: 1.1 MG/DL (ref 0.9–1.3)
DIFFERENTIAL TYPE: ABNORMAL
EKG ATRIAL RATE: 56 BPM
EKG DIAGNOSIS: NORMAL
EKG P AXIS: 33 DEGREES
EKG P-R INTERVAL: 184 MS
EKG Q-T INTERVAL: 482 MS
EKG QRS DURATION: 108 MS
EKG QTC CALCULATION (BAZETT): 465 MS
EKG R AXIS: -17 DEGREES
EKG T AXIS: 26 DEGREES
EKG VENTRICULAR RATE: 56 BPM
EOSINOPHILS ABSOLUTE: 0.6 K/CU MM
EOSINOPHILS RELATIVE PERCENT: 8.9 % (ref 0–3)
GFR AFRICAN AMERICAN: >60 ML/MIN/1.73M2
GFR NON-AFRICAN AMERICAN: >60 ML/MIN/1.73M2
GLUCOSE BLD-MCNC: 104 MG/DL (ref 70–99)
GLUCOSE BLD-MCNC: 140 MG/DL (ref 70–99)
GLUCOSE BLD-MCNC: 145 MG/DL (ref 70–99)
GLUCOSE BLD-MCNC: 149 MG/DL (ref 70–99)
GLUCOSE BLD-MCNC: 214 MG/DL (ref 70–99)
GLUCOSE BLD-MCNC: 225 MG/DL (ref 70–99)
HCT VFR BLD CALC: 33.4 % (ref 42–52)
HEMOGLOBIN: 10.7 GM/DL (ref 13.5–18)
IMMATURE NEUTROPHIL %: 1.1 % (ref 0–0.43)
LYMPHOCYTES ABSOLUTE: 2.2 K/CU MM
LYMPHOCYTES RELATIVE PERCENT: 31.2 % (ref 24–44)
MCH RBC QN AUTO: 29.8 PG (ref 27–31)
MCHC RBC AUTO-ENTMCNC: 32 % (ref 32–36)
MCV RBC AUTO: 93 FL (ref 78–100)
MONOCYTES ABSOLUTE: 1 K/CU MM
MONOCYTES RELATIVE PERCENT: 14.6 % (ref 0–4)
NUCLEATED RBC %: 0 %
PDW BLD-RTO: 13.4 % (ref 11.7–14.9)
PLATELET # BLD: 249 K/CU MM (ref 140–440)
PMV BLD AUTO: 9.1 FL (ref 7.5–11.1)
POTASSIUM SERPL-SCNC: 3.4 MMOL/L (ref 3.5–5.1)
RBC # BLD: 3.59 M/CU MM (ref 4.6–6.2)
SEGMENTED NEUTROPHILS ABSOLUTE COUNT: 3.1 K/CU MM
SEGMENTED NEUTROPHILS RELATIVE PERCENT: 43.8 % (ref 36–66)
SODIUM BLD-SCNC: 135 MMOL/L (ref 135–145)
TOTAL IMMATURE NEUTOROPHIL: 0.08 K/CU MM
TOTAL NUCLEATED RBC: 0 K/CU MM
WBC # BLD: 7.1 K/CU MM (ref 4–10.5)

## 2021-04-11 PROCEDURE — 82962 GLUCOSE BLOOD TEST: CPT

## 2021-04-11 PROCEDURE — 85025 COMPLETE CBC W/AUTO DIFF WBC: CPT

## 2021-04-11 PROCEDURE — 93010 ELECTROCARDIOGRAM REPORT: CPT | Performed by: INTERNAL MEDICINE

## 2021-04-11 PROCEDURE — 6370000000 HC RX 637 (ALT 250 FOR IP): Performed by: INTERNAL MEDICINE

## 2021-04-11 PROCEDURE — 94761 N-INVAS EAR/PLS OXIMETRY MLT: CPT

## 2021-04-11 PROCEDURE — 6360000002 HC RX W HCPCS: Performed by: NURSE PRACTITIONER

## 2021-04-11 PROCEDURE — 80048 BASIC METABOLIC PNL TOTAL CA: CPT

## 2021-04-11 PROCEDURE — 6360000002 HC RX W HCPCS: Performed by: SPECIALIST

## 2021-04-11 PROCEDURE — 71045 X-RAY EXAM CHEST 1 VIEW: CPT

## 2021-04-11 PROCEDURE — 2580000003 HC RX 258: Performed by: INTERNAL MEDICINE

## 2021-04-11 PROCEDURE — 6360000002 HC RX W HCPCS: Performed by: INTERNAL MEDICINE

## 2021-04-11 PROCEDURE — C9113 INJ PANTOPRAZOLE SODIUM, VIA: HCPCS | Performed by: INTERNAL MEDICINE

## 2021-04-11 PROCEDURE — 36415 COLL VENOUS BLD VENIPUNCTURE: CPT

## 2021-04-11 PROCEDURE — 93005 ELECTROCARDIOGRAM TRACING: CPT | Performed by: NURSE PRACTITIONER

## 2021-04-11 PROCEDURE — 1200000000 HC SEMI PRIVATE

## 2021-04-11 PROCEDURE — 2500000003 HC RX 250 WO HCPCS: Performed by: INTERNAL MEDICINE

## 2021-04-11 RX ORDER — LORAZEPAM 2 MG/ML
0.5 INJECTION INTRAMUSCULAR ONCE
Status: COMPLETED | OUTPATIENT
Start: 2021-04-11 | End: 2021-04-11

## 2021-04-11 RX ORDER — LORAZEPAM 2 MG/ML
1 INJECTION INTRAMUSCULAR ONCE
Status: COMPLETED | OUTPATIENT
Start: 2021-04-11 | End: 2021-04-11

## 2021-04-11 RX ADMIN — METRONIDAZOLE 500 MG: 500 INJECTION, SOLUTION INTRAVENOUS at 07:39

## 2021-04-11 RX ADMIN — ASPIRIN 81 MG CHEWABLE TABLET 81 MG: 81 TABLET CHEWABLE at 09:58

## 2021-04-11 RX ADMIN — CARVEDILOL 6.25 MG: 6.25 TABLET, FILM COATED ORAL at 16:45

## 2021-04-11 RX ADMIN — HEPARIN SODIUM 5000 UNITS: 5000 INJECTION INTRAVENOUS; SUBCUTANEOUS at 15:00

## 2021-04-11 RX ADMIN — DILTIAZEM HYDROCHLORIDE 60 MG: 60 TABLET, FILM COATED ORAL at 09:58

## 2021-04-11 RX ADMIN — CARVEDILOL 6.25 MG: 6.25 TABLET, FILM COATED ORAL at 09:58

## 2021-04-11 RX ADMIN — SODIUM CHLORIDE, PRESERVATIVE FREE 10 ML: 5 INJECTION INTRAVENOUS at 21:16

## 2021-04-11 RX ADMIN — INSULIN LISPRO 2 UNITS: 100 INJECTION, SOLUTION INTRAVENOUS; SUBCUTANEOUS at 16:46

## 2021-04-11 RX ADMIN — LORAZEPAM 0.5 MG: 2 INJECTION INTRAMUSCULAR; INTRAVENOUS at 23:25

## 2021-04-11 RX ADMIN — NITROFURANTOIN MONOHYDRATE/MACROCRYSTALLINE 100 MG: 25; 75 CAPSULE ORAL at 22:16

## 2021-04-11 RX ADMIN — SODIUM CHLORIDE, PRESERVATIVE FREE 10 ML: 5 INJECTION INTRAVENOUS at 09:57

## 2021-04-11 RX ADMIN — LISINOPRIL 5 MG: 5 TABLET ORAL at 09:59

## 2021-04-11 RX ADMIN — NITROFURANTOIN MONOHYDRATE/MACROCRYSTALLINE 100 MG: 25; 75 CAPSULE ORAL at 09:58

## 2021-04-11 RX ADMIN — CIPROFLOXACIN 400 MG: 2 INJECTION, SOLUTION INTRAVENOUS at 05:16

## 2021-04-11 RX ADMIN — LATANOPROST 1 DROP: 50 SOLUTION/ DROPS OPHTHALMIC at 21:16

## 2021-04-11 RX ADMIN — HEPARIN SODIUM 5000 UNITS: 5000 INJECTION INTRAVENOUS; SUBCUTANEOUS at 22:51

## 2021-04-11 RX ADMIN — HEPARIN SODIUM 5000 UNITS: 5000 INJECTION INTRAVENOUS; SUBCUTANEOUS at 07:40

## 2021-04-11 RX ADMIN — ATORVASTATIN CALCIUM 40 MG: 40 TABLET, FILM COATED ORAL at 09:59

## 2021-04-11 RX ADMIN — GABAPENTIN 100 MG: 100 CAPSULE ORAL at 21:16

## 2021-04-11 RX ADMIN — LORAZEPAM 1 MG: 2 INJECTION INTRAMUSCULAR; INTRAVENOUS at 02:41

## 2021-04-11 RX ADMIN — PANTOPRAZOLE SODIUM 40 MG: 40 INJECTION, POWDER, FOR SOLUTION INTRAVENOUS at 10:51

## 2021-04-11 RX ADMIN — DILTIAZEM HYDROCHLORIDE 60 MG: 60 TABLET, FILM COATED ORAL at 21:15

## 2021-04-11 ASSESSMENT — PAIN SCALES - GENERAL: PAINLEVEL_OUTOF10: 0

## 2021-04-11 NOTE — PLAN OF CARE
Problem: Falls - Risk of:  Goal: Will remain free from falls  Description: Will remain free from falls  Outcome: Ongoing  Goal: Absence of physical injury  Description: Absence of physical injury  Outcome: Ongoing     Problem: Infection:  Goal: Will remain free from infection  Description: Will remain free from infection  Outcome: Ongoing     Problem: Safety:  Goal: Free from accidental physical injury  Description: Free from accidental physical injury  Outcome: Ongoing  Goal: Free from intentional harm  Description: Free from intentional harm  Outcome: Ongoing  Goal: Ability to remain free from injury will improve  Description: Ability to remain free from injury will improve  Outcome: Ongoing     Problem: Safety:  Goal: Free from accidental physical injury  Description: Free from accidental physical injury  Outcome: Ongoing  Goal: Free from intentional harm  Description: Free from intentional harm  Outcome: Ongoing  Goal: Ability to remain free from injury will improve  Description: Ability to remain free from injury will improve  Outcome: Ongoing     Problem: Daily Care:  Goal: Daily care needs are met  Description: Daily care needs are met  Outcome: Ongoing     Problem: Pain:  Goal: Patient's pain/discomfort is manageable  Description: Patient's pain/discomfort is manageable  Outcome: Ongoing     Problem: Skin Integrity:  Goal: Skin integrity will stabilize  Description: Skin integrity will stabilize  Outcome: Ongoing     Problem: Discharge Planning:  Goal: Patients continuum of care needs are met  Description: Patients continuum of care needs are met  Outcome: Ongoing     Problem: Coping:  Goal: Ability to remain calm will improve  Description: Ability to remain calm will improve  Outcome: Ongoing     Problem: Self-Care:  Goal: Ability to participate in self-care as condition permits will improve  Description: Ability to participate in self-care as condition permits will improve  Outcome: Ongoing     Problem: Skin Integrity:  Goal: Will show no infection signs and symptoms  Description: Will show no infection signs and symptoms  Outcome: Ongoing  Goal: Absence of new skin breakdown  Description: Absence of new skin breakdown  Outcome: Ongoing

## 2021-04-11 NOTE — PROGRESS NOTES
Progress Note( Dr. Brock Quintero)  4/11/2021  Subjective:   Admit Date: 4/4/2021  PCP: Lucho Rosario MD    Admitted For : Confusion     Consulted For: Better control of blood glucose    Interval History: Patient is very sleepy this morning    Patient had following procedure done yesterday 4/8/2021 Dr. Barry Spatz    ERCP with balloon dilation of the papilla, and  removal of multiple common bile duct stones along with sludge. This is 1 of several procedures ERCP done before her bile duct stone    Denies any chest pains,   Denies SOB . Denies nausea or vomiting. Now eating better     no new bowel or bladder symptoms.        Intake/Output Summary (Last 24 hours) at 4/11/2021 1339  Last data filed at 4/11/2021 1304  Gross per 24 hour   Intake 120 ml   Output 375 ml   Net -255 ml       DATA    CBC:   Recent Labs     04/09/21  0600 04/10/21  0824 04/11/21  0817   WBC 9.9 10.5 7.1   HGB 11.5* 11.1* 10.7*    266 249    CMP:  Recent Labs     04/09/21  0811 04/10/21  0824 04/11/21  0817    136 135   K 3.7 3.4* 3.4*    100 102   CO2 25 26 28   BUN 21 18 20   CREATININE 1.1 1.0 1.1   CALCIUM 9.3 9.2 8.7   PROT 5.5* 5.8*  --    LABALBU 3.2* 3.5  --    BILITOT 0.2 0.2  --    ALKPHOS 95 91  --    AST 16 21  --    ALT 9* 11  --      Lipids:   Lab Results   Component Value Date    CHOL 144 11/12/2018    HDL 62 03/15/2019    TRIG 101 11/12/2018     Glucose:  Recent Labs     04/11/21  0208 04/11/21  0637 04/11/21  1123   POCGLU 145* 149* 140*     [id number]:  Lab Results   Component Value Date    LABA1C 7.9 12/01/2020     High Sensitivity TSH:   Lab Results   Component Value Date    TSHHS 0.777 12/03/2020     Free T3:   Lab Results   Component Value Date    FT3 2.7 10/21/2015     Free T4:  Lab Results   Component Value Date    T4FREE 1.26 11/05/2018          Scheduled Medicines   Medications:    nitrofurantoin (macrocrystal-monohydrate)  100 mg Oral 2 times per day    insulin lispro  0-6 Units Subcutaneous 2 times per encephalopathy     Impaired cognition     Generalized weakness     Cholangitis due to bile duct calculus with obstruction     Oropharyngeal dysphagia     S/P BKA (below knee amputation) bilateral (HCC)     Abdominal pain     Inguinal pain     Closed fracture of left hip (HCC)     Acute alteration in mental status      Plan:     1. Reviewed POC blood glucose . Labs and X ray results   2. Reviewed Current Medicines   3. Discontinued 10 IV as blood glucose is stable  4. Patient is maintaining blood glucose at a reasonable and acceptable level  5. On Correction bolus Humalog  Insulin regime    6. Monitor Blood glucose frequently   7. Modified  the dose of Insulin/ other medicines as needed   8. Will follow     .      Mackenzie George MD

## 2021-04-11 NOTE — PROGRESS NOTES
Maico Delgado MD, [address]                Internal Medicine Hospitalist             Daily Progress  Note   Subjective:     Chief Complaint   Patient presents with    Hypertension    Hypoglycemia    Altered Mental Status     Mr. Nohemi Childress Complains of nil, eating well. Objective:    /62   Pulse 61   Temp 98 °F (36.7 °C) (Oral)   Resp 16   Ht 5' 8\" (1.727 m)   Wt 129 lb (58.5 kg)   SpO2 98%   BMI 19.61 kg/m²      Intake/Output Summary (Last 24 hours) at 4/11/2021 1647  Last data filed at 4/11/2021 1304  Gross per 24 hour   Intake 120 ml   Output 375 ml   Net -255 ml      Physical Exam:  Heart:  Regular rate and rhythm, normal S1 and S2 in all 4 auscultatory areas. No rubs  Murmurs or gallops heard. Lungs: Mostly clear to auscultation, decreased breath sounds at bases. No wheezes appreciated no crackles heard. Abdomen: Soft, non distended. Bowel sounds not appreciated. No obvious liver or spleen enlargement. Non tender, no rebound noted. CNS: Grossly intact.     Labs:  CBC with Differential:    Lab Results   Component Value Date    WBC 7.1 04/11/2021    RBC 3.59 04/11/2021    HGB 10.7 04/11/2021    HCT 33.4 04/11/2021     04/11/2021    MCV 93.0 04/11/2021    MCH 29.8 04/11/2021    MCHC 32.0 04/11/2021    RDW 13.4 04/11/2021    NRBC 1 02/21/2021    SEGSPCT 43.8 04/11/2021    BANDSPCT 4 02/21/2021    LYMPHOPCT 31.2 04/11/2021    MONOPCT 14.6 04/11/2021    MYELOPCT 1 09/02/2019    BASOPCT 0.4 04/11/2021    MONOSABS 1.0 04/11/2021    LYMPHSABS 2.2 04/11/2021    EOSABS 0.6 04/11/2021    BASOSABS 0.0 04/11/2021    DIFFTYPE AUTOMATED DIFFERENTIAL 04/11/2021     CMP:    Lab Results   Component Value Date     04/11/2021    K 3.4 04/11/2021     04/11/2021    CO2 28 04/11/2021    BUN 20 04/11/2021    CREATININE 1.1 04/11/2021    GFRAA >60 04/11/2021    LABGLOM >60 04/11/2021    GLUCOSE 104 04/11/2021    PROT 5.8 04/10/2021    PROT 8.0 08/30/2012    LABALBU 3.5 04/10/2021    CALCIUM 8.7 Nausea vomiting and diarrhea     Sepsis ([facility] [address] 09/01/2019    Vomiting 01/22/2019    Acute encephalopathy 11/02/2018    Chest pain 03/18/2017    Abdominal pain, epigastric     Elevated LFTs     Abnormal findings on imaging of biliary tract        Plan:     Problems being addressed this admission:   Acute metabolic encephalopathy  9/34/20-QT presented with confusion but he could be confused at base line as well, will continue to monitor  4/11/21-resolved. Acute Abdominal pain / CBD stones s/p ERCP  4/10/21-RN will check with GI if he needs to stay on Cipro and Flagyl. 4/11/21- antibiotics have been stopped by GI. I think he should be able to be discharged in am.     Acute Respiratory failure  4/10/21-His resp failure seems to have been resolved. UTI / enterococcus fecalis  4/10/21-For his UTI unable to start ampicillin as has allergy to it so started on Macro bid. HTN  4/10/21-His b/p is 170/92, does fluctuate and so continue to monitor. 4/11/21- b/p is 126/62 today. DM 2  4/10/21-Endocrine on for his diabetes. Continue as before. Check a1c. Low K will repeat. 4/11/21- sugar is 214 today, continue to monitor. Consultants:  GI  Endocrine    General Orders:  Repeat basic labs again in am.  I have explained to the patient and discussed with him/her the treatment plan.   Kaci Mai MD, [address]

## 2021-04-11 NOTE — PROGRESS NOTES
DOING FAIR NO ABD COMPLAINTS ORAL INTAKE GOOD STILL SOMEWHAT CONFUSED NO VOMITING  VITALS STABLE   LABS NOTED LFTS OK  WILL D/C CIPRO AND FLAGYL SINCE CHOLANGITIS RESOLVED  D/W PT AND WIFE

## 2021-04-12 LAB
ANION GAP SERPL CALCULATED.3IONS-SCNC: 9 MMOL/L (ref 4–16)
BASOPHILS ABSOLUTE: 0.1 K/CU MM
BASOPHILS RELATIVE PERCENT: 0.7 % (ref 0–1)
BUN BLDV-MCNC: 17 MG/DL (ref 6–23)
CALCIUM SERPL-MCNC: 9.4 MG/DL (ref 8.3–10.6)
CHLORIDE BLD-SCNC: 102 MMOL/L (ref 99–110)
CO2: 26 MMOL/L (ref 21–32)
CREAT SERPL-MCNC: 1.1 MG/DL (ref 0.9–1.3)
DIFFERENTIAL TYPE: ABNORMAL
EOSINOPHILS ABSOLUTE: 0.9 K/CU MM
EOSINOPHILS RELATIVE PERCENT: 12 % (ref 0–3)
GFR AFRICAN AMERICAN: >60 ML/MIN/1.73M2
GFR NON-AFRICAN AMERICAN: >60 ML/MIN/1.73M2
GLUCOSE BLD-MCNC: 111 MG/DL (ref 70–99)
GLUCOSE BLD-MCNC: 150 MG/DL (ref 70–99)
GLUCOSE BLD-MCNC: 150 MG/DL (ref 70–99)
GLUCOSE BLD-MCNC: 164 MG/DL (ref 70–99)
GLUCOSE BLD-MCNC: 210 MG/DL (ref 70–99)
GLUCOSE BLD-MCNC: 98 MG/DL (ref 70–99)
HCT VFR BLD CALC: 39.1 % (ref 42–52)
HEMOGLOBIN: 12.3 GM/DL (ref 13.5–18)
IMMATURE NEUTROPHIL %: 1.4 % (ref 0–0.43)
LYMPHOCYTES ABSOLUTE: 2.4 K/CU MM
LYMPHOCYTES RELATIVE PERCENT: 33.1 % (ref 24–44)
MCH RBC QN AUTO: 29.4 PG (ref 27–31)
MCHC RBC AUTO-ENTMCNC: 31.5 % (ref 32–36)
MCV RBC AUTO: 93.5 FL (ref 78–100)
MONOCYTES ABSOLUTE: 1.1 K/CU MM
MONOCYTES RELATIVE PERCENT: 14.8 % (ref 0–4)
NUCLEATED RBC %: 0 %
PDW BLD-RTO: 13.4 % (ref 11.7–14.9)
PLATELET # BLD: 272 K/CU MM (ref 140–440)
PMV BLD AUTO: 9 FL (ref 7.5–11.1)
POTASSIUM SERPL-SCNC: 3.7 MMOL/L (ref 3.5–5.1)
RBC # BLD: 4.18 M/CU MM (ref 4.6–6.2)
SEGMENTED NEUTROPHILS ABSOLUTE COUNT: 2.7 K/CU MM
SEGMENTED NEUTROPHILS RELATIVE PERCENT: 38 % (ref 36–66)
SODIUM BLD-SCNC: 137 MMOL/L (ref 135–145)
TOTAL IMMATURE NEUTOROPHIL: 0.1 K/CU MM
TOTAL NUCLEATED RBC: 0 K/CU MM
WBC # BLD: 7.1 K/CU MM (ref 4–10.5)

## 2021-04-12 PROCEDURE — 82962 GLUCOSE BLOOD TEST: CPT

## 2021-04-12 PROCEDURE — 97535 SELF CARE MNGMENT TRAINING: CPT

## 2021-04-12 PROCEDURE — 36415 COLL VENOUS BLD VENIPUNCTURE: CPT

## 2021-04-12 PROCEDURE — 80048 BASIC METABOLIC PNL TOTAL CA: CPT

## 2021-04-12 PROCEDURE — 6360000002 HC RX W HCPCS: Performed by: INTERNAL MEDICINE

## 2021-04-12 PROCEDURE — 97530 THERAPEUTIC ACTIVITIES: CPT

## 2021-04-12 PROCEDURE — 94761 N-INVAS EAR/PLS OXIMETRY MLT: CPT

## 2021-04-12 PROCEDURE — 6370000000 HC RX 637 (ALT 250 FOR IP): Performed by: INTERNAL MEDICINE

## 2021-04-12 PROCEDURE — 85025 COMPLETE CBC W/AUTO DIFF WBC: CPT

## 2021-04-12 PROCEDURE — C9113 INJ PANTOPRAZOLE SODIUM, VIA: HCPCS | Performed by: INTERNAL MEDICINE

## 2021-04-12 PROCEDURE — 97116 GAIT TRAINING THERAPY: CPT

## 2021-04-12 PROCEDURE — 1200000000 HC SEMI PRIVATE

## 2021-04-12 PROCEDURE — 6360000002 HC RX W HCPCS: Performed by: NURSE PRACTITIONER

## 2021-04-12 PROCEDURE — 2580000003 HC RX 258: Performed by: INTERNAL MEDICINE

## 2021-04-12 RX ORDER — ZIPRASIDONE MESYLATE 20 MG/ML
10 INJECTION, POWDER, LYOPHILIZED, FOR SOLUTION INTRAMUSCULAR ONCE
Status: COMPLETED | OUTPATIENT
Start: 2021-04-12 | End: 2021-04-12

## 2021-04-12 RX ADMIN — ASPIRIN 81 MG CHEWABLE TABLET 81 MG: 81 TABLET CHEWABLE at 08:12

## 2021-04-12 RX ADMIN — DILTIAZEM HYDROCHLORIDE 60 MG: 60 TABLET, FILM COATED ORAL at 08:12

## 2021-04-12 RX ADMIN — CARVEDILOL 6.25 MG: 6.25 TABLET, FILM COATED ORAL at 16:56

## 2021-04-12 RX ADMIN — ATORVASTATIN CALCIUM 40 MG: 40 TABLET, FILM COATED ORAL at 08:12

## 2021-04-12 RX ADMIN — INSULIN LISPRO 1 UNITS: 100 INJECTION, SOLUTION INTRAVENOUS; SUBCUTANEOUS at 11:40

## 2021-04-12 RX ADMIN — HEPARIN SODIUM 5000 UNITS: 5000 INJECTION INTRAVENOUS; SUBCUTANEOUS at 06:40

## 2021-04-12 RX ADMIN — NITROFURANTOIN MONOHYDRATE/MACROCRYSTALLINE 100 MG: 25; 75 CAPSULE ORAL at 22:36

## 2021-04-12 RX ADMIN — LISINOPRIL 5 MG: 5 TABLET ORAL at 08:13

## 2021-04-12 RX ADMIN — PANTOPRAZOLE SODIUM 40 MG: 40 INJECTION, POWDER, FOR SOLUTION INTRAVENOUS at 08:12

## 2021-04-12 RX ADMIN — INSULIN LISPRO 1 UNITS: 100 INJECTION, SOLUTION INTRAVENOUS; SUBCUTANEOUS at 16:53

## 2021-04-12 RX ADMIN — HEPARIN SODIUM 5000 UNITS: 5000 INJECTION INTRAVENOUS; SUBCUTANEOUS at 15:30

## 2021-04-12 RX ADMIN — SODIUM CHLORIDE, PRESERVATIVE FREE 10 ML: 5 INJECTION INTRAVENOUS at 08:13

## 2021-04-12 RX ADMIN — GABAPENTIN 100 MG: 100 CAPSULE ORAL at 22:36

## 2021-04-12 RX ADMIN — ZIPRASIDONE MESYLATE 10 MG: 20 INJECTION, POWDER, LYOPHILIZED, FOR SOLUTION INTRAMUSCULAR at 22:35

## 2021-04-12 RX ADMIN — CARVEDILOL 6.25 MG: 6.25 TABLET, FILM COATED ORAL at 08:13

## 2021-04-12 RX ADMIN — NITROFURANTOIN MONOHYDRATE/MACROCRYSTALLINE 100 MG: 25; 75 CAPSULE ORAL at 10:26

## 2021-04-12 RX ADMIN — LATANOPROST 1 DROP: 50 SOLUTION/ DROPS OPHTHALMIC at 22:36

## 2021-04-12 RX ADMIN — DILTIAZEM HYDROCHLORIDE 60 MG: 60 TABLET, FILM COATED ORAL at 22:36

## 2021-04-12 RX ADMIN — HEPARIN SODIUM 5000 UNITS: 5000 INJECTION INTRAVENOUS; SUBCUTANEOUS at 22:35

## 2021-04-12 ASSESSMENT — ENCOUNTER SYMPTOMS
SHORTNESS OF BREATH: 0
DIARRHEA: 0

## 2021-04-12 NOTE — CARE COORDINATION
Whiteboard placed requesting updated PT/OT notes for pending ARU pre-cert. ARU will continue to follow.

## 2021-04-12 NOTE — PROGRESS NOTES
Physical Therapy    Physical Therapy Treatment Note  Name: Matilda Hannon MRN: [id number] :   1940   Date:  2021   Admission Date: 2021 Room:  [address] [place] [address]   Restrictions/Precautions:  Restrictions/Precautions  Restrictions/Precautions: General Precautions, Weight Bearing, Fall Risk Lower Extremity Weight Bearing Restrictions  Left Lower Extremity Weight Bearing: Weight Bearing As Tolerated       Subjective:  Patient states:  \"I would like to walk\"  Pain:   Location, Type, Intensity (0/10 to 10/10): Denies; 0/10    Objective:    Observation:  Pt sitting upright in chair upon entry    Treatment, including education/measures:  Pt agreeable to participating in therapy at this time. Therapeutic Activity Training:   Therapeutic activity training was instructed today. Cues were given for safety, sequence, UE/LE placement, awareness, and balance. Activities performed today included bed mobility training, sup-sit, sit-stand. Pt completed sit to stand from chair x4 with assist varying from minAx1 to modAx1 with verbal cues to push through chair and avoid pulling on walker. Pt completed stand to sit onto chair x3 and bedx1 with minAx1 with verbal cues to feel chair/bed against back of legs, reach back, and sit slowly. Pt completed sit to supine with minAx1. Gait Training:  Cues were given for safety, sequence, device management, balance, posture, awareness, path. Pt ambulated 35 feet + 20 feet + 35 feet with assist varying from CGAx1 to minAx1 with a front wheeled walker with a decreased gait speed, a decreased step length bilaterally, and an unsteady gait. Pt provided with verbal and tactile cues for BLE placement, walker placement, and sequence throughout ambulation. Pt provided with verbal and tactile cues to maintain upright posture in order to avoid COM shifting outside of SHAD.   Pt provided with verbal cues for directions in order to successfully navigate hallway and return to correct room. Safety  Patient left safely in the bed, with call light/phone in reach with alarm applied. Gait belt and mask were used for transfers and gait. Assessment / Impression:       Patient's tolerance of treatment:  Good; patient tolerated ambulation in hallway today  Adverse Reaction: none  Significant change in status and impact:  none  Barriers to improvement:  Altered mental status      Plan for Next Session:    Continue progressing toward goals per plan of care. Progress independence with transfers and ambulation as tolerated and appropriate. Progress ambulation distance as tolerated and appropriate. Time in:  1225  Time out:  1305  Timed treatment minutes:  40  Total treatment time:  40    Previously filed items:  Social/Functional History  Lives With: Spouse  Type of Home: Assisted living  Home Layout: One level  Home Access: Level entry  Bathroom Shower/Tub: Walk-in shower  Bathroom Toilet: Handicap height  Bathroom Equipment: Shower chair  Home Equipment: Rolling walker(bilateral lower extremity prosthetics (bilateral BKA))  ADL Assistance: Independent  Ambulation Assistance: Independent(mod I with RW)  Transfer Assistance: Independent     Long term goals  Time Frame for Long term goals :  In one week:  Long term goal 1: Pt will complete all bed mobility with supervision  Long term goal 2: Pt will complete sit <> stand transfers with supervision  Long term goal 3: Pt will ambulate 400 feet with supervision with Pebbles Khanman term goal 4: Pt will independently complete 3 sets of 10 reps of BLE AROM exercises in available and allowed ROM    Electronically signed by:       Rasheeda Ross PT, DPT  License #: [id number]

## 2021-04-12 NOTE — PROGRESS NOTES
Progress Note  Date:2021       Room:[facility]  Patient Name:Ronald Daylene Landau     YOB: 1940     Age:81 y.o.    80 yo male presenting to the hospital for increased dizziness and confusion.      --acute respiratory failure with hypoxia requiring 6L on admission  --intractable n/v and abdominal pain without acute source. Hx of CBD stones. Found to have cholangitis s/p ERCP   --Acute metabolic encephalopathy. Maybe due to hypoxia, UTI  --acute UTI, grew Enterococcus. --hypokalemia ,replaced   -- CAD s/p CABG to hx  -- DM2 on long term insulin  -- HTN, carvedilol  -- COPD  -- s/p BL BKA    Subjective    Subjective:  Symptoms:  Stable. No shortness of breath, chest pain, headache or diarrhea. Pain:  He complains of pain that is mild. Pain is partially controlled. Review of Systems   Unable to perform ROS: Dementia   Respiratory: Negative for shortness of breath. Cardiovascular: Negative for chest pain. Gastrointestinal: Negative for diarrhea. All other systems reviewed and are negative. Wasn't oriented but answered most questions appropriately. Wanted to show me his cabg and gb surgery scars    Objective         Vitals Last 24 Hours:  TEMPERATURE:  Temp  Av °F (36.7 °C)  Min: 97.7 °F (36.5 °C)  Max: 98.4 °F (36.9 °C)  RESPIRATIONS RANGE: Resp  Av  Min: 16  Max: 18  PULSE OXIMETRY RANGE: SpO2  Av.6 %  Min: 94 %  Max: 98 %  PULSE RANGE: Pulse  Av.3  Min: 60  Max: 74  BLOOD PRESSURE RANGE: Systolic (54CZN), LIL:671 , Min:143 , CBE:442   ; Diastolic (05TKV), CEZ:33, Min:70, Max:80    I/O (24Hr): No intake or output data in the 24 hours ending 21 1536  Objective:  General Appearance: In no acute distress. Vital signs: (most recent): Blood pressure (!) 147/77, pulse 60, temperature 98.4 °F (36.9 °C), temperature source Oral, resp. rate 16, height 5' 8\" (1.727 m), weight 129 lb (58.5 kg), SpO2 97 %.   Vital signs are normal.    Output: Producing urine and producing stool. Lungs:  Normal effort. Heart: No gallop or friction rub. Chest: Symmetric chest wall expansion. Abdomen: Abdomen is soft and non-distended. There is no rebound tenderness. There is no guarding. Extremities: There is no deformity. Pupils:  Pupils are equal.   Skin:  Warm and dry. Labs/Imaging/Diagnostics    Labs:  CBC:  Recent Labs     04/10/21  0824 04/11/21  0817 04/12/21  0807   WBC 10.5 7.1 7.1   RBC 3.68* 3.59* 4.18*   HGB 11.1* 10.7* 12.3*   HCT 33.9* 33.4* 39.1*   MCV 92.1 93.0 93.5   RDW 13.2 13.4 13.4    249 272     CHEMISTRIES:  Recent Labs     04/10/21  0824 04/11/21  0817 04/12/21  0807    135 137   K 3.4* 3.4* 3.7    102 102   CO2 26 28 26   BUN 18 20 17   CREATININE 1.0 1.1 1.1   GLUCOSE 227* 104* 164*     PT/INR:No results for input(s): PROTIME, INR in the last 72 hours. APTT:No results for input(s): APTT in the last 72 hours. LIVER PROFILE:  Recent Labs     04/10/21  0824   AST 21   ALT 11   BILITOT 0.2   ALKPHOS 91       Imaging Last 24 Hours:  Xr Chest Portable    Result Date: 4/11/2021  EXAMINATION: ONE XRAY VIEW OF THE CHEST 4/11/2021 6:16 am COMPARISON: 04/09/2021 HISTORY: ORDERING SYSTEM PROVIDED HISTORY: Acute respiratory failure TECHNOLOGIST PROVIDED HISTORY: Reason for exam:->Acute respiratory failure Reason for Exam: Acute respiratory failure Acuity: Acute Type of Exam: Initial Additional signs and symptoms: best images possible due to pt. condition/cooperation FINDINGS: Sternal wires are in place. The heart and mediastinal structures are stable. The lungs are clear. No acute cardiopulmonary disease. Assessment//Plan           Hospital Problems           Last Modified POA    Acute alteration in mental status 4/5/2021 Yes        Assessment:    Condition: In stable condition. Improving.    (78 yo male presenting to the hospital for increased dizziness and confusion.      --acute respiratory failure with hypoxia requiring 6L on admission  --intractable n/v and abdominal pain without acute source. Hx of CBD stones. Found to have cholangitis s/p ERCP 6/2/46  --Acute metabolic encephalopathy. Maybe due to hypoxia, UTI  --acute UTI, grew Enterococcus. --hypokalemia ,replaced   -- CAD s/p CABG to hx  -- DM2 on long term insulin  -- HTN, carvedilol  -- COPD  -- s/p BL BKA). Plan:   Encourage ambulation and out of bed and up to chair. Continue respiratory treatments. Consults: gastroenterology. Advance diet as tolerated. (    ARU appropriate  Waiting on precert  Taper medications as able    DVT: lovenox or PO medications  GI prophylaxis: PO intake or medicated   [unfilled]).        Electronically signed by Aliya Krueger DO on 4/12/21 at 3:36 PM EDT

## 2021-04-12 NOTE — PROGRESS NOTES
Occupational Therapy      Occupational Therapy Treatment Note  Name: Shanna Gilman MRN: [id number] :   1940   Date:  2021   Admission Date: 2021 Room:  [address] [place] [address]   Restrictions/Precautions:  Restrictions/Precautions  Restrictions/Precautions: General Precautions, Weight Bearing, Fall Risk WBAT  Communication with other providers:  Nursing handoff, MARVA  Subjective:  Patient states:  \"I want to get up\"  Pain:   Location, Type, Intensity (0/10 to 10/10): Denies  Objective:    Observation:  Pt received supine in bed, MARVA on  Objective Measures:  WFL  Treatment, including education:  Therapeutic Activity Training:   Therapeutic activity training was instructed today. Cues were given for safety, sequence, UE/LE placement, awareness, and balance. Activities performed today included bed mobility training, sup-sit, sit-stand, SPT. Self Care Training:   Cues were given for safety, sequence, UE/LE placement, visual cues, and balance. Activities performed today included grooming, LB dressing    Grooming washing face w/ warm washcloth SBA, Supine to sit Supervision, Sitting EOB Supervision, LB dressing donning prosthetics Setup, STS from EOB up to RW CGA, functional mobility ~5 feet w/ RW CGA, stand to sit CGA. Grooming oral care Setup only.     Pt sitting upright in chair, chair alarm on , call light at side, nursing notified,  on      Assessment / Impression:        Patient's tolerance of treatment:  Pt tolerated treatment well   Adverse Reaction: none  Significant change in status and impact:  none  Barriers to improvement:  Decreased safety awareness/cognition    Plan for Next Session:    Pt will perform functional task in stand reaching to increase dynamic standing balance    Time in:  0939  Time out:  1003  Timed treatment minutes:  24 minutes  Total treatment time:  24 minutes  Electronically signed by:    Christa NGUYEN/L [id number]  11:38 AM,2021     Previously filed

## 2021-04-13 ENCOUNTER — APPOINTMENT (OUTPATIENT)
Dept: GENERAL RADIOLOGY | Age: 81
DRG: 444 | End: 2021-04-13
Payer: MEDICARE

## 2021-04-13 LAB
ANION GAP SERPL CALCULATED.3IONS-SCNC: 8 MMOL/L (ref 4–16)
BASOPHILS ABSOLUTE: 0.1 K/CU MM
BASOPHILS RELATIVE PERCENT: 1.3 % (ref 0–1)
BUN BLDV-MCNC: 20 MG/DL (ref 6–23)
CALCIUM SERPL-MCNC: 9 MG/DL (ref 8.3–10.6)
CHLORIDE BLD-SCNC: 102 MMOL/L (ref 99–110)
CO2: 26 MMOL/L (ref 21–32)
CREAT SERPL-MCNC: 1.1 MG/DL (ref 0.9–1.3)
DIFFERENTIAL TYPE: ABNORMAL
EOSINOPHILS ABSOLUTE: 1 K/CU MM
EOSINOPHILS RELATIVE PERCENT: 13 % (ref 0–3)
GFR AFRICAN AMERICAN: >60 ML/MIN/1.73M2
GFR NON-AFRICAN AMERICAN: >60 ML/MIN/1.73M2
GLUCOSE BLD-MCNC: 105 MG/DL (ref 70–99)
GLUCOSE BLD-MCNC: 206 MG/DL (ref 70–99)
GLUCOSE BLD-MCNC: 211 MG/DL (ref 70–99)
GLUCOSE BLD-MCNC: 219 MG/DL (ref 70–99)
GLUCOSE BLD-MCNC: 229 MG/DL (ref 70–99)
GLUCOSE BLD-MCNC: 74 MG/DL (ref 70–99)
HCT VFR BLD CALC: 39.5 % (ref 42–52)
HEMOGLOBIN: 12.2 GM/DL (ref 13.5–18)
IMMATURE NEUTROPHIL %: 2.9 % (ref 0–0.43)
LYMPHOCYTES ABSOLUTE: 2.2 K/CU MM
LYMPHOCYTES RELATIVE PERCENT: 28.4 % (ref 24–44)
MCH RBC QN AUTO: 28.9 PG (ref 27–31)
MCHC RBC AUTO-ENTMCNC: 30.9 % (ref 32–36)
MCV RBC AUTO: 93.6 FL (ref 78–100)
MONOCYTES ABSOLUTE: 1 K/CU MM
MONOCYTES RELATIVE PERCENT: 13.6 % (ref 0–4)
NUCLEATED RBC %: 0 %
PDW BLD-RTO: 13.8 % (ref 11.7–14.9)
PLATELET # BLD: 259 K/CU MM (ref 140–440)
PMV BLD AUTO: 9.4 FL (ref 7.5–11.1)
POTASSIUM SERPL-SCNC: 3.8 MMOL/L (ref 3.5–5.1)
RBC # BLD: 4.22 M/CU MM (ref 4.6–6.2)
SEGMENTED NEUTROPHILS ABSOLUTE COUNT: 3.1 K/CU MM
SEGMENTED NEUTROPHILS RELATIVE PERCENT: 40.8 % (ref 36–66)
SODIUM BLD-SCNC: 136 MMOL/L (ref 135–145)
TOTAL IMMATURE NEUTOROPHIL: 0.22 K/CU MM
TOTAL NUCLEATED RBC: 0 K/CU MM
WBC # BLD: 7.6 K/CU MM (ref 4–10.5)

## 2021-04-13 PROCEDURE — 94761 N-INVAS EAR/PLS OXIMETRY MLT: CPT

## 2021-04-13 PROCEDURE — C9113 INJ PANTOPRAZOLE SODIUM, VIA: HCPCS | Performed by: INTERNAL MEDICINE

## 2021-04-13 PROCEDURE — 85025 COMPLETE CBC W/AUTO DIFF WBC: CPT

## 2021-04-13 PROCEDURE — 2580000003 HC RX 258: Performed by: INTERNAL MEDICINE

## 2021-04-13 PROCEDURE — 71045 X-RAY EXAM CHEST 1 VIEW: CPT

## 2021-04-13 PROCEDURE — 82962 GLUCOSE BLOOD TEST: CPT

## 2021-04-13 PROCEDURE — 36415 COLL VENOUS BLD VENIPUNCTURE: CPT

## 2021-04-13 PROCEDURE — 97530 THERAPEUTIC ACTIVITIES: CPT

## 2021-04-13 PROCEDURE — 80048 BASIC METABOLIC PNL TOTAL CA: CPT

## 2021-04-13 PROCEDURE — 97116 GAIT TRAINING THERAPY: CPT

## 2021-04-13 PROCEDURE — 1200000000 HC SEMI PRIVATE

## 2021-04-13 PROCEDURE — 6360000002 HC RX W HCPCS: Performed by: INTERNAL MEDICINE

## 2021-04-13 PROCEDURE — 6370000000 HC RX 637 (ALT 250 FOR IP): Performed by: INTERNAL MEDICINE

## 2021-04-13 RX ORDER — PANTOPRAZOLE SODIUM 40 MG/1
40 TABLET, DELAYED RELEASE ORAL
Status: DISCONTINUED | OUTPATIENT
Start: 2021-04-14 | End: 2021-04-15 | Stop reason: HOSPADM

## 2021-04-13 RX ORDER — INSULIN GLARGINE 100 [IU]/ML
8 INJECTION, SOLUTION SUBCUTANEOUS NIGHTLY
Status: DISCONTINUED | OUTPATIENT
Start: 2021-04-13 | End: 2021-04-14

## 2021-04-13 RX ADMIN — NITROFURANTOIN MONOHYDRATE/MACROCRYSTALLINE 100 MG: 25; 75 CAPSULE ORAL at 10:01

## 2021-04-13 RX ADMIN — ATORVASTATIN CALCIUM 40 MG: 40 TABLET, FILM COATED ORAL at 10:02

## 2021-04-13 RX ADMIN — INSULIN LISPRO 2 UNITS: 100 INJECTION, SOLUTION INTRAVENOUS; SUBCUTANEOUS at 13:15

## 2021-04-13 RX ADMIN — CARVEDILOL 6.25 MG: 6.25 TABLET, FILM COATED ORAL at 10:01

## 2021-04-13 RX ADMIN — NITROFURANTOIN MONOHYDRATE/MACROCRYSTALLINE 100 MG: 25; 75 CAPSULE ORAL at 23:58

## 2021-04-13 RX ADMIN — SODIUM CHLORIDE, PRESERVATIVE FREE 10 ML: 5 INJECTION INTRAVENOUS at 08:59

## 2021-04-13 RX ADMIN — GABAPENTIN 100 MG: 100 CAPSULE ORAL at 23:58

## 2021-04-13 RX ADMIN — INSULIN LISPRO 2 UNITS: 100 INJECTION, SOLUTION INTRAVENOUS; SUBCUTANEOUS at 18:19

## 2021-04-13 RX ADMIN — DILTIAZEM HYDROCHLORIDE 60 MG: 60 TABLET, FILM COATED ORAL at 23:58

## 2021-04-13 RX ADMIN — SODIUM CHLORIDE, PRESERVATIVE FREE 10 ML: 5 INJECTION INTRAVENOUS at 23:59

## 2021-04-13 RX ADMIN — LATANOPROST 1 DROP: 50 SOLUTION/ DROPS OPHTHALMIC at 23:58

## 2021-04-13 RX ADMIN — CARVEDILOL 6.25 MG: 6.25 TABLET, FILM COATED ORAL at 18:19

## 2021-04-13 RX ADMIN — HEPARIN SODIUM 5000 UNITS: 5000 INJECTION INTRAVENOUS; SUBCUTANEOUS at 06:44

## 2021-04-13 RX ADMIN — SODIUM CHLORIDE, PRESERVATIVE FREE 10 ML: 5 INJECTION INTRAVENOUS at 10:04

## 2021-04-13 RX ADMIN — PANTOPRAZOLE SODIUM 40 MG: 40 INJECTION, POWDER, FOR SOLUTION INTRAVENOUS at 08:59

## 2021-04-13 RX ADMIN — DILTIAZEM HYDROCHLORIDE 60 MG: 60 TABLET, FILM COATED ORAL at 10:02

## 2021-04-13 RX ADMIN — ASPIRIN 81 MG CHEWABLE TABLET 81 MG: 81 TABLET CHEWABLE at 10:01

## 2021-04-13 RX ADMIN — LISINOPRIL 5 MG: 5 TABLET ORAL at 10:02

## 2021-04-13 ASSESSMENT — PAIN SCALES - GENERAL: PAINLEVEL_OUTOF10: 0

## 2021-04-13 NOTE — PROGRESS NOTES
Progress Note( Dr. Mk Blum)    Subjective:   Admit Date: 4/4/2021  PCP: Lenore Davila MD    Admitted For : Confusion     Consulted For: Better control of blood glucose    Interval History: Patient is very confused and disoriented     Patient had following procedure done yesterday 4/8/2021 Dr. Bautista Folk    ERCP with balloon dilation of the papilla, and  removal of multiple common bile duct stones along with sludge. This is 1 of several procedures ERCP done before her bile duct stone    Denies any chest pains,   Denies SOB . Denies nausea or vomiting. Now eating better     no new bowel or bladder symptoms.        Intake/Output Summary (Last 24 hours) at 4/13/2021 0657  Last data filed at 4/13/2021 0448  Gross per 24 hour   Intake --   Output 500 ml   Net -500 ml       DATA    CBC:   Recent Labs     04/10/21  0824 04/11/21  0817 04/12/21  0807   WBC 10.5 7.1 7.1   HGB 11.1* 10.7* 12.3*    249 272    CMP:  Recent Labs     04/10/21  0824 04/11/21  0817 04/12/21  0807    135 137   K 3.4* 3.4* 3.7    102 102   CO2 26 28 26   BUN 18 20 17   CREATININE 1.0 1.1 1.1   CALCIUM 9.2 8.7 9.4   PROT 5.8*  --   --    LABALBU 3.5  --   --    BILITOT 0.2  --   --    ALKPHOS 91  --   --    AST 21  --   --    ALT 11  --   --      Lipids:   Lab Results   Component Value Date    CHOL 144 11/12/2018    HDL 62 03/15/2019    TRIG 101 11/12/2018     Glucose:  Recent Labs     04/12/21  2227 04/13/21  0205 04/13/21  0628   POCGLU 210* 74 105*     QdpdpvjehsH1T:  Lab Results   Component Value Date    LABA1C 7.9 12/01/2020     High Sensitivity TSH:   Lab Results   Component Value Date    TSHHS 0.777 12/03/2020     Free T3:   Lab Results   Component Value Date    FT3 2.7 10/21/2015     Free T4:  Lab Results   Component Value Date    T4FREE 1.26 11/05/2018          Scheduled Medicines   Medications:    nitrofurantoin (macrocrystal-monohydrate)  100 mg Oral 2 times per day    insulin lispro  0-6 Units Subcutaneous 2 times per day

## 2021-04-13 NOTE — PROGRESS NOTES
Hospitalist Progress Note      Name:  Americo Wong /Age/Sex: 1940  (79 y.o. male)   MRN & CSN:  [id number] & [id number] Admission Date/Time: 2021  6:07 PM   Location:  [address] [place] [address] PCP: Chiqui Arthur MD         Hospital Day: 10    ASSESSMENT & PLAN:  Americo Wong is a 79 y.o.  male  who was brought to the hospital for increased confusion. He lives in a nursing home. He was hypoxic with SPO2 of 85% at one point while in the ED per admission HPI. #.  Acute hypoxic respiratory failure---CTA pulmonary without evidence of large or central pulmonary embolism. CT without evidence of consolidation or infiltrate. Peak oxygen requirement at 6 L. No longer requiring oxygen. Hypoxia resolved. #.  UTI---urine culture growing Enterococcus faecalis sensitive to Macrobid.  -Continue Macrobid 100 mg twice daily    #. Toxic metabolic encephalopathy---POA. In the setting of UTI, cholangitis and hypoxia. No longer encephalopathic. #.  Chronic systolic CHF---EF 08% 2020. Compensated. Euvolemic    #. Choledocholithiasis &  cholangitis ---s/p ERCP on 2021. Underwent balloon dilation of papilla 10 to 12 mm and removal of multiple CBD stones and sludge. LFTs normal.  Treated with Cipro and Flagyl. Overall stable. -DC Cipro and Flagyl on     #. Sellar/parasellar mass & pituitary macroadenoma ---large, unchanged grossly on CT head. #.  CAD---s/p CABG in . S/p PCI in the past.  Stable. No acute issues.  -Continue aspirin, Lipitor, Coreg    #. AD9---GCSP complications including bilateral BKA. Blood glucose control.  -Low-dose sliding scale insulin  -Hypoglycemia protocol  -Lantus 8 units at bedtime    #. Hypertension---BP controlled. On Coreg and diltiazem    #.   S/p bilateral BKA      Diet DIET GENERAL; Carb Control: 5 carb choices (75 gms)/meal  Dietary Nutrition Supplements: Low Calorie High Protein Supplement   DVT Prophylaxis [x] Lovenox, []  Heparin, [] SCDs, [] Ambulation   GI Prophylaxis [] PPI,  [] H2 Blocker,  [] Carafate,  [] Diet/Tube Feeds   Code Status Full Code   Disposition  SNF   MDM [] Low, [x] Moderate,[]  High     Chief complaint/Interval History/ROS     Chief Complaint: Shortness of breath, confusion    INTERVAL HISTORY:    4/13: No longer short of breath. No confusion. Overall stable. Awaiting placement. ROS:  No chest pain. No abdominal pain. No nausea. No vomiting. Objective: Intake/Output Summary (Last 24 hours) at 4/13/2021 1515  Last data filed at 4/13/2021 1342  Gross per 24 hour   Intake 240 ml   Output 500 ml   Net -260 ml      Vitals:   Vitals:    04/13/21 1444   BP: (!) 124/58   Pulse: 60   Resp: 16   Temp: 97.9 °F (36.6 °C)   SpO2: 98%     Physical Exam:   GEN: Awake male, lying flat in bed. Nontoxic-appearing. Answers questions appropriate. EYES: No discharge. Ocular muscles intact. HENT:  mucous membranes moist.  No nasal discharge. NECK: Supple  RESP: Symmetric breath sounds. Accessory muscle use. Symmetric chest expansion. CV: RRR. No pitting lower extremity edema. No murmur heard. GI: Abdomen soft. Nontender. Nondistended. : No Renee in place. MSK: No bony fractures. S/p bilateral BKA.   SKIN: warm, dry, no rashes, no pressure ulcer  NEURO: Cranial nerves appear grossly intact, normal speech  PSYCH: Awake, alert    Medications:   Medications:    nitrofurantoin (macrocrystal-monohydrate)  100 mg Oral 2 times per day    insulin lispro  0-6 Units Subcutaneous 2 times per day    insulin lispro  0-6 Units Subcutaneous TID     lisinopril  5 mg Oral Daily    heparin (porcine)  5,000 Units Subcutaneous TID    sodium chloride flush  10 mL Intravenous 2 times per day    aspirin  81 mg Oral Daily    atorvastatin  40 mg Oral Daily    carvedilol  6.25 mg Oral BID WC    dilTIAZem  60 mg Oral BID    gabapentin  100 mg Oral Nightly    latanoprost  1 drop Both Eyes Nightly    pantoprazole  40 mg Intravenous Daily    sodium chloride flush  10 mL Intravenous BID      Infusions:    sodium chloride      dextrose Stopped (04/06/21 0039)     PRN Meds: sodium chloride flush, 10 mL, PRN  sodium chloride, 25 mL, PRN  polyethylene glycol, 17 g, Daily PRN  acetaminophen, 650 mg, Q6H PRN    Or  acetaminophen, 650 mg, Q6H PRN  ondansetron, 4 mg, Q6H PRN  cholestyramine light, 4 g, BID PRN  glucose, 15 g, PRN  dextrose, 12.5 g, PRN  glucagon (rDNA), 1 mg, PRN  dextrose, 100 mL/hr, PRN      Electronically signed by Verna Roman MD on 4/13/2021 at 3:15 PM

## 2021-04-13 NOTE — PROGRESS NOTES
Comprehensive Nutrition Assessment    Type and Reason for Visit:  Reassess    Nutrition Recommendations/Plan:   Continue carb controlled diet   Will offer low calorie, high protein oral nutrition supplement  Assist with meals, encourage intake    Will continue to follow up during stay     Nutrition Assessment:  Able to advance from liquid diet, remains on carb controlled diet, no caffeine. Recent meal intake 50-75%. Patient reports enjoys meals. Will offer low calorie, high protein supplement. With improving intake moderate nutrition risk at this time. Malnutrition Assessment:  Malnutrition Status: At risk for malnutrition (Comment)    Context:  Acute Illness       Estimated Daily Nutrient Needs:  Energy (kcal):  1735-0934 (25-30 kcal/kg); Weight Used for Energy Requirements:  Current     Protein (g):  63-76 (1-1.3 g/kg); Weight Used for Protein Requirements:  Current        Fluid (ml/day):  3322-4913; Method Used for Fluid Requirements:  1 ml/kcal      Nutrition Related Findings:  sitting up eating lunch on visit, happy with meal, confused      Wounds:  None       Current Nutrition Therapies:    DIET GENERAL; Carb Control: 5 carb choices (75 gms)/meal    Anthropometric Measures:  · Height: 5' 8\" (172.7 cm)  · Current Body Weight: 128 lb 15.5 oz (58.5 kg)   · Admission Body Weight: 138 lb 3.2 oz (62.7 kg)    · Usual Body Weight: 142 lb 3.2 oz (64.5 kg)(4/1/20)     · Ideal Body Weight: 154 lbs; % Ideal Body Weight 93.4 %   · BMI: 19.6  · Adjusted Body Weight: 154.5;  Amputation   · Adjusted BMI: 24.1    · BMI Categories: Normal Weight (BMI 22.0 to 24.9) age over 65(adjusted for amputation)       Nutrition Diagnosis:   · Inadequate protein-energy intake related to cognitive or neurological impairment as evidenced by NPO or clear liquid status due to medical condition      Nutrition Interventions:   Food and/or Nutrient Delivery:  Continue Current Diet, Start Oral Nutrition Supplement  Nutrition Education/Counseling:  No recommendation at this time   Coordination of Nutrition Care:  Continue to monitor while inpatient, Feeding Assistance/Environment Change    Goals:  Pt will toerate diet avancement within 24-48 hr       Nutrition Monitoring and Evaluation:   Behavioral-Environmental Outcomes:  None Identified   Food/Nutrient Intake Outcomes:  Diet Advancement/Tolerance, Food and Nutrient Intake, Supplement Intake  Physical Signs/Symptoms Outcomes:  Biochemical Data, Meal Time Behavior, Skin, Weight     Discharge Planning:    Continue current diet     Electronically signed by Isabel Kwan RD, LD on 4/13/21 at 12:59 PM EDT    Contact: [phone]

## 2021-04-13 NOTE — PROGRESS NOTES
Physical Therapy    Physical Therapy Treatment Note  Name: Selwyn Roper MRN: [id number] :   1940   Date:  2021   Admission Date: 2021 Room:  [address] [place] [address]   Restrictions/Precautions:  Restrictions/Precautions  Restrictions/Precautions: General Precautions, Weight Bearing, Fall Risk Lower Extremity Weight Bearing Restrictions  Left Lower Extremity Weight Bearing: Weight Bearing As Tolerated       Subjective:  Patient states:  \"I am a little tired. I would like to take a walk. \"  Pain:   Location, Type, Intensity (0/10 to 10/10): Denies, 0/10    Objective:    Observation:  Pt supine in bed upon entry. Treatment, including education/measures:  Pt agreeable to participating in therapy today. Therapeutic Activity Training:   Therapeutic activity training was instructed today. Cues were given for safety, sequence, UE/LE placement, awareness, and balance. Activities performed today included  sup-sit and sit-stand. Pt completed sup <> sit at EOB with SBAx1. Verbal cues provided to use bed rail to assist in transfer. Pt sat EOB for 4 min to don bilateral socks and prosthesis with assist ranging from SBAx1 to CGAx1. Pt demonstrated intermittent bouts of retropulsion and decreased sitting balance. Verbal cues provided to instruct patient to lean forward and to sit up tall to avoid retropulsion. Pt completed sit to stand from EOB with assist ranging form minAx1 to modAx1 with use of FWW. Verbal cues provided for BUE and BLE placement, walker placement, and sequencing throughout. Pt complete stand <> sit from chair with minAx1 with use of FWW. Verbal cues provided to feel chair at back of legs, reach back for chair, and sit slowly. Verbal cues for BUE and BLE placement, walker placement, and sequencing throughout. Gait Training:  Cues were given for safety, sequence, device management, balance, posture, awareness, path.       Pt ambulated 75 feet + 75 feet with use of FWW and chair follow and CGAx1. Verbal cues provided for BUE and BLE placement, walker placement, navigation of hallway, and sequencing throughout. Pt demonstrated decreased gait speed with intermittent rest breaks. Safety  Patient left safely in the chair, with call light/phone in reach with alarm applied. Gait belt and mask were used for transfers and gait. Assessment / Impression:    Patient's tolerance of treatment:  Good  Adverse Reaction: none   Significant change in status and impact:  none  Barriers to improvement:  General weakness and decreased endurance     Plan for Next Session:    Continue to progress goals per plan of care. Progress ambulation distance as appropriate and tolerable. Progress independence with all bed mobility and transfers as appropriate and tolerable. Time in:  1035  Time out:  1101  Timed treatment minutes:  26  Total treatment time:  26    Previously filed items:  Social/Functional History  Lives With: Spouse  Type of Home: Assisted living  Home Layout: One level  Home Access: Level entry  Bathroom Shower/Tub: Walk-in shower  Bathroom Toilet: Handicap height  Bathroom Equipment: Shower chair  Home Equipment: Rolling walker(bilateral lower extremity prosthetics (bilateral BKA))  ADL Assistance: Independent  Ambulation Assistance: Independent(mod I with RW)  Transfer Assistance: Independent     Long term goals  Time Frame for Long term goals : In one week:  Long term goal 1: Pt will complete all bed mobility with supervision  Long term goal 2: Pt will complete sit <> stand transfers with supervision  Long term goal 3: Pt will ambulate 400 feet with supervision with Elise Reese term goal 4: Pt will independently complete 3 sets of 10 reps of BLE AROM exercises in available and allowed ROM    Electronically signed by:     YONG Napier  4/13/2021, 11:04 AM

## 2021-04-14 LAB
GLUCOSE BLD-MCNC: 119 MG/DL (ref 70–99)
GLUCOSE BLD-MCNC: 151 MG/DL (ref 70–99)
GLUCOSE BLD-MCNC: 170 MG/DL (ref 70–99)
GLUCOSE BLD-MCNC: 196 MG/DL (ref 70–99)
GLUCOSE BLD-MCNC: 99 MG/DL (ref 70–99)

## 2021-04-14 PROCEDURE — 6370000000 HC RX 637 (ALT 250 FOR IP): Performed by: INTERNAL MEDICINE

## 2021-04-14 PROCEDURE — 82962 GLUCOSE BLOOD TEST: CPT

## 2021-04-14 PROCEDURE — 1200000000 HC SEMI PRIVATE

## 2021-04-14 PROCEDURE — 94761 N-INVAS EAR/PLS OXIMETRY MLT: CPT

## 2021-04-14 PROCEDURE — 6360000002 HC RX W HCPCS: Performed by: INTERNAL MEDICINE

## 2021-04-14 RX ORDER — CARVEDILOL 6.25 MG/1
6.25 TABLET ORAL 2 TIMES DAILY WITH MEALS
Status: CANCELLED | OUTPATIENT
Start: 2021-04-14

## 2021-04-14 RX ORDER — NICOTINE POLACRILEX 4 MG
15 LOZENGE BUCCAL PRN
Status: CANCELLED | OUTPATIENT
Start: 2021-04-14

## 2021-04-14 RX ORDER — LATANOPROST 50 UG/ML
1 SOLUTION/ DROPS OPHTHALMIC NIGHTLY
Status: CANCELLED | OUTPATIENT
Start: 2021-04-14

## 2021-04-14 RX ORDER — ATORVASTATIN CALCIUM 40 MG/1
40 TABLET, FILM COATED ORAL DAILY
Status: CANCELLED | OUTPATIENT
Start: 2021-04-15

## 2021-04-14 RX ORDER — ASPIRIN 81 MG/1
81 TABLET, CHEWABLE ORAL DAILY
Status: CANCELLED | OUTPATIENT
Start: 2021-04-15

## 2021-04-14 RX ORDER — INSULIN GLARGINE 100 [IU]/ML
5 INJECTION, SOLUTION SUBCUTANEOUS NIGHTLY
Status: CANCELLED | OUTPATIENT
Start: 2021-04-14

## 2021-04-14 RX ORDER — ACETAMINOPHEN 325 MG/1
650 TABLET ORAL EVERY 6 HOURS PRN
Status: CANCELLED | OUTPATIENT
Start: 2021-04-14

## 2021-04-14 RX ORDER — DEXTROSE MONOHYDRATE 25 G/50ML
12.5 INJECTION, SOLUTION INTRAVENOUS PRN
Status: CANCELLED | OUTPATIENT
Start: 2021-04-14

## 2021-04-14 RX ORDER — POLYETHYLENE GLYCOL 3350 17 G/17G
17 POWDER, FOR SOLUTION ORAL DAILY PRN
Status: CANCELLED | OUTPATIENT
Start: 2021-04-14

## 2021-04-14 RX ORDER — ACETAMINOPHEN 650 MG/1
650 SUPPOSITORY RECTAL EVERY 6 HOURS PRN
Status: CANCELLED | OUTPATIENT
Start: 2021-04-14

## 2021-04-14 RX ORDER — INSULIN GLARGINE 100 [IU]/ML
5 INJECTION, SOLUTION SUBCUTANEOUS NIGHTLY
Status: DISCONTINUED | OUTPATIENT
Start: 2021-04-14 | End: 2021-04-15

## 2021-04-14 RX ORDER — PANTOPRAZOLE SODIUM 40 MG/1
40 TABLET, DELAYED RELEASE ORAL
Status: CANCELLED | OUTPATIENT
Start: 2021-04-15

## 2021-04-14 RX ORDER — CHOLESTYRAMINE LIGHT 4 G/5.7G
4 POWDER, FOR SUSPENSION ORAL 2 TIMES DAILY PRN
Status: CANCELLED | OUTPATIENT
Start: 2021-04-14

## 2021-04-14 RX ORDER — GABAPENTIN 100 MG/1
100 CAPSULE ORAL NIGHTLY
Status: CANCELLED | OUTPATIENT
Start: 2021-04-14

## 2021-04-14 RX ORDER — INSULIN GLARGINE 100 [IU]/ML
5 INJECTION, SOLUTION SUBCUTANEOUS NIGHTLY
Status: DISCONTINUED | OUTPATIENT
Start: 2021-04-14 | End: 2021-04-14

## 2021-04-14 RX ORDER — DEXTROSE MONOHYDRATE 50 MG/ML
100 INJECTION, SOLUTION INTRAVENOUS PRN
Status: CANCELLED | OUTPATIENT
Start: 2021-04-14

## 2021-04-14 RX ORDER — ONDANSETRON 2 MG/ML
4 INJECTION INTRAMUSCULAR; INTRAVENOUS EVERY 6 HOURS PRN
Status: CANCELLED | OUTPATIENT
Start: 2021-04-14

## 2021-04-14 RX ORDER — DILTIAZEM HYDROCHLORIDE 60 MG/1
60 TABLET, FILM COATED ORAL 2 TIMES DAILY
Status: CANCELLED | OUTPATIENT
Start: 2021-04-14

## 2021-04-14 RX ORDER — LISINOPRIL 5 MG/1
5 TABLET ORAL DAILY
Status: CANCELLED | OUTPATIENT
Start: 2021-04-15

## 2021-04-14 RX ADMIN — ATORVASTATIN CALCIUM 40 MG: 40 TABLET, FILM COATED ORAL at 09:30

## 2021-04-14 RX ADMIN — ASPIRIN 81 MG CHEWABLE TABLET 81 MG: 81 TABLET CHEWABLE at 09:30

## 2021-04-14 RX ADMIN — DILTIAZEM HYDROCHLORIDE 60 MG: 60 TABLET, FILM COATED ORAL at 09:30

## 2021-04-14 RX ADMIN — INSULIN GLARGINE 8 UNITS: 100 INJECTION, SOLUTION SUBCUTANEOUS at 00:03

## 2021-04-14 RX ADMIN — ENOXAPARIN SODIUM 40 MG: 40 INJECTION SUBCUTANEOUS at 09:30

## 2021-04-14 RX ADMIN — INSULIN LISPRO 1 UNITS: 100 INJECTION, SOLUTION INTRAVENOUS; SUBCUTANEOUS at 16:48

## 2021-04-14 RX ADMIN — GABAPENTIN 100 MG: 100 CAPSULE ORAL at 20:42

## 2021-04-14 RX ADMIN — CARVEDILOL 6.25 MG: 6.25 TABLET, FILM COATED ORAL at 16:57

## 2021-04-14 RX ADMIN — DILTIAZEM HYDROCHLORIDE 60 MG: 60 TABLET, FILM COATED ORAL at 20:42

## 2021-04-14 RX ADMIN — CARVEDILOL 6.25 MG: 6.25 TABLET, FILM COATED ORAL at 09:30

## 2021-04-14 RX ADMIN — PANTOPRAZOLE SODIUM 40 MG: 40 TABLET, DELAYED RELEASE ORAL at 06:12

## 2021-04-14 RX ADMIN — INSULIN GLARGINE 5 UNITS: 100 INJECTION, SOLUTION SUBCUTANEOUS at 20:47

## 2021-04-14 RX ADMIN — NITROFURANTOIN MONOHYDRATE/MACROCRYSTALLINE 100 MG: 25; 75 CAPSULE ORAL at 09:30

## 2021-04-14 RX ADMIN — LATANOPROST 1 DROP: 50 SOLUTION/ DROPS OPHTHALMIC at 20:43

## 2021-04-14 RX ADMIN — ACETAMINOPHEN 650 MG: 325 TABLET ORAL at 13:58

## 2021-04-14 RX ADMIN — NITROFURANTOIN MONOHYDRATE/MACROCRYSTALLINE 100 MG: 25; 75 CAPSULE ORAL at 22:52

## 2021-04-14 RX ADMIN — LISINOPRIL 5 MG: 5 TABLET ORAL at 09:30

## 2021-04-14 RX ADMIN — INSULIN LISPRO 1 UNITS: 100 INJECTION, SOLUTION INTRAVENOUS; SUBCUTANEOUS at 11:56

## 2021-04-14 ASSESSMENT — PAIN DESCRIPTION - FREQUENCY: FREQUENCY: INTERMITTENT

## 2021-04-14 ASSESSMENT — PAIN DESCRIPTION - LOCATION: LOCATION: HEAD

## 2021-04-14 ASSESSMENT — PAIN DESCRIPTION - PAIN TYPE: TYPE: ACUTE PAIN

## 2021-04-14 ASSESSMENT — PAIN SCALES - GENERAL
PAINLEVEL_OUTOF10: 2
PAINLEVEL_OUTOF10: 4

## 2021-04-14 ASSESSMENT — PAIN DESCRIPTION - DESCRIPTORS: DESCRIPTORS: HEADACHE

## 2021-04-14 ASSESSMENT — PAIN DESCRIPTION - ORIENTATION: ORIENTATION: ANTERIOR

## 2021-04-14 NOTE — PROGRESS NOTES
Hospitalist Progress Note      Name:  Samira Iglesias /Age/Sex: 1940  (79 y.o. male)   MRN & CSN:  [id number] & [id number] Admission Date/Time: 2021  6:07 PM   [facility]  [facility] PCP: Lenny Boggs, [address] Day: 11     Reason for continued hospitalization:  Awaiting placement  Medically ready for discharge      ASSESSMENT & PLAN:  Samira Iglesias is a 79 y.o.  male  who was brought to the hospital for increased confusion. He lives in a nursing home. He was hypoxic with SPO2 of 85% at one point while in the ED per admission HPI. #.  Acute hypoxic respiratory failure---CTA pulmonary without evidence of large or central pulmonary embolism. CT without evidence of consolidation or infiltrate. Peak oxygen requirement at 6 L. No longer requiring oxygen. Hypoxia resolved. #.  UTI---urine culture growing Enterococcus faecalis sensitive to Macrobid.  -Continue Macrobid 100 mg twice daily    #. Toxic metabolic encephalopathy---POA. In the setting of UTI, cholangitis and hypoxia. No longer encephalopathic. #.  Chronic systolic CHF---EF 68% 2020. Compensated. Euvolemic    #. Choledocholithiasis &  cholangitis ---s/p ERCP on 2021. Underwent balloon dilation of papilla 10 to 12 mm and removal of multiple CBD stones and sludge. LFTs normal.  Treated with Cipro and Flagyl. Overall stable. -DC Cipro and Flagyl on     #. Sellar/parasellar mass & pituitary macroadenoma ---large, unchanged grossly on CT head. #.  CAD---s/p CABG in . S/p PCI in the past.  Stable. No acute issues.  -Continue aspirin, Lipitor, Coreg    #. AY3---EGQL complications including bilateral BKA. Blood glucose control.  -Low-dose sliding scale insulin  -Hypoglycemia protocol  -Lantus 8 units at bedtime    #. Hypertension---BP controlled. On Coreg and diltiazem    #.   S/p bilateral BKA    Diet DIET GENERAL; Carb Control: 5 carb choices (75 gms)/meal  Dietary Nutrition Supplements: Low Calorie High Protein Supplement   DVT Prophylaxis [x] Lovenox, []  Heparin, [] SCDs, [] Ambulation   GI Prophylaxis [] PPI,  [] H2 Blocker,  [] Carafate,  [] Diet/Tube Feeds   Code Status Full Code   Disposition  SNF   MDM [] Low, [x] Moderate,[]  High     Chief complaint/Interval History/ROS     Chief Complaint: Shortness of breath, confusion    INTERVAL HISTORY:    4/14: Overall stable. Awaiting placement. 4/13: No longer short of breath. No confusion. Overall stable. Awaiting placement. ROS:  No chest pain. No abdominal pain. No nausea. No vomiting. Objective: Intake/Output Summary (Last 24 hours) at 4/14/2021 1156  Last data filed at 4/14/2021 0911  Gross per 24 hour   Intake 730 ml   Output 200 ml   Net 530 ml      Vitals:   Vitals:    04/14/21 0927   BP: (!) 145/66   Pulse: 75   Resp: 18   Temp: 97.3 °F (36.3 °C)   SpO2: 96%     Physical Exam:   GEN: Awake male, lying flat in bed. Nontoxic-appearing. Answers questions appropriate. EYES: No discharge. Ocular muscles intact. HENT:  mucous membranes moist.  No nasal discharge. NECK: Supple  RESP: Symmetric breath sounds. Accessory muscle use. Symmetric chest expansion. CV: RRR. No pitting lower extremity edema. No murmur heard. GI: Abdomen soft. Nontender. Nondistended. : No Renee in place. MSK: No bony fractures. S/p bilateral BKA.   SKIN: warm, dry, no rashes, no pressure ulcer  NEURO: Cranial nerves appear grossly intact, normal speech  PSYCH: Awake, alert    Medications:   Medications:    insulin glargine  5 Units Subcutaneous Nightly    pantoprazole  40 mg Oral QAM AC    enoxaparin  40 mg Subcutaneous Daily    nitrofurantoin (macrocrystal-monohydrate)  100 mg Oral 2 times per day    insulin lispro  0-6 Units Subcutaneous 2 times per day    insulin lispro  0-6 Units Subcutaneous TID WC    lisinopril  5 mg Oral Daily    sodium chloride flush  10 mL Intravenous 2 times per day    aspirin  81 mg Oral Daily    atorvastatin  40 mg Oral Daily    carvedilol  6.25 mg Oral BID WC    dilTIAZem  60 mg Oral BID    gabapentin  100 mg Oral Nightly    latanoprost  1 drop Both Eyes Nightly    sodium chloride flush  10 mL Intravenous BID      Infusions:    sodium chloride      dextrose Stopped (04/06/21 0039)     PRN Meds: sodium chloride flush, 10 mL, PRN  sodium chloride, 25 mL, PRN  polyethylene glycol, 17 g, Daily PRN  acetaminophen, 650 mg, Q6H PRN    Or  acetaminophen, 650 mg, Q6H PRN  ondansetron, 4 mg, Q6H PRN  cholestyramine light, 4 g, BID PRN  glucose, 15 g, PRN  dextrose, 12.5 g, PRN  glucagon (rDNA), 1 mg, PRN  dextrose, 100 mL/hr, PRN      Electronically signed by Penne Carrel, MD on 4/14/2021 at 11:56 AM

## 2021-04-14 NOTE — DISCHARGE INSTR - COC
Continuity of Care Form    Patient Name: Nehemiah Hughes   :  1940  MRN:  [id number]    Admit date:  2021  Discharge date:  ***    Code Status Order: Full Code   Advance Directives:   Advance Care Flowsheet Documentation       Date/Time Healthcare Directive Type of Healthcare Directive Copy in [address] Agent's Name Healthcare Agent's Phone Number    21 1252  No, patient does not have an advance directive for healthcare treatment -- -- -- -- --    21 0960  No, patient does not have an advance directive for healthcare treatment -- -- -- -- --            Admitting Physician:  Shanna Kapoor MD  PCP: Tony Duarte MD    Discharging Nurse: [facility] Unit/Room#: 2526/5532-F  Discharging Unit Phone Number: ***    Emergency Contact:   Extended Emergency Contact Information  Primary Emergency Contact: Colin Mahoney  Address: [address] [place] [address] Phone: [phone]  Relation: Spouse  Secondary Emergency Contact: Manoj Say [address] Phone: [phone]  Mobile Phone: [phone]  Relation: Child    Past Surgical History:  Past Surgical History:   Procedure Laterality Date    ABDOMEN SURGERY      stones in bile duct removed x3    CARDIAC SURGERY      CABG x 2 in  at [facility] - Dr. Em Monk, ESOPHAGUS      ERCP  14    w/ dilation of papilla    ERCP  14    ERCP  10/24/15    extractions stones, balloon dilatation     ERCP  2017    stone extraction     ERCP N/A 2019    ERCP STONE REMOVAL/ DILATATION OF PAPILLA WITH 10-12MM AND 12-15MM BALLOON AND 9-12MM, 12-15MM  EXTRACTOR BALLOON USED FOR REMOVAL OF MULTIPLE  CBD STONES performed by Edwin Ganser, MD at [facility]    ERCP N/A 2019    ERCP STONE REMOVAL, DILATATION OF PAPILLA WITH 10-12MM BALLOON, [address] [place] [address] BALLOON 12-15MM USED FOR REMOVAL OF CBD STONE AND SLUDE performed by Marlys Sahu MD at [address] [facility] ENDOSCOPY    ERCP N/A 3/31/2020    ERCP DILATION BALLOON to 12  AND SWEEP OF CBD STONE AND SLUDGE performed by Marlys Sahu MD at [address] [facility] ENDOSCOPY    ERCP N/A 4/8/2021    ERCP WITH BALLOON DILATATION 10-12 MM BALLOON( TO 12 MM) OF PAPILLA, , BALLOON SWEEP  WITH REMOVAL OF CBD STONES AND VKYZBY(16-40 MM BALLOON) performed by Marlys Sahu MD at [address] Left 2/18/2021    LEFT FEMUR IM NAIL VIANEY INSERTION performed by Jes Mullen DO at [address] Bilateral     2005 at [address] [facility]  03/13/14    sphincterotomy       Immunization History:   Immunization History   Administered Date(s) Administered    Influenza Virus Vaccine 09/11/2013, 09/13/2014    Pneumococcal Conjugate 7-valent (Justin Garcia) 02/22/2012       Active Problems:  Patient Active Problem List   Diagnosis Code    Uncontrolled type II diabetes with peripheral autonomic neuropathy (HCC) E11.43, E11.65    Hypertension, essential I10    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    Choledocholithiasis K80.50    Generalized abdominal pain R10.84    COPD (chronic obstructive pulmonary disease) ([facility]) J44.9    Abdominal pain, epigastric R10.13    Elevated LFTs R79.89    Abnormal findings on imaging of biliary tract R93.2    Chest pain R07.9    Coronary artery disease involving coronary bypass graft of native heart without angina pectoris I25.810    Acute encephalopathy G93.40    Vomiting R11.10    Sepsis (HCC) A41.9    Nausea vomiting and diarrhea R11.2, R19.7    Fever W02.0    Metabolic encephalopathy R82.41    Impaired cognition R41.89    Generalized weakness R53.1    Cholangitis due to bile duct calculus with obstruction K80.31    Oropharyngeal dysphagia R13.12    S/P BKA (below knee amputation) bilateral ([facility] Z89.512, Z89.511    Abdominal pain R10.9    Inguinal pain R10.30    Closed fracture of left hip (HCC) S72.002A    Acute alteration in mental status R41.82       Isolation/Infection:   Isolation            No Isolation          Patient Infection Status       Infection Onset Added Last Indicated Last Indicated By Review Planned Expiration Resolved Resolved By    None active    Resolved    COVID-19 Rule Out 21 COVID-19, Rapid (Ordered)   21 Rule-Out Test Resulted    COVID-19 Rule Out 21 COVID-19, Rapid (Ordered)   21 Rule-Out Test Resulted    C-diff Rule Out 20 C difficile Molecular/PCR (Ordered)   21 Berkley Cramer LPN    RAMLN-43 20 COVID-19   12/15/20     MRSA 19 Wound culture   20 Caroline Oliva LPN            Nurse Assessment:  Last Vital Signs: BP (!) 145/66   Pulse 75   Temp 97.3 °F (36.3 °C) (Oral)   Resp 18   Ht 5' 8\" (1.727 m)   Wt 129 lb (58.5 kg)   SpO2 96%   BMI 19.61 kg/m²     Last documented pain score (0-10 scale): Pain Level: 0  Last Weight:   Wt Readings from Last 1 Encounters:   21 129 lb (58.5 kg)     Mental Status:  {IP PT MENTAL STATUS:16470:::0}    IV Access:  { RAGHAV IV ACCESS:184850287:::0}    Nursing Mobility/ADLs:  Walking   {CHP DME ADLs:631222949:::0}  Transfer  {CHP DME ADLs:470677306:::0}  Bathing  {CHP DME ADLs:994975712:::0}  Dressing  {CHP DME ADLs:259915598:::0}  Toileting  {CHP DME ADLs:927776476:::0}  Feeding  {CHP DME ADLs:404564952:::0}  Med Admin  {CHP DME ADLs:346039127:::0}  Med Delivery   { RAGHAV MED Delivery:445862976:::0}    Wound Care Documentation and Therapy:  Wound 20 Arm Proximal;Right;Dorsal skin tare (Active)   Number of days: 375       Wound 21 Elbow Anterior; Left scab (Active)   Number of days: 55        Elimination:  Continence:    Bowel: {YES / NJ:21069}  Bladder: {YES / ED:17803}  Urinary Catheter: {Urinary Catheter:006427649:::0}   Colostomy/Ileostomy/Ileal Conduit: {YES / TO:30932}       Date of Last BM: ***    Intake/Output Summary (Last 24 hours) at 4/14/2021 1158  Last data filed at 4/14/2021 0911  Gross per 24 hour   Intake 730 ml   Output 200 ml   Net 530 ml     I/O last 3 completed shifts: In: 250 [P.O.:240; I.V.:10]  Out: 200 [Urine:200]    Safety Concerns:     [address] RAGHAV Safety Concerns:086596379:::0}    Impairments/Disabilities:      [address] RAGHAV Impairments/Disabilities:739195648:::0}    Nutrition Therapy:  Current Nutrition Therapy:   - Oral Diet:  General    Routes of Feeding: Oral  Liquids: No Restrictions  Daily Fluid Restriction: no  Last Modified Barium Swallow with Video (Video Swallowing Test): not done    Treatments at the Time of Hospital Discharge:   Respiratory Treatments:   Oxygen Therapy:  is not on home oxygen therapy. Ventilator:    - No ventilator support    Rehab Therapies: Physical Therapy  Weight Bearing Status/Restrictions: as tolerated  Other Medical Equipment (for information only, NOT a DME order):     Other Treatments: ***    Patient's personal belongings (please select all that are sent with patient):  {[facility] DME Belongings:008516779:::0}    RN SIGNATURE:  {Esignature:725950463:::0}    CASE MANAGEMENT/SOCIAL WORK SECTION    Inpatient Status Date: ***    Readmission Risk Assessment Score:  Readmission Risk              Risk of Unplanned Readmission:        27           Discharging to Facility/ Agency   Name:   Address:  Phone:  Fax:    Dialysis Facility (if applicable)   Name:  Address:  Dialysis Schedule:  Phone:  Fax:    / signature: {Esignature:641842364:::0}    PHYSICIAN SECTION    Prognosis: Good    Condition at Discharge: Stable    Rehab Potential (if transferring to Rehab): Good    Recommended Labs or Other Treatments After Discharge:     Physician Certification: I certify the above information and transfer of Salvador Peña  is necessary for the continuing treatment of the diagnosis listed and that he requires [place] for greater 30 days.      Update Admission H&P: No change in H&P    PHYSICIAN SIGNATURE:  Electronically signed by Vincenzo Tapia MD on 4/15/21 at 11:59 AM EDT

## 2021-04-14 NOTE — PROGRESS NOTES
Progress Note( Dr. Tram Christensen)    Subjective:   Admit Date: 4/4/2021  PCP: Brock Landrum MD    Admitted For : Confusion     Consulted For: Better control of blood glucose    Interval History: Patient is very confused and disoriented     Patient had following procedure done yesterday 4/8/2021 Dr. Reece Barker    ERCP with balloon dilation of the papilla, and  removal of multiple common bile duct stones along with sludge. This is 1 of several procedures ERCP done before her bile duct stone    Denies any chest pains,   Denies SOB . Denies nausea or vomiting. Now eating better     no new bowel or bladder symptoms.        Intake/Output Summary (Last 24 hours) at 4/14/2021 0649  Last data filed at 4/13/2021 2358  Gross per 24 hour   Intake 250 ml   Output 200 ml   Net 50 ml       DATA    CBC:   Recent Labs     04/11/21  0817 04/12/21  0807 04/13/21  0845   WBC 7.1 7.1 7.6   HGB 10.7* 12.3* 12.2*    272 259    CMP:  Recent Labs     04/11/21  0817 04/12/21  0807 04/13/21  0845    137 136   K 3.4* 3.7 3.8    102 102   CO2 28 26 26   BUN 20 17 20   CREATININE 1.1 1.1 1.1   CALCIUM 8.7 9.4 9.0     Lipids:   Lab Results   Component Value Date    CHOL 144 11/12/2018    HDL 62 03/15/2019    TRIG 101 11/12/2018     Glucose:  Recent Labs     04/13/21  1606 04/13/21  2335 04/14/21  0251   POCGLU 219* 229* 99     NqzhjtcsvxH2T:  Lab Results   Component Value Date    LABA1C 7.9 12/01/2020     High Sensitivity TSH:   Lab Results   Component Value Date    TSHHS 0.777 12/03/2020     Free T3:   Lab Results   Component Value Date    FT3 2.7 10/21/2015     Free T4:  Lab Results   Component Value Date    T4FREE 1.26 11/05/2018          Scheduled Medicines   Medications:    insulin glargine  5 Units Subcutaneous Nightly    pantoprazole  40 mg Oral QAM AC    enoxaparin  40 mg Subcutaneous Daily    nitrofurantoin (macrocrystal-monohydrate)  100 mg Oral 2 times per day    insulin lispro  0-6 Units Subcutaneous 2 times per day    insulin lispro  0-6 Units Subcutaneous TID     lisinopril  5 mg Oral Daily    sodium chloride flush  10 mL Intravenous 2 times per day    aspirin  81 mg Oral Daily    atorvastatin  40 mg Oral Daily    carvedilol  6.25 mg Oral BID WC    dilTIAZem  60 mg Oral BID    gabapentin  100 mg Oral Nightly    latanoprost  1 drop Both Eyes Nightly    sodium chloride flush  10 mL Intravenous BID      Infusions:    sodium chloride      dextrose Stopped (04/06/21 0039)         Objective:   Vitals: BP (!) 141/67   Pulse 65   Temp 98.3 °F (36.8 °C) (Oral)   Resp 16   Ht 5' 8\" (1.727 m)   Wt 129 lb (58.5 kg)   SpO2 97%   BMI 19.61 kg/m²   General appearance: alert and cooperative with exam  Neck: no JVD or bruit  Thyroid : Normal lobes   Lungs: Has Vesicular Breath sounds   Heart:  regular rate and rhythm  Abdomen: soft, non-tender; bowel sounds normal; no masses,  no organomegaly  Musculoskeletal: Normal  Extremities: extremities normal, , no edema bilateral below-knee amputation  Neurologic:  Awake, alert, oriented to name, place and time. Cra paresthesia s intact. ,  and gait is abnormal.  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)     Choledocholithiasis     Generalized abdominal pain     COPD (chronic obstructive pulmonary disease) (HCC)     Abdominal pain, epigastric     Elevated LFTs     Abnormal findings on imaging of biliary tract     Chest pain     Coronary artery disease involving coronary bypass graft of native heart without angina pectoris     Acute encephalopathy     Vomiting     Sepsis (HCC)     Nausea vomiting and diarrhea     Fever     Metabolic encephalopathy     Impaired cognition     Generalized weakness     Cholangitis due to bile duct calculus with obstruction     Oropharyngeal dysphagia     S/P BKA (below knee amputation)

## 2021-04-15 ENCOUNTER — APPOINTMENT (OUTPATIENT)
Dept: GENERAL RADIOLOGY | Age: 81
DRG: 444 | End: 2021-04-15
Payer: MEDICARE

## 2021-04-15 ENCOUNTER — HOSPITAL ENCOUNTER (INPATIENT)
Age: 81
LOS: 7 days | Discharge: HOME HEALTH | DRG: 444 | End: 2021-04-22
Attending: PHYSICAL MEDICINE & REHABILITATION | Admitting: PHYSICAL MEDICINE & REHABILITATION
Payer: MEDICARE

## 2021-04-15 VITALS
DIASTOLIC BLOOD PRESSURE: 60 MMHG | WEIGHT: 124 LBS | RESPIRATION RATE: 16 BRPM | HEART RATE: 61 BPM | TEMPERATURE: 97.8 F | BODY MASS INDEX: 18.79 KG/M2 | SYSTOLIC BLOOD PRESSURE: 131 MMHG | OXYGEN SATURATION: 94 % | HEIGHT: 68 IN

## 2021-04-15 LAB
GLUCOSE BLD-MCNC: 108 MG/DL (ref 70–99)
GLUCOSE BLD-MCNC: 167 MG/DL (ref 70–99)
GLUCOSE BLD-MCNC: 181 MG/DL (ref 70–99)
GLUCOSE BLD-MCNC: 209 MG/DL (ref 70–99)
GLUCOSE BLD-MCNC: 94 MG/DL (ref 70–99)

## 2021-04-15 PROCEDURE — 82962 GLUCOSE BLOOD TEST: CPT

## 2021-04-15 PROCEDURE — 6370000000 HC RX 637 (ALT 250 FOR IP): Performed by: INTERNAL MEDICINE

## 2021-04-15 PROCEDURE — 99223 1ST HOSP IP/OBS HIGH 75: CPT | Performed by: PHYSICAL MEDICINE & REHABILITATION

## 2021-04-15 PROCEDURE — 94761 N-INVAS EAR/PLS OXIMETRY MLT: CPT

## 2021-04-15 PROCEDURE — 6360000002 HC RX W HCPCS: Performed by: INTERNAL MEDICINE

## 2021-04-15 PROCEDURE — 71045 X-RAY EXAM CHEST 1 VIEW: CPT

## 2021-04-15 PROCEDURE — 1280000000 HC REHAB R&B

## 2021-04-15 RX ORDER — ATORVASTATIN CALCIUM 40 MG/1
40 TABLET, FILM COATED ORAL DAILY
Status: DISCONTINUED | OUTPATIENT
Start: 2021-04-16 | End: 2021-04-22 | Stop reason: HOSPADM

## 2021-04-15 RX ORDER — ACETAMINOPHEN 325 MG/1
650 TABLET ORAL EVERY 4 HOURS PRN
Status: DISCONTINUED | OUTPATIENT
Start: 2021-04-15 | End: 2021-04-22 | Stop reason: HOSPADM

## 2021-04-15 RX ORDER — ASPIRIN 81 MG/1
81 TABLET, CHEWABLE ORAL DAILY
Status: DISCONTINUED | OUTPATIENT
Start: 2021-04-16 | End: 2021-04-22 | Stop reason: HOSPADM

## 2021-04-15 RX ORDER — CARVEDILOL 6.25 MG/1
6.25 TABLET ORAL 2 TIMES DAILY WITH MEALS
Status: DISCONTINUED | OUTPATIENT
Start: 2021-04-15 | End: 2021-04-22 | Stop reason: HOSPADM

## 2021-04-15 RX ORDER — CHOLESTYRAMINE LIGHT 4 G/5.7G
4 POWDER, FOR SUSPENSION ORAL 2 TIMES DAILY PRN
Status: DISCONTINUED | OUTPATIENT
Start: 2021-04-15 | End: 2021-04-22 | Stop reason: HOSPADM

## 2021-04-15 RX ORDER — LISINOPRIL 5 MG/1
5 TABLET ORAL DAILY
Status: DISCONTINUED | OUTPATIENT
Start: 2021-04-16 | End: 2021-04-22 | Stop reason: HOSPADM

## 2021-04-15 RX ORDER — ACETAMINOPHEN 325 MG/1
650 TABLET ORAL EVERY 6 HOURS PRN
Status: DISCONTINUED | OUTPATIENT
Start: 2021-04-15 | End: 2021-04-15 | Stop reason: SDUPTHER

## 2021-04-15 RX ORDER — POLYETHYLENE GLYCOL 3350 17 G/17G
17 POWDER, FOR SOLUTION ORAL DAILY PRN
Status: DISCONTINUED | OUTPATIENT
Start: 2021-04-15 | End: 2021-04-22 | Stop reason: HOSPADM

## 2021-04-15 RX ORDER — POLYETHYLENE GLYCOL 3350 17 G/17G
17 POWDER, FOR SOLUTION ORAL DAILY PRN
Status: DISCONTINUED | OUTPATIENT
Start: 2021-04-15 | End: 2021-04-15 | Stop reason: CLARIF

## 2021-04-15 RX ORDER — GABAPENTIN 100 MG/1
100 CAPSULE ORAL NIGHTLY
Status: DISCONTINUED | OUTPATIENT
Start: 2021-04-15 | End: 2021-04-22 | Stop reason: HOSPADM

## 2021-04-15 RX ORDER — NICOTINE POLACRILEX 4 MG
15 LOZENGE BUCCAL PRN
Status: DISCONTINUED | OUTPATIENT
Start: 2021-04-15 | End: 2021-04-22 | Stop reason: HOSPADM

## 2021-04-15 RX ORDER — PANTOPRAZOLE SODIUM 40 MG/1
40 TABLET, DELAYED RELEASE ORAL
Status: DISCONTINUED | OUTPATIENT
Start: 2021-04-16 | End: 2021-04-22 | Stop reason: HOSPADM

## 2021-04-15 RX ORDER — DILTIAZEM HYDROCHLORIDE 60 MG/1
60 TABLET, FILM COATED ORAL 2 TIMES DAILY
Status: DISCONTINUED | OUTPATIENT
Start: 2021-04-15 | End: 2021-04-22 | Stop reason: HOSPADM

## 2021-04-15 RX ORDER — ONDANSETRON 2 MG/ML
4 INJECTION INTRAMUSCULAR; INTRAVENOUS EVERY 6 HOURS PRN
Status: DISCONTINUED | OUTPATIENT
Start: 2021-04-15 | End: 2021-04-22 | Stop reason: HOSPADM

## 2021-04-15 RX ORDER — LATANOPROST 50 UG/ML
1 SOLUTION/ DROPS OPHTHALMIC NIGHTLY
Status: DISCONTINUED | OUTPATIENT
Start: 2021-04-15 | End: 2021-04-22 | Stop reason: HOSPADM

## 2021-04-15 RX ORDER — FAMOTIDINE 40 MG/1
40 TABLET, FILM COATED ORAL DAILY
COMMUNITY

## 2021-04-15 RX ORDER — DEXTROSE MONOHYDRATE 25 G/50ML
12.5 INJECTION, SOLUTION INTRAVENOUS PRN
Status: DISCONTINUED | OUTPATIENT
Start: 2021-04-15 | End: 2021-04-22 | Stop reason: HOSPADM

## 2021-04-15 RX ORDER — DEXTROSE MONOHYDRATE 50 MG/ML
100 INJECTION, SOLUTION INTRAVENOUS PRN
Status: DISCONTINUED | OUTPATIENT
Start: 2021-04-15 | End: 2021-04-22 | Stop reason: HOSPADM

## 2021-04-15 RX ORDER — ACETAMINOPHEN 650 MG/1
650 SUPPOSITORY RECTAL EVERY 6 HOURS PRN
Status: DISCONTINUED | OUTPATIENT
Start: 2021-04-15 | End: 2021-04-15 | Stop reason: SDUPTHER

## 2021-04-15 RX ADMIN — ENOXAPARIN SODIUM 40 MG: 40 INJECTION SUBCUTANEOUS at 09:46

## 2021-04-15 RX ADMIN — DILTIAZEM HYDROCHLORIDE 60 MG: 60 TABLET, FILM COATED ORAL at 21:11

## 2021-04-15 RX ADMIN — INSULIN LISPRO 2 UNITS: 100 INJECTION, SOLUTION INTRAVENOUS; SUBCUTANEOUS at 21:18

## 2021-04-15 RX ADMIN — ATORVASTATIN CALCIUM 40 MG: 40 TABLET, FILM COATED ORAL at 09:47

## 2021-04-15 RX ADMIN — DILTIAZEM HYDROCHLORIDE 60 MG: 60 TABLET, FILM COATED ORAL at 09:47

## 2021-04-15 RX ADMIN — INSULIN LISPRO 1 UNITS: 100 INJECTION, SOLUTION INTRAVENOUS; SUBCUTANEOUS at 12:08

## 2021-04-15 RX ADMIN — INSULIN LISPRO 1 UNITS: 100 INJECTION, SOLUTION INTRAVENOUS; SUBCUTANEOUS at 18:14

## 2021-04-15 RX ADMIN — ASPIRIN 81 MG CHEWABLE TABLET 81 MG: 81 TABLET CHEWABLE at 09:47

## 2021-04-15 RX ADMIN — PANTOPRAZOLE SODIUM 40 MG: 40 TABLET, DELAYED RELEASE ORAL at 05:47

## 2021-04-15 RX ADMIN — LISINOPRIL 5 MG: 5 TABLET ORAL at 09:47

## 2021-04-15 RX ADMIN — CARVEDILOL 6.25 MG: 6.25 TABLET, FILM COATED ORAL at 18:13

## 2021-04-15 RX ADMIN — LATANOPROST 1 DROP: 50 SOLUTION/ DROPS OPHTHALMIC at 21:11

## 2021-04-15 RX ADMIN — NITROFURANTOIN MONOHYDRATE/MACROCRYSTALLINE 100 MG: 25; 75 CAPSULE ORAL at 09:47

## 2021-04-15 RX ADMIN — GABAPENTIN 100 MG: 100 CAPSULE ORAL at 21:11

## 2021-04-15 RX ADMIN — CARVEDILOL 6.25 MG: 6.25 TABLET, FILM COATED ORAL at 09:53

## 2021-04-15 ASSESSMENT — PAIN SCALES - GENERAL: PAINLEVEL_OUTOF10: 0

## 2021-04-15 NOTE — CARE COORDINATION
Called and spoke with expedited appeal department at [facility]. Per representative chart is showing that denial has been overturned and patient is now approved for ARU. Auth# [id number]. Reference # for call is [phone]. Contacted patients spouse. Notified Dr. Cohen Guardian of approval.      Renetta Schafer negative test on 4/5 noted.

## 2021-04-15 NOTE — PROGRESS NOTES
Hospitalist Progress Note      Name:  Misti Ross /Age/Sex: 1940  (79 y.o. male)   MRN & CSN:  [id number] & [id number] Admission Date/Time: 2021  6:07 PM   [facility]  [facility] PCP: Raiza Velaqzuez, [address] Day: 12     Reason for continued hospitalization:  Awaiting placement  Medically ready for discharge    ASSESSMENT & PLAN:  Misti Ross is a 79 y.o.  male  who was brought to the hospital for increased confusion. He lives in a nursing home. He was hypoxic with SPO2 of 85% at one point while in the ED per admission HPI. #.  Acute hypoxic respiratory failure---CTA pulmonary without evidence of large or central pulmonary embolism. CT without evidence of consolidation or infiltrate. Peak oxygen requirement at 6 L. No longer requiring oxygen. Hypoxia resolved. #.  UTI---urine culture growing Enterococcus faecalis sensitive to Macrobid.  -Continue Macrobid 100 mg twice daily    #. Toxic metabolic encephalopathy---POA. In the setting of UTI, cholangitis and hypoxia. No longer encephalopathic. #.  Chronic systolic CHF---EF 85% 2020. Compensated. Euvolemic    #. Choledocholithiasis &  cholangitis ---s/p ERCP on 2021. Underwent balloon dilation of papilla 10 to 12 mm and removal of multiple CBD stones and sludge. LFTs normal.  Treated with Cipro and Flagyl. Overall stable. -DC Cipro and Flagyl on     #. Sellar/parasellar mass & pituitary macroadenoma ---large, unchanged grossly on CT head. #.  CAD---s/p CABG in . S/p PCI in the past.  Stable. No acute issues.  -Continue aspirin, Lipitor, Coreg    #. JW5---PKTD complications including bilateral BKA. Blood glucose control.  -Low-dose sliding scale insulin  -Hypoglycemia protocol  -Lantus 8 units at bedtime    #. Hypertension---BP controlled. On Coreg and diltiazem    #.   S/p bilateral BKA    Diet DIET GENERAL; Carb Control: 5 carb choices (75 gms)/meal  Dietary Nutrition Supplements: Low Calorie High Protein Supplement   DVT Prophylaxis [x] Lovenox, []  Heparin, [] SCDs, [] Ambulation   GI Prophylaxis [] PPI,  [] H2 Blocker,  [] Carafate,  [] Diet/Tube Feeds   Code Status Full Code   Disposition  [facility] [x] Low, [] Moderate,[]  High     Chief complaint/Interval History/ROS     Chief Complaint: Shortness of breath, confusion    INTERVAL HISTORY:    4/15 overall stable. Awaiting placement.:     4/14: Overall stable. Awaiting placement. 4/13: No longer short of breath. No confusion. Overall stable. Awaiting placement. ROS:  No chest pain. No abdominal pain. No nausea. No vomiting. Objective: Intake/Output Summary (Last 24 hours) at 4/15/2021 1037  Last data filed at 4/15/2021 0548  Gross per 24 hour   Intake --   Output 550 ml   Net -550 ml      Vitals:   Vitals:    04/15/21 0945   BP: 131/60   Pulse: 61   Resp: 16   Temp: 97.8 °F (36.6 °C)   SpO2: 94%     Physical Exam:   GEN: Awake male, laying flat in bed. Pleasant. Does not appear to be in distress. EYES: No discharge. Ocular muscles intact. HENT:  mucous membranes moist.  No nasal discharge. NECK: Supple  RESP: Symmetric breath sounds. Accessory muscle use. Symmetric chest expansion. CV: RRR. No pitting lower extremity edema. No murmur heard. GI: Abdomen soft. Nontender. Nondistended. : No Renee in place. MSK: No bony fractures. S/p bilateral BKA.   SKIN: warm, dry, no rashes, no pressure ulcer  NEURO: Cranial nerves appear grossly intact, normal speech  PSYCH: Awake, alert    Medications:   Medications:    pantoprazole  40 mg Oral QAM AC    enoxaparin  40 mg Subcutaneous Daily    nitrofurantoin (macrocrystal-monohydrate)  100 mg Oral 2 times per day    insulin lispro  0-6 Units Subcutaneous 2 times per day    insulin lispro  0-6 Units Subcutaneous TID WC    lisinopril  5 mg Oral Daily    sodium chloride flush  10 mL Intravenous 2 times per day    aspirin  81 mg Oral Daily    atorvastatin  40 mg Oral Daily    carvedilol  6.25 mg Oral BID WC    dilTIAZem  60 mg Oral BID    gabapentin  100 mg Oral Nightly    latanoprost  1 drop Both Eyes Nightly    sodium chloride flush  10 mL Intravenous BID      Infusions:    sodium chloride      dextrose Stopped (04/06/21 0039)     PRN Meds: sodium chloride flush, 10 mL, PRN  sodium chloride, 25 mL, PRN  polyethylene glycol, 17 g, Daily PRN  acetaminophen, 650 mg, Q6H PRN    Or  acetaminophen, 650 mg, Q6H PRN  ondansetron, 4 mg, Q6H PRN  cholestyramine light, 4 g, BID PRN  glucose, 15 g, PRN  dextrose, 12.5 g, PRN  glucagon (rDNA), 1 mg, PRN  dextrose, 100 mL/hr, PRN      Electronically signed by Marianne Hamilton MD on 4/15/2021 at 10:37 AM

## 2021-04-15 NOTE — PROGRESS NOTES
Progress Note( Dr. Tram Christensen)    Subjective:   Admit Date: 4/4/2021  PCP: Brock Landrum MD    Admitted For : Confusion     Consulted For: Better control of blood glucose    Interval History: Patient is very confused and disoriented     Patient had following procedure done yesterday 4/8/2021 Dr. Reece Barker    ERCP with balloon dilation of the papilla, and  removal of multiple common bile duct stones along with sludge. This is 1 of several procedures ERCP done before her bile duct stone    Denies any chest pains,   Denies SOB . Denies nausea or vomiting. Now eating better     no new bowel or bladder symptoms.        Intake/Output Summary (Last 24 hours) at 4/15/2021 0650  Last data filed at 4/15/2021 0548  Gross per 24 hour   Intake 480 ml   Output 550 ml   Net -70 ml       DATA    CBC:   Recent Labs     04/12/21  0807 04/13/21  0845   WBC 7.1 7.6   HGB 12.3* 12.2*    259    CMP:  Recent Labs     04/12/21  0807 04/13/21  0845    136   K 3.7 3.8    102   CO2 26 26   BUN 17 20   CREATININE 1.1 1.1   CALCIUM 9.4 9.0     Lipids:   Lab Results   Component Value Date    CHOL 144 11/12/2018    HDL 62 03/15/2019    TRIG 101 11/12/2018     Glucose:  Recent Labs     04/14/21  1603 04/14/21  2046 04/15/21  0217   POCGLU 170* 151* 108*     [id number]:  Lab Results   Component Value Date    LABA1C 7.9 12/01/2020     High Sensitivity TSH:   Lab Results   Component Value Date    TSHHS 0.777 12/03/2020     Free T3:   Lab Results   Component Value Date    FT3 2.7 10/21/2015     Free T4:  Lab Results   Component Value Date    T4FREE 1.26 11/05/2018          Scheduled Medicines   Medications:    pantoprazole  40 mg Oral QAM AC    enoxaparin  40 mg Subcutaneous Daily    nitrofurantoin (macrocrystal-monohydrate)  100 mg Oral 2 times per day    insulin lispro  0-6 Units Subcutaneous 2 times per day    insulin lispro  0-6 Units Subcutaneous TID WC    lisinopril  5 mg Oral Daily    sodium chloride flush  10 mL Intravenous 2 times per day    aspirin  81 mg Oral Daily    atorvastatin  40 mg Oral Daily    carvedilol  6.25 mg Oral BID WC    dilTIAZem  60 mg Oral BID    gabapentin  100 mg Oral Nightly    latanoprost  1 drop Both Eyes Nightly    sodium chloride flush  10 mL Intravenous BID      Infusions:    sodium chloride      dextrose Stopped (04/06/21 0039)         Objective:   Vitals: BP (!) 144/60   Pulse 59   Temp 98.2 °F (36.8 °C) (Oral)   Resp 16   Ht 5' 8\" (1.727 m)   Wt 124 lb (56.2 kg)   SpO2 97%   BMI 18.85 kg/m²   General appearance: alert and cooperative with exam  Neck: no JVD or bruit  Thyroid : Normal lobes   Lungs: Has Vesicular Breath sounds   Heart:  regular rate and rhythm  Abdomen: soft, non-tender; bowel sounds normal; no masses,  no organomegaly  Musculoskeletal: Normal  Extremities: extremities normal, , no edema bilateral below-knee amputation  Neurologic:  Awake, alert, oriented to name, place and time. Cra paresthesia s intact. ,  and gait is abnormal.  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)     Choledocholithiasis     Generalized abdominal pain     COPD (chronic obstructive pulmonary disease) (HCC)     Abdominal pain, epigastric     Elevated LFTs     Abnormal findings on imaging of biliary tract     Chest pain     Coronary artery disease involving coronary bypass graft of native heart without angina pectoris     Acute encephalopathy     Vomiting     Sepsis (HCC)     Nausea vomiting and diarrhea     Fever     Metabolic encephalopathy     Impaired cognition     Generalized weakness     Cholangitis due to bile duct calculus with obstruction     Oropharyngeal dysphagia     S/P BKA (below knee amputation) bilateral (HCC)     Abdominal pain     Inguinal pain     Closed fracture of left hip (HCC)     Acute alteration in mental status      Plan:     1. Reviewed POC blood glucose . Labs and X ray results   2. Reviewed Current Medicines   3. Discontinued 10 IV as blood glucose is stable  4. Patient is maintaining blood glucose at a reasonable and acceptable level  5. On Correction bolus Humalog  Insulin regime    6. Discontinued Lantus insulin at bedtime  7. Monitor Blood glucose frequently   8. Modified  the dose of Insulin/ other medicines as needed   9. Will follow     .      Dee Shen MD

## 2021-04-15 NOTE — DISCHARGE SUMMARY
Consults this admission:  IP CONSULT TO CASE MANAGEMENT  IP CONSULT TO HOSPITALIST  IP CONSULT TO GI  IP CONSULT TO ENDOCRINOLOGY    Discharge Instruction:   Follow-up with PCP    Diet:  regular diet   Activity: activity as tolerated  Disposition: Discharged to:   []Home, []HHC, []SNF, [x]Acute Rehab, []Hospice  Condition on discharge: Stable    Discharge Medications:      Henok Shipman   Home Medication Instructions ANDRIY:[id number]    Printed on:04/15/21 1111   Medication Information                      aspirin 81 MG chewable tablet  Take 81 mg by mouth daily              atorvastatin (LIPITOR) 40 MG tablet  Take 40 mg by mouth daily             carvedilol (COREG) 6.25 MG tablet  Take 1 tablet by mouth 2 times daily (with meals)             Cholecalciferol 50 MCG (2000 UT) TABS  Take one tablet daily by mouth             cholestyramine light 4 g packet  Take 1 packet by mouth 2 times daily as needed (diarrhea)             cilostazol (PLETAL) 100 MG tablet  TAKE 1 TABLET BY MOUTH TWO  TIMES A DAY 1/2 HOUR BEFORE OR 2 HOURS AFTER BREAKFAST  AND DINNER             clopidogrel (PLAVIX) 75 MG tablet  Take 1 tablet by mouth daily             clotrimazole-betamethasone (LOTRISONE) 1-0.05 % cream               coenzyme Q10 100 MG CAPS capsule  As Directed             diltiazem (CARDIZEM) 60 MG tablet  Take 1 tablet by mouth 2 times daily             docusate sodium (COLACE) 100 MG capsule  Take 100 mg by mouth nightly as needed              gabapentin (NEURONTIN) 100 MG capsule  Take 100 mg by mouth nightly.              glimepiride (AMARYL) 4 MG tablet  Take by mouth             Insulin Detemir (LEVEMIR FLEXTOUCH SC)  Inject 10 Units into the skin nightly             insulin lispro (HUMALOG) 100 UNIT/ML injection vial  Inject 0-12 Units into the skin 3 times daily (with meals) **Medium Dose Corrective Algorithm**  Glucose: Dose:  If <139             No Insulin  140-199 2 Units  200-249 4 Units  250-299 6 Units  300-349 8 Units  350-400 10 Units  Above 400       12 Units             latanoprost (XALATAN) 0.005 % ophthalmic solution  Place 1 drop into both eyes nightly              lisinopril (PRINIVIL;ZESTRIL) 5 MG tablet  Take 1 tablet by mouth daily             Multiple Vitamins-Minerals (MULTIVITAMIN PO)  Take 1 tablet by mouth daily. omeprazole (PRILOSEC) 20 MG delayed release capsule  Take 1 capsule by mouth daily             ondansetron (ZOFRAN) 4 MG tablet  Take 4 mg by mouth 2 times daily as needed for Nausea or Vomiting             testosterone cypionate (DEPOTESTOTERONE CYPIONATE) 200 MG/ML injection  Inject into the muscle. Objective Findings at Discharge:   /60   Pulse 61   Temp 97.8 °F (36.6 °C) (Oral)   Resp 16   Ht 5' 8\" (1.727 m)   Wt 124 lb (56.2 kg)   SpO2 94%   BMI 18.85 kg/m²            PHYSICAL EXAM   GEN: Awake male, laying flat in bed. Pleasant. Does not appear to be in distress. EYES: No discharge. Ocular muscles intact. HENT:  mucous membranes moist.  No nasal discharge. NECK: Supple  RESP: Symmetric breath sounds. Accessory muscle use. Symmetric chest expansion. CV: RRR. No pitting lower extremity edema. No murmur heard. GI: Abdomen soft. Nontender. Nondistended. : No Renee in place. MSK: No bony fractures. S/p bilateral BKA.   SKIN: warm, dry, no rashes, no pressure ulcer  NEURO: Cranial nerves appear grossly intact, normal speech  PSYCH: Awake, alert, oriented  BMP/CBC  Recent Labs     04/13/21  0845      K 3.8      CO2 26   BUN 20   CREATININE 1.1   WBC 7.6   HCT 39.5*          IMAGING:  All imaging reviewed before discharge    Discharge Time of 28 minutes    Electronically signed by Pattie Mckenzie MD on 4/15/2021 at 11:11 AM

## 2021-04-15 NOTE — CARE COORDINATION
F/u with Pt spouse to discuss discharge plan. LSW explained that Pt original Chad Smart was denied by the insurance and a expedited appeal was initiated. If the appeal is denied what is the discharge plan. Pt spouse asked if the Pt is able to return home. LSW reviewed the last therapy note. Pt walked 35 + 35 + 20 feet. Pt spouse stated that she got a call from the insurance stating that something was approved. She was wondering if that was the approval for ARU. Call to [facility]IL[facility]. LSW left a message.

## 2021-04-15 NOTE — PLAN OF CARE
ARU Interdisciplinary Plan of Care (IPOC)  [facility] Dr. Elliott Kwan [facility], [address]  [phone]  Fax: [phone]        Salvador Peña    : 1940  Acct #: [de-identified]  MRN: [id number]   PHYSICIAN:  Ada Frank MD  Primary Active Problems:   Active Hospital Problems    Diagnosis Date Noted    Metabolic encephalopathy [K58.35] 2020       Rehabilitation Diagnosis:     Metabolic encephalopathy [K47.07]       ADMIT DATE:4/15/2021   CARE PLAN     NURSING:  Salvador Peña while on this unit will:      Bowel and Bladder   [] Be continent of bowel and bladder      [] Have an adequate number of bowel movements   [] Urinate with no urinary retention >300ml in bladder   [] Bladder Scan: (details)   [] Complete bladder protocol with hart removal   [] Initiate Bladder Program to toilet every ___ hours   [] Initiate Bowel Program to toilet every ___hours   [] Bladder training    [] Bowel training  Pulmonary   [x] Maintain O2 SATs at 92% or greater  Pain Management   [x] Have pain managed while on ARU        [] Be pain free by discharge    [] Medication Management and Education  Maintenance of Skin Integrity/Wound Management   [] Have no skin breakdown while on ARU   [] Have improved skin integrity via wound measurements   [] Have no signs/symptoms of infection via infection protection and monitoring at the          wound site  Fall Prevention   [] Be free from injury during hospitalization via fall prevention measures     [] Disease management and Education  Precautions   [] Weight Bearing Precautions   [] Swallowing Precautions   [] Monitoring of Risks of Complications   [] DVT Prophylaxis    [] Fluid/electrolyte/Nutrition Management    [] Complete education with patient/family with understanding demonstrated for          in-room safety with transfers to bed, toilet, wheelchair, shower as well as                bathroom activities and hygiene. [] Adjustment   [] Other:   Nursing interventions may include bowel/bladder training, education for medical assistive devices, medication education, O2 saturation management, energy conservation, stress management techniques, fall prevention, alarms protocol, seating and positioning, skin/wound care, pressure relief instruction,dressing changes,  infection protection, DVT prophylaxis, and/or assistance with in room safety with transfers to bed, toilet, wheelchair, shower as well as bathroom activities and hygiene. Patient/caregiver education for:   [] Disease/sustained injury/management      [] Medication Use   [] Surgical intervention   [] Safety/Precautions   [] Body mechanics and or joint protection   [] Health maintenance         PHYSICAL THERAPY:  Goals:                               Long term goals  Time Frame for Long term goals : 7 days STG=LTG  Long term goal 1: Pt will perform all bed mobility with   independence  Long term goal 2: Pt will use 2ww and B prostheses  to perform sit to stand, pivot and car transfers with mod I  Long term goal 3: Pt will ambulate 50 including 2 turns on level surfaces with  mod I  assist  using 2ww and B prostheses  Long term goal 4: Pt will ambulate 150' on level surfaces and 20' on unlevel surfaces with supervision using 2ww and B prostheses  Long term goal 5: Pt will ascend/descend curb step with 2ww and up to   4  steps with  rail(s) with  supervision  assist  Long term goal 6: Pt will retrieve light item from floor with  supervision  assist  using 2ww and B prostheses  Long term goal 7: Pt will propel w/c in household situation for at least 150    feet with mod I  These goals were reviewed with this patient at the time of assessment and Franchesca Mendes is in agreement. Plan of Care: Pt to be seen 5 days per week for a minimum of 60 minutes for 7 days.                 Current Treatment Recommendations: Strengthening, Balance Training, Functional Mobility Training, Transfer Training, Gait Training, Patient/Caregiver Education & Training, IADL Training, Stair training, Neuromuscular Re-education, Equipment Evaluation, Education, & procurement, Home Exercise Program, Positioning, Endurance Training, Safety Education & Training, Cognitive/Perceptual Training, Wheelchair Mobility Training community reintegration,animal assisted therapy, and concurrent/group therapy.     PT IRF-ELIZABETH scores and goals for initial assessment:   Bed Mobility:   Sit to Lying  Assistance Needed: Independent  CARE Score: 6  Discharge Goal: Independent  Roll Left and Right  Assistance Needed: Independent  CARE Score: 6  Discharge Goal: Independent  Lying to Sitting on Side of Bed  Assistance Needed: Independent  CARE Score: 6  Discharge Goal: Independent    Transfers:    Sit to Stand  Assistance Needed: Supervision or touching assistance  Comment: CG from most surfaces  CARE Score: 4  Discharge Goal: Independent  Chair/Bed-to-Chair Transfer  Assistance Needed: Supervision or touching assistance  Comment: CG with 2ww  CARE Score: 4  Discharge Goal: Independent  Toilet Transfer  Assistance Needed: Supervision or touching assistance  Comment: supervision with grab bar  CARE Score: 4  Car Transfer  Assistance Needed: Partial/moderate assistance  Comment: min assist for LEs into car  CARE Score: 3  Discharge Goal: Independent    Ambulation:    Walking Ability  Does the Patient Walk?: Yes     Walk 10 Feet  Assistance Needed: Supervision or touching assistance  Comment: CG with 2ww and B prostheses  CARE Score: 4  Discharge Goal: Independent     Walk 50 Feet with Two Turns  Assistance Needed: Supervision or touching assistance  Comment: CG 2WW and B prostheses  CARE Score: 4  Discharge Goal: Independent     Walk 150 Feet  Comment: 108' max distance  Reason if not Attempted: Not attempted due to medical condition or safety concerns  CARE Score: 88  Discharge Goal: Supervision or touching assistance     Walking 10 Feet on Uneven Surfaces  Assistance Needed: Supervision or touching assistance  Comment: CG with 2ww and B prostheses  CARE Score: 4  Discharge Goal: Supervision or touching assistance     1 Step (Curb)  Assistance Needed: Partial/moderate assistance  Comment: mod assist 6\" step with 2ww and B prostheses  CARE Score: 3  Discharge Goal: Supervision or touching assistance     4 Steps  Comment: pt too fatigued  Reason if not Attempted: Not attempted due to medical condition or safety concerns  CARE Score: 88  Discharge Goal: Supervision or touching assistance     12 Steps  Reason if not Attempted: Not applicable  CARE Score: 9  Discharge Goal: Not Applicable         Wheelchair:  w/c Ability: Wheelchair Ability  Uses a Wheelchair and/or Scooter?: Yes  Wheel 50 Feet with Two Turns  Assistance Needed: Supervision or touching assistance  Comment: BUE  CARE Score: 4  Discharge Goal: Independent  Wheel 150 Feet  Assistance Needed: Partial/moderate assistance  Comment: 90' max distance  CARE Score: 3  Discharge Goal: Independent          Balance:        Object: Picking Up Object  Assistance Needed: Partial/moderate assistance  Comment: min assist with 2ww and B prostheses  CARE Score: 3  Discharge Goal: Supervision or touching assistance  OCCUPATIONAL THERAPY:  Goals:             Short term goals  Time Frame for Short term goals: STGs=LTGs :  Long term goals  Time Frame for Long term goals : 7-10 days or until d/c  Long term goal 1: Pt will complete grooming tasks Ind  Long term goal 2: Pt will complete total body bathing c supervision  Long term goal 3: Pt will complete UB dressing Ind  Long term goal 4: Pt will complete LB dressing mod I  Long term goal 5: Pt will complete toileting mod I  Long term goals 6: Pt will complete functional transfers (bed, chair, toilet) c RW and mod I; shower transfer c S  Long term goal 7: Pt will perform therex/therax to facilitate increased strength/endurance (c emphasis on dynamic standing balance/endurance >8 mins, BUE endurance) c SBA :    These goals were reviewed with this patient at the time of assessment and Sixto Rehman is in agreement    Plan of Care:  Pt to be seen 5 days per week for a minimum of 60 minutes for 7 days. Plan  Current Treatment Recommendations: Strengthening, ROM, Endurance Training, Functional Mobility Training, Patient/Caregiver Education & Training, Positioning, Home Management Training, Equipment Evaluation, Education, & procurement, Cognitive/Perceptual Training, Balance Training, Safety Education & Training, Self-Care / ADL         cognitive training, home management, energy conservation training, community reintegration, splint fabrication, patient/caregiver education and training, animal assisted therapy, and concurrent and/or group therapy.       OT IRF-ELIZABETH scores and goals for initial assessment:    ADLs:    Eating: Eating  Assistance Needed: Independent  Comment: occasionally requires increased time but able to open packages/containers  CARE Score: 6  Discharge Goal: Independent       Oral Hygiene: Oral Hygiene  Assistance Needed: Setup or clean-up assistance  Comment: seated to clean dentures  CARE Score: 5  Discharge Goal: Independent    UB/LB Bathing: Shower/Bathe Self  Assistance Needed: Supervision or touching assistance  Comment: performed sponge bath at EOB; performed lateral leans seated to bathe perineal area  CARE Score: 4  Discharge Goal: Supervision or touching assistance    UB Dressing: Upper Body Dressing  Assistance Needed: Supervision or touching assistance  Comment: to don pullover T shirt at EOB  CARE Score: 4  Discharge Goal: Independent         LB Dressing: Lower Body Dressing  Assistance Needed: Partial/moderate assistance  Comment: with increased time able to don sleeves and B prosthesis; required min A to manage pant legs over prosthesis and ranged from CGA-min A for balance while in stance  CARE Score: 3  Discharge Goal: Independent    Donning and [place] Footwear: Putting On/Taking Off Footwear  Comment: h/o B BKA, does not perform at baseline  Reason if not Attempted: Not applicable  CARE Score: 9  Discharge Goal: Not Applicable      Toileting: Toileting Hygiene  Assistance Needed: Supervision or touching assistance  Comment: per PT report pt able to perform OOB with CGA  CARE Score: 4  Discharge Goal: Independent      Toilet Transfers: Toilet Transfer  Assistance Needed: Supervision or touching assistance  Comment: supervision with grab bar  CARE Score: 4  Discharge Goal: Independent      SPEECH THERAPY: (If ordered)  Plan of Care and Goals:   LTG                         Short-term Goals  Timeframe for Short-term Goals: No ST recommended                      Timeframe for Short-term Goals: No ST recommended     LTG:                           Treatments may include speech/language/communication therapy, cognitive training, animal assisted therapy, group therapy, education, and/or dysphagia therapy based on the above goals. Co-treats where appropriate with PT or OT to facilitate patient goals in functional tasks. These goals were reviewed with this patient at the time of assessment and Stan Carias is in agreement. CASE MANAGEMENT:  Goals:   Assist patient/family with discharge planning, patient/family counseling, assistance in obtaining recommended equipment and other services, and coordination with insurance during ARU stay. Patient Goals: Return to maximum level of independent function. Nutrition goal: Patient will consume at least 50-75% at meals during stay       Activities Prior to Admit:   Homemaking Responsibilities: No  Active : No     Occupation: Retired  Leisure & Hobbies: Doctors visits, groceries are delivered, social functions on campus--pt uses scooter, Wife manages meds.  cat         Intensity of Therapy  Stan Carias will be seen a minimum of 3 hours of therapy per day/a minimum of 5 out of 7 days per week. [] In this rare instance due to the nature of this patient's medical involvement, this patient will be seen 15 hours per week (900 minutes within a 7 day period). Treatments may include therapeutic exercises, gait training, neuromuscular re-ed, transfer training, community reintegration, bed mobility, w/c mobility and training, self care, home mgmt, cognitive training, energy conservation,dysphagia tx, speech/language/communication therapy, group therapy, and patient/family education. In addition, dietician/nutritionist may monitor calorie count as well as intake and collaboratively work with SLP on dietary upgrades. Neuropsychology/Psychology may evaluate and provide necessary support. Group therapy as appropriate to facilitate improved endurance, STR, COORD, function, safety, transfers, awareness and insight into deficits, problem solving, memory, and social interaction and engagement. Medical issues being managed closely and that require 24 hour availability of a physician:   [] Swallowing Precautions                                     [] Weight bearing precautions   [x] Wound Care                             [x] Infection Prevention   [x] DVT Prophylaxis/assessment              [x] Monitoring for complications    [x] Fall Precautions/Prevention                         [x] Fluid/Electrolyte/Nutrition Balance   [] Voice Protection                           [x] Medication Management   [x] Respiratory                   [x] Pain Mgmt   [x] Bowel/Bladder Fx    Medical Prognosis: [] Good  [x] Fair    [] Guarded   Total expected IRF days 15                                            Physician anticipated functional outcomes:  FWW and WC with [facility] OT/PT and supervision.   Rehab Goals:   [] Return to premorbid function of_______________________________.    [] Independent   [] Mod I  [x] Supervision  [] CGA   [] Min A   [] Mod A  Level for ambulation steroids. 4. Uncontrolled diabetes type 2 with hyperglycemia: Patient requires a diet modified for carbohydrates. Until his intake stabilizes, he is on Humalog sliding scale only. Blood sugars are checked at mealtime and bedtime. 5. Urinary tract infection: Patient has completed a course of Macrobid and we will monitor him for signs of recurrence of UTI. Encouraging oral hydration. 6. Chronic systolic congestive heart failure: No added salt at the table. Coreg for afterload reduction. Antiplatelet therapy and a statin for his heart disease. Lisinopril. Monitoring his weights daily. 7. Acute cholangitis: Ivins diet. Hydration. He has completed antibiotic therapy. 8. Essential hypertension: Coreg, Cardizem and lisinopril are used to control systolic blood pressure. Target systolic blood pressure is 120-140. Vital signs are checked at rest and with activity. Encouraging oral hydration. 9. Bilateral below-knee amputation: Patient has his bilateral prostheses here in his room. Will work on maintaining range of motion of the knee so he can safely  his devices. Monitoring his stumps for pressure injury. Pituitary macroadenoma: Outpatient follow-up with serial imaging per the pre-existing schedule. Monitoring him for neurologic changes and seizures. Anticipated discharge destination:    [] Home Independently   [x] Home with supervision    []SNF     [] Other       This plan has been reviewed with me in a language I understand.  I have had the opportunity to include my input with my therapy team.    ________________________________________________   ______________________  Patient/Significant Other      Date    I have reviewed this initial plan of care and agree with its contents:    Title   Name    Date    Time    Physician: Dima Marshall 4/17/2021 8:41 AM    Case Mgmt: Galen Patel MSW, LSW 04/16/21 1200    OT: Tomas Barth MS, OTR/L 04/16/2021 1606    PT: Therese Campo PT 4/16/21 1529    RN: Arianne Villarreal. Nic LOPEZ 4/15/2021 1614    ST: [address]  [facility], [address] [facility], [address] [place] [address] 4/16/2021  4:48 PM    Dietician: Sherry Puri RD, [place]  04/16/2021   15:12

## 2021-04-16 LAB
EKG ATRIAL RATE: 81 BPM
EKG DIAGNOSIS: NORMAL
EKG P AXIS: 28 DEGREES
EKG P-R INTERVAL: 152 MS
EKG Q-T INTERVAL: 436 MS
EKG QRS DURATION: 86 MS
EKG QTC CALCULATION (BAZETT): 506 MS
EKG R AXIS: -28 DEGREES
EKG T AXIS: 9 DEGREES
EKG VENTRICULAR RATE: 81 BPM
GLUCOSE BLD-MCNC: 101 MG/DL (ref 70–99)
GLUCOSE BLD-MCNC: 134 MG/DL (ref 70–99)
GLUCOSE BLD-MCNC: 164 MG/DL (ref 70–99)
GLUCOSE BLD-MCNC: 207 MG/DL (ref 70–99)
GLUCOSE BLD-MCNC: 215 MG/DL (ref 70–99)

## 2021-04-16 PROCEDURE — 97535 SELF CARE MNGMENT TRAINING: CPT

## 2021-04-16 PROCEDURE — 97162 PT EVAL MOD COMPLEX 30 MIN: CPT

## 2021-04-16 PROCEDURE — 97530 THERAPEUTIC ACTIVITIES: CPT

## 2021-04-16 PROCEDURE — 92523 SPEECH SOUND LANG COMPREHEN: CPT

## 2021-04-16 PROCEDURE — 82962 GLUCOSE BLOOD TEST: CPT

## 2021-04-16 PROCEDURE — 99232 SBSQ HOSP IP/OBS MODERATE 35: CPT | Performed by: PHYSICAL MEDICINE & REHABILITATION

## 2021-04-16 PROCEDURE — 97116 GAIT TRAINING THERAPY: CPT

## 2021-04-16 PROCEDURE — 6370000000 HC RX 637 (ALT 250 FOR IP): Performed by: INTERNAL MEDICINE

## 2021-04-16 PROCEDURE — 6360000002 HC RX W HCPCS: Performed by: PHYSICAL MEDICINE & REHABILITATION

## 2021-04-16 PROCEDURE — 6370000000 HC RX 637 (ALT 250 FOR IP): Performed by: PHYSICAL MEDICINE & REHABILITATION

## 2021-04-16 PROCEDURE — 1280000000 HC REHAB R&B

## 2021-04-16 PROCEDURE — 97166 OT EVAL MOD COMPLEX 45 MIN: CPT

## 2021-04-16 RX ORDER — LOPERAMIDE HYDROCHLORIDE 2 MG/1
2 CAPSULE ORAL 4 TIMES DAILY PRN
Status: DISCONTINUED | OUTPATIENT
Start: 2021-04-16 | End: 2021-04-22 | Stop reason: HOSPADM

## 2021-04-16 RX ADMIN — ATORVASTATIN CALCIUM 40 MG: 40 TABLET, FILM COATED ORAL at 09:53

## 2021-04-16 RX ADMIN — LOPERAMIDE HYDROCHLORIDE 2 MG: 2 CAPSULE ORAL at 12:02

## 2021-04-16 RX ADMIN — PANTOPRAZOLE SODIUM 40 MG: 40 TABLET, DELAYED RELEASE ORAL at 06:45

## 2021-04-16 RX ADMIN — INSULIN LISPRO 2 UNITS: 100 INJECTION, SOLUTION INTRAVENOUS; SUBCUTANEOUS at 13:18

## 2021-04-16 RX ADMIN — GABAPENTIN 100 MG: 100 CAPSULE ORAL at 20:26

## 2021-04-16 RX ADMIN — INSULIN LISPRO 1 UNITS: 100 INJECTION, SOLUTION INTRAVENOUS; SUBCUTANEOUS at 09:59

## 2021-04-16 RX ADMIN — ASPIRIN 81 MG CHEWABLE TABLET 81 MG: 81 TABLET CHEWABLE at 09:53

## 2021-04-16 RX ADMIN — LISINOPRIL 5 MG: 5 TABLET ORAL at 09:53

## 2021-04-16 RX ADMIN — DILTIAZEM HYDROCHLORIDE 60 MG: 60 TABLET, FILM COATED ORAL at 09:53

## 2021-04-16 RX ADMIN — CARVEDILOL 6.25 MG: 6.25 TABLET, FILM COATED ORAL at 17:53

## 2021-04-16 RX ADMIN — INSULIN LISPRO 2 UNITS: 100 INJECTION, SOLUTION INTRAVENOUS; SUBCUTANEOUS at 17:55

## 2021-04-16 RX ADMIN — ACETAMINOPHEN 650 MG: 325 TABLET ORAL at 06:44

## 2021-04-16 RX ADMIN — ENOXAPARIN SODIUM 40 MG: 40 INJECTION SUBCUTANEOUS at 09:53

## 2021-04-16 RX ADMIN — DILTIAZEM HYDROCHLORIDE 60 MG: 60 TABLET, FILM COATED ORAL at 20:26

## 2021-04-16 RX ADMIN — CHOLESTYRAMINE 4 G: 4 POWDER, FOR SUSPENSION ORAL at 15:47

## 2021-04-16 RX ADMIN — LATANOPROST 1 DROP: 50 SOLUTION/ DROPS OPHTHALMIC at 20:26

## 2021-04-16 RX ADMIN — CARVEDILOL 6.25 MG: 6.25 TABLET, FILM COATED ORAL at 09:58

## 2021-04-16 ASSESSMENT — PAIN SCALES - GENERAL
PAINLEVEL_OUTOF10: 0
PAINLEVEL_OUTOF10: 3
PAINLEVEL_OUTOF10: 3

## 2021-04-16 NOTE — CARE COORDINATION
performed by Louise Smart DO at [address] REPLACEMENT      LEG AMPUTATION BELOW KNEE Bilateral     2005 at [address] [facility]  03/13/14    sphincterotomy     Allergies   Allergen Reactions    Byetta 10 Mcg Pen [Exenatide]     Sulbactam     Ampicillin Rash    Aricept [Donepezil Hydrochloride] Other (See Comments)     Hallucinations, confusion    Benztropine Other (See Comments)     Hallucinations, confusion    Celebrex [Celecoxib] Other (See Comments)     'causes him to be nervous and jittery\"    Diphenhydramine Hcl Other (See Comments)     nervous and jittery    Diphenhydramine Hcl [Diphenhydramine] Anxiety    Exenatide Other (See Comments)     Causes him to be nervous and jittery    Metoprolol Succinate Other (See Comments)     Hallucinations     Phenergan [Promethazine Hcl] Other (See Comments)     Hallucinations, nervous, jittery    Plavix [Clopidogrel Bisulfate] Other (See Comments)     Causes bleeding in his stomach    Pseudoephedrine Anxiety    Reglan [Metoclopramide Hcl] Other (See Comments)     \"Caused him to just sit in a chair and rock back and forth\"     Precautions: falls    Date of Admit: 4/15/2021  Room #: 1019/1019-A      Current functional status at time of admit:        Home Living/DME Available:      Type of Home: Assisted living([facility])  Home Access: Level entry  Bathroom Shower/Tub: Tub/Shower unit  [employer]: Standard(+ toilet riser)  Bathroom Equipment: Grab bars in shower, Tub transfer bench, Toilet raiser  Home Equipment: Rolling walker, Wheelchair-manual, Electric scooter(bilateral lower extremity prosthetics (bilateral BKA); also owns transport chair)       IADL Hx:   Homemaking Responsibilities: No  Active : No     Occupation: Retired  Leisure & Hobbies: Doctors visits, groceries are delivered, social functions on campus--pt uses scooter, Wife manages meds       Spouse:  Wife Ronnie Reddy,  64 years. Family: 4 grown children, [employer] Staff    Comments: LSW met with patient for admission assessment. Patient lives with wife Serenity Murillo at [address] [place] [address]. There are 0 steps to enter and no steps inside. Patient reports walk-in shower for bathing and bedroom and bathroom are on the main level. Patient has PCP, was mostly independent in ADLs PTA, and uses [facility] in [facility] for prescriptions. Patient states he has a rolling walker and bathroom equipment at home and no pre-existing HHC (past [facility] provided by [facility]). Patient reports access to food, utilities, and shelter and feels safe in his environment. BIMS completed in initial assessment. Plan is to D/C back to [address] [place] [address], with [facility] and DME as recommended by therapy staff. F/U to be set with Dr. Douglas Pond ([phone]) and family will transport home.     Zac Church, 4/16/2021, 2:51 PM

## 2021-04-16 NOTE — PROGRESS NOTES
Facility/Department: [facility]  Initial Speech/Language/Cognitive Assessment    NAME: Kvng Ford  : 1940   MRN: [id number]  ADMISSION DATE: 4/15/2021  ADMITTING DIAGNOSIS: has Uncontrolled type II diabetes with peripheral autonomic neuropathy ([address] Hypertension, essential; 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[address] Choledocholithiasis; Generalized abdominal pain; COPD (chronic obstructive pulmonary disease) ([address] Abdominal pain, epigastric; Elevated LFTs; Abnormal findings on imaging of biliary tract; Chest pain; Coronary artery disease involving coronary bypass graft of native heart without angina pectoris; Acute encephalopathy; Vomiting; Sepsis ([facility] Nausea vomiting and diarrhea; Fever; Metabolic encephalopathy; Impaired cognition; Generalized weakness; Cholangitis due to bile duct calculus with obstruction; Oropharyngeal dysphagia; S/P BKA (below knee amputation) bilateral ([facility] Abdominal pain; Inguinal pain; Closed fracture of left hip ([address] and Acute alteration in mental status on their problem list.  DATE ONSET: 21    Date of Eval: 2021   Evaluating Therapist: KRISTEN Gorman    RECENT RESULTS  CT OF HEAD/MRI:  Radiology Findings   CT of head: Impression 1. No acute intracranial abnormality. 2. Grossly unchanged large sellar/parasellar mass  Chest Xray: Impression No acute process. Consults    Primary Complaint: No complaints voiced. Pt stated, \"I feel like I'm doing much better than I was. \"    Pain:  Pain Assessment \"0\"      Assessment:  Kvng Ford is a 79 y.o. male with coronary artery disease s/p CABG, peripheral arterial disease, diabetes mellitus type 2, hypertension, COPD, BLE BKA (prosthesis), dementia, recent admission for left hip fracture, currently at [facility] was transferred to ER for worsening confusion. Patient unable to provide any information. He knows his date of birth, does not know the year, month, date.  Patient admits to having abdominal pain on review of systems. Denied any other complaints-no headache, visual disturbance, admits to having nausea, vomiting, denied any chest pain, shortness of breath. Initial work-up for altered mental status was negative. While in the ER patient started having nausea, vomiting, also noted to be hypoxic. CTA chest, CT abdomen/pelvis-no acute process. Rapid Covid negative. Per patients spouse, patient was sleeping more than usual, his BP was high and his blood sugar was low in 60's, which prompted today's ER visit. According to patient's wife-he usually ambulates with the help of a walker. Upon arrival to ED patient was noted to have /82, HR 78, RR 16, temperature 97.9, saturating 96% on room air. Later patient's oxygen saturation was down to 85% on room air. Currently needing 3 L of oxygen to maintain oxygen saturation around 93%. Labs significant for sodium 133, random glucose 81, LFTs within normal limits, hemoglobin 12.3, platelets 221. UA-small ketones, leukocyte esterase small, WBC 26, bacteria rare. CT head-grossly unchanged large sellar/parasellar mass. CT abdomen/pelvis-pneumobilia without significant change, dilated common bile duct and pancreatic duct without significant change since prior examination-July 2, 2020. Patient received IV Zofran, ceftriaxone in ER. Patient dx with metabolic encephalopathy 2/2 UTI. Patient also being medically managed for ARF with hypoxia, dysphagia, and hypoglycemia. Patient lives with spouse in [address] [place] [address] at [facility] and is IND with ADL's and Gisela with BLE prostheses and RW. Currently patient is min-modA with ADL's and min-modA with RW for transfers and ambulation. Per patient and spouse goal is for patient to return home with spouse to [place]. COVID negative test on 4/5. Admitted to ARU with Metabolic  Encephalopathy.    Medical/Surgical History  coronary artery disease s/p CABG, peripheral arterial disease, diabetes mellitus type 2, hypertension, history of renal Orientation  Overall Orientation Status: Within Functional Limits  Attention  Attention: Within Functional Limits  Memory  Memory: Exceptions to [facility]  Working Memory: Mild    Additional Assessments: The Brief Cognitive Assessment Tool (BCAT®) was administered 4/16/2021 to assess the following cognitive domains: orientation, verbal recall, visual recognition, visual recall, attention, abstraction, language, executive functions, and visuo-spatial processing. The emphasis of the tool is assessing contextual memory, executive functions, and attentional capacity. Cognitive Impairment Scale:  NORMAL:    46-50   NO FUNCTIONAL DEFICIT; INDEPENDENT LIVING; MAY BE  SUBJECTIVE MEMORY COMPLAINTS BUT LITTLE TO NO EVIDENCE  MILD COGNITIVE  IMPAIRMENT; 34-46   GENERALLY FUNCTIONAL WITH EARLY SPECIFIC FX DECLINE (IADL)   LOWER SCORES MORE SUGGESTIVE OF ADDITIONAL SUPPORT NEEDS   BOTTOM RANGE SCORES CONSIDER MED MGMT AND SUPPORT FOR COMMUNITY REINTEGRATION    MILD DEMENTIA 26-34   IADL DEFICITS; CLEARLY OBJECTIVE EVIDENCE OF MEMORY AND OTHER COGNITIVE DECLINE; MED MGMT AND COMMUNITY REINTEGRATION SUPPORT NEEDED    MODERATE TO   SEVERE DEMENTIA 0-25   MODERATE (UPPER END OF RANGE)--PERVASIVE FX DEFICITS (IADLS) BUT ADLS GENERALLY INTACT   MARKED DEFICITS IN MEMORY AND EXECUTIVE FX; BEHAVIORAL AND PSYCH SYMPTOMS ARE COMMON      Dementia Impairment Scale:  Mild Cognitive Impairment  38  Mild Dementia  28  Moderate Dementia  18      BCAT score for Samira Spray: 38/50 indicating mild cognitive impairment. Strengths:  Ability to put names to objects  Ability to concentrate and focus  Determine how objects are similar to one another  Understand and express speech  Understand visual processes and relationships  Awareness of self, time, place, and situation  Weaknesses:  \"Command and control\" cognitive activities  Ability to immediately recall a word list of 4 items: banana/justice/Ramonita/bridge.   Pt able to complete immediate recall 4/4 and delayed recall 1/4  Improves with cues  Recall previously presented words over a time delay and recall elements of a story and previously presented pictures/story  Story specifics included:  [place] [place] / borrowed / $9 / from her brother / Jeb Wick / last week. / She couldn't pay him back / because she bought /a delicious / ice cream cone / at the circus instead. Pts immediate response was 6/11 and delayed response: 6/11  Strategies that improved performance included:   [x] repetition   [x] practical application    [] spaced retrieval    [x] choice cues               Prognosis:  Speech Therapy Prognosis  Prognosis: Good  Prognosis Considerations: Participation Level; Potential;Previous Level of Function  Individuals consulted  Consulted and agree with results and recommendations: Patient    Education:  Patient Education: recommendations, plan  Patient Education Response: Demonstrated understanding  Safety Devices in place: Yes  Type of devices:  All fall risk precautions in place    Therapy Time:   Individual Concurrent Group Co-treatment   Time In 1010         Time Out 1045         Minutes [address], [place], CCC-SLP  4/16/2021 4:46 PM

## 2021-04-16 NOTE — PATIENT CARE CONFERENCE
touching assistance     4 Steps  Comment: pt too fatigued  Reason if not Attempted: Not attempted due to medical condition or safety concerns  CARE Score: 88  Discharge Goal: Supervision or touching assistance     12 Steps  Reason if not Attempted: Not applicable  CARE Score: 9  Discharge Goal: Not Applicable    Gait Deviations: []None []Slow jean-pierre  [] Increased SHAD  [] Staggers []Deviated Path  [] Decreased step length  [] Decreased step height  []Decreased arm swing  [] Shuffles  [] Decreased head and trunk rotation  []other:        Wheelchair:  w/c Ability: Wheelchair Ability  Uses a Wheelchair and/or Scooter?: Yes    Wheel 50 Feet with Two Turns  Assistance Needed: Independent  Comment: BUE  CARE Score: 6  Discharge Goal: Independent    Wheel 150 Feet  Assistance Needed: Independent  Comment: 365'  CARE Score: 6  Discharge Goal: Independent              Balance:        Object: Picking Up Object  Assistance Needed: Supervision or touching assistance  Comment: CG with 2ww  CARE Score: 4  Discharge Goal: Supervision or touching assistance    Fall Risk: [x]  Yes  []  No    OCCUPATIONAL THERAPY  (Updated in QI)  Short term goals  Time Frame for Short term goals: STGs=LTGs :   Long term goals  Time Frame for Long term goals : 7-10 days or until d/c  Long term goal 1: Pt will complete grooming tasks Ind  Long term goal 2: Pt will complete total body bathing c supervision  Long term goal 3: Pt will complete UB dressing Ind  Long term goal 4: Pt will complete LB dressing mod I  Long term goal 5: Pt will complete toileting mod I  Long term goals 6: Pt will complete functional transfers (bed, chair, toilet) c RW and mod I; shower transfer c S  Long term goal 7: Pt will perform therex/therax to facilitate increased strength/endurance (c emphasis on dynamic standing balance/endurance >8 mins, BUE endurance) c SBA :                                       OT IRF-ELIZABETH scores and goals since initial assessment: ADLs:    Eating: Eating  Assistance Needed: Independent  Comment: occasionally requires increased time but able to open packages/containers  CARE Score: 6  Discharge Goal: Independent       Oral Hygiene: Oral Hygiene  Assistance Needed: Supervision or touching assistance  Comment: SBA in stance at sink per PT  CARE Score: 4  Discharge Goal: Independent    UB/LB Bathing: Shower/Bathe Self  Assistance Needed: Supervision or touching assistance  Comment: supervision seated for full shower  CARE Score: 4  Discharge Goal: Supervision or touching assistance    UB Dressing: Upper Body Dressing  Assistance Needed: Setup or clean-up assistance  Comment: to don button up shirt  CARE Score: 5  Discharge Goal: Independent         LB Dressing: Lower Body Dressing  Assistance Needed: Partial/moderate assistance  Comment: d/t snug fit of pant legs over prostheses pt required partial assist to thread (discussed benefits of looser fitting clothing); CGA-SBA while in stance for pants management  CARE Score: 3  Discharge Goal: Independent    Donning and [place] A[place] Footwear: Putting On/Taking Off Footwear  Comment: h/o B BKA, does not perform at baseline  Reason if not Attempted: Not applicable  CARE Score: 9  Discharge Goal: Not Applicable      Toileting: Toileting Hygiene  Assistance Needed: Supervision or touching assistance  Comment: SBA for pants management and hygiene  CARE Score: 4  Discharge Goal: Independent      Toilet Transfers:     Toilet Transfer  Assistance Needed: Supervision or touching assistance  Comment: Supervision c grab bar  CARE Score: 4  Discharge Goal: Independent      Impairments/deficits, barriers:  Decreased endurance, balance, cognition   Assessment  Performance deficits / Impairments: Decreased functional mobility , Decreased ADL status, Decreased cognition, Decreased endurance, Decreased balance, Decreased high-level IADLs, Decreased posture  Decision Making: Medium Complexity  REQUIRES OT FOLLOW UP: Yes  Equipment needed at discharge: Confirm grab bar by toilet      COGNITIVE FUNCTION/SPEECH THERAPY (AS INDICATED)  LTG                         Short-term Goals  Timeframe for Short-term Goals: No ST recommended                          Nursing Current Medical Status:   [] Is continent of bowel and bladder     [] Is incontinent of bowel and bladder    [x] Has had an adequate number of bowel movements   [x] Urinates with no urinary retention >300ml in bladder   [] Targeting bladder protocol with hart removal   [x] Maintaining O2 SATs at 92% or greater   [x] Has pain managed while on ARU         [x] Has had no skin breakdown while on ARU   [x] Has improved skin integrity via wound measurements   [] Has no signs/symptoms of infection at the wound site   [] Pressure wounds Stage/Location: Healing stage 2 on buttocks   [x] Arrived on unit with pressure wound  [x] Has been free from injury during hospitalization   [] Has experienced a fall during hospitalization  [x] Ongoing education with patient/family with understanding demonstrated for:  [] Receives IV Fluids  [] Other:        NUTRITION  Weight: 130 lb 6.4 oz (59.1 kg) / Body mass index is 19.83 kg/m². Current diet: DIET GENERAL; Carb Control: 5 carb choices (75 gms)/meal  Dietary Nutrition Supplements: Low Calorie High Protein Supplement  Intake: Tolerating diet, appetite improving, % meals and will drink some oral nutrition supplement    Medical improvements/barriers: Progressing well        Team goals for next treatment period/Intervention for current barriers:   [x] Pt will increase activity tolerance for daily tasks.   [] Pt will improve bed mobility with reduced assist.  [] Pt will improve safety in fx tasks with reduced cues/assist  [x] Pt will improve transfers with reduced assist  [x] Pt will improve toileting with reduced assist  [x] Pt will improve ADL's with use of adaptive equipment with reduced assist  [] Pt will improve pain mgmt for maximum participation in tx program  [] Pt will improve communication to get basic needs met on unit  [] Pt will improve swallowing for safe diet advancement with use of strategies  []  Plan for discharge to home. Patient Strengths: Motivated, Cooperative and Pleasant    Justification for Continued Stay  Based on my medical assessment of the patient and review of information from the interdisciplinary team as part of this weekly team conference, the patient continues to meet the following criteria for IRF level of care:   The patient requires active and ongoing intervention of multiple therapy disciplines   The patient requires and intensive rehabilitation therapy program   The patient requires continued physician supervision by a rehabilitation physician   The patient requires 24 hours rehab nursing care   The patient requires an intensive and coordinated interdisciplinary team approach to the delivery of rehabilitative care. Assessment/Plan   [x]  The patient is making good progression towards their long term goals and is actively participating in and has a reasonable expectation to continue to benefit from the intensive rehabilitation therapy program   []  The estimated discharge date has been changed from initial team conference due to:   []  The estimated discharge destination has been changed from initial team conference due to:         Ongoing tx following discharge: [x][facility]   PT/NSG   []OUTPATIENT     [] No Further Treatment     [] Family/Caregiver Training  []  Transitional Living Arrangement   [] Home Assessment (date  )     [] Family Conference   []  Therapeutic Pass       []  Other: (specify)    Estimated Discharge Date: 4/22/21    Estimated Discharge Destination: []home alone   []home alone with assist prn  []Continuous supervision [x]Return home with spouse/family   [] Assisted living  []SNF     Team members participating in today's conference.     [x] C Reyes Poplin, Medical Director  [x] Lynn Darby Angelo Mason,   [x] Cherelle Pineda, Nurse Mgr     []  Roel Heath, PT   [] Maico Valera, OT   [x]  Ilana Langford, PT  [x] Ifeoma Bundy, OT      [x] Hudson Demarco, SLP    []  Lali Ferreira, SLP   [] Dali Mittal, SLP   []  Tiffanie Pedraza, RD     [x] Siva Escamilla,     [x]Lisa Messina,    [] Salima Mi RN    [] Lolita Romero RN  [] Ronit Corrigan RN    [] Deepthi Barreto, JESSICA    [x] Ady Zeng,  JESSICA      []     I have led this Team Conference and agree with the plan, Glory Gordon MD, 4/20/2021, 12:36 PM  Goals have been updated to reflect recent status.     Team conference note transcribed this date by: Gato Champagne MA, Patricia Kapoor, Therapy Coordinator

## 2021-04-16 NOTE — H&P
The history and physical for this account was mislabeled as a progress note and is dated 4/15/2021 at 10:22 PM.

## 2021-04-16 NOTE — PROGRESS NOTES
Occupational Therapy                              [facility] ARU OCCUPATIONAL THERAPY EVALUATION    Chart Review:  Past Medical History:   Diagnosis Date    Arthritis     Biliary stones 2015    CAD (coronary artery disease)     [facility] obstructive [facility] disease) [facility]     Diabetes mellitus ([facility]     History of blood transfusion     Hx of angiography 3/11/2014    Pulmonary Arteries: Small sliding hiatal hernia. Mod coronary artery calcifications. Mild bilat gynecomastia.     Hyperlipidemia     Hypertension     Kidney stone     Metabolic encephalopathy 48/45/3596     Past Surgical History:   Procedure Laterality Date    ABDOMEN SURGERY      stones in bile duct removed x3    CARDIAC SURGERY      CABG x 2 in 2004 at [facility] - Dr. Tacos Dillard, ESOPHAGUS      ERCP  03/13/14    w/ dilation of papilla    ERCP  9/11/14    ERCP  10/24/15    extractions stones, balloon dilatation     ERCP  03/18/2017    stone extraction     ERCP N/A 1/25/2019    ERCP STONE REMOVAL/ DILATATION OF PAPILLA WITH 10-12MM AND 12-15MM BALLOON AND 9-12MM, 12-15MM  EXTRACTOR BALLOON USED FOR REMOVAL OF MULTIPLE  CBD STONES performed by Romeo Carmichael MD at [place] [address] ERCP N/A 9/7/2019    ERCP STONE REMOVAL, DILATATION OF PAPILLA WITH 10-12MM BALLOON, AND EXTRACTOR BALLOON 12-15MM USED FOR REMOVAL OF CBD STONE AND SLUDE performed by Gil Yousif MD at [place] [address] ERCP N/A 3/31/2020    ERCP DILATION BALLOON to 12  AND SWEEP OF CBD STONE AND SLUDGE performed by Gil Yousif MD at [place] [address] ERCP N/A 4/8/2021    ERCP WITH BALLOON DILATATION 10-12 MM BALLOON( TO 12 MM) OF PAPILLA, , BALLOON SWEEP  WITH REMOVAL OF CBD STONES AND OLAPLX(09-58 MM BALLOON) performed by Gil Yousif MD at [address] Left 2/18/2021    LEFT FEMUR IM NAIL VIANEY INSERTION performed by Myrtha Paget, DO at [facility] of hearing/hearing concerns    ROM:      LUE AROM (degrees)  LUE AROM : WFL     Left Hand AROM (degrees)  Left Hand AROM: WFL     RUE AROM (degrees)  RUE AROM : WFL     Right Hand AROM (degrees)  Right Hand AROM: WFL    Strength:    LUE Strength  Gross LUE Strength: WNL  L Hand General: 4+/5  LUE Strength Comment: 4+/5 grossly  RUE Strength  Gross RUE Strength: WNL  R Hand General: 4+/5  RUE Strength Comment: 4+/5 grossly    Quality of Movement: Tone RUE  RUE Tone: Normotonic  Tone LUE  LUE Tone: Normotonic  Coordination  Movements Are Fluid And Coordinated: Yes       Sensation:    Sensation  Overall Sensation Status: WFL     ADLs:  Eating: Eating  Assistance Needed: Independent  Comment: occasionally requires increased time but able to open packages/containers  CARE Score: 6  Discharge Goal: Independent       Oral Hygiene: Oral Hygiene  Assistance Needed: Setup or clean-up assistance  Comment: seated to clean dentures  CARE Score: 5  Discharge Goal: Independent    UB/LB Bathing: Shower/Bathe Self  Assistance Needed: Supervision or touching assistance  Comment: performed sponge bath at EOB; performed lateral leans seated to bathe perineal area  CARE Score: 4  Discharge Goal: Supervision or touching assistance    UB Dressing: Upper Body Dressing  Assistance Needed: Supervision or touching assistance  Comment: to don pullover T shirt at EOB  CARE Score: 4  Discharge Goal: Independent         LB Dressing:   Lower Body Dressing  Assistance Needed: Partial/moderate assistance  Comment: with increased time able to don sleeves and B prosthesis; required min A to manage pant legs over prosthesis and ranged from CGA-min A for balance while in stance  CARE Score: 3  Discharge Goal: Independent    Donning and [place] Footwear: Putting On/Taking Off Footwear  Comment: h/o B BKA, does not perform at baseline  Reason if not Attempted: Not applicable  CARE Score: 9  Discharge Goal: Not Applicable      Toileting:  Toileting Hygiene  Assistance Needed: Supervision or touching assistance  Comment: per PT report pt able to perform OOB with CGA  CARE Score: 4  Discharge Goal: Independent      Bed Mobility:    Bed mobility  Supine to Sit: Supervision(with bed rail)    Transfers:    Transfers  Stand Pivot Transfers: Contact guard assistance(c RW)  Sit to stand: Contact guard assistance  Stand to sit: Contact guard assistance     Shower Transfers  Shower Transfers: Not tested     Toilet Transfer  Assistance Needed: Supervision or touching assistance  Comment: supervision with grab bar  CARE Score: 4  Discharge Goal: Independent    Functional Mobility:    Balance  Sitting Balance: Supervision(seated EOB >30 mins for ADL)  Standing Balance: Minimal assistance(Ranged from CGA-min A)  Standing Balance  Time: 2 stands ranging from 2-3 mins each  Activity: ADLs  Comment: Pt only required CGA with 1 UE support on RW but without UE support at times required min A  Functional Mobility  Functional - Mobility Device: Rolling Walker  Activity: Other(within room)  Assist Level: Contact guard assistance     Cognition:  Cognition  Overall Cognitive Status: Exceptions  Following Commands: Follows multistep commands with repitition  Attention Span: Difficulty dividing attention  Memory: Decreased recall of recent events, Decreased short term memory  Problem Solving: Assistance required to identify errors made  Cognition Comment: Pt with dementia dx    Perception:  Perception  Overall Perceptual Status: WFL      Assessment:     Performance deficits / Impairments: Decreased functional mobility , Decreased ADL status, Decreased cognition, Decreased endurance, Decreased balance, Decreased high-level IADLs, Decreased posture    The patient is a 79year old male admitted onto ARU after hospitalization for acute hypoxic respiratory failure, nausea/vomiting (pt s/p ERCP on 04/08), and toxic metabolic encephalopathy 2* UTI.   Pt had a recent hip fx in Feb of this year; is WBAT LLE and pt denies pain. Pt also has a h/o dementia and B BKA with prosthesis. PTA, pt lives with wife at [address] and reports sometimes needing help managing pants with prosthesis but otherwise is Ind c ADLs and is mod I c RW for transfers and mobility. Today, pt presented with decreased endurance and dynamic balance; but sequenced ADL tasks appropriately and followed commands. I feel pt has good potential to return to baseline with assistance PRN from wife and IL staff. The QI, MMT, and ROM standardized assessments were used this date to determine the above performance deficits, which compromise pt's ability to safely complete ADLs/IADLs/mobility. Pt will benefit from ARU OT services to increase functional performance and return to PLOF. Decision Making: Medium Complexity  Clinical Presentation:  Evolving c changing characteristics (I.e. endurance)  Patient education:   ARU procotol, Role of O.T., O.T. plan of care  []   Patient goal was established and reviewed in Re[employer]er with patient and/or family this date. REQUIRES OT FOLLOW UP: Yes  Discharge Recommendations:  Home c assist from family, [facility] OT  Equipment Recommendations:  Grab bar by toilet?  Pt denies there is one    Goals:     Short term goals  Time Frame for Short term goals: STGs=LTGs  Long term goals  Time Frame for Long term goals : 7-10 days or until d/c  Long term goal 1: Pt will complete grooming tasks Ind  Long term goal 2: Pt will complete total body bathing c supervision  Long term goal 3: Pt will complete UB dressing Ind  Long term goal 4: Pt will complete LB dressing mod I  Long term goal 5: Pt will complete toileting mod I  Long term goals 6: Pt will complete functional transfers (bed, chair, toilet) c RW and mod I; shower transfer c S  Long term goal 7: Pt will perform therex/therax to facilitate increased strength/endurance (c emphasis on dynamic standing balance/endurance >8 mins, BUE endurance) c SBA    Plan:    Pt will be seen at least 60 minutes per day for a minimum of 5 days per week, plus group therapy as appropriate  Current Treatment Recommendations: Strengthening, ROM, Endurance Training, Functional Mobility Training, Patient/Caregiver Education & Training, Positioning, Home Management Training, Equipment Evaluation, Education, & procurement, Cognitive/Perceptual Training, Balance Training, Safety Education & Training, Self-Care / ADL    OT Individual Minutes  Time In: 4441  Time Out: 5738  Minutes: 75                Number of Minutes/Billable Intervention      OT Evaluation 20   Therapeutic Exercise    ADL Self-care 55   Neuro Re-Ed    Therapeutic Activity    Group    Other:    TOTAL 75     Electronically signed by:    Gagan Washington MS, OTR/L  License #[id number]   4/16/2021, 3:58 PM

## 2021-04-16 NOTE — PROGRESS NOTES
performed sponge bath at EOB; performed lateral leans seated to bathe perineal area  CARE Score: 4  Discharge Goal: Supervision or touching assistance    UB Dressing: Upper Body Dressing  Assistance Needed: Supervision or touching assistance  Comment: to don pullover T shirt at EOB  CARE Score: 4  Discharge Goal: Independent         LB Dressing: Lower Body Dressing  Assistance Needed: Partial/moderate assistance  Comment: with increased time able to don sleeves and B prosthesis; required min A to manage pant legs over prosthesis and ranged from CGA-min A for balance while in stance  CARE Score: 3  Discharge Goal: Independent    Donning and [place] Footwear: Putting On/Taking Off Footwear  Comment: h/o B BKA, does not perform at baseline  Reason if not Attempted: Not applicable  CARE Score: 9  Discharge Goal: Not Applicable      Toileting: Toileting Hygiene  Assistance Needed: Supervision or touching assistance  Comment: per PT report pt able to perform OOB with CGA  CARE Score: 4  Discharge Goal: Independent      Toilet Transfers:   Toilet Transfer  Assistance Needed: Supervision or touching assistance  Comment: supervision with grab bar  CARE Score: 4  Discharge Goal: Independent    Physical Therapy:         Long term goals  Time Frame for Long term goals : 7 days STG=LTG  Long term goal 1: Pt will perform all bed mobility with   independence  Long term goal 2: Pt will use 2ww and B prostheses  to perform sit to stand, pivot and car transfers with mod I  Long term goal 3: Pt will ambulate 50 including 2 turns on level surfaces with  mod I  assist  using 2ww and B prostheses  Long term goal 4: Pt will ambulate 150' on level surfaces and 20' on unlevel surfaces with supervision using 2ww and B prostheses  Long term goal 5: Pt will ascend/descend curb step with 2ww and up to   4  steps with  rail(s) with  supervision  assist  Long term goal 6: Pt will retrieve light item from floor with  supervision  assist  using 2ww and B prostheses  Long term goal 7: Pt will propel w/c in household situation for at least 150    feet with mod I      Bed Mobility:   Sit to Lying  Assistance Needed: Independent  CARE Score: 6  Discharge Goal: Independent  Roll Left and Right  Assistance Needed: Independent  CARE Score: 6  Discharge Goal: Independent  Lying to Sitting on Side of Bed  Assistance Needed: Independent  CARE Score: 6  Discharge Goal: Independent    Transfers:    Sit to Stand  Assistance Needed: Supervision or touching assistance  Comment: CG from most surfaces  CARE Score: 4  Discharge Goal: Independent  Chair/Bed-to-Chair Transfer  Assistance Needed: Supervision or touching assistance  Comment: CG with 2ww  CARE Score: 4  Discharge Goal: Independent  Toilet Transfer  Assistance Needed: Supervision or touching assistance  Comment: supervision with grab bar  CARE Score: 4  Car Transfer  Assistance Needed: Partial/moderate assistance  Comment: min assist for LEs into car  CARE Score: 3  Discharge Goal: Independent    Ambulation:    Walking Ability  Does the Patient Walk?: Yes     Walk 10 Feet  Assistance Needed: Supervision or touching assistance  Comment: CG with 2ww and B prostheses  CARE Score: 4  Discharge Goal: Independent     Walk 50 Feet with Two Turns  Assistance Needed: Supervision or touching assistance  Comment: CG 2WW and B prostheses  CARE Score: 4  Discharge Goal: Independent     Walk 150 Feet  Comment: 108' max distance  Reason if not Attempted: Not attempted due to medical condition or safety concerns  CARE Score: 88  Discharge Goal: Supervision or touching assistance     Walking 10 Feet on Uneven Surfaces  Assistance Needed: Supervision or touching assistance  Comment: CG with 2ww and B prostheses  CARE Score: 4  Discharge Goal: Supervision or touching assistance     1 Step (Curb)  Assistance Needed: Partial/moderate assistance  Comment: mod assist 6\" step with 2ww and B prostheses  CARE Score: 3  Discharge Goal: Supervision or touching assistance     4 Steps  Comment: pt too fatigued  Reason if not Attempted: Not attempted due to medical condition or safety concerns  CARE Score: 88  Discharge Goal: Supervision or touching assistance     12 Steps  Reason if not Attempted: Not applicable  CARE Score: 9  Discharge Goal: Not Applicable       Wheelchair:  w/c Ability: Wheelchair Ability  Uses a Wheelchair and/or Scooter?: Yes  Wheel 50 Feet with Two Turns  Assistance Needed: Supervision or touching assistance  Comment: BUE  CARE Score: 4  Discharge Goal: Independent  Wheel 150 Feet  Assistance Needed: Partial/moderate assistance  Comment: 90' max distance  CARE Score: 3  Discharge Goal: Independent          Balance:        Object: Picking Up Object  Assistance Needed: Partial/moderate assistance  Comment: min assist with 2ww and B prostheses  CARE Score: 3  Discharge Goal: Supervision or touching assistance    I      Exam:    Blood pressure 138/64, pulse 80, temperature 98.4 °F (36.9 °C), resp. rate 18, height 5' 8\" (1.727 m), weight 126 lb 3.2 oz (57.2 kg), SpO2 100 %. General: Sitting up in bed. Pleasant. Talkative. Hard of hearing. HEENT: MMM. Neck supple. No JVD. Dysarthric. Pulmonary: Shallow respirations without accessory muscle use. Cardiac: Regular rate and rhythm. Abdomen: Patient's abdomen is soft and nondistended. Bowel sounds were present throughout. There was no rebound, guarding or masses noted. Upper extremities: Clumsy movements of both upper limbs but can bring his hands out to meet mine. Functional  strength. Lower extremities: Mature bilateral short BKA stumps. In bed he is unable to extend his knees fully. Some chronic skin changes over the bony prominences of both stumps but no open areas or drainage. Sitting balance was good. Standing balance was poor.     Lab Results   Component Value Date    WBC 7.6 04/13/2021    HGB 12.2 (L) 04/13/2021    HCT 39.5 (L) 04/13/2021    MCV 93.6 04/13/2021     04/13/2021     Lab Results   Component Value Date    INR 1.11 04/08/2021    INR 1.23 04/07/2021    INR 1.18 04/06/2021    PROTIME 13.4 04/08/2021    PROTIME 14.9 (H) 04/07/2021    PROTIME 14.3 04/06/2021     Lab Results   Component Value Date    CREATININE 1.1 04/13/2021    BUN 20 04/13/2021     04/13/2021    K 3.8 04/13/2021     04/13/2021    CO2 26 04/13/2021     Lab Results   Component Value Date    ALT 11 04/10/2021    AST 21 04/10/2021    ALKPHOS 91 04/10/2021    BILITOT 0.2 04/10/2021       Expected length of stay  prior to a supervised level of function for discharge home with a walker and C OT/PT is 2 weeks. Recommendations:    1. Metabolic encephalopathy with gait disturbance: Developing the routine for his daily occupational and physical therapy with speech-language pathology. Pursuing treatment of his cholangitis, UTI and COPD while on the ARU. This involves providing supplemental oxygen, steroid taper and inhalers. He is on oral antibiotics and an oral diet. Providing DVT prophylaxis, pulmonary hygiene and nutritional support. Bowel and bladder retraining. Anticipating that he will be able to use his prosthetic devices for most mobility and self-care by discharge. 2. DVT prophylaxis: Lovenox 40 mg subcu daily. I must monitor his hemoglobin and platelet count periodically while on this medication. Weightbearing activities are pursued daily. GI prophylaxis offered. No clinical signs of blood loss. 3. Exacerbation of COPD with hypoxia: Patient is on supplemental oxygen, incentive spirometry and inhalers as needed. Monitoring O2 saturations at rest and with activity. Monitoring his tolerance for being off the steroids. 4. Uncontrolled diabetes type 2 with hyperglycemia: Patient requires a diet modified for carbohydrates. With his poor intake, he is on Humalog sliding scale only.   Blood sugars are checked at mealtime and bedtime. 5. Urinary tract infection: Patient has completed a course of Macrobid. No current signs of recurrence of UTI. Encouraging oral hydration. 6. Chronic systolic congestive heart failure: No added salt at the table. Coreg for afterload reduction. Antiplatelet therapy and a statin for his heart disease. Lisinopril. Monitoring his weights daily for evidence of decompensation. 7. Acute cholangitis: [place] diet. Hydration. He has completed antibiotic therapy. Asymptomatic. 8. Essential hypertension: Coreg, Cardizem and lisinopril are used to control systolic blood pressure. Target systolic blood pressure is 120-140. Vital signs are checked at rest and with activity. Encouraging oral hydration. Blood pressure within target range today. 9. Bilateral below-knee amputation: Patient has his bilateral prostheses here in his room. Addressing range of motion of the knee so he can safely  his prosthetic devices. Monitoring his stumps for pressure injury. 10. Pituitary macroadenoma: Outpatient follow-up with serial imaging per the pre-existing schedule. No evidence of any neurologic changes or seizures.

## 2021-04-16 NOTE — PROGRESS NOTES
papilla    ERCP  9/11/14    ERCP  10/24/15    extractions stones, balloon dilatation     ERCP  03/18/2017    stone extraction     ERCP N/A 1/25/2019    ERCP STONE REMOVAL/ DILATATION OF PAPILLA WITH 10-12MM AND 12-15MM BALLOON AND 9-12MM, 12-15MM  EXTRACTOR BALLOON USED FOR REMOVAL OF MULTIPLE  CBD STONES performed by Lianet Calhoun MD at [place] [address] ERCP N/A 9/7/2019    ERCP STONE REMOVAL, DILATATION OF PAPILLA WITH 10-12MM BALLOON, AND EXTRACTOR BALLOON 12-15MM USED FOR REMOVAL OF CBD STONE AND SLUDE performed by Belem Dee MD at [place] [address] ERCP N/A 3/31/2020    ERCP DILATION BALLOON to 12  AND SWEEP OF CBD STONE AND SLUDGE performed by Belem Dee MD at [place] [address] ERCP N/A 4/8/2021    ERCP WITH BALLOON DILATATION 10-12 MM BALLOON( TO 12 MM) OF PAPILLA, , BALLOON SWEEP  WITH REMOVAL OF CBD STONES AND SSGWRT(23-41 MM BALLOON) performed by Belem Dee MD at [address] Left 2/18/2021    LEFT FEMUR IM NAIL VIANEY INSERTION performed by Sergey Hernandez DO at [facility]      JOINT REPLACEMENT      LEG AMPUTATION BELOW KNEE Bilateral     2005 at [address] [facility]  03/13/14    sphincterotomy       Current Medications:     Current Facility-Administered Medications:     [START ON 4/16/2021] aspirin chewable tablet 81 mg, 81 mg, Oral, Daily, Chapin Keating MD    [START ON 4/16/2021] atorvastatin (LIPITOR) tablet 40 mg, 40 mg, Oral, Daily, Chapin Keating MD    carvedilol (COREG) tablet 6.25 mg, 6.25 mg, Oral, BID , Hafsa Denny MD, 6.25 mg at 04/15/21 1813    cholestyramine light packet 4 g, 4 g, Oral, BID PRN, Chapin Keating MD    dextrose 5 % solution, 100 mL/hr, Intravenous, PRN, Chapin Keating MD    dextrose 50 % IV solution, 12.5 g, Intravenous, PRN, Chapin Keating MD    dilTIAZem (CARDIZEM) tablet 60 mg, 60 mg, Oral, BID, Chapin Keating MD, 60 mg at 04/15/21 2111    gabapentin (NEURONTIN) capsule 100 mg, 100 mg, Oral, Nightly, Josh Spicer MD, 100 mg at 04/15/21 2111    glucagon (rDNA) injection 1 mg, 1 mg, Intramuscular, PRN, Josh Spicer MD    glucose (GLUTOSE) 40 % oral gel 15 g, 15 g, Oral, PRN, Josh Spicer MD    insulin lispro (HUMALOG) injection vial 0-6 Units, 0-6 Units, Subcutaneous, TID WC, Josh Spicer MD, 1 Units at 04/15/21 1814    insulin lispro (HUMALOG) injection vial 0-6 Units, 0-6 Units, Subcutaneous, 2 times per day, Josh Spicer MD, 2 Units at 04/15/21 2118    latanoprost (XALATAN) 0.005 % ophthalmic solution 1 drop, 1 drop, Both Eyes, Nightly, Hafsa Denny MD, 1 drop at 04/15/21 2111    [START ON 4/16/2021] lisinopril (PRINIVIL;ZESTRIL) tablet 5 mg, 5 mg, Oral, Daily, Josh Spicer MD    [START ON 4/16/2021] pantoprazole (PROTONIX) tablet 40 mg, 40 mg, Oral, QAM AC, Hafsa Denny MD    ondansetron (ZOFRAN) injection 4 mg, 4 mg, Intravenous, Q6H PRN, Josh Spicer MD    acetaminophen (TYLENOL) tablet 650 mg, 650 mg, Oral, Q4H PRN, ERIN Herring MD    enoxaparin (LOVENOX) injection 40 mg, 40 mg, Subcutaneous, Daily, ERIN Herring MD    polyethylene glycol [facility]) packet 17 g, 17 g, Oral, Daily PRN, ERIN Herring MD    Family History:   Family History   Problem Relation Age of Onset    Cancer Mother     Diabetes Mother     Cancer Father        Exam:    Blood pressure (!) 109/57, pulse 68, temperature 98.1 °F (36.7 °C), temperature source Oral, resp. rate 18, height 5' 8\" (1.727 m), weight 128 lb (58.1 kg), SpO2 96 %. General: Semirecumbent in bed. Alert but confused. Oriented to person and hospital only. Situational details gaping. HEENT: MMM. Neck supple. No signs of trauma to his head or neck. Slow and deliberate speech. Pulmonary: Shallow respirations without wheezes, rales or coughing. A few basilar rhonchi. Cardiac: Regular rate and rhythm. Distant heart sounds noted.     Abdomen: Patient's abdomen was soft and nondistended. Bowel sounds were present throughout. There was no rebound, guarding or masses noted. Spinal exam: No percussion tenderness. Decreased trunk rotation. Decreased lumbar lordosis. Skin intact. Upper extremities: Cautiously brings both hands up to meet mine. Some give way with MMT. Some fresh bruises and minimal swelling. Lower extremities: Both short BKA stumps show pressure changes over the bony knobs but no open areas. He is unable to extend his knees fully. Thighs are soft. Sitting balance was fair. Standing balance was poor. Lab Results   Component Value Date    WBC 7.6 04/13/2021    HGB 12.2 (L) 04/13/2021    HCT 39.5 (L) 04/13/2021    MCV 93.6 04/13/2021     04/13/2021     Lab Results   Component Value Date    INR 1.11 04/08/2021    INR 1.23 04/07/2021    INR 1.18 04/06/2021    PROTIME 13.4 04/08/2021    PROTIME 14.9 (H) 04/07/2021    PROTIME 14.3 04/06/2021     Lab Results   Component Value Date    CREATININE 1.1 04/13/2021    BUN 20 04/13/2021     04/13/2021    K 3.8 04/13/2021     04/13/2021    CO2 26 04/13/2021     Lab Results   Component Value Date    ALT 11 04/10/2021    AST 21 04/10/2021    ALKPHOS 91 04/10/2021    BILITOT 0.2 04/10/2021         Impression: 76-year-old male with bilateral below-knee amputation, chronic congestive heart failure, emphysema and diabetes who is suffered mental status changes with hypoxia, UTI and acute cholangitis (metabolic encephalopathy). His blood pressure has been fluctuating significantly and he has a pituitary adenoma. He is also recovering from a left intertrochanteric hip fracture in February of this year. Strengths for the patient: Accessible home, supportive spouse and some experience with adaptive equipment. Limitations/barriers for the patient: His age, his need for bilateral below-knee prostheses for ambulation and his multiple medical comorbidities.     Recommendation: Acute inpatient 8. Essential hypertension: Coreg, Cardizem and lisinopril are used to control systolic blood pressure. Target systolic blood pressure is 120-140. Vital signs are checked at rest and with activity. Encouraging oral hydration. 9. Bilateral below-knee amputation: Patient has his bilateral prostheses here in his room. Will work on maintaining range of motion of the knee so he can safely  his devices. Monitoring his stumps for pressure injury. 10. Pituitary macroadenoma: Outpatient follow-up with serial imaging per the pre-existing schedule. Monitoring him for neurologic changes and seizures. I personally performed a history and physical on this patient within 24 hours of admission to the rehab unit. I have reviewed the preadmission screening and concur with its findings without change. A detailed plan of care will be established by hospital day 4 and I attest the patient is appropriate for inpatient rehabilitation at this time. I have compared the patient's current functional status noted during my history and physical with that of the preadmission screen and I have found no significant differences.
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
## 2021-04-17 LAB
GLUCOSE BLD-MCNC: 135 MG/DL (ref 70–99)
GLUCOSE BLD-MCNC: 138 MG/DL (ref 70–99)
GLUCOSE BLD-MCNC: 165 MG/DL (ref 70–99)
GLUCOSE BLD-MCNC: 187 MG/DL (ref 70–99)
GLUCOSE BLD-MCNC: 238 MG/DL (ref 70–99)

## 2021-04-17 PROCEDURE — 97530 THERAPEUTIC ACTIVITIES: CPT

## 2021-04-17 PROCEDURE — 97116 GAIT TRAINING THERAPY: CPT

## 2021-04-17 PROCEDURE — 94761 N-INVAS EAR/PLS OXIMETRY MLT: CPT

## 2021-04-17 PROCEDURE — 82962 GLUCOSE BLOOD TEST: CPT

## 2021-04-17 PROCEDURE — 6370000000 HC RX 637 (ALT 250 FOR IP): Performed by: INTERNAL MEDICINE

## 2021-04-17 PROCEDURE — 97110 THERAPEUTIC EXERCISES: CPT

## 2021-04-17 PROCEDURE — 1280000000 HC REHAB R&B

## 2021-04-17 PROCEDURE — 6370000000 HC RX 637 (ALT 250 FOR IP): Performed by: PHYSICAL MEDICINE & REHABILITATION

## 2021-04-17 PROCEDURE — 94150 VITAL CAPACITY TEST: CPT

## 2021-04-17 PROCEDURE — 6360000002 HC RX W HCPCS: Performed by: PHYSICAL MEDICINE & REHABILITATION

## 2021-04-17 PROCEDURE — 94664 DEMO&/EVAL PT USE INHALER: CPT

## 2021-04-17 RX ADMIN — LATANOPROST 1 DROP: 50 SOLUTION/ DROPS OPHTHALMIC at 22:24

## 2021-04-17 RX ADMIN — GABAPENTIN 100 MG: 100 CAPSULE ORAL at 22:25

## 2021-04-17 RX ADMIN — DILTIAZEM HYDROCHLORIDE 60 MG: 60 TABLET, FILM COATED ORAL at 22:25

## 2021-04-17 RX ADMIN — DILTIAZEM HYDROCHLORIDE 60 MG: 60 TABLET, FILM COATED ORAL at 08:23

## 2021-04-17 RX ADMIN — ATORVASTATIN CALCIUM 40 MG: 40 TABLET, FILM COATED ORAL at 08:22

## 2021-04-17 RX ADMIN — ASPIRIN 81 MG CHEWABLE TABLET 81 MG: 81 TABLET CHEWABLE at 08:21

## 2021-04-17 RX ADMIN — ACETAMINOPHEN 650 MG: 325 TABLET ORAL at 08:22

## 2021-04-17 RX ADMIN — LISINOPRIL 5 MG: 5 TABLET ORAL at 08:22

## 2021-04-17 RX ADMIN — CARVEDILOL 6.25 MG: 6.25 TABLET, FILM COATED ORAL at 08:21

## 2021-04-17 RX ADMIN — PANTOPRAZOLE SODIUM 40 MG: 40 TABLET, DELAYED RELEASE ORAL at 06:33

## 2021-04-17 RX ADMIN — ENOXAPARIN SODIUM 40 MG: 40 INJECTION SUBCUTANEOUS at 08:21

## 2021-04-17 RX ADMIN — INSULIN LISPRO 1 UNITS: 100 INJECTION, SOLUTION INTRAVENOUS; SUBCUTANEOUS at 17:12

## 2021-04-17 RX ADMIN — INSULIN LISPRO 1 UNITS: 100 INJECTION, SOLUTION INTRAVENOUS; SUBCUTANEOUS at 22:27

## 2021-04-17 RX ADMIN — INSULIN LISPRO 2 UNITS: 100 INJECTION, SOLUTION INTRAVENOUS; SUBCUTANEOUS at 12:18

## 2021-04-17 ASSESSMENT — PAIN SCALES - GENERAL
PAINLEVEL_OUTOF10: 0

## 2021-04-17 ASSESSMENT — PAIN DESCRIPTION - ONSET: ONSET: ON-GOING

## 2021-04-17 ASSESSMENT — PAIN DESCRIPTION - PAIN TYPE: TYPE: ACUTE PAIN

## 2021-04-17 ASSESSMENT — PAIN DESCRIPTION - FREQUENCY: FREQUENCY: INTERMITTENT

## 2021-04-17 NOTE — PROGRESS NOTES
curb: 4in step/curb - Ascending w/ RLE and descending w/ LLE after pt's reports of residual weakness in L hip post fx in Feb 2021. Moderate carryover observed. Supportive Device:  2ww, Perez initially. CGA after 1st attempt. Height:   4 in    3 steps:  CGA - ascending w/ RLE and descending w/ LLE. Supportive Device:  Jose Raul handrails    Picking Up Object From Floor (must be standing position):  Assistance:    Picking up cones from various surfaces of various heights CGA. Instruction and cues given for correct body mechanics w/ moderate carryover observed. []   Reacher used     Additional Therapeutic activities/exercises completed this date:     []   Nu-step:  Time:        Level:         #Steps:       []   Rebounder:    [x]  Seated: While seated in chair, pt performed BLE marching, laqs, hip abd/add x20 ea     []  Standing       []   Balance training         []   Postural training    []   Supine ther ex (reps/sets):     []   Seated ther ex (reps/sets):     []   Standing ther ex (reps/sets):     []   Other:   []   Other:   []   Other:    Comments:  Min cueing for correct execution of exes. Patient/Caregiver Education and Training:   [x]   Bed Mobility/Transfer technique/safety  [x]   Gait technique/sequencing  [x]   Proper use of assistive device  []   Advanced mobility safety and technique  []   Reinforced patient's precautions/mobility while maintaining precautions  []   Postural awareness  []   Family training  []   Progress was updated and reviewed in Rehabtracker with patient and/or family this date. Treatment Plan for Next Session: Gait, transfers, steps/stairs,       Assessment:  Pt demos good motivation throughout session today. However, pt demos premature fatigue w/ standing activities d/t weakness, requiring several rest breaks.      Treatment/Activity Tolerance:   [x] Tolerated treatment with no adverse effects    [] Patient limited by fatigue  [] Patient limited by pain   [] Patient limited by medical complications:    [] Adverse reaction to Tx:   [] Significant change in status    Safety:       []  bed alarm set    [x]  chair alarm set    []  Pt refused alarms                [x]  Telesitter activated      [x]  Gait belt used during tx session      []other:         Number of Minutes/Billable Intervention  Gait Training 60   Therapeutic Exercise 30   Neuro Re-Ed    Therapeutic Activity 30   Wheelchair Propulsion    Group    Other:    TOTAL 120         Social History  Social/Functional History  Lives With: Spouse(Nannette)  Type of Home: Assisted living([facility])  Home Layout: One level  Home Access: Level entry  Bathroom Shower/Tub: Tub/Shower unit  Bathroom Toilet: Standard(+ toilet riser)  Bathroom Equipment: Grab bars in shower, Tub transfer bench, Toilet raiser  Bathroom Accessibility: Walker accessible  Home Equipment: Rolling walker, Wheelchair-manual, Electric scooter(bilateral lower extremity prosthetics (bilateral BKA); also owns transport chair)  ADL Assistance: Independent  Homemaking Responsibilities: No  Ambulation Assistance: Independent(mod I with RW)  Transfer Assistance: Independent  Active : No  Patient's  Info: Wife  Education: HS Graduation  Occupation: Retired  Type of occupation: Glass   Leisure & Hobbies: Doctors visits, groceries are delivered, social functions on campus--pt uses scooter, Wife manages meds. cat  Additional Comments: Pt reports 3 falls in the last year; 1 caused L hip fx in Feb 2021. Pt sleeps in a flat, regular bed at home. Objective                                                                                    Goals:  (Update in navigator)   :   Long term goals  Time Frame for Long term goals : 7 days STG=LTG  Long term goal 1: Pt will perform all bed mobility with   independence  Long term goal 2: Pt will use 2ww and B prostheses  to perform sit to stand, pivot and car transfers with mod I  Long term goal 3: Pt will ambulate 50 including 2 turns on level surfaces with  mod I  assist  using 2ww and B prostheses  Long term goal 4: Pt will ambulate 150' on level surfaces and 20' on unlevel surfaces with supervision using 2ww and B prostheses  Long term goal 5: Pt will ascend/descend curb step with 2ww and up to   4  steps with  rail(s) with  supervision  assist  Long term goal 6: Pt will retrieve light item from floor with  supervision  assist  using 2ww and B prostheses  Long term goal 7: Pt will propel w/c in household situation for at least 150    feet with mod I:        Plan of Care                                                                              Times per week: 5 days per week for a minimum of 60 minutes/day plus group as appropriate for 60 minutes.   Treatment to include Current Treatment Recommendations: Strengthening, Balance Training, Functional Mobility Training, Transfer Training, Gait Training, Patient/Caregiver Education & Training, IADL Training, Stair training, Neuromuscular Re-education, Equipment Evaluation, Education, & procurement, Home Exercise Program, Positioning, Endurance Training, Safety Education & Training, Cognitive/Perceptual Training, Wheelchair Mobility Training    Electronically signed by   Stacie Israel,  4/17/2021, 8:31 AM
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
## 2021-04-18 LAB
GLUCOSE BLD-MCNC: 116 MG/DL (ref 70–99)
GLUCOSE BLD-MCNC: 124 MG/DL (ref 70–99)
GLUCOSE BLD-MCNC: 184 MG/DL (ref 70–99)
GLUCOSE BLD-MCNC: 195 MG/DL (ref 70–99)
GLUCOSE BLD-MCNC: 217 MG/DL (ref 70–99)

## 2021-04-18 PROCEDURE — 94761 N-INVAS EAR/PLS OXIMETRY MLT: CPT

## 2021-04-18 PROCEDURE — 6360000002 HC RX W HCPCS: Performed by: PHYSICAL MEDICINE & REHABILITATION

## 2021-04-18 PROCEDURE — 1280000000 HC REHAB R&B

## 2021-04-18 PROCEDURE — 6370000000 HC RX 637 (ALT 250 FOR IP): Performed by: INTERNAL MEDICINE

## 2021-04-18 PROCEDURE — 82962 GLUCOSE BLOOD TEST: CPT

## 2021-04-18 PROCEDURE — 6370000000 HC RX 637 (ALT 250 FOR IP): Performed by: PHYSICAL MEDICINE & REHABILITATION

## 2021-04-18 RX ADMIN — INSULIN LISPRO 1 UNITS: 100 INJECTION, SOLUTION INTRAVENOUS; SUBCUTANEOUS at 18:26

## 2021-04-18 RX ADMIN — LATANOPROST 1 DROP: 50 SOLUTION/ DROPS OPHTHALMIC at 21:41

## 2021-04-18 RX ADMIN — INSULIN LISPRO 2 UNITS: 100 INJECTION, SOLUTION INTRAVENOUS; SUBCUTANEOUS at 11:55

## 2021-04-18 RX ADMIN — ATORVASTATIN CALCIUM 40 MG: 40 TABLET, FILM COATED ORAL at 09:17

## 2021-04-18 RX ADMIN — INSULIN LISPRO 1 UNITS: 100 INJECTION, SOLUTION INTRAVENOUS; SUBCUTANEOUS at 21:41

## 2021-04-18 RX ADMIN — CARVEDILOL 6.25 MG: 6.25 TABLET, FILM COATED ORAL at 09:19

## 2021-04-18 RX ADMIN — DILTIAZEM HYDROCHLORIDE 60 MG: 60 TABLET, FILM COATED ORAL at 09:16

## 2021-04-18 RX ADMIN — LISINOPRIL 5 MG: 5 TABLET ORAL at 09:18

## 2021-04-18 RX ADMIN — PANTOPRAZOLE SODIUM 40 MG: 40 TABLET, DELAYED RELEASE ORAL at 05:38

## 2021-04-18 RX ADMIN — ASPIRIN 81 MG CHEWABLE TABLET 81 MG: 81 TABLET CHEWABLE at 09:17

## 2021-04-18 RX ADMIN — GABAPENTIN 100 MG: 100 CAPSULE ORAL at 21:41

## 2021-04-18 RX ADMIN — ENOXAPARIN SODIUM 40 MG: 40 INJECTION SUBCUTANEOUS at 09:18

## 2021-04-18 RX ADMIN — DILTIAZEM HYDROCHLORIDE 60 MG: 60 TABLET, FILM COATED ORAL at 21:40

## 2021-04-18 ASSESSMENT — PAIN SCALES - GENERAL
PAINLEVEL_OUTOF10: 0
PAINLEVEL_OUTOF10: 0

## 2021-04-18 ASSESSMENT — PAIN SCALES - WONG BAKER: WONGBAKER_NUMERICALRESPONSE: 0

## 2021-04-18 NOTE — PLAN OF CARE
Problem: Falls - Risk of:  Goal: Will remain free from falls  Description: Will remain free from falls  4/18/2021 0231 by Gayla Montejo RN  Outcome: Ongoing  4/17/2021 1248 by Ирина Arrieta RN  Outcome: Ongoing  Goal: Absence of physical injury  Description: Absence of physical injury  4/18/2021 0231 by Gayla Montejo RN  Outcome: Ongoing  4/17/2021 1248 by Ирина Arrieta RN  Outcome: Ongoing     Problem: Skin Integrity:  Goal: Will show no infection signs and symptoms  Description: Will show no infection signs and symptoms  4/18/2021 0231 by Gayla Montejo RN  Outcome: Ongoing  4/17/2021 1248 by Ирина Arrieta RN  Outcome: Ongoing  Goal: Absence of new skin breakdown  Description: Absence of new skin breakdown  4/18/2021 0231 by Gayla Montejo RN  Outcome: Ongoing  4/17/2021 1248 by Ирина Arrieta RN  Outcome: Ongoing

## 2021-04-18 NOTE — PLAN OF CARE
Problem: Falls - Risk of:  Goal: Will remain free from falls  Description: Will remain free from falls  4/18/2021 1302 by Shira Lara RN  Outcome: Ongoing  4/18/2021 0231 by Sherryle Bracket, RN  Outcome: Ongoing  Goal: Absence of physical injury  Description: Absence of physical injury  4/18/2021 1302 by Shira Lara RN  Outcome: Ongoing  4/18/2021 0231 by Sherryle Bracket, RN  Outcome: Ongoing     Problem: Skin Integrity:  Goal: Will show no infection signs and symptoms  Description: Will show no infection signs and symptoms  4/18/2021 1302 by Shira Lara RN  Outcome: Ongoing  4/18/2021 0231 by Sherryle Bracket, RN  Outcome: Ongoing  Goal: Absence of new skin breakdown  Description: Absence of new skin breakdown  4/18/2021 1302 by Shira Lara RN  Outcome: Ongoing  4/18/2021 0231 by Sherryle Bracket, RN  Outcome: Ongoing

## 2021-04-19 LAB
GLUCOSE BLD-MCNC: 116 MG/DL (ref 70–99)
GLUCOSE BLD-MCNC: 142 MG/DL (ref 70–99)
GLUCOSE BLD-MCNC: 147 MG/DL (ref 70–99)
GLUCOSE BLD-MCNC: 178 MG/DL (ref 70–99)
GLUCOSE BLD-MCNC: 186 MG/DL (ref 70–99)

## 2021-04-19 PROCEDURE — 82962 GLUCOSE BLOOD TEST: CPT

## 2021-04-19 PROCEDURE — 97530 THERAPEUTIC ACTIVITIES: CPT

## 2021-04-19 PROCEDURE — 6370000000 HC RX 637 (ALT 250 FOR IP): Performed by: INTERNAL MEDICINE

## 2021-04-19 PROCEDURE — 6370000000 HC RX 637 (ALT 250 FOR IP): Performed by: PHYSICAL MEDICINE & REHABILITATION

## 2021-04-19 PROCEDURE — 94150 VITAL CAPACITY TEST: CPT

## 2021-04-19 PROCEDURE — 97542 WHEELCHAIR MNGMENT TRAINING: CPT

## 2021-04-19 PROCEDURE — 97110 THERAPEUTIC EXERCISES: CPT

## 2021-04-19 PROCEDURE — 97535 SELF CARE MNGMENT TRAINING: CPT

## 2021-04-19 PROCEDURE — 1280000000 HC REHAB R&B

## 2021-04-19 PROCEDURE — 94761 N-INVAS EAR/PLS OXIMETRY MLT: CPT

## 2021-04-19 PROCEDURE — 99232 SBSQ HOSP IP/OBS MODERATE 35: CPT | Performed by: PHYSICAL MEDICINE & REHABILITATION

## 2021-04-19 PROCEDURE — 6360000002 HC RX W HCPCS: Performed by: PHYSICAL MEDICINE & REHABILITATION

## 2021-04-19 PROCEDURE — 97116 GAIT TRAINING THERAPY: CPT

## 2021-04-19 RX ADMIN — CARVEDILOL 6.25 MG: 6.25 TABLET, FILM COATED ORAL at 17:33

## 2021-04-19 RX ADMIN — INSULIN LISPRO 1 UNITS: 100 INJECTION, SOLUTION INTRAVENOUS; SUBCUTANEOUS at 23:37

## 2021-04-19 RX ADMIN — INSULIN LISPRO 1 UNITS: 100 INJECTION, SOLUTION INTRAVENOUS; SUBCUTANEOUS at 12:25

## 2021-04-19 RX ADMIN — CARVEDILOL 6.25 MG: 6.25 TABLET, FILM COATED ORAL at 08:19

## 2021-04-19 RX ADMIN — GABAPENTIN 100 MG: 100 CAPSULE ORAL at 23:37

## 2021-04-19 RX ADMIN — ACETAMINOPHEN 650 MG: 325 TABLET ORAL at 05:27

## 2021-04-19 RX ADMIN — LATANOPROST 1 DROP: 50 SOLUTION/ DROPS OPHTHALMIC at 23:37

## 2021-04-19 RX ADMIN — INSULIN LISPRO 1 UNITS: 100 INJECTION, SOLUTION INTRAVENOUS; SUBCUTANEOUS at 17:33

## 2021-04-19 RX ADMIN — INSULIN LISPRO 1 UNITS: 100 INJECTION, SOLUTION INTRAVENOUS; SUBCUTANEOUS at 08:21

## 2021-04-19 RX ADMIN — PANTOPRAZOLE SODIUM 40 MG: 40 TABLET, DELAYED RELEASE ORAL at 05:27

## 2021-04-19 RX ADMIN — ASPIRIN 81 MG CHEWABLE TABLET 81 MG: 81 TABLET CHEWABLE at 08:18

## 2021-04-19 RX ADMIN — ENOXAPARIN SODIUM 40 MG: 40 INJECTION SUBCUTANEOUS at 08:19

## 2021-04-19 RX ADMIN — ATORVASTATIN CALCIUM 40 MG: 40 TABLET, FILM COATED ORAL at 08:19

## 2021-04-19 RX ADMIN — DILTIAZEM HYDROCHLORIDE 60 MG: 60 TABLET, FILM COATED ORAL at 23:36

## 2021-04-19 NOTE — PROGRESS NOTES
Am Cardizem and Lisinopril held this am d/t /63-HR64. Coreg 6.25 mg given. Rechecked /61 HR 59 at 1216. Perfect served Dr. Gurmeet Serna to inform him. Dr. Gurmeet Serna responded to this nurse in person and instructed this nurse to hold AM Cardizem and Lisinopril.

## 2021-04-19 NOTE — PROGRESS NOTES
Transfer  Assistance Needed: Supervision or touching assistance  Comment: Cg to SBA, variable with 2ww  CARE Score: 4  Discharge Goal: Independent  Toilet Transfer  Assistance Needed: Supervision or touching assistance  Comment: supervision with grab bar  CARE Score: 4  Car Transfer  Assistance Needed: Partial/moderate assistance  Comment: min assist for LEs into car  CARE Score: 3  Discharge Goal: Independent    Ambulation:    Walking Ability  Does the Patient Walk?: Yes     Walk 10 Feet  Assistance Needed: Supervision or touching assistance  Comment: SBA with 2ww and B protheses  CARE Score: 4  Discharge Goal: Independent     Walk 50 Feet with Two Turns  Assistance Needed: Supervision or touching assistance  Comment: SBA 2ww and B prostheses  CARE Score: 4  Discharge Goal: Independent     Walk 150 Feet  Assistance Needed: Supervision or touching assistance  Comment: SBA with 2ww and B prostheses  Reason if not Attempted: Not attempted due to medical condition or safety concerns  CARE Score: 4  Discharge Goal: Supervision or touching assistance     Walking 10 Feet on Uneven Surfaces  Assistance Needed: Supervision or touching assistance  Comment: CG with 2ww and B prostheses  CARE Score: 4  Discharge Goal: Supervision or touching assistance     1 Step (Curb)  Assistance Needed: Partial/moderate assistance  Comment: min assist 6\" step  CARE Score: 3  Discharge Goal: Supervision or touching assistance     4 Steps  Comment: pt too fatigued  Reason if not Attempted: Not attempted due to medical condition or safety concerns  CARE Score: 88  Discharge Goal: Supervision or touching assistance     12 Steps  Reason if not Attempted: Not applicable  CARE Score: 9  Discharge Goal: Not Applicable    Gait Deviations: []None []Slow jean-pierre  [] Increased SHAD  [] Staggers []Deviated Path  [] Decreased step length  [] Decreased step height  []Decreased arm swing  [] Shuffles  [] Decreased head and trunk rotation  []other: treatment with no adverse effects    [] Patient limited by fatigue  [] Patient limited by pain   [] Patient limited by medical complications:    [] Adverse reaction to Tx:   [] Significant change in status    Safety:       []  bed alarm set    [x]  chair alarm set    []  Pt refused alarms                []  Telesitter activated      [x]  Gait belt used during tx session      []other:         Number of Minutes/Billable Intervention  Gait Training 30   Therapeutic Exercise    Neuro Re-Ed    Therapeutic Activity 20   Wheelchair Propulsion 10   Group    Other:    TOTAL 60         Social History  Social/Functional History  Lives With: Spouse(Nannette)  Type of Home: Assisted living([facility])  Home Layout: One level  Home Access: Level entry  Bathroom Shower/Tub: Tub/Shower unit  Bathroom Toilet: Standard(+ toilet riser)  Bathroom Equipment: Grab bars in shower, Tub transfer bench, Toilet raiser  Bathroom Accessibility: Walker accessible  Home Equipment: Rolling walker, Wheelchair-manual, Electric scooter(bilateral lower extremity prosthetics (bilateral BKA); also owns transport chair)  ADL Assistance: Independent  Homemaking Responsibilities: No  Ambulation Assistance: Independent(mod I with RW)  Transfer Assistance: Independent  Active : No  Patient's  Info: Wife  Education: HS Graduation  Occupation: Retired  Type of occupation: Glass   Leisure & Hobbies: Doctors visits, groceries are delivered, social functions on campus--pt uses scooter, Wife manages meds. cat  Additional Comments: Pt reports 3 falls in the last year; 1 caused L hip fx in Feb 2021. Pt sleeps in a flat, regular bed at home. Objective                                                                                    Goals:  (Update in navigator)   :   Long term goals  Time Frame for Long term goals : 7 days STG=LTG  Long term goal 1: Pt will perform all bed mobility with   independence  Long term goal 2: Pt will use 2ww and B prostheses  to perform sit to stand, pivot and car transfers with mod I  Long term goal 3: Pt will ambulate 50 including 2 turns on level surfaces with  mod I  assist  using 2ww and B prostheses  Long term goal 4: Pt will ambulate 150' on level surfaces and 20' on unlevel surfaces with supervision using 2ww and B prostheses  Long term goal 5: Pt will ascend/descend curb step with 2ww and up to   4  steps with  rail(s) with  supervision  assist  Long term goal 6: Pt will retrieve light item from floor with  supervision  assist  using 2ww and B prostheses  Long term goal 7: Pt will propel w/c in household situation for at least 150    feet with mod I:        Plan of Care                                                                              Times per week: 5 days per week for a minimum of 60 minutes/day plus group as appropriate for 60 minutes.   Treatment to include Current Treatment Recommendations: Strengthening, Balance Training, Functional Mobility Training, Transfer Training, Gait Training, Patient/Caregiver Education & Training, IADL Training, Stair training, Neuromuscular Re-education, Equipment Evaluation, Education, & procurement, Home Exercise Program, Positioning, Endurance Training, Safety Education & Training, Cognitive/Perceptual Training, Wheelchair Mobility Training    Electronically signed by   Gurpreet Smith PT  4/19/2021, 9:25 AM
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
## 2021-04-19 NOTE — PROGRESS NOTES
Occupational Therapy    Physical Rehabilitation: OCCUPATIONAL THERAPY     [x] daily progress note       [] discharge       Patient Name:  Emily Khan   :  1940 MRN: [id number]  Room:  [address] [place] [address] Date of Admission: 4/15/2021  Rehabilitation Diagnosis:   Metabolic encephalopathy [L81.01]       Date 2021       Day of ARU Week:  5   Time IN//1035   Individual Tx Minutes 58   Group Tx Minutes    Co-Treat Minutes    Concurrent Tx Minutes    TOTAL Tx Time Mins 62   Variance Time    Variance Time []   Refusal due to:     []   Medical hold/reason:    []   Illness   []   Off Unit for test/procedure  []   Extra time needed to complete task  []   Therapeutic need  []   Other (specify):   Restrictions Restrictions/Precautions: General Precautions, Weight Bearing, Fall Risk(MAVRA; B BKA with prosthesis, WBAT LLE from recent hip fx/IM nailing)         Communication with other providers: [x]   OK to see per nursing:     []   Spoke with team member regarding:      Subjective observations and cognitive status: Pt in W/C on approach, pleasantly confused, agreeable to ADL session including a full shower. \"Oh, I feel better! But I'm tired now\". Worsened excoriation in groin observed compared to eval on Friday, RN applied a creme.      Pain level/location:    /10       Location: Denied   Discharge recommendations  Anticipated discharge date:  TBD  Destination: []home alone   []home alone w assist prn   [] home w/ family    [x] Continuous supervision       []SNF    [] Assisted living     [] Other:   Continued therapy: [x]HHC OT  []OUTPATIENT  OT   [] No Further OT  Equipment needs: TBD       ADLs:    Oral Hygiene: Oral Hygiene  Assistance Needed: Supervision or touching assistance  Comment: SBA in stance at sink per PT  CARE Score: 4  Discharge Goal: Independent    UB/LB Bathing: Shower/Bathe Self  Assistance Needed: Supervision or touching assistance  Comment: supervision seated for full shower  CARE Score: 4  Discharge Goal: Supervision or touching assistance    UB Dressing: Upper Body Dressing  Assistance Needed: Setup or clean-up assistance  Comment: to don pullover T shirt  CARE Score: 5  Discharge Goal: Independent         LB Dressing: Lower Body Dressing  Assistance Needed: Partial/moderate assistance  Comment: d/t snug fit of pant legs over prostheses pt required partial assist to thread (discussed benefits of looser fitting clothing); CGA-SBA while in stance for pants management  CARE Score: 3  Discharge Goal: Independent    Donning and Carbon Footwear: Putting On/Taking Off Footwear  Comment: h/o B BKA, does not perform at baseline  Reason if not Attempted: Not applicable  CARE Score: 9  Discharge Goal: Not Applicable      Toileting: Toileting Hygiene  Assistance Needed: Supervision or touching assistance  Comment: SBA per PT  CARE Score: 4  Discharge Goal: Independent      Toilet Transfers:    Toilet Transfer  Assistance Needed: Supervision or touching assistance  Comment: supervision with grab bar  CARE Score: 4  Discharge Goal: Independent  Device Used:    []   Standard Toilet         [x]   Grab Bars           []  Bedside Commode       []   Elevated Toilet          []   Other:        Bed Mobility:           [x]   Pt received out of bed       Transfers:    Sit--> Stand:  SBA  Stand --> Sit:   SBA  Stand-Pivot:  SBA  Other:    Assistive device required for transfer:   RW      Functional Mobility:    Assistance:  SBA within room  Device:   [x]   Rolling Walker     []   Standard Walker []   Wheelchair        []   [employer]       []   4-Wheeled Rachel          []   Cardiac Walker       []   Other:          Additional Therapeutic activities/exercises completed this date:     [x]   ADL Training   [x]   Balance/Postural training     [x]   Bed/Transfer Training   [x]   Endurance Training   []   Neuromuscular Re-ed   []   Nu-step:  Time:        Level:         #Steps:       []   Rebounder:    []  Seated     []  Standing []   Supine Ther Ex (reps/sets):     []   Seated Ther Ex (reps/sets):     []   Standing Ther Ex (reps/sets):     []   Other:      Comments: All intervention performed to increase pt's endurance, ax tolerance, balance,  and I c ADLs/IADLs and functional transfers/mobility. Patient/Caregiver Education and Training:   []   [employer] Equipment Use  [x]   Bed Mobility/Transfer Technique/Safety  []   Energy Conservation Tips  []   Family training  [x]   Postural Awareness  [x]   Safety During Functional Activities  []   Reinforced Patient's Precautions   []   Progress was updated and reviewed in Rehabtracker with patient and/or family this         date. Treatment Plan for Next Session: Continue OT POC, standing balance/endurance      Assessment: This pt demonstrated a positive  response to today's treatment as evidenced by ability to perform full shower. The patient is making good progress toward established goals as evidenced by QI scores. Ongoing deficits are observed in the areas of balance, endurance and continued focus on this is recommended.        Treatment/Activity Tolerance:   [x] Tolerated treatment with no adverse effects    [] Patient limited by fatigue  [] Patient limited by pain   [] Patient limited by medical complications:    [] Adverse reaction to Tx:   [] Significant change in status    Safety:       []  bed alarm set    [x]  chair alarm set    []  Pt refused alarms                []  Telesitter activated      [x]  Gait belt used during tx session      []other:       Number of Minutes/Billable Intervention  Therapeutic Exercise    ADL Self-care 62   Neuro Re-Ed    Therapeutic Activity    Group    Other:    TOTAL 62       Social History  Social/Functional History  Lives With: Spouse(Nannette)  Type of Home: Assisted living([facility])  Home Layout: One level  Home Access: Level entry  Bathroom Shower/Tub: Tub/Shower unit  Bathroom Toilet: Standard(+ toilet riser)  Bathroom Equipment: [employer] bars in shower, Tub transfer bench, Toilet raiser  Bathroom Accessibility: Vinita Estrella accessible  Home Equipment: Rolling walker, Tonnie Porteous scooter(bilateral lower extremity prosthetics (bilateral BKA); also owns transport chair)  ADL Assistance: Independent  Homemaking Responsibilities: No  Ambulation Assistance: Independent(mod I with RW)  Transfer Assistance: Independent  Active : No  Patient's  Info: Wife  Education: HS Graduation  Occupation: Retired  Type of occupation: Glass   Leisure & Hobbies: Doctors visits, groceries are delivered, social functions on campus--pt uses scooter, Wife manages meds. cat  Additional Comments: Pt reports 3 falls in the last year; 1 caused L hip fx in Feb 2021. Pt sleeps in a flat, regular bed at home. Objective                                                                                    Goals:  (Update in navigator)  Short term goals  Time Frame for Short term goals: STGs=LTGs:  Long term goals  Time Frame for Long term goals : 7-10 days or until d/c  Long term goal 1: Pt will complete grooming tasks Ind  Long term goal 2: Pt will complete total body bathing c supervision  Long term goal 3: Pt will complete UB dressing Ind  Long term goal 4: Pt will complete LB dressing mod I  Long term goal 5: Pt will complete toileting mod I  Long term goals 6: Pt will complete functional transfers (bed, chair, toilet) c RW and mod I; shower transfer c S  Long term goal 7: Pt will perform therex/therax to facilitate increased strength/endurance (c emphasis on dynamic standing balance/endurance >8 mins, BUE endurance) c SBA:        Plan of Care                                                                              Times per week: 5 days per week for a minimum of 60 minutes/day plus group as appropriate for 60 minutes.   Treatment to include Plan  Current Treatment Recommendations: Strengthening, ROM, Endurance Training, Functional Mobility Training, Patient/Caregiver Education & Training, Positioning, Home Management Training, Equipment Evaluation, Education, & procurement, Cognitive/Perceptual Training, Balance Training, Safety Education & Training, Self-Care / ADL    Electronically signed by   Oswald Ramsay MS, OTR/L  License #[id number]    4/19/2021, 10:47 AM

## 2021-04-20 LAB
GLUCOSE BLD-MCNC: 127 MG/DL (ref 70–99)
GLUCOSE BLD-MCNC: 128 MG/DL (ref 70–99)
GLUCOSE BLD-MCNC: 198 MG/DL (ref 70–99)
GLUCOSE BLD-MCNC: 219 MG/DL (ref 70–99)
GLUCOSE BLD-MCNC: 242 MG/DL (ref 70–99)

## 2021-04-20 PROCEDURE — 6370000000 HC RX 637 (ALT 250 FOR IP): Performed by: PHYSICAL MEDICINE & REHABILITATION

## 2021-04-20 PROCEDURE — 97110 THERAPEUTIC EXERCISES: CPT

## 2021-04-20 PROCEDURE — 94761 N-INVAS EAR/PLS OXIMETRY MLT: CPT

## 2021-04-20 PROCEDURE — 97535 SELF CARE MNGMENT TRAINING: CPT

## 2021-04-20 PROCEDURE — 97116 GAIT TRAINING THERAPY: CPT

## 2021-04-20 PROCEDURE — 6370000000 HC RX 637 (ALT 250 FOR IP): Performed by: INTERNAL MEDICINE

## 2021-04-20 PROCEDURE — 94150 VITAL CAPACITY TEST: CPT

## 2021-04-20 PROCEDURE — 97530 THERAPEUTIC ACTIVITIES: CPT

## 2021-04-20 PROCEDURE — 82962 GLUCOSE BLOOD TEST: CPT

## 2021-04-20 PROCEDURE — 99233 SBSQ HOSP IP/OBS HIGH 50: CPT | Performed by: PHYSICAL MEDICINE & REHABILITATION

## 2021-04-20 PROCEDURE — 6360000002 HC RX W HCPCS: Performed by: PHYSICAL MEDICINE & REHABILITATION

## 2021-04-20 PROCEDURE — 1280000000 HC REHAB R&B

## 2021-04-20 RX ADMIN — DILTIAZEM HYDROCHLORIDE 60 MG: 60 TABLET, FILM COATED ORAL at 22:09

## 2021-04-20 RX ADMIN — INSULIN LISPRO 2 UNITS: 100 INJECTION, SOLUTION INTRAVENOUS; SUBCUTANEOUS at 12:47

## 2021-04-20 RX ADMIN — GABAPENTIN 100 MG: 100 CAPSULE ORAL at 22:10

## 2021-04-20 RX ADMIN — INSULIN LISPRO 2 UNITS: 100 INJECTION, SOLUTION INTRAVENOUS; SUBCUTANEOUS at 17:35

## 2021-04-20 RX ADMIN — CARVEDILOL 6.25 MG: 6.25 TABLET, FILM COATED ORAL at 08:33

## 2021-04-20 RX ADMIN — LISINOPRIL 5 MG: 5 TABLET ORAL at 08:33

## 2021-04-20 RX ADMIN — ENOXAPARIN SODIUM 40 MG: 40 INJECTION SUBCUTANEOUS at 08:33

## 2021-04-20 RX ADMIN — LATANOPROST 1 DROP: 50 SOLUTION/ DROPS OPHTHALMIC at 22:13

## 2021-04-20 RX ADMIN — DILTIAZEM HYDROCHLORIDE 60 MG: 60 TABLET, FILM COATED ORAL at 08:33

## 2021-04-20 RX ADMIN — ACETAMINOPHEN 650 MG: 325 TABLET ORAL at 02:21

## 2021-04-20 RX ADMIN — CARVEDILOL 6.25 MG: 6.25 TABLET, FILM COATED ORAL at 16:46

## 2021-04-20 RX ADMIN — INSULIN LISPRO 1 UNITS: 100 INJECTION, SOLUTION INTRAVENOUS; SUBCUTANEOUS at 22:15

## 2021-04-20 RX ADMIN — ASPIRIN 81 MG CHEWABLE TABLET 81 MG: 81 TABLET CHEWABLE at 08:32

## 2021-04-20 RX ADMIN — ATORVASTATIN CALCIUM 40 MG: 40 TABLET, FILM COATED ORAL at 08:33

## 2021-04-20 RX ADMIN — ACETAMINOPHEN 650 MG: 325 TABLET ORAL at 06:42

## 2021-04-20 RX ADMIN — PANTOPRAZOLE SODIUM 40 MG: 40 TABLET, DELAYED RELEASE ORAL at 06:42

## 2021-04-20 ASSESSMENT — PAIN DESCRIPTION - LOCATION: LOCATION: KNEE

## 2021-04-20 ASSESSMENT — PAIN DESCRIPTION - DESCRIPTORS: DESCRIPTORS: DISCOMFORT;ACHING

## 2021-04-20 ASSESSMENT — PAIN DESCRIPTION - ONSET: ONSET: GRADUAL

## 2021-04-20 ASSESSMENT — PAIN DESCRIPTION - FREQUENCY: FREQUENCY: INTERMITTENT

## 2021-04-20 ASSESSMENT — PAIN SCALES - GENERAL: PAINLEVEL_OUTOF10: 3

## 2021-04-20 ASSESSMENT — PAIN DESCRIPTION - PAIN TYPE: TYPE: ACUTE PAIN

## 2021-04-20 NOTE — PROGRESS NOTES
Physical Therapy  [x] daily progress note       [] discharge       Patient Name:  Sixto Rehman   :  1940 MRN: [id number]  Room:  [address] [place] [address] Date of Admission: 4/15/2021  Rehabilitation Diagnosis:   Metabolic encephalopathy [H29.10]       Date 2021       Day of ARU Week:  6   Time IN//932   Individual Tx Minutes 60   Group Tx Minutes    Co-Treat Minutes    Concurrent Tx Minutes    TOTAL Tx Time Mins 60   Variance Time    Variance Time []   Refusal due to:     []   Medical hold/reason:    []   Illness   []   Off Unit for test/procedure  []   Extra time needed to complete task  []   Therapeutic need  []   Other (specify):   Restrictions Restrictions/Precautions  Restrictions/Precautions: General Precautions, Weight Bearing, Fall Risk(; B BKA with prosthesis, WBAT LLE from recent hip fx/IM nailing)      Interdisciplinary communication [x]   Cleared for therapy per nursing     []   RN notified about issues during session  []   RN updated on pt performance  []   Spoke with   []   Spoke with OT  []   Spoke with MD  []   Other:    Subjective observations and cognitive status: Pt in bed, agreeable to therapy. No complaints this morning     Pain level/location:   0 /10       Location:    Discharge recommendations  Anticipated discharge date:    Destination: []home alone   []home alone with assist PRN     [x] home w/ family      [] Continuous supervision  []SNF    [x] Assisted living     [] Other:  Continued therapy: [x]HHC PT  []OUTPATIENT  PT   [] No Further PT  []SNF PT  Caregiver training recommended: []Yes  [x] No   Equipment needs: none     Bed Mobility:           []   Pt received out of bed   Rolling R/L:  independent  Scooting:   I  Lying --> Sit:  Supervision with min effort  Sit --> lying:  I  Bed features used: [] Yes  [x] No       Transfers:    Sit--> Stand:  SBA  Stand --> Sit:   SBA  Chair-->Bed/Bed --> Chair:   SBA  Assistive device required for transfer:  2ww, B prostheses  Gait:    Distance:  290', pt felt ply was off on prostheses, returned to room to adjust. Pt then ambulated 40' and felt it was better. Assistance:  SBA  Device:  2ww, B prostheses  Gait Quality:  Slightly discontinuous steps, equal step length        Additional Therapeutic activities/exercises completed this date:     []   Nu-step:  Time:        Level:         #Steps:       []   Rebounder:    []  Seated     []  Standing        [x]   Balance training: during donning of prostheses, pt needed occasional CG to prevent posterior LOB with pt needing to sit on bed x2 due to inability to self recover. Educated pt in keeping wider SHAD and to always perform this task while in front of a bed or chair. Pt verbalized understanding. []   Postural training    []   Supine ther ex (reps/sets):     []   Seated ther ex (reps/sets):     []   Standing ther ex (reps/sets):     []   Picking up object from floor (standing):                   []   Reacher used   [x]   Other: Pt donned own prostheses with one cue for set up, then after gait, adjusted ply of socks as he felt L was tight and R loose. Pt needed increased time to complete this task   []   Other:  ORTHO EXERCISES    Position:   [x] supine   [x] seated       Repetitions: []5    []10    []15    [x]20    []25  Exercises completed:  [] AP's    [] quad sets    [] hamstring sets    [] gluteal sets    [x] heelslides    [x] SAQ , 5 second hold  [x] SLR, 5 second hold for hamstring stretch  [x] hip abduction    [x] LAQ  [] knee flex    [x] marching  X  Hip extension in sidelying with 3 second stretch of hip flexors.      Patient/Caregiver Education and Training:   []   Role of PT  []   Education about Dx  []   Use of call light for assist   []   Wheelchair mobility/management  []   HEP provided and explained   []   Treatment plan reviewed  []   Home safety  []   Body mechanics  []   Positioning  [x]   Bed Mobility/Transfer technique  [x]   Gait technique/sequencing  [x]   Proper use of assistive device/adaptive equipment  []   Stair training/Advanced mobility safety and technique  []   Reinforced patient's precautions/mobility while maintaining precautions  []   Postural awareness  []   Family/caregiver training  []   Progress was updated and reviewed in Rehabtracker with patient and/or family this date. []   Other:      Treatment Plan for Next Session: uneven surfaces, curb step, QI    Assessment:    Assessment: This pt demonstrated a positive response to today's treatment as evidenced by improving mobility. The patient is making expected progress toward established goals as evidenced by QI scores.  Ongoing deficits are observed in the areas of balance, activity tolerance  Treatment/Activity Tolerance:   [x] Tolerated treatment with no adverse effects    [] Patient limited by fatigue  [] Patient limited by pain   [] Patient limited by medical complications:    [] Adverse reaction to Tx:   [] Significant change in status    Barrier/s to progress/learning:   []   None  [x]   Cognition  []   Hearing deficit  []   Pre-morbid mental/psychological status   []   Motivation  []   Communication  []   Anxiety  []   Vision deficit  []   Attention  []   Other:      Safety:       []  bed alarm set    [x]  chair alarm set    []  Pt refused alarms                []  Telesitter activated      [x]  Gait belt used during tx session      []other:         Number of Minutes/Billable Intervention  Gait Training 15   Therapeutic Exercise 35   Neuro Re-Ed    Therapeutic Activity 10   Wheelchair Propulsion    Group    Other:    TOTAL 60         Social History  Social/Functional History  Lives With: Spouse(Nannette)  Type of Home: Assisted living([facility])  Home Layout: One level  Home Access: Level entry  Bathroom Shower/Tub: Tub/Shower unit  Bathroom Toilet: Standard(+ toilet riser)  Bathroom Equipment: Grab bars in shower, Tub transfer bench, Toilet raiser  Bathroom Accessibility: Walker accessible  Home Equipment: Rolling walker, [employer], Electric scooter(bilateral lower extremity prosthetics (bilateral BKA); also owns transport chair)  ADL Assistance: Independent  Homemaking Responsibilities: No  Ambulation Assistance: Independent(mod I with RW)  Transfer Assistance: Independent  Active : No  Patient's  Info: Wife  Education: HS Graduation  Occupation: Retired  Type of occupation: Glass   Leisure & Hobbies: Doctors visits, groceries are delivered, social functions on campus--pt uses scooter, Wife manages meds. cat  Additional Comments: Pt reports 3 falls in the last year; 1 caused L hip fx in Feb 2021. Pt sleeps in a flat, regular bed at home. Objective                                                                                    Goals:  (Update in navigator)   : Long term goals  Time Frame for Long term goals : 7 days STG=LTG  Long term goal 1: Pt will perform all bed mobility with   independence  Long term goal 2: Pt will use 2ww and B prostheses  to perform sit to stand, pivot and car transfers with mod I  Long term goal 3: Pt will ambulate 50 including 2 turns on level surfaces with  mod I  assist  using 2ww and B prostheses  Long term goal 4: Pt will ambulate 150' on level surfaces and 20' on unlevel surfaces with supervision using 2ww and B prostheses  Long term goal 5: Pt will ascend/descend curb step with 2ww and up to   4  steps with  rail(s) with  supervision  assist  Long term goal 6: Pt will retrieve light item from floor with  supervision  assist  using 2ww and B prostheses  Long term goal 7: Pt will propel w/c in household situation for at least 150    feet with mod I:        Plan of Care                                                                              Times per week: 5 days per week for a minimum of 60 minutes/day plus group as appropriate for 60 minutes.   Treatment to include Current Treatment

## 2021-04-20 NOTE — CARE COORDINATION
LSW met with patient following Care Conference. LSW informed patient that there are no DME recommendations. Patient agreeable to this. LSW then informed of recommendation for [facility] and patient is agreeable. Patient verbalized understanding and would like to use [facility], which he has used in the past.    D/C Plan:  Date:  Apr. 22nd  DME:  Patient has all recommended. HHC:  PT, Nursing [facility])  To:   [address] with wife (transport needed)

## 2021-04-20 NOTE — PROGRESS NOTES
[facility]    : 1940  Acct #: [de-identified]  MRN: [id number]              PM&R Progress Note      Admitting diagnosis: Metabolic encephalopathy ( [place] [address])     Comorbid diagnoses impacting rehabilitation: Generalized weakness, exacerbation of COPD with hypoxia, urinary tract infection, chronic systolic congestive heart failure, acute cholangitis, uncontrolled diabetes type 2 with hyperglycemia, essential hypertension, history of bilateral below-knee amputations, pituitary macroadenoma     Chief complaint: Pleased with his activity resumption. No new problems. Prior (baseline) level of function: Independent.     Current level of function:         Current  [facility] and Goals:   Occupational Therapy:    Short term goals  Time Frame for Short term goals: STGs=LTGs :   Long term goals  Time Frame for Long term goals : 7-10 days or until d/c  Long term goal 1: Pt will complete grooming tasks Ind  Long term goal 2: Pt will complete total body bathing c supervision  Long term goal 3: Pt will complete UB dressing Ind  Long term goal 4: Pt will complete LB dressing mod I  Long term goal 5: Pt will complete toileting mod I  Long term goals 6: Pt will complete functional transfers (bed, chair, toilet) c RW and mod I; shower transfer c S  Long term goal 7: Pt will perform therex/therax to facilitate increased strength/endurance (c emphasis on dynamic standing balance/endurance >8 mins, BUE endurance) c SBA :                                       Eating: Eating  Assistance Needed: Independent  Comment: occasionally requires increased time but able to open packages/containers  CARE Score: 6  Discharge Goal: Independent       Oral Hygiene: Oral Hygiene  Assistance Needed: Supervision or touching assistance  Comment: SBA in stance at sink per PT  CARE Score: 4  Discharge Goal: Independent    UB/LB Bathing: Shower/Bathe Self  Assistance Needed: Supervision or touching assistance  Comment: supervision seated for full 150    feet with mod I      Bed Mobility:   Sit to Lying  Assistance Needed: Independent  CARE Score: 6  Discharge Goal: Independent  Roll Left and Right  Assistance Needed: Independent  CARE Score: 6  Discharge Goal: Independent  Lying to Sitting on Side of Bed  Assistance Needed: Independent  CARE Score: 6  Discharge Goal: Independent    Transfers:    Sit to Stand  Assistance Needed: Supervision or touching assistance  Comment: CG to SBA, variable  CARE Score: 4  Discharge Goal: Independent  Chair/Bed-to-Chair Transfer  Assistance Needed: Supervision or touching assistance  Comment: Cg to SBA, variable with 2ww  CARE Score: 4  Discharge Goal: Independent  Toilet Transfer  Assistance Needed: Supervision or touching assistance  Comment: supervision with grab bar  CARE Score: 4  Car Transfer  Assistance Needed: Partial/moderate assistance  Comment: min assist for LEs into car  CARE Score: 3  Discharge Goal: Independent    Ambulation:    Walking Ability  Does the Patient Walk?: Yes     Walk 10 Feet  Assistance Needed: Supervision or touching assistance  Comment: SBA with 2ww and B protheses  CARE Score: 4  Discharge Goal: Independent     Walk 50 Feet with Two Turns  Assistance Needed: Supervision or touching assistance  Comment: SBA 2ww and B prostheses  CARE Score: 4  Discharge Goal: Independent     Walk 150 Feet  Assistance Needed: Supervision or touching assistance  Comment: SBA with 2ww and B prostheses  Reason if not Attempted: Not attempted due to medical condition or safety concerns  CARE Score: 4  Discharge Goal: Supervision or touching assistance     Walking 10 Feet on Uneven Surfaces  Assistance Needed: Supervision or touching assistance  Comment: CG with 2ww and B prostheses  CARE Score: 4  Discharge Goal: Supervision or touching assistance     1 Step (Curb)  Assistance Needed: Partial/moderate assistance  Comment: min assist 6\" step  CARE Score: 3  Discharge Goal: Supervision or touching assistance     4 Steps  Comment: pt too fatigued  Reason if not Attempted: Not attempted due to medical condition or safety concerns  CARE Score: 88  Discharge Goal: Supervision or touching assistance     12 Steps  Reason if not Attempted: Not applicable  CARE Score: 9  Discharge Goal: Not Applicable       Wheelchair:  w/c Ability: Wheelchair Ability  Uses a Wheelchair and/or Scooter?: Yes  Wheel 50 Feet with Two Turns  Assistance Needed: Independent  Comment: BUE  CARE Score: 6  Discharge Goal: Independent  Wheel 150 Feet  Assistance Needed: Independent  Comment: 365'  CARE Score: 6  Discharge Goal: Independent          Balance:        Object: Picking Up Object  Assistance Needed: Supervision or touching assistance  Comment: CG with 2ww  CARE Score: 4  Discharge Goal: Supervision or touching assistance    I      Exam:    Blood pressure 139/62, pulse 54, temperature 97.5 °F (36.4 °C), temperature source Oral, resp. rate 18, height 5' 8\" (1.727 m), weight 130 lb 6.4 oz (59.1 kg), SpO2 96 %. General: Up in a wheelchair. Prosthetics in place. Alert. In no distress. HEENT: Talkative. Easily distracted but able to answer direct questions. Hard of hearing. Pulmonary: Symmetric air exchange without coughing. Cardiac: RRR. Abdomen: Patient's abdomen is soft and nondistended. Bowel sounds were present throughout. There was no rebound, guarding or masses noted. Upper extremities: Moving both upper limbs though functional range of motion. [address] coordination. Lower extremities: Prosthetics fitting without ill effects on the stumps. Sitting balance was good. Standing balance was fair-with the prosthetics.     Lab Results   Component Value Date    WBC 7.6 04/13/2021    HGB 12.2 (L) 04/13/2021    HCT 39.5 (L) 04/13/2021    MCV 93.6 04/13/2021     04/13/2021     Lab Results   Component Value Date    INR 1.11 04/08/2021    INR 1.23 04/07/2021    INR 1.18 04/06/2021    PROTIME 13.4 04/08/2021    PROTIME 14.9 (H) 04/07/2021    PROTIME 14.3 04/06/2021     Lab Results   Component Value Date    CREATININE 1.1 04/13/2021    BUN 20 04/13/2021     04/13/2021    K 3.8 04/13/2021     04/13/2021    CO2 26 04/13/2021     Lab Results   Component Value Date    ALT 11 04/10/2021    AST 21 04/10/2021    ALKPHOS 91 04/10/2021    BILITOT 0.2 04/10/2021       Expected length of stay  prior to a supervised level of function for discharge home with a walker and [facility] OT/PT is 4/22/2021. Recommendations:    1. Metabolic encephalopathy with gait disturbance:   He is demonstrating significant benefit from participating in the daily occupational and physical therapy with speech-language pathology.    Asymptomatic from his his cholangitis, UTI and COPD now. Tolerating the supplemental oxygen, steroid taper and inhalers. Glenis Kirkpatrick has completed the oral antibiotics and is tolerating an oral diet.    Providing DVT prophylaxis, pulmonary hygiene and nutritional support.  Bowel and bladder retraining.  Progressing with the use of his prosthetic devices for most mobility and self-care. 2. DVT prophylaxis: Lovenox 40 mg subcu daily.  I must monitor his hemoglobin and platelet count periodically while on this medication.  Weightbearing activities are improving daily.  GI prophylaxis offered.  No new bruising or swelling. 3. Exacerbation of COPD with hypoxia: Patient is on supplemental oxygen on doses as he is at home. Benefiting from incentive spirometry and inhalers as needed.  Continue monitoring O2 saturations at rest and with activity. Tolerating being off the steroids. 4. Uncontrolled diabetes type 2 with hyperglycemia: Patient requires a diet modified for carbohydrates.    With his poor intake, he is on Humalog sliding scale only.  Blood sugars are checked at mealtime and bedtime.   5. Urinary tract infection: Patient has completed a course of Macrobid.  No current signs of recurrence of UTI.  Encouraging oral hydration. 6. Chronic systolic congestive heart failure: No added salt at the table.  Coreg for afterload reduction.  Antiplatelet therapy and a statin for his heart disease.  Lisinopril.  Monitoring his weights daily for evidence of decompensation.    7. Acute cholangitis: Cecil diet. Zoey Brantley has completed antibiotic therapy.    Asymptomatic. 8. Essential hypertension: Coreg, Cardizem and lisinopril are used to control systolic blood pressure.  Target systolic blood pressure is 120-140.  Vital signs are checked at rest and with activity.  Encouraging oral hydration.  Blood pressure within target range today. 9. Bilateral below-knee amputation: Patient has his bilateral prostheses here in his room.    Addressing range of motion of the knee so he can safely  his prosthetic devices.  Monitoring his stumps for pressure injury. 10. Pituitary macroadenoma: Outpatient follow-up with serial imaging per the pre-existing schedule.    No evidence of any neurologic changes or seizures.      Counseling and Coordination of Care: In care conference today I met with the patient's OT, PT, RN and . We discussed the patient's problems, progress and prognosis. Disposition issues were clarified and plans were established for ongoing rehabilitation efforts beyond the ARU stay. I reviewed this information with the patient during a second distinct visit with the patient. More than half of the total time of 35 minutes spent with the patient involved counseling and coordination of care.

## 2021-04-20 NOTE — PROGRESS NOTES
Supervision Toilet Transfer  Assistance Needed: Supervision or touching assistance  Comment: Supervision c grab bar  CARE Score: 4  Discharge Goal: Independent  Device Used:    [x]   Standard Toilet         [x]   Grab Bars           []  Bedside Commode       []   Elevated Toilet          []   Other:        Bed Mobility:           [x]   Pt received out of bed       Transfers:    Sit--> Stand:  SBA   Stand --> Sit:   SBA   Stand-Pivot:   SBA   Other:    Assistive device required for transfer:   RW       Functional Mobility:  AM: 151ft c W/C      PM: 24ft +24ft c RW   Assistance:  SBA   Device:   [x]   Rolling Walker     []   Standard Walker [x]   Wheelchair        []   [employer]       []   4-Wheeled Walker         []   Cardiac Walker       []   Other:          Additional Therapeutic activities/exercises completed this date:     [x]   ADL Training   [x]   Balance/Postural training: Pt stood for ~12:30mins c one UE on walker at SBA and ~15:20 mins c no support at SBA to increase standing balance and endurance for improved ADLs. and transfers. []   Bed/Transfer Training   [x]   Endurance Training: Pt completed ~10 mins on restorator on min resistance, 50-55 RPM, to increase strength and endurance needed for improved ADLs and transfers. []   Neuromuscular Re-ed   []   Nu-step:  Time:        Level:         #Steps:       []   Rebounder:    []  Seated     []  Standing        []   Supine Ther Ex (reps/sets):     []   Seated Ther Ex (reps/sets):     []   Standing Ther Ex (reps/sets):     []   Other:      Comments:      Patient/Caregiver Education and Training:   []   [employer] Equipment Use  []   Bed Mobility/Transfer Technique/Safety  []   Energy Conservation Tips  []   Family training  []   Postural Awareness  []   Safety During Functional Activities  []   Reinforced Patient's Precautions   []   Progress was updated and reviewed in Rehabtracker with patient and/or family this         date.     Treatment Plan for Next Session: Continue OT POC       Assessment: This pt demonstrated a positive response to today's treatment as evidenced by improved standing endurance. The patient is making good progress toward established goals as evidenced by QI scores. Ongoing deficits are observed in the areas of strength and transfers and continued focus on this is recommended. Treatment/Activity Tolerance:   [x] Tolerated treatment with no adverse effects    [] Patient limited by fatigue  [] Patient limited by pain   [] Patient limited by medical complications:    [] Adverse reaction to Tx:   [] Significant change in status    Safety:       []  bed alarm set    [x]  chair alarm set    []  Pt refused alarms                []  Telesitter activated      [x]  Gait belt used during tx session      []other:       Number of Minutes/Billable Intervention  Therapeutic Exercise 15   ADL Self-care 45   Neuro Re-Ed    Therapeutic Activity 60   Group    Other:    TOTAL 120       Social History  Social/Functional History  Lives With: Spouse(Nannette)  Type of Home: Assisted living([facility])  Home Layout: One level  Home Access: Level entry  Bathroom Shower/Tub: Tub/Shower unit  Bathroom Toilet: Standard(+ toilet riser)  Bathroom Equipment: Grab bars in shower, Tub transfer bench, Toilet raiser  Bathroom Accessibility: Walker accessible  Home Equipment: Rolling walker, Wheelchair-manual, Electric scooter(bilateral lower extremity prosthetics (bilateral BKA); also owns transport chair)  ADL Assistance: Independent  Homemaking Responsibilities: No  Ambulation Assistance: Independent(mod I with RW)  Transfer Assistance: Independent  Active : No  Patient's  Info: Wife  Education: HS Graduation  Occupation: Retired  Type of occupation: Glass   Leisure & Hobbies: Doctors visits, groceries are delivered, social functions on campus--pt uses scooter, Wife manages meds.  cat  Additional Comments: Pt reports 3 falls in the last year; 1 caused L hip fx in Feb 2021. Pt sleeps in a flat, regular bed at home. Objective                                                                                    Goals:  (Update in navigator)  Short term goals  Time Frame for Short term goals: STGs=LTGs:  Long term goals  Time Frame for Long term goals : 7-10 days or until d/c  Long term goal 1: Pt will complete grooming tasks Ind  Long term goal 2: Pt will complete total body bathing c supervision  Long term goal 3: Pt will complete UB dressing Ind  Long term goal 4: Pt will complete LB dressing mod I  Long term goal 5: Pt will complete toileting mod I  Long term goals 6: Pt will complete functional transfers (bed, chair, toilet) c RW and mod I; shower transfer c S  Long term goal 7: Pt will perform therex/therax to facilitate increased strength/endurance (c emphasis on dynamic standing balance/endurance >8 mins, BUE endurance) c SBA:        Plan of Care                                                                              Times per week: 5 days per week for a minimum of 60 minutes/day plus group as appropriate for 60 minutes.   Treatment to include Plan  Current Treatment Recommendations: Strengthening, ROM, Endurance Training, Functional Mobility Training, Patient/Caregiver Education & Training, Positioning, Home Management Training, Equipment Evaluation, Education, & procurement, Cognitive/Perceptual Training, Balance Training, Safety Education & Training, Self-Care / ADL    Electronically signed by   JMIMY Abreu   4/20/2021, 10:34 AM

## 2021-04-20 NOTE — PROGRESS NOTES
Jose Alejandro Lam    : 1940  Acct #: [de-identified]  MRN: [id number]              PM&R Progress Note      Admitting diagnosis: Metabolic encephalopathy ( [place] [address] [address])     Comorbid diagnoses impacting rehabilitation: Generalized weakness, exacerbation of COPD with hypoxia, urinary tract infection, chronic systolic congestive heart failure, acute cholangitis, uncontrolled diabetes type 2 with hyperglycemia, essential hypertension, history of bilateral below-knee amputations, pituitary macroadenoma     Chief complaint: Diarrhea is now under control. Some positional dizziness with low blood pressure in therapy. Prior (baseline) level of function: Independent.     Current level of function:         Current  [facility] and Goals:   Occupational Therapy:    Short term goals  Time Frame for Short term goals: STGs=LTGs :   Long term goals  Time Frame for Long term goals : 7-10 days or until d/c  Long term goal 1: Pt will complete grooming tasks Ind  Long term goal 2: Pt will complete total body bathing c supervision  Long term goal 3: Pt will complete UB dressing Ind  Long term goal 4: Pt will complete LB dressing mod I  Long term goal 5: Pt will complete toileting mod I  Long term goals 6: Pt will complete functional transfers (bed, chair, toilet) c RW and mod I; shower transfer c S  Long term goal 7: Pt will perform therex/therax to facilitate increased strength/endurance (c emphasis on dynamic standing balance/endurance >8 mins, BUE endurance) c SBA :                                       Eating: Eating  Assistance Needed: Independent  Comment: occasionally requires increased time but able to open packages/containers  CARE Score: 6  Discharge Goal: Independent       Oral Hygiene: Oral Hygiene  Assistance Needed: Supervision or touching assistance  Comment: SBA in stance at sink per PT  CARE Score: 4  Discharge Goal: Independent    UB/LB Bathing: Shower/Bathe Self  Assistance Needed: Supervision or touching assistance  Comment: supervision seated for full shower  CARE Score: 4  Discharge Goal: Supervision or touching assistance    UB Dressing: Upper Body Dressing  Assistance Needed: Setup or clean-up assistance  Comment: to don pullover T shirt  CARE Score: 5  Discharge Goal: Independent         LB Dressing: Lower Body Dressing  Assistance Needed: Partial/moderate assistance  Comment: d/t snug fit of pant legs over prostheses pt required partial assist to thread (discussed benefits of looser fitting clothing); CGA-SBA while in stance for pants management  CARE Score: 3  Discharge Goal: Independent    Donning and [place] Footwear: Putting On/Taking Off Footwear  Comment: h/o B BKA, does not perform at baseline  Reason if not Attempted: Not applicable  CARE Score: 9  Discharge Goal: Not Applicable      Toileting: Toileting Hygiene  Assistance Needed: Supervision or touching assistance  Comment: SBA per PT  CARE Score: 4  Discharge Goal: Independent      Toilet Transfers:   Toilet Transfer  Assistance Needed: Supervision or touching assistance  Comment: supervision with grab bar  CARE Score: 4  Discharge Goal: Independent    Physical Therapy:         Long term goals  Time Frame for Long term goals : 7 days STG=LTG  Long term goal 1: Pt will perform all bed mobility with   independence  Long term goal 2: Pt will use 2ww and B prostheses  to perform sit to stand, pivot and car transfers with mod I  Long term goal 3: Pt will ambulate 50 including 2 turns on level surfaces with  mod I  assist  using 2ww and B prostheses  Long term goal 4: Pt will ambulate 150' on level surfaces and 20' on unlevel surfaces with supervision using 2ww and B prostheses  Long term goal 5: Pt will ascend/descend curb step with 2ww and up to   4  steps with  rail(s) with  supervision  assist  Long term goal 6: Pt will retrieve light item from floor with  supervision  assist  using 2ww and B prostheses  Long term goal 7: Pt will propel w/c in household situation for at least 150    feet with mod I      Bed Mobility:   Sit to Lying  Assistance Needed: Independent  CARE Score: 6  Discharge Goal: Independent  Roll Left and Right  Assistance Needed: Independent  CARE Score: 6  Discharge Goal: Independent  Lying to Sitting on Side of Bed  Assistance Needed: Independent  CARE Score: 6  Discharge Goal: Independent    Transfers:    Sit to Stand  Assistance Needed: Supervision or touching assistance  Comment: CG to SBA, variable  CARE Score: 4  Discharge Goal: Independent  Chair/Bed-to-Chair Transfer  Assistance Needed: Supervision or touching assistance  Comment: Cg to SBA, variable with 2ww  CARE Score: 4  Discharge Goal: Independent  Toilet Transfer  Assistance Needed: Supervision or touching assistance  Comment: supervision with grab bar  CARE Score: 4  Car Transfer  Assistance Needed: Partial/moderate assistance  Comment: min assist for LEs into car  CARE Score: 3  Discharge Goal: Independent    Ambulation:    Walking Ability  Does the Patient Walk?: Yes     Walk 10 Feet  Assistance Needed: Supervision or touching assistance  Comment: SBA with 2ww and B protheses  CARE Score: 4  Discharge Goal: Independent     Walk 50 Feet with Two Turns  Assistance Needed: Supervision or touching assistance  Comment: SBA 2ww and B prostheses  CARE Score: 4  Discharge Goal: Independent     Walk 150 Feet  Assistance Needed: Supervision or touching assistance  Comment: SBA with 2ww and B prostheses  Reason if not Attempted: Not attempted due to medical condition or safety concerns  CARE Score: 4  Discharge Goal: Supervision or touching assistance     Walking 10 Feet on Uneven Surfaces  Assistance Needed: Supervision or touching assistance  Comment: CG with 2ww and B prostheses  CARE Score: 4  Discharge Goal: Supervision or touching assistance     1 Step (Curb)  Assistance Needed: Partial/moderate assistance  Comment: min assist 6\" step  CARE Score: 3  Discharge Goal: Supervision or touching assistance     4 Steps  Comment: pt too fatigued  Reason if not Attempted: Not attempted due to medical condition or safety concerns  CARE Score: 88  Discharge Goal: Supervision or touching assistance     12 Steps  Reason if not Attempted: Not applicable  CARE Score: 9  Discharge Goal: Not Applicable       Wheelchair:  w/c Ability: Wheelchair Ability  Uses a Wheelchair and/or Scooter?: Yes  Wheel 50 Feet with Two Turns  Assistance Needed: Independent  Comment: BUE  CARE Score: 6  Discharge Goal: Independent  Wheel 150 Feet  Assistance Needed: Independent  Comment: 365'  CARE Score: 6  Discharge Goal: Independent          Balance:        Object: Picking Up Object  Assistance Needed: Supervision or touching assistance  Comment: CG with 2ww  CARE Score: 4  Discharge Goal: Supervision or touching assistance    I      Exam:    Blood pressure (!) 155/69, pulse 65, temperature 96.4 °F (35.8 °C), temperature source Oral, resp. rate 17, height 5' 8\" (1.727 m), weight 133 lb 12.8 oz (60.7 kg), SpO2 96 %. General: Up in a bedside chair. Prosthetics in place. HEENT: MMM. Speech clear. Full visual field. Pulmonary: Unlabored breathing without rales or rhonchi. Cardiac: Regular rate and rhythm. Abdomen: Patient's abdomen is soft and nondistended. Bowel sounds were present throughout. There was no rebound, guarding or masses noted. Upper extremities: Moving his upper limbs still functional range of motion. Lacks reflexes. Fair  strength. Lower extremities: Both prostheses are in place. Thighs are soft. Sitting balance was good. Standing balance was poor.     Lab Results   Component Value Date    WBC 7.6 04/13/2021    HGB 12.2 (L) 04/13/2021    HCT 39.5 (L) 04/13/2021    MCV 93.6 04/13/2021     04/13/2021     Lab Results   Component Value Date    INR 1.11 04/08/2021    INR 1.23 04/07/2021    INR 1.18 04/06/2021    PROTIME 13.4 04/08/2021    PROTIME 14.9 (H) 04/07/2021    PROTIME 14.3 04/06/2021     Lab Results   Component Value Date    CREATININE 1.1 04/13/2021    BUN 20 04/13/2021     04/13/2021    K 3.8 04/13/2021     04/13/2021    CO2 26 04/13/2021     Lab Results   Component Value Date    ALT 11 04/10/2021    AST 21 04/10/2021    ALKPHOS 91 04/10/2021    BILITOT 0.2 04/10/2021       Expected length of stay  prior to a supervised level of function for discharge home with a walker and [facility] OT/PT is 2 weeks. Recommendations:    1. Metabolic encephalopathy with gait disturbance:  He responds to verbal cues to participate regularly in the daily occupational and physical therapy with speech-language pathology. Asymptomatic from his his cholangitis, UTI and COPD now on the ARU.  Continue providing supplemental oxygen, steroid taper and inhalers. Aileen Mcdonald is on oral antibiotics and an oral diet.    Providing DVT prophylaxis, pulmonary hygiene and nutritional support.  Bowel and bladder retraining. Anticipating that he will be able to use his prosthetic devices for most mobility and self-care by discharge. 2. DVT prophylaxis: Lovenox 40 mg subcu daily.  I must monitor his hemoglobin and platelet count periodically while on this medication.  Weightbearing activities are pursued daily.  GI prophylaxis offered. No clinical signs of blood loss. 3. Exacerbation of COPD with hypoxia: Patient is on supplemental oxygen, incentive spirometry and inhalers as needed.  Monitoring O2 saturations at rest and with activity.  Monitoring his tolerance for being off the steroids. 4. Uncontrolled diabetes type 2 with hyperglycemia: Patient requires a diet modified for carbohydrates.    With his poor intake, he is on Humalog sliding scale only.  Blood sugars are checked at mealtime and bedtime. 5. Urinary tract infection: Patient has completed a course of Macrobid. No current signs of recurrence of UTI.  Encouraging oral hydration.   6. Chronic systolic congestive heart failure: No added salt at the table.  Coreg for afterload reduction.  Antiplatelet therapy and a statin for his heart disease.  Lisinopril.  Monitoring his weights daily for evidence of decompensation.    7. Acute cholangitis: [place] diet. [facility] has completed antibiotic therapy.    Asymptomatic. 8. Essential hypertension: Coreg, Cardizem and lisinopril are used to control systolic blood pressure.  Target systolic blood pressure is 120-140.  Vital signs are checked at rest and with activity.  Encouraging oral hydration. Blood pressure within target range today. 9. Bilateral below-knee amputation: Patient has his bilateral prostheses here in his room.    Addressing range of motion of the knee so he can safely  his prosthetic devices.  Monitoring his stumps for pressure injury.   10. Pituitary macroadenoma: Outpatient follow-up with serial imaging per the pre-existing schedule.    No evidence of any neurologic changes or seizures.

## 2021-04-21 LAB
GLUCOSE BLD-MCNC: 119 MG/DL (ref 70–99)
GLUCOSE BLD-MCNC: 128 MG/DL (ref 70–99)
GLUCOSE BLD-MCNC: 157 MG/DL (ref 70–99)
GLUCOSE BLD-MCNC: 216 MG/DL (ref 70–99)
GLUCOSE BLD-MCNC: 265 MG/DL (ref 70–99)

## 2021-04-21 PROCEDURE — 6370000000 HC RX 637 (ALT 250 FOR IP): Performed by: PHYSICAL MEDICINE & REHABILITATION

## 2021-04-21 PROCEDURE — 97535 SELF CARE MNGMENT TRAINING: CPT

## 2021-04-21 PROCEDURE — 6370000000 HC RX 637 (ALT 250 FOR IP): Performed by: INTERNAL MEDICINE

## 2021-04-21 PROCEDURE — 1280000000 HC REHAB R&B

## 2021-04-21 PROCEDURE — 99232 SBSQ HOSP IP/OBS MODERATE 35: CPT | Performed by: PHYSICAL MEDICINE & REHABILITATION

## 2021-04-21 PROCEDURE — 6360000002 HC RX W HCPCS: Performed by: PHYSICAL MEDICINE & REHABILITATION

## 2021-04-21 PROCEDURE — 97110 THERAPEUTIC EXERCISES: CPT

## 2021-04-21 PROCEDURE — 97530 THERAPEUTIC ACTIVITIES: CPT

## 2021-04-21 PROCEDURE — 82962 GLUCOSE BLOOD TEST: CPT

## 2021-04-21 PROCEDURE — 94761 N-INVAS EAR/PLS OXIMETRY MLT: CPT

## 2021-04-21 PROCEDURE — 97116 GAIT TRAINING THERAPY: CPT

## 2021-04-21 RX ORDER — GLIPIZIDE 5 MG/1
5 TABLET ORAL
Status: DISCONTINUED | OUTPATIENT
Start: 2021-04-22 | End: 2021-04-22 | Stop reason: HOSPADM

## 2021-04-21 RX ADMIN — INSULIN LISPRO 3 UNITS: 100 INJECTION, SOLUTION INTRAVENOUS; SUBCUTANEOUS at 20:59

## 2021-04-21 RX ADMIN — CARVEDILOL 6.25 MG: 6.25 TABLET, FILM COATED ORAL at 17:19

## 2021-04-21 RX ADMIN — DILTIAZEM HYDROCHLORIDE 60 MG: 60 TABLET, FILM COATED ORAL at 08:27

## 2021-04-21 RX ADMIN — INSULIN LISPRO 1 UNITS: 100 INJECTION, SOLUTION INTRAVENOUS; SUBCUTANEOUS at 17:20

## 2021-04-21 RX ADMIN — ENOXAPARIN SODIUM 40 MG: 40 INJECTION SUBCUTANEOUS at 08:28

## 2021-04-21 RX ADMIN — LATANOPROST 1 DROP: 50 SOLUTION/ DROPS OPHTHALMIC at 20:59

## 2021-04-21 RX ADMIN — ACETAMINOPHEN 650 MG: 325 TABLET ORAL at 20:57

## 2021-04-21 RX ADMIN — LISINOPRIL 5 MG: 5 TABLET ORAL at 08:27

## 2021-04-21 RX ADMIN — GABAPENTIN 100 MG: 100 CAPSULE ORAL at 20:58

## 2021-04-21 RX ADMIN — ASPIRIN 81 MG CHEWABLE TABLET 81 MG: 81 TABLET CHEWABLE at 08:26

## 2021-04-21 RX ADMIN — INSULIN LISPRO 2 UNITS: 100 INJECTION, SOLUTION INTRAVENOUS; SUBCUTANEOUS at 12:01

## 2021-04-21 RX ADMIN — ATORVASTATIN CALCIUM 40 MG: 40 TABLET, FILM COATED ORAL at 08:29

## 2021-04-21 RX ADMIN — PANTOPRAZOLE SODIUM 40 MG: 40 TABLET, DELAYED RELEASE ORAL at 05:51

## 2021-04-21 NOTE — CARE COORDINATION
LSW called [facility] ([phone]) to discuss discharge plan. LSW spoke with Dany Delaney, who informed LSW that patient and wife live in an independent living apartment, not AL. Due to this, no COVID test needed to return. LSW asked about transport home and was told that family would need to provide this. LSW then called patient's wife Kings Obrien ([phone]). LSW informed Kings Obrien of discharge recommendations and she verbalized understanding. LSW asked if there was a preferred [address] agency and Kings Obrien stated they would like referral to [facility] made as they were used in the past.  If they are unable to accept patient, then they are open to any other agency. Kings Obrien also stated that she will provide patient transport home and will plan to be here around 1 PM tomorrow. 11:10 AM  Hospital follow-up set with patient PCP, Dr. Angelica Zaman ([phone]), for 04/26/21 at 12:40 PM.  Follow-up with Dr. Zhang Geiger ([phone]) set as a telehealth on 04/28/21 at 2 PM.  Instructions to complete paperwork before appointment added to discharge instructions. LSW then called office of Dr. Zuhair Gonzalez ([phone]) and message left on confidential scheduling voicemail to return call for appointment. LSW awaits call back. AVS updated to reflect appointments. 12:00 PM  LSW called [facility] at [phone] and spoke with [facility] concerning referral for [address] PT and Nursing. [facility] believes they will be able to accept patient as he has used them in the past.  Referral information sent via fax to [phone]. [facility] states that if LSW does not hear anything, he is more than likely accepted. LSW will call later to double check receipt of referral.    1:40 PM  LSW received call back from Dr. Ray Maria office. Patient follow-up set for 04/30/21 at 9:15 AM.  AVS updated to reflect. 2:40 PM  LSW received call back from Cindy Gunderson with [facility]. They are able to accept patient.
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
## 2021-04-21 NOTE — PROGRESS NOTES
assistance  Comment: supervision with cues at times using 2ww  CARE Score: 4  Discharge Goal: Independent  Toilet Transfer  Assistance Needed: Supervision or touching assistance  Comment: supervision with grab bar  CARE Score: 4  Car Transfer  Assistance Needed: Supervision or touching assistance  Comment: supervision  CARE Score: 4  Discharge Goal: Independent    Ambulation:    Walking Ability  Does the Patient Walk?: Yes     Walk 10 Feet  Assistance Needed: Supervision or touching assistance  Comment: supervision with 2ww and B prosthesis  CARE Score: 4  Discharge Goal: Independent     Walk 50 Feet with Two Turns  Assistance Needed: Supervision or touching assistance  Comment: supervision with 2ww and B prosthesis  CARE Score: 4  Discharge Goal: Independent     Walk 150 Feet  Assistance Needed: Supervision or touching assistance  Comment: supervision with 2ww and B prosthesis  CARE Score: 4  Discharge Goal: Supervision or touching assistance     Walking 10 Feet on Uneven Surfaces  Assistance Needed: Supervision or touching assistance  Comment: supervision with 2ww and B prosthesis  CARE Score: 4  Discharge Goal: Supervision or touching assistance     1 Step (Curb)  Assistance Needed: Supervision or touching assistance  Comment: supervision with 2ww and B prosthesis  CARE Score: 4  Discharge Goal: Supervision or touching assistance     4 Steps  Assistance Needed: Supervision or touching assistance  Comment: supervision with B rails and B prosthesis  CARE Score: 4  Discharge Goal: Supervision or touching assistance     12 Steps  Reason if not Attempted: Not applicable  CARE Score: 9  Discharge Goal: Not Applicable         Wheelchair:  w/c Ability: Wheelchair Ability  Uses a Wheelchair and/or Scooter?: Yes  Wheel 50 Feet with Two Turns  Assistance Needed: Independent  Comment: BUE  CARE Score: 6  Discharge Goal: Independent  Wheel 150 Feet  Assistance Needed: Independent  Comment: 365'  CARE Score: 6  Discharge Goal: Independent          Balance:        Object: Picking Up Object  Assistance Needed: Supervision or touching assistance  Comment: supervision with 2ww and B prosthesis  CARE Score: 4  Discharge Goal: Supervision or touching assistance      Additional Therapeutic activities/exercises completed this date:     []   Nu-step:  Time:        Level:         #Steps:       []   Rebounder:    []  Seated     []  Standing        [x]   Balance training : standing balance training for pt to change pants after spilling drink on lap. Pt able to perform with SBA with one self correction of pt widening SHAD and several times steadying self with 2ww        []   Postural training    []   Supine ther ex (reps/sets):     []   Seated ther ex (reps/sets):     []   Standing ther ex (reps/sets):     []   Picking up object from floor (standing):                   []   Reacher used   []   Other:   []   Other:    Comments:      Patient/Caregiver Education and Training:   [x]   Bed Mobility/Transfer technique/safety  [x]   Gait technique/sequencing  [x]   Proper use of assistive device  [x]   Advanced mobility safety and technique  []   Reinforced patient's precautions/mobility while maintaining precautions  []   Postural awareness  []   Family training  []   Progress was updated and reviewed in Rehabtracker with patient and/or family this date. Treatment Plan for Next Session: n/a     Assessment:  Pt has made good progress with therapeutic interventions and nearly met all goals, but pt needs cuing and supervision for some aspects of gait and transfers. Feel that pt may return to a more mod I level in his home setting with automatic movements. Pt is recommended to use 2ww and B prostheses for all mobility and to have supervision at all times. Pt to follow up with Manuel Sanchez PT to promote safe transition to home and independence.      Treatment/Activity Tolerance:   [x] Tolerated treatment with no adverse effects    [] Patient limited by fatigue  [] Patient limited by pain   [] Patient limited by medical complications:    [] Adverse reaction to Tx:   [] Significant change in status    Safety:       []  bed alarm set    [x]  chair alarm set    []  Pt refused alarms                [x]  Telesitter activated      [x]  Gait belt used during tx session      []other:         Number of Minutes/Billable Intervention  Gait Training 25   Therapeutic Exercise    Neuro Re-Ed    Therapeutic Activity 35   Wheelchair Propulsion    Group    Other:    TOTAL 60         Social History  Social/Functional History  Lives With: Spouse(Nannette)  Type of Home: Assisted living([facility])  Home Layout: One level  Home Access: Level entry  Bathroom Shower/Tub: Tub/Shower unit  Bathroom Toilet: Standard(+ toilet riser)  Bathroom Equipment: Grab bars in shower, Tub transfer bench, Toilet raiser  Bathroom Accessibility: Walker accessible  Home Equipment: Rolling walker, Wheelchair-manual, Electric scooter(bilateral lower extremity prosthetics (bilateral BKA); also owns transport chair)  ADL Assistance: Independent  Homemaking Responsibilities: No  Ambulation Assistance: Independent(mod I with RW)  Transfer Assistance: Independent  Active : No  Patient's  Info: Wife  Education: HS Graduation  Occupation: Retired  Type of occupation: Glass   Leisure & Hobbies: Doctors visits, groceries are delivered, social functions on campus--pt uses scooter, Wife manages meds. cat  Additional Comments: Pt reports 3 falls in the last year; 1 caused L hip fx in Feb 2021. Pt sleeps in a flat, regular bed at home. Objective                                                                                    Goals:  (Update in navigator)   :   Long term goals  Time Frame for Long term goals : 7 days STG=LTG  Long term goal 1: Pt will perform all bed mobility with   independence  Long term goal 2: Pt will use 2ww and B prostheses  to perform sit to stand, pivot and car transfers with mod I  Long term goal 3: Pt will ambulate 50 including 2 turns on level surfaces with  mod I  assist  using 2ww and B prostheses  Long term goal 4: Pt will ambulate 150' on level surfaces and 20' on unlevel surfaces with supervision using 2ww and B prostheses  Long term goal 5: Pt will ascend/descend curb step with 2ww and up to   4  steps with  rail(s) with  supervision  assist  Long term goal 6: Pt will retrieve light item from floor with  supervision  assist  using 2ww and B prostheses  Long term goal 7: Pt will propel w/c in household situation for at least 150    feet with mod I:        Plan of Care                                                                              Times per week: 5 days per week for a minimum of 60 minutes/day plus group as appropriate for 60 minutes.   Treatment to include Current Treatment Recommendations: Strengthening, Balance Training, Functional Mobility Training, Transfer Training, Gait Training, Patient/Caregiver Education & Training, IADL Training, Stair training, Neuromuscular Re-education, Equipment Evaluation, Education, & procurement, Home Exercise Program, Positioning, Endurance Training, Safety Education & Training, Cognitive/Perceptual Training, Wheelchair Mobility Training    Electronically signed by   Ilana Langford PT  4/21/2021, 3:45 PM

## 2021-04-21 NOTE — PLAN OF CARE
Problem: Falls - Risk of:  Goal: Will remain free from falls  Description: Will remain free from falls  4/21/2021 1042 by Zora Quinones RN  Outcome: Ongoing  4/21/2021 0122 by Tru Vasquez RN  Outcome: Ongoing  Goal: Absence of physical injury  Description: Absence of physical injury  4/21/2021 1042 by Zora Quinones RN  Outcome: Ongoing  4/21/2021 0122 by Tru Vasquez RN  Outcome: Ongoing

## 2021-04-21 NOTE — PROGRESS NOTES
will consume at least 50-75% at meals during stay       Nutrition Monitoring and Evaluation:   Behavioral-Environmental Outcomes:  None Identified   Food/Nutrient Intake Outcomes:  Diet Advancement/Tolerance, Supplement Intake, Food and Nutrient Intake  Physical Signs/Symptoms Outcomes:  Biochemical Data, Meal Time Behavior, Skin, Weight     Discharge Planning:    Continue current diet     Electronically signed by Addison Anders RD, LD on 4/21/21 at 2:09 PM EDT    Contact: [phone]

## 2021-04-22 VITALS
BODY MASS INDEX: 19.78 KG/M2 | WEIGHT: 130.5 LBS | HEIGHT: 68 IN | HEART RATE: 73 BPM | SYSTOLIC BLOOD PRESSURE: 154 MMHG | RESPIRATION RATE: 18 BRPM | DIASTOLIC BLOOD PRESSURE: 70 MMHG | OXYGEN SATURATION: 96 % | TEMPERATURE: 97.5 F

## 2021-04-22 LAB
ANION GAP SERPL CALCULATED.3IONS-SCNC: 11 MMOL/L (ref 4–16)
BUN BLDV-MCNC: 36 MG/DL (ref 6–23)
CALCIUM SERPL-MCNC: 9.5 MG/DL (ref 8.3–10.6)
CHLORIDE BLD-SCNC: 107 MMOL/L (ref 99–110)
CO2: 21 MMOL/L (ref 21–32)
CREAT SERPL-MCNC: 1.1 MG/DL (ref 0.9–1.3)
GFR AFRICAN AMERICAN: >60 ML/MIN/1.73M2
GFR NON-AFRICAN AMERICAN: >60 ML/MIN/1.73M2
GLUCOSE BLD-MCNC: 123 MG/DL (ref 70–99)
GLUCOSE BLD-MCNC: 137 MG/DL (ref 70–99)
GLUCOSE BLD-MCNC: 144 MG/DL (ref 70–99)
GLUCOSE BLD-MCNC: 98 MG/DL (ref 70–99)
HCT VFR BLD CALC: 36.1 % (ref 42–52)
HEMOGLOBIN: 11.4 GM/DL (ref 13.5–18)
MCH RBC QN AUTO: 28.9 PG (ref 27–31)
MCHC RBC AUTO-ENTMCNC: 31.6 % (ref 32–36)
MCV RBC AUTO: 91.4 FL (ref 78–100)
PDW BLD-RTO: 14 % (ref 11.7–14.9)
PLATELET # BLD: 217 K/CU MM (ref 140–440)
PMV BLD AUTO: 10.2 FL (ref 7.5–11.1)
POTASSIUM SERPL-SCNC: 4 MMOL/L (ref 3.5–5.1)
RBC # BLD: 3.95 M/CU MM (ref 4.6–6.2)
SODIUM BLD-SCNC: 139 MMOL/L (ref 135–145)
WBC # BLD: 7.5 K/CU MM (ref 4–10.5)

## 2021-04-22 PROCEDURE — 6370000000 HC RX 637 (ALT 250 FOR IP): Performed by: PHYSICAL MEDICINE & REHABILITATION

## 2021-04-22 PROCEDURE — 85027 COMPLETE CBC AUTOMATED: CPT

## 2021-04-22 PROCEDURE — 94761 N-INVAS EAR/PLS OXIMETRY MLT: CPT

## 2021-04-22 PROCEDURE — 99239 HOSP IP/OBS DSCHRG MGMT >30: CPT | Performed by: PHYSICAL MEDICINE & REHABILITATION

## 2021-04-22 PROCEDURE — 80048 BASIC METABOLIC PNL TOTAL CA: CPT

## 2021-04-22 PROCEDURE — 82962 GLUCOSE BLOOD TEST: CPT

## 2021-04-22 PROCEDURE — 36415 COLL VENOUS BLD VENIPUNCTURE: CPT

## 2021-04-22 PROCEDURE — 6370000000 HC RX 637 (ALT 250 FOR IP): Performed by: INTERNAL MEDICINE

## 2021-04-22 PROCEDURE — 94150 VITAL CAPACITY TEST: CPT

## 2021-04-22 RX ORDER — GLIMEPIRIDE 4 MG/1
4 TABLET ORAL
Qty: 30 TABLET | Refills: 0 | Status: SHIPPED | OUTPATIENT
Start: 2021-04-22 | End: 2022-02-09

## 2021-04-22 RX ORDER — CHOLESTYRAMINE LIGHT 4 G/5.7G
4 POWDER, FOR SUSPENSION ORAL 2 TIMES DAILY PRN
Qty: 30 PACKET | Refills: 0 | Status: SHIPPED | OUTPATIENT
Start: 2021-04-22 | End: 2024-09-27

## 2021-04-22 RX ORDER — LISINOPRIL 5 MG/1
5 TABLET ORAL DAILY
Qty: 30 TABLET | Refills: 0 | Status: SHIPPED | OUTPATIENT
Start: 2021-04-22 | End: 2022-02-09

## 2021-04-22 RX ORDER — INSULIN DETEMIR 100 [IU]/ML
10 INJECTION, SOLUTION SUBCUTANEOUS NIGHTLY
Qty: 5 PEN | Refills: 0 | Status: ON HOLD | OUTPATIENT
Start: 2021-04-22 | End: 2022-07-09 | Stop reason: HOSPADM

## 2021-04-22 RX ADMIN — DILTIAZEM HYDROCHLORIDE 60 MG: 60 TABLET, FILM COATED ORAL at 10:03

## 2021-04-22 RX ADMIN — PANTOPRAZOLE SODIUM 40 MG: 40 TABLET, DELAYED RELEASE ORAL at 05:29

## 2021-04-22 RX ADMIN — GLIPIZIDE 5 MG: 5 TABLET ORAL at 08:37

## 2021-04-22 RX ADMIN — LISINOPRIL 5 MG: 5 TABLET ORAL at 10:04

## 2021-04-22 RX ADMIN — CARVEDILOL 6.25 MG: 6.25 TABLET, FILM COATED ORAL at 10:03

## 2021-04-22 RX ADMIN — INSULIN LISPRO 1 UNITS: 100 INJECTION, SOLUTION INTRAVENOUS; SUBCUTANEOUS at 08:33

## 2021-04-22 RX ADMIN — ASPIRIN 81 MG CHEWABLE TABLET 81 MG: 81 TABLET CHEWABLE at 10:03

## 2021-04-22 ASSESSMENT — PAIN SCALES - GENERAL: PAINLEVEL_OUTOF10: 0

## 2021-04-22 NOTE — PROGRESS NOTES
Pt discharged. Reviewed medications and discharge instructions. All questions answered. All personal belongings given to patient. Pt escorted to POV via wheelchair. And assisted into POV.

## 2021-04-22 NOTE — DISCHARGE INSTR - DIET
plate. Put a grain or starchy vegetable (such as brown rice or a potato) on the final quarter of the plate. You can add a small piece of fruit and some low-fat or fat-free milk or yogurt, depending on your carbohydrate goal for each meal.  Here are some tips for using the plate format:  · Make sure that you are not using an oversized plate. A 9-inch plate is best. Many restaurants use larger plates. · Get used to using the plate format at home. Then you can use it when you eat out. · Write down your questions about using the plate format. Talk to your doctor, a dietitian, or a diabetes educator about your concerns. Carbohydrate counting  With carbohydrate counting, you plan meals based on the amount of carbohydrate in each food. Carbohydrate raises blood sugar higher and more quickly than any other nutrient. It is found in desserts, breads and cereals, and fruit. It's also found in starchy vegetables such as potatoes and corn, grains such as rice and pasta, and milk and yogurt. Spreading carbohydrate throughout the day helps keep your blood sugar levels within your target range. Your daily amount depends on several things, including your weight, how active you are, which diabetes medicines you take, and what your goals are for your blood sugar levels. A registered dietitian or diabetes educator can help you plan how much carbohydrate to include in each meal and snack. A guideline for your daily amount of carbohydrate is:  · 45 to 60 grams at each meal. That's about the same as 3 to 4 carbohydrate servings. · 15 to 20 grams at each snack. That's about the same as 1 carbohydrate serving. The Nutrition Facts label on packaged foods tells you how much carbohydrate is in a serving of the food. First, look at the serving size on the food label. Is that the amount you eat in a serving? All of the nutrition information on a food label is based on that serving size.  So if you eat more or less than that, you'll need to adjust the other numbers. Total carbohydrate is the next thing you need to look for on the label. If you count carbohydrate servings, one serving of carbohydrate is 15 grams. For foods that don't come with labels, such as fresh fruits and vegetables, you'll need a guide that lists carbohydrate in these foods. Ask your doctor, dietitian, or diabetes educator about books or other nutrition guides you can use. If you take insulin, you need to know how many grams of carbohydrate are in a meal. This lets you know how much rapid-acting insulin to take before you eat. If you use an insulin pump, you get a constant rate of insulin during the day. So the pump must be programmed at meals to give you extra insulin to cover the rise in blood sugar after meals. When you know how much carbohydrate you will eat, you can take the right amount of insulin. Or, if you always use the same amount of insulin, you need to make sure that you eat the same amount of carbohydrate at meals. If you need more help to understand carbohydrate counting and food labels, ask your doctor, dietitian, or diabetes educator. How can you plan healthy meals? Here are some tips to get started:  · Plan your meals a week at a time. Don't forget to include snacks too. · Use cookbooks or online recipes to plan several main meals. Plan some quick meals for busy nights. You also can double some recipes that freeze well. Then you can save half for other busy nights when you don't have time to cook. · Make sure you have the ingredients you need for your recipes. If you're running low on basic items, put these items on your shopping list too. · List foods that you use to make breakfasts, lunches, and snacks. List plenty of fruits and vegetables. · Post this list on the refrigerator. Add to it as you think of more things you need. · Take the list to the store to do your weekly shopping. Follow-up care is a key part of your treatment and safety.  Be sure to make and go to all appointments, and call your doctor if you are having problems. It's also a good idea to know your test results and keep a list of the medicines you take. Where can you learn more? Go to [email or web address]://[email or web address] org and sign in to your [employer] account. Enter C607 in the [employer] box to learn more about \"Learning About Meal Planning for Diabetes. \"     If you do not have an account, please click on the \"Sign Up Now\" link. Current as of: August 31, 2020               Content Version: 12.8  © 3470-3650 Healthwise, Incorporated. Care instructions adapted under license by [facility] ([facility]). If you have questions about a medical condition or this instruction, always ask your healthcare professional. [address] any warranty or liability for your use of this information.

## 2021-04-22 NOTE — PROGRESS NOTES
Araceli Party    : 1940  Acct #: [de-identified]  MRN: [id number]              PM&R Progress Note      Admitting diagnosis: Metabolic encephalopathy ( [address])     Comorbid diagnoses impacting rehabilitation: Generalized weakness, exacerbation of COPD with hypoxia, urinary tract infection, chronic systolic congestive heart failure, acute cholangitis, uncontrolled diabetes type 2 with hyperglycemia, essential hypertension, history of bilateral below-knee amputations, pituitary macroadenoma    Chief complaint: Looking forward to discharge tomorrow. Less episodes of dizziness, pain and diarrhea. Prior (baseline) level of function: Independent.     Current level of function:         Current  [facility] and Goals:   Occupational Therapy:    Short term goals  Time Frame for Short term goals: STGs=LTGs :   Long term goals  Time Frame for Long term goals : 7-10 days or until d/c  Long term goal 1: Pt will complete grooming tasks Ind  Long term goal 2: Pt will complete total body bathing c supervision  Long term goal 3: Pt will complete UB dressing Ind  Long term goal 4: Pt will complete LB dressing mod I  Long term goal 5: Pt will complete toileting mod I  Long term goals 6: Pt will complete functional transfers (bed, chair, toilet) c RW and mod I; shower transfer c S  Long term goal 7: Pt will perform therex/therax to facilitate increased strength/endurance (c emphasis on dynamic standing balance/endurance >8 mins, BUE endurance) c SBA :                                       Eating: Eating  Assistance Needed: Independent  Comment: Pt able to open packages/containers  CARE Score: 6  Discharge Goal: Independent       Oral Hygiene: Oral Hygiene  Assistance Needed: Independent  Comment: mod I in stance at sink  CARE Score: 6  Discharge Goal: Independent    UB/LB Bathing: Shower/Bathe Self  Assistance Needed: Supervision or touching assistance  Comment: S seated for full shower  CARE Score: 4  Discharge Goal: Supervision or touching assistance    UB Dressing: Upper Body Dressing  Assistance Needed: Independent  Comment: Pt retrieved clothing; able to don/doff shirt c mod I  CARE Score: 6  Discharge Goal: Independent         LB Dressing: Lower Body Dressing  Assistance Needed: Independent  Comment: Pt able to don/doff prothesis and pants  CARE Score: 6  Discharge Goal: Independent    Donning and [place] Footwear: Putting On/Taking Off Footwear  Comment: does not perform at baseline  Reason if not Attempted: Not applicable  CARE Score: 9  Discharge Goal: Not Applicable      Toileting: Toileting Hygiene  Assistance Needed: Supervision or touching assistance  Comment: Supervision for balance to manage pants  CARE Score: 4  Discharge Goal: Independent      Toilet Transfers:   Toilet Transfer  Assistance Needed: Supervision or touching assistance  Comment: CGA  CARE Score: 4  Discharge Goal: Independent    Physical Therapy:         Long term goals  Time Frame for Long term goals : 7 days STG=LTG  Long term goal 1: Pt will perform all bed mobility with   independence  Long term goal 2: Pt will use 2ww and B prostheses  to perform sit to stand, pivot and car transfers with mod I  Long term goal 3: Pt will ambulate 50 including 2 turns on level surfaces with  mod I  assist  using 2ww and B prostheses  Long term goal 4: Pt will ambulate 150' on level surfaces and 20' on unlevel surfaces with supervision using 2ww and B prostheses  Long term goal 5: Pt will ascend/descend curb step with 2ww and up to   4  steps with  rail(s) with  supervision  assist  Long term goal 6: Pt will retrieve light item from floor with  supervision  assist  using 2ww and B prostheses  Long term goal 7: Pt will propel w/c in household situation for at least 150    feet with mod I      Bed Mobility:   Sit to Lying  Assistance Needed: Independent  CARE Score: 6  Discharge Goal: Independent  Roll Left and Right  Assistance Needed: Independent  CARE Score: 6  Discharge Goal: Independent  Lying to Sitting on Side of Bed  Assistance Needed: Independent  CARE Score: 6  Discharge Goal: Independent    Transfers:    Sit to Stand  Assistance Needed: Independent  Comment: CG to SBA, variable  CARE Score: 6  Discharge Goal: Independent  Chair/Bed-to-Chair Transfer  Assistance Needed: Supervision or touching assistance  Comment: supervision with cues at times using 2ww  CARE Score: 4  Discharge Goal: Independent  Toilet Transfer  Assistance Needed: Supervision or touching assistance  Comment: supervision with grab bar  CARE Score: 4  Car Transfer  Assistance Needed: Supervision or touching assistance  Comment: supervision  CARE Score: 4  Discharge Goal: Independent    Ambulation:    Walking Ability  Does the Patient Walk?: Yes     Walk 10 Feet  Assistance Needed: Supervision or touching assistance  Comment: supervision with 2ww and B prosthesis  CARE Score: 4  Discharge Goal: Independent     Walk 50 Feet with Two Turns  Assistance Needed: Supervision or touching assistance  Comment: supervision with 2ww and B prosthesis  CARE Score: 4  Discharge Goal: Independent     Walk 150 Feet  Assistance Needed: Supervision or touching assistance  Comment: supervision with 2ww and B prosthesis  Reason if not Attempted: Not attempted due to medical condition or safety concerns  CARE Score: 4  Discharge Goal: Supervision or touching assistance     Walking 10 Feet on Uneven Surfaces  Assistance Needed: Supervision or touching assistance  Comment: supervision with 2ww and B prosthesis  CARE Score: 4  Discharge Goal: Supervision or touching assistance     1 Step (Curb)  Assistance Needed: Supervision or touching assistance  Comment: supervision with 2ww and B prosthesis  CARE Score: 4  Discharge Goal: Supervision or touching assistance     4 Steps  Assistance Needed: Supervision or touching assistance  Comment: supervision with B rails and B prosthesis  Reason if not Attempted: Not attempted due to medical condition or safety concerns  CARE Score: 4  Discharge Goal: Supervision or touching assistance     12 Steps  Reason if not Attempted: Not applicable  CARE Score: 9  Discharge Goal: Not Applicable       Wheelchair:  w/c Ability: Wheelchair Ability  Uses a Wheelchair and/or Scooter?: Yes  Wheel 50 Feet with Two Turns  Assistance Needed: Independent  Comment: BUE  CARE Score: 6  Discharge Goal: Independent  Wheel 150 Feet  Assistance Needed: Independent  Comment: 365'  CARE Score: 6  Discharge Goal: Independent          Balance:        Object: Picking Up Object  Assistance Needed: Supervision or touching assistance  Comment: supervision with 2ww and B prosthesis  CARE Score: 4  Discharge Goal: Supervision or touching assistance    I      Exam:    Blood pressure (!) 112/55, pulse 60, temperature 97.9 °F (36.6 °C), temperature source Oral, resp. rate 18, height 5' 8\" (1.727 m), weight 127 lb 11.2 oz (57.9 kg), SpO2 97 %. General: Up in a wheelchair. In good spirits. Alert and oriented. In no distress. Hard of hearing. HEENT: Clear speech. MMM. No JVD. Pulmonary: No coughing or wheezing. Cardiac: RRR. Abdomen: Patient's abdomen is soft and nondistended. Bowel sounds were present throughout. There was no rebound, guarding or masses noted. Upper extremities: Moving his upper limbs spontaneously. No new bruises or swelling. Lower extremities: Stumps are well healed and knees extend freely now. Sitting balance was good. Standing balance was fair with prostheses. .    Lab Results   Component Value Date    WBC 7.6 04/13/2021    HGB 12.2 (L) 04/13/2021    HCT 39.5 (L) 04/13/2021    MCV 93.6 04/13/2021     04/13/2021     Lab Results   Component Value Date    INR 1.11 04/08/2021    INR 1.23 04/07/2021    INR 1.18 04/06/2021    PROTIME 13.4 04/08/2021    PROTIME 14.9 (H) 04/07/2021    PROTIME 14.3 04/06/2021     Lab Results   Component Value Date    CREATININE 1.1 04/13/2021    BUN 20 04/13/2021     04/13/2021    K 3.8 04/13/2021     04/13/2021    CO2 26 04/13/2021     Lab Results   Component Value Date    ALT 11 04/10/2021    AST 21 04/10/2021    ALKPHOS 91 04/10/2021    BILITOT 0.2 04/10/2021       Expected length of stay  prior to a supervised level of function for discharge home with a walker and [address] OT/PT is 4/22/2021. Recommendations:    1. Metabolic encephalopathy with gait disturbance:    Dissipating he will continue his recovery with home care therapies after discharge tomorrow. He has benefited from the daily occupational and physical therapy with speech-language pathology.    Asymptomatic from his his cholangitis, UTI and COPD now. On his baseline level of oxygen supplementation. Occasional use of inhalers. Off steroids. Ongoing DVT prophylaxis, pulmonary hygiene and nutritional support.  Bowel and bladder retraining.  Progressing with the use of his prosthetic devices for most mobility and self-care. Standby assist with transfers. 2. DVT prophylaxis: Lovenox 40 mg subcu daily.  I must monitor his hemoglobin and platelet count periodically while on this medication.  Weightbearing activities are improving daily.  GI prophylaxis offered.  No clinical signs of blood loss. 3. Exacerbation of COPD with hypoxia: Patient is on supplemental oxygen on doses as he is at home. Benefiting from incentive spirometry and inhalers as needed.  Continue monitoring O2 saturations at rest and with activity. Tolerating being off the steroids. 4. Uncontrolled diabetes type 2 with hyperglycemia: Patient requires a diet modified for carbohydrates.    With his poor intake, he is on Humalog sliding scale only.  Blood sugars are checked at mealtime and bedtime. 5. Urinary tract infection: Patient has completed a course of Macrobid.  No current signs of recurrence of UTI.  Encouraging oral hydration.   6. Chronic systolic congestive heart failure: No added salt at the table.  Coreg for afterload reduction.  Antiplatelet therapy and a statin for his heart disease.  Lisinopril.  Monitoring his weights daily for evidence of decompensation.    7. Acute cholangitis: Holt diet. Funmilayo Shaw has completed antibiotic therapy.    Asymptomatic. 8. Essential hypertension: Coreg, Cardizem and lisinopril are used to control systolic blood pressure.  Target systolic blood pressure is 120-140.  Vital signs are checked at rest and with activity.  Encouraging oral hydration.  Blood pressure within target range today. 9. Bilateral below-knee amputation: Patient has his bilateral prostheses here in his room.    Addressing range of motion of the knee so he can safely  his prosthetic devices.  Monitoring his stumps for pressure injury.   10. Pituitary macroadenoma: Outpatient follow-up with serial imaging per the pre-existing schedule.    No evidence of any neurologic changes or seizures.

## 2021-04-25 NOTE — DISCHARGE SUMMARY
Patient Name: Darryle Igo  Patient :  1940  Patient MRN:   [id number]      Admission Date:  4/15/2021  Discharge Date: 2021    Admitting diagnosis: Metabolic encephalopathy ( [address])     Comorbid diagnoses impacting rehabilitation: Generalized weakness, exacerbation of COPD with hypoxia, urinary tract infection, chronic systolic congestive heart failure, acute cholangitis, uncontrolled diabetes type 2 with hyperglycemia, essential hypertension, history of bilateral below-knee amputations, pituitary macroadenoma    Discharging diagnosis: Metabolic encephalopathy ( [address])     Comorbid diagnoses impacting rehabilitation: Generalized weakness, exacerbation of COPD with hypoxia, urinary tract infection, chronic systolic congestive heart failure, acute cholangitis, uncontrolled diabetes type 2 with hyperglycemia, essential hypertension, history of bilateral below-knee amputations, pituitary macroadenoma    History of present illness: The patient is an 50-year-old right-hand-dominant male who had been staying at a local nursing home following a left hip fracture in 2021 (2021). Recently the nursing staff is noted to you become confused and generalized weaker than usual.  He has brought to our ED on 2021 with hypoxia, cough and confusion. He is found to have respiratory failure with hypoxia, urinary tract infection and acute cholangitis. Blood sugars and blood pressures were fluctuating significantly. He underwent an ERCP with removal of CBD stones and sludge on 2021 with Dr. Jaime Villegas. He has been treated with IV antibiotics, IV fluids and cautious resumption of an oral diet. He had deterioration of alertness, memory, judgment and speech and was diagnosed with a metabolic encephalopathy. He seemed to be responding to oral steroids, nebulizers and oxygen supplementation to control his COPD. His eating was poor and bowel movements were frequent and loose.       Prior (baseline) level of function: Independent. Current level of function:         Current  [facility] and Goals:   Occupational Therapy:    Short term goals  Time Frame for Short term goals: STGs=LTGs :   Long term goals  Time Frame for Long term goals : 7-10 days or until d/c  Long term goal 1: Pt will complete grooming tasks Ind  Long term goal 2: Pt will complete total body bathing c supervision  Long term goal 3: Pt will complete UB dressing Ind  Long term goal 4: Pt will complete LB dressing mod I  Long term goal 5: Pt will complete toileting mod I  Long term goals 6: Pt will complete functional transfers (bed, chair, toilet) c RW and mod I; shower transfer c S  Long term goal 7: Pt will perform therex/therax to facilitate increased strength/endurance (c emphasis on dynamic standing balance/endurance >8 mins, BUE endurance) c SBA :                                       Eating: Eating  Assistance Needed: Independent  Comment: Pt able to open packages/containers  CARE Score: 6  Discharge Goal: Independent       Oral Hygiene: Oral Hygiene  Assistance Needed: Independent  Comment: mod I in stance at sink  CARE Score: 6  Discharge Goal: Independent    UB/LB Bathing: Shower/Bathe Self  Assistance Needed: Supervision or touching assistance  Comment: S seated for full shower  CARE Score: 4  Discharge Goal: Supervision or touching assistance    UB Dressing: Upper Body Dressing  Assistance Needed: Independent  Comment: Pt retrieved clothing; able to don/doff shirt c mod I  CARE Score: 6  Discharge Goal: Independent         LB Dressing: Lower Body Dressing  Assistance Needed: Independent  Comment: Pt able to don/doff prothesis and pants  CARE Score: 6  Discharge Goal: Independent    Donning and [place] Footwear: Putting On/Taking Off Footwear  Comment: does not perform at baseline  Reason if not Attempted: Not applicable  CARE Score: 9  Discharge Goal: Not Applicable      Toileting:  Toileting Hygiene  Assistance Needed: Supervision or touching assistance  Comment: Supervision for balance to manage pants  CARE Score: 4  Discharge Goal: Independent      Toilet Transfers:   Toilet Transfer  Assistance Needed: Supervision or touching assistance  Comment: CGA  CARE Score: 4  Discharge Goal: Independent    Physical Therapy:         Long term goals  Time Frame for Long term goals : 7 days STG=LTG  Long term goal 1: Pt will perform all bed mobility with   independence  Long term goal 2: Pt will use 2ww and B prostheses  to perform sit to stand, pivot and car transfers with mod I  Long term goal 3: Pt will ambulate 50 including 2 turns on level surfaces with  mod I  assist  using 2ww and B prostheses  Long term goal 4: Pt will ambulate 150' on level surfaces and 20' on unlevel surfaces with supervision using 2ww and B prostheses  Long term goal 5: Pt will ascend/descend curb step with 2ww and up to   4  steps with  rail(s) with  supervision  assist  Long term goal 6: Pt will retrieve light item from floor with  supervision  assist  using 2ww and B prostheses  Long term goal 7: Pt will propel w/c in household situation for at least 150    feet with mod I      Bed Mobility:   Sit to Lying  Assistance Needed: Independent  CARE Score: 6  Discharge Goal: Independent  Roll Left and Right  Assistance Needed: Independent  CARE Score: 6  Discharge Goal: Independent  Lying to Sitting on Side of Bed  Assistance Needed: Independent  CARE Score: 6  Discharge Goal: Independent    Transfers:    Sit to Stand  Assistance Needed: Independent  Comment: CG to SBA, variable  CARE Score: 6  Discharge Goal: Independent  Chair/Bed-to-Chair Transfer  Assistance Needed: Supervision or touching assistance  Comment: supervision with cues at times using 2ww  CARE Score: 4  Discharge Goal: Independent  Toilet Transfer  Assistance Needed: Supervision or touching assistance  Comment: supervision with grab bar  CARE Score: 4  Car Transfer  Assistance Needed: Supervision or touching assistance  Comment: supervision  CARE Score: 4  Discharge Goal: Independent    Ambulation:    Walking Ability  Does the Patient Walk?: Yes     Walk 10 Feet  Assistance Needed: Supervision or touching assistance  Comment: supervision with 2ww and B prosthesis  CARE Score: 4  Discharge Goal: Independent     Walk 50 Feet with Two Turns  Assistance Needed: Supervision or touching assistance  Comment: supervision with 2ww and B prosthesis  CARE Score: 4  Discharge Goal: Independent     Walk 150 Feet  Assistance Needed: Supervision or touching assistance  Comment: supervision with 2ww and B prosthesis  Reason if not Attempted: Not attempted due to medical condition or safety concerns  CARE Score: 4  Discharge Goal: Supervision or touching assistance     Walking 10 Feet on Uneven Surfaces  Assistance Needed: Supervision or touching assistance  Comment: supervision with 2ww and B prosthesis  CARE Score: 4  Discharge Goal: Supervision or touching assistance     1 Step (Curb)  Assistance Needed: Supervision or touching assistance  Comment: supervision with 2ww and B prosthesis  CARE Score: 4  Discharge Goal: Supervision or touching assistance     4 Steps  Assistance Needed: Supervision or touching assistance  Comment: supervision with B rails and B prosthesis  Reason if not Attempted: Not attempted due to medical condition or safety concerns  CARE Score: 4  Discharge Goal: Supervision or touching assistance     12 Steps  Reason if not Attempted: Not applicable  CARE Score: 9  Discharge Goal: Not Applicable       Wheelchair:  w/c Ability: Wheelchair Ability  Uses a Wheelchair and/or Scooter?: Yes  Wheel 50 Feet with Two Turns  Assistance Needed: Independent  Comment: ALYSHA  CARE Score: 6  Discharge Goal: Independent  Wheel 150 Feet  Assistance Needed: Independent  Comment: 365'  CARE Score: 6  Discharge Goal: Independent          Balance:        Object: Picking Up Object  Assistance Needed: Supervision or touching assistance  Comment: supervision with 2ww and B prosthesis  CARE Score: 4  Discharge Goal: Supervision or touching assistance    I      Exam:    Blood pressure (!) 154/70, pulse 73, temperature 97.5 °F (36.4 °C), temperature source Oral, resp. rate 18, height 5' 8\" (1.727 m), weight 130 lb 8 oz (59.2 kg), SpO2 96 %. General: Sitting up in a bedside chair. Oriented x3. In no distress. Talkative. HEENT: Neck supple. Hard of hearing. Clear speech. MMM. No JVD. Pulmonary: Unlabored breathing without coughing or wheezing. Cardiac: RRR. Abdomen: Patient's abdomen is soft and nondistended. Bowel sounds were present throughout. There was no rebound, guarding or masses noted. Upper extremities: Moving his upper limbs spontaneously. No new bruises or swelling. Lower extremities: Stumps are well healed and knees extend freely now. Sitting balance was good. Standing balance was fair with prostheses. .    Lab Results   Component Value Date    WBC 7.5 04/22/2021    HGB 11.4 (L) 04/22/2021    HCT 36.1 (L) 04/22/2021    MCV 91.4 04/22/2021     04/22/2021     Lab Results   Component Value Date    INR 1.11 04/08/2021    INR 1.23 04/07/2021    INR 1.18 04/06/2021    PROTIME 13.4 04/08/2021    PROTIME 14.9 (H) 04/07/2021    PROTIME 14.3 04/06/2021     Lab Results   Component Value Date    CREATININE 1.1 04/22/2021    BUN 36 (H) 04/22/2021     04/22/2021    K 4.0 04/22/2021     04/22/2021    CO2 21 04/22/2021     Lab Results   Component Value Date    ALT 11 04/10/2021    AST 21 04/10/2021    ALKPHOS 91 04/10/2021    BILITOT 0.2 04/10/2021           The patient presented to the ARU with the above history requiring a multidisciplinary treatment plan including close medical supervision by the Fatuma Fuentes toshia Director. The patient participated in the prescribed therapy treatment plan with reasonable compliance and progressive tolerance. They avoided significant medical complications.     By the time of discharge the patient had become safer with adaptive equipment to transfer and toilet with a FWW with the supervision of family/caregivers. The patient was tolerating an oral diet without choking/coughing and was back to their baseline with regards to bowel and bladder control. Discharge instructions were reviewed with the patient. The patient is to follow up with the PCP in 1 week. No driving. [facility] PT/OT/RN will be arranged. Ardelle Lanes   [place] [facility] [place]ed on:21 [id number]   Medication Information                      aspirin 81 MG chewable tablet  Take 81 mg by mouth daily              atorvastatin (LIPITOR) 40 MG tablet  Take 40 mg by mouth daily             carvedilol (COREG) 6.25 MG tablet  Take 1 tablet by mouth 2 times daily (with meals)             Cholecalciferol 50 MCG ( UT) TABS  Take one tablet daily by mouth             cholestyramine light 4 g packet  Take 1 packet by mouth 2 times daily as needed (diarrhea)             diltiazem (CARDIZEM) 60 MG tablet  Take 1 tablet by mouth 2 times daily             docusate sodium (COLACE) 100 MG capsule  Take 100 mg by mouth nightly as needed              famotidine (PEPCID) 40 MG tablet  Take 40 mg by mouth daily             gabapentin (NEURONTIN) 100 MG capsule  Take 100 mg by mouth nightly.              glimepiride (AMARYL) 4 MG tablet  Take 1 tablet by mouth every morning (before breakfast)             insulin detemir (LEVEMIR FLEXTOUCH) 100 UNIT/ML injection pen  Inject 10 Units into the skin nightly             insulin lispro (HUMALOG) 100 UNIT/ML injection vial  Inject 0-12 Units into the skin 3 times daily (with meals) **Medium Dose Corrective Algorithm**  Glucose: Dose:  If <139             No Insulin  140-199 2 Units  200-249 4 Units  250-299 6 Units  300-349 8 Units  350-400 10 Units  Above 400       12 Units             latanoprost (XALATAN) 0.005 % ophthalmic solution  Place 1 drop

## 2021-04-26 ENCOUNTER — TELEPHONE (OUTPATIENT)
Dept: CARDIOLOGY CLINIC | Age: 81
End: 2021-04-26

## 2021-04-26 NOTE — TELEPHONE ENCOUNTER
Faxed back medical records request for Carotid report done on 4/5/21- I reported there isn't a Carotid report done at this office or [facility].

## 2021-09-09 ENCOUNTER — APPOINTMENT (OUTPATIENT)
Dept: CT IMAGING | Age: 81
End: 2021-09-09
Payer: MEDICARE

## 2021-09-09 ENCOUNTER — HOSPITAL ENCOUNTER (EMERGENCY)
Age: 81
Discharge: HOME | End: 2021-09-09
Attending: STUDENT IN AN ORGANIZED HEALTH CARE EDUCATION/TRAINING PROGRAM
Payer: MEDICARE

## 2021-09-09 VITALS
RESPIRATION RATE: 16 BRPM | OXYGEN SATURATION: 99 % | TEMPERATURE: 98 F | BODY MASS INDEX: 23.63 KG/M2 | WEIGHT: 147 LBS | HEART RATE: 88 BPM | HEIGHT: 66 IN | SYSTOLIC BLOOD PRESSURE: 141 MMHG | DIASTOLIC BLOOD PRESSURE: 78 MMHG

## 2021-09-09 DIAGNOSIS — R10.31 RIGHT GROIN PAIN: Primary | ICD-10-CM

## 2021-09-09 LAB
BACTERIA: NEGATIVE /HPF
BILIRUBIN URINE: NEGATIVE MG/DL
BLOOD, URINE: NEGATIVE
CLARITY: CLEAR
COLOR: YELLOW
GLUCOSE, URINE: NEGATIVE MG/DL
KETONES, URINE: ABNORMAL MG/DL
LEUKOCYTE ESTERASE, URINE: NEGATIVE
MUCUS: ABNORMAL HPF
NITRITE URINE, QUANTITATIVE: NEGATIVE
PH, URINE: 7 (ref 5–8)
PROTEIN UA: NEGATIVE MG/DL
RBC URINE: ABNORMAL /HPF (ref 0–3)
SPECIFIC GRAVITY UA: 1.01 (ref 1–1.03)
TRICHOMONAS: ABNORMAL /HPF
UROBILINOGEN, URINE: NEGATIVE MG/DL (ref 0.2–1)
WBC UA: <1 /HPF (ref 0–2)

## 2021-09-09 PROCEDURE — 81001 URINALYSIS AUTO W/SCOPE: CPT

## 2021-09-09 PROCEDURE — 80053 COMPREHEN METABOLIC PANEL: CPT

## 2021-09-09 PROCEDURE — 74177 CT ABD & PELVIS W/CONTRAST: CPT

## 2021-09-09 PROCEDURE — 6360000004 HC RX CONTRAST MEDICATION: Performed by: EMERGENCY MEDICINE

## 2021-09-09 PROCEDURE — 99283 EMERGENCY DEPT VISIT LOW MDM: CPT

## 2021-09-09 RX ADMIN — IOPAMIDOL 75 ML: 755 INJECTION, SOLUTION INTRAVENOUS at 16:29

## 2021-09-09 ASSESSMENT — PAIN DESCRIPTION - ONSET: ONSET: ON-GOING

## 2021-09-09 ASSESSMENT — PAIN SCALES - GENERAL
PAINLEVEL_OUTOF10: 4
PAINLEVEL_OUTOF10: 0

## 2021-09-09 ASSESSMENT — PAIN DESCRIPTION - DESCRIPTORS: DESCRIPTORS: ACHING

## 2021-09-09 ASSESSMENT — PAIN DESCRIPTION - PAIN TYPE: TYPE: ACUTE PAIN

## 2021-09-09 ASSESSMENT — PAIN DESCRIPTION - LOCATION: LOCATION: ABDOMEN

## 2021-09-09 ASSESSMENT — PAIN DESCRIPTION - FREQUENCY: FREQUENCY: CONTINUOUS

## 2021-09-09 ASSESSMENT — PAIN DESCRIPTION - ORIENTATION: ORIENTATION: RIGHT;LOWER

## 2021-09-09 ASSESSMENT — PAIN DESCRIPTION - PROGRESSION: CLINICAL_PROGRESSION: NOT CHANGED

## 2021-09-09 NOTE — ED PROVIDER NOTES
eMERGENCY dEPARTMENT eNCOUnter    ATTENDING SIGN OUT NOTE    HPI/Physical Exam/Medical Decision Making  Time: 16:30  I have received sign out of [facility]  Emergency Department care from Dr. Jovita Romero. We discussed the history, physical exam, completed/pending test results (if obtained) and current treatment plan. Please refer to his/her chart for further details. In brief, the patient is a 79 y.o. male who presented to the ED for evaluation after he had an episode of right groin pain last night. The pain has resolved and he is asymptomatic in the emergency department. He is pending a urinalysis and a CT abdomen and pelvis. I am asked to follow-up these results and to disposition the patient. 17:15  Urinalysis is unremarkable. CT abdomen and pelvis is negative for acute abnormality. There is a small moderate sized hiatal hernia. Results were discussed with the patient. He is aware of his hiatal hernia. His groin pain has resolved. The etiology is unclear at this time. I have a low suspicion for acute surgical abdomen. I feel he is stable for outpatient management with follow-up with his physician in the next 2 to 3 days. Return precautions are given. The patient verbalized understanding and was agreeable with plan. He was discharged in stable condition. Diagnostics:  CT ABDOMEN PELVIS W IV CONTRAST Additional Contrast? None (Final result)  Result time 09/09/21 16:59:08  Final result by Beau Talavera MD (09/09/21 16:59:08)                Impression:    No acute abnormality identified. Small moderate-sized hiatal hernia. Narrative:    EXAMINATION:   CT OF THE ABDOMEN AND PELVIS WITH CONTRAST 9/9/2021 1:28 pm     TECHNIQUE:   CT of the abdomen and pelvis was performed with the administration of   intravenous contrast. Multiplanar reformatted images are provided for review.    Dose modulation, iterative reconstruction, and/or weight based adjustment of   the mA/kV was utilized to reduce the radiation dose to as low as reasonably   achievable. COMPARISON:   Abdomen pelvis CT, 04/04/2021     HISTORY:   ORDERING SYSTEM PROVIDED HISTORY: RLQ and R pelvic pain   TECHNOLOGIST PROVIDED HISTORY:   Additional Contrast?->None   Reason for exam:->RLQ and R pelvic pain   Decision Support Exception - unselect if not a suspected or confirmed   emergency medical condition->Emergency Medical Condition (MA)   Reason for Exam: RLQ and R pelvic pain     FINDINGS:   Lower Chest: Mild dependent atelectasis noted.  Lungs otherwise clear.  Base   of the heart unremarkable. Organs: Pneumobilia is again seen. Pamela Pedlar is otherwise unremarkable   appearance.  The portal vein is patent.  The gallbladder is surgically   absent.  The common duct is again noted to be dilated, but that appears   similar when compared to the previous exam.     Spleen enhances normally.  Normal adrenals.  Pancreas is unremarkable. No acute renal abnormalities are identified. GI/Bowel: No large bowel abnormalities are identified.  The appendix is not   well visualized.  No asymmetric pericecal inflammation is seen to suggest   acute appendicitis. There is a small to moderate-sized hiatal hernia.  Otherwise, the distal   esophagus, stomach, and duodenal sweep are unremarkable appearance.  The   remainder of the small bowel is unremarkable. Pelvis: Evaluation the pelvis is limited by streak artifact generated by   bilateral hip hardware.  That said, urinary bladder is grossly within normal   limits.  Prostate is grossly unremarkable.  No free pelvic fluid is found. Peritoneum/Retroperitoneum: Very heavy calcifications are seen within the   abdominal aorta.  The abdominal aorta is normal in caliber.  Superior   mesenteric artery is enhancing.  No lymphadenopathy is seen.      Bones/Soft Tissues: Right total hip arthroplasty noted.  Gamma nail is noted   on the left.  No acute bony abnormalities are identified. Labs Reviewed   URINE RT REFLEX TO CULTURE - Abnormal; Notable for the following components:       Result Value    Ketones, Urine SMALL (*)     Mucus, UA RARE (*)     All other components within normal limits       Clinical Impression:  1. Right groin pain        Disposition referral (if applicable):  Lupe Miller MD  [address]  [address]  [phone]    Schedule an appointment as soon as possible for a visit in 2 days      [facility] Emergency Department  [place] [id number] [phone]  Go to   If symptoms worsen      Disposition medications (if applicable):  Discharge Medication List as of 9/9/2021  5:25 PM            Comment: Please note this report has been produced using speech recognition software and may contain errors related to that system including errors in grammar, punctuation, and spelling, as well as words and phrases that may be inappropriate. If there are any questions or concerns please feel free to contact the dictating provider for clarification.        Gale Hdz MD  09/10/21 [id number]

## 2021-09-09 NOTE — ED PROVIDER NOTES
stone     Metabolic encephalopathy 55/31/4224     Medical history reviewed and no pertinent past medical history other than the ones mentioned above    SURGICAL HISTORY    Past Surgical History:   Procedure Laterality Date    ABDOMEN SURGERY      stones in bile duct removed x3    CARDIAC SURGERY      CABG x 2 in 2004 at [facility] - Dr. Cheyenne Rico, ESOPHAGUS      ERCP  03/13/14    w/ dilation of papilla    ERCP  9/11/14    ERCP  10/24/15    extractions stones, balloon dilatation     ERCP  03/18/2017    stone extraction     ERCP N/A 1/25/2019    ERCP STONE REMOVAL/ DILATATION OF PAPILLA WITH 10-12MM AND 12-15MM BALLOON AND 9-12MM, 12-15MM  EXTRACTOR BALLOON USED FOR REMOVAL OF MULTIPLE  CBD STONES performed by Ed Jaeger MD at [place] [address] ERCP N/A 9/7/2019    ERCP STONE REMOVAL, DILATATION OF PAPILLA WITH 10-12MM BALLOON, AND EXTRACTOR BALLOON 12-15MM USED FOR REMOVAL OF CBD STONE AND SLUDE performed by Naseem Flores MD at [place] [address] ERCP N/A 3/31/2020    ERCP DILATION BALLOON to 12  AND SWEEP OF CBD STONE AND SLUDGE performed by Naseem Flores MD at [place] [address] ERCP N/A 4/8/2021    ERCP WITH BALLOON DILATATION 10-12 MM BALLOON( TO 12 MM) OF PAPILLA, , BALLOON SWEEP  WITH REMOVAL OF CBD STONES AND EOOGDC(12-73 MM BALLOON) performed by Naseem Flores MD at [address] [place] [address] [address] Left 2/18/2021    LEFT FEMUR IM NAIL VIANEY INSERTION performed by Tracie Corbin DO at [facility] Bilateral     2005 at [facility]  03/13/14    sphincterotomy     Surgical history reviewed and no pertinent surgical history other than the ones mentioned above    CURRENT MEDICATIONS    Current Outpatient Rx   Medication Sig Dispense Refill    glimepiride (AMARYL) 4 MG tablet Take 1 tablet by mouth every morning (before breakfast) 30 tablet 0    insulin detemir (LEVEMIR FLEXTOUCH) 100 UNIT/ML injection pen Inject 10 Units into the skin nightly 5 pen 0    cholestyramine light 4 g packet Take 1 packet by mouth 2 times daily as needed (diarrhea) 30 packet 0    lisinopril (PRINIVIL;ZESTRIL) 5 MG tablet Take 1 tablet by mouth daily 30 tablet 0    famotidine (PEPCID) 40 MG tablet Take 40 mg by mouth daily      insulin lispro (HUMALOG) 100 UNIT/ML injection vial Inject 0-12 Units into the skin 3 times daily (with meals) **Medium Dose Corrective Algorithm**  Glucose: Dose:  If <139             No Insulin  140-199 2 Units  200-249 4 Units  250-299 6 Units  300-349 8 Units  350-400 10 Units  Above 400       12 Units 1 vial 3    carvedilol (COREG) 6.25 MG tablet Take 1 tablet by mouth 2 times daily (with meals) 60 tablet 0    ondansetron (ZOFRAN) 4 MG tablet Take 4 mg by mouth 2 times daily as needed for Nausea or Vomiting      gabapentin (NEURONTIN) 100 MG capsule Take 100 mg by mouth nightly.  Cholecalciferol 50 MCG (2000 UT) TABS Take one tablet daily by mouth      atorvastatin (LIPITOR) 40 MG tablet Take 40 mg by mouth daily      docusate sodium (COLACE) 100 MG capsule Take 100 mg by mouth nightly as needed       latanoprost (XALATAN) 0.005 % ophthalmic solution Place 1 drop into both eyes nightly       diltiazem (CARDIZEM) 60 MG tablet Take 1 tablet by mouth 2 times daily 90 tablet 3    Multiple Vitamins-Minerals (MULTIVITAMIN PO) Take 1 tablet by mouth daily.       aspirin 81 MG chewable tablet Take 81 mg by mouth daily        Medication is reviewed    ALLERGIES    Allergies   Allergen Reactions    Byetta 10 Mcg Pen [Exenatide]     Sulbactam     Ampicillin Rash    Aricept [Donepezil Hydrochloride] Other (See Comments)     Hallucinations, confusion    Benztropine Other (See Comments)     Hallucinations, confusion    Celebrex [Celecoxib] Other (See Comments)     'causes him to be nervous and jittery\"    Diphenhydramine Hcl Other (See Comments)     nervous and jittery    Diphenhydramine Hcl [Diphenhydramine] Anxiety    Exenatide Other (See Comments)     Causes him to be nervous and jittery    Metoprolol Succinate Other (See Comments)     Hallucinations     Phenergan [Promethazine Hcl] Other (See Comments)     Hallucinations, nervous, jittery    Plavix [Clopidogrel Bisulfate] Other (See Comments)     Causes bleeding in his stomach    Pseudoephedrine Anxiety    Reglan [Metoclopramide Hcl] Other (See Comments)     \"Caused him to just sit in a chair and rock back and forth\"     Allergy is reviewed    FAMILY HISTORY    Family History   Problem Relation Age of Onset    Cancer Mother     Diabetes Mother     Cancer Father      Family history reviewed and no pertinent family history other than the ones mentioned above    SOCIAL HISTORY    Social History     Socioeconomic History    Marital status:      Spouse name: Angus Beck Number of children: Not on file    Years of education: Not on file    Highest education level: Not on file   Occupational History    Not on file   Tobacco Use    Smoking status: Never Smoker    Smokeless tobacco: Never Used   Substance and Sexual Activity    Alcohol use: No    Drug use: No    Sexual activity: Not on file   Other Topics Concern    Not on file   Social History Narrative    Not on file     Social Determinants of Health     Financial Resource Strain:     Difficulty of Paying Living Expenses:    Food Insecurity:     Worried About Running Out of Food in the Last Year:     [address] in the Last Year:    Transportation Needs:     Lack of Transportation (Medical):      Lack of Transportation (Non-Medical):    Physical Activity:     Days of Exercise per Week:     Minutes of Exercise per Session:    Stress:     Feeling of Stress :    Social Connections:     Frequency of Communication with Friends and Family:     Frequency of Social Gatherings with Friends and Family:     Attends Druze Services:     Active Member of Clubs or Organizations:     Attends Club or Organization Meetings:     Marital Status:    Intimate Partner Violence:     Fear of Current or Ex-Partner:     Emotionally Abused:     Physically Abused:     Sexually Abused:      Live with family  Alcohol and recreational drug use: Denies  Social history reviewed and no pertinent social history other than the ones mentioned above    PHYSICAL EXAM    Vital Signs:BP (!) 155/80   Pulse 85   Temp 98.1 °F (36.7 °C)   Resp 17   Ht 5' 6\" (1.676 m)   Wt 147 lb (66.7 kg)   SpO2 97%   BMI 23.73 kg/m²   I have reviewed the triage vital signs. Constitutional: Well nourished, well developed, appears stated age  Eyes: PERRL, no conjunctival injection  HENT: NCAT, Neck supple without meningismus   CV: RRR, Warm, no edema  RESP: Normal RR, no increased respiratory efforts  GI: soft, non-distended, right lower pelvic tenderness on palpation otherwise nontender nonperitoneal,   : No inguinal hernia appreciated on testicular exam  MSK: No gross deformities  Skin: Warm, dry. No rashes  Neuro: Alert, CNs II-XII grossly intact. Moving all 4 extremities  Psych: Appropriate mood and affect. Labs:   Labs Reviewed   URINE RT REFLEX TO CULTURE       Radiology:  CT ABDOMEN PELVIS W IV CONTRAST Additional Contrast? None    (Results Pending)       I directly reviewed the images and radiology interpretation    ED COURSE  Assessment & Medical Decision Making:  Tano Veloz is a 79 y.o. male who presents with right lower pelvic pain could be atypical presentation appendicitis or hernia not appreciated on exam, patient is testicle exam is normal less concern for testicular torsion leading to this pain could be just a UTI obtain urine and CT abdomen pelvis with contrast, if patient's work-up is unremarkable he currently asymptomatic he can likely be discharged home.   At this point patient's work-up still incomplete will have to sign out to oncoming provider. DDx includes but not limited to:  Appendicitis, cholecystitis, hepatitis, abscess, abdominal wall pain, PUD, pancreatitis, viscus perforation, gastritis, enteritis, colitis, SBO, volvulus, hernia (strangulated, incarcerated, reducible), testicular torsion, epididymo-orchitis, UTI, pyelonephritis, renal stone, mesenteric ischemia, AAA      Workup includes but not limited to: Urine CT    Treatment includes but not limited to: None required    Critical care time: NA    Impression:   1. Abdominal pain    Disposition: Pending CT and urinalysis    This note dictated using Dragon medical voice recognition software. Attempts at proofreading were made, but errors may occasionally still occur.            Irene Mercado MD  09/09/21 4870

## 2021-09-21 ENCOUNTER — APPOINTMENT (OUTPATIENT)
Dept: CT IMAGING | Age: 81
End: 2021-09-21
Payer: MEDICARE

## 2021-09-21 ENCOUNTER — HOSPITAL ENCOUNTER (EMERGENCY)
Age: 81
Discharge: HOME | End: 2021-09-21
Payer: MEDICARE

## 2021-09-21 VITALS
RESPIRATION RATE: 19 BRPM | HEART RATE: 100 BPM | SYSTOLIC BLOOD PRESSURE: 169 MMHG | WEIGHT: 145 LBS | DIASTOLIC BLOOD PRESSURE: 88 MMHG | OXYGEN SATURATION: 100 % | TEMPERATURE: 97.5 F | BODY MASS INDEX: 23.3 KG/M2 | HEIGHT: 66 IN

## 2021-09-21 DIAGNOSIS — K40.90 NON-RECURRENT UNILATERAL INGUINAL HERNIA WITHOUT OBSTRUCTION OR GANGRENE: Primary | ICD-10-CM

## 2021-09-21 LAB
ALBUMIN SERPL-MCNC: 4.2 GM/DL (ref 3.4–5)
ALP BLD-CCNC: 81 IU/L (ref 40–129)
ALT SERPL-CCNC: 12 U/L (ref 10–40)
ANION GAP SERPL CALCULATED.3IONS-SCNC: 18 MMOL/L (ref 4–16)
AST SERPL-CCNC: 20 IU/L (ref 15–37)
BACTERIA: NEGATIVE /HPF
BASOPHILS ABSOLUTE: 0.1 K/CU MM
BASOPHILS RELATIVE PERCENT: 1.3 % (ref 0–1)
BILIRUB SERPL-MCNC: 0.7 MG/DL (ref 0–1)
BILIRUBIN URINE: NEGATIVE MG/DL
BLOOD, URINE: NEGATIVE
BUN BLDV-MCNC: 17 MG/DL (ref 6–23)
CALCIUM SERPL-MCNC: 9.8 MG/DL (ref 8.3–10.6)
CHLORIDE BLD-SCNC: 101 MMOL/L (ref 99–110)
CLARITY: ABNORMAL
CO2: 18 MMOL/L (ref 21–32)
COLOR: YELLOW
CREAT SERPL-MCNC: 0.9 MG/DL (ref 0.9–1.3)
DIFFERENTIAL TYPE: ABNORMAL
EOSINOPHILS ABSOLUTE: 0.7 K/CU MM
EOSINOPHILS RELATIVE PERCENT: 10.3 % (ref 0–3)
GFR AFRICAN AMERICAN: >60 ML/MIN/1.73M2
GFR NON-AFRICAN AMERICAN: >60 ML/MIN/1.73M2
GLUCOSE BLD-MCNC: 165 MG/DL (ref 70–99)
GLUCOSE, URINE: NEGATIVE MG/DL
HCT VFR BLD CALC: 38.1 % (ref 42–52)
HEMOGLOBIN: 12.7 GM/DL (ref 13.5–18)
IMMATURE NEUTROPHIL %: 0.1 % (ref 0–0.43)
KETONES, URINE: NEGATIVE MG/DL
LACTATE: 1.8 MMOL/L (ref 0.4–2)
LEUKOCYTE ESTERASE, URINE: NEGATIVE
LIPASE: 26 IU/L (ref 13–60)
LYMPHOCYTES ABSOLUTE: 2 K/CU MM
LYMPHOCYTES RELATIVE PERCENT: 30 % (ref 24–44)
MCH RBC QN AUTO: 29.7 PG (ref 27–31)
MCHC RBC AUTO-ENTMCNC: 33.3 % (ref 32–36)
MCV RBC AUTO: 89 FL (ref 78–100)
MONOCYTES ABSOLUTE: 0.8 K/CU MM
MONOCYTES RELATIVE PERCENT: 11.3 % (ref 0–4)
MUCUS: ABNORMAL HPF
NITRITE URINE, QUANTITATIVE: NEGATIVE
NON SQUAM EPI CELLS: <1 /HPF
NUCLEATED RBC %: 0 %
PDW BLD-RTO: 12.7 % (ref 11.7–14.9)
PH, URINE: 9 (ref 5–8)
PLATELET # BLD: 219 K/CU MM (ref 140–440)
PMV BLD AUTO: 9.3 FL (ref 7.5–11.1)
POTASSIUM SERPL-SCNC: 3.6 MMOL/L (ref 3.5–5.1)
PROTEIN UA: NEGATIVE MG/DL
RBC # BLD: 4.28 M/CU MM (ref 4.6–6.2)
RBC URINE: <1 /HPF (ref 0–3)
SEGMENTED NEUTROPHILS ABSOLUTE COUNT: 3.1 K/CU MM
SEGMENTED NEUTROPHILS RELATIVE PERCENT: 47 % (ref 36–66)
SODIUM BLD-SCNC: 137 MMOL/L (ref 135–145)
SPECIFIC GRAVITY UA: 1.01 (ref 1–1.03)
SQUAMOUS EPITHELIAL: <1 /HPF
TOTAL IMMATURE NEUTOROPHIL: 0.01 K/CU MM
TOTAL NUCLEATED RBC: 0 K/CU MM
TOTAL PROTEIN: 6.8 GM/DL (ref 6.4–8.2)
TRICHOMONAS: ABNORMAL /HPF
UROBILINOGEN, URINE: NEGATIVE MG/DL (ref 0.2–1)
WBC # BLD: 6.7 K/CU MM (ref 4–10.5)
WBC UA: <1 /HPF (ref 0–2)

## 2021-09-21 PROCEDURE — 6360000004 HC RX CONTRAST MEDICATION: Performed by: PHYSICIAN ASSISTANT

## 2021-09-21 PROCEDURE — 6370000000 HC RX 637 (ALT 250 FOR IP): Performed by: PHYSICIAN ASSISTANT

## 2021-09-21 PROCEDURE — 80053 COMPREHEN METABOLIC PANEL: CPT

## 2021-09-21 PROCEDURE — 83690 ASSAY OF LIPASE: CPT

## 2021-09-21 PROCEDURE — 99285 EMERGENCY DEPT VISIT HI MDM: CPT

## 2021-09-21 PROCEDURE — 81001 URINALYSIS AUTO W/SCOPE: CPT

## 2021-09-21 PROCEDURE — 36415 COLL VENOUS BLD VENIPUNCTURE: CPT

## 2021-09-21 PROCEDURE — 85025 COMPLETE CBC W/AUTO DIFF WBC: CPT

## 2021-09-21 PROCEDURE — 83605 ASSAY OF LACTIC ACID: CPT

## 2021-09-21 PROCEDURE — 74177 CT ABD & PELVIS W/CONTRAST: CPT

## 2021-09-21 RX ORDER — FENTANYL CITRATE 50 UG/ML
50 INJECTION, SOLUTION INTRAMUSCULAR; INTRAVENOUS ONCE
Status: DISCONTINUED | OUTPATIENT
Start: 2021-09-21 | End: 2021-09-22 | Stop reason: HOSPADM

## 2021-09-21 RX ORDER — HYDROCODONE BITARTRATE AND ACETAMINOPHEN 5; 325 MG/1; MG/1
1 TABLET ORAL ONCE
Status: COMPLETED | OUTPATIENT
Start: 2021-09-21 | End: 2021-09-21

## 2021-09-21 RX ORDER — HYDROCODONE BITARTRATE AND ACETAMINOPHEN 5; 325 MG/1; MG/1
1 TABLET ORAL EVERY 8 HOURS PRN
Qty: 15 TABLET | Refills: 0 | Status: SHIPPED | OUTPATIENT
Start: 2021-09-21 | End: 2021-09-24

## 2021-09-21 RX ADMIN — HYDROCODONE BITARTRATE AND ACETAMINOPHEN 1 TABLET: 5; 325 TABLET ORAL at 21:55

## 2021-09-21 RX ADMIN — IOPAMIDOL 75 ML: 755 INJECTION, SOLUTION INTRAVENOUS at 20:45

## 2021-09-21 ASSESSMENT — PAIN SCALES - GENERAL: PAINLEVEL_OUTOF10: 8

## 2021-09-21 ASSESSMENT — PAIN DESCRIPTION - PAIN TYPE: TYPE: ACUTE PAIN

## 2021-09-21 NOTE — ED PROVIDER NOTES
Triage Chief Complaint:   Abdominal Pain (hx of hernia, due to have surgery tomorrow for his hernia. )    Ute:  Today in the ED I had the pleasure of caring for Maxine Saini who is a 79 y.o. male that presents today to the emergency department complaining of right lower quadrant abdominal pain. Ongoing x1 week. Patient states the pain has gotten much worse. His doctor is out of town so he could not follow-up with his doctor. So he came here for evaluation. Pain is been much worse today. Particularly on the right hearing that he had a bump which caused his pain to move a bit more medial.  He denies any associated nausea vomiting or diarrhea no chest pain palpitations lightheadedness dizziness. No pain with urination. No change in appetite. Abdominal trauma no testicular pain swelling or tenderness. He was seen here in the emergency department 12 days ago. For the exact same symptoms. Small right-sided hiatal hernia was noted on imaging at the time. ROS:  REVIEW OF SYSTEMS    At least 10 systems reviewed      All other review of systems are negative  See HPI and nursing notes for additional information       Past Medical History:   Diagnosis Date    Acute exacerbation of COPD with asthma ([address]     Arthritis     Biliary stones 2015    CAD (coronary artery disease)     Chronic systolic (congestive) heart failure (HCC)     COPD (chronic obstructive pulmonary disease) ([address]     Diabetes mellitus ([address]     History of blood transfusion     Hx of angiography 3/11/2014    Pulmonary Arteries: Small sliding hiatal hernia. Mod coronary artery calcifications. Mild bilat gynecomastia.     Hyperlipidemia     Hypertension     Kidney stone     Metabolic encephalopathy 05/26/3524     Past Surgical History:   Procedure Laterality Date    ABDOMEN SURGERY      stones in bile duct removed x3    CARDIAC SURGERY      CABG x 2 in 2004 at [facility] - Dr. Yusuf Mitchell Financial Resource Strain:     Difficulty of Paying Living Expenses:    Food Insecurity:     Worried About Running Out of Food in the Last Year:     [address] in the Last Year:    Transportation Needs:     Lack of Transportation (Medical):  Lack of Transportation (Non-Medical):    Physical Activity:     Days of Exercise per Week:     Minutes of Exercise per Session:    Stress:     Feeling of Stress :    Social Connections:     Frequency of Communication with Friends and Family:     Frequency of Social Gatherings with Friends and Family:     Attends Buddhism Services:     Active Member of Clubs or Organizations:     Attends Club or Organization Meetings:     Marital Status:    Intimate Partner Violence:     Fear of Current or Ex-Partner:     Emotionally Abused:     Physically Abused:     Sexually Abused:      Current Facility-Administered Medications   Medication Dose Route Frequency Provider Last Rate Last Admin    fentaNYL (SUBLIMAZE) injection 50 mcg  50 mcg IntraVENous [employer], PA-C        HYDROcodone-acetaminophen (NORCO) 5-325 MG per tablet 1 tablet  1 tablet Oral Once [employer], PA-C         Current Outpatient Medications   Medication Sig Dispense Refill    HYDROcodone-acetaminophen (NORCO) 5-325 MG per tablet Take 1 tablet by mouth every 8 hours as needed for Pain for up to 3 days.  15 tablet 0    glimepiride (AMARYL) 4 MG tablet Take 1 tablet by mouth every morning (before breakfast) 30 tablet 0    insulin detemir (LEVEMIR FLEXTOUCH) 100 UNIT/ML injection pen Inject 10 Units into the skin nightly 5 pen 0    cholestyramine light 4 g packet Take 1 packet by mouth 2 times daily as needed (diarrhea) 30 packet 0    lisinopril (PRINIVIL;ZESTRIL) 5 MG tablet Take 1 tablet by mouth daily 30 tablet 0    famotidine (PEPCID) 40 MG tablet Take 40 mg by mouth daily      insulin lispro (HUMALOG) 100 UNIT/ML injection vial Inject 0-12 Units into the skin 3 grossly normal. Affect is appropriate  Neuro: Motor intact sensory intact cranial nerves II through XII are intact level of consciousness is normal cerebellar function is normal reflexes are grossly normal. No evidence of incontinence or loss of bowel or bladder no saddle anesthesia noted Lymphatic: There is no submandibular or cervical adenopathy appreciated.         I have reviewed and interpreted all of the currently available lab results from this visit (if applicable):  Results for orders placed or performed during the hospital encounter of 09/21/21   CBC Auto Differential   Result Value Ref Range    WBC 6.7 4.0 - 10.5 K/CU MM    RBC 4.28 (L) 4.6 - 6.2 M/CU MM    Hemoglobin 12.7 (L) 13.5 - 18.0 GM/DL    Hematocrit 38.1 (L) 42 - 52 %    MCV 89.0 78 - 100 FL    MCH 29.7 27 - 31 PG    MCHC 33.3 32.0 - 36.0 %    RDW 12.7 11.7 - 14.9 %    Platelets 781 623 - 491 K/CU MM    MPV 9.3 7.5 - 11.1 FL    Differential Type AUTOMATED DIFFERENTIAL     Segs Relative 47.0 36 - 66 %    Lymphocytes % 30.0 24 - 44 %    Monocytes % 11.3 (H) 0 - 4 %    Eosinophils % 10.3 (H) 0 - 3 %    Basophils % 1.3 (H) 0 - 1 %    Segs Absolute 3.1 K/CU MM    Lymphocytes Absolute 2.0 K/CU MM    Monocytes Absolute 0.8 K/CU MM    Eosinophils Absolute 0.7 K/CU MM    Basophils Absolute 0.1 K/CU MM    Nucleated RBC % 0.0 %    Total Nucleated RBC 0.0 K/CU MM    Total Immature Neutrophil 0.01 K/CU MM    Immature Neutrophil % 0.1 0 - 0.43 %   Comprehensive Metabolic Panel   Result Value Ref Range    Sodium 137 135 - 145 MMOL/L    Potassium 3.6 3.5 - 5.1 MMOL/L    Chloride 101 99 - 110 mMol/L    CO2 18 (L) 21 - 32 MMOL/L    BUN 17 6 - 23 MG/DL    CREATININE 0.9 0.9 - 1.3 MG/DL    Glucose 165 (H) 70 - 99 MG/DL    Calcium 9.8 8.3 - 10.6 MG/DL    Albumin 4.2 3.4 - 5.0 GM/DL    Total Protein 6.8 6.4 - 8.2 GM/DL    Total Bilirubin 0.7 0.0 - 1.0 MG/DL    ALT 12 10 - 40 U/L    AST 20 15 - 37 IU/L    Alkaline Phosphatase 81 40 - 129 IU/L    GFR Non- >60 >60 mL/min/1.73m2    GFR African American >60 >60 mL/min/1.73m2    Anion Gap 18 (H) 4 - 16   Urinalysis   Result Value Ref Range    Color, UA YELLOW YELLOW    Clarity, UA HAZY (A) CLEAR    Glucose, Urine NEGATIVE NEGATIVE MG/DL    Bilirubin Urine NEGATIVE NEGATIVE MG/DL    Ketones, Urine NEGATIVE NEGATIVE MG/DL    Specific Gravity, UA 1.013 1.001 - 1.035    Blood, Urine NEGATIVE NEGATIVE    pH, Urine 9.0 (HH) 5.0 - 8.0    Protein, UA NEGATIVE NEGATIVE MG/DL    Urobilinogen, Urine NEGATIVE 0.2 - 1.0 MG/DL    Nitrite Urine, Quantitative NEGATIVE NEGATIVE    Leukocyte Esterase, Urine NEGATIVE NEGATIVE    RBC, UA <1 0 - 3 /HPF    WBC, UA <1 0 - 2 /HPF    Bacteria, UA NEGATIVE NEGATIVE /HPF    Squam Epithel, UA <1 /HPF    Mucus, UA RARE (A) NEGATIVE HPF    Trichomonas, UA NONE SEEN NONE SEEN /HPF    non squam epi cells <1 /HPF   Lipase   Result Value Ref Range    Lipase 26 13 - 60 IU/L   Lactic Acid, Plasma   Result Value Ref Range    Lactate 1.8 0.4 - 2.0 mMOL/L      Radiographs (if obtained):  [] The following radiograph was interpreted by myself in the absence of a radiologist:   [] Radiologist's Report Reviewed:  CT ABDOMEN PELVIS W IV CONTRAST Additional Contrast? None   Final Result   1. No acute abdominopelvic abnormality. 2. Small to moderate right inguinal hernia containing a short loop of small   bowel without associated inflammation or bowel obstruction. 3. Prior cholecystectomy with stable common bile duct dilatation and   pneumobilia likely related to prior sphincterotomy. 4. Stable small to moderate hiatal hernia. EKG (if obtained):   Please See Note of attending physician for EKG interpretation.      Chart review shows recent radiograph(s):  CT ABDOMEN PELVIS W IV CONTRAST Additional Contrast? None    Result Date: 9/9/2021  EXAMINATION: CT OF THE ABDOMEN AND PELVIS WITH CONTRAST 9/9/2021 1:28 pm TECHNIQUE: CT of the abdomen and pelvis was performed with the administration of intravenous contrast. Multiplanar reformatted images are provided for review. Dose modulation, iterative reconstruction, and/or weight based adjustment of the mA/kV was utilized to reduce the radiation dose to as low as reasonably achievable. COMPARISON: Abdomen pelvis CT, 04/04/2021 HISTORY: ORDERING SYSTEM PROVIDED HISTORY: RLQ and R pelvic pain TECHNOLOGIST PROVIDED HISTORY: Additional Contrast?->None Reason for exam:->RLQ and R pelvic pain Decision Support Exception - unselect if not a suspected or confirmed emergency medical condition->Emergency Medical Condition (MA) Reason for Exam: RLQ and R pelvic pain FINDINGS: Lower Chest: Mild dependent atelectasis noted. Lungs otherwise clear. Base of the heart unremarkable. Organs: Pneumobilia is again seen. Liver is otherwise unremarkable appearance. The portal vein is patent. The gallbladder is surgically absent. The common duct is again noted to be dilated, but that appears similar when compared to the previous exam. Spleen enhances normally. Normal adrenals. Pancreas is unremarkable. No acute renal abnormalities are identified. GI/Bowel: No large bowel abnormalities are identified. The appendix is not well visualized. No asymmetric pericecal inflammation is seen to suggest acute appendicitis. There is a small to moderate-sized hiatal hernia. Otherwise, the distal esophagus, stomach, and duodenal sweep are unremarkable appearance. The remainder of the small bowel is unremarkable. Pelvis: Evaluation the pelvis is limited by streak artifact generated by bilateral hip hardware. That said, urinary bladder is grossly within normal limits. Prostate is grossly unremarkable. No free pelvic fluid is found. Peritoneum/Retroperitoneum: Very heavy calcifications are seen within the abdominal aorta. The abdominal aorta is normal in caliber. Superior mesenteric artery is enhancing. No lymphadenopathy is seen.  Bones/Soft Tissues: Right total hip arthroplasty noted.  Gamma nail is noted on the left. No acute bony abnormalities are identified. No acute abnormality identified. Small moderate-sized hiatal hernia. MDM:     Interventions given this visit:   Orders Placed This Encounter   Medications    fentaNYL (SUBLIMAZE) injection 50 mcg    iopamidol (ISOVUE-370) 76 % injection 75 mL    HYDROcodone-acetaminophen (NORCO) 5-325 MG per tablet     Sig: Take 1 tablet by mouth every 8 hours as needed for Pain for up to 3 days. Dispense:  15 tablet     Refill:  0    HYDROcodone-acetaminophen (NORCO) 5-325 MG per tablet 1 tablet     Lactic acidosis normal vital signs no leukocytosis patient presents today to the emergency department with right-sided inguinal pain consistent with inguinal hernia which is confirmed on CT scan. No evidence of entrapment, incarceration, strangulation. Analgesia is provided here in the ED. Will provide patient with final analgesic Rx with promt gen surge FU  I estimate there is LOW risk for (including but not limited to) ACUTE APPENDICITIS, BOWEL OBSTRUCTION, ACUTE CHOLECYSTITIS, RUPTURED DIVERTICULITIS, INCARCERATED or STRANGULATED HERNIA, HEMMORHAGIC PANCREATITIS, or PERFORATED BOWEL/ULCER, thus I consider the discharge disposition reasonable. Carlene Dixon (or their surrogate) and I have discussed the diagnosis and risks, and we agree with discharging home with close follow-up. We also discussed returning to the Emergency Department immediately if new or worsening symptoms occur. We have discussed the symptoms which are most concerning that necessitate immediate return. I independently managed patient today in the ED    BP (!) 175/84   Pulse 85   Temp 97.5 °F (36.4 °C) (Oral)   Resp 16   Ht 5' 6\" (1.676 m)   Wt 145 lb (65.8 kg)   SpO2 100%   BMI 23.40 kg/m²       Clinical Impression:  1.  Non-recurrent unilateral inguinal hernia without obstruction or gangrene        Disposition referral (if applicable):  Carline Graham Lupillo Seo MD  [facility]  [phone]    In 2 days      Sacha Quiñones MD  [email or web address]  [phone]    Call in 1 day      Disposition medications (if applicable):  New Prescriptions    HYDROCODONE-ACETAMINOPHEN (NORCO) 5-325 MG PER TABLET    Take 1 tablet by mouth every 8 hours as needed for Pain for up to 3 days. Comment: Please note this report has been produced using speech recognition software and may contain errors related to that system including errors in grammar, punctuation, and spelling, as well as words and phrases that may be inappropriate. If there are any questions or concerns please feel free to contact the dictating provider for clarification.       Marychuy Shore, [address] [place] [address]  09/21/21 4911

## 2021-09-22 NOTE — ED NOTES
Report given to [facility] and care transferred at this time     [facility], RN  09/21/21 2038

## 2021-09-22 NOTE — ED NOTES
Pt was wheeled to Waiting room to wait on spouse. Pt was edcucated on home care and pain medications. Pt was educated on the importance of a follow up PCP apt. Pt denied any questions.       Kam Graves RN  09/21/21 1577

## 2021-09-22 NOTE — ED NOTES
Report given to [facility]. and care assumed at this time      Mo Tam, JESSICA  09/21/21 8685

## 2021-09-27 PROBLEM — K40.90 INGUINAL HERNIA OF RIGHT SIDE WITHOUT OBSTRUCTION OR GANGRENE: Status: ACTIVE | Noted: 2021-09-27

## 2021-09-29 ENCOUNTER — TELEPHONE (OUTPATIENT)
Dept: CARDIOLOGY CLINIC | Age: 81
End: 2021-09-29

## 2021-09-29 ENCOUNTER — OFFICE VISIT (OUTPATIENT)
Dept: CARDIOLOGY CLINIC | Age: 81
End: 2021-09-29
Payer: MEDICARE

## 2021-09-29 ENCOUNTER — PROCEDURE VISIT (OUTPATIENT)
Dept: CARDIOLOGY CLINIC | Age: 81
End: 2021-09-29
Payer: MEDICARE

## 2021-09-29 VITALS
SYSTOLIC BLOOD PRESSURE: 136 MMHG | HEART RATE: 67 BPM | DIASTOLIC BLOOD PRESSURE: 70 MMHG | BODY MASS INDEX: 23.3 KG/M2 | WEIGHT: 145 LBS | HEIGHT: 66 IN

## 2021-09-29 DIAGNOSIS — I25.10 ASCVD (ARTERIOSCLEROTIC CARDIOVASCULAR DISEASE): ICD-10-CM

## 2021-09-29 DIAGNOSIS — I51.9 SYSTOLIC DYSFUNCTION: ICD-10-CM

## 2021-09-29 DIAGNOSIS — E78.2 MIXED HYPERLIPIDEMIA: ICD-10-CM

## 2021-09-29 DIAGNOSIS — Z71.89 SURGICAL COUNSELING VISIT: ICD-10-CM

## 2021-09-29 DIAGNOSIS — Z01.810 PRE-OPERATIVE CARDIOVASCULAR EXAMINATION: Primary | ICD-10-CM

## 2021-09-29 LAB
LV EF: 58 %
LVEF MODALITY: NORMAL

## 2021-09-29 PROCEDURE — G8427 DOCREV CUR MEDS BY ELIG CLIN: HCPCS | Performed by: NURSE PRACTITIONER

## 2021-09-29 PROCEDURE — G8420 CALC BMI NORM PARAMETERS: HCPCS | Performed by: NURSE PRACTITIONER

## 2021-09-29 PROCEDURE — 1123F ACP DISCUSS/DSCN MKR DOCD: CPT | Performed by: NURSE PRACTITIONER

## 2021-09-29 PROCEDURE — 93306 TTE W/DOPPLER COMPLETE: CPT | Performed by: INTERNAL MEDICINE

## 2021-09-29 PROCEDURE — 93000 ELECTROCARDIOGRAM COMPLETE: CPT | Performed by: NURSE PRACTITIONER

## 2021-09-29 PROCEDURE — 4040F PNEUMOC VAC/ADMIN/RCVD: CPT | Performed by: NURSE PRACTITIONER

## 2021-09-29 PROCEDURE — 1036F TOBACCO NON-USER: CPT | Performed by: NURSE PRACTITIONER

## 2021-09-29 PROCEDURE — 99214 OFFICE O/P EST MOD 30 MIN: CPT | Performed by: NURSE PRACTITIONER

## 2021-09-29 ASSESSMENT — ENCOUNTER SYMPTOMS: ORTHOPNEA: 0

## 2021-09-29 NOTE — TELEPHONE ENCOUNTER
Cardiologist: Dr. Xenia Brown  Surgeon: Dr. Virginia Ying  Surgery: [facility] repair w/mesh  Anesthesia: MAC/Local  Date: TBD  FAX# [phone]  # [phone]    Last OV 9/29/2021 w/joanna      CAD  · Patient had stents placed 2015  · Patient is not having cardiac symptoms  · continue Coreg, ASA  · Low salt, Low cholesterol diet      Dyslipidemia  · Lipid panel reviewed from care every where  · Patient LDL is  at goal, HDL is at goal, triglycerides normal  · Continue Lipitor (atorvastatin)   · Discussed with the patient the need for exercise, low cholesterol diet, and compliance with medications.      Revised Cardiac Risk Index:   High risk type of surgery yes     NM- 10/22/2015    Echo-  12/4/2020   Expedited imaging was used to obtain protocol images to reduce the   sonographer's exposure during COVID-19 pandemic. Left ventricular systolic function is abnormal.   Ejection fraction is visually estimated at 40%. Apical ballooning noted with hyperkinetic base, suggestive of stress induced   cardiomyopathy   Mild left ventricular hypertrophy. Calcified mitral valve and annulus. No evidence of any pericardial effusion.        CATH- hx    CABG- hx    Aspirin

## 2021-09-29 NOTE — LETTER
Florencia Crespo  1940  [id number]    Have you had any Chest Pain that is not new? - No       DO EKG IF: Patient has a Heart Rate above 100 or below 40     CAD (Coronary Artery Disease) patient should have one on file every 6 months        Have you had any Shortness of Breath - No      Have you had any dizziness - No       Sitting wait 5 minutes do supine (laying down) wait 5 minutes then do standing - log each in \"vitals\" area in Epic   Be sure to ask what symptoms they are having if they get dizzy while completing ortho stats such as: room spinning, nausea, etc.    Have you had any palpitations that are not new? - No    Is the patient on any of the following medications - {Blank single:47043::\"Amiodarone (Cordarone\",\"Nextrone, Pacerone)\",\"Rhythmol (Propafenone)\",\"Multaq (Dronedarone)\",\"Betapace (Sotalol)\",\"Corlanor (Ivabradine)\",\"Dofetilide (Tikosyn)\"}  If Yes DO EKG - Needs done every 6 months    Do you have any edema - swelling in No    If Yes - CHECK TO SEE IF THE EDEMA IS PITTING      Vein \"LEG PROBLEM Questionnaire\"  1. Do you have prominent leg veins? No   2. Do you have any skin discoloration? No  3. Do you have any healed or active sores? No  4. Do you have swelling of the legs? No  5. Do you have a family history of varicose veins? No  6. Does your profession involve pro-longed        standing or heavy lifting? No  7. Have you been fighting overweight problems? No  8. Do you have restless legs? No  9. Do you have any night time cramps? No  10. Do you have any of the following in your legs: No           When did you have your last labs drawn 09/21/2021  Where did you have them done [address] [place] [address]    If we do not have these labs you are retrieve these labs for these providers!     Do you have a surgery or procedure scheduled in the near future - Yes  If Yes- DO EKG  If Yes - Who is the surgery with?  Dr Mark Giraldo  Phone number of physician  Fax number of physician   Type of surgery Hernia Removal   GIVE THIS INFORMATION TO NAEEM OLIVO     Ask patient if they want to sign up for MyChart if they are not already signed up     Check to see if we have an E-MAIL on file for the patient     Check medication list thoroughly!!! AND RECONCILE OUTSIDE MEDICATIONS  If dose has changed change the entire order not just the [place] At check out add to every patient's \"wrap up\" the following dot phrase AFTERHOURSEDUCATION and ensure we explain this to our patients

## 2021-09-29 NOTE — PATIENT INSTRUCTIONS
**It is YOUR responsibilty to bring medication bottles and/or updated medication list to [address] [place] [address]. This will allow us to better serve you and all your healthcare needs**  Please be informed that if you contact our office outside of normal business hours the physician on call cannot help with any scheduling or rescheduling issues, procedure instruction questions or any type of medication issue. We advise you for any urgent/emergency that you go to the nearest emergency room!     PLEASE CALL OUR OFFICE DURING NORMAL BUSINESS HOURS    Monday - Friday   8 am to 5 pm    [place]: [place]: [phone]    [place]:  [phone]

## 2021-09-29 NOTE — PROGRESS NOTES
[facility] ([facility]HABILIT[facility] [place]  [place] [address] [facility],[address]  Phone: [phone]    Fax [phone]                  Grace Lopez MD, Olivia Florence MD, Edmar Linder MD, MD Sofía Santiago MD Susanne Plume, MD Nickie Bernhardt, APRN               Paul Brenner, APRN    Loyd Hannah, APRN              Yaima Feliz, APRN        9/29/2021    RE: Kathy Simms  (1940)                               TO:  Dr. Lance Landrum MD  The primary cardiologist is Dr. Shari Pickering    CC:   1. Pre-operative cardiovascular examination    2. ASCVD (arteriosclerotic cardiovascular disease)    3. Mixed hyperlipidemia    4. Systolic dysfunction       HPI:    Thank you for involving me in taking care of your patient Kathy Simms. Kathy Simms is a 79y.o. year old male with a history as listed above and is being seen in the office today. Kathy Simms reports that he  is feeling okay. There is no chest pain. He denies SOB. There are no reported palpitations. He denies dizziness. He is not active he uses wheel chair. He fell not long ago and broke his hip. He now has moved to [address] [place] [address] and is using walker every where in the house, if going out of the house he uses a wheel  Chair due to hernia pain currently.  wants to do hernia repair but needs cardiac clearance due to history of CAD. Current Outpatient Medications   Medication Sig Dispense Refill    busPIRone (BUSPAR) 5 MG tablet Take 1 tablet by mouth      cilostazol (PLETAL) 100 MG tablet       traMADol (ULTRAM) 50 MG tablet Take 50 mg by mouth every 6 hours as needed.       glimepiride (AMARYL) 4 MG tablet Take 1 tablet by mouth every morning (before breakfast) 30 tablet 0    insulin detemir (LEVEMIR FLEXTOUCH) 100 UNIT/ML injection pen Inject 10 Units into the skin nightly 5 pen 0    cholestyramine light 4 g packet Take 1 packet by mouth 2 times daily as needed (diarrhea) 30 packet 0    lisinopril (PRINIVIL;ZESTRIL) 5 MG tablet Take 1 tablet by mouth daily 30 tablet 0    famotidine (PEPCID) 40 MG tablet Take 40 mg by mouth daily      insulin lispro (HUMALOG) 100 UNIT/ML injection vial Inject 0-12 Units into the skin 3 times daily (with meals) **Medium Dose Corrective Algorithm**  Glucose: Dose:  If <139             No Insulin  140-199 2 Units  200-249 4 Units  250-299 6 Units  300-349 8 Units  350-400 10 Units  Above 400       12 Units 1 vial 3    carvedilol (COREG) 6.25 MG tablet Take 1 tablet by mouth 2 times daily (with meals) 60 tablet 0    ondansetron (ZOFRAN) 4 MG tablet Take 4 mg by mouth 2 times daily as needed for Nausea or Vomiting      gabapentin (NEURONTIN) 100 MG capsule Take 100 mg by mouth nightly.  Cholecalciferol 50 MCG (2000 UT) TABS Take one tablet daily by mouth      atorvastatin (LIPITOR) 40 MG tablet Take 40 mg by mouth daily      docusate sodium (COLACE) 100 MG capsule Take 100 mg by mouth nightly as needed       latanoprost (XALATAN) 0.005 % ophthalmic solution Place 1 drop into both eyes nightly       diltiazem (CARDIZEM) 60 MG tablet Take 1 tablet by mouth 2 times daily 90 tablet 3    Multiple Vitamins-Minerals (MULTIVITAMIN PO) Take 1 tablet by mouth daily.  aspirin 81 MG chewable tablet Take 81 mg by mouth daily        No current facility-administered medications for this visit.      Allergies: Byetta 10 mcg pen [exenatide], Sulbactam, Ampicillin, Aricept [donepezil hydrochloride], Benztropine, Celebrex [celecoxib], Diphenhydramine hcl, Diphenhydramine hcl [diphenhydramine], Exenatide, Metoprolol succinate, Phenergan [promethazine hcl], Plavix [clopidogrel bisulfate], Pseudoephedrine, and Reglan [metoclopramide hcl]  Past Medical History:   Diagnosis Date    [facility]     Arthritis     Biliary stones 2015    CAD (coronary artery disease)     Chronic systolic (congestive) heart failure [facility] [address] [facility]     COPD (chronic obstructive pulmonary disease) ([facility] [address] [facility]     Diabetes mellitus ([facility] [address] [facility]     History of blood transfusion     Hx of angiography 3/11/2014    Pulmonary Arteries: Small sliding hiatal hernia. Mod coronary artery calcifications. Mild bilat gynecomastia.     Hyperlipidemia     Hypertension     Kidney stone     Metabolic encephalopathy 87/51/6751     Past Surgical History:   Procedure Laterality Date    ABDOMEN SURGERY      stones in bile duct removed x3    CARDIAC SURGERY      CABG x 2 in 2004 at [facility] - Dr. Dee Dee Chandra, ESOPHAGUS      ERCP  03/13/14    w/ dilation of papilla    ERCP  9/11/14    ERCP  10/24/15    extractions stones, balloon dilatation     ERCP  03/18/2017    stone extraction     ERCP N/A 1/25/2019    ERCP STONE REMOVAL/ DILATATION OF PAPILLA WITH 10-12MM AND 12-15MM BALLOON AND 9-12MM, 12-15MM  EXTRACTOR BALLOON USED FOR REMOVAL OF MULTIPLE  CBD STONES performed by Vahid Salas MD at [place] [address] ERCP N/A 9/7/2019    ERCP STONE REMOVAL, DILATATION OF PAPILLA WITH 10-12MM BALLOON, AND EXTRACTOR BALLOON 12-15MM USED FOR REMOVAL OF CBD STONE AND SLUDE performed by Wan Cam MD at [place] [address] ERCP N/A 3/31/2020    ERCP DILATION BALLOON to 12  AND SWEEP OF CBD STONE AND SLUDGE performed by Wan Cam MD at [place] [address] ERCP N/A 4/8/2021    ERCP WITH BALLOON DILATATION 10-12 MM BALLOON( TO 12 MM) OF PAPILLA, , BALLOON SWEEP  WITH REMOVAL OF CBD STONES AND TAVDAQ(28-29 MM BALLOON) performed by Wan Cam MD at [address] Left 2/18/2021    LEFT FEMUR IM NAIL VIANEY INSERTION performed by Jena Valencia DO at [address] [facility] Bilateral     2005 at [address] [facility]  03/13/14    sphincterotomy     Family History   Problem Relation Age of Onset    Cancer Mother     Diabetes Mother     Cancer Father      Social History     Tobacco Use    Smoking status: Never Smoker    Smokeless tobacco: Never Used   Substance Use Topics    Alcohol use: No        Review of Systems   Cardiovascular: Negative for chest pain, claudication, dyspnea on exertion, leg swelling, orthopnea, palpitations, paroxysmal nocturnal dyspnea and syncope. Neurological: Positive for weakness (uses walker and wheel chair). Negative for headaches, light-headedness, numbness, tremors and vertigo. Psychiatric/Behavioral: The patient has insomnia. Objective:      Physical Exam:  /70 (Site: Right Upper Arm, Position: Sitting, Cuff Size: Small Adult)   Pulse 67   Ht 5' 6\" (1.676 m)   Wt 145 lb (65.8 kg)   BMI 23.40 kg/m²   Wt Readings from Last 3 Encounters:   09/29/21 145 lb (65.8 kg)   09/27/21 145 lb (65.8 kg)   09/21/21 145 lb (65.8 kg)     Body mass index is 23.4 kg/m². Physical Exam  Vitals reviewed. Constitutional:       General: He is not in acute distress. Appearance: Normal appearance. He is not ill-appearing. HENT:      Head: Atraumatic. Neck:      Vascular: No carotid bruit. Cardiovascular:      Rate and Rhythm: Normal rate and regular rhythm. Pulses: Normal pulses. Heart sounds: Normal heart sounds. No murmur heard. Pulmonary:      Effort: Pulmonary effort is normal. No respiratory distress. Breath sounds: Normal breath sounds. Musculoskeletal:         General: No swelling or deformity. Cervical back: Neck supple. No muscular tenderness. Neurological:      Mental Status: He is alert.              DATA  BNP:   Lab Results   Component Value Date    BNP 9 03/11/2014    PROBNP 891.1 (H) 04/04/2021     CBC:   Lab Results   Component Value Date    WBC 6.7 09/21/2021    RBC 4.28 09/21/2021    HGB 12.7 09/21/2021    HCT 38.1 09/21/2021     09/21/2021     CMP:    Lab Results   Component Value Date     09/21/2021 K 3.6 09/21/2021     09/21/2021    CO2 18 09/21/2021    BUN 17 09/21/2021    CREATININE 0.9 09/21/2021    GFRAA >60 09/21/2021    LABGLOM >60 09/21/2021    GLUCOSE 165 09/21/2021    CALCIUM 9.8 09/21/2021     Hepatic Function Panel:    Lab Results   Component Value Date    ALKPHOS 81 09/21/2021    ALT 12 09/21/2021    AST 20 09/21/2021    PROT 6.8 09/21/2021    PROT 8.0 08/30/2012    BILITOT 0.7 09/21/2021    BILIDIR 0.2 04/08/2020    IBILI 0.0 04/08/2020    LABALBU 4.2 09/21/2021     Magnesium:    Lab Results   Component Value Date    MG 1.9 07/02/2020     PT/INR:    Lab Results   Component Value Date    PROTIME 13.4 04/08/2021    INR 1.11 04/08/2021     Lipids:    Lab Results   Component Value Date    TRIG 101 11/12/2018    HDL 62 03/15/2019    LDLDIRECT 60 03/15/2019     Lab Results   Component Value Date    LABA1C 7.9 (H) 12/01/2020    LABA1C 7.7 (H) 03/30/2020     TSH:  No results found for: TSH      Assessment/ Plan:    Patient seen, interviewed and examined. Testing was reviewed. CAD   Patient had stents placed 2015   Patient is not having cardiac symptoms   continue Coreg, ASA   Low salt, Low cholesterol diet     Dyslipidemia   Lipid panel reviewed from care every where   Patient LDL is  at goal, HDL is at goal, triglycerides normal   Continue Lipitor (atorvastatin)    Discussed with the patient the need for exercise, low cholesterol diet, and compliance with medications.      Revised Cardiac Risk Index:   High risk type of surgery yes   H/O ischemic heart disease  (h/o MI, or a positive stress test, current complaint of chest pain considered to be secondary to myocardial ischemia,  Use of nitrate therapy or ECG with pathological Q waves; do not count prior coronary revascularization procedure unless one of the other criteria for ischemic heart disease is present) yes     H/O heart failure yes  H/O CVA no   Patient is not able to walk up a flight of stairs or walk around the block  H/O DM treated with insulin yes  Preoperative serum creatinine >2.0 mg/dl (177 micromol/L) no   Has a history of atrial fibrillation No  Has a history of VHD No  EKG ( 9/29/2021) does not have changes from EKG ( 4/11/2021)    The calculated rate of cardiac death, nonfatal myocardial infarction, and nonfatal cardiac arrest according to the number of predictors is; Three or more risk factors  5.4% (95% CI: 2.8-7.9)    Will check echo prior to surgery to ensure Systolic dysfunction not worse. Lifestyle and risk factor modificatons discussed. Various goals are discussed and questions answered. Continue current medications. Appropriate prescriptions are addressed. Questions answered and patient verbalizes understanding.      Office visit in 3 months

## 2021-10-12 ENCOUNTER — TELEPHONE (OUTPATIENT)
Dept: CARDIOLOGY CLINIC | Age: 81
End: 2021-10-12

## 2021-10-12 NOTE — TELEPHONE ENCOUNTER
----- Message from MOLLY Jhaveri CNP sent at 10/5/2021  8:09 AM EDT -----  No significant abnormalities on echocardiogram  Mitral valve does have some calcium on it that is causing restriction but nothing of great significance.   We will send cardiac clearance  Patient to follow-up as scheduled  ----- Message -----  From: Carol Hamlin Incoming Cardiovascular Results From [facility]  Sent: 9/29/2021  11:48 AM EDT  To: MOLLY Jhaveri CNP

## 2021-10-12 NOTE — TELEPHONE ENCOUNTER
Patient notified of results and verbalized understanding. Summary   Left ventricular function and size is normal, EF is estimated at 55-60%. Mild left ventricular hypertrophy. Normal diastolic filling pattern for age. No regional wall motion abnormalities were detected. Mildly dilated left atrium. Sclerotic, but non-stenotic aortic valve. Mitral annular calcification is present. Calcified mitral valve leaflets noted with mild mitral stenosis, mean   gradient is 2mmhg. Mild mitral and tricuspid regurgitation is present. RVSP is 25 mmHg. No evidence of pericardial effusion.       Signature      ------------------------------------------------------------------   Electronically signed by Nelly Ayoub MD   (Interpreting physician) on 09/29/2021 at 11:48 AM

## 2021-10-27 PROBLEM — Z09 STATUS POST RIGHT INGUINAL HERNIA REPAIR, FOLLOW-UP EXAM: Status: ACTIVE | Noted: 2021-10-27

## 2021-11-26 PROBLEM — Z09 STATUS POST RIGHT INGUINAL HERNIA REPAIR, FOLLOW-UP EXAM: Status: RESOLVED | Noted: 2021-10-27 | Resolved: 2021-11-26

## 2022-01-03 ENCOUNTER — APPOINTMENT (OUTPATIENT)
Dept: CT IMAGING | Age: 82
End: 2022-01-03
Payer: MEDICARE

## 2022-01-03 ENCOUNTER — HOSPITAL ENCOUNTER (OUTPATIENT)
Age: 82
Setting detail: OBSERVATION
Discharge: HOME HEALTH | End: 2022-01-07
Attending: INTERNAL MEDICINE | Admitting: HOSPITALIST
Payer: MEDICARE

## 2022-01-03 ENCOUNTER — APPOINTMENT (OUTPATIENT)
Dept: GENERAL RADIOLOGY | Age: 82
End: 2022-01-03
Payer: MEDICARE

## 2022-01-03 DIAGNOSIS — R41.82 ALTERED MENTAL STATUS, UNSPECIFIED ALTERED MENTAL STATUS TYPE: Primary | ICD-10-CM

## 2022-01-03 DIAGNOSIS — R44.1 VISUAL HALLUCINATION: ICD-10-CM

## 2022-01-03 DIAGNOSIS — R79.89 ELEVATED BRAIN NATRIURETIC PEPTIDE (BNP) LEVEL: ICD-10-CM

## 2022-01-03 DIAGNOSIS — R77.8 ELEVATED TROPONIN: ICD-10-CM

## 2022-01-03 LAB
ALBUMIN SERPL-MCNC: 3.9 GM/DL (ref 3.4–5)
ALP BLD-CCNC: 79 IU/L (ref 40–128)
ALT SERPL-CCNC: 17 U/L (ref 10–40)
AMMONIA: 24 UMOL/L (ref 16–60)
ANION GAP SERPL CALCULATED.3IONS-SCNC: 13 MMOL/L (ref 4–16)
AST SERPL-CCNC: 24 IU/L (ref 15–37)
BACTERIA: ABNORMAL /HPF
BASOPHILS ABSOLUTE: 0.1 K/CU MM
BASOPHILS RELATIVE PERCENT: 1.2 % (ref 0–1)
BILIRUB SERPL-MCNC: 0.5 MG/DL (ref 0–1)
BILIRUBIN URINE: NEGATIVE MG/DL
BLOOD, URINE: NEGATIVE
BUN BLDV-MCNC: 23 MG/DL (ref 6–23)
CALCIUM SERPL-MCNC: 9.7 MG/DL (ref 8.3–10.6)
CHLORIDE BLD-SCNC: 102 MMOL/L (ref 99–110)
CLARITY: CLEAR
CO2: 22 MMOL/L (ref 21–32)
COLOR: YELLOW
CREAT SERPL-MCNC: 1.2 MG/DL (ref 0.9–1.3)
DIFFERENTIAL TYPE: ABNORMAL
EOSINOPHILS ABSOLUTE: 1 K/CU MM
EOSINOPHILS RELATIVE PERCENT: 12.5 % (ref 0–3)
GFR AFRICAN AMERICAN: >60 ML/MIN/1.73M2
GFR NON-AFRICAN AMERICAN: 58 ML/MIN/1.73M2
GLUCOSE BLD-MCNC: 128 MG/DL (ref 70–99)
GLUCOSE BLD-MCNC: 135 MG/DL (ref 70–99)
GLUCOSE, URINE: NEGATIVE MG/DL
HCT VFR BLD CALC: 35.9 % (ref 42–52)
HEMOGLOBIN: 12.2 GM/DL (ref 13.5–18)
IMMATURE NEUTROPHIL %: 0.4 % (ref 0–0.43)
KETONES, URINE: ABNORMAL MG/DL
LACTATE: 1.4 MMOL/L (ref 0.4–2)
LEUKOCYTE ESTERASE, URINE: NEGATIVE
LIPASE: 35 IU/L (ref 13–60)
LYMPHOCYTES ABSOLUTE: 2.5 K/CU MM
LYMPHOCYTES RELATIVE PERCENT: 32.2 % (ref 24–44)
MCH RBC QN AUTO: 30.3 PG (ref 27–31)
MCHC RBC AUTO-ENTMCNC: 34 % (ref 32–36)
MCV RBC AUTO: 89.1 FL (ref 78–100)
MONOCYTES ABSOLUTE: 1 K/CU MM
MONOCYTES RELATIVE PERCENT: 13.7 % (ref 0–4)
NITRITE URINE, QUANTITATIVE: NEGATIVE
NUCLEATED RBC %: 0 %
PDW BLD-RTO: 12.4 % (ref 11.7–14.9)
PH, URINE: 8 (ref 5–8)
PLATELET # BLD: 226 K/CU MM (ref 140–440)
PMV BLD AUTO: 9.5 FL (ref 7.5–11.1)
POTASSIUM SERPL-SCNC: 3.9 MMOL/L (ref 3.5–5.1)
PRO-BNP: 1514 PG/ML
PROTEIN UA: NEGATIVE MG/DL
RBC # BLD: 4.03 M/CU MM (ref 4.6–6.2)
RBC URINE: <1 /HPF (ref 0–3)
SARS-COV-2, NAAT: NOT DETECTED
SEGMENTED NEUTROPHILS ABSOLUTE COUNT: 3 K/CU MM
SEGMENTED NEUTROPHILS RELATIVE PERCENT: 40 % (ref 36–66)
SODIUM BLD-SCNC: 137 MMOL/L (ref 135–145)
SOURCE: NORMAL
SPECIFIC GRAVITY UA: 1.01 (ref 1–1.03)
TOTAL CK: 54 IU/L (ref 38–174)
TOTAL IMMATURE NEUTOROPHIL: 0.03 K/CU MM
TOTAL NUCLEATED RBC: 0 K/CU MM
TOTAL PROTEIN: 6.5 GM/DL (ref 6.4–8.2)
TRICHOMONAS: ABNORMAL /HPF
TROPONIN T: 0.05 NG/ML
TSH HIGH SENSITIVITY: 2.98 UIU/ML (ref 0.27–4.2)
UROBILINOGEN, URINE: NEGATIVE MG/DL (ref 0.2–1)
WBC # BLD: 7.6 K/CU MM (ref 4–10.5)
WBC UA: <1 /HPF (ref 0–2)

## 2022-01-03 PROCEDURE — 83036 HEMOGLOBIN GLYCOSYLATED A1C: CPT

## 2022-01-03 PROCEDURE — 70450 CT HEAD/BRAIN W/O DYE: CPT

## 2022-01-03 PROCEDURE — 71275 CT ANGIOGRAPHY CHEST: CPT

## 2022-01-03 PROCEDURE — 87635 SARS-COV-2 COVID-19 AMP PRB: CPT

## 2022-01-03 PROCEDURE — 84443 ASSAY THYROID STIM HORMONE: CPT

## 2022-01-03 PROCEDURE — 86592 SYPHILIS TEST NON-TREP QUAL: CPT

## 2022-01-03 PROCEDURE — 82550 ASSAY OF CK (CPK): CPT

## 2022-01-03 PROCEDURE — 99285 EMERGENCY DEPT VISIT HI MDM: CPT

## 2022-01-03 PROCEDURE — 73564 X-RAY EXAM KNEE 4 OR MORE: CPT

## 2022-01-03 PROCEDURE — 87086 URINE CULTURE/COLONY COUNT: CPT

## 2022-01-03 PROCEDURE — 93005 ELECTROCARDIOGRAM TRACING: CPT | Performed by: PHYSICIAN ASSISTANT

## 2022-01-03 PROCEDURE — 84484 ASSAY OF TROPONIN QUANT: CPT

## 2022-01-03 PROCEDURE — 83605 ASSAY OF LACTIC ACID: CPT

## 2022-01-03 PROCEDURE — 80053 COMPREHEN METABOLIC PANEL: CPT

## 2022-01-03 PROCEDURE — 81001 URINALYSIS AUTO W/SCOPE: CPT

## 2022-01-03 PROCEDURE — 83690 ASSAY OF LIPASE: CPT

## 2022-01-03 PROCEDURE — 83880 ASSAY OF NATRIURETIC PEPTIDE: CPT

## 2022-01-03 PROCEDURE — 82140 ASSAY OF AMMONIA: CPT

## 2022-01-03 PROCEDURE — 82607 VITAMIN B-12: CPT

## 2022-01-03 PROCEDURE — 6360000004 HC RX CONTRAST MEDICATION: Performed by: PHYSICIAN ASSISTANT

## 2022-01-03 PROCEDURE — 82746 ASSAY OF FOLIC ACID SERUM: CPT

## 2022-01-03 PROCEDURE — 85025 COMPLETE CBC W/AUTO DIFF WBC: CPT

## 2022-01-03 PROCEDURE — 74177 CT ABD & PELVIS W/CONTRAST: CPT

## 2022-01-03 PROCEDURE — 82805 BLOOD GASES W/O2 SATURATION: CPT

## 2022-01-03 PROCEDURE — 71045 X-RAY EXAM CHEST 1 VIEW: CPT

## 2022-01-03 PROCEDURE — 82962 GLUCOSE BLOOD TEST: CPT

## 2022-01-03 RX ADMIN — IOPAMIDOL 85 ML: 755 INJECTION, SOLUTION INTRAVENOUS at 23:27

## 2022-01-03 NOTE — ED PROVIDER NOTES
EKG Interpretation    Interpreted by emergency department physician    Rhythm: normal sinus   Rate: normal  Axis: left  Ectopy: none  Conduction: left anterior fasciclar block  ST Segments: normal  T Waves: normal  Q Waves: none    Clinical Impression: Normal sinus rhythm with left anterior fascicular block and low voltage QRS    MD Karen Lewis MD  01/03/22 [id number]

## 2022-01-03 NOTE — ED TRIAGE NOTES
Per EMS report patients last known well was yesterday that started out with a headache. Report of confusion increasing also today. Patient having visual hallucinations seeing water on the walls. 20 SL in left FA. Bilateral below knee amputee.

## 2022-01-03 NOTE — ED NOTES
Bed: H-04  Expected date: 1/3/22  Expected time:   Means of arrival: [facility]  Comments:     Yakelin Castillo.  JESSICA Astorga  01/03/22 [id number]

## 2022-01-03 NOTE — ED PROVIDER NOTES
EMERGENCY DEPARTMENT ENCOUNTER    [facility] EMERGENCY DEPARTMENT        TRIAGE CHIEF COMPLAINT:   Altered Mental Status      Habematolel:  Estevan Howard is a 79 y.o. male that presents with triage chief complaint of altered mental status/confusion. On exam, patient is awake, alert and oriented x2 to self and place but is pleasantly confused. He is not able to provide HPI, stating \"my friend Ela Duncan that I have known for over 30 years told me to come to the ER. \"  I did speak with patient's wife over the phone, patient is currently and assisted living and beginning yesterday, began having a generalized headache without preceding trauma or injury. States headache had resolved today however patient has been having visual hallucinations. Wife states that he has been talking and seeing to people not there in their home as well as \"seeing water on the walls and trying to take pictures of them. \"  Wife states that he has had similar altered mental status and confusion with hallucinations when \"his bile duct becomes obstructed with stones. \"  Patient has had a cholecystectomy in the past but wife states that every 6 months, he will have an obstructing stone that needs to be taken out by general surgeon, Dr. Almita Florentino. Patient did have an episode of nausea and vomiting last week but has otherwise been asymptomatic for any persistent abdominal pain vomiting diarrhea or changes in bowel movements. No fever or chills. He is also had a cough and intermittent shortness of breath x1 week without known COVID-19 contacts or other sick contacts. No documented fevers at home. Patient is fully vaccinated for COVID-19. Last medical history includes COPD, coronary disease, CHF, diabetes, hyperlipidemia, hypertension and kidney stones. Patient is compliant with all of his home medications including baby aspirin. No anticoagulation therapy. Wife. No changes in urination or bowel movements.   On my exam, patient is denying any pain other than to his left lower leg. He has had bilateral below the knee amputations and was noted to have a small scabbed wound to the stump of his left lower leg. He is denying any chest pain shortness of breath. When asked if he is having any abdominal pain he is stating \"kind of kind of not\" and is very vague. ROS: Limited given patient's confusion, provided by wife   General:  No fevers   Cardiovascular:  No chest pain   Respiratory:  +cough  Gastrointestinal:  See HPI  Musculoskeletal:  See HPI  Skin:  See HPI  Neurologic:  See HPI  Extremities:  No edema    Past Medical History:   Diagnosis Date    Acute exacerbation of COPD with asthma ([address]     Arthritis     Biliary stones 2015    CAD (coronary artery disease)     Chronic systolic (congestive) heart failure (HCC)     COPD (chronic obstructive pulmonary disease) (HCC)     Diabetes mellitus (HCC)     History of blood transfusion     Hx of angiography 3/11/2014    Pulmonary Arteries: Small sliding hiatal hernia. Mod coronary artery calcifications. Mild bilat gynecomastia.     Hyperlipidemia     Hypertension     Kidney stone     Metabolic encephalopathy 21/56/3801     Past Surgical History:   Procedure Laterality Date    ABDOMEN SURGERY      stones in bile duct removed x3    CARDIAC SURGERY      CABG x 2 in 2004 at [facility] - Dr. Diana Doherty, ESOPHAGUS      ERCP  03/13/14    w/ dilation of papilla    ERCP  9/11/14    ERCP  10/24/15    extractions stones, balloon dilatation     ERCP  03/18/2017    stone extraction     ERCP N/A 1/25/2019    ERCP STONE REMOVAL/ DILATATION OF PAPILLA WITH 10-12MM AND 12-15MM BALLOON AND 9-12MM, 12-15MM  EXTRACTOR BALLOON USED FOR REMOVAL OF MULTIPLE  CBD STONES performed by Savanna De La Torre MD at [place] [address] ERCP N/A 9/7/2019    ERCP STONE REMOVAL, DILATATION OF PAPILLA WITH 10-12MM BALLOON, AND EXTRACTOR BALLOON 12-15MM USED FOR REMOVAL OF CBD STONE AND SLUDE performed by Susan Sarkar MD at [place] [address] ERCP N/A 3/31/2020    ERCP DILATION BALLOON to 12  AND SWEEP OF CBD STONE AND SLUDGE performed by Susan Sarkar MD at [facility] ERCP N/A 4/8/2021    ERCP WITH BALLOON DILATATION 10-12 MM BALLOON( TO 12 MM) OF PAPILLA, , BALLOON SWEEP  WITH REMOVAL OF CBD STONES AND GDBFCQ(65-87 MM BALLOON) performed by Susan Sarkar MD at [address] Left 2/18/2021    LEFT FEMUR IM NAIL VIANEY INSERTION performed by Neli Urbina DO at [address]      JOINT REPLACEMENT      LEG AMPUTATION BELOW KNEE Bilateral     2005 at [address] [facility]  03/13/14    sphincterotomy     Family History   Problem Relation Age of Onset    Cancer Mother     Diabetes Mother     Cancer Father      Social History     Socioeconomic History    Marital status:      Spouse name: Suyapa Flores Number of children: Not on file    Years of education: Not on file    Highest education level: Not on file   Occupational History    Not on file   Tobacco Use    Smoking status: Never Smoker    Smokeless tobacco: Never Used   Substance and Sexual Activity    Alcohol use: No    Drug use: No    Sexual activity: Not on file   Other Topics Concern    Not on file   Social History Narrative    Not on file     Social Determinants of Health     Financial Resource Strain:     Difficulty of Paying Living Expenses: Not on file   Food Insecurity:     Worried About Running Out of Food in the Last Year: Not on file    Eul[place] of Food in the Last Year: Not on file   Transportation Needs:     Lack of Transportation (Medical): Not on file    Lack of Transportation (Non-Medical):  Not on file   Physical Activity:     Days of Exercise per Week: Not on file    Minutes of Exercise per Session: Not on file   Stress:     Feeling of Stress : Not on file   Social Connections:     Frequency of Communication with Friends and Family: Not on file    Frequency of Social Gatherings with Friends and Family: Not on file    Attends Cheondoism Services: Not on file    Active Member of Clubs or Organizations: Not on file    Attends Club or Organization Meetings: Not on file    Marital Status: Not on file   Intimate Partner Violence:     Fear of Current or Ex-Partner: Not on file    Emotionally Abused: Not on file    Physically Abused: Not on file    Sexually Abused: Not on file   Housing Stability:     Unable to Pay for Housing in the Last Year: Not on file    Number of [place] in the Last Year: Not on file    Unstable Housing in the Last Year: Not on file     No current facility-administered medications for this encounter. Current Outpatient Medications   Medication Sig Dispense Refill    busPIRone (BUSPAR) 5 MG tablet Take 1 tablet by mouth      cilostazol (PLETAL) 100 MG tablet       traMADol (ULTRAM) 50 MG tablet Take 50 mg by mouth every 6 hours as needed.       glimepiride (AMARYL) 4 MG tablet Take 1 tablet by mouth every morning (before breakfast) 30 tablet 0    insulin detemir (LEVEMIR FLEXTOUCH) 100 UNIT/ML injection pen Inject 10 Units into the skin nightly 5 pen 0    cholestyramine light 4 g packet Take 1 packet by mouth 2 times daily as needed (diarrhea) 30 packet 0    lisinopril (PRINIVIL;ZESTRIL) 5 MG tablet Take 1 tablet by mouth daily 30 tablet 0    famotidine (PEPCID) 40 MG tablet Take 40 mg by mouth daily      insulin lispro (HUMALOG) 100 UNIT/ML injection vial Inject 0-12 Units into the skin 3 times daily (with meals) **Medium Dose Corrective Algorithm**  Glucose: Dose:  If <139             No Insulin  140-199 2 Units  200-249 4 Units  250-299 6 Units  300-349 8 Units  350-400 10 Units  Above 400       12 Units 1 vial 3    carvedilol (COREG) 6.25 MG tablet Take 1 tablet by mouth 2 times daily (with meals) 60 tablet 0    ondansetron (ZOFRAN) 4 MG tablet Take 4 mg by mouth 2 times daily as needed for Nausea or Vomiting      gabapentin (NEURONTIN) 100 MG capsule Take 100 mg by mouth nightly.  Cholecalciferol 50 MCG (2000 UT) TABS Take one tablet daily by mouth      atorvastatin (LIPITOR) 40 MG tablet Take 40 mg by mouth daily      docusate sodium (COLACE) 100 MG capsule Take 100 mg by mouth nightly as needed       latanoprost (XALATAN) 0.005 % ophthalmic solution Place 1 drop into both eyes nightly       diltiazem (CARDIZEM) 60 MG tablet Take 1 tablet by mouth 2 times daily 90 tablet 3    Multiple Vitamins-Minerals (MULTIVITAMIN PO) Take 1 tablet by mouth daily.  aspirin 81 MG chewable tablet Take 81 mg by mouth daily        Allergies   Allergen Reactions    Byetta 10 Mcg Pen [Exenatide]     Sulbactam     Ampicillin Rash    Aricept [Donepezil Hydrochloride] Other (See Comments)     Hallucinations, confusion    Benztropine Other (See Comments)     Hallucinations, confusion    Celebrex [Celecoxib] Other (See Comments)     'causes him to be nervous and jittery\"    Diphenhydramine Hcl Other (See Comments)     nervous and jittery    Diphenhydramine Hcl [Diphenhydramine] Anxiety    Exenatide Other (See Comments)     Causes him to be nervous and jittery    Metoprolol Succinate Other (See Comments)     Hallucinations     Phenergan [Promethazine Hcl] Other (See Comments)     Hallucinations, nervous, jittery    Plavix [Clopidogrel Bisulfate] Other (See Comments)     Causes bleeding in his stomach    Pseudoephedrine Anxiety    Reglan [Metoclopramide Hcl] Other (See Comments)     \"Caused him to just sit in a chair and rock back and forth\"       Nursing Notes Reviewed  PHYSICAL EXAM    VITAL SIGNS: BP (!) 146/101   Pulse 85   Temp 98.6 °F (37 °C) (Oral)   Resp 12   SpO2 99%    Constitutional:  Well developed, thin male, chronically deconditioned, nontoxic  Head:  Normocephalic, Atraumatic  Eyes:   EOMI. Sclera clear. Conjunctiva normal, No discharge. Neck/Lymphatics: Supple, no JVD, No lymphadenopathy  Cardiovascular:  RRR, Normal S1 & S2    Peripheral Vascular: Distal pulses 2+, Capillary refill <2seconds  Respiratory:  Respirations nonnlabored, Clear to auscultation bilaterally, No retractions  GI:  No gross discoloration. Bowel sounds present in all quadrants, No audible bruits. Soft,  Nondistended. No localized abdominal tenderness and without rebound tenderness or guarding, No palpable pulsatile masses or obvious hernias. No McBurney's point tenderness  Negative Rovsing sign   Negative Parker's sign. Back:   No CVA tenderness to percussion. Musculoskeletal: Bilateral below the knee amputation. Well-appearing stump on the right side. There is a small scabbed wound to the distal aspect of the left stump without dehiscence erythema warmth or induration. Integument:  Warm, Dry, Intact, Skin turgor and texture normal  Neurologic:  Alert & oriented to self and place, not to time, believes it is the year 2020. No focal deficits noted. Cranial nerves II through XII grossly intact. No slurred speech. No facial droop. Following all commands, answering questions appropriately. Equal finger-nose testing, no pronator drift. Psychiatric:  Affect appropriate, cooperative. Not respond to internal stimuli. No active hallucinations at this time.        I have reviewed and interpreted all of the currently available lab results from this visit (if applicable):  Results for orders placed or performed during the hospital encounter of 01/03/22   COVID-19, Rapid    Specimen: Nasopharyngeal   Result Value Ref Range    Source THROAT     SARS-CoV-2, NAAT NOT DETECTED NOT DETECTED   CBC Auto Differential   Result Value Ref Range    WBC 7.6 4.0 - 10.5 K/CU MM    RBC 4.03 (L) 4.6 - 6.2 M/CU MM    Hemoglobin 12.2 (L) 13.5 - 18.0 GM/DL    Hematocrit 35.9 (L) 42 - 52 %    MCV 89.1 78 - 100 FL    MCH 30.3 27 - 31 PG    MCHC 34.0 32.0 - 36.0 %    RDW 12.4 11.7 - 14.9 %    Platelets 977 325 - 958 K/CU MM    MPV 9.5 7.5 - 11.1 FL    Differential Type AUTOMATED DIFFERENTIAL     Segs Relative 40.0 36 - 66 %    Lymphocytes % 32.2 24 - 44 %    Monocytes % 13.7 (H) 0 - 4 %    Eosinophils % 12.5 (H) 0 - 3 %    Basophils % 1.2 (H) 0 - 1 %    Segs Absolute 3.0 K/CU MM    Lymphocytes Absolute 2.5 K/CU MM    Monocytes Absolute 1.0 K/CU MM    Eosinophils Absolute 1.0 K/CU MM    Basophils Absolute 0.1 K/CU MM    Nucleated RBC % 0.0 %    Total Nucleated RBC 0.0 K/CU MM    Total Immature Neutrophil 0.03 K/CU MM    Immature Neutrophil % 0.4 0 - 0.43 %   Comprehensive Metabolic Panel   Result Value Ref Range    Sodium 137 135 - 145 MMOL/L    Potassium 3.9 3.5 - 5.1 MMOL/L    Chloride 102 99 - 110 mMol/L    CO2 22 21 - 32 MMOL/L    BUN 23 6 - 23 MG/DL    CREATININE 1.2 0.9 - 1.3 MG/DL    Glucose 135 (H) 70 - 99 MG/DL    Calcium 9.7 8.3 - 10.6 MG/DL    Albumin 3.9 3.4 - 5.0 GM/DL    Total Protein 6.5 6.4 - 8.2 GM/DL    Total Bilirubin 0.5 0.0 - 1.0 MG/DL    ALT 17 10 - 40 U/L    AST 24 15 - 37 IU/L    Alkaline Phosphatase 79 40 - 128 IU/L    GFR Non- 58 (L) >60 mL/min/1.73m2    GFR African American >60 >60 mL/min/1.73m2    Anion Gap 13 4 - 16   Brain Natriuretic Peptide   Result Value Ref Range    Pro-BNP 1,514 (H) <300 PG/ML   Ammonia Level   Result Value Ref Range    Ammonia 24 16 - 60 UMOL/L   Lactic Acid, Plasma   Result Value Ref Range    Lactate 1.4 0.4 - 2.0 mMOL/L   TSH without Reflex   Result Value Ref Range    TSH, High Sensitivity 2.980 0.270 - 4.20 uIu/ml   CK   Result Value Ref Range    Total CK 54 38 - 174 IU/L   EKG 12 Lead   Result Value Ref Range    Ventricular Rate 88 BPM    Atrial Rate 88 BPM    P-R Interval 140 ms    QRS Duration 84 ms    Q-T Interval 374 ms    QTc Calculation (Bazett) 452 ms    P Axis 75 degrees    R Axis -57 degrees    T Axis 99 degrees    Diagnosis       Poor data quality, interpretation may be adversely affected  Normal sinus rhythm  Low voltage QRS  Left anterior fascicular block  Possible Lateral infarct , age undetermined  Cannot rule out Inferior infarct , age undetermined  Abnormal ECG  When compared with ECG of 11-APR-2021 09:45,  Significant changes have occurred          Radiographs (if obtained):  [] The following radiograph was interpreted by myself in the absence of a radiologist:   [] Radiologist's Report Reviewed:  CT ABDOMEN PELVIS W IV CONTRAST Additional Contrast? None   Preliminary Result   1. Moderate impacted stool within the rectosigmoid colon. No evidence of   bowel obstruction. 2. Small to moderate hiatal hernia. 3. Pneumobilia without significant change since the prior examination of   September 21, 2021. This is likely related to prior biliary intervention. RECOMMENDATIONS:   Unavailable         CTA PULMONARY W CONTRAST   Final Result   No evidence of pulmonary embolism or acute pulmonary abnormality. XR CHEST PORTABLE   Final Result   No acute process. XR KNEE LEFT (MIN 4 VIEWS)   Final Result   1. No acute findings in the left knee status post below-knee amputation. 2. Moderate tricompartmental osteoarthritic changes of the left knee. 3. Bony demineralization. CT HEAD WO CONTRAST   Final Result   No acute intracranial abnormality. RECOMMENDATIONS:   Unavailable                     CT ABDOMEN PELVIS W IV CONTRAST Additional Contrast? None (Preliminary result)  Result time 01/04/22 00:12:28  Preliminary result by Viky Mcginnis MD (01/04/22 00:12:28)                Impression:    1. Moderate impacted stool within the rectosigmoid colon.  No evidence of   bowel obstruction. 2. Small to moderate hiatal hernia. 3. Pneumobilia without significant change since the prior examination of   September 21, 2021.  This is likely related to prior biliary intervention.      RECOMMENDATIONS:   Unavailable                       CTA PULMONARY W CONTRAST (Final result)  Result time 01/03/22 23:54:39  Procedure changed from [address]  Final result by Jeovanny Bellamy MD (01/03/22 23:54:39)                Impression:    No evidence of pulmonary embolism or acute pulmonary abnormality. Narrative:    EXAMINATION:   CTA OF THE CHEST 1/3/2022 11:26 pm     TECHNIQUE:   CTA of the chest was performed after the administration of intravenous   contrast.  Multiplanar reformatted images are provided for review.  MIP   images are provided for review. Dose modulation, iterative reconstruction,   and/or weight based adjustment of the mA/kV was utilized to reduce the   radiation dose to as low as reasonably achievable. COMPARISON:   None. HISTORY:   ORDERING SYSTEM PROVIDED HISTORY: AMS, cough, elevated trop/bnp eval for   pneumonia versus PE   TECHNOLOGIST PROVIDED HISTORY:   Reason for exam:->AMS, cough, elevated trop/bnp eval for pneumonia versus PE   Reason for Exam: AMS, cough, elevated trop/bnp eval for pneumonia versus PE     FINDINGS:   Pulmonary Arteries: Pulmonary arteries are adequately opacified for   evaluation.  No evidence of intraluminal filling defect to suggest pulmonary   embolism.  Main pulmonary artery is normal in caliber. Mediastinum: No evidence of mediastinal lymphadenopathy.  The heart and   pericardium demonstrate no acute abnormality.  There is no acute abnormality   of the thoracic aorta. Lungs/pleura: The lungs are without acute process.  No focal consolidation or   pulmonary edema.  No evidence of pleural effusion or pneumothorax. Upper Abdomen: Moderate size hiatal hernia.  Status post cholecystectomy. Moderate pneumobilia, primarily in the left hepatic lobe.      Soft Tissues/Bones: No acute bone or soft tissue abnormality.                       XR CHEST PORTABLE (Final result)  Result time 01/03/22 19:26:27  Final result by Camron Scott MD (01/03/22 19:26:27)                Impression:    No acute process. Narrative:    EXAMINATION:   ONE XRAY VIEW OF THE CHEST     1/3/2022 6:26 pm     COMPARISON:   04/15/2021     HISTORY:   ORDERING SYSTEM PROVIDED HISTORY: chest pain   TECHNOLOGIST PROVIDED HISTORY:   Reason for exam:->chest pain   Reason for Exam: chest pain     ams     FINDINGS:   Status post median sternotomy. The lungs are without acute focal process.  There is no effusion or   pneumothorax. The cardiomediastinal silhouette is stable. The osseous   structures are stable.                       XR KNEE LEFT (MIN 4 VIEWS) (Final result)  Result time 01/03/22 19:30:11  Procedure changed from XR KNEE LEFT (3 VIEWS)  Final result by Jose Jacobsen MD (01/03/22 19:30:11)                Impression:    1. No acute findings in the left knee status post below-knee amputation. 2. Moderate tricompartmental osteoarthritic changes of the left knee. 3. Bony demineralization. Narrative:    EXAMINATION:   FOUR XRAY VIEWS OF THE LEFT KNEE     1/3/2022 6:33 pm     COMPARISON:   Left knee radiograph 02/17/2021     HISTORY:   ORDERING SYSTEM PROVIDED HISTORY: wound to distal stump   TECHNOLOGIST PROVIDED HISTORY:   Reason for exam:->wound to distal stump   Reason for Exam: wound to distal stump due to friction     FINDINGS:   Unchanged findings of below-knee amputation.  New partially included changes   of femoral internal fixation with no evident complication.  Diffuse bony   demineralization.  No acute fractures nor areas of abnormal cortical   disruption.  Ill-defined sclerosis in the remnant tibia suggestive of prior   infarction.  Joints maintain anatomic alignment.  Patellar enthesophytes at   the insertion of the quadriceps tendon and origin of the patellar tendon. Moderate tricompartmental arthropathic changes.  Trace suprapatellar   effusion.  Chondrocalcinosis involving the lateral meniscus, suggesting   calcium pyrophosphate deposition.  No obvious acute soft tissue abnormality. Diffuse arterial calcifications.                       CT HEAD WO CONTRAST (Final result)  Result time 01/03/22 19:24:51  Final result by Génesis Davis MD (01/03/22 19:24:51)                Impression:    No acute intracranial abnormality. RECOMMENDATIONS:   Unavailable             Narrative:    EXAMINATION:   CT OF THE HEAD WITHOUT CONTRAST  1/3/2022 7:10 pm     TECHNIQUE:   CT of the head was performed without the administration of intravenous   contrast. Dose modulation, iterative reconstruction, and/or weight based   adjustment of the mA/kV was utilized to reduce the radiation dose to as low   as reasonably achievable. COMPARISON:   04/04/2021     HISTORY:   ORDERING SYSTEM PROVIDED HISTORY: visual hallucinations, AMS   TECHNOLOGIST PROVIDED HISTORY:   Has a \"code stroke\" or \"stroke alert\" been called? ->No   Reason for exam:->visual hallucinations, AMS   Decision Support Exception - unselect if not a suspected or confirmed   emergency medical condition->Emergency Medical Condition (MA)   Reason for Exam: visual hallucinations, AMS   Additional signs and symptoms: no   Relevant Medical/Surgical History: none     FINDINGS:   BRAIN/VENTRICLES: There is no acute intracranial hemorrhage, mass effect or   midline shift.  No abnormal extra-axial fluid collection.  The gray-white   differentiation is maintained without evidence of an acute infarct.  There is   no evidence of hydrocephalus. ORBITS: The visualized portion of the orbits demonstrate no acute abnormality. SINUSES: The visualized paranasal sinuses and mastoid air cells demonstrate   no acute abnormality. SOFT TISSUES/SKULL:  No acute abnormality of the visualized skull or soft   tissues.                   EKG Interpretation  Please see ED physician's note - Dr. Rachel Hodge - for EKG interpretation      Chart review shows recent radiographs:  No results found.     ED COURSE & MEDICAL DECISION MAKING       Vital signs and nursing notes reviewed during ED course. I have independently evaluated this patient . Supervising physician - Dr. Franklin Qiu - was present in ED and available for consultation throughout entirety of patient's care. All pertinent Lab data and radiographic results reviewed with patient at bedside. The patient and/or the family were informed of the results of any tests/labs/imaging, the treatment plan, and time was allotted to answer questions. Clinical Impression:  1. Altered mental status, unspecified altered mental status type    2. Visual hallucination    3. Elevated troponin    4. Elevated brain natriuretic peptide (BNP) level        Patient presents via EMS with concern for altered mental status/confusion and visual hallucinations. On exam, pleasant 26-year-old male in, thin, in no acute distress. He is alert and oriented to self and place, not to time. He is not responding to internal stimuli, no active hallucinations on exam but does state \"my friend Abril Cabral told me to come into the ER. \"  Not able to provide HPI, HPI provided by wife over the phone who is patient has had similar [place] status and confusion with biliary ductal obstructions. Patient is currently asymptomatic for any complaints. Nonlabored breathing. Abdomen is thin soft nondistended. CVA tenderness percussion. Small scab nonindurated nonerythematous wound to the end of the left stump, bilateral below the knee amputation. Patient is placed on telemetry and continuous pulse ox monitoring. As wife states headache and confusion began yesterday, patient is outside of the window for stroke alert initiation of this time. Shows normal white count, hemoglobin 12.2, stable to baseline. CMP shows glucose of 135, normal renal function, electrolytes, bilirubin and LFTs. Troponin reveals an elevated troponin of 0.052 with a BNP of 1514. Normal ammonia, lactic acid, TSH and CK. Covid is negative.   UA reveals rare bacteria otherwise negative for leukocytes nitrates or white blood cells. Lipase is normal. EKG reveals no evidence of acute ischemic changes. CT head shows no evidence of acute intracranial process. X-ray of left knee reveals no acute osseous abnormality other than moderate tricompartment osteoarthritis changes of the left knee. Chest x-ray is unremarkable. Given patient's age and detectable troponin/BNP as well as history of common bile duct obstruction, did order CT chest and pelvis with contrast.  CTA pulmonary shows no evidence of PE or other pneumonia findings. CT abdomen pelvis with contrast shows small to moderate hiatal hernia with moderate impacted stool within the rectosigmoid colon as well as unchanged pneumobilia likely related to prior biliary intervention. While in the ED, patient did well, continues to be alert to self and place without new neuro deficits. Given his encephalopathy and visualizations as well elevated troponin BNP, patient will be admitted for further evaluation care, trending troponins. I had ordered a venous blood gas which is still pending at time of this dictation. I did consult with Dr. Thao Saldivar - hospitalist - and discussed patient's history, ED presentation/course including any pertinent laboratory findings and imaging study findings. He/she agrees to hospital admission. Patient is admitted to the hospital in stable condition. In consideration of current COVID19 pandemic, with effort to minimize unnecessary provider exposure, this patient was seen at bedside by me independently. However, in compliance with current hospital DAVID/ED protocol, prior to admission I did discuss this patient case with emergency department physician, Dr. Caitlin Hartmann, who did agree with ED workup/evaluation and plan for admission however but ED attending physician did not independently evaluate the patient. Disposition referral (if applicable):  No follow-up provider specified.     Disposition medications (if applicable):  New Prescriptions    No medications on file         (Please note that portions of this note may have been completed with a voice recognition program. Efforts were made to edit the dictations but occasionally words are mis-transcribed.)          Cam Benitez PA-C  01/04/22 0101

## 2022-01-04 ENCOUNTER — APPOINTMENT (OUTPATIENT)
Dept: MRI IMAGING | Age: 82
End: 2022-01-04
Payer: MEDICARE

## 2022-01-04 LAB
BASE EXCESS MIXED: 3.6 (ref 0–1.2)
COMMENT: ABNORMAL
EKG ATRIAL RATE: 88 BPM
EKG DIAGNOSIS: NORMAL
EKG P AXIS: 75 DEGREES
EKG P-R INTERVAL: 140 MS
EKG Q-T INTERVAL: 374 MS
EKG QRS DURATION: 84 MS
EKG QTC CALCULATION (BAZETT): 452 MS
EKG R AXIS: -57 DEGREES
EKG T AXIS: 99 DEGREES
EKG VENTRICULAR RATE: 88 BPM
ESTIMATED AVERAGE GLUCOSE: 151 MG/DL
FOLATE: >20 NG/ML (ref 3.1–17.5)
GLUCOSE BLD-MCNC: 128 MG/DL
GLUCOSE BLD-MCNC: 131 MG/DL
GLUCOSE BLD-MCNC: 131 MG/DL (ref 70–99)
GLUCOSE BLD-MCNC: 174 MG/DL (ref 70–99)
GLUCOSE BLD-MCNC: 202 MG/DL (ref 70–99)
HBA1C MFR BLD: 6.9 % (ref 4.2–6.3)
HCO3 VENOUS: 26.3 MMOL/L (ref 19–25)
O2 SAT, VEN: 44.7 % (ref 50–70)
PCO2, VEN: 33 MMHG (ref 38–52)
PH VENOUS: 7.51 (ref 7.32–7.42)
PO2, VEN: 31 MMHG (ref 28–48)
TROPONIN T: 0.05 NG/ML
TROPONIN T: 0.06 NG/ML
VITAMIN B-12: 533.4 PG/ML (ref 211–911)

## 2022-01-04 PROCEDURE — 93010 ELECTROCARDIOGRAM REPORT: CPT | Performed by: INTERNAL MEDICINE

## 2022-01-04 PROCEDURE — 82962 GLUCOSE BLOOD TEST: CPT

## 2022-01-04 PROCEDURE — 6370000000 HC RX 637 (ALT 250 FOR IP): Performed by: INTERNAL MEDICINE

## 2022-01-04 PROCEDURE — 6370000000 HC RX 637 (ALT 250 FOR IP): Performed by: HOSPITALIST

## 2022-01-04 PROCEDURE — 70551 MRI BRAIN STEM W/O DYE: CPT

## 2022-01-04 PROCEDURE — 93308 TTE F-UP OR LMTD: CPT

## 2022-01-04 PROCEDURE — 96365 THER/PROPH/DIAG IV INF INIT: CPT

## 2022-01-04 PROCEDURE — 99215 OFFICE O/P EST HI 40 MIN: CPT | Performed by: INTERNAL MEDICINE

## 2022-01-04 PROCEDURE — 84484 ASSAY OF TROPONIN QUANT: CPT

## 2022-01-04 PROCEDURE — 96372 THER/PROPH/DIAG INJ SC/IM: CPT

## 2022-01-04 PROCEDURE — 2580000003 HC RX 258: Performed by: HOSPITALIST

## 2022-01-04 PROCEDURE — G0378 HOSPITAL OBSERVATION PER HR: HCPCS

## 2022-01-04 PROCEDURE — 6360000002 HC RX W HCPCS: Performed by: FAMILY MEDICINE

## 2022-01-04 PROCEDURE — 6360000002 HC RX W HCPCS: Performed by: HOSPITALIST

## 2022-01-04 PROCEDURE — 99205 OFFICE O/P NEW HI 60 MIN: CPT | Performed by: PSYCHIATRY & NEUROLOGY

## 2022-01-04 PROCEDURE — 2580000003 HC RX 258: Performed by: FAMILY MEDICINE

## 2022-01-04 RX ORDER — ACETAMINOPHEN 650 MG/1
650 SUPPOSITORY RECTAL EVERY 6 HOURS PRN
Status: DISCONTINUED | OUTPATIENT
Start: 2022-01-04 | End: 2022-01-07 | Stop reason: HOSPADM

## 2022-01-04 RX ORDER — DILTIAZEM HYDROCHLORIDE 60 MG/1
60 TABLET, FILM COATED ORAL EVERY 6 HOURS SCHEDULED
Status: DISCONTINUED | OUTPATIENT
Start: 2022-01-04 | End: 2022-01-07 | Stop reason: HOSPADM

## 2022-01-04 RX ORDER — DILTIAZEM HYDROCHLORIDE 60 MG/1
60 TABLET, FILM COATED ORAL 2 TIMES DAILY
Status: DISCONTINUED | OUTPATIENT
Start: 2022-01-04 | End: 2022-01-04

## 2022-01-04 RX ORDER — SODIUM CHLORIDE 0.9 % (FLUSH) 0.9 %
5-40 SYRINGE (ML) INJECTION EVERY 12 HOURS SCHEDULED
Status: DISCONTINUED | OUTPATIENT
Start: 2022-01-04 | End: 2022-01-07 | Stop reason: HOSPADM

## 2022-01-04 RX ORDER — CILOSTAZOL 100 MG/1
100 TABLET ORAL
Status: DISCONTINUED | OUTPATIENT
Start: 2022-01-04 | End: 2022-01-07 | Stop reason: HOSPADM

## 2022-01-04 RX ORDER — NICOTINE POLACRILEX 4 MG
15 LOZENGE BUCCAL PRN
Status: DISCONTINUED | OUTPATIENT
Start: 2022-01-04 | End: 2022-01-07 | Stop reason: HOSPADM

## 2022-01-04 RX ORDER — CARVEDILOL 6.25 MG/1
6.25 TABLET ORAL 2 TIMES DAILY WITH MEALS
Status: DISCONTINUED | OUTPATIENT
Start: 2022-01-04 | End: 2022-01-07 | Stop reason: HOSPADM

## 2022-01-04 RX ORDER — POLYETHYLENE GLYCOL 3350 17 G/17G
17 POWDER, FOR SOLUTION ORAL DAILY PRN
Status: DISCONTINUED | OUTPATIENT
Start: 2022-01-04 | End: 2022-01-07 | Stop reason: HOSPADM

## 2022-01-04 RX ORDER — CHOLESTYRAMINE LIGHT 4 G/5.7G
4 POWDER, FOR SUSPENSION ORAL 2 TIMES DAILY PRN
Status: DISCONTINUED | OUTPATIENT
Start: 2022-01-04 | End: 2022-01-07 | Stop reason: HOSPADM

## 2022-01-04 RX ORDER — SODIUM CHLORIDE 0.9 % (FLUSH) 0.9 %
5-40 SYRINGE (ML) INJECTION PRN
Status: DISCONTINUED | OUTPATIENT
Start: 2022-01-04 | End: 2022-01-07 | Stop reason: HOSPADM

## 2022-01-04 RX ORDER — MULTIVITAMIN WITH IRON
1 TABLET ORAL DAILY
Status: DISCONTINUED | OUTPATIENT
Start: 2022-01-04 | End: 2022-01-07 | Stop reason: CLARIF

## 2022-01-04 RX ORDER — ONDANSETRON 4 MG/1
4 TABLET, ORALLY DISINTEGRATING ORAL EVERY 8 HOURS PRN
Status: DISCONTINUED | OUTPATIENT
Start: 2022-01-04 | End: 2022-01-07 | Stop reason: HOSPADM

## 2022-01-04 RX ORDER — BUSPIRONE HYDROCHLORIDE 5 MG/1
5 TABLET ORAL DAILY
Status: DISCONTINUED | OUTPATIENT
Start: 2022-01-04 | End: 2022-01-07 | Stop reason: HOSPADM

## 2022-01-04 RX ORDER — ASPIRIN 81 MG/1
81 TABLET, CHEWABLE ORAL DAILY
Status: DISCONTINUED | OUTPATIENT
Start: 2022-01-04 | End: 2022-01-07 | Stop reason: HOSPADM

## 2022-01-04 RX ORDER — ATORVASTATIN CALCIUM 40 MG/1
40 TABLET, FILM COATED ORAL DAILY
Status: DISCONTINUED | OUTPATIENT
Start: 2022-01-04 | End: 2022-01-07 | Stop reason: HOSPADM

## 2022-01-04 RX ORDER — DEXTROSE MONOHYDRATE 25 G/50ML
12.5 INJECTION, SOLUTION INTRAVENOUS PRN
Status: DISCONTINUED | OUTPATIENT
Start: 2022-01-04 | End: 2022-01-07 | Stop reason: HOSPADM

## 2022-01-04 RX ORDER — GABAPENTIN 100 MG/1
100 CAPSULE ORAL EVERY EVENING
Status: DISCONTINUED | OUTPATIENT
Start: 2022-01-04 | End: 2022-01-07 | Stop reason: HOSPADM

## 2022-01-04 RX ORDER — LATANOPROST 50 UG/ML
1 SOLUTION/ DROPS OPHTHALMIC NIGHTLY
Status: DISCONTINUED | OUTPATIENT
Start: 2022-01-04 | End: 2022-01-07 | Stop reason: HOSPADM

## 2022-01-04 RX ORDER — INSULIN GLARGINE 100 [IU]/ML
0.25 INJECTION, SOLUTION SUBCUTANEOUS EVERY EVENING
Status: DISCONTINUED | OUTPATIENT
Start: 2022-01-04 | End: 2022-01-07 | Stop reason: HOSPADM

## 2022-01-04 RX ORDER — DOCUSATE SODIUM 100 MG/1
100 CAPSULE, LIQUID FILLED ORAL NIGHTLY PRN
Status: DISCONTINUED | OUTPATIENT
Start: 2022-01-04 | End: 2022-01-07 | Stop reason: HOSPADM

## 2022-01-04 RX ORDER — POTASSIUM CHLORIDE 7.45 MG/ML
10 INJECTION INTRAVENOUS PRN
Status: DISCONTINUED | OUTPATIENT
Start: 2022-01-04 | End: 2022-01-07 | Stop reason: HOSPADM

## 2022-01-04 RX ORDER — ONDANSETRON 4 MG/1
4 TABLET, FILM COATED ORAL 2 TIMES DAILY PRN
Status: DISCONTINUED | OUTPATIENT
Start: 2022-01-04 | End: 2022-01-04

## 2022-01-04 RX ORDER — LISINOPRIL 5 MG/1
5 TABLET ORAL DAILY
Status: DISCONTINUED | OUTPATIENT
Start: 2022-01-04 | End: 2022-01-07 | Stop reason: HOSPADM

## 2022-01-04 RX ORDER — ACETAMINOPHEN 325 MG/1
650 TABLET ORAL EVERY 6 HOURS PRN
Status: DISCONTINUED | OUTPATIENT
Start: 2022-01-04 | End: 2022-01-07 | Stop reason: HOSPADM

## 2022-01-04 RX ORDER — MAGNESIUM SULFATE IN WATER 40 MG/ML
2000 INJECTION, SOLUTION INTRAVENOUS PRN
Status: DISCONTINUED | OUTPATIENT
Start: 2022-01-04 | End: 2022-01-07 | Stop reason: HOSPADM

## 2022-01-04 RX ORDER — POTASSIUM CHLORIDE 20 MEQ/1
40 TABLET, EXTENDED RELEASE ORAL PRN
Status: DISCONTINUED | OUTPATIENT
Start: 2022-01-04 | End: 2022-01-07 | Stop reason: HOSPADM

## 2022-01-04 RX ORDER — TRAMADOL HYDROCHLORIDE 50 MG/1
50 TABLET ORAL EVERY 6 HOURS PRN
Status: DISCONTINUED | OUTPATIENT
Start: 2022-01-04 | End: 2022-01-07 | Stop reason: HOSPADM

## 2022-01-04 RX ORDER — SODIUM CHLORIDE 9 MG/ML
25 INJECTION, SOLUTION INTRAVENOUS PRN
Status: DISCONTINUED | OUTPATIENT
Start: 2022-01-04 | End: 2022-01-07 | Stop reason: HOSPADM

## 2022-01-04 RX ORDER — FAMOTIDINE 20 MG/1
40 TABLET, FILM COATED ORAL DAILY
Status: DISCONTINUED | OUTPATIENT
Start: 2022-01-04 | End: 2022-01-07 | Stop reason: HOSPADM

## 2022-01-04 RX ORDER — ONDANSETRON 2 MG/ML
4 INJECTION INTRAMUSCULAR; INTRAVENOUS EVERY 6 HOURS PRN
Status: DISCONTINUED | OUTPATIENT
Start: 2022-01-04 | End: 2022-01-07 | Stop reason: HOSPADM

## 2022-01-04 RX ORDER — DEXTROSE MONOHYDRATE 50 MG/ML
100 INJECTION, SOLUTION INTRAVENOUS PRN
Status: DISCONTINUED | OUTPATIENT
Start: 2022-01-04 | End: 2022-01-07 | Stop reason: HOSPADM

## 2022-01-04 RX ADMIN — SODIUM CHLORIDE, PRESERVATIVE FREE 10 ML: 5 INJECTION INTRAVENOUS at 20:31

## 2022-01-04 RX ADMIN — CEFTRIAXONE 1000 MG: 1 INJECTION, POWDER, FOR SOLUTION INTRAMUSCULAR; INTRAVENOUS at 17:19

## 2022-01-04 RX ADMIN — TRAMADOL HYDROCHLORIDE 50 MG: 50 TABLET, COATED ORAL at 20:31

## 2022-01-04 RX ADMIN — THERA TABS 1 TABLET: TAB at 14:40

## 2022-01-04 RX ADMIN — INSULIN LISPRO 2 UNITS: 100 INJECTION, SOLUTION INTRAVENOUS; SUBCUTANEOUS at 08:15

## 2022-01-04 RX ADMIN — BUSPIRONE HYDROCHLORIDE 5 MG: 5 TABLET ORAL at 11:01

## 2022-01-04 RX ADMIN — FAMOTIDINE 40 MG: 20 TABLET, FILM COATED ORAL at 11:03

## 2022-01-04 RX ADMIN — DILTIAZEM HYDROCHLORIDE 60 MG: 60 TABLET, FILM COATED ORAL at 14:39

## 2022-01-04 RX ADMIN — ENOXAPARIN SODIUM 40 MG: 100 INJECTION SUBCUTANEOUS at 14:49

## 2022-01-04 RX ADMIN — ASPIRIN 81 MG 81 MG: 81 TABLET ORAL at 11:01

## 2022-01-04 RX ADMIN — GABAPENTIN 100 MG: 100 CAPSULE ORAL at 02:47

## 2022-01-04 RX ADMIN — ATORVASTATIN CALCIUM 40 MG: 40 TABLET, FILM COATED ORAL at 14:52

## 2022-01-04 RX ADMIN — DILTIAZEM HYDROCHLORIDE 60 MG: 60 TABLET, FILM COATED ORAL at 02:46

## 2022-01-04 RX ADMIN — CARVEDILOL 6.25 MG: 6.25 TABLET, FILM COATED ORAL at 17:00

## 2022-01-04 RX ADMIN — CARVEDILOL 6.25 MG: 6.25 TABLET, FILM COATED ORAL at 11:00

## 2022-01-04 RX ADMIN — CILOSTAZOL 100 MG: 100 TABLET ORAL at 14:38

## 2022-01-04 RX ADMIN — Medication 2000 UNITS: at 14:49

## 2022-01-04 RX ADMIN — INSULIN LISPRO 1 UNITS: 100 INJECTION, SOLUTION INTRAVENOUS; SUBCUTANEOUS at 16:46

## 2022-01-04 ASSESSMENT — PAIN SCALES - GENERAL: PAINLEVEL_OUTOF10: 8

## 2022-01-04 NOTE — CONSULTS
Neurology Service Consult Note  [facility]  Patient Name: Paul Ross  : 1940        Subjective:   Reason for consult: Encephalopathy  Patient seen and examined. Chart reviewed in detail. 79 y.o. -male with PMH CAD, HLD, HTN, metabolic encephalopathy, COPD, T2DM, and CHF presenting to [facility] for altered mental status and confusion. Patient was brought in by his wife in which she reported to the emergency room staff that patient has been having visual hallucinations in which she has been seen and talking to people who were not there in their home. Patient also reportedly seeing water on the walls and trying to take pictures of them. On exam patient is alert to self, having visual hallucinations at this time. Patient states that he sees water running. Patient is moving all extremities without difficulty, and following commands. Prior work-up includes CBC, CMP, UA, CT head, and MRI of brain, which were unremarkable to acute findings. Past Medical History:   Diagnosis Date    Acute exacerbation of COPD with asthma ([facility]     Arthritis     Biliary stones     CAD (coronary artery disease)     Chronic systolic (congestive) heart failure (HCC)     COPD (chronic obstructive pulmonary disease) (HCC)     Diabetes mellitus ([facility]     History of blood transfusion     Hx of angiography 3/11/2014    Pulmonary Arteries: Small sliding hiatal hernia. Mod coronary artery calcifications. Mild bilat gynecomastia.     Hyperlipidemia     Hypertension     Kidney stone     Metabolic encephalopathy     :   Past Surgical History:   Procedure Laterality Date    ABDOMEN SURGERY      stones in bile duct removed x3    CARDIAC SURGERY      CABG [facility] 2 in  at [facility] Dr. Femi Booth, ESOPHAGUS      ERCP  14    w/ dilation of papilla    ERCP 9/11/14    ERCP  10/24/15    extractions stones, balloon dilatation     ERCP  03/18/2017    stone extraction     ERCP N/A 1/25/2019    ERCP STONE REMOVAL/ DILATATION OF PAPILLA WITH 10-12MM AND 12-15MM BALLOON AND 9-12MM, 12-15MM  EXTRACTOR BALLOON USED FOR REMOVAL OF MULTIPLE  CBD STONES performed by Sarah Esparza MD at [place] [address] ERCP N/A 9/7/2019    ERCP STONE REMOVAL, DILATATION OF PAPILLA WITH 10-12MM BALLOON, AND EXTRACTOR BALLOON 12-15MM USED FOR REMOVAL OF CBD STONE AND SLUDE performed by Loyd Cook MD at [place] [address] ERCP N/A 3/31/2020    ERCP DILATION BALLOON to 12  AND SWEEP OF CBD STONE AND SLUDGE performed by Loyd Cook MD at [place] [address] ERCP N/A 4/8/2021    ERCP WITH BALLOON DILATATION 10-12 MM BALLOON( TO 12 MM) OF PAPILLA, , BALLOON SWEEP  WITH REMOVAL OF CBD STONES AND RSGZMN(53-37 MM BALLOON) performed by Loyd Cook MD at [address] Left 2/18/2021    LEFT FEMUR IM NAIL VIANEY INSERTION performed by Jessica Marmolejo DO at [address]      JOINT REPLACEMENT      LEG [place] Bilateral     2005 at [address] [place] [address]  03/13/14    sphincterotomy     Medications:  Scheduled Meds:   aspirin  81 mg Oral Daily    multivitamin  1 tablet Oral Daily    atorvastatin  40 mg Oral Daily    latanoprost  1 drop Both Eyes Nightly    gabapentin  100 mg Oral QPM    vitamin D  2,000 Units Oral Daily    carvedilol  6.25 mg Oral BID WC    famotidine  40 mg Oral Daily    lisinopril  5 mg Oral Daily    busPIRone  5 mg Oral Daily    cilostazol  100 mg Oral BID AC    sodium chloride flush  5-40 mL IntraVENous 2 times per day    enoxaparin  40 mg SubCUTAneous Daily    insulin glargine  0.25 Units/kg SubCUTAneous QPM    insulin lispro  0.08 Units/kg SubCUTAneous TID WC    insulin lispro  0-6 Units SubCUTAneous TID WC    insulin lispro  0-3 Units SubCUTAneous QPM    dilTIAZem  60 mg Oral 4 times per day     Continuous Infusions:   sodium chloride      dextrose       PRN Meds:.docusate sodium, cholestyramine light, traMADol, sodium chloride flush, sodium chloride, ondansetron **OR** ondansetron, polyethylene glycol, acetaminophen **OR** acetaminophen, potassium chloride **OR** potassium alternative oral replacement **OR** potassium chloride, magnesium sulfate, glucose, dextrose, glucagon (rDNA), dextrose    Allergies   Allergen Reactions    Byetta 10 Mcg Pen [Exenatide]     Sulbactam     Ampicillin Rash    Aricept [Donepezil Hydrochloride] Other (See Comments)     Hallucinations, confusion    Benztropine Other (See Comments)     Hallucinations, confusion    Celebrex [Celecoxib] Other (See Comments)     'causes him to be nervous and jittery\"    Diphenhydramine Hcl Other (See Comments)     nervous and jittery    Diphenhydramine Hcl [Diphenhydramine] Anxiety    Exenatide Other (See Comments)     Causes him to be nervous and jittery    Metoprolol Succinate Other (See Comments)     Hallucinations     Phenergan [Promethazine Hcl] Other (See Comments)     Hallucinations, nervous, jittery    Plavix [Clopidogrel Bisulfate] Other (See Comments)     Causes bleeding in his stomach    Pseudoephedrine Anxiety    Reglan [Metoclopramide Hcl] Other (See Comments)     \"Caused him to just sit in a chair and rock back and forth\"     Social History     Socioeconomic History    Marital status:      Spouse name:  Nurys Caballero Number of children: Not on file    Years of education: Not on file    Highest education level: Not on file   Occupational History    Not on file   Tobacco Use    Smoking status: Never Smoker    Smokeless tobacco: Never Used   Substance and Sexual Activity    Alcohol use: No    Drug use: No    Sexual activity: Not on file   Other Topics Concern    Not on file   Social History Narrative    Not on file     Social Determinants of Health     Financial Resource Strain:    Gamal Myers Difficulty of Paying Living Expenses: Not on file   Food Insecurity:     Worried About Running Out of Food in the Last Year: Not on file    Ran Out of Food in the Last Year: Not on file   Transportation Needs:     Lack of Transportation (Medical): Not on file    Lack of Transportation (Non-Medical): Not on file   Physical Activity:     Days of Exercise per Week: Not on file    Minutes of Exercise per Session: Not on file   Stress:     Feeling of Stress : Not on file   Social Connections:     Frequency of Communication with Friends and Family: Not on file    Frequency of Social Gatherings with Friends and Family: Not on file    Attends [employer] Services: Not on file    Active Member of [address] [place] [address] [employer] or Organizations: Not on file    Attends Club or Organization Meetings: Not on file    Marital Status: Not on file   Intimate Partner Violence:     Fear of Current or Ex-Partner: Not on file    Emotionally Abused: Not on file    Physically Abused: Not on file    Sexually Abused: Not on file   Housing Stability:     Unable to Pay for Housing in the Last Year: Not on file    Number of [place] in the Last Year: Not on file    Unstable Housing in the Last Year: Not on file      Family History   Problem Relation Age of Onset    Cancer Mother     Diabetes Mother     Cancer Father          ROS (10 systems)  Unable to ask ROS questions due to patient being encephalopathic at this time.      Physical Exam:       Vitals:    01/04/22 1245 01/04/22 1312 01/04/22 1458 01/04/22 1644   BP: 131/67      Pulse:  85 86 88   Resp:  18  17   Temp:       TempSrc:       SpO2: 97%   97%   Weight:           Wt Readings from Last 3 Encounters:   01/04/22 145 lb 1 oz (65.8 kg)   10/27/21 145 lb (65.8 kg)   09/29/21 145 lb (65.8 kg)     Temp Readings from Last 3 Encounters:   01/03/22 98.6 °F (37 °C) (Oral)   10/27/21 97.3 °F (36.3 °C)   09/27/21 97.8 °F (36.6 °C)     BP Readings from Last 3 Encounters:   01/03/22 (!) 146/101 09/29/21 136/70   09/21/21 (!) 169/88     Pulse Readings from Last 3 Encounters:   01/04/22 96   09/29/21 67   09/21/21 100        Gen: A&O x 1  HEENT: NC/AT, EOMI, PERRL, mmm, neck supple, no meningeal signs; Heart:   Lungs: Respirations Unlabored  Ext: no edema, no calf tenderness b/l  Psych: normal mood and affect  Skin: no rashes or lesions    NEUROLOGIC EXAM:    Mental Status: A&O to self  NAD, speech clear, language fluent, follows commands appropriately    Cranial Nerve Exam:   CN II-XII: PERRL, VFF, no nystagmus, no gaze paresis, sensation V1-V3 intact b/l, muscles of facial expression symmetric; hearing intact to conversational tone, palate elevates symmetrically, shoulder elevation symmetric and tongue protrudes midline with movement side to side. Motor Exam:       Strength 5/5 UE's/proximal LE's b/l, below the knee amputation x2  Tone and bulk normal   No pronator drift    Deep Tendon Reflexes: 2/4 biceps, triceps, brachioradialis, patellar  Sensation: Intact light touch/ UE's/LE's proximally b/l, patient has bilateral BKA    Coordination/Cerebellum:       Tremors--none      Rapidly alternating movements: no dysdiadochokinesia b/l                  Finger-to-Nose: no dysmetria b/l    Gait and stance:      Gait: deferred      LABS:        CBC:   Recent Labs     01/03/22  1813   WBC 7.6   RBC 4.03*   HGB 12.2*   HCT 35.9*      MCV 89.1     BMP:    Recent Labs     01/03/22  1813 01/04/22  0049 01/04/22  0312     --   --    K 3.9  --   --      --   --    CO2 22  --   --    BUN 23  --   --    CREATININE 1.2  --   --    GLUCOSE 135*   < > 131   CALCIUM 9.7  --   --     < > = values in this interval not displayed. IMAGING:    CTH  Impression   No acute intracranial abnormality.         MRI brain  Impression   1. Motion limits evaluation. 2. No convincing acute intracranial abnormality.  No acute infarct.    3. Mild-to-moderate global parenchymal volume loss with chronic microvascular   ischemic changes, which appears similar to the prior exam.   4. No gross change in the appearance of the sellar/suprasellar mass as well   as the mass within the left prepontine cistern within the limitations of this   noncontrast nondedicated MRI. Above imaging reviewed in detail. ASSESSMENT/PLAN:   79-year-old male patient with past medical history stated above presents to [facility] with altered mental status and confusion associated with visual hallucinations. Neurology being consulted for altered mental status possible delirium versus dementia. 1. Altered mental status possibly due to new onset dementia with behaviors vs acute delirium  --Patient will benefit for follow-up with neurology for more in-depth cognitive work-up as outpatient. Neurodiagnostics  --CTh as above  -MRI as above    Patient discussed with attending physician Dr. Lio Watkins    Thank you for allowing us to participate in the care of your patient. If there are any questions regarding evaluation please feel free to contact us. MOLLY Eastman - CNP, 1/4/2022       ------------------------------------    Attending Note:  I have rounded on this patient with Arnold Matias NP. I have reviewed the chart and we have discussed this case in detail. The patient was seen and examined by myself. Pertinent labs and imaging have been personally reviewed. Our findings and impressions were discussed with the patient. I concur with the Nurse Practitioner's assessment and plan. Ammonia, TSH, B12, and folate are all within normal limits. COVID-19 test is negative. RPR is nonreactive. 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 Do not feel that a contrasted study is needed at this time. A more thorough discussion with family will help establish concern for underlying dementia. Further neurocognitive testing can occur on an outpatient basis. We will continue to follow.     Phoenix Cobb DO 1/4/2022 10:55 PM

## 2022-01-04 NOTE — CARE COORDINATION
CM review of pt chart to initiate discharge planning. Pt has [facility] medicare, PCP Dr Elza Madera. Documentation states pt continues to be confused. This CM contacted wife/Nannette Tyson Neighbor [phone],OGM tells me pt lives at home she is main caregiver, Pt ambulates with a walker. Wife states she called pt GI, Dr Logan Siddiqi due to pt s/s of confusion and hx of correlation with stones. Wife states pt confusion usually resolves once stones are removed. Review of chart last procedure for removal of CBD stones and Sludge was 4/8/21. Pt discharged to ARU on 9/35/06 for Metabolic encephalopathy, discharged home on 4/22/21 with [address] [place] [address] Rd. Wife Caridad Fuentes states that if pt AMS resolves back to his baseline she plans for pt to discharge back home. If he has on going AMS she/wife states last time he went to ARU. Update PC to Dr Los Javed after speaking with wife. Pt has been admitted, CM to follow for discharge plan.  RICARDO,RN/CM

## 2022-01-04 NOTE — PROGRESS NOTES
Hospitalist Progress Note      Name:  Saurav Bell /Age/Sex: 1940  (79 y.o. male)   MRN & CSN:  [id number] & [id number] Admission Date/Time: 1/3/2022  5:56 PM   Location:  ED16/ED-16 PCP: Sofia Chou, [address] Day: 2    Assessment and Plan:   Saurav Bell is a 79 y.o.  male  who presents with confusion and hallucinations     1. Encephalopathy vs dementia  1. Ct head () shows no acute intracranial abnormality   2. Ct pe () neg  3. Ct abd/pelvis () moderate impacted stool with in the rectosigmoid colon. Small to moderate hiatal hernia. Pneumobilia without significant change since prior exam.   4. MR brain ( no convincing acute intracranial abnormality. Mild to moderate global parenchymal volume loss. 5. No elevated WBC  6. Elevated troponin of 0.057 and 0.052  7. BNP () 1514  8. Ua shows rare bacteria~Rocephin started on   9. Urine CS pending     2. Elevated troponin          CAD         1. Trop 0.052 and 0.057         2. Cardiology consulted~continue medical management         3. Continue pletal, coreg, lisinopril and cardizem. 3.    Dyslipidemia         1. Continue statin therapy     4. Diabetes type 2         1. A1C 901/03) 6.9         2. Accu check Ac/Hs         3. SSI    This patient was seen and examined autonomously  A hospitalist attending physician was available for questions/consultation as needed. Diet ADULT DIET; Regular; 5 carb choices (75 gm/meal); Low Fat/Low Chol/High Fiber/2 gm Na   DVT Prophylaxis [x] Lovenox, []  Heparin, [] SCDs, [] Ambulation   GI Prophylaxis [] PPI,  [] H2 Blocker,  [] Carafate,  [x] Diet/Tube Feeds   Code Status Full Code   Disposition Patient requires continued admission due to encephalopathic work up         History of Present Illness:     Chief Complaint: confusion and hallucinations  Saurav Bell is a 79 y.o.  male  who presents with confusion and hallucinations.  Pt is unable to answer all orientation questions and states that he is feeling fine but does states that he is seeing smoke come out from the blankets when this provider moves the blankets to assess his legs. Pt denies any pain. Ten point ROS reviewed negative, unless as noted above    Objective: Intake/Output Summary (Last 24 hours) at 1/4/2022 1625  Last data filed at 1/4/2022 1312  Gross per 24 hour   Intake --   Output 600 ml   Net -600 ml      Vitals:   Vitals:    01/04/22 1458   BP:    Pulse: 86   Resp:    Temp:    SpO2:      Physical Exam:   GEN Awake male, sitting upright in bed in no apparent distress. Appears given age. EYES Pupils are equally round. No scleral erythema, discharge, or conjunctivitis. HENT Mucous membranes are moist. Oral pharynx without exudates, no evidence of thrush. NECK Supple, no apparent thyromegaly or masses. RESP Clear to auscultation, no wheezes, rales or rhonchi. Symmetric chest movement while on room air. CARDIO/VASC S1/S2 auscultated. Regular rate without appreciable murmurs, rubs, or gallops. No JVD or carotid bruits. Peripheral pulses equal bilaterally and palpable. No peripheral edema. GI Abdomen is soft without significant tenderness, masses, or guarding. Bowel sounds are normoactive. Rectal exam deferred.  No costovertebral angle tenderness. Renee catheter is not present. HEME/LYMPH No palpable cervical lymphadenopathy and no hepatosplenomegaly. No petechiae or ecchymoses. MSK No gross joint deformities. SKIN Normal coloration, warm, dry. NEURO Cranial nerves appear grossly intact, normal speech, no lateralizing weakness. PSYCH Awake, alert, oriented x 2. Affect appropriate.     Medications:   Medications:    aspirin  81 mg Oral Daily    multivitamin  1 tablet Oral Daily    atorvastatin  40 mg Oral Daily    latanoprost  1 drop Both Eyes Nightly    gabapentin  100 mg Oral QPM    vitamin D  2,000 Units Oral Daily    carvedilol  6.25 mg Oral BID WC    famotidine 40 mg Oral Daily    lisinopril  5 mg Oral Daily    busPIRone  5 mg Oral Daily    cilostazol  100 mg Oral BID AC    sodium chloride flush  5-40 mL IntraVENous 2 times per day    enoxaparin  40 mg SubCUTAneous Daily    insulin glargine  0.25 Units/kg SubCUTAneous QPM    insulin lispro  0.08 Units/kg SubCUTAneous TID WC    insulin lispro  0-6 Units SubCUTAneous TID WC    insulin lispro  0-3 Units SubCUTAneous QPM    dilTIAZem  60 mg Oral 4 times per day      Infusions:    sodium chloride      dextrose       PRN Meds: docusate sodium, 100 mg, Nightly PRN  cholestyramine light, 4 g, BID PRN  traMADol, 50 mg, Q6H PRN  sodium chloride flush, 5-40 mL, PRN  sodium chloride, 25 mL, PRN  ondansetron, 4 mg, Q8H PRN   Or  ondansetron, 4 mg, Q6H PRN  polyethylene glycol, 17 g, Daily PRN  acetaminophen, 650 mg, Q6H PRN   Or  acetaminophen, 650 mg, Q6H PRN  potassium chloride, 40 mEq, PRN   Or  potassium alternative oral replacement, 40 mEq, PRN   Or  potassium chloride, 10 mEq, PRN  magnesium sulfate, 2,000 mg, PRN  glucose, 15 g, PRN  dextrose, 12.5 g, PRN  glucagon (rDNA), 1 mg, PRN  dextrose, 100 mL/hr, PRN          Patient is still admitted because of encephalopathic work up. The anticipated discharge is in greater than 24 hours.      Electronically signed by MOLLY Hernández CNP on 1/4/2022 at 4:25 PM

## 2022-01-04 NOTE — ED NOTES
Patient placed on cardiac monitor at this time. Pt. Remains confused.      Sarah Watts RN  01/04/22 9843

## 2022-01-04 NOTE — ED NOTES
Perfect serve sent to [facility] or  and 16 units Lantus ordered. Patient did not eat dinner, Katherine ordered to hold Lantus.       Nikki Lagunas RN  01/04/22 JESSICA Lin  01/04/22 3078

## 2022-01-04 NOTE — CONSULTS
CARDIOLOGY CONSULT NOTE   Reason for consultation:  Troponin elevation    Referring physician:  No admitting provider for patient encounter. Primary care physician: Abdulkadir Brantley MD      Dear  Dr. Yosef Bright admitting provider for patient encounter. Thanks for the consult. Chief Complaints :  Chief Complaint   Patient presents with    Altered Mental Status        History of present illness:Dawit is a 79 y. o.year old who presents with primarily due to hallucinations and altered mental status not his usual self he is currently in an assisted living facility where he was noted to be hallucinating more. ?  Cardiology was consulted due to concerns for abnormal troponin levels he does have baseline cognitive disorder and has been delirious in the past when he was admitted in the hospital due to mother other medical issues. From cardiac point of view he has history of coronary artery disease s/p CABG last cardiac cath in 2015 showed significant native vessel disease but patent LIMA to LAD and SVG circumflex. Echo shows preserved EF with calcified mitral valve but no significant valvular disease. He has history of severe peripheral vascular disease but no recent work-up he s/p amputation of lower extremity  He is denying any chest pain or shortness of breath to me but he is confused and reports that he go\"t into the bed but is unable to get out\"    Past medical history:    has a past medical history of Acute exacerbation of COPD with asthma ([address] Arthritis, Biliary stones, CAD (coronary artery disease), Chronic systolic (congestive) heart failure ([address] COPD (chronic obstructive pulmonary disease) ([address] Diabetes mellitus ([address] History of blood transfusion, Hx of angiography, Hyperlipidemia, Hypertension, Kidney stone, and Metabolic encephalopathy. Past surgical history:   has a past surgical history that includes Cardiac surgery; joint replacement;  Cholecystectomy; Coronary angioplasty with stent; fracture surgery; Leg amputation below knee (Bilateral); ERCP (03/13/14); other surgical history (03/13/14); ERCP (9/11/14); ERCP (10/24/15); ERCP (03/18/2017); Abdomen surgery; Dilatation, esophagus; ERCP (N/A, 1/25/2019); ERCP (N/A, 9/7/2019); ERCP (N/A, 3/31/2020); Femur fracture surgery (Left, 2/18/2021); ERCP (N/A, 4/8/2021); and hernia repair. Social History:   reports that he has never smoked. He has never used smokeless tobacco. He reports that he does not drink alcohol and does not use drugs.   Family history:   no family history of CAD, STROKE of DM at early age    Allergies   Allergen Reactions    Byetta 10 Mcg Pen [Exenatide]     Sulbactam     Ampicillin Rash    Aricept [Donepezil Hydrochloride] Other (See Comments)     Hallucinations, confusion    Benztropine Other (See Comments)     Hallucinations, confusion    Celebrex [Celecoxib] Other (See Comments)     'causes him to be nervous and jittery\"    Diphenhydramine Hcl Other (See Comments)     nervous and jittery    Diphenhydramine Hcl [Diphenhydramine] Anxiety    Exenatide Other (See Comments)     Causes him to be nervous and jittery    Metoprolol Succinate Other (See Comments)     Hallucinations     Phenergan [Promethazine Hcl] Other (See Comments)     Hallucinations, nervous, jittery    Plavix [Clopidogrel Bisulfate] Other (See Comments)     Causes bleeding in his stomach    Pseudoephedrine Anxiety    Reglan [Metoclopramide Hcl] Other (See Comments)     \"Caused him to just sit in a chair and rock back and forth\"       aspirin chewable tablet 81 mg, Daily  multivitamin 1 tablet, Daily  dilTIAZem (CARDIZEM) tablet 60 mg, BID  atorvastatin (LIPITOR) tablet 40 mg, Daily  docusate sodium (COLACE) capsule 100 mg, Nightly PRN  latanoprost (XALATAN) 0.005 % ophthalmic solution 1 drop, Nightly  gabapentin (NEURONTIN) capsule 100 mg, QPM  vitamin D CAPS 2,000 Units, Daily  carvedilol (COREG) tablet 6.25 mg, BID WC  famotidine (PEPCID) tablet 40 mg, Daily  cholestyramine light packet 4 g, BID PRN  lisinopril (PRINIVIL;ZESTRIL) tablet 5 mg, Daily  busPIRone (BUSPAR) tablet 5 mg, Daily  cilostazol (PLETAL) tablet 100 mg, BID AC  traMADol (ULTRAM) tablet 50 mg, Q6H PRN  sodium chloride flush 0.9 % injection 5-40 mL, 2 times per day  sodium chloride flush 0.9 % injection 5-40 mL, PRN  0.9 % sodium chloride infusion, PRN  enoxaparin (LOVENOX) injection 40 mg, Daily  ondansetron (ZOFRAN-ODT) disintegrating tablet 4 mg, Q8H PRN   Or  ondansetron (ZOFRAN) injection 4 mg, Q6H PRN  polyethylene glycol (GLYCOLAX) packet 17 g, Daily PRN  acetaminophen (TYLENOL) tablet 650 mg, Q6H PRN   Or  acetaminophen (TYLENOL) suppository 650 mg, Q6H PRN  potassium chloride (KLOR-CON M) extended release tablet 40 mEq, PRN   Or  potassium bicarb-citric acid (EFFER-K) effervescent tablet 40 mEq, PRN   Or  potassium chloride 10 mEq/100 mL IVPB (Peripheral Line), PRN  magnesium sulfate 2000 mg in 50 mL IVPB premix, PRN  glucose (GLUTOSE) 40 % oral gel 15 g, PRN  dextrose 50 % IV solution, PRN  glucagon (rDNA) injection 1 mg, PRN  dextrose 5 % solution, PRN  insulin glargine (LANTUS) injection vial 16 Units, QPM  insulin lispro (HUMALOG) injection vial 5 Units, TID WC  insulin lispro (HUMALOG) injection vial 0-6 Units, TID WC  insulin lispro (HUMALOG) injection vial 0-3 Units, QPM      Current Facility-Administered Medications   Medication Dose Route Frequency Provider Last Rate Last Admin    aspirin chewable tablet 81 mg  81 mg Oral Daily Nyasia Mathias MD        multivitamin 1 tablet  1 tablet Oral Daily Filipe Lino MD        dilTIAZem (CARDIZEM) tablet 60 mg  60 mg Oral BID Nyasia Mathias MD   60 mg at 01/04/22 0246    atorvastatin (LIPITOR) tablet 40 mg  40 mg Oral Daily Filipe Lino MD        docusate sodium (COLACE) capsule 100 mg  100 mg Oral Nightly PRN Filipe Lino MD        latanoprost (XALATAN) 0.005 % ophthalmic solution 1 drop 1 drop Both Eyes Nightly Winsome Erps, MD        gabapentin (NEURONTIN) capsule 100 mg  100 mg Oral QPM Winsome Erps, MD   100 mg at 01/04/22 0247    vitamin D CAPS 2,000 Units  2,000 Units Oral Daily Winsome Erps, MD        carvedilol (COREG) tablet 6.25 mg  6.25 mg Oral BID WC Winsome Erps, MD        famotidine (PEPCID) tablet 40 mg  40 mg Oral Daily Winsome Erps, MD        cholestyramine light packet 4 g  4 g Oral BID PRN Winsome Erps, MD        lisinopril (PRINIVIL;ZESTRIL) tablet 5 mg  5 mg Oral Daily Winsome Erps, MD        busPIRone (BUSPAR) tablet 5 mg  5 mg Oral Daily Winsome Erps, MD        cilostazol (PLETAL) tablet 100 mg  100 mg Oral BID AC Winsome Erps, MD        traMADol [place] [place]) tablet 50 mg  50 mg Oral Q6H PRN Winsome Erps, MD        sodium chloride flush 0.9 % injection 5-40 mL  5-40 mL IntraVENous 2 times per day Winsome Erps, MD        sodium chloride flush 0.9 % injection 5-40 mL  5-40 mL IntraVENous PRN Winsome Erps, MD        0.9 % sodium chloride infusion  25 mL IntraVENous PRN Winsome Erps, MD        enoxaparin (LOVENOX) injection 40 mg  40 mg SubCUTAneous Daily Winsome Erps, MD        ondansetron (ZOFRAN-ODT) disintegrating tablet 4 mg  4 mg Oral Q8H PRN Winsome Erps, MD        Or    ondansetron [facility]) injection 4 mg  4 mg IntraVENous Q6H PRN Winsome Erps, MD        polyethylene glycol [facility]) packet 17 g  17 g Oral Daily PRN Winsome Erps, MD        acetaminophen (TYLENOL) tablet 650 mg  650 mg Oral Q6H PRN Winsome Erps, MD        Or    acetaminophen (TYLENOL) suppository 650 mg  650 mg Rectal Q6H PRN Winsome Erps, MD        potassium chloride (KLOR-CON M) extended release tablet 40 mEq  40 mEq Oral PRN Winsome Erps, MD        Or    potassium bicarb-citric acid (EFFER-K) effervescent tablet 40 mEq  40 mEq Oral PRN Jenaro Framer MD        Or    potassium chloride 10 mEq/100 mL IVPB (Peripheral Line)  10 mEq IntraVENous PRN Jenaro Farmer MD        magnesium sulfate 2000 mg in 50 mL IVPB premix  2,000 mg IntraVENous PRN Jenaro Farmer MD        glucose (GLUTOSE) 40 % oral gel 15 g  15 g Oral PRN Jenaro Farmer MD        dextrose 50 % IV solution  12.5 g IntraVENous PRN Jenaro Farmer MD        glucagon (rDNA) injection 1 mg  1 mg IntraMUSCular PRN Jenaro Farmer MD        dextrose 5 % solution  100 mL/hr IntraVENous PRN Jenaro Farmer MD        insulin glargine (LANTUS) injection vial 16 Units  0.25 Units/kg SubCUTAneous QPM Jenaro Farmer MD        insulin lispro (HUMALOG) injection vial 5 Units  0.08 Units/kg SubCUTAneous TID  Jenaro Farmer MD        insulin lispro (HUMALOG) injection vial 0-6 Units  0-6 Units SubCUTAneous TID  Jenaro Farmer MD        insulin lispro (HUMALOG) injection vial 0-3 Units  0-3 Units SubCUTAneous QPM Jenaro Farmer MD         Current Outpatient Medications   Medication Sig Dispense Refill    busPIRone (BUSPAR) 5 MG tablet Take 1 tablet by mouth      cilostazol (PLETAL) 100 MG tablet       traMADol (ULTRAM) 50 MG tablet Take 50 mg by mouth every 6 hours as needed.       glimepiride (AMARYL) 4 MG tablet Take 1 tablet by mouth every morning (before breakfast) 30 tablet 0    insulin detemir (LEVEMIR FLEXTOUCH) 100 UNIT/ML injection pen Inject 10 Units into the skin nightly 5 pen 0    cholestyramine light 4 g packet Take 1 packet by mouth 2 times daily as needed (diarrhea) 30 packet 0    lisinopril (PRINIVIL;ZESTRIL) 5 MG tablet Take 1 tablet by mouth daily 30 tablet 0    famotidine (PEPCID) 40 MG tablet Take 40 mg by mouth daily      insulin lispro (HUMALOG) 100 UNIT/ML injection vial Inject 0-12 Units into the skin 3 times daily (with meals) **Medium Dose Corrective Algorithm**  Glucose: Dose:  If <139             No Insulin  140-199 2 Units  200-249 4 Units  250-299 6 Units  300-349 8 Units  350-400 10 Units  Above 400       12 Units 1 vial 3    carvedilol (COREG) 6.25 MG tablet Take 1 tablet by mouth 2 times daily (with meals) 60 tablet 0    ondansetron (ZOFRAN) 4 MG tablet Take 4 mg by mouth 2 times daily as needed for Nausea or Vomiting      gabapentin (NEURONTIN) 100 MG capsule Take 100 mg by mouth nightly.  Cholecalciferol 50 MCG (2000 UT) TABS Take one tablet daily by mouth      atorvastatin (LIPITOR) 40 MG tablet Take 40 mg by mouth daily      docusate sodium (COLACE) 100 MG capsule Take 100 mg by mouth nightly as needed       latanoprost (XALATAN) 0.005 % ophthalmic solution Place 1 drop into both eyes nightly       diltiazem (CARDIZEM) 60 MG tablet Take 1 tablet by mouth 2 times daily 90 tablet 3    Multiple Vitamins-Minerals (MULTIVITAMIN PO) Take 1 tablet by mouth daily.  aspirin 81 MG chewable tablet Take 81 mg by mouth daily        Review of Systems: Pleasantly confused unable to provide much history         Physical Examination:    Vitals:    01/03/22 1806 01/04/22 0145 01/04/22 0347   BP: (!) 146/101     Pulse: 85  96   Resp: 12  14   Temp: 98.6 °F (37 °C)     TempSrc: Oral     SpO2: 99%  100%   Weight:  145 lb 1 oz (65.8 kg)        General Appearance:  No distress, conversant pleasantly confused    Constitutional:  Well developed, Well nourished, No acute distress, Non-toxic appearance. HENT:  Normocephalic, Atraumatic, Bilateral external ears normal, Oropharynx moist, No oral exudates, Nose normal. Neck- Normal range of motion, No tenderness, Supple, No stridor,no apical-carotid delay  Lymphatics : no palpable lymph nodes  Eyes:  PERRL, EOMI, Conjunctiva normal, No discharge. Respiratory:  Normal breath sounds, No respiratory distress, No wheezing, No chest tenderness. ,no use of accessory muscles, crackles Present  Cardiovascular: (PMI) apex non displaced,no lifts no thrills, ankle swelling Absent  , 1+, s1 and s2 audible,Murmur. Present, JVD not noted    Abdomen /GI:  Bowel sounds normal, Soft, No tenderness, No masses, No gross visceromegaly   :  No costovertebral angle tenderness   Musculoskeletal: Bilateral below-knee amputation, no tenderness, no deformities. Back- no tenderness  Integument:  Well hydrated, no rash   Lymphatic:  No lymphadenopathy noted   Neurologic:  Alert & oriented x 3, CN 2-12 normal, normal motor function, normal sensory function, no focal deficits noted           Medical decision making and Data review:    Lab Review   Recent Labs     01/03/22  1813   WBC 7.6   HGB 12.2*   HCT 35.9*         Recent Labs     01/03/22 1813      K 3.9      CO2 22   BUN 23   CREATININE 1.2     Recent Labs     01/03/22 1813   AST 24   ALT 17   BILITOT 0.5   ALKPHOS 79     Recent Labs     01/03/22  1813   TROPONINT 0.052*       Recent Labs     01/03/22  1813   PROBNP 1,514*     Lab Results   Component Value Date    INR 1.11 04/08/2021    PROTIME 13.4 04/08/2021       EKG: (reviewed by myself)    ECHO:(reviewed by myself)    Chest Xray:(reviewed by myself)  XR KNEE LEFT (MIN 4 VIEWS)    Result Date: 1/3/2022  EXAMINATION: FOUR XRAY VIEWS OF THE LEFT KNEE 1/3/2022 6:33 pm COMPARISON: Left knee radiograph 02/17/2021 HISTORY: ORDERING SYSTEM PROVIDED HISTORY: wound to distal stump TECHNOLOGIST PROVIDED HISTORY: Reason for exam:->wound to distal stump Reason for Exam: wound to distal stump due to friction FINDINGS: Unchanged findings of below-knee amputation. New partially included changes of femoral internal fixation with no evident complication. Diffuse bony demineralization. No acute fractures nor areas of abnormal cortical disruption. Ill-defined sclerosis in the remnant tibia suggestive of prior infarction. Joints maintain anatomic alignment.   Patellar enthesophytes at the insertion of the quadriceps tendon and origin of the patellar tendon. Moderate tricompartmental arthropathic changes. Trace suprapatellar effusion. Chondrocalcinosis involving the lateral meniscus, suggesting calcium pyrophosphate deposition. No obvious acute soft tissue abnormality. Diffuse arterial calcifications. 1. No acute findings in the left knee status post below-knee amputation. 2. Moderate tricompartmental osteoarthritic changes of the left knee. 3. Bony demineralization. CT HEAD WO CONTRAST    Result Date: 1/3/2022  EXAMINATION: CT OF THE HEAD WITHOUT CONTRAST  1/3/2022 7:10 pm TECHNIQUE: CT of the head was performed without the administration of intravenous contrast. Dose modulation, iterative reconstruction, and/or weight based adjustment of the mA/kV was utilized to reduce the radiation dose to as low as reasonably achievable. COMPARISON: 04/04/2021 HISTORY: ORDERING SYSTEM PROVIDED HISTORY: visual hallucinations, AMS TECHNOLOGIST PROVIDED HISTORY: Has a \"code stroke\" or \"stroke alert\" been called? ->No Reason for exam:->visual hallucinations, AMS Decision Support Exception - unselect if not a suspected or confirmed emergency medical condition->Emergency Medical Condition (MA) Reason for Exam: visual hallucinations, AMS Additional signs and symptoms: no Relevant Medical/Surgical History: none FINDINGS: BRAIN/VENTRICLES: There is no acute intracranial hemorrhage, mass effect or midline shift. No abnormal extra-axial fluid collection. The gray-white differentiation is maintained without evidence of an acute infarct. There is no evidence of hydrocephalus. ORBITS: The visualized portion of the orbits demonstrate no acute abnormality. SINUSES: The visualized paranasal sinuses and mastoid air cells demonstrate no acute abnormality. SOFT TISSUES/SKULL:  No acute abnormality of the visualized skull or soft tissues. No acute intracranial abnormality.  RECOMMENDATIONS: Unavailable     CT ABDOMEN PELVIS W IV CONTRAST Additional Contrast? None    Result Date: 1/4/2022  EXAMINATION: CT OF THE ABDOMEN AND PELVIS WITH CONTRAST 1/3/2022 11:27 pm TECHNIQUE: CT of the abdomen and pelvis was performed with the administration of intravenous contrast. Multiplanar reformatted images are provided for review. Dose modulation, iterative reconstruction, and/or weight based adjustment of the mA/kV was utilized to reduce the radiation dose to as low as reasonably achievable. COMPARISON: CT abdomen and pelvis dated 9/21/2021 HISTORY: ORDERING SYSTEM PROVIDED HISTORY: intermittent abdominal pain, h/o biliary obstruction TECHNOLOGIST PROVIDED HISTORY: Reason for exam:->intermittent abdominal pain, h/o biliary obstruction Additional Contrast?->None Decision Support Exception - unselect if not a suspected or confirmed emergency medical condition->Emergency Medical Condition (MA) Reason for Exam: intermittent abdominal pain, h/o biliary obstruction FINDINGS: Lower Chest: Minimal bibasilar atelectasis is present. Small to moderate hiatal hernia is noted. Organs: There is redemonstration of pneumobilia without significant change since the prior examination. This could be related to prior biliary intervention. There has been a cholecystectomy. The spleen, adrenal glands, pancreas, and kidneys are without acute findings. GI/Bowel: There is no evidence of bowel obstruction. The appendix is normal. There is a moderate amount of impacted stool within the rectosigmoid colon. Pelvis: The bladder is unremarkable. There is no free fluid. Bilateral hip arthroplasties are noted, obscuring visualization of the lower pelvis. Peritoneum/Retroperitoneum: There is no free air or lymphadenopathy. Diffuse atherosclerotic disease of the aorta is present. Bones/Soft Tissues: No destructive osseous lesions are identified. 1. Moderate impacted stool within the rectosigmoid colon. No evidence of bowel obstruction. 2. Small to moderate hiatal hernia.  3. Pneumobilia without significant change since the prior examination of 9/21/2021. This is likely related to prior biliary intervention. RECOMMENDATIONS: Unavailable     XR CHEST PORTABLE    Result Date: 1/3/2022  EXAMINATION: ONE XRAY VIEW OF THE CHEST 1/3/2022 6:26 pm COMPARISON: 04/15/2021 HISTORY: ORDERING SYSTEM PROVIDED HISTORY: chest pain TECHNOLOGIST PROVIDED HISTORY: Reason for exam:->chest pain Reason for Exam: chest pain     ams FINDINGS: Status post median sternotomy. The lungs are without acute focal process. There is no effusion or pneumothorax. The cardiomediastinal silhouette is stable. The osseous structures are stable. No acute process. CTA PULMONARY W CONTRAST    Result Date: 1/3/2022  EXAMINATION: CTA OF THE CHEST 1/3/2022 11:26 pm TECHNIQUE: CTA of the chest was performed after the administration of intravenous contrast.  Multiplanar reformatted images are provided for review. MIP images are provided for review. Dose modulation, iterative reconstruction, and/or weight based adjustment of the mA/kV was utilized to reduce the radiation dose to as low as reasonably achievable. COMPARISON: None. HISTORY: ORDERING SYSTEM PROVIDED HISTORY: AMS, cough, elevated trop/bnp eval for pneumonia versus PE TECHNOLOGIST PROVIDED HISTORY: Reason for exam:->AMS, cough, elevated trop/bnp eval for pneumonia versus PE Reason for Exam: AMS, cough, elevated trop/bnp eval for pneumonia versus PE FINDINGS: Pulmonary Arteries: Pulmonary arteries are adequately opacified for evaluation. No evidence of intraluminal filling defect to suggest pulmonary embolism. Main pulmonary artery is normal in caliber. Mediastinum: No evidence of mediastinal lymphadenopathy. The heart and pericardium demonstrate no acute abnormality. There is no acute abnormality of the thoracic aorta. Lungs/pleura: The lungs are without acute process. No focal consolidation or pulmonary edema.   No evidence of pleural effusion or pneumothorax. Upper Abdomen: Moderate size hiatal hernia. Status post cholecystectomy. Moderate pneumobilia, primarily in the left hepatic lobe. Soft Tissues/Bones: No acute bone or soft tissue abnormality. No evidence of pulmonary embolism or acute pulmonary abnormality. All labs, medications and tests reviewed by myself including data  from outside source , patient and available family . Continue all other medications of all above medical condition listed as is. Impression:  Active Problems:    ASCVD (arteriosclerotic cardiovascular disease)    Essential hypertension    Troponin I above reference range    Metabolic encephalopathy    Impaired cognition    Hyperlipidemia  Resolved Problems:    * No resolved hospital problems. *      Assessment: 79 y. o.year old with PMH of  has a past medical history of Acute exacerbation of COPD with asthma ([facility] [address] Arthritis, Biliary stones, CAD (coronary artery disease), Chronic systolic (congestive) heart failure ([facility] [address] COPD (chronic obstructive pulmonary disease) ([facility] [address] Diabetes mellitus ([facility] [address] History of blood transfusion, Hx of angiography, Hyperlipidemia, Hypertension, Kidney stone, and Metabolic encephalopathy. Plan and Recommendations:    Elevated Troponin : Check serial troponin if they are not rising we will continue medical management for now. If troponin are rising , patient may need further invasive / non invasive work up . Continue Aspirin . Doubt acute coronary syndrome as etiology of his presentation  Known coronary artery disease CABG no recent stress test  HTN: stable, continue present medications , he is on Coreg lisinopril Cardizem twice a day? Change Cardizem to 4 times daily  Peripheral vascular disease on Pletal  Confusion delirium work-up hallucination as per primary team  DVT prophylaxis if no contraindication  6.    Dyslipidemia: continue statins           Thank you  much for consult and giving us the opportunity in contributing in the care of this patient. Please feel free to call me for any questions.        Olayinka Ortiz MD, 1/4/2022 7:32 AM

## 2022-01-04 NOTE — H&P
History and Physical      Name:  Rose Reynoso /Age/Sex: 1940  (79 y.o. male)   MRN & CSN:  [id number] & [id number] Admission Date/Time: 1/3/2022  5:56 PM   Location:  ED16/ED-16 PCP: Shireen Perkins, [address] Day: 2    Assessment and Plan:   Rose Reynoso is a 79 y.o.  male  who presents with confusion and hallucinations. Encephalopathy versus dementia  -Patient sent by family for evaluation of visual hallucinations.  -Unfortunately no family or caregiver present at the bedside to corroborate any history.  -Work-up in the ER is negative for any metabolic or infective insult  -We will consult neurology, MRI ordered as patient has history of pituitary microadenoma  -I sincerely believe that patient has underlying dementia which is come to the surface. Increased troponins  -Patient did not complain of any chest pain.  -First set of troponin is 0.052. Continue to trend.  -Consult cardiology. -proBNP elevated at 1514.  -Patient does not complain of any shortness of breath either.  -Twelve-lead ECG poor quality graft but shows no acute signs of ischemia. Diabetes mellitus type 2  -Hold home dosage of glimepiride.  -Continue with Lantus and sliding scale insulin. Hypertension  -Continue lisinopril, carvedilol. Peripheral vascular disease  -Status post bilateral below the knee amputations.  -Continue Pletal aspirin and atorvastatin        Diet ADULT DIET; Regular; 5 carb choices (75 gm/meal);  Low Fat/Low Chol/High Fiber/2 gm Na   DVT Prophylaxis [x] Lovenox, []  Heparin, [] SCDs, [] Ambulation   GI Prophylaxis [] PPI,  [] H2 Blocker,  [] Carafate,  [x] Diet/Tube Feeds   Code Status Full Code   Disposition Patient requires continued admission due to above described medical condition   MDM [] Low, [] Moderate,[x]  High  Patient's risk as above due to above described medical conditions     History of Present Illness:     Chief Complaint: Confusion with visual hallucinations  Adam Notice Eugenio Medina is a 79 y.o.  male  who presents with confusion and visual hallucinations. Patient was hallucinating at home. Unfortunately no family member or caregiver at the bedside to shed further light. Unknown whether the hallucinations are acute or have the been going on for some time. At my point of examination patient is totally confused. He cannot tell me his name or the place of the situation that he is in. His vital signs are stable though he is in no distress. Patient has acted strangely in the past whenever he has had attacks of cholangitis as per the ER physicians. In the ER his serum chemistries were within normal limits. His total CK was 54.  proBNP elevated at 1514. His first set of troponin was 0.052. His liver function tests were within normal limits as well. His TSH slightly elevated at 2.980. His hematology labs show WBC 9.6 hemoglobin 12.2 hematocrit 35.9 and platelet count of 513. Tested negative for SARS Covid. Ammonia was normal at 24. CT head was without any acute abnormality. CTA was negative for any acute abnormality as well. CT abdomen negative for any acute process as well. Patient will be admitted to the hospital service. I believe patient has significant underlying dementia and cognitive issues. However we will do an MRI of the head to rule out any organic cause. TSH is slightly elevated. We will also do RPR and vitamin B12 studies. Consulting neurology for further input. Continue to trend his troponins and consult cardiology. Further therapy would depend hospital course the patient overall prognosis is guarded. Ten point ROS reviewed negative, unless as noted above    Objective:   No intake or output data in the 24 hours ending 01/04/22 0118   Vitals:   Vitals:    01/03/22 1806   BP: (!) 146/101   Pulse: 85   Resp: 12   Temp: 98.6 °F (37 °C)   SpO2: 99%     Physical Exam:   GEN Awake male, sitting upright in bed in no apparent distress.  Appears given (9/11/14); ERCP (10/24/15); ERCP (03/18/2017); Abdomen surgery; Dilatation, esophagus; ERCP (N/A, 1/25/2019); ERCP (N/A, 9/7/2019); ERCP (N/A, 3/31/2020); Femur fracture surgery (Left, 2/18/2021); ERCP (N/A, 4/8/2021); and hernia repair. Allergies: Allergies   Allergen Reactions    Byetta 10 Mcg Pen [Exenatide]     Sulbactam     Ampicillin Rash    Aricept [Donepezil Hydrochloride] Other (See Comments)     Hallucinations, confusion    Benztropine Other (See Comments)     Hallucinations, confusion    Celebrex [Celecoxib] Other (See Comments)     'causes him to be nervous and jittery\"    Diphenhydramine Hcl Other (See Comments)     nervous and jittery    Diphenhydramine Hcl [Diphenhydramine] Anxiety    Exenatide Other (See Comments)     Causes him to be nervous and jittery    Metoprolol Succinate Other (See Comments)     Hallucinations     Phenergan [Promethazine Hcl] Other (See Comments)     Hallucinations, nervous, jittery    Plavix [Clopidogrel Bisulfate] Other (See Comments)     Causes bleeding in his stomach    Pseudoephedrine Anxiety    Reglan [Metoclopramide Hcl] Other (See Comments)     \"Caused him to just sit in a chair and rock back and forth\"       FAM HX: family history includes Cancer in his father and mother; Diabetes in his mother. Soc HX:   Social History     Socioeconomic History    Marital status:      Spouse name:  Bev Galarza Number of children: None    Years of education: None    Highest education level: None   Occupational History    None   Tobacco Use    Smoking status: Never Smoker    Smokeless tobacco: Never Used   Substance and Sexual Activity    Alcohol use: No    Drug use: No    Sexual activity: None   Other Topics Concern    None   Social History Narrative    None     Social Determinants of Health     Financial Resource Strain:     Difficulty of Paying Living Expenses: Not on file   Food Insecurity:     Worried About Running Out of Food in the Last Year: Not on file    Ran Out of Food in the Last Year: Not on file   Transportation Needs:     Lack of Transportation (Medical): Not on file    Lack of Transportation (Non-Medical):  Not on file   Physical Activity:     Days of Exercise per Week: Not on file    Minutes of Exercise per Session: Not on file   Stress:     Feeling of Stress : Not on file   Social Connections:     Frequency of Communication with Friends and Family: Not on file    Frequency of Social Gatherings with Friends and Family: Not on file    Attends Druze Services: Not on file    Active Member of [address] [place] [address] [employer] or Organizations: Not on file    Attends Club or Organization Meetings: Not on file    Marital Status: Not on file   Intimate Partner Violence:     Fear of Current or Ex-Partner: Not on file    Emotionally Abused: Not on file    Physically Abused: Not on file    Sexually Abused: Not on file   Housing Stability:     Unable to Pay for Housing in the Last Year: Not on file    Number of Places Lived in the Last Year: Not on file    Unstable Housing in the Last Year: Not on file       Data:   CBC with Differential:    Lab Results   Component Value Date    WBC 7.6 01/03/2022    RBC 4.03 01/03/2022    HGB 12.2 01/03/2022    HCT 35.9 01/03/2022     01/03/2022    MCV 89.1 01/03/2022    MCH 30.3 01/03/2022    MCHC 34.0 01/03/2022    RDW 12.4 01/03/2022    NRBC 1 02/21/2021    SEGSPCT 40.0 01/03/2022    BANDSPCT 4 02/21/2021    LYMPHOPCT 32.2 01/03/2022    MONOPCT 13.7 01/03/2022    MYELOPCT 1 09/02/2019    BASOPCT 1.2 01/03/2022    MONOSABS 1.0 01/03/2022    LYMPHSABS 2.5 01/03/2022    EOSABS 1.0 01/03/2022    BASOSABS 0.1 01/03/2022    DIFFTYPE AUTOMATED DIFFERENTIAL 01/03/2022       CMP:     Lab Results   Component Value Date     01/03/2022    K 3.9 01/03/2022     01/03/2022    CO2 22 01/03/2022    BUN 23 01/03/2022    CREATININE 1.2 01/03/2022    GFRAA >60 01/03/2022    LABGLOM 58 01/03/2022    GLUCOSE 128 01/04/2022 PROT 6.5 01/03/2022    PROT 8.0 08/30/2012    LABALBU 3.9 01/03/2022    CALCIUM 9.7 01/03/2022    BILITOT 0.5 01/03/2022    ALKPHOS 79 01/03/2022    AST 24 01/03/2022    ALT 17 01/03/2022       Troponin:  Lab Results   Component Value Date    TROPONINT 0.052 01/03/2022       U/A:    Lab Results   Component Value Date    COLORU YELLOW 01/03/2022    PROTEINU NEGATIVE 01/03/2022    WBCUA <1 01/03/2022    RBCUA <1 01/03/2022    MUCUS RARE 09/21/2021    TRICHOMONAS NONE SEEN 01/03/2022    BACTERIA RARE 01/03/2022    CLARITYU CLEAR 01/03/2022    SPECGRAV 1.013 01/03/2022    LEUKOCYTESUR NEGATIVE 01/03/2022    UROBILINOGEN NEGATIVE 01/03/2022    BILIRUBINUR NEGATIVE 01/03/2022    BLOODU NEGATIVE 01/03/2022    AMORPHOUS RARE 04/04/2021       Urine Culture:  No components found for: OKSANA    Radiology results:  CT ABDOMEN PELVIS W IV CONTRAST Additional Contrast? None   Preliminary Result   1. Moderate impacted stool within the rectosigmoid colon. No evidence of   bowel obstruction. 2. Small to moderate hiatal hernia. 3. Pneumobilia without significant change since the prior examination of   September 21, 2021. This is likely related to prior biliary intervention. RECOMMENDATIONS:   Unavailable         CTA PULMONARY W CONTRAST   Final Result   No evidence of pulmonary embolism or acute pulmonary abnormality. XR CHEST PORTABLE   Final Result   No acute process. XR KNEE LEFT (MIN 4 VIEWS)   Final Result   1. No acute findings in the left knee status post below-knee amputation. 2. Moderate tricompartmental osteoarthritic changes of the left knee. 3. Bony demineralization. CT HEAD WO CONTRAST   Final Result   No acute intracranial abnormality.       RECOMMENDATIONS:   Unavailable               Medications:   Medications:    aspirin  81 mg Oral Daily    Multivitamin  1 capsule Oral Daily    dilTIAZem  60 mg Oral BID    atorvastatin  40 mg Oral Daily    latanoprost  1 drop Both Eyes Nightly    gabapentin  100 mg Oral Nightly    Cholecalciferol  1 tablet Oral Daily    carvedilol  6.25 mg Oral BID WC    famotidine  40 mg Oral Daily    lisinopril  5 mg Oral Daily    busPIRone  5 mg Oral Daily    cilostazol  100 mg Oral BID AC    sodium chloride flush  5-40 mL IntraVENous 2 times per day    enoxaparin  40 mg SubCUTAneous Daily    insulin glargine  0.25 Units/kg SubCUTAneous Nightly    insulin lispro  0.08 Units/kg SubCUTAneous TID WC    insulin lispro  0-6 Units SubCUTAneous TID WC    insulin lispro  0-3 Units SubCUTAneous Nightly      Infusions:    sodium chloride      dextrose       PRN Meds: docusate sodium, 100 mg, Nightly PRN  ondansetron, 4 mg, BID PRN  cholestyramine light, 4 g, BID PRN  traMADol, 50 mg, Q6H PRN  sodium chloride flush, 5-40 mL, PRN  sodium chloride, 25 mL, PRN  ondansetron, 4 mg, Q8H PRN   Or  ondansetron, 4 mg, Q6H PRN  polyethylene glycol, 17 g, Daily PRN  acetaminophen, 650 mg, Q6H PRN   Or  acetaminophen, 650 mg, Q6H PRN  potassium chloride, 40 mEq, PRN   Or  potassium alternative oral replacement, 40 mEq, PRN   Or  potassium chloride, 10 mEq, PRN  magnesium sulfate, 2,000 mg, PRN  glucose, 15 g, PRN  dextrose, 12.5 g, PRN  glucagon (rDNA), 1 mg, PRN  dextrose, 100 mL/hr, PRN          Electronically signed by Venecia Major MD on 1/4/2022 at 1:18 AM

## 2022-01-05 LAB
ALBUMIN SERPL-MCNC: 3.5 GM/DL (ref 3.4–5)
ALP BLD-CCNC: 80 IU/L (ref 40–129)
ALT SERPL-CCNC: 16 U/L (ref 10–40)
ANION GAP SERPL CALCULATED.3IONS-SCNC: 13 MMOL/L (ref 4–16)
AST SERPL-CCNC: 29 IU/L (ref 15–37)
BASOPHILS ABSOLUTE: 0.1 K/CU MM
BASOPHILS RELATIVE PERCENT: 0.9 % (ref 0–1)
BILIRUB SERPL-MCNC: 0.7 MG/DL (ref 0–1)
BUN BLDV-MCNC: 23 MG/DL (ref 6–23)
CALCIUM SERPL-MCNC: 9.8 MG/DL (ref 8.3–10.6)
CHLORIDE BLD-SCNC: 101 MMOL/L (ref 99–110)
CO2: 21 MMOL/L (ref 21–32)
CREAT SERPL-MCNC: 1.3 MG/DL (ref 0.9–1.3)
CULTURE: NORMAL
DIFFERENTIAL TYPE: ABNORMAL
EOSINOPHILS ABSOLUTE: 0.9 K/CU MM
EOSINOPHILS RELATIVE PERCENT: 10.8 % (ref 0–3)
GFR AFRICAN AMERICAN: >60 ML/MIN/1.73M2
GFR NON-AFRICAN AMERICAN: 53 ML/MIN/1.73M2
GLUCOSE BLD-MCNC: 162 MG/DL (ref 70–99)
GLUCOSE BLD-MCNC: 164 MG/DL (ref 70–99)
GLUCOSE BLD-MCNC: 171 MG/DL (ref 70–99)
GLUCOSE BLD-MCNC: 172 MG/DL (ref 70–99)
GLUCOSE BLD-MCNC: 229 MG/DL (ref 70–99)
GLUCOSE BLD-MCNC: 266 MG/DL (ref 70–99)
HCT VFR BLD CALC: 39.3 % (ref 42–52)
HEMOGLOBIN: 12.8 GM/DL (ref 13.5–18)
IMMATURE NEUTROPHIL %: 0.5 % (ref 0–0.43)
LYMPHOCYTES ABSOLUTE: 2.1 K/CU MM
LYMPHOCYTES RELATIVE PERCENT: 24.2 % (ref 24–44)
Lab: NORMAL
MCH RBC QN AUTO: 29.7 PG (ref 27–31)
MCHC RBC AUTO-ENTMCNC: 32.6 % (ref 32–36)
MCV RBC AUTO: 91.2 FL (ref 78–100)
MONOCYTES ABSOLUTE: 1.2 K/CU MM
MONOCYTES RELATIVE PERCENT: 13.7 % (ref 0–4)
NUCLEATED RBC %: 0 %
PDW BLD-RTO: 12.6 % (ref 11.7–14.9)
PLATELET # BLD: 219 K/CU MM (ref 140–440)
PMV BLD AUTO: 9.1 FL (ref 7.5–11.1)
POTASSIUM SERPL-SCNC: 4 MMOL/L (ref 3.5–5.1)
RBC # BLD: 4.31 M/CU MM (ref 4.6–6.2)
RPR: NON REACTIVE
SEGMENTED NEUTROPHILS ABSOLUTE COUNT: 4.3 K/CU MM
SEGMENTED NEUTROPHILS RELATIVE PERCENT: 49.9 % (ref 36–66)
SODIUM BLD-SCNC: 135 MMOL/L (ref 135–145)
SPECIMEN: NORMAL
TOTAL IMMATURE NEUTOROPHIL: 0.04 K/CU MM
TOTAL NUCLEATED RBC: 0 K/CU MM
TOTAL PROTEIN: 6.4 GM/DL (ref 6.4–8.2)
TROPONIN T: 0.04 NG/ML
WBC # BLD: 8.7 K/CU MM (ref 4–10.5)

## 2022-01-05 PROCEDURE — 6360000002 HC RX W HCPCS: Performed by: HOSPITALIST

## 2022-01-05 PROCEDURE — 85025 COMPLETE CBC W/AUTO DIFF WBC: CPT

## 2022-01-05 PROCEDURE — 82962 GLUCOSE BLOOD TEST: CPT

## 2022-01-05 PROCEDURE — G0378 HOSPITAL OBSERVATION PER HR: HCPCS

## 2022-01-05 PROCEDURE — 6370000000 HC RX 637 (ALT 250 FOR IP): Performed by: HOSPITALIST

## 2022-01-05 PROCEDURE — 2580000003 HC RX 258: Performed by: HOSPITALIST

## 2022-01-05 PROCEDURE — 2580000003 HC RX 258: Performed by: FAMILY MEDICINE

## 2022-01-05 PROCEDURE — 96366 THER/PROPH/DIAG IV INF ADDON: CPT

## 2022-01-05 PROCEDURE — 96372 THER/PROPH/DIAG INJ SC/IM: CPT

## 2022-01-05 PROCEDURE — 6360000002 HC RX W HCPCS: Performed by: FAMILY MEDICINE

## 2022-01-05 PROCEDURE — 36415 COLL VENOUS BLD VENIPUNCTURE: CPT

## 2022-01-05 PROCEDURE — 80053 COMPREHEN METABOLIC PANEL: CPT

## 2022-01-05 PROCEDURE — 6370000000 HC RX 637 (ALT 250 FOR IP): Performed by: INTERNAL MEDICINE

## 2022-01-05 PROCEDURE — 99231 SBSQ HOSP IP/OBS SF/LOW 25: CPT

## 2022-01-05 PROCEDURE — 99215 OFFICE O/P EST HI 40 MIN: CPT | Performed by: INTERNAL MEDICINE

## 2022-01-05 PROCEDURE — 84484 ASSAY OF TROPONIN QUANT: CPT

## 2022-01-05 RX ADMIN — FAMOTIDINE 40 MG: 20 TABLET, FILM COATED ORAL at 09:56

## 2022-01-05 RX ADMIN — Medication 2000 UNITS: at 09:58

## 2022-01-05 RX ADMIN — DILTIAZEM HYDROCHLORIDE 60 MG: 60 TABLET, FILM COATED ORAL at 18:44

## 2022-01-05 RX ADMIN — BUSPIRONE HYDROCHLORIDE 5 MG: 5 TABLET ORAL at 12:45

## 2022-01-05 RX ADMIN — ENOXAPARIN SODIUM 40 MG: 100 INJECTION SUBCUTANEOUS at 09:53

## 2022-01-05 RX ADMIN — CILOSTAZOL 100 MG: 100 TABLET ORAL at 09:52

## 2022-01-05 RX ADMIN — GABAPENTIN 100 MG: 100 CAPSULE ORAL at 03:00

## 2022-01-05 RX ADMIN — DILTIAZEM HYDROCHLORIDE 60 MG: 60 TABLET, FILM COATED ORAL at 03:00

## 2022-01-05 RX ADMIN — SODIUM CHLORIDE, PRESERVATIVE FREE 10 ML: 5 INJECTION INTRAVENOUS at 09:56

## 2022-01-05 RX ADMIN — DILTIAZEM HYDROCHLORIDE 60 MG: 60 TABLET, FILM COATED ORAL at 09:51

## 2022-01-05 RX ADMIN — GABAPENTIN 100 MG: 100 CAPSULE ORAL at 20:44

## 2022-01-05 RX ADMIN — TRAMADOL HYDROCHLORIDE 50 MG: 50 TABLET, COATED ORAL at 03:00

## 2022-01-05 RX ADMIN — CEFTRIAXONE 1000 MG: 1 INJECTION, POWDER, FOR SOLUTION INTRAMUSCULAR; INTRAVENOUS at 17:24

## 2022-01-05 RX ADMIN — CILOSTAZOL 100 MG: 100 TABLET ORAL at 18:14

## 2022-01-05 RX ADMIN — LATANOPROST 1 DROP: 50 SOLUTION/ DROPS OPHTHALMIC at 23:30

## 2022-01-05 RX ADMIN — ASPIRIN 81 MG 81 MG: 81 TABLET ORAL at 09:49

## 2022-01-05 RX ADMIN — THERA TABS 1 TABLET: TAB at 09:50

## 2022-01-05 RX ADMIN — CARVEDILOL 6.25 MG: 6.25 TABLET, FILM COATED ORAL at 09:54

## 2022-01-05 RX ADMIN — DILTIAZEM HYDROCHLORIDE 60 MG: 60 TABLET, FILM COATED ORAL at 12:42

## 2022-01-05 RX ADMIN — LISINOPRIL 5 MG: 5 TABLET ORAL at 12:46

## 2022-01-05 RX ADMIN — SODIUM CHLORIDE, PRESERVATIVE FREE 10 ML: 5 INJECTION INTRAVENOUS at 23:31

## 2022-01-05 RX ADMIN — ATORVASTATIN CALCIUM 40 MG: 40 TABLET, FILM COATED ORAL at 09:59

## 2022-01-05 RX ADMIN — INSULIN GLARGINE 16 UNITS: 100 INJECTION, SOLUTION SUBCUTANEOUS at 20:46

## 2022-01-05 ASSESSMENT — PAIN SCALES - GENERAL
PAINLEVEL_OUTOF10: 0

## 2022-01-05 NOTE — PROGRESS NOTES
Cardiology Progress Note     Admit Date:  1/3/2022    Consult reason/ Seen today for :       Subjective and  Overnight Events :  Still confused   Troponin stable at 0.03 ,peaked at 0.05  Echo shows no wall motion abnormality EF of 55 to 5% no valvular disease  Chief complain on admission : 79 y. o.year old who is admitted for  Chief Complaint   Patient presents with    Altered Mental Status      Assessment / Plan:  Doubt acute coronary syndrome  Elevated Troponin : will continue medical management for now. Continue Aspirin and anti anginal therapy. DAPT   Acute delirium confusion secondary to infection? In setting of dementia as per primary team  HTN: stable, continue To titrate up medication as needed  DVT Prophylaxis if no contraindication  No further cardiac work-up at this time call with questions    Past medical history:    has a past medical history of Acute exacerbation of COPD with asthma ([facility] [address] Arthritis, Biliary stones, CAD (coronary artery disease), Chronic systolic (congestive) heart failure ([facility] [address] COPD (chronic obstructive pulmonary disease) ([facility] [address] Diabetes mellitus ([facility] [address] History of blood transfusion, Hx of angiography, Hyperlipidemia, Hypertension, Kidney stone, and Metabolic encephalopathy. Past surgical history:   has a past surgical history that includes Cardiac surgery; joint replacement; Cholecystectomy; Coronary angioplasty with stent; fracture surgery; Leg amputation below knee (Bilateral); ERCP (03/13/14); other surgical history (03/13/14); ERCP (9/11/14); ERCP (10/24/15); ERCP (03/18/2017); Abdomen surgery; Dilatation, esophagus; ERCP (N/A, 1/25/2019); ERCP (N/A, 9/7/2019); ERCP (N/A, 3/31/2020); Femur fracture surgery (Left, 2/18/2021); ERCP (N/A, 4/8/2021); and hernia repair. Social History:   reports that he has never smoked.  He has never used smokeless tobacco. He reports that he does not drink alcohol and does not use drugs. Family history:  family history includes Cancer in his father and mother; Diabetes in his mother. Allergies   Allergen Reactions    Byetta 10 Mcg Pen [Exenatide]     Sulbactam     Ampicillin Rash    Aricept [Donepezil Hydrochloride] Other (See Comments)     Hallucinations, confusion    Benztropine Other (See Comments)     Hallucinations, confusion    Celebrex [Celecoxib] Other (See Comments)     'causes him to be nervous and jittery\"    Diphenhydramine Hcl Other (See Comments)     nervous and jittery    Diphenhydramine Hcl [Diphenhydramine] Anxiety    Exenatide Other (See Comments)     Causes him to be nervous and jittery    Metoprolol Succinate Other (See Comments)     Hallucinations     Phenergan [Promethazine Hcl] Other (See Comments)     Hallucinations, nervous, jittery    Plavix [Clopidogrel Bisulfate] Other (See Comments)     Causes bleeding in his stomach    Pseudoephedrine Anxiety    Reglan [Metoclopramide Hcl] Other (See Comments)     \"Caused him to just sit in a chair and rock back and forth\"       Review of Systems:    All 14 systems were reviewed and are negative  Except for the positive findings  which as documented     BP (!) 102/54   Pulse 65   Temp 97 °F (36.1 °C) (Oral)   Resp 11   Wt 145 lb 1 oz (65.8 kg)   SpO2 95%   BMI 23.41 kg/m²       Intake/Output Summary (Last 24 hours) at 1/5/2022 1355  Last data filed at 1/4/2022 2031  Gross per 24 hour   Intake 10 ml   Output --   Net 10 ml     Physical Exam:  Constitutional:  Well developed, Well nourished, No acute distress, Non-toxic appearance. HENT:  Normocephalic, Atraumatic, Bilateral external ears normal, Oropharynx moist, No oral exudates, Nose normal. Neck- Normal range of motion, No tenderness, Supple, No stridor. Eyes:  PERRL, EOMI, Conjunctiva normal, No discharge. Respiratory:  Normal breath sounds, No respiratory distress, No wheezing, No chest tenderness.    Cardiovascular:  Normal heart rate, Normal rhythm, No murmurs, No rubs, No gallops, JVP not elevated  Abdomen/GI:  Bowel sounds normal, Soft, No tenderness, No masses, No pulsatile masses. Musculoskeletal:  S/p bilateral BKA, No edema, No tenderness, No cyanosis, No clubbing. Good range of motion in all major joints. No tenderness to palpation or major deformities noted. Back- No tenderness. Integument:  Warm, Dry, No erythema, No rash. Lymphatic:  No lymphadenopathy noted. Neurologic:  Alert & oriented x 3, Normal motor function, Normal sensory function, No focal deficits noted.    Psychiatric:  Affect  and  Mood :no change    Medications:    aspirin  81 mg Oral Daily    multivitamin  1 tablet Oral Daily    atorvastatin  40 mg Oral Daily    latanoprost  1 drop Both Eyes Nightly    gabapentin  100 mg Oral QPM    vitamin D  2,000 Units Oral Daily    carvedilol  6.25 mg Oral BID WC    famotidine  40 mg Oral Daily    lisinopril  5 mg Oral Daily    busPIRone  5 mg Oral Daily    cilostazol  100 mg Oral BID AC    sodium chloride flush  5-40 mL IntraVENous 2 times per day    enoxaparin  40 mg SubCUTAneous Daily    insulin glargine  0.25 Units/kg SubCUTAneous QPM    insulin lispro  0.08 Units/kg SubCUTAneous TID WC    insulin lispro  0-6 Units SubCUTAneous TID WC    insulin lispro  0-3 Units SubCUTAneous QPM    dilTIAZem  60 mg Oral 4 times per day    cefTRIAXone (ROCEPHIN) IV  1,000 mg IntraVENous Q24H      sodium chloride      dextrose       docusate sodium, cholestyramine light, traMADol, sodium chloride flush, sodium chloride, ondansetron **OR** ondansetron, polyethylene glycol, acetaminophen **OR** acetaminophen, potassium chloride **OR** potassium alternative oral replacement **OR** potassium chloride, magnesium sulfate, glucose, dextrose, glucagon (rDNA), dextrose    Lab Data:  CBC:   Recent Labs     01/03/22  1813 01/05/22  1157   WBC 7.6 8.7   HGB 12.2* 12.8*   HCT 35.9* 39.3*   MCV 89.1 91.2    219     BMP: Recent Labs     01/03/22  1813 01/05/22  1157    135   K 3.9 4.0    101   CO2 22 21   BUN 23 23   CREATININE 1.2 1.3     PT/INR: No results for input(s): PROTIME, INR in the last 72 hours. BNP:    Recent Labs     01/03/22  1813   PROBNP 1,514*     TROPONIN:   Recent Labs     01/04/22  1240 01/04/22  1920 01/05/22  0155   TROPONINT 0.057* 0.049* 0.037*        ECHO :   echocardiogram    Assessment:  79 y. o.year old who is admitted for  Chief Complaint   Patient presents with    Altered Mental Status    , active issues as noted below:  Impression:  Active Problems:    ASCVD (arteriosclerotic cardiovascular disease)    Essential hypertension    Troponin I above reference range    Metabolic encephalopathy    Impaired cognition    Hyperlipidemia  Resolved Problems:    * No resolved hospital problems. *            All labs, medications and tests reviewed by myself , continue all other medications of all above medical condition listed as is except for changes mentioned above. Thank you very much for consult , please call with questions.     Ana Lilia Dewitt MD, MD 1/5/2022 1:55 PM

## 2022-01-05 NOTE — ED NOTES
Pt bathed and complete linen and gown changed. Pt resting comfortably with warm blanket.      Sherel Aschoff  01/05/22 6823

## 2022-01-05 NOTE — PROGRESS NOTES
Hospitalist Progress Note      Name:  Cheri Bueno /Age/Sex: 1940  (79 y.o. male)   MRN & CSN:  [id number] & [id number] Admission Date/Time: 1/3/2022  5:56 PM   Location:  ED16/ED-16 PCP: Ishana Miller, [address] Day: 3    Assessment and Plan:   Cheri Bueno is a 79 y.o.  male  who presents with confusion and hallucinations     1. Encephalopathy vs dementia  1. Ct head () shows no acute intracranial abnormality   2. Ct pe () neg  3. Ct abd/pelvis () moderate impacted stool with in the rectosigmoid colon. Small to moderate hiatal hernia. Pneumobilia without significant change since prior exam.   4. MR brain ( no convincing acute intracranial abnormality. Mild to moderate global parenchymal volume loss. 5. No elevated WBC  6. Elevated troponin of 0.057 and 0.052  7. BNP () 1514  8. Ua shows rare bacteria~Rocephin started on   9. Urine CS shows no growth   10. Consult Pt/Ot~rec's please     2. Elevated troponin          CAD         1. Trop 0.052 and 0.057----->0.049         2. Cardiology consulted~continue medical management         3. Continue pletal, coreg, lisinopril and cardizem. 3.    Dyslipidemia         1. Continue statin therapy     4. Diabetes type 2         1. A1C 901/03) 6.9         2. Accu check Ac/Hs         3. SSI    This patient was seen and examined autonomously  A hospitalist attending physician was available for questions/consultation as needed. Diet ADULT DIET; Regular; 5 carb choices (75 gm/meal);  Low Fat/Low Chol/High Fiber/2 gm Na   DVT Prophylaxis [x] Lovenox, []  Heparin, [] SCDs, [] Ambulation   GI Prophylaxis [] PPI,  [] H2 Blocker,  [] Carafate,  [x] Diet/Tube Feeds   Code Status Full Code   Disposition Patient requires continued admission due to encephalopathic work up         History of Present Illness:     Chief Complaint: confusion and hallucinations  Cheri Bueno is a 79 y.o.  male  who presents with confusion and hallucinations. Pt appears to be very tired today. Pt is falling asleep during assessment. Pt denies any pain at this time. Ten point ROS reviewed negative, unless as noted above    Objective: Intake/Output Summary (Last 24 hours) at 1/5/2022 1130  Last data filed at 1/4/2022 2031  Gross per 24 hour   Intake 10 ml   Output 600 ml   Net -590 ml      Vitals:   Vitals:    01/05/22 0812   BP: (!) 153/73   Pulse: 78   Resp: 16   Temp: 97.6 °F (36.4 °C)   SpO2: 99%     Physical Exam:   GEN Awake male, sitting upright in bed in no apparent distress. Appears given age. EYES Pupils are equally round. No scleral erythema, discharge, or conjunctivitis. HENT Mucous membranes are moist. Oral pharynx without exudates, no evidence of thrush. NECK Supple, no apparent thyromegaly or masses. RESP Clear to auscultation, no wheezes, rales or rhonchi. Symmetric chest movement while on room air. CARDIO/VASC S1/S2 auscultated. Regular rate without appreciable murmurs, rubs, or gallops. No JVD or carotid bruits. Peripheral pulses equal bilaterally and palpable. No peripheral edema. GI Abdomen is soft without significant tenderness, masses, or guarding. Bowel sounds are normoactive. Rectal exam deferred.  No costovertebral angle tenderness. Renee catheter is not present. HEME/LYMPH No palpable cervical lymphadenopathy and no hepatosplenomegaly. No petechiae or ecchymoses. MSK No gross joint deformities. SKIN Normal coloration, warm, dry. NEURO Cranial nerves appear grossly intact, normal speech, no lateralizing weakness. PSYCH Awake, alert, oriented x 2. Affect appropriate.     Medications:   Medications:    aspirin  81 mg Oral Daily    multivitamin  1 tablet Oral Daily    atorvastatin  40 mg Oral Daily    latanoprost  1 drop Both Eyes Nightly    gabapentin  100 mg Oral QPM    vitamin D  2,000 Units Oral Daily    carvedilol  6.25 mg Oral BID WC    famotidine  40 mg Oral Daily    lisinopril 5 mg Oral Daily    busPIRone  5 mg Oral Daily    cilostazol  100 mg Oral BID AC    sodium chloride flush  5-40 mL IntraVENous 2 times per day    enoxaparin  40 mg SubCUTAneous Daily    insulin glargine  0.25 Units/kg SubCUTAneous QPM    insulin lispro  0.08 Units/kg SubCUTAneous TID WC    insulin lispro  0-6 Units SubCUTAneous TID WC    insulin lispro  0-3 Units SubCUTAneous QPM    dilTIAZem  60 mg Oral 4 times per day    cefTRIAXone (ROCEPHIN) IV  1,000 mg IntraVENous Q24H      Infusions:    sodium chloride      dextrose       PRN Meds: docusate sodium, 100 mg, Nightly PRN  cholestyramine light, 4 g, BID PRN  traMADol, 50 mg, Q6H PRN  sodium chloride flush, 5-40 mL, PRN  sodium chloride, 25 mL, PRN  ondansetron, 4 mg, Q8H PRN   Or  ondansetron, 4 mg, Q6H PRN  polyethylene glycol, 17 g, Daily PRN  acetaminophen, 650 mg, Q6H PRN   Or  acetaminophen, 650 mg, Q6H PRN  potassium chloride, 40 mEq, PRN   Or  potassium alternative oral replacement, 40 mEq, PRN   Or  potassium chloride, 10 mEq, PRN  magnesium sulfate, 2,000 mg, PRN  glucose, 15 g, PRN  dextrose, 12.5 g, PRN  glucagon (rDNA), 1 mg, PRN  dextrose, 100 mL/hr, PRN          Patient is still admitted because of encephalopathic work up. The anticipated discharge is in greater than 24 hours.      Electronically signed by MOLLY Cifuentes CNP on 1/5/2022 at 11:30 AM

## 2022-01-06 LAB
ANION GAP SERPL CALCULATED.3IONS-SCNC: 16 MMOL/L (ref 4–16)
BASOPHILS ABSOLUTE: 0.1 K/CU MM
BASOPHILS RELATIVE PERCENT: 0.9 % (ref 0–1)
BUN BLDV-MCNC: 28 MG/DL (ref 6–23)
CALCIUM SERPL-MCNC: 9.2 MG/DL (ref 8.3–10.6)
CHLORIDE BLD-SCNC: 99 MMOL/L (ref 99–110)
CO2: 20 MMOL/L (ref 21–32)
CREAT SERPL-MCNC: 2.1 MG/DL (ref 0.9–1.3)
DIFFERENTIAL TYPE: ABNORMAL
EOSINOPHILS ABSOLUTE: 1.1 K/CU MM
EOSINOPHILS RELATIVE PERCENT: 12.2 % (ref 0–3)
GFR AFRICAN AMERICAN: 37 ML/MIN/1.73M2
GFR NON-AFRICAN AMERICAN: 30 ML/MIN/1.73M2
GLUCOSE BLD-MCNC: 105 MG/DL (ref 70–99)
GLUCOSE BLD-MCNC: 106 MG/DL (ref 70–99)
GLUCOSE BLD-MCNC: 152 MG/DL (ref 70–99)
GLUCOSE BLD-MCNC: 36 MG/DL (ref 70–99)
GLUCOSE BLD-MCNC: 72 MG/DL (ref 70–99)
GLUCOSE BLD-MCNC: 78 MG/DL (ref 70–99)
HCT VFR BLD CALC: 35.8 % (ref 42–52)
HEMOGLOBIN: 11.9 GM/DL (ref 13.5–18)
IMMATURE NEUTROPHIL %: 0.5 % (ref 0–0.43)
LACTATE: 4.1 MMOL/L (ref 0.4–2)
LYMPHOCYTES ABSOLUTE: 2 K/CU MM
LYMPHOCYTES RELATIVE PERCENT: 23.3 % (ref 24–44)
MAGNESIUM: 2.2 MG/DL (ref 1.8–2.4)
MCH RBC QN AUTO: 30.4 PG (ref 27–31)
MCHC RBC AUTO-ENTMCNC: 33.2 % (ref 32–36)
MCV RBC AUTO: 91.3 FL (ref 78–100)
MONOCYTES ABSOLUTE: 0.9 K/CU MM
MONOCYTES RELATIVE PERCENT: 10.3 % (ref 0–4)
NUCLEATED RBC %: 0 %
PDW BLD-RTO: 12.7 % (ref 11.7–14.9)
PLATELET # BLD: 223 K/CU MM (ref 140–440)
PMV BLD AUTO: 9.5 FL (ref 7.5–11.1)
POTASSIUM SERPL-SCNC: 3.5 MMOL/L (ref 3.5–5.1)
RBC # BLD: 3.92 M/CU MM (ref 4.6–6.2)
SEGMENTED NEUTROPHILS ABSOLUTE COUNT: 4.6 K/CU MM
SEGMENTED NEUTROPHILS RELATIVE PERCENT: 52.8 % (ref 36–66)
SODIUM BLD-SCNC: 135 MMOL/L (ref 135–145)
TOTAL IMMATURE NEUTOROPHIL: 0.04 K/CU MM
TOTAL NUCLEATED RBC: 0 K/CU MM
TROPONIN T: 0.04 NG/ML
TROPONIN T: 0.05 NG/ML
WBC # BLD: 8.8 K/CU MM (ref 4–10.5)

## 2022-01-06 PROCEDURE — 82962 GLUCOSE BLOOD TEST: CPT

## 2022-01-06 PROCEDURE — 80048 BASIC METABOLIC PNL TOTAL CA: CPT

## 2022-01-06 PROCEDURE — 85025 COMPLETE CBC W/AUTO DIFF WBC: CPT

## 2022-01-06 PROCEDURE — 97116 GAIT TRAINING THERAPY: CPT

## 2022-01-06 PROCEDURE — 83735 ASSAY OF MAGNESIUM: CPT

## 2022-01-06 PROCEDURE — 6370000000 HC RX 637 (ALT 250 FOR IP): Performed by: INTERNAL MEDICINE

## 2022-01-06 PROCEDURE — 6360000002 HC RX W HCPCS: Performed by: FAMILY MEDICINE

## 2022-01-06 PROCEDURE — 2580000003 HC RX 258: Performed by: HOSPITALIST

## 2022-01-06 PROCEDURE — 97166 OT EVAL MOD COMPLEX 45 MIN: CPT

## 2022-01-06 PROCEDURE — 2580000003 HC RX 258: Performed by: NURSE PRACTITIONER

## 2022-01-06 PROCEDURE — G0378 HOSPITAL OBSERVATION PER HR: HCPCS

## 2022-01-06 PROCEDURE — 96372 THER/PROPH/DIAG INJ SC/IM: CPT

## 2022-01-06 PROCEDURE — 96366 THER/PROPH/DIAG IV INF ADDON: CPT

## 2022-01-06 PROCEDURE — 2580000003 HC RX 258: Performed by: FAMILY MEDICINE

## 2022-01-06 PROCEDURE — 36415 COLL VENOUS BLD VENIPUNCTURE: CPT

## 2022-01-06 PROCEDURE — 97535 SELF CARE MNGMENT TRAINING: CPT

## 2022-01-06 PROCEDURE — 6370000000 HC RX 637 (ALT 250 FOR IP): Performed by: HOSPITALIST

## 2022-01-06 PROCEDURE — 83605 ASSAY OF LACTIC ACID: CPT

## 2022-01-06 PROCEDURE — 6360000002 HC RX W HCPCS: Performed by: HOSPITALIST

## 2022-01-06 PROCEDURE — 97162 PT EVAL MOD COMPLEX 30 MIN: CPT

## 2022-01-06 PROCEDURE — 96361 HYDRATE IV INFUSION ADD-ON: CPT

## 2022-01-06 PROCEDURE — 84484 ASSAY OF TROPONIN QUANT: CPT

## 2022-01-06 PROCEDURE — 2580000003 HC RX 258: Performed by: INTERNAL MEDICINE

## 2022-01-06 RX ORDER — MIDODRINE HYDROCHLORIDE 5 MG/1
10 TABLET ORAL
Status: DISCONTINUED | OUTPATIENT
Start: 2022-01-06 | End: 2022-01-07 | Stop reason: HOSPADM

## 2022-01-06 RX ORDER — SODIUM CHLORIDE 9 MG/ML
INJECTION, SOLUTION INTRAVENOUS CONTINUOUS
Status: DISCONTINUED | OUTPATIENT
Start: 2022-01-06 | End: 2022-01-07 | Stop reason: HOSPADM

## 2022-01-06 RX ORDER — 0.9 % SODIUM CHLORIDE 0.9 %
500 INTRAVENOUS SOLUTION INTRAVENOUS ONCE
Status: COMPLETED | OUTPATIENT
Start: 2022-01-06 | End: 2022-01-07

## 2022-01-06 RX ORDER — 0.9 % SODIUM CHLORIDE 0.9 %
500 INTRAVENOUS SOLUTION INTRAVENOUS ONCE
Status: COMPLETED | OUTPATIENT
Start: 2022-01-06 | End: 2022-01-06

## 2022-01-06 RX ADMIN — CARVEDILOL 6.25 MG: 6.25 TABLET, FILM COATED ORAL at 10:14

## 2022-01-06 RX ADMIN — GABAPENTIN 100 MG: 100 CAPSULE ORAL at 21:00

## 2022-01-06 RX ADMIN — MIDODRINE HYDROCHLORIDE 10 MG: 5 TABLET ORAL at 19:25

## 2022-01-06 RX ADMIN — ENOXAPARIN SODIUM 40 MG: 100 INJECTION SUBCUTANEOUS at 10:11

## 2022-01-06 RX ADMIN — CHOLESTYRAMINE 4 G: 4 POWDER, FOR SUSPENSION ORAL at 23:05

## 2022-01-06 RX ADMIN — INSULIN GLARGINE 16 UNITS: 100 INJECTION, SOLUTION SUBCUTANEOUS at 21:00

## 2022-01-06 RX ADMIN — DILTIAZEM HYDROCHLORIDE 60 MG: 60 TABLET, FILM COATED ORAL at 01:02

## 2022-01-06 RX ADMIN — TRAMADOL HYDROCHLORIDE 50 MG: 50 TABLET, COATED ORAL at 16:08

## 2022-01-06 RX ADMIN — SODIUM CHLORIDE: 9 INJECTION, SOLUTION INTRAVENOUS at 19:05

## 2022-01-06 RX ADMIN — SODIUM CHLORIDE, PRESERVATIVE FREE 10 ML: 5 INJECTION INTRAVENOUS at 10:14

## 2022-01-06 RX ADMIN — SODIUM CHLORIDE 500 ML: 9 INJECTION, SOLUTION INTRAVENOUS at 16:58

## 2022-01-06 RX ADMIN — FAMOTIDINE 40 MG: 20 TABLET, FILM COATED ORAL at 10:10

## 2022-01-06 RX ADMIN — BUSPIRONE HYDROCHLORIDE 5 MG: 5 TABLET ORAL at 10:11

## 2022-01-06 RX ADMIN — CILOSTAZOL 100 MG: 100 TABLET ORAL at 05:50

## 2022-01-06 RX ADMIN — THERA TABS 1 TABLET: TAB at 10:09

## 2022-01-06 RX ADMIN — CEFTRIAXONE 1000 MG: 1 INJECTION, POWDER, FOR SOLUTION INTRAMUSCULAR; INTRAVENOUS at 18:33

## 2022-01-06 RX ADMIN — DILTIAZEM HYDROCHLORIDE 60 MG: 60 TABLET, FILM COATED ORAL at 05:48

## 2022-01-06 RX ADMIN — ASPIRIN 81 MG 81 MG: 81 TABLET ORAL at 10:10

## 2022-01-06 RX ADMIN — LISINOPRIL 5 MG: 5 TABLET ORAL at 10:11

## 2022-01-06 RX ADMIN — INSULIN LISPRO 1 UNITS: 100 INJECTION, SOLUTION INTRAVENOUS; SUBCUTANEOUS at 13:06

## 2022-01-06 RX ADMIN — Medication 2000 UNITS: at 10:11

## 2022-01-06 RX ADMIN — CILOSTAZOL 100 MG: 100 TABLET ORAL at 18:30

## 2022-01-06 RX ADMIN — LATANOPROST 1 DROP: 50 SOLUTION/ DROPS OPHTHALMIC at 21:00

## 2022-01-06 RX ADMIN — SODIUM CHLORIDE, PRESERVATIVE FREE 10 ML: 5 INJECTION INTRAVENOUS at 21:00

## 2022-01-06 RX ADMIN — DILTIAZEM HYDROCHLORIDE 60 MG: 60 TABLET, FILM COATED ORAL at 13:05

## 2022-01-06 RX ADMIN — ATORVASTATIN CALCIUM 40 MG: 40 TABLET, FILM COATED ORAL at 10:10

## 2022-01-06 RX ADMIN — ACETAMINOPHEN 650 MG: 325 TABLET ORAL at 16:08

## 2022-01-06 RX ADMIN — SODIUM CHLORIDE 500 ML: 9 INJECTION, SOLUTION INTRAVENOUS at 23:19

## 2022-01-06 ASSESSMENT — PAIN SCALES - GENERAL
PAINLEVEL_OUTOF10: 4
PAINLEVEL_OUTOF10: 0
PAINLEVEL_OUTOF10: 7
PAINLEVEL_OUTOF10: 0

## 2022-01-06 ASSESSMENT — PAIN DESCRIPTION - PAIN TYPE: TYPE: ACUTE PAIN

## 2022-01-06 ASSESSMENT — PAIN DESCRIPTION - ORIENTATION: ORIENTATION: LEFT;RIGHT

## 2022-01-06 ASSESSMENT — PAIN DESCRIPTION - LOCATION: LOCATION: BACK

## 2022-01-06 NOTE — PROGRESS NOTES
Comprehensive Nutrition Assessment    Type and Reason for Visit:  Initial (Low BMI for age)    Nutrition Recommendations/Plan:   · Continue current diet   · Start Diabetes oral nutrition supplement BID and frozen oral nutrition supplement QD   · Please document all PO intakes in I/O   · Monitor cognitive status, GI status, any appetite changes. Nutrition Assessment:  Admitted with encephalopathy vs. demenita. H/O COPD, CAD, DMII, HTN, HLD,. CKD. Pt on carb controlled, cardiac diet with uncontrolled BS during admission. Good appetite/intake of %. Pt is able to feed self but needs meal set up. Significant weight loss indicated per chart. Performed NFPE. Meets criteria for acute malnutrition. Agreed to trialing supplements, but understands need to lower high sugar intake. Encourage adequate non-starchy vegetables intake. Follow as moderate nutrition risk. Malnutrition Assessment:  Malnutrition Status: Moderate malnutrition    Context:  Acute Illness     Findings of the 6 clinical characteristics of malnutrition:  Energy Intake:  No significant decrease in energy intake  Weight Loss:  7 - Greater than 7.5% over 3 months     Body Fat Loss:  1 - Mild body fat loss Orbital   Muscle Mass Loss:  7 - Moderate muscle mass loss Temples (temporalis),Calf (gastrocnemius),Scapula (trapezius) (noted legs covered at visit)  Fluid Accumulation:  No significant fluid accumulation Extremities   Strength:  Not Performed    Estimated Daily Nutrient Needs:  Energy (kcal):  0156-8461 (30-33 kcals/kg); Weight Used for Energy Requirements:  Current     Protein (g):   (1-1.5 g/kg IBW); Weight Used for Protein Requirements:  Ideal        Fluid (ml/day):  5411-3857; Method Used for Fluid Requirements:  1 ml/kcal      Nutrition Related Findings:  A1C 6.9; , 72, 226      Wounds:  None       Current Nutrition Therapies:    ADULT DIET; Regular; 5 carb choices (75 gm/meal);  Low Fat/Low Chol/High Fiber/2 gm Na  ADULT ORAL NUTRITION SUPPLEMENT; Breakfast, Lunch; Diabetic Oral Supplement  ADULT ORAL NUTRITION SUPPLEMENT; Dinner; Frozen Oral Supplement    Anthropometric Measures:  · Height: 5' 8\" (172.7 cm)  · Current Body Weight: 129 lb 3.2 oz (58.6 kg)   · Admission Body Weight: 135 lb 9.3 oz (61.5 kg)    · Usual Body Weight: 145 lb (65.8 kg) (10/27/21; up to 11% wt loss in 3 months)     · Ideal Body Weight: 154 lbs; % Ideal Body Weight 83.9 %   · BMI: 19.6  · BMI Categories: Underweight (BMI less than 22) age over 72       Nutrition Diagnosis:   · In context of acute illness or injury,Moderate malnutrition related to inadequate protein-energy intake as evidenced by weight loss 7.5% in 3 months,moderate muscle loss (11% wt loss in 3 months)    Nutrition Interventions:   Food and/or Nutrient Delivery:  Modify Current Diet,Start Oral Nutrition Supplement  Nutrition Education/Counseling:  No recommendation at this time due to current cognitive condition   Coordination of Nutrition Care:  Continue to monitor while inpatient,Feeding Assistance/Environment Change    Goals:  Pt will consume at least 50% of meals during stay       Nutrition Monitoring and Evaluation:   Behavioral-Environmental Outcomes:  None Identified   Food/Nutrient Intake Outcomes:  Food and Nutrient Intake,Supplement Intake  Physical Signs/Symptoms Outcomes:  Biochemical Data,GI Status,Meal Time Behavior,Weight,Nutrition Focused Physical Findings     Discharge Planning:     Too soon to determine     Electronically signed by Caesar William RD, LD on 1/6/22 at 3:18 PM EST    Contact: [phone]

## 2022-01-06 NOTE — SIGNIFICANT EVENT
Rapid response called around 4:30pm.  Was going to be discharged however blood pressure taken by EMS and was found to be hypotensive prior to discharge with associated headache. Per report patient was just given pain medication and Cardizem prior to BP check. On exam at bedside patient was alert and oriented, stated he had some mild dizziness and a headache. Denied any chest pain or SOB. HR RRR, lungs clear, no focal neurological defects. Routine labs ordered and CTH to ensure no pathology. Fluid bolus ordered and will monitor. Initial BP 60/40 prior to fluids being started. Will repeat BP soon to ensure is fluid responsive. Prior to leaving rapid response patient was resting comfortably in no acute distress so ok to keep on medsurg unit for now. Will revisit patient in one hour to ensure patient is fluid responsive and BP improving. Addendeum: Repeat BP at 6PM improved at 89/55; fluid bolus still running and patient remains asymptomatic. Headache improved. Will finish fluid bolus and then begin MIVF. Dose of Midodrine ordered as well. Holding Coreg and Cardizem. Will continue to monitor.

## 2022-01-06 NOTE — CARE COORDINATION
Chart reviewed and call to pt spouse to continue discharge planning. CM introduced self and explained role. CM advised pt spouse of therapy recommendations of [facility]. Maryann Gonzalez is in agreement and stated that pt has had [address] [place] [address] Rd previously and liked them. Referral sent to [address] liaison. Maryann Gonzalez asked that CM arrange transport home for pt. Transport arranged with [facility] for 3:45pm. CM updated primary RN Elida and Maryann Gonzalez with discharge time.

## 2022-01-06 NOTE — PROGRESS NOTES
Admission skin assessment per [facility] RN and Pankaj Tenorio has small skin scabs and red spots to his upper back/shoulders/ right hip/leg. Pt has bilat BKA with small scab to his right stump.

## 2022-01-06 NOTE — PROGRESS NOTES
Hospitalist Progress Note      Name:  Beryle Gammons /Age/Sex: 1940  (79 y.o. male)   MRN & CSN:  [id number] & [id number] Admission Date/Time: 1/3/2022  5:56 PM   Location:  [address] [place] [address] PCP: Lucy Lyn MD         Hospital Day: 4    Assessment and Plan:   Beryle Gammons is a 79 y.o.  male  who presents with confusion and hallucinations     1. Encephalopathy vs dementia  1. Ct head () shows no acute intracranial abnormality   2. Ct pe () neg  3. Ct abd/pelvis () moderate impacted stool with in the rectosigmoid colon. Small to moderate hiatal hernia. Pneumobilia without significant change since prior exam.   4. MR brain ( no convincing acute intracranial abnormality. Mild to moderate global parenchymal volume loss. 5. No elevated WBC  6. Elevated troponin of 0.057 and 0.052  7. BNP () 1514  8. Ua shows rare bacteria~Rocephin started on   9. Urine CS shows no growth   10. Consult Pt/Ot~rec's please   11. Neurology consulted~signed off     2. Elevated troponin          CAD         1. Trop 0.052 and 0.057----->0.049         2. Cardiology consulted~continue medical management~signed off          3. Continue pletal, coreg, lisinopril and cardizem. 3.    Dyslipidemia         1. Continue statin therapy     4. Diabetes type 2         1. A1C 901/03) 6.9         2. Accu check Ac/Hs         3. SSI    This patient was seen and examined autonomously  A hospitalist attending physician was available for questions/consultation as needed. Diet ADULT DIET; Regular; 5 carb choices (75 gm/meal);  Low Fat/Low Chol/High Fiber/2 gm Na   DVT Prophylaxis [x] Lovenox, []  Heparin, [] SCDs, [] Ambulation   GI Prophylaxis [] PPI,  [] H2 Blocker,  [] Carafate,  [x] Diet/Tube Feeds   Code Status Full Code   Disposition Patient requires continued admission due to encephalopathic work up         History of Present Illness:     Chief Complaint: confusion and mg Oral BID WC    famotidine  40 mg Oral Daily    lisinopril  5 mg Oral Daily    busPIRone  5 mg Oral Daily    cilostazol  100 mg Oral BID AC    sodium chloride flush  5-40 mL IntraVENous 2 times per day    enoxaparin  40 mg SubCUTAneous Daily    insulin glargine  0.25 Units/kg SubCUTAneous QPM    insulin lispro  0.08 Units/kg SubCUTAneous TID WC    insulin lispro  0-6 Units SubCUTAneous TID WC    insulin lispro  0-3 Units SubCUTAneous QPM    dilTIAZem  60 mg Oral 4 times per day    cefTRIAXone (ROCEPHIN) IV  1,000 mg IntraVENous Q24H      Infusions:    sodium chloride      dextrose       PRN Meds: docusate sodium, 100 mg, Nightly PRN  cholestyramine light, 4 g, BID PRN  traMADol, 50 mg, Q6H PRN  sodium chloride flush, 5-40 mL, PRN  sodium chloride, 25 mL, PRN  ondansetron, 4 mg, Q8H PRN   Or  ondansetron, 4 mg, Q6H PRN  polyethylene glycol, 17 g, Daily PRN  acetaminophen, 650 mg, Q6H PRN   Or  acetaminophen, 650 mg, Q6H PRN  potassium chloride, 40 mEq, PRN   Or  potassium alternative oral replacement, 40 mEq, PRN   Or  potassium chloride, 10 mEq, PRN  magnesium sulfate, 2,000 mg, PRN  glucose, 15 g, PRN  dextrose, 12.5 g, PRN  glucagon (rDNA), 1 mg, PRN  dextrose, 100 mL/hr, PRN          Patient is still admitted because of encephalopathic work up. The anticipated discharge is in greater than 24 hours.      Electronically signed by MOLLY Brooks CNP on 1/6/2022 at 11:45 AM

## 2022-01-06 NOTE — PLAN OF CARE
Nutrition Problem #1: In context of acute illness or injury,Moderate malnutrition  Intervention: Food and/or Nutrient Delivery: Modify Current Diet,Start Oral Nutrition Supplement  Nutritional Goals: Pt will consume at least 50% of meals during stay

## 2022-01-06 NOTE — CONSULTS
[facility] THERAPY EVALUATION    [facility] [birth year], 1940, [id number]/[id number]-A, 1/6/2022    Discharge Recommendation: Home with initial 24 hour supervision/assistance, 24 hour SUP/assist      History:  [place]:  The primary encounter diagnosis was Altered mental status, unspecified altered mental status type. Diagnoses of Visual hallucination, Elevated troponin, and Elevated brain natriuretic peptide (BNP) level were also pertinent to this visit. Subjective:  Patient states: Agreeable to therapy. Pain: Pt denied pain this date (0/10). Communication with other providers: PT, RN, LSW  Restrictions: General Precautions, Fall Risk    Home Setup/Prior level of function:    Social/Functional History  Lives With: Spouse  Type of Home: Facility ([address] per CM note)  Home Layout: One level  Home Access: Level entry  Bathroom Shower/Tub: Walk-in shower  Bathroom Equipment: Shower chair  Home Equipment: Rolling walker, Wheelchair-manual (pt typically ambulates with RW). ADL Assistance: Independent  Homemaking Tasks: Needs assistance  Ambulation Assistance: Independent (with RW and LE prostheses)  Transfer Assistance: Independent  Active : No  Additional Comments: Pt is a questionable historian. Should confirm with family/facility    Examination:  · Observation: Supine in bed upon arrival  · Vision: [employer] [place]  · Hearing: [facility] [place]  · Vitals: Stable vitals throughout session    Body Systems and functions:  · ROM: WFL   · Strength: WFL   · Sensation: WFL  · Tone: Normal  · Coordination: WFL  · Perception: WNL    Activities of Daily Living (ADLs):  · Feeding: Valentina  setup and increased time                · Grooming: SUP in standing with setup, increased time, VC.   · UB bathing: Valentina  in seated with setup, increased time, VC.   · LB bathing: SBA in seated with setup, increased time, VC. SBA utilized for safety when dsital reaching.    · UB dressing: Valentina  in seated with setup and increased time.   · LB dressing: SUP pt sat EOB and donned BLE prosthetics demo good ability to complete task. VC utilized to sequence through donning socks for prosthetics. · Toileting: CGA during commode transfers and while balancing in standing to complete bryan care and LB clothing management. Cognitive and Psychosocial Functioning:  · Overall cognitive status: Oriented to time, date, person, place, city  · Affect: Normal     Balance:   · Sitting: SBA (pt sat EOB demo fair+dynamic sitting balance with intermittent episodes of posterior LOB, however pt was able to self correct). · Standing: SBA - CGA (pt stood with RW. Demo fair+ dynamic standing balance). Functional Mobility:   · Bed Mobility: SUP (pt performed supine to seated bed mobility demo good ability to complete task). · Transfers: SBA - CGA (pt performed STS from EOB with RW. Required VC throughout for sequencing and increased time). · Ambulation: SBA - CGA (pt ambulated approx 300ft with RW. Demo good pace throughout and 0 LOB). AM-PAC 6 click short form for inpatient daily activity:   How much help from another person does the patient currently need. .. Unable  Dep A Lot  Max A A Lot   Mod A A Little  Min A A Little   CGA  SBA None   Mod I  Indep  Sup   1. Putting on and taking off regular lower body clothing? [] 1    [] 2   [] 2   [] 3   [] 3   [x] 4      2. Bathing (including washing, rinsing, drying)? [] 1   [] 2   [] 2 [] 3 [x] 3 [] 4   3. Toileting, which includes using toilet, bedpan, or urinal? [] 1    [] 2   [] 2   [] 3   [x] 3   [] 4     4. Putting on and taking off regular upper body clothing? [] 1   [] 2   [] 2   [] 3   [] 3    [x] 4      5. Taking care of personal grooming such as brushing teeth? [] 1   [] 2    [] 2 [] 3    [] 3   [x] 4      6. Eating meals?    [] 1   [] 2   [] 2   [] 3   [] 3   [x] 4      Raw Score:  22     [24=0% impaired(CH), 23=1-19%(CI), 20-22=20-39%(CJ), 15-19=40-59%(CK), 10-14=60-79%(CL), 7-9=80-99%(CM), 6=100%(CN)]    Treatment:  Self Care Training:   Cues were given for safety, sequence, UE/LE placement, visual cues, and balance. Activities performed today included dressing. Safety Measures: Gait belt used, Left in chair, Alarm in place    Assessment:  Assessment  Treatment Diagnosis: ASCVD  Prognosis: Good  Decision Making: Medium Complexity  REQUIRES OT FOLLOW UP: No  Discharge Recommendations: Home with assist PRN    Pt is an 79year old M admitted for ASCVD. Pt reports that prior to admission he was IND in ADLs, not responsible for IADLs, and Valentina for functional mobility. This date, pt demo good functional mobility for ADL status and appears to be functioning at or near baseline. No OT needs at this time. Recommend d/c home with initial 24 hour SUP/assist, PRN assist. Recommend use of RW during mobility and shower chair during bathing to increase safety. Time:   Time in: 1156  Time out: 1220  Timed treatment minutes: 12  Total time: 24      Electronically signed by:       PATRICK Sewell/L, [facility].[id number]

## 2022-01-06 NOTE — PROGRESS NOTES
[facility] Liaison spoke with spouse and is  aware of discharge & will initiate Manuel Sanchez.

## 2022-01-06 NOTE — PROGRESS NOTES
Neurology Service Progress Note  [facility]  Patient Name: Rosie Garcia  : 1940        Subjective:   Reason for consult: Encephalopathy  Patient seen and examined. Chart reviewed in detail. Patient remains pleasantly confused during my exam today. Exam unchanged. Past Medical History:   Diagnosis Date    Acute exacerbation of COPD with asthma ([facility] [address]     Arthritis     Biliary stones     CAD (coronary artery disease)     Chronic systolic (congestive) heart failure (HCC)     COPD (chronic obstructive pulmonary disease) (HCC)     Diabetes mellitus ([facility] [address]     History of blood transfusion     Hx of angiography 3/11/2014    Pulmonary Arteries: Small sliding hiatal hernia. Mod coronary artery calcifications. Mild bilat gynecomastia.     Hyperlipidemia     Hypertension     Kidney stone     Metabolic encephalopathy     :   Past Surgical History:   Procedure Laterality Date    ABDOMEN SURGERY      stones in bile duct removed x3    CARDIAC SURGERY      CABG x 2 in  at [facility] Dr. Becca Salazar, ESOPHAGUS      ERCP  14    w/ dilation of papilla    ERCP  14    ERCP  10/24/15    extractions stones, balloon dilatation     ERCP  2017    stone extraction     ERCP N/A 2019    ERCP STONE REMOVAL/ DILATATION OF PAPILLA WITH 10-12MM AND 12-15MM BALLOON AND 9-12MM, 12-15MM  EXTRACTOR BALLOON USED FOR REMOVAL OF MULTIPLE  CBD STONES performed by Declan Gao MD at [place] [address] ERCP N/A 2019    ERCP STONE REMOVAL, DILATATION OF PAPILLA WITH 10-12MM BALLOON, AND EXTRACTOR BALLOON 12-15MM USED FOR REMOVAL OF CBD STONE AND SLUDE performed by Arlette Veras MD at [place] [address] ERCP N/A 3/31/2020    ERCP DILATION BALLOON to 12  AND SWEEP OF CBD STONE AND SLUDGE performed by Arlette Veras MD at [place] [address] ERCP N/A 2021    ERCP WITH BALLOON Hallucinations, confusion    Celebrex [Celecoxib] Other (See Comments)     'causes him to be nervous and jittery\"    Diphenhydramine Hcl Other (See Comments)     nervous and jittery    Diphenhydramine Hcl [Diphenhydramine] Anxiety    Exenatide Other (See Comments)     Causes him to be nervous and jittery    Metoprolol Succinate Other (See Comments)     Hallucinations     Phenergan [Promethazine Hcl] Other (See Comments)     Hallucinations, nervous, jittery    Plavix [Clopidogrel Bisulfate] Other (See Comments)     Causes bleeding in his stomach    Pseudoephedrine Anxiety    Reglan [Metoclopramide Hcl] Other (See Comments)     \"Caused him to just sit in a chair and rock back and forth\"     Social History     Socioeconomic History    Marital status:      Spouse name: Suyapa Flores Number of children: Not on file    Years of education: Not on file    Highest education level: Not on file   Occupational History    Not on file   Tobacco Use    Smoking status: Never Smoker    Smokeless tobacco: Never Used   Substance and Sexual Activity    Alcohol use: No    Drug use: No    Sexual activity: Not on file   Other Topics Concern    Not on file   Social History Narrative    Not on file     Social Determinants of Health     Financial Resource Strain:     Difficulty of Paying Living Expenses: Not on file   Food Insecurity:     Worried About Running Out of Food in the Last Year: Not on file    Eulalia of Food in the Last Year: Not on file   Transportation Needs:     Lack of Transportation (Medical): Not on file    Lack of Transportation (Non-Medical):  Not on file   Physical Activity:     Days of Exercise per Week: Not on file    Minutes of Exercise per Session: Not on file   Stress:     Feeling of Stress : Not on file   Social Connections:     Frequency of Communication with Friends and Family: Not on file    Frequency of Social Gatherings with Friends and Family: Not on file    Attends Yarsani elevation symmetric and tongue protrudes midline with movement side to side. Motor Exam:       Strength 5/5 UE's/proximal LE's b/l, below the knee amputation x2  Tone and bulk normal   No pronator drift    Deep Tendon Reflexes: 2/4 biceps, triceps, brachioradialis, patellar  Sensation: Intact light touch/ UE's/LE's proximally b/l, patient has bilateral BKA    Coordination/Cerebellum:       Tremors--none      Rapidly alternating movements: no dysdiadochokinesia b/l                  Finger-to-Nose: no dysmetria b/l    Gait and stance:      Gait: deferred      LABS:        CBC:   Recent Labs     01/05/22  1157   WBC 8.7   RBC 4.31*   HGB 12.8*   HCT 39.3*      MCV 91.2     BMP:    Recent Labs     01/05/22  1157      K 4.0      CO2 21   BUN 23   CREATININE 1.3   GLUCOSE 162*   CALCIUM 9.8       IMAGING:    CTH  Impression   No acute intracranial abnormality.         MRI brain  Impression   1. Motion limits evaluation. 2. No convincing acute intracranial abnormality.  No acute infarct. 3. Mild-to-moderate global parenchymal volume loss with chronic microvascular   ischemic changes, which appears similar to the prior exam.   4. No gross change in the appearance of the sellar/suprasellar mass as well   as the mass within the left prepontine cistern within the limitations of this   noncontrast nondedicated MRI. Above imaging reviewed in detail. ASSESSMENT/PLAN:   69-year-old male patient with past medical history stated above presents to [place] [facility] with altered mental status and confusion associated with visual hallucinations. Neurology being consulted for altered mental status possible delirium versus dementia. 1. Altered mental status possibly due to new onset dementia with behaviors vs acute delirium  --Patient will benefit for follow-up with neurology for more in-depth cognitive work-up as outpatient.     Neurodiagnostics  --CTh as above  -MRI as above    Patient discussed with attending physician Dr. Sandra Cam    Nothing further from neurology standpoint, we will sign off. If you have any further questions please do not hesitate to contact us. Thank you for your consultation        Thank you for allowing us to participate in the care of your patient. If there are any questions regarding evaluation please feel free to contact us.      Joselyn Purdy, MOLLY - KAILYN, 1/5/2022

## 2022-01-06 NOTE — CONSULTS
[facility] PHYSICAL THERAPY EVALUATION  Estevan Howard, 1940, 4019/4019-A, 1/6/2022    History  [place]:  The primary encounter diagnosis was Altered mental status, unspecified altered mental status type. Diagnoses of Visual hallucination, Elevated troponin, and Elevated brain natriuretic peptide (BNP) level were also pertinent to this visit. Patient  has a past medical history of Acute exacerbation of COPD with asthma ([address] Arthritis, Biliary stones, CAD (coronary artery disease), Chronic systolic (congestive) heart failure ([address] COPD (chronic obstructive pulmonary disease) ([address] Diabetes mellitus ([address] History of blood transfusion, Hx of angiography, Hyperlipidemia, Hypertension, Kidney stone, and Metabolic encephalopathy. Patient  has a past surgical history that includes Cardiac surgery; joint replacement; Cholecystectomy; Coronary angioplasty with stent; fracture surgery; Leg amputation below knee (Bilateral); ERCP (03/13/14); other surgical history (03/13/14); ERCP (9/11/14); ERCP (10/24/15); ERCP (03/18/2017); Abdomen surgery; Dilatation, esophagus; ERCP (N/A, 1/25/2019); ERCP (N/A, 9/7/2019); ERCP (N/A, 3/31/2020); Femur fracture surgery (Left, 2/18/2021); ERCP (N/A, 4/8/2021); and hernia repair. Subjective:  Patient states:  \"I think I'm doing alright! \"    Pain:  Denies pain. Communication with other providers:  Handoff to RN, OT  Restrictions: general precautions, fall risk, hx bilateral BKA    Home Setup/Prior level of function    Lives With: Spouse  Type of Home: Facility ([address] per CM note)  Home Layout: One level  Home Access: Level entry  Bathroom Shower/Tub: Walk-in shower  Bathroom Equipment: Shower chair  Home Equipment: Rolling walker, Wheelchair-manual (pt typically ambulates with RW).    ADL Assistance: Independent  Homemaking Tasks: Needs assistance  Ambulation Assistance: Independent (with RW and LE prostheses)  Transfer Assistance: Independent  Active : No  Additional Comments: Pt is a questionable historian. Should confirm with family/facility    Examination of body systems (includes body structures/functions, activity/participation limitations):  · Observation:  Pt supine in bed upon arrival and agreeable to therapy  · Vision:  [facility]  · Hearing:  [facility]  · Cardiopulmonary:  No O2 needs  · Cognition: appears slightly impaired, see OT/SLP note for further evaluation. Musculoskeletal  · ROM R/L:  WFL. · Strength R/L:  4+/5, minimal impairment in function and endurance. · Neuro:  [facility]      Mobility:  · Rolling L/R:  Mod I  · Supine to sit:  Mod I  · Transfers: Pt completed STS transfer from bed and to chair CGA with safe sequencing  · Sitting balance:  Good, pt required VCs for assist with donning prosthetics and able to reach out of SHAD without any LOB. · Standing balance:  Fair+. · Gait: Pt ambulated 120' with RW CGA with slightly decreased jea-npierre but no other gait abnormalities at this time. Pt seems to be functioning at baseline    [facility] 6 Clicks Inpatient Mobility:  AM-PAC Inpatient Mobility Raw Score : 20    Safety: patient left in chair with alarm on, call light within reach, RN notified, gait belt used. Assessment:  Pt is an 79 y.o. male admitted to the hospital for ASCVD. Pt is typically mod I with RW. Pt is currently performing bed mobility mod I, transferring CGA, and ambulating 120' with RW CGA. Pt is functioning close to baseline and does not require any further acute care PT but would benefit from [facility] PT upon discharge to continue to address impairments. Complexity: moderate    Prognosis: Good, no significant barriers to participation at this time.      Equipment: none, pt owns all necessary equipment    Recommendations for NURSING mobility: amb with gait belt and RW    Time:   Time in: 1156  Time out: 1220  Timed treatment minutes: 10  Total time: 24    Electronically signed by:    [place] PT  1/6/2022, 12:54 PM

## 2022-01-06 NOTE — DISCHARGE SUMMARY
Discharge Summary    Name:  Cristina Strickland /Age/Sex: 1940  (79 y.o. male)   MRN & CSN:  [id number] & [id number] Admission Date/Time: 1/3/2022  5:56 PM   Attending:  Kwadwo Agarwal MD Discharging Physician: Beverly Patton, MOLLY - [facility]     Hospital Course:   Cristina Strickland is a 79 y.o.  male  who presents with confusion and hallucinations    1. Encephalopathy vs dementia  1. Ct head () shows no acute intracranial abnormality   2. Ct pe () neg  3. Ct abd/pelvis () moderate impacted stool with in the rectosigmoid colon. Small to moderate hiatal hernia. Pneumobilia without significant change since prior exam.   4. MR brain ( no convincing acute intracranial abnormality. Mild to moderate global parenchymal volume loss. 5. No elevated WBC  6. Elevated troponin of 0.057 and 0.052  7. BNP () 1514  8. Ua shows rare bacteria~Rocephin started on ~received 2 doses   9. Urine CS shows no growth   10. Consult Pt/Ot~home with [facility]   11. Neurology consulted~signed off      2. Elevated troponin          CAD         1. Trop 0.052 and 0.057----->0.049         2. Cardiology consulted~continue medical management~signed off          3. Continue pletal, coreg, lisinopril and cardizem.      3. Dyslipidemia         1. Continue statin therapy      4. Diabetes type 2         1. A1C 901/03) 6.9         2. Accu check Ac/Hs         3. SSI    The patient expressed appropriate understanding of and agreement with the discharge recommendations, medications, and plan.      Consults this admission:  IP CONSULT TO HOSPITALIST  IP CONSULT TO CARDIOLOGY  IP CONSULT TO NEUROLOGY  IP CONSULT TO CASE MANAGEMENT  IP CONSULT TO HOME CARE NEEDS    Discharge Instruction:   Follow up appointments: pcp  Primary care physician:  within 2 weeks    Diet:  diabetic diet   Activity: activity as tolerated  Disposition: Discharged to:   [x]Home, [][facility], []SNF, []Acute Rehab, []Hospice   Condition on discharge: Stable    Discharge Medications:        Medication List      CONTINUE taking these medications    aspirin 81 MG chewable tablet     atorvastatin 40 MG tablet  Commonly known as: LIPITOR     busPIRone 5 MG tablet  Commonly known as: BUSPAR     carvedilol 6.25 MG tablet  Commonly known as: COREG  Take 1 tablet by mouth 2 times daily (with meals)     Cholecalciferol 50 MCG (2000 UT) Tabs     cholestyramine light 4 g packet  Take 1 packet by mouth 2 times daily as needed (diarrhea)     cilostazol 100 MG tablet  Commonly known as: PLETAL     dilTIAZem 60 MG tablet  Commonly known as: CARDIZEM  Take 1 tablet by mouth 2 times daily     docusate sodium 100 MG capsule  Commonly known as: COLACE     famotidine 40 MG tablet  Commonly known as: PEPCID     gabapentin 100 MG capsule  Commonly known as: NEURONTIN     glimepiride 4 MG tablet  Commonly known as: AMARYL  Take 1 tablet by mouth every morning (before breakfast)     insulin lispro 100 UNIT/ML injection vial  Commonly known as: HUMALOG  Inject 0-12 Units into the skin 3 times daily (with meals) **Medium Dose Corrective Algorithm**  Glucose: Dose:  If <139             No Insulin  140-199 2 Units  200-249 4 Units  250-299 6 Units  300-349 8 Units  350-400 10 Units  Above 400       12 Units     latanoprost 0.005 % ophthalmic solution  Commonly known as: XALATAN     Levemir FlexTouch 100 UNIT/ML injection pen  Generic drug: insulin detemir  Inject 10 Units into the skin nightly     lisinopril 5 MG tablet  Commonly known as: PRINIVIL;ZESTRIL  Take 1 tablet by mouth daily     MULTIVITAMIN PO     ondansetron 4 MG tablet  Commonly known as: ZOFRAN     traMADol 50 MG tablet  Commonly known as: ULTRAM            Objective Findings at Discharge:   BP (!) 100/58   Pulse 71   Temp 97.7 °F (36.5 °C) (Axillary)   Resp 16   Ht 5' 8\" (1.727 m)   Wt 129 lb 3.2 oz (58.6 kg)   SpO2 100%   BMI 19.64 kg/m²            PHYSICAL EXAM   GEN Awake male, sitting upright in bed in no apparent distress. Appears given age. EYES Pupils are equally round. No scleral erythema, discharge, or conjunctivitis. HENT Mucous membranes are moist. Oral pharynx without exudates, no evidence of thrush. NECK Supple, no apparent thyromegaly or masses. RESP Clear to auscultation, no wheezes, rales or rhonchi. Symmetric chest movement while on room air. CARDIO/VASC S1/S2 auscultated. Regular rate without appreciable murmurs, rubs, or gallops. No JVD or carotid bruits. Peripheral pulses equal bilaterally and palpable. No peripheral edema. GI Abdomen is soft without significant tenderness, masses, or guarding. Bowel sounds are normoactive. Rectal exam deferred.  No costovertebral angle tenderness. Normal appearing external genitalia. Renee catheter is not present. HEME/LYMPH No palpable cervical lymphadenopathy and no hepatosplenomegaly. No petechiae or ecchymoses. MSK No gross joint deformities. SKIN Normal coloration, warm, dry. NEURO Cranial nerves appear grossly intact, normal speech, no lateralizing weakness. PSYCH Awake, alert, oriented x 2. Affect appropriate.     BMP/CBC  Recent Labs     01/03/22  1813 01/05/22  1157    135   K 3.9 4.0    101   CO2 22 21   BUN 23 23   CREATININE 1.2 1.3   WBC 7.6 8.7   HCT 35.9* 39.3*    219       IMAGING:  Results reviewed     Discharge Time of 35> minutes    Electronically signed by MOLLY Torres CNP on 1/6/2022 at 3:24 PM

## 2022-01-07 ENCOUNTER — APPOINTMENT (OUTPATIENT)
Dept: CT IMAGING | Age: 82
End: 2022-01-07
Payer: MEDICARE

## 2022-01-07 VITALS
TEMPERATURE: 97.8 F | BODY MASS INDEX: 19.58 KG/M2 | WEIGHT: 129.2 LBS | HEART RATE: 86 BPM | HEIGHT: 68 IN | OXYGEN SATURATION: 95 % | RESPIRATION RATE: 16 BRPM | SYSTOLIC BLOOD PRESSURE: 118 MMHG | DIASTOLIC BLOOD PRESSURE: 67 MMHG

## 2022-01-07 LAB
ALBUMIN SERPL-MCNC: 3.5 GM/DL (ref 3.4–5)
ALP BLD-CCNC: 80 IU/L (ref 40–129)
ALT SERPL-CCNC: 16 U/L (ref 10–40)
ANION GAP SERPL CALCULATED.3IONS-SCNC: 11 MMOL/L (ref 4–16)
AST SERPL-CCNC: 28 IU/L (ref 15–37)
BACTERIA: NEGATIVE /HPF
BASOPHILS ABSOLUTE: 0.1 K/CU MM
BASOPHILS RELATIVE PERCENT: 0.8 % (ref 0–1)
BILIRUB SERPL-MCNC: 0.4 MG/DL (ref 0–1)
BILIRUBIN URINE: NEGATIVE MG/DL
BLOOD, URINE: NEGATIVE
BUN BLDV-MCNC: 27 MG/DL (ref 6–23)
CALCIUM SERPL-MCNC: 9.3 MG/DL (ref 8.3–10.6)
CHLORIDE BLD-SCNC: 106 MMOL/L (ref 99–110)
CLARITY: CLEAR
CO2: 18 MMOL/L (ref 21–32)
COLOR: YELLOW
CREAT SERPL-MCNC: 1.7 MG/DL (ref 0.9–1.3)
DIFFERENTIAL TYPE: ABNORMAL
EOSINOPHILS ABSOLUTE: 1.3 K/CU MM
EOSINOPHILS RELATIVE PERCENT: 12 % (ref 0–3)
GFR AFRICAN AMERICAN: 47 ML/MIN/1.73M2
GFR NON-AFRICAN AMERICAN: 39 ML/MIN/1.73M2
GLUCOSE BLD-MCNC: 101 MG/DL (ref 70–99)
GLUCOSE BLD-MCNC: 134 MG/DL (ref 70–99)
GLUCOSE BLD-MCNC: 159 MG/DL (ref 70–99)
GLUCOSE BLD-MCNC: 92 MG/DL (ref 70–99)
GLUCOSE, URINE: NEGATIVE MG/DL
HCT VFR BLD CALC: 37.9 % (ref 42–52)
HEMOGLOBIN: 12.3 GM/DL (ref 13.5–18)
IMMATURE NEUTROPHIL %: 0.3 % (ref 0–0.43)
KETONES, URINE: NEGATIVE MG/DL
LACTIC ACID, SEPSIS: 0.9 MMOL/L (ref 0.5–1.9)
LEUKOCYTE ESTERASE, URINE: NEGATIVE
LYMPHOCYTES ABSOLUTE: 2.5 K/CU MM
LYMPHOCYTES RELATIVE PERCENT: 23.2 % (ref 24–44)
MCH RBC QN AUTO: 29.7 PG (ref 27–31)
MCHC RBC AUTO-ENTMCNC: 32.5 % (ref 32–36)
MCV RBC AUTO: 91.5 FL (ref 78–100)
MONOCYTES ABSOLUTE: 0.8 K/CU MM
MONOCYTES RELATIVE PERCENT: 7.7 % (ref 0–4)
MUCUS: ABNORMAL HPF
NITRITE URINE, QUANTITATIVE: NEGATIVE
NUCLEATED RBC %: 0 %
PDW BLD-RTO: 12.6 % (ref 11.7–14.9)
PH, URINE: 5 (ref 5–8)
PLATELET # BLD: 207 K/CU MM (ref 140–440)
PMV BLD AUTO: 9.3 FL (ref 7.5–11.1)
POTASSIUM SERPL-SCNC: 3.5 MMOL/L (ref 3.5–5.1)
PROTEIN UA: NEGATIVE MG/DL
RBC # BLD: 4.14 M/CU MM (ref 4.6–6.2)
RBC URINE: 3 /HPF (ref 0–3)
SEGMENTED NEUTROPHILS ABSOLUTE COUNT: 5.9 K/CU MM
SEGMENTED NEUTROPHILS RELATIVE PERCENT: 56 % (ref 36–66)
SODIUM BLD-SCNC: 135 MMOL/L (ref 135–145)
SPECIFIC GRAVITY UA: 1.01 (ref 1–1.03)
SQUAMOUS EPITHELIAL: <1 /HPF
TOTAL IMMATURE NEUTOROPHIL: 0.03 K/CU MM
TOTAL NUCLEATED RBC: 0 K/CU MM
TOTAL PROTEIN: 5.9 GM/DL (ref 6.4–8.2)
TRICHOMONAS: ABNORMAL /HPF
TROPONIN T: 0.04 NG/ML
UROBILINOGEN, URINE: 0.2 MG/DL (ref 0.2–1)
WBC # BLD: 10.6 K/CU MM (ref 4–10.5)
WBC UA: 10 /HPF (ref 0–2)

## 2022-01-07 PROCEDURE — 84484 ASSAY OF TROPONIN QUANT: CPT

## 2022-01-07 PROCEDURE — 6370000000 HC RX 637 (ALT 250 FOR IP): Performed by: HOSPITALIST

## 2022-01-07 PROCEDURE — 82962 GLUCOSE BLOOD TEST: CPT

## 2022-01-07 PROCEDURE — G0378 HOSPITAL OBSERVATION PER HR: HCPCS

## 2022-01-07 PROCEDURE — 94761 N-INVAS EAR/PLS OXIMETRY MLT: CPT

## 2022-01-07 PROCEDURE — 6360000002 HC RX W HCPCS: Performed by: HOSPITALIST

## 2022-01-07 PROCEDURE — 2580000003 HC RX 258: Performed by: HOSPITALIST

## 2022-01-07 PROCEDURE — 81001 URINALYSIS AUTO W/SCOPE: CPT

## 2022-01-07 PROCEDURE — 6370000000 HC RX 637 (ALT 250 FOR IP): Performed by: INTERNAL MEDICINE

## 2022-01-07 PROCEDURE — 80053 COMPREHEN METABOLIC PANEL: CPT

## 2022-01-07 PROCEDURE — 83605 ASSAY OF LACTIC ACID: CPT

## 2022-01-07 PROCEDURE — 70450 CT HEAD/BRAIN W/O DYE: CPT

## 2022-01-07 PROCEDURE — 36415 COLL VENOUS BLD VENIPUNCTURE: CPT

## 2022-01-07 PROCEDURE — 85025 COMPLETE CBC W/AUTO DIFF WBC: CPT

## 2022-01-07 PROCEDURE — 96361 HYDRATE IV INFUSION ADD-ON: CPT

## 2022-01-07 PROCEDURE — 87086 URINE CULTURE/COLONY COUNT: CPT

## 2022-01-07 PROCEDURE — 74176 CT ABD & PELVIS W/O CONTRAST: CPT

## 2022-01-07 PROCEDURE — 96372 THER/PROPH/DIAG INJ SC/IM: CPT

## 2022-01-07 PROCEDURE — 74150 CT ABDOMEN W/O CONTRAST: CPT

## 2022-01-07 RX ORDER — M-VIT,TX,IRON,MINS/CALC/FOLIC 27MG-0.4MG
1 TABLET ORAL DAILY
Status: DISCONTINUED | OUTPATIENT
Start: 2022-01-07 | End: 2022-01-07 | Stop reason: HOSPADM

## 2022-01-07 RX ADMIN — MULTIPLE VITAMINS W/ MINERALS TAB 1 TABLET: TAB at 13:49

## 2022-01-07 RX ADMIN — Medication 2000 UNITS: at 10:57

## 2022-01-07 RX ADMIN — LISINOPRIL 5 MG: 5 TABLET ORAL at 11:28

## 2022-01-07 RX ADMIN — ENOXAPARIN SODIUM 40 MG: 100 INJECTION SUBCUTANEOUS at 10:59

## 2022-01-07 RX ADMIN — SODIUM CHLORIDE, PRESERVATIVE FREE 10 ML: 5 INJECTION INTRAVENOUS at 11:26

## 2022-01-07 RX ADMIN — MIDODRINE HYDROCHLORIDE 10 MG: 5 TABLET ORAL at 13:49

## 2022-01-07 RX ADMIN — BUSPIRONE HYDROCHLORIDE 5 MG: 5 TABLET ORAL at 10:58

## 2022-01-07 RX ADMIN — ASPIRIN 81 MG 81 MG: 81 TABLET ORAL at 10:58

## 2022-01-07 RX ADMIN — ATORVASTATIN CALCIUM 40 MG: 40 TABLET, FILM COATED ORAL at 10:59

## 2022-01-07 RX ADMIN — FAMOTIDINE 40 MG: 20 TABLET, FILM COATED ORAL at 10:58

## 2022-01-07 RX ADMIN — INSULIN LISPRO 1 UNITS: 100 INJECTION, SOLUTION INTRAVENOUS; SUBCUTANEOUS at 18:19

## 2022-01-07 RX ADMIN — MIDODRINE HYDROCHLORIDE 10 MG: 5 TABLET ORAL at 10:58

## 2022-01-07 RX ADMIN — MIDODRINE HYDROCHLORIDE 10 MG: 5 TABLET ORAL at 17:31

## 2022-01-07 RX ADMIN — CILOSTAZOL 100 MG: 100 TABLET ORAL at 11:25

## 2022-01-07 NOTE — PROGRESS NOTES
Discharge summary called to wife Mary Mon, two nurse verification with [facility]ESS[facility]. IV removed, belongings packed. Pt received by [facility], discharged from unit.

## 2022-01-07 NOTE — PROGRESS NOTES
@ [phone] tele call SN about patient off the monitor, SN went to patient room and found patient sitting on the floor, patient is so confused , he removed his IV , no injury found , denies pain at this time , patient has diarrhea , skilled nursing assessment /observation completed ,staff assist patient back in bed , call light within reached . Vital signs /73,MAP 89,90,97.6,02sat 100%. Will continue to monitor patient condition . MD has been notified through perfect serve.

## 2022-01-07 NOTE — PROGRESS NOTES
6.9         2. Accu check Ac/Hs         3. SSI    This patient was seen and examined autonomously  A hospitalist attending physician was available for questions/consultation as needed. Diet ADULT DIET; Regular; 5 carb choices (75 gm/meal); Low Fat/Low Chol/High Fiber/2 gm Na  ADULT ORAL NUTRITION SUPPLEMENT; Breakfast, Lunch; Diabetic Oral Supplement  ADULT ORAL NUTRITION SUPPLEMENT; Dinner; Frozen Oral Supplement   DVT Prophylaxis [x] Lovenox, []  Heparin, [] SCDs, [] Ambulation   GI Prophylaxis [] PPI,  [] H2 Blocker,  [] Carafate,  [x] Diet/Tube Feeds   Code Status Full Code   Disposition Patient requires continued admission due to encephalopathic work up         History of Present Illness:     Chief Complaint: confusion and hallucinations  Paul Ross is a 79 y.o.  male  who presents with confusion and hallucinations. Pt states that he is feeling fine but does have some abdominal pain that he has had for a few days. Pt denies any nausea or vomiting. Pt is able to answer all LOC questions except for the name of the hospital.   Vital signs have remained stable throughout the night. Ten point ROS reviewed negative, unless as noted above    This patient was seen and examined autonomously  A hospitalist attending physician was available for questions/consultation as needed. Objective:     No intake or output data in the 24 hours ending 01/07/22 1125   Vitals:   Vitals:    01/07/22 1045   BP: 125/66   Pulse: 86   Resp: 16   Temp: 98.6 °F (37 °C)   SpO2: 100%     Physical Exam:   GEN Awake male, sitting upright in bed in no apparent distress. Appears given age. EYES Pupils are equally round. No scleral erythema, discharge, or conjunctivitis. HENT Mucous membranes are moist. Oral pharynx without exudates, no evidence of thrush. NECK Supple, no apparent thyromegaly or masses. RESP Clear to auscultation, no wheezes, rales or rhonchi. Symmetric chest movement while on room air.   CARDIO/VASC S1/S2 auscultated. Regular rate without appreciable murmurs, rubs, or gallops. No JVD or carotid bruits. Peripheral pulses equal bilaterally and palpable. No peripheral edema. GI Abdomen is soft without significant tenderness, masses, or guarding. Bowel sounds are normoactive. Rectal exam deferred.  No costovertebral angle tenderness. Renee catheter is not present. HEME/LYMPH No palpable cervical lymphadenopathy and no hepatosplenomegaly. No petechiae or ecchymoses. MSK No gross joint deformities. SKIN Normal coloration, warm, dry. NEURO Cranial nerves appear grossly intact, normal speech, no lateralizing weakness. PSYCH Awake, alert, oriented x 2. Affect appropriate.     Medications:   Medications:    midodrine  10 mg Oral TID WC    aspirin  81 mg Oral Daily    multivitamin  1 tablet Oral Daily    atorvastatin  40 mg Oral Daily    latanoprost  1 drop Both Eyes Nightly    gabapentin  100 mg Oral QPM    vitamin D  2,000 Units Oral Daily    [Held by provider] carvedilol  6.25 mg Oral BID WC    famotidine  40 mg Oral Daily    lisinopril  5 mg Oral Daily    busPIRone  5 mg Oral Daily    cilostazol  100 mg Oral BID AC    sodium chloride flush  5-40 mL IntraVENous 2 times per day    enoxaparin  40 mg SubCUTAneous Daily    insulin glargine  0.25 Units/kg SubCUTAneous QPM    insulin lispro  0.08 Units/kg SubCUTAneous TID     insulin lispro  0-6 Units SubCUTAneous TID WC    insulin lispro  0-3 Units SubCUTAneous QPM    [Held by provider] dilTIAZem  60 mg Oral 4 times per day    cefTRIAXone (ROCEPHIN) IV  1,000 mg IntraVENous Q24H      Infusions:    sodium chloride 100 mL/hr at 01/06/22 1905    sodium chloride      dextrose       PRN Meds: docusate sodium, 100 mg, Nightly PRN  cholestyramine light, 4 g, BID PRN  traMADol, 50 mg, Q6H PRN  sodium chloride flush, 5-40 mL, PRN  sodium chloride, 25 mL, PRN  ondansetron, 4 mg, Q8H PRN   Or  ondansetron, 4 mg, Q6H PRN  polyethylene glycol, 17 g, Daily PRN  acetaminophen, 650 mg, Q6H PRN   Or  acetaminophen, 650 mg, Q6H PRN  potassium chloride, 40 mEq, PRN   Or  potassium alternative oral replacement, 40 mEq, PRN   Or  potassium chloride, 10 mEq, PRN  magnesium sulfate, 2,000 mg, PRN  glucose, 15 g, PRN  dextrose, 12.5 g, PRN  glucagon (rDNA), 1 mg, PRN  dextrose, 100 mL/hr, PRN          Patient is still admitted because of encephalopathic work up. The anticipated discharge is in greater than 24 hours.      Electronically signed by MOLLY Murphy CNP on 1/7/2022 at 11:25 AM

## 2022-01-07 NOTE — DISCHARGE SUMMARY
Discharge Summary    Name:  Beryle Gammons /Age/Sex: 1940  (79 y.o. male)   MRN & CSN:  [id number] & [id number] Admission Date/Time: 1/3/2022  5:56 PM   Attending:  Jayant Larson MD Discharging Physician: MOLLY Tyler - CNP     Hospital Course:   Beryle Gammons is a 79 y.o.  male  who presents with confusion and hallucinations      1. Encephalopathy vs dementia  1. Ct head () shows no acute intracranial abnormality   2. Ct pe () neg  3. Ct abd/pelvis () moderate impacted stool with in the rectosigmoid colon. Small to moderate hiatal hernia. Pneumobilia without significant change since prior exam.   4. MR brain ( no convincing acute intracranial abnormality. Mild to moderate global parenchymal volume loss. 5. No elevated WBC  6. Elevated troponin of 0.057 and 0.052  7. BNP () 1514  8. Ua shows rare bacteria~Rocephin started on   9. Urine CS shows no growth   10. Consult Pt/Ot~rec's please~[facility]  11. Neurology consulted~signed off      2. Hypotension         1. One time occurrence on  after receiving Cardizem and tramadol         2. Ct head () no significant change in the sellar/suprasellar mass. Ovoid mass along the left tentorium with punctate calcification this is likely a meningioma with some mass effect against the adjacent trang. 3.  Ct abdomen (107) shows a dilated fluid filled rectum measuring up to 7 cm. Persistent pneumobilia which is unchanged and high density material within the pelvis within the bladder may reflect residual contrast material vs blood products. 4. Labs wnl, ua normal.       3.    Elevated troponin          CAD         1. Trop 0.052 and 0.057----->0.049         2. Cardiology consulted~continue medical management~signed off          3. Continue pletal, coreg, lisinopril and cardizem.      4.    Dyslipidemia         1. Continue statin therapy      5. Diabetes type 2         1.  A1C 901/03) 6.9         2. Accu check Ac/Hs         3. SSI      The patient expressed appropriate understanding of and agreement with the discharge recommendations, medications, and plan.      Consults this admission:  IP CONSULT TO HOSPITALIST  IP CONSULT TO CARDIOLOGY  IP CONSULT TO NEUROLOGY  IP CONSULT TO CASE MANAGEMENT  IP CONSULT TO HOME CARE NEEDS    Discharge Instruction:   Follow up appointments: PCP  Primary care physician:  within 2 weeks    Diet:  regular diet   Activity: activity as tolerated  Disposition: Discharged to:   [x]Home, []C, []SNF, []Acute Rehab, []Hospice   Condition on discharge: Stable    Discharge Medications:        Medication List      CONTINUE taking these medications    aspirin 81 MG chewable tablet     atorvastatin 40 MG tablet  Commonly known as: LIPITOR     busPIRone 5 MG tablet  Commonly known as: BUSPAR     carvedilol 6.25 MG tablet  Commonly known as: COREG  Take 1 tablet by mouth 2 times daily (with meals)     Cholecalciferol 50 MCG (2000 UT) Tabs     cholestyramine light 4 g packet  Take 1 packet by mouth 2 times daily as needed (diarrhea)     cilostazol 100 MG tablet  Commonly known as: PLETAL     dilTIAZem 60 MG tablet  Commonly known as: CARDIZEM  Take 1 tablet by mouth 2 times daily     docusate sodium 100 MG capsule  Commonly known as: COLACE     famotidine 40 MG tablet  Commonly known as: PEPCID     gabapentin 100 MG capsule  Commonly known as: NEURONTIN     glimepiride 4 MG tablet  Commonly known as: AMARYL  Take 1 tablet by mouth every morning (before breakfast)     insulin lispro 100 UNIT/ML injection vial  Commonly known as: HUMALOG  Inject 0-12 Units into the skin 3 times daily (with meals) **Medium Dose Corrective Algorithm**  Glucose: Dose:  If <139             No Insulin  140-199 2 Units  200-249 4 Units  250-299 6 Units  300-349 8 Units  350-400 10 Units  Above 400       12 Units     latanoprost 0.005 % ophthalmic solution  Commonly known as: XALATAN     Levemir FlexTouch 100 UNIT/ML injection pen  Generic drug: insulin detemir  Inject 10 Units into the skin nightly     lisinopril 5 MG tablet  Commonly known as: PRINIVIL;ZESTRIL  Take 1 tablet by mouth daily     MULTIVITAMIN PO     ondansetron 4 MG tablet  Commonly known as: ZOFRAN     traMADol 50 MG tablet  Commonly known as: ULTRAM            Objective Findings at Discharge:   BP (!) 131/58   Pulse 86   Temp 97.8 °F (36.6 °C) (Oral)   Resp 16   Ht 5' 8\" (1.727 m)   Wt 129 lb 3.2 oz (58.6 kg)   SpO2 95%   BMI 19.64 kg/m²            PHYSICAL EXAM   GEN Awake male, sitting upright in bed in no apparent distress. Appears given age. EYES Pupils are equally round. No scleral erythema, discharge, or conjunctivitis. HENT Mucous membranes are moist. Oral pharynx without exudates, no evidence of thrush. NECK Supple, no apparent thyromegaly or masses. RESP Clear to auscultation, no wheezes, rales or rhonchi. Symmetric chest movement while on room air. CARDIO/VASC S1/S2 auscultated. Regular rate without appreciable murmurs, rubs, or gallops. No JVD or carotid bruits. Peripheral pulses equal bilaterally and palpable. No peripheral edema. GI Abdomen is soft without significant tenderness, masses, or guarding. Bowel sounds are normoactive. Rectal exam deferred.  No costovertebral angle tenderness. Renee catheter is not present. HEME/LYMPH No palpable cervical lymphadenopathy and no hepatosplenomegaly. No petechiae or ecchymoses. MSK No gross joint deformities. SKIN Normal coloration, warm, dry. NEURO Cranial nerves appear grossly intact, normal speech, no lateralizing weakness. PSYCH Awake, alert, oriented x 2. Affect appropriate.     BMP/CBC  Recent Labs     01/05/22  1157 01/06/22  1718 01/07/22  1430    135  --    K 4.0 3.5  --     99  --    CO2 21 20*  --    BUN 23 28*  --    CREATININE 1.3 2.1*  --    WBC 8.7 8.8 10.6*   HCT 39.3* 35.8* 37.9*    223 207       IMAGING:  Results reviewed Discharge Time of 35> minutes    Electronically signed by MOLLY Sherman CNP on 1/7/2022 at 4:05 PM

## 2022-01-10 LAB
CULTURE: NORMAL
Lab: NORMAL
SPECIMEN: NORMAL

## 2022-01-30 ENCOUNTER — APPOINTMENT (OUTPATIENT)
Dept: GENERAL RADIOLOGY | Age: 82
End: 2022-01-30
Payer: MEDICARE

## 2022-01-30 ENCOUNTER — APPOINTMENT (OUTPATIENT)
Dept: CT IMAGING | Age: 82
End: 2022-01-30
Payer: MEDICARE

## 2022-01-30 ENCOUNTER — HOSPITAL ENCOUNTER (OUTPATIENT)
Age: 82
Setting detail: OBSERVATION
Discharge: HOME | End: 2022-02-04
Attending: STUDENT IN AN ORGANIZED HEALTH CARE EDUCATION/TRAINING PROGRAM | Admitting: FAMILY MEDICINE
Payer: MEDICARE

## 2022-01-30 DIAGNOSIS — R41.82 ALTERED MENTAL STATUS, UNSPECIFIED ALTERED MENTAL STATUS TYPE: Primary | ICD-10-CM

## 2022-01-30 LAB
ALBUMIN SERPL-MCNC: 3.5 GM/DL (ref 3.4–5)
ALCOHOL SCREEN SERUM: <0.01 %WT/VOL
ALP BLD-CCNC: 73 IU/L (ref 40–129)
ALT SERPL-CCNC: 12 U/L (ref 10–40)
AMMONIA: 23 UMOL/L (ref 16–60)
AMPHETAMINES: NEGATIVE
ANION GAP SERPL CALCULATED.3IONS-SCNC: 10 MMOL/L (ref 4–16)
APTT: 25.6 SECONDS (ref 25.1–37.1)
AST SERPL-CCNC: 21 IU/L (ref 15–37)
BACTERIA: NEGATIVE /HPF
BARBITURATE SCREEN URINE: NEGATIVE
BASOPHILS ABSOLUTE: 0.1 K/CU MM
BASOPHILS RELATIVE PERCENT: 0.8 % (ref 0–1)
BENZODIAZEPINE SCREEN, URINE: NEGATIVE
BILIRUB SERPL-MCNC: 0.4 MG/DL (ref 0–1)
BILIRUBIN URINE: NEGATIVE MG/DL
BLOOD, URINE: NEGATIVE
BUN BLDV-MCNC: 16 MG/DL (ref 6–23)
CALCIUM SERPL-MCNC: 9.3 MG/DL (ref 8.3–10.6)
CANNABINOID SCREEN URINE: NEGATIVE
CHLORIDE BLD-SCNC: 103 MMOL/L (ref 99–110)
CLARITY: CLEAR
CO2: 24 MMOL/L (ref 21–32)
COCAINE METABOLITE: NEGATIVE
COLOR: ABNORMAL
CREAT SERPL-MCNC: 1 MG/DL (ref 0.9–1.3)
DIFFERENTIAL TYPE: ABNORMAL
EOSINOPHILS ABSOLUTE: 0.9 K/CU MM
EOSINOPHILS RELATIVE PERCENT: 12.4 % (ref 0–3)
GFR AFRICAN AMERICAN: >60 ML/MIN/1.73M2
GFR NON-AFRICAN AMERICAN: >60 ML/MIN/1.73M2
GLUCOSE BLD-MCNC: 194 MG/DL (ref 70–99)
GLUCOSE BLD-MCNC: 296 MG/DL (ref 70–99)
GLUCOSE, URINE: NEGATIVE MG/DL
HCT VFR BLD CALC: 34.7 % (ref 42–52)
HEMOGLOBIN: 11.6 GM/DL (ref 13.5–18)
IMMATURE NEUTROPHIL %: 0.3 % (ref 0–0.43)
INR BLD: 0.94 INDEX
KETONES, URINE: NEGATIVE MG/DL
LACTATE: 0.9 MMOL/L (ref 0.4–2)
LEUKOCYTE ESTERASE, URINE: NEGATIVE
LYMPHOCYTES ABSOLUTE: 2.1 K/CU MM
LYMPHOCYTES RELATIVE PERCENT: 27.3 % (ref 24–44)
MCH RBC QN AUTO: 30.1 PG (ref 27–31)
MCHC RBC AUTO-ENTMCNC: 33.4 % (ref 32–36)
MCV RBC AUTO: 90.1 FL (ref 78–100)
MONOCYTES ABSOLUTE: 1 K/CU MM
MONOCYTES RELATIVE PERCENT: 12.8 % (ref 0–4)
MUCUS: ABNORMAL HPF
NITRITE URINE, QUANTITATIVE: NEGATIVE
NUCLEATED RBC %: 0 %
OPIATES, URINE: NEGATIVE
OXYCODONE: NEGATIVE
PDW BLD-RTO: 12.8 % (ref 11.7–14.9)
PH, URINE: 7.5 (ref 5–8)
PHENCYCLIDINE, URINE: NEGATIVE
PLATELET # BLD: 194 K/CU MM (ref 140–440)
PMV BLD AUTO: 9.3 FL (ref 7.5–11.1)
POTASSIUM SERPL-SCNC: 4.5 MMOL/L (ref 3.5–5.1)
PRO-BNP: 420.3 PG/ML
PROTEIN UA: NEGATIVE MG/DL
PROTHROMBIN TIME: 12.1 SECONDS (ref 11.7–14.5)
RBC # BLD: 3.85 M/CU MM (ref 4.6–6.2)
RBC URINE: 1 /HPF (ref 0–3)
SEGMENTED NEUTROPHILS ABSOLUTE COUNT: 3.5 K/CU MM
SEGMENTED NEUTROPHILS RELATIVE PERCENT: 46.4 % (ref 36–66)
SODIUM BLD-SCNC: 137 MMOL/L (ref 135–145)
SPECIFIC GRAVITY UA: 1.01 (ref 1–1.03)
TOTAL IMMATURE NEUTOROPHIL: 0.02 K/CU MM
TOTAL NUCLEATED RBC: 0 K/CU MM
TOTAL PROTEIN: 5.9 GM/DL (ref 6.4–8.2)
TRICHOMONAS: ABNORMAL /HPF
TROPONIN T: <0.01 NG/ML
UROBILINOGEN, URINE: NORMAL MG/DL (ref 0.2–1)
WBC # BLD: 7.6 K/CU MM (ref 4–10.5)
WBC UA: 1 /HPF (ref 0–2)

## 2022-01-30 PROCEDURE — 85610 PROTHROMBIN TIME: CPT

## 2022-01-30 PROCEDURE — 94761 N-INVAS EAR/PLS OXIMETRY MLT: CPT

## 2022-01-30 PROCEDURE — U0003 INFECTIOUS AGENT DETECTION BY NUCLEIC ACID (DNA OR RNA); SEVERE ACUTE RESPIRATORY SYNDROME CORONAVIRUS 2 (SARS-COV-2) (CORONAVIRUS DISEASE [COVID-19]), AMPLIFIED PROBE TECHNIQUE, MAKING USE OF HIGH THROUGHPUT TECHNOLOGIES AS DESCRIBED BY CMS-2020-01-R: HCPCS

## 2022-01-30 PROCEDURE — 83605 ASSAY OF LACTIC ACID: CPT

## 2022-01-30 PROCEDURE — G0378 HOSPITAL OBSERVATION PER HR: HCPCS

## 2022-01-30 PROCEDURE — 2580000003 HC RX 258: Performed by: FAMILY MEDICINE

## 2022-01-30 PROCEDURE — G0480 DRUG TEST DEF 1-7 CLASSES: HCPCS

## 2022-01-30 PROCEDURE — 82140 ASSAY OF AMMONIA: CPT

## 2022-01-30 PROCEDURE — 70450 CT HEAD/BRAIN W/O DYE: CPT

## 2022-01-30 PROCEDURE — 6370000000 HC RX 637 (ALT 250 FOR IP): Performed by: FAMILY MEDICINE

## 2022-01-30 PROCEDURE — 93005 ELECTROCARDIOGRAM TRACING: CPT | Performed by: STUDENT IN AN ORGANIZED HEALTH CARE EDUCATION/TRAINING PROGRAM

## 2022-01-30 PROCEDURE — 85025 COMPLETE CBC W/AUTO DIFF WBC: CPT

## 2022-01-30 PROCEDURE — 96372 THER/PROPH/DIAG INJ SC/IM: CPT

## 2022-01-30 PROCEDURE — 99285 EMERGENCY DEPT VISIT HI MDM: CPT

## 2022-01-30 PROCEDURE — 81001 URINALYSIS AUTO W/SCOPE: CPT

## 2022-01-30 PROCEDURE — 80307 DRUG TEST PRSMV CHEM ANLYZR: CPT

## 2022-01-30 PROCEDURE — 6360000002 HC RX W HCPCS: Performed by: FAMILY MEDICINE

## 2022-01-30 PROCEDURE — 87635 SARS-COV-2 COVID-19 AMP PRB: CPT

## 2022-01-30 PROCEDURE — 85730 THROMBOPLASTIN TIME PARTIAL: CPT

## 2022-01-30 PROCEDURE — 84484 ASSAY OF TROPONIN QUANT: CPT

## 2022-01-30 PROCEDURE — 71045 X-RAY EXAM CHEST 1 VIEW: CPT

## 2022-01-30 PROCEDURE — 83880 ASSAY OF NATRIURETIC PEPTIDE: CPT

## 2022-01-30 PROCEDURE — 80053 COMPREHEN METABOLIC PANEL: CPT

## 2022-01-30 PROCEDURE — 82962 GLUCOSE BLOOD TEST: CPT

## 2022-01-30 PROCEDURE — U0005 INFEC AGEN DETEC AMPLI PROBE: HCPCS

## 2022-01-30 RX ORDER — ONDANSETRON 2 MG/ML
4 INJECTION INTRAMUSCULAR; INTRAVENOUS EVERY 6 HOURS PRN
Status: DISCONTINUED | OUTPATIENT
Start: 2022-01-30 | End: 2022-02-04 | Stop reason: HOSPADM

## 2022-01-30 RX ORDER — TRAMADOL HYDROCHLORIDE 50 MG/1
50 TABLET ORAL EVERY 6 HOURS PRN
Status: DISCONTINUED | OUTPATIENT
Start: 2022-01-30 | End: 2022-02-04 | Stop reason: HOSPADM

## 2022-01-30 RX ORDER — LATANOPROST 50 UG/ML
1 SOLUTION/ DROPS OPHTHALMIC NIGHTLY
Status: DISCONTINUED | OUTPATIENT
Start: 2022-01-30 | End: 2022-02-04 | Stop reason: HOSPADM

## 2022-01-30 RX ORDER — SODIUM CHLORIDE 0.9 % (FLUSH) 0.9 %
5-40 SYRINGE (ML) INJECTION PRN
Status: DISCONTINUED | OUTPATIENT
Start: 2022-01-30 | End: 2022-02-04 | Stop reason: HOSPADM

## 2022-01-30 RX ORDER — ACETAMINOPHEN 650 MG/1
650 SUPPOSITORY RECTAL EVERY 6 HOURS PRN
Status: DISCONTINUED | OUTPATIENT
Start: 2022-01-30 | End: 2022-02-04 | Stop reason: HOSPADM

## 2022-01-30 RX ORDER — SODIUM CHLORIDE 9 MG/ML
25 INJECTION, SOLUTION INTRAVENOUS PRN
Status: DISCONTINUED | OUTPATIENT
Start: 2022-01-30 | End: 2022-02-04 | Stop reason: HOSPADM

## 2022-01-30 RX ORDER — ACETAMINOPHEN 325 MG/1
650 TABLET ORAL EVERY 6 HOURS PRN
Status: DISCONTINUED | OUTPATIENT
Start: 2022-01-30 | End: 2022-02-04 | Stop reason: HOSPADM

## 2022-01-30 RX ORDER — CILOSTAZOL 100 MG/1
100 TABLET ORAL
Status: DISCONTINUED | OUTPATIENT
Start: 2022-01-31 | End: 2022-02-04 | Stop reason: HOSPADM

## 2022-01-30 RX ORDER — DOCUSATE SODIUM 100 MG/1
100 CAPSULE, LIQUID FILLED ORAL DAILY
Status: DISCONTINUED | OUTPATIENT
Start: 2022-01-31 | End: 2022-02-04 | Stop reason: HOSPADM

## 2022-01-30 RX ORDER — ATORVASTATIN CALCIUM 40 MG/1
40 TABLET, FILM COATED ORAL DAILY
Status: DISCONTINUED | OUTPATIENT
Start: 2022-01-31 | End: 2022-02-04 | Stop reason: HOSPADM

## 2022-01-30 RX ORDER — POLYETHYLENE GLYCOL 3350 17 G/17G
17 POWDER, FOR SOLUTION ORAL DAILY PRN
Status: DISCONTINUED | OUTPATIENT
Start: 2022-01-30 | End: 2022-02-04 | Stop reason: HOSPADM

## 2022-01-30 RX ORDER — ONDANSETRON 4 MG/1
4 TABLET, ORALLY DISINTEGRATING ORAL EVERY 8 HOURS PRN
Status: DISCONTINUED | OUTPATIENT
Start: 2022-01-30 | End: 2022-02-04 | Stop reason: HOSPADM

## 2022-01-30 RX ORDER — ASPIRIN 81 MG/1
81 TABLET, CHEWABLE ORAL DAILY
Status: DISCONTINUED | OUTPATIENT
Start: 2022-01-31 | End: 2022-02-04 | Stop reason: HOSPADM

## 2022-01-30 RX ORDER — DEXTROSE MONOHYDRATE 50 MG/ML
100 INJECTION, SOLUTION INTRAVENOUS PRN
Status: DISCONTINUED | OUTPATIENT
Start: 2022-01-30 | End: 2022-02-04 | Stop reason: HOSPADM

## 2022-01-30 RX ORDER — CARVEDILOL 6.25 MG/1
6.25 TABLET ORAL 2 TIMES DAILY WITH MEALS
Status: DISCONTINUED | OUTPATIENT
Start: 2022-01-30 | End: 2022-02-04 | Stop reason: HOSPADM

## 2022-01-30 RX ORDER — DEXTROSE MONOHYDRATE 25 G/50ML
12.5 INJECTION, SOLUTION INTRAVENOUS PRN
Status: DISCONTINUED | OUTPATIENT
Start: 2022-01-30 | End: 2022-01-30 | Stop reason: RX

## 2022-01-30 RX ORDER — FAMOTIDINE 20 MG/1
10 TABLET, FILM COATED ORAL 2 TIMES DAILY
Status: DISCONTINUED | OUTPATIENT
Start: 2022-01-30 | End: 2022-02-04 | Stop reason: HOSPADM

## 2022-01-30 RX ORDER — SODIUM CHLORIDE 0.9 % (FLUSH) 0.9 %
5-40 SYRINGE (ML) INJECTION EVERY 12 HOURS SCHEDULED
Status: DISCONTINUED | OUTPATIENT
Start: 2022-01-30 | End: 2022-02-04 | Stop reason: HOSPADM

## 2022-01-30 RX ORDER — NICOTINE POLACRILEX 4 MG
15 LOZENGE BUCCAL PRN
Status: DISCONTINUED | OUTPATIENT
Start: 2022-01-30 | End: 2022-02-04 | Stop reason: HOSPADM

## 2022-01-30 RX ADMIN — FAMOTIDINE 10 MG: 20 TABLET ORAL at 21:25

## 2022-01-30 RX ADMIN — ENOXAPARIN SODIUM 30 MG: 100 INJECTION SUBCUTANEOUS at 18:13

## 2022-01-30 RX ADMIN — SODIUM CHLORIDE, PRESERVATIVE FREE 10 ML: 5 INJECTION INTRAVENOUS at 21:53

## 2022-01-30 RX ADMIN — LATANOPROST 1 DROP: 50 SOLUTION/ DROPS OPHTHALMIC at 21:55

## 2022-01-30 RX ADMIN — CARVEDILOL 6.25 MG: 6.25 TABLET, FILM COATED ORAL at 21:25

## 2022-01-30 ASSESSMENT — ENCOUNTER SYMPTOMS
SHORTNESS OF BREATH: 0
ABDOMINAL PAIN: 0
NAUSEA: 0
DIARRHEA: 0
SORE THROAT: 0
COUGH: 0
EYE PAIN: 0
VOMITING: 0
CHEST TIGHTNESS: 0
EYE REDNESS: 0

## 2022-01-30 NOTE — ED NOTES
This RN spoke with patients wife for clarification on concerns of AMS. pts wife states that pt began having visual and auditory hallucinations Friday night, states he has not slept in 2 days and was seeing people in their living room that were not there and claiming they were taking things out of their house. Wife also states that he will see birds and people outside during the day that are not there. Wife states \"he usually gets this way when he has stones in his common bile duct and Dr Afshan Cobb has to remove them\".       Santiago Denver, RN  01/30/22 1819

## 2022-01-30 NOTE — ED NOTES
Bed: ED-15  Expected date:   Expected time:   Means of arrival:   Comments:  EMS     Reina Rendon Bowels  01/30/22 1200

## 2022-01-30 NOTE — H&P
History and Physical      Name:  Beatriz Ardon /Age/Sex: 1940  (79 y.o. male)   MRN & CSN:  [id number] & [id number] Admission Date/Time: 2022 12:01 PM   Location:  [facility] PCP: Mauricio Glez MD       Hospital Day: 1    Assessment and Plan:   Beatriz Ardon is a 79 y.o.  male  who presents with Acute metabolic encephalopathy    1. Metabolic Encephalopathy  2. Diabetes mellitus out of control with hyperglycemia ()  3. Accelerated Hypertension (/85)  4. Dyslipidemia  5. Essential hypertension  6. Peripheral vascular disease  7. Mild valvular cardiomyopathy & Pulmonary Hypertension (RVSP 31mmHg)  8. B/L BKA  9. Normocytic anemia (11.6 POA near baseline)  10. DVT prophylaxis    Patient having intermittent auditory & visual hallucinations  UA neg for signs of infection. No leukocytosis. Renal function and Hepatic function unremarkable. 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 Psychiatry and neurology consultation pending  Continue pletal, coreg, lisinopril and cardizem.   Continue Statin  Hold sedating medications     Diet No diet orders on file   DVT Prophylaxis [x] Lovenox, []  Heparin, [] SCDs, [] Ambulation   GI Prophylaxis [] PPI,  [x] H2 Blocker,  [] Carafate,  [] Diet/Tube Feeds   Code Status Full Code   Disposition Patient requires continued admission due to Metabolic Encephalopathy   MDM [] Low, [] Moderate,[x]  High  Patient's risk as above due to Metabolic Encephalopathy     History of Present Illness:     Chief Complaint: Acute metabolic encephalopathy  Beatriz Ardon is a 79 y.o.  male  with hx of dementia who was brought in by EMS from [facility] [address] [facility] for wife's concerns of patients altered mental status ongoing since 1/28/22. She reports he has been having auditory & visual hallucinations with insomnia for the last 2 days. He has had hallucinations in the past when he had\" stones in his common bile duct\". Patient has been seeing birds and people who are not present. He also is claiming to see people stealing things out of there house. EMS reported pt having  and recent admission for encephalopathy earlier this month. During my encounter patient is alert and oriented on exam x4. States he presented because his home health nurse recommend he come in. Patient does report auditory and visual hallucinations ongoing for months. Patient denies history of mental illness. Patient denies history of dementia    Called wife at home who provided additional history and confirmed medications. Florian Raafel Spouse [phone]     Ten point ROS reviewed negative, unless as noted above    Objective:   No intake or output data in the 24 hours ending 01/30/22 1645   Vitals:   Vitals:    01/30/22 1521   BP: (!) 160/85   Pulse: 72   Resp: 16   Temp:    SpO2: 98%     Physical Exam:   GEN Awake male, sitting upright in bed in no apparent distress. Appears given age. EYES Pupils are equally round. No scleral erythema, discharge, or conjunctivitis. HENT Mucous membranes are moist. Oral pharynx without exudates, no evidence of thrush. NECK Supple, no apparent thyromegaly or masses. RESP Clear to auscultation, no wheezes, rales or rhonchi. Symmetric chest movement while on room air. CARDIO/VASC S1/S2 auscultated. Regular rate without appreciable murmurs, rubs, or gallops. No JVD or carotid bruits. Peripheral pulses equal bilaterally and palpable. No peripheral edema. GI Abdomen is soft without significant tenderness, masses, or guarding. Bowel sounds are normoactive. Rectal exam deferred.  No costovertebral angle tenderness. Normal appearing external genitalia.  Renee catheter is not present. HEME/LYMPH No palpable cervical lymphadenopathy and no hepatosplenomegaly. No petechiae or ecchymoses. MSK No gross joint deformities. SKIN Normal coloration, warm, dry. NEURO Cranial nerves appear grossly intact, normal speech, no lateralizing weakness. PSYCH Awake, alert, oriented x 4. Affect appropriate. Past Medical History:      Past Medical History:   Diagnosis Date    Acute exacerbation of COPD with asthma ([facility] [address]     Arthritis     Biliary stones 2015    CAD (coronary artery disease)     Chronic systolic (congestive) heart failure (HCC)     COPD (chronic obstructive pulmonary disease) (HCC)     Diabetes mellitus ([facility] [address]     History of blood transfusion     Hx of angiography 3/11/2014    Pulmonary Arteries: Small sliding hiatal hernia. Mod coronary artery calcifications. Mild bilat gynecomastia.  Hyperlipidemia     Hypertension     Kidney stone     Metabolic encephalopathy 25/50/4211     PSHX:  has a past surgical history that includes Cardiac surgery; joint replacement; Cholecystectomy; Coronary angioplasty with stent; fracture surgery; Leg amputation below knee (Bilateral); ERCP (03/13/14); other surgical history (03/13/14); ERCP (9/11/14); ERCP (10/24/15); ERCP (03/18/2017); Abdomen surgery; Dilatation, esophagus; ERCP (N/A, 1/25/2019); ERCP (N/A, 9/7/2019); ERCP (N/A, 3/31/2020); Femur fracture surgery (Left, 2/18/2021); ERCP (N/A, 4/8/2021); and hernia repair. Allergies:    Allergies   Allergen Reactions    Byetta 10 Mcg Pen [Exenatide]     Sulbactam     Ampicillin Rash    Aricept [Donepezil Hydrochloride] Other (See Comments)     Hallucinations, confusion    Benztropine Other (See Comments)     Hallucinations, confusion    Celebrex [Celecoxib] Other (See Comments)     'causes him to be nervous and jittery\"    Diphenhydramine Hcl Other (See Comments)     nervous and jittery    Diphenhydramine Hcl [Diphenhydramine] Anxiety    Exenatide Other (See Comments)     Causes him to be nervous and jittery    Metoprolol Succinate Other (See Comments)     Hallucinations     Phenergan [Promethazine Hcl] Other (See Comments)     Hallucinations, nervous, jittery    Plavix [Clopidogrel Bisulfate] Other (See Comments)     Causes bleeding in his stomach    Pseudoephedrine Anxiety    Reglan [Metoclopramide Hcl] Other (See Comments)     \"Caused him to just sit in a chair and rock back and forth\"       FAM HX: family history includes Cancer in his father and mother; Diabetes in his mother. Soc HX:   Social History     Socioeconomic History    Marital status:      Spouse name: Beatriz Carvajal Number of children: None    Years of education: None    Highest education level: None   Occupational History    None   Tobacco Use    Smoking status: Never Smoker    Smokeless tobacco: Never Used   Substance and Sexual Activity    Alcohol use: No    Drug use: No    Sexual activity: None   Other Topics Concern    None   Social History Narrative    None     Social Determinants of Health     Financial Resource Strain:     Difficulty of Paying Living Expenses: Not on file   Food Insecurity:     Worried About Running Out of Food in the Last Year: Not on file    Eulalia of Food in the Last Year: Not on file   Transportation Needs:     Lack of Transportation (Medical): Not on file    Lack of Transportation (Non-Medical):  Not on file   Physical Activity:     Days of Exercise per Week: Not on file    Minutes of Exercise per Session: Not on file   Stress:     Feeling of Stress : Not on file   Social Connections:     Frequency of Communication with Friends and Family: Not on file    Frequency of Social Gatherings with Friends and Family: Not on file    Attends [employer] Services: Not on file    Active Member of Clubs or Organizations: Not on file    Attends Club or Organization Meetings: Not on file    Marital Status: Not on file   Intimate Partner Violence:     Fear of Current or Ex-Partner: Not on file    Emotionally Abused: Not on file    Physically Abused: Not on file    Sexually Abused: Not on file   Housing Stability:     Unable to Pay for Housing in the Last Year: Not on file    Number of Places Lived in the Last Year: Not on file    Unstable Housing in the Last Year: Not on file       Medications:   Medications:    insulin lispro  0-12 Units SubCUTAneous TID WC    insulin lispro  0-6 Units SubCUTAneous Nightly    sodium chloride flush  5-40 mL IntraVENous 2 times per day    enoxaparin  30 mg SubCUTAneous Daily      Infusions:    dextrose      sodium chloride       PRN Meds: glucose, 15 g, PRN  dextrose, 12.5 g, PRN  glucagon (rDNA), 1 mg, PRN  dextrose, 100 mL/hr, PRN  sodium chloride flush, 5-40 mL, PRN  sodium chloride, 25 mL, PRN  ondansetron, 4 mg, Q8H PRN   Or  ondansetron, 4 mg, Q6H PRN  polyethylene glycol, 17 g, Daily PRN  acetaminophen, 650 mg, Q6H PRN   Or  acetaminophen, 650 mg, Q6H PRN          Electronically signed by Alicia Castaneda DO on 1/30/2022 at 4:45 PM

## 2022-01-30 NOTE — CARE COORDINATION
CM review of pt chart for readmission risk, last admission 1/3-7/22, c/o AMS( confusion,hallucinations. Pt discharged home with [facility] per therapy recommendations. Pt discharged home with [facility] on 1/7/22. Pt returns to ER today with c/o AMS, pt is from independent living at [facility] with [facility]. Dr Olivia Hall provider, no alternative to admission at this time for unspecified AMS.  RICARDORN/CM

## 2022-01-30 NOTE — ED PROVIDER NOTES
[facility] Attending Note:    NAME: Stan Carias 79 y.o.  CSN: [id number]  MRN: [id number]   PCP:    Ashley Donato MD      History:     Chief Complaint:    AMS   HPI:      The history was obtained from the patient and EMS report . Roman Mckinnon is a 79 y.o. male who presents with concerns of AMS. Per EMS, patient's wife states that he has been more confused since Friday. Patient reports pain on his right flank earlier on that has now resolved. Patient unable to recall if any hallucinations as in the past. Per wife, patient has been having increase visual and auditory hallucinations. Reports previous history of them but reports noticing an acute decline in the past three days. PMHx:    Past Medical History:   Diagnosis Date    Acute exacerbation of COPD with asthma ([address]     Arthritis     Biliary stones 2015    CAD (coronary artery disease)     Chronic systolic (congestive) heart failure (HCC)     [facility]ch[facility]     Diabetes mellitus ([facility]     History of blood transfusion     Hx of angiography 3/11/2014    Pulmonary Arteries: Small sliding hiatal hernia. Mod coronary artery calcifications. Mild bilat gynecomastia.     Hyperlipidemia     Hypertension     Kidney stone     Metabolic encephalopathy 57/66/5764       PMSx:    Past Surgical History:   Procedure Laterality Date    ABDOMEN SURGERY      stones in bile duct removed x3    CARDIAC SURGERY      CABG x 2 in 2004 at [facility] - Dr. Chatman Host, ESOPHAGUS      ERCP  03/13/14    w/ dilation of papilla    ERCP  9/11/14    ERCP  10/24/15    extractions stones, balloon dilatation     ERCP  03/18/2017    stone extraction     ERCP N/A 1/25/2019    ERCP STONE REMOVAL/ DILATATION OF PAPILLA WITH 10-12MM AND 12-15MM BALLOON AND 9-12MM, 12-15MM  EXTRACTOR BALLOON USED FOR REMOVAL OF MULTIPLE  CBD STONES performed by Dhara Alvarenga MD at [place] [address] ERCP N/A 9/7/2019    ERCP STONE REMOVAL, DILATATION OF PAPILLA WITH 10-12MM BALLOON, AND EXTRACTOR BALLOON 12-15MM USED FOR REMOVAL OF CBD STONE AND SLUDE performed by Paul Adams MD at [place] [address] ERCP N/A 3/31/2020    ERCP DILATION BALLOON to 12  AND SWEEP OF CBD STONE AND SLUDGE performed by Paul Adams MD at [place] [address] ERCP N/A 4/8/2021    ERCP WITH BALLOON DILATATION 10-12 MM BALLOON( TO 12 MM) OF PAPILLA, , BALLOON SWEEP  WITH REMOVAL OF CBD STONES AND RWBDWF(99-18 MM BALLOON) performed by Paul Adams MD at [facility] [address] Left 2/18/2021    LEFT FEMUR IM NAIL VIANEY INSERTION performed by Uyen Cuevas DO at [address] [place] [address],[address]      JOINT REPLACEMENT      LEG AMPUTATION BELOW KNEE Bilateral     2005 at [address]  03/13/14    sphincterotomy       FAM. Hx:   Family History   Problem Relation Age of Onset    Cancer Mother     Diabetes Mother     Cancer Father        SOC. Hx:   Social History     Socioeconomic History    Marital status:      Spouse name: Bev Galarza Number of children: Not on file    Years of education: Not on file    Highest education level: Not on file   Occupational History    Not on file   Tobacco Use    Smoking status: Never Smoker    Smokeless tobacco: Never Used   Substance and Sexual Activity    Alcohol use: No    Drug use: No    Sexual activity: Not on file   Other Topics Concern    Not on file   Social History Narrative    Not on file     Social Determinants of Health     Financial Resource Strain:     Difficulty of Paying Living Expenses: Not on file   Food Insecurity:     Worried About Running Out of Food in the Last Year: Not on file    Eulalia of Food in the Last Year: Not on file   Transportation Needs:     Lack of Transportation (Medical):  Not on file    Lack of Transportation (Non-Medical): Not on file   Physical Activity:     Days of Exercise per Week: Not on file    Minutes of Exercise per Session: Not on file   Stress:     Feeling of Stress : Not on file   Social Connections:     Frequency of Communication with Friends and Family: Not on file    Frequency of Social Gatherings with Friends and Family: Not on file    Attends [employer] Services: Not on file    Active Member of [address] [place] [address] or Organizations: Not on file    Attends Club or Organization Meetings: Not on file    Marital Status: Not on file   Intimate Partner Violence:     Fear of Current or Ex-Partner: Not on file    Emotionally Abused: Not on file    Physically Abused: Not on file    Sexually Abused: Not on file   Housing Stability:     Unable to Pay for Housing in the Last Year: Not on file    Number of [place] in the Last Year: Not on file    Unstable Housing in the Last Year: Not on file       MEDs:    Previous Medications    ASPIRIN 81 MG CHEWABLE TABLET    Take 81 mg by mouth daily     ATORVASTATIN (LIPITOR) 40 MG TABLET    Take 40 mg by mouth daily    BUSPIRONE (BUSPAR) 5 MG TABLET    Take 1 tablet by mouth    CARVEDILOL (COREG) 6.25 MG TABLET    Take 1 tablet by mouth 2 times daily (with meals)    CHOLECALCIFEROL 50 MCG (2000 UT) TABS    Take one tablet daily by mouth    CHOLESTYRAMINE LIGHT 4 G PACKET    Take 1 packet by mouth 2 times daily as needed (diarrhea)    CILOSTAZOL (PLETAL) 100 MG TABLET        DILTIAZEM (CARDIZEM) 60 MG TABLET    Take 1 tablet by mouth 2 times daily    DOCUSATE SODIUM (COLACE) 100 MG CAPSULE    Take 100 mg by mouth nightly as needed     FAMOTIDINE (PEPCID) 40 MG TABLET    Take 40 mg by mouth daily    GABAPENTIN (NEURONTIN) 100 MG CAPSULE    Take 100 mg by mouth nightly.     GLIMEPIRIDE (AMARYL) 4 MG TABLET    Take 1 tablet by mouth every morning (before breakfast)    INSULIN DETEMIR (LEVEMIR FLEXTOUCH) 100 UNIT/ML INJECTION PEN    Inject 10 Units into the skin nightly INSULIN LISPRO (HUMALOG) 100 UNIT/ML INJECTION VIAL    Inject 0-12 Units into the skin 3 times daily (with meals) **Medium Dose Corrective Algorithm**  Glucose: Dose:  If <139             No Insulin  140-199 2 Units  200-249 4 Units  250-299 6 Units  300-349 8 Units  350-400 10 Units  Above 400       12 Units    LATANOPROST (XALATAN) 0.005 % OPHTHALMIC SOLUTION    Place 1 drop into both eyes nightly     LISINOPRIL (PRINIVIL;ZESTRIL) 5 MG TABLET    Take 1 tablet by mouth daily    MULTIPLE VITAMINS-MINERALS (MULTIVITAMIN PO)    Take 1 tablet by mouth daily. ONDANSETRON (ZOFRAN) 4 MG TABLET    Take 4 mg by mouth 2 times daily as needed for Nausea or Vomiting    TRAMADOL (ULTRAM) 50 MG TABLET    Take 50 mg by mouth every 6 hours as needed. ALL:     Allergies   Allergen Reactions    Byetta 10 Mcg Pen [Exenatide]     Sulbactam     Ampicillin Rash    Aricept [Donepezil Hydrochloride] Other (See Comments)     Hallucinations, confusion    Benztropine Other (See Comments)     Hallucinations, confusion    Celebrex [Celecoxib] Other (See Comments)     'causes him to be nervous and jittery\"    Diphenhydramine Hcl Other (See Comments)     nervous and jittery    Diphenhydramine Hcl [Diphenhydramine] Anxiety    Exenatide Other (See Comments)     Causes him to be nervous and jittery    Metoprolol Succinate Other (See Comments)     Hallucinations     Phenergan [Promethazine Hcl] Other (See Comments)     Hallucinations, nervous, jittery    Plavix [Clopidogrel Bisulfate] Other (See Comments)     Causes bleeding in his stomach    Pseudoephedrine Anxiety    Reglan [Metoclopramide Hcl] Other (See Comments)     \"Caused him to just sit in a chair and rock back and forth\"       ROS:  Review of Systems   Constitutional: Negative for fatigue and fever. Increased confusion per family    HENT: Negative for congestion and sore throat. Eyes: Negative for pain and redness.    Respiratory: Negative for cough, chest tightness and shortness of breath. Cardiovascular: Negative for chest pain and leg swelling. Gastrointestinal: Negative for abdominal pain, diarrhea, nausea and vomiting. Genitourinary: Negative for difficulty urinating. Flank pain (resolved)    Skin: Negative for rash. Neurological: Negative for dizziness and headaches. Psychiatric/Behavioral: Negative for agitation and behavioral problems. Positives andpertinent negatives as per HPI. All other systems were reviewed and are negative. Physical Exam:      Patient Vitals for the past 24 hrs:   BP Temp Pulse Resp SpO2 Height Weight   01/30/22 1202 (!) 147/64    96 %     01/30/22 1154 (!) 147/65 98.3 °F (36.8 °C) 66 16 95 % 5' 8\" (1.727 m) 145 lb (65.8 kg)                  Physical Exam  Vitals and nursing note reviewed. Constitutional:       General: He is not in acute distress. Appearance: Normal appearance. Comments: awake   HENT:      Head: Normocephalic. Eyes:      Conjunctiva/sclera: Conjunctivae normal.      Pupils: Pupils are equal, round, and reactive to light. Cardiovascular:      Rate and Rhythm: Normal rate and regular rhythm. Pulses: Normal pulses. Heart sounds: Normal heart sounds. No murmur heard. No gallop. Pulmonary:      Effort: Pulmonary effort is normal.      Breath sounds: Normal breath sounds. Abdominal:      General: There is no distension. Palpations: Abdomen is soft. Tenderness: There is no abdominal tenderness. There is no guarding or rebound. Musculoskeletal:         General: Normal range of motion. Cervical back: Normal range of motion and neck supple. Comments: bilateral AKA   Skin:     General: Skin is warm and dry. Capillary Refill: Capillary refill takes less than 2 seconds. Neurological:      General: No focal deficit present. Mental Status: He is alert and oriented to person, place, and time. GCS: GCS eye subscore is 4.  GCS verbal subscore is 5. GCS motor subscore is 6. Cranial Nerves: No cranial nerve deficit, dysarthria or facial asymmetry. Sensory: No sensory deficit. Motor: No weakness. Comments: Unable to assess gait due to AKA    Psychiatric:         Mood and Affect: Mood normal.         Behavior: Behavior normal.              Laboratory & Radiological Imaging (if done):      Labs Reviewed   CBC WITH AUTO DIFFERENTIAL - Abnormal; Notable for the following components:       Result Value    RBC 3.85 (*)     Hemoglobin 11.6 (*)     Hematocrit 34.7 (*)     Monocytes % 12.8 (*)     Eosinophils % 12.4 (*)     All other components within normal limits   COMPREHENSIVE METABOLIC PANEL W/ REFLEX TO MG FOR LOW K - Abnormal; Notable for the following components:    Glucose 194 (*)     Total Protein 5.9 (*)     All other components within normal limits   URINALYSIS - Abnormal; Notable for the following components:    Color, UA STRAW (*)     Mucus, UA RARE (*)     All other components within normal limits   TROPONIN   AMMONIA   LACTIC ACID, PLASMA   COVID-19   BRAIN NATRIURETIC PEPTIDE        Interpretation per the Radiologist below, if available at the time of this note:  CT head without contrast    Result Date: 1/30/2022  EXAMINATION: CT OF THE HEAD WITHOUT CONTRAST  1/30/2022 1:19 pm TECHNIQUE: CT of the head was performed without the administration of intravenous contrast. Dose modulation, iterative reconstruction, and/or weight based adjustment of the mA/kV was utilized to reduce the radiation dose to as low as reasonably achievable. COMPARISON: CT head January 7, 2022; MRI brain January 4, 2022 HISTORY: ORDERING SYSTEM PROVIDED HISTORY: AMS TECHNOLOGIST PROVIDED HISTORY: Reason for exam:->AMS Has a \"code stroke\" or \"stroke alert\" been called? 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 There is no acute intracranial hemorrhage, mass effect or midline shift. No abnormal extra-axial fluid collection. The gray-white differentiation is maintained without evidence of an acute infarct. There is no evidence of hydrocephalus. Atherosclerotic changes of the intracranial vasculature. There is moderate periventricular and subcortical white matter hypoattenuation most consistent with microvascular ischemic changes. There is mild age appropriate global cerebral atrophy. ORBITS: The visualized portion of the orbits demonstrate no acute abnormality. SINUSES: The visualized paranasal sinuses and mastoid air cells demonstrate no acute abnormality. SOFT TISSUES/SKULL:  No acute abnormality of the visualized skull or soft tissues. 1. No acute intracranial process identified. 2. Grossly similar appearance of sellar/suprasellar mass and mass within the left prepontine cistern. 3. Chronic microvascular ischemic changes and global cerebral atrophy. XR CHEST PORTABLE    Result Date: 1/30/2022  EXAMINATION: ONE XRAY VIEW OF THE CHEST 1/30/2022 1:00 pm COMPARISON: 01/03/2022 HISTORY: ORDERING SYSTEM PROVIDED HISTORY: AMS TECHNOLOGIST PROVIDED HISTORY: Reason for exam:->AMS Reason for Exam: AMS Additional signs and symptoms: unknown Relevant Medical/Surgical History: ASCVD FINDINGS: Status post median sternotomy. Trace left pleural effusion with adjacent atelectasis. Right costophrenic angle sharp. No overt pulmonary edema. Stable cardiomediastinal silhouette. No pneumothorax. The osseous structures are stable. Osteopenia. Trace left pleural effusion with adjacent atelectasis.        Medical Decision Making/   ED Course as of 01/30/22 1438   Sun Jan 30, 2022   1425 CT head without contrast [MC]      ED Course User Index  [MC] Mario Gregory MD Patient is a 79year old male who presents with AMS. Per family he has had increase episodes of visual and auditory hallucinations for the past 3 days. Patient's head CT shows a previously known sellar/suprasellar mass constant in change. (present prior to 2019). Patient EKG shows sinus rhythm and similar to previous. Differential included due not limited to encephalopathy, infections, SDH, tumor, or delirium. Patient is currently hemodynamically stable and will be admitted for further evaluation. Procedure(s):     12 lead EKG per my interpretation:  Normal Sinus Rhythm 64  House is   Left axis deviation  QTc is  within an acceptable range  There is no specific T wave changes appreciated. There is no specific ST wave changes appreciated. No STEMI   Prior EKG to compare with was available and similar to previous       Clinical Impression:      1. Altered mental status, unspecified altered mental status type          Disposition:              Patient is being admitted         New Prescriptions    No medications on file                                                                   Nasir Coreas M.D                                                              ED Attending Physician      (Please note that portions of this note have been completed with a voice recognition software.  Efforts were made to correct any errors, butoccasionally words are mis-transcribed.)               Nasir Coreas MD  01/30/22 9304

## 2022-01-30 NOTE — ED TRIAGE NOTES
Pt to ED today via EMS for c/o altered mental status. EMS reports pts wife states he has been more confused and having some hallucinations since Friday. Pt has hx of dementia. Pt alert and oriented on arrival to ED with no complaints. Pt lives with wife in independent living on [address]. EMS reports patients blood sugar 344 PTA. EMS also reports pt recently was adm earlier this month for encephalopathy. Pt is bilateral AKA.

## 2022-01-31 PROBLEM — F03.918 DEMENTIA WITH BEHAVIORAL DISTURBANCE: Status: ACTIVE | Noted: 2022-01-31

## 2022-01-31 PROBLEM — R44.0 AUDITORY HALLUCINATIONS: Status: ACTIVE | Noted: 2022-01-31

## 2022-01-31 PROBLEM — R44.1 VISUAL HALLUCINATIONS: Status: ACTIVE | Noted: 2022-01-31

## 2022-01-31 LAB
EKG ATRIAL RATE: 63 BPM
EKG DIAGNOSIS: NORMAL
EKG Q-T INTERVAL: 424 MS
EKG QRS DURATION: 82 MS
EKG QTC CALCULATION (BAZETT): 437 MS
EKG R AXIS: -45 DEGREES
EKG T AXIS: 101 DEGREES
EKG VENTRICULAR RATE: 64 BPM
GLUCOSE BLD-MCNC: 144 MG/DL (ref 70–99)
GLUCOSE BLD-MCNC: 144 MG/DL (ref 70–99)
GLUCOSE BLD-MCNC: 162 MG/DL (ref 70–99)
GLUCOSE BLD-MCNC: 170 MG/DL (ref 70–99)
GLUCOSE BLD-MCNC: 215 MG/DL (ref 70–99)
SARS-COV-2, NAAT: NOT DETECTED
SARS-COV-2: NOT DETECTED
SOURCE: NORMAL
SOURCE: NORMAL
TROPONIN T: <0.01 NG/ML

## 2022-01-31 PROCEDURE — 6360000002 HC RX W HCPCS: Performed by: FAMILY MEDICINE

## 2022-01-31 PROCEDURE — 6370000000 HC RX 637 (ALT 250 FOR IP): Performed by: FAMILY MEDICINE

## 2022-01-31 PROCEDURE — G0378 HOSPITAL OBSERVATION PER HR: HCPCS

## 2022-01-31 PROCEDURE — 93010 ELECTROCARDIOGRAM REPORT: CPT | Performed by: INTERNAL MEDICINE

## 2022-01-31 PROCEDURE — 6370000000 HC RX 637 (ALT 250 FOR IP): Performed by: NURSE PRACTITIONER

## 2022-01-31 PROCEDURE — 84484 ASSAY OF TROPONIN QUANT: CPT

## 2022-01-31 PROCEDURE — 99222 1ST HOSP IP/OBS MODERATE 55: CPT | Performed by: NURSE PRACTITIONER

## 2022-01-31 PROCEDURE — 80048 BASIC METABOLIC PNL TOTAL CA: CPT

## 2022-01-31 PROCEDURE — 96372 THER/PROPH/DIAG INJ SC/IM: CPT

## 2022-01-31 PROCEDURE — 94761 N-INVAS EAR/PLS OXIMETRY MLT: CPT

## 2022-01-31 PROCEDURE — 2580000003 HC RX 258: Performed by: FAMILY MEDICINE

## 2022-01-31 PROCEDURE — 99215 OFFICE O/P EST HI 40 MIN: CPT | Performed by: STUDENT IN AN ORGANIZED HEALTH CARE EDUCATION/TRAINING PROGRAM

## 2022-01-31 PROCEDURE — 82962 GLUCOSE BLOOD TEST: CPT

## 2022-01-31 RX ORDER — RISPERIDONE 0.5 MG/1
1 TABLET, FILM COATED ORAL DAILY
Status: DISCONTINUED | OUTPATIENT
Start: 2022-01-31 | End: 2022-02-04 | Stop reason: HOSPADM

## 2022-01-31 RX ADMIN — SODIUM CHLORIDE, PRESERVATIVE FREE 10 ML: 5 INJECTION INTRAVENOUS at 08:49

## 2022-01-31 RX ADMIN — ASPIRIN 81 MG 81 MG: 81 TABLET ORAL at 08:47

## 2022-01-31 RX ADMIN — FAMOTIDINE 10 MG: 20 TABLET ORAL at 20:45

## 2022-01-31 RX ADMIN — CILOSTAZOL 100 MG: 100 TABLET ORAL at 17:52

## 2022-01-31 RX ADMIN — CILOSTAZOL 100 MG: 100 TABLET ORAL at 10:52

## 2022-01-31 RX ADMIN — ATORVASTATIN CALCIUM 40 MG: 40 TABLET, FILM COATED ORAL at 08:47

## 2022-01-31 RX ADMIN — DOCUSATE SODIUM 100 MG: 100 CAPSULE ORAL at 08:48

## 2022-01-31 RX ADMIN — FAMOTIDINE 10 MG: 20 TABLET ORAL at 08:49

## 2022-01-31 RX ADMIN — CARVEDILOL 6.25 MG: 6.25 TABLET, FILM COATED ORAL at 08:48

## 2022-01-31 RX ADMIN — CARVEDILOL 6.25 MG: 6.25 TABLET, FILM COATED ORAL at 17:52

## 2022-01-31 RX ADMIN — TRAMADOL HYDROCHLORIDE 50 MG: 50 TABLET, COATED ORAL at 20:45

## 2022-01-31 RX ADMIN — ENOXAPARIN SODIUM 30 MG: 100 INJECTION SUBCUTANEOUS at 08:48

## 2022-01-31 RX ADMIN — RISPERIDONE 1 MG: 0.5 TABLET ORAL at 17:52

## 2022-01-31 ASSESSMENT — PAIN DESCRIPTION - ORIENTATION: ORIENTATION: LEFT;RIGHT

## 2022-01-31 ASSESSMENT — PAIN DESCRIPTION - ONSET: ONSET: ON-GOING

## 2022-01-31 ASSESSMENT — PAIN DESCRIPTION - DESCRIPTORS: DESCRIPTORS: ACHING

## 2022-01-31 ASSESSMENT — PAIN - FUNCTIONAL ASSESSMENT: PAIN_FUNCTIONAL_ASSESSMENT: ACTIVITIES ARE NOT PREVENTED

## 2022-01-31 ASSESSMENT — PAIN DESCRIPTION - PROGRESSION: CLINICAL_PROGRESSION: NOT CHANGED

## 2022-01-31 ASSESSMENT — PAIN DESCRIPTION - FREQUENCY: FREQUENCY: INTERMITTENT

## 2022-01-31 ASSESSMENT — PAIN DESCRIPTION - LOCATION
LOCATION: ABDOMEN
LOCATION: KNEE

## 2022-01-31 ASSESSMENT — PAIN SCALES - GENERAL
PAINLEVEL_OUTOF10: 0
PAINLEVEL_OUTOF10: 6
PAINLEVEL_OUTOF10: 7

## 2022-01-31 ASSESSMENT — PAIN DESCRIPTION - PAIN TYPE
TYPE: ACUTE PAIN
TYPE: ACUTE PAIN

## 2022-01-31 NOTE — PROGRESS NOTES
Hospitalist Progress Note      Name:  Xuan Banuelos /Age/Sex: 1940  (79 y.o. male)   MRN & CSN:  [id number] & [id number] Admission Date/Time: 2022 12:01 PM   Location:  [facility]/1014 PCP: Tate Thompson [address] Day: 2                                                  Assessment and Plan:     Xuan Banuelos is a 79 y.o.  male with PMH dementia, diabetes, HTN, PVD s/p remote bilateral BKA presented to [facility] on 2022 with worsening confusion. He was hospitalized for further work-up/treatment, neurology and psychiatry evaluation    Dementia: per hx. With worsening confusion that started 2 days prior to arrival.  Was hospitalized 1/32022 for the same thing; had extensive neurological work-up at that time. No obvious infectious source. UA not indicative of UTI. No metabolic derangements. Head CT nonacute. Evaluated by neurology who noted possible worsening dementia and recommended outpatient cognitive work-up. Suspect worsening dementia; lives at home with wife and anticipate he will need SNF (wife is agreeable if recommended). Now has 1:1 sitter. Brain MRI pending. Psychiatry consult    HTN: per hx. BP variable and elevated at times. With advanced age would allow BP to run slightly on the higher side. Holding home ACE. Cannot have PRN IV hydralazine on 1 E. Monitor BP and resume home ACE if BP remains elevated    Diabetes: per hx. 1/3/2022 HgbA1c 6.9%. Holding home oral hypoglycemic. On SSI. Monitor blood sugar and titrate PRN     PVD: per hx. S/p remote bilateral lower extremity BKA. Cont home ASA< cilostazol, statin. Has prosthesis at home and ambulates with a walker. PT/OT consult. DVT prophylaxis: lovenox               Subjective 2022     79year-old male who appears stated age, chronically ill. Seen in ED sitting on edge of cot. Alert to self, knows he is in the hospital but unable to state which 1.   Unable to tell me month/year/president of [place] or reason for hospitalization    Objective: Intake/Output Summary (Last 24 hours) at 1/31/2022 1343  Last data filed at 1/31/2022 0849  Gross per 24 hour   Intake 10 ml   Output    Net 10 ml      Vitals:   Vitals:    01/31/22 0000 01/31/22 0100 01/31/22 1051 01/31/22 1330   BP:  133/74 133/73 (!) 148/80   Pulse:   88 86   Resp:   15 17   Temp:    98.9 °F (37.2 °C)   TempSrc:    Oral   SpO2: 97%  97% 98%   Weight:    136 lb 6 oz (61.9 kg)   Height:    5' 8\" (1.727 m)     Physical Exam: 01/31/22       General: awake and alert. Sitting on edge of cot in ED. No distress apparent. Appears stated age. Eyes: eyelids/lashes normal appearence. Pupils equal, round and reactive. No scleral erythema, discharge, or conjunctivitis. HENT: bilateral ear and nose normal in appearance. Mucous membranes moist. Hearing grossly intact. Neck supple, no apparent thyromegaly or masses. Respiratory: lungs lear to auscultation, no wheezes, rales or rhonchi. Symmetric chest movement while on room air. Cardiovascular: RRR, S1/S2 auscultated. No murmurs, rubs, or gallops. No JVD or carotid bruits. Peripheral pulses equal bilaterally and palpable. No peripheral edema. GI: abdomen soft. No tenderness, masses, or guarding. Bowel sounds are normoactive. Rectal exam deferred. : No costovertebral angle tenderness. Renee catheter is not present. Heme/lymph: no palpable cervical lymphadenopathy and no hepatosplenomegaly. No petechiae or ecchymoses. Musculoskeletal: Bilateral BKA  Skin: intact, no rashes or lesions. Neurological: cranial nerves 2-12 grossly intact. No slurred speech, no facial droop. Non-focal   Psych: Alert to self, knows she is in the hospital.  Pleasantly confused, calm and cooperative. Affect appropriate.       Medications:   Medications:    insulin lispro  0-12 Units SubCUTAneous TID WC    insulin lispro  0-6 Units SubCUTAneous Nightly    sodium chloride flush  5-40 mL IntraVENous 2 times per day    enoxaparin  30 mg SubCUTAneous Daily    aspirin  81 mg Oral Daily    atorvastatin  40 mg Oral Daily    carvedilol  6.25 mg Oral BID WC    cilostazol  100 mg Oral BID AC    docusate sodium  100 mg Oral Daily    famotidine  10 mg Oral BID    latanoprost  1 drop Both Eyes Nightly      Infusions:    dextrose      sodium chloride       PRN Meds: glucose, 15 g, PRN  glucagon (rDNA), 1 mg, PRN  dextrose, 100 mL/hr, PRN  sodium chloride flush, 5-40 mL, PRN  sodium chloride, 25 mL, PRN  ondansetron, 4 mg, Q8H PRN   Or  ondansetron, 4 mg, Q6H PRN  polyethylene glycol, 17 g, Daily PRN  acetaminophen, 650 mg, Q6H PRN   Or  acetaminophen, 650 mg, Q6H PRN  dextrose bolus (hypoglycemia), 250 mL, PRN  traMADol, 50 mg, Q6H PRN        LABS  CBC   Recent Labs     01/30/22  1411   WBC 7.6   HGB 11.6*   HCT 34.7*         RENAL  Recent Labs     01/30/22  1411      K 4.5      CO2 24   BUN 16   CREATININE 1.0     LFT'S  Recent Labs     01/30/22  1411   AST 21   ALT 12   BILITOT 0.4   ALKPHOS 73     COAG  Recent Labs     01/30/22  1411   INR 0.94     CARDIAC ENZYMES  No results for input(s): CKTOTAL, CKMB, CKMBINDEX, TROPONINI in the last 72 hours. Radiology this visit:  Reviewed.     Echocardiogram limited    Result Date: 1/4/2022  Transthoracic Echocardiography Report (TTE)  Demographics   Patient Name       Dennie Gallon  Date of Study       01/04/2022   Date of Birth      1940         Gender              Male   Age                79 year(s)         Race                   Patient Number     [id number]         Room Number         ED16   Visit Number       [id number]   Corporate ID       [id number]   Accession Number   [id number]         Moises Jenkins RDMS   Ordering Physician Giselle Yanez MD                 Physician           MD  Procedure Type of Study   TTE procedure:ECHOCARDIOGRAM LIMITED. Procedure Date Date: 01/04/2022 Start: 08:45 AM Study Location: Portable Technical Quality: Technically difficult study Indications:Wall motion abnormalities. Patient Status: Routine Height: 66 inches Weight: 145 pounds BSA: 1.74 m2 BMI: 23.4 kg/m2 HR: 88 bpm BP: 146/101 mmHg  Conclusions   Summary  Technically difficult examination; Patient moving throughout entire exam.  Left ventricular systolic function is normal.  Ejection fraction is visually estimated at 55-60%. No wall motion abnormalities detected. Moderately dilated left atrium. Sclerotic, but non-stenotic aortic valve. Mitral annular calcification is present. Signature   ------------------------------------------------------------------  Electronically signed by Tremayne Dolan MD (Interpreting  physician) on 01/04/2022 at 10:32 AM  ------------------------------------------------------------------   Findings   Left Ventricle  Left ventricular systolic function is normal.  Ejection fraction is visually estimated at 55-60%. Left ventricle size is normal.  No wall motion abnormalities detected. Left Atrium  Moderately dilated left atrium. Right Atrium  Right atrium is not clearly visualized. Right Ventricle  Essentially normal right ventricle. Aortic Valve  Sclerotic, but non-stenotic aortic valve. Mitral Valve  Mitral annular calcification is present. Tricuspid Valve  Mild tricuspid regurgitation; RVSP: 31 mmHg. Pulmonic Valve  The pulmonic valve was not well visualized. Pericardial Effusion  No evidence of any pericardial effusion. Pleural Effusion  No evidence of pleural effusion. Miscellaneous  Aorta was not clearly visualized.   M-Mode/2D Measurements & Calculations   LV Diastolic Dimension: 0.72 cm LV Systolic Dimension: 2.7 cm  LV FS:21.7 %                    LV Volume Diastolic: 53 ml  LV PW Diastolic: 6.33 cm        LV Volume Systolic: 22 ml  LV PW Systolic: 6.39 cm LV EDV/LV EDV Index: 53 ml/30 m2LV ESV/LV  Septum Diastolic: 5.40 cm       ESV Index: 22 ml/13 m2  Septum Systolic: 3.80 cm        EF Calculated (A4C): 58.5 %                                  EF Calculated (2D): 45 %   LV Area Diastolic: 30.9 cm2     LV Length: 7.06 cm  LV Area Systolic: 97.7 cm2  Doppler Measurements & Calculations                               Estimated RVSP: 31 mmHg                              Estimated RAP:3 mmHg    Estimated PASP: 14.16 mmHg TR Velocity:167 cm/s                              TR Gradient:11.16 mmHg      CT ABDOMEN PELVIS WO CONTRAST Additional Contrast? None    Result Date: 1/8/2022  EXAMINATION: CT OF THE ABDOMEN AND PELVIS WITHOUT CONTRAST 1/7/2022 8:57 am TECHNIQUE: CT of the abdomen and pelvis was performed without the administration of intravenous contrast. Multiplanar reformatted images are provided for review. Dose modulation, iterative reconstruction, and/or weight based adjustment of the mA/kV was utilized to reduce the radiation dose to as low as reasonably achievable. COMPARISON: 01/03/2022 HISTORY: ORDERING SYSTEM PROVIDED HISTORY: high lactic acid TECHNOLOGIST PROVIDED HISTORY: Reason for exam:->high lactic acid Additional Contrast?->None Reason for Exam: high lactic acid Follow-up exam FINDINGS: Lower Chest: Lung bases are clear. Organs: Persistent pneumobilia, unchanged from the prior exam.  There is associated biliary duct dilation, also stable. The unenhanced liver, spleen, pancreas and adrenal glands are otherwise without focal abnormality given limitations due to motion artifact. Status post cholecystectomy. No renal or ureteral calculus. There is a subcentimeter left renal cyst.  No hydronephrosis or perinephric stranding. GI/Bowel: Small-to-moderate hiatal hernia. The rectum is dilated and fluid filled measuring up to 7.7 cm. No other dilated loops of bowel or bowel wall thickening. The appendix is normal.  No free air. Duodenal diverticulum. Pelvis: High-density material within the bladder. No bladder wall thickening. No pathologically enlarged adenopathy or free fluid. Peritoneum/Retroperitoneum: The aorta is normal in caliber with severe atherosclerosis. There is extensive vascular calcifications throughout the abdomen. No pathologically enlarged adenopathy. No ascites or drainable fluid collection. Bones/Soft Tissues: Diffuse osteopenia. Postsurgical changes from right hip arthroplasty and left proximal femoral fracture fixation. No acute findings in the bones or soft tissues. 1. Dilated fluid-filled rectum measuring up to 7 cm. Otherwise, no new acute abdominal or pelvic abnormality on this unenhanced study. 2. Persistent pneumobilia, unchanged from the prior exam. 3. High-density material within the pelvis within the bladder may reflect residual contrast material from prior CT versus blood products. 4. Extensive vascular calcifications. RECOMMENDATIONS: Unavailable     XR KNEE LEFT (MIN 4 VIEWS)    Result Date: 1/3/2022  EXAMINATION: FOUR XRAY VIEWS OF THE LEFT KNEE 1/3/2022 6:33 pm COMPARISON: Left knee radiograph 02/17/2021 HISTORY: ORDERING SYSTEM PROVIDED HISTORY: wound to distal stump TECHNOLOGIST PROVIDED HISTORY: Reason for exam:->wound to distal stump Reason for Exam: wound to distal stump due to friction FINDINGS: Unchanged findings of below-knee amputation. New partially included changes of femoral internal fixation with no evident complication. Diffuse bony demineralization. No acute fractures nor areas of abnormal cortical disruption. Ill-defined sclerosis in the remnant tibia suggestive of prior infarction. Joints maintain anatomic alignment. Patellar enthesophytes at the insertion of the quadriceps tendon and origin of the patellar tendon. Moderate tricompartmental arthropathic changes. Trace suprapatellar effusion. Chondrocalcinosis involving the lateral meniscus, suggesting calcium pyrophosphate deposition.   No obvious acute soft tissue abnormality. Diffuse arterial calcifications. 1. No acute findings in the left knee status post below-knee amputation. 2. Moderate tricompartmental osteoarthritic changes of the left knee. 3. Bony demineralization. CT head without contrast    Result Date: 1/30/2022  EXAMINATION: CT OF THE HEAD WITHOUT CONTRAST  1/30/2022 1:19 pm TECHNIQUE: CT of the head was performed without the administration of intravenous contrast. Dose modulation, iterative reconstruction, and/or weight based adjustment of the mA/kV was utilized to reduce the radiation dose to as low as reasonably achievable. COMPARISON: CT head January 7, 2022; MRI brain January 4, 2022 HISTORY: ORDERING SYSTEM PROVIDED HISTORY: AMS TECHNOLOGIST PROVIDED HISTORY: Reason for exam:->AMS Has a \"code stroke\" or \"stroke alert\" been called? 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 There is no acute intracranial hemorrhage, mass effect or midline shift. No abnormal extra-axial fluid collection. The gray-white differentiation is maintained without evidence of an acute infarct. There is no evidence of hydrocephalus. Atherosclerotic changes of the intracranial vasculature. There is moderate periventricular and subcortical white matter hypoattenuation most consistent with microvascular ischemic changes. There is mild age appropriate global cerebral atrophy. ORBITS: The visualized portion of the orbits demonstrate no acute abnormality. SINUSES: The visualized paranasal sinuses and mastoid air cells demonstrate no acute abnormality. SOFT TISSUES/SKULL:  No acute abnormality of the visualized skull or soft tissues.      1. No acute intracranial process identified. 2. Grossly similar appearance of sellar/suprasellar mass and mass within the left prepontine cistern. 3. Chronic microvascular ischemic changes and global cerebral atrophy. CT HEAD WO CONTRAST    Result Date: 1/7/2022  EXAMINATION: CT OF THE HEAD WITHOUT CONTRAST  1/7/2022 8:56 am TECHNIQUE: CT of the head was performed without the administration of intravenous contrast. Dose modulation, iterative reconstruction, and/or weight based adjustment of the mA/kV was utilized to reduce the radiation dose to as low as reasonably achievable. COMPARISON: 01/03/2022. HISTORY: ORDERING SYSTEM PROVIDED HISTORY: acute migraine with hypotension, r/o intracranial pathology TECHNOLOGIST PROVIDED HISTORY: Reason for exam:->acute migraine with hypotension, r/o intracranial pathology Has a \"code stroke\" or \"stroke alert\" been called? ->No Reason for Exam: acute migraine with hypotension, r/o intracranial pathology FINDINGS: BRAIN/VENTRICLES: There is no acute intracranial hemorrhage, mass effect or midline shift. No abnormal extra-axial fluid collection. The gray-white differentiation is maintained without evidence of an acute infarct. There is no evidence of hydrocephalus. The cerebral sulci and ventricles are enlarged compatible with diffuse cerebral atrophy. There is low-attenuation in the periventricular white matter and deep white matter of the brain representing changes of chronic small vessel ischemic disease. Redemonstrated is a mass within the sella. In addition, there is an ovoid mass along the left tentorium with mass effect against the adjacent trang measuring 2.2 x 1.5 cm. ORBITS: The visualized portion of the orbits demonstrate no acute abnormality. SINUSES: The visualized paranasal sinuses and mastoid air cells demonstrate no acute abnormality. SOFT TISSUES/SKULL:  No acute abnormality of the visualized skull or soft tissues.      1. No significant change in the sellar/suprasellar mass.  This was noted on the prior MRI dating back to 12/01/2020. 2. Ovoid mass along the left tentorium with punctate calcification this is likely a meningioma with some mass effect against the adjacent trang. This was not well seen on the most recent MRI due to motion artifact. RECOMMENDATIONS: Unavailable     CT HEAD WO CONTRAST    Result Date: 1/3/2022  EXAMINATION: CT OF THE HEAD WITHOUT CONTRAST  1/3/2022 7:10 pm TECHNIQUE: CT of the head was performed without the administration of intravenous contrast. Dose modulation, iterative reconstruction, and/or weight based adjustment of the mA/kV was utilized to reduce the radiation dose to as low as reasonably achievable. COMPARISON: 04/04/2021 HISTORY: ORDERING SYSTEM PROVIDED HISTORY: visual hallucinations, AMS TECHNOLOGIST PROVIDED HISTORY: Has a \"code stroke\" or \"stroke alert\" been called? ->No Reason for exam:->visual hallucinations, AMS Decision Support Exception - unselect if not a suspected or confirmed emergency medical condition->Emergency Medical Condition (MA) Reason for Exam: visual hallucinations, AMS Additional signs and symptoms: no Relevant Medical/Surgical History: none FINDINGS: BRAIN/VENTRICLES: There is no acute intracranial hemorrhage, mass effect or midline shift. No abnormal extra-axial fluid collection. The gray-white differentiation is maintained without evidence of an acute infarct. There is no evidence of hydrocephalus. ORBITS: The visualized portion of the orbits demonstrate no acute abnormality. SINUSES: The visualized paranasal sinuses and mastoid air cells demonstrate no acute abnormality. SOFT TISSUES/SKULL:  No acute abnormality of the visualized skull or soft tissues. No acute intracranial abnormality.  RECOMMENDATIONS: Unavailable     CT ABDOMEN PELVIS W IV CONTRAST Additional Contrast? None    Result Date: 1/4/2022  EXAMINATION: CT OF THE ABDOMEN AND PELVIS WITH CONTRAST 1/3/2022 11:27 pm TECHNIQUE: CT of the abdomen and pelvis was performed with the administration of intravenous contrast. Multiplanar reformatted images are provided for review. Dose modulation, iterative reconstruction, and/or weight based adjustment of the mA/kV was utilized to reduce the radiation dose to as low as reasonably achievable. COMPARISON: CT abdomen and pelvis dated 9/21/2021 HISTORY: ORDERING SYSTEM PROVIDED HISTORY: intermittent abdominal pain, h/o biliary obstruction TECHNOLOGIST PROVIDED HISTORY: Reason for exam:->intermittent abdominal pain, h/o biliary obstruction Additional Contrast?->None Decision Support Exception - unselect if not a suspected or confirmed emergency medical condition->Emergency Medical Condition (MA) Reason for Exam: intermittent abdominal pain, h/o biliary obstruction FINDINGS: Lower Chest: Minimal bibasilar atelectasis is present. Small to moderate hiatal hernia is noted. Organs: There is redemonstration of pneumobilia without significant change since the prior examination. This could be related to prior biliary intervention. There has been a cholecystectomy. The spleen, adrenal glands, pancreas, and kidneys are without acute findings. GI/Bowel: There is no evidence of bowel obstruction. The appendix is normal. There is a moderate amount of impacted stool within the rectosigmoid colon. Pelvis: The bladder is unremarkable. There is no free fluid. Bilateral hip arthroplasties are noted, obscuring visualization of the lower pelvis. Peritoneum/Retroperitoneum: There is no free air or lymphadenopathy. Diffuse atherosclerotic disease of the aorta is present. Bones/Soft Tissues: No destructive osseous lesions are identified. 1. Moderate impacted stool within the rectosigmoid colon. No evidence of bowel obstruction. 2. Small to moderate hiatal hernia. 3. Pneumobilia without significant change since the prior examination of 9/21/2021. This is likely related to prior biliary intervention.  RECOMMENDATIONS: Unavailable XR CHEST PORTABLE    Result Date: 1/30/2022  EXAMINATION: ONE XRAY VIEW OF THE CHEST 1/30/2022 1:00 pm COMPARISON: 01/03/2022 HISTORY: ORDERING SYSTEM PROVIDED HISTORY: AMS TECHNOLOGIST PROVIDED HISTORY: Reason for exam:->AMS Reason for Exam: AMS Additional signs and symptoms: unknown Relevant Medical/Surgical History: ASCVD FINDINGS: Status post median sternotomy. Trace left pleural effusion with adjacent atelectasis. Right costophrenic angle sharp. No overt pulmonary edema. Stable cardiomediastinal silhouette. No pneumothorax. The osseous structures are stable. Osteopenia. Trace left pleural effusion with adjacent atelectasis. XR CHEST PORTABLE    Result Date: 1/3/2022  EXAMINATION: ONE XRAY VIEW OF THE CHEST 1/3/2022 6:26 pm COMPARISON: 04/15/2021 HISTORY: ORDERING SYSTEM PROVIDED HISTORY: chest pain TECHNOLOGIST PROVIDED HISTORY: Reason for exam:->chest pain Reason for Exam: chest pain     ams FINDINGS: Status post median sternotomy. The lungs are without acute focal process. There is no effusion or pneumothorax. The cardiomediastinal silhouette is stable. The osseous structures are stable. No acute process. CTA PULMONARY W CONTRAST    Result Date: 1/3/2022  EXAMINATION: CTA OF THE CHEST 1/3/2022 11:26 pm TECHNIQUE: CTA of the chest was performed after the administration of intravenous contrast.  Multiplanar reformatted images are provided for review. MIP images are provided for review. Dose modulation, iterative reconstruction, and/or weight based adjustment of the mA/kV was utilized to reduce the radiation dose to as low as reasonably achievable. COMPARISON: None.  HISTORY: ORDERING SYSTEM PROVIDED HISTORY: AMS, cough, elevated trop/bnp eval for pneumonia versus PE TECHNOLOGIST PROVIDED HISTORY: Reason for exam:->AMS, cough, elevated trop/bnp eval for pneumonia versus PE Reason for Exam: AMS, cough, elevated trop/bnp eval for pneumonia versus PE FINDINGS: Pulmonary Arteries: Pulmonary arteries are adequately opacified for evaluation. No evidence of intraluminal filling defect to suggest pulmonary embolism. Main pulmonary artery is normal in caliber. Mediastinum: No evidence of mediastinal lymphadenopathy. The heart and pericardium demonstrate no acute abnormality. There is no acute abnormality of the thoracic aorta. Lungs/pleura: The lungs are without acute process. No focal consolidation or pulmonary edema. No evidence of pleural effusion or pneumothorax. Upper Abdomen: Moderate size hiatal hernia. Status post cholecystectomy. Moderate pneumobilia, primarily in the left hepatic lobe. Soft Tissues/Bones: No acute bone or soft tissue abnormality. No evidence of pulmonary embolism or acute pulmonary abnormality. MRI BRAIN WO CONTRAST    Result Date: 1/4/2022  EXAMINATION: MRI OF THE BRAIN WITHOUT CONTRAST  1/4/2022 9:14 am TECHNIQUE: Multiplanar multisequence MRI of the brain was performed without the administration of intravenous contrast. COMPARISON: 12/01/2020. HISTORY: ORDERING SYSTEM PROVIDED HISTORY: Encephalopathy, history of pituitary tumor TECHNOLOGIST PROVIDED HISTORY: Reason for exam:->Encephalopathy, history of pituitary tumor Reason for Exam: Enephalopathy, history of pituitary tumor Relevant Medical/Surgical History: none Initial evaluation. FINDINGS: Motion degrades images limiting evaluation. INTRACRANIAL STRUCTURES/VENTRICLES: There is no acute infarct. No mass effect or midline shift. No evidence of an acute intracranial hemorrhage. Areas of T2 FLAIR hyperintensity are seen in the periventricular and subcortical white matter, which are nonspecific, but may represent chronic microvascular ischemic change. There is prominence of the ventricles and sulci due to global parenchymal volume loss. Overall, no gross change in the large sellar/suprasellar mass as well as the extra-axial mass along the left prepontine cistern.   The normal signal voids within the major intracranial vessels appear maintained. ORBITS: The visualized portion of the orbits demonstrate no acute abnormality. SINUSES: The visualized paranasal sinuses and mastoid air cells demonstrate no acute abnormality. BONES/SOFT TISSUES: The bone marrow signal intensity appears normal. The soft tissues demonstrate no acute abnormality. 1. Motion limits evaluation. 2. No convincing acute intracranial abnormality. No acute infarct. 3. Mild-to-moderate global parenchymal volume loss with chronic microvascular ischemic changes, which appears similar to the prior exam. 4. No gross change in the appearance of the sellar/suprasellar mass as well as the mass within the left prepontine cistern within the limitations of this noncontrast nondedicated MRI.              Electronically signed by MOLLY Cormier CNP on 1/31/2022 at 1:43 PM

## 2022-01-31 NOTE — PROGRESS NOTES
4 Eyes Skin Assessment     NAME:  Annette Bailey  YOB: 1940  MEDICAL RECORD NUMBER:  [id number]    The patient is being assess for  Admission    I agree that 2 RN's have performed a thorough Head to Toe Skin Assessment on the patient. ALL assessment sites listed below have been assessed. Areas assessed by both nurses:    Head, Face, Ears, Shoulders, Back, Chest, Arms, Elbows, Hands, Sacrum. Buttock, Coccyx, Ischium and Legs. Feet and Heels        Does the Patient have a Wound?  abrasion on top of head, pink buttocks, cream applied     Beka Prevention initiated:  Yes   Wound Care Orders initiated:  No    Pressure Injury (Stage 3,4, Unstageable, DTI, NWPT, and Complex wounds) if present place consult order under [de-identified] No    New and Established Ostomies if present place consult order under : No      Nurse 1 eSignature: Electronically signed by Jorge Dockery RN on 1/31/22 at 2:49 PM EST    **SHARE this note so that the co-signing nurse is able to place an eSignature**    Nurse 2 eSignature: Electronically signed by Addis Ibrahim RN on 1/31/22 at 5:21 PM EST

## 2022-01-31 NOTE — CONSULTS
Initial Psychiatric Consult    Gricelda Falcon  [id number]  1/30/2022 01/31/22    ID: Patient is a 79 yrs y.o. male    CC: \"I came here to get these things straightened out. \"    HPI: Gricelda Falcon is a 79 y.o.  male  with hx of dementia who was brought in by EMS from [address] for wife's concerns of patients altered mental status ongoing since 1/28/22. She reports he has been having auditory & visual hallucinations with insomnia for the last 2 days. He has had hallucinations in the past when he had\" stones in his common bile duct\". Patient has been seeing birds and people who are not present. He also is claiming to see people stealing things out of there house. EMS reported pt having  and recent admission for encephalopathy earlier this month. Psychiatry consulted for AMS with auditory and visual hallucinations. Differential includes Lewy Body Dementia, psychotic process, intracranial mass effect    During today's interview patient is A&Ox3, oriented to person, situation, and time, but not to place. Patient denies suicidal or homicidal ideations. Admits visual hallucinations of kids running around outside of his house, peeking in the windows, trying to get in and steal things. Endorses auditory hallucinations. Patient admits sleep has been \"terrible\". Admits to changes in appetite and is \"not eating much\". When asked about anxiety patient admits to experiencing anxiety but is unable to quantify the level of anxiety. When asked about depression, patient goes off on a tangent and is unable to answer. During entire interview patient exhibited tangential speech. Patient denies a past psychiatric history or hospitalizations for mental illness. Denies ever attempting suicide. Patient denies family history of psychiatric disorders or a family member committing suicide. 1/4/2022 MRI brain without contrast    INTRACRANIAL STRUCTURES/VENTRICLES: There is no acute infarct.  No mass effect   or midline shift. No evidence of an acute intracranial hemorrhage.  Areas of   T2 FLAIR hyperintensity are seen in the periventricular and subcortical white   matter, which are nonspecific, but may represent chronic microvascular   ischemic change. Dorothe Felicita is prominence of the ventricles and sulci due to   global parenchymal volume loss.  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  The normal signal voids within the major intracranial   vessels appear maintained. Past Psychiatric History: none    Family Psychiatric History: none known  Family History   Problem Relation Age of Onset   [facility] Cancer Mother     Diabetes Mother     Cancer Father       Allergies:   Allergies   Allergen Reactions    Byetta 10 Mcg Pen [Exenatide]     Sulbactam     Ampicillin Rash    Aricept [Donepezil Hydrochloride] Other (See Comments)     Hallucinations, confusion    Benztropine Other (See Comments)     Hallucinations, confusion    Celebrex [Celecoxib] Other (See Comments)     'causes him to be nervous and jittery\"    Diphenhydramine Hcl Other (See Comments)     nervous and jittery    Diphenhydramine Hcl [Diphenhydramine] Anxiety    Exenatide Other (See Comments)     Causes him to be nervous and jittery    Metoprolol Succinate Other (See Comments)     Hallucinations     Phenergan [Promethazine Hcl] Other (See Comments)     Hallucinations, nervous, jittery    Plavix [Clopidogrel Bisulfate] Other (See Comments)     Causes bleeding in his stomach    Pseudoephedrine Anxiety    Reglan [Metoclopramide Hcl] Other (See Comments)     \"Caused him to just sit in a chair and rock back and forth\"        OBJECTIVE  Vital Signs:  Vitals:    01/31/22 1330   BP: (!) 148/80   Pulse: 86   Resp: 17   Temp: 98.9 °F (37.2 °C)   SpO2: 98%       Labs:  Recent Results (from the past 48 hour(s))   EKG 12 Lead    Collection Time: 01/30/22 12:45 PM Result Value Ref Range    Ventricular Rate 64 BPM    Atrial Rate 63 BPM    QRS Duration 82 ms    Q-T Interval 424 ms    QTc Calculation (Bazett) 437 ms    R Axis -45 degrees    T Axis 101 degrees    Diagnosis       Junctional rhythm  Left anterior fascicular block  Inferior infarct (cited on or before 03-JAN-2022)  Anterior infarct (cited on or before 03-JAN-2022)  Abnormal ECG  When compared with ECG of 03-JAN-2022 18:38,  Significant changes have occurred     Urinalysis, reflex to microscopic    Collection Time: 01/30/22  2:10 PM   Result Value Ref Range    Color, UA STRAW (A) YELLOW    Clarity, UA CLEAR CLEAR    Glucose, Urine NEGATIVE NEGATIVE MG/DL    Bilirubin Urine NEGATIVE NEGATIVE MG/DL    Ketones, Urine NEGATIVE NEGATIVE MG/DL    Specific Gravity, UA 1.015 1.001 - 1.035    Blood, Urine NEGATIVE NEGATIVE    pH, Urine 7.5 5.0 - 8.0    Protein, UA NEGATIVE NEGATIVE MG/DL    Urobilinogen, Urine NORMAL 0.2 - 1.0 MG/DL    Nitrite Urine, Quantitative NEGATIVE NEGATIVE    Leukocyte Esterase, Urine NEGATIVE NEGATIVE    RBC, UA 1 0 - 3 /HPF    WBC, UA 1 0 - 2 /HPF    Bacteria, UA NEGATIVE NEGATIVE /HPF    Mucus, UA RARE (A) NEGATIVE HPF    Trichomonas, UA NONE SEEN NONE SEEN /HPF   Drug screen multi urine    Collection Time: 01/30/22  2:10 PM   Result Value Ref Range    Cannabinoid Scrn, Ur NEGATIVE NEGATIVE    Amphetamines NEGATIVE NEGATIVE    Cocaine Metabolite NEGATIVE NEGATIVE    Benzodiazepine Screen, Urine NEGATIVE NEGATIVE    Barbiturate Screen, Ur NEGATIVE NEGATIVE    Opiates, Urine NEGATIVE NEGATIVE    Phencyclidine, Urine NEGATIVE NEGATIVE    Oxycodone NEGATIVE NEGATIVE   CBC Auto Differential    Collection Time: 01/30/22  2:11 PM   Result Value Ref Range    WBC 7.6 4.0 - 10.5 K/CU MM    RBC 3.85 (L) 4.6 - 6.2 M/CU MM    Hemoglobin 11.6 (L) 13.5 - 18.0 GM/DL    Hematocrit 34.7 (L) 42 - 52 %    MCV 90.1 78 - 100 FL    MCH 30.1 27 - 31 PG    MCHC 33.4 32.0 - 36.0 %    RDW 12.8 11.7 - 14.9 %    Platelets 194 140 - 440 K/CU MM    MPV 9.3 7.5 - 11.1 FL    Differential Type AUTOMATED DIFFERENTIAL     Segs Relative 46.4 36 - 66 %    Lymphocytes % 27.3 24 - 44 %    Monocytes % 12.8 (H) 0 - 4 %    Eosinophils % 12.4 (H) 0 - 3 %    Basophils % 0.8 0 - 1 %    Segs Absolute 3.5 K/CU MM    Lymphocytes Absolute 2.1 K/CU MM    Monocytes Absolute 1.0 K/CU MM    Eosinophils Absolute 0.9 K/CU MM    Basophils Absolute 0.1 K/CU MM    Nucleated RBC % 0.0 %    Total Nucleated RBC 0.0 K/CU MM    Total Immature Neutrophil 0.02 K/CU MM    Immature Neutrophil % 0.3 0 - 0.43 %   Comprehensive Metabolic Panel w/ Reflex to MG    Collection Time: 01/30/22  2:11 PM   Result Value Ref Range    Sodium 137 135 - 145 MMOL/L    Potassium 4.5 3.5 - 5.1 MMOL/L    Chloride 103 99 - 110 mMol/L    CO2 24 21 - 32 MMOL/L    BUN 16 6 - 23 MG/DL    CREATININE 1.0 0.9 - 1.3 MG/DL    Glucose 194 (H) 70 - 99 MG/DL    Calcium 9.3 8.3 - 10.6 MG/DL    Albumin 3.5 3.4 - 5.0 GM/DL    Total Protein 5.9 (L) 6.4 - 8.2 GM/DL    Total Bilirubin 0.4 0.0 - 1.0 MG/DL    ALT 12 10 - 40 U/L    AST 21 15 - 37 IU/L    Alkaline Phosphatase 73 40 - 129 IU/L    GFR Non-African American >60 >60 mL/min/1.73m2    GFR African American >60 >60 mL/min/1.73m2    Anion Gap 10 4 - 16   Troponin    Collection Time: 01/30/22  2:11 PM   Result Value Ref Range    Troponin T <0.010 <0.01 NG/ML   Ethanol    Collection Time: 01/30/22  2:11 PM   Result Value Ref Range    Alcohol Scrn <0.01 <0.01 %WT/VOL   Protime-INR    Collection Time: 01/30/22  2:11 PM   Result Value Ref Range    Protime 12.1 11.7 - 14.5 SECONDS    INR 0.94 INDEX   APTT    Collection Time: 01/30/22  2:11 PM   Result Value Ref Range    aPTT 25.6 25.1 - 37.1 SECONDS   Brain Natriuretic Peptide    Collection Time: 01/30/22  2:11 PM   Result Value Ref Range    Pro-.3 (H) <300 PG/ML   COVID-19    Collection Time: 01/30/22  5:54 PM    Specimen: Nasopharyngeal   Result Value Ref Range    Source UNKNOWN     SARS-CoV-2 NOT DETECTED NOT DETECTED   Ammonia    Collection Time: 01/30/22  6:02 PM   Result Value Ref Range    Ammonia 23 16 - 60 UMOL/L   Lactic acid, plasma    Collection Time: 01/30/22  6:02 PM   Result Value Ref Range    Lactate 0.9 0.4 - 2.0 mMOL/L   POCT Glucose    Collection Time: 01/30/22  9:45 PM   Result Value Ref Range    POC Glucose 296 (H) 70 - 99 MG/DL   COVID-19, Rapid    Collection Time: 01/30/22 11:45 PM    Specimen: Nasopharyngeal   Result Value Ref Range    Source THROAT     SARS-CoV-2, NAAT NOT DETECTED NOT DETECTED   Troponin    Collection Time: 01/31/22  1:25 AM   Result Value Ref Range    Troponin T <0.010 <0.01 NG/ML   POCT Glucose    Collection Time: 01/31/22  8:07 AM   Result Value Ref Range    POC Glucose 144 (H) 70 - 99 MG/DL   POCT Glucose    Collection Time: 01/31/22  8:52 AM   Result Value Ref Range    POC Glucose 144 (H) 70 - 99 MG/DL   POCT Glucose    Collection Time: 01/31/22 12:10 PM   Result Value Ref Range    POC Glucose 162 (H) 70 - 99 MG/DL       Review of Systems:  Reports of no current cardiovascular, respiratory, gastrointestinal, genitourinary, integumentary, neurological, muscuoskeletal, or immunological symptoms today. PSYCHIATRIC: See HPI above.     PSYCHIATRIC EXAMINATION / MENTAL STATUS EXAM    CONSTITUTIONAL:    Vitals:   Vitals:    01/31/22 1330   BP: (!) 148/80   Pulse: 86   Resp: 17   Temp: 98.9 °F (37.2 °C)   SpO2: 98%      General appearance: [x] appears age, []  appears older than stated age,               [x]  adequately dressed and groomed, [] disheveled,               [x]  in no acute distress, [] appears mildly distressed, [] other           MUSCULOSKELETAL:   Gait:   [] normal, [] antalgic, [] unsteady, [x] gait not evaluated   Station:             [] erect, [x] sitting, [] recumbent, [] other        Strength/tone:  [x] muscle strength and tone appear consistent with age and condition     [] atrophy      [] abnormal movements  PSYCHIATRIC:    Appearance: Appears stated age. Alert and oriented to person, time & situation. No acute distress. Adequate grooming and hygiene. Good eye contact. No prominent physical abnormalities. Attitude: Manner is cooperative and pleasant  Motor: No psychomotor agitation, retardation or abnormal movements noted  Speech: Clearly articulated; normal rate, volume, tone & amount. Language: tangential  Mood: euthymic   Affect: euthymic, congruent with mood and content of speech  Thought Production: Spontaneous. Thought Form: tangentiality, focused on the kids peeking in his home. No circumstantiality. No flight of ideas or loosening of associations. Thought Content/Perceptions: Visual hallucinations, auditory hallucinations, No SI/HI, delusions of people stealing things from his home  Insight: poor  Judgment: poor  Memory: Immediate, recent, and remote appear intact, though not formally tested. Attention: maintained throughout interview  Fund of knowledge: fair  Gait/Balance: not assessed    Impression:   Visual hallucinations  Auditory hallucinations    Problem List:   Acute metabolic encephalopathy    Plan:  1. Start risperidone one mg daily to target hallucinations  2. MRI consistent with subcortical vascular dementia. Recommend work up with outpatient Neurology upon discharge  3. Neurology consult note read. Agree with Neuro recommendations  4. Will follow loosely    Thank you for the interesting consult.     Electronically signed by MOLLY Haynes CNP on 1/31/2022 at 2:17 PM

## 2022-01-31 NOTE — CONSULTS
Neurology Service Consult Note  [facility]  Patient Name: Saurav Bell  : 1940        Subjective:   Reason for consult: \"Altered mental status hallucinations in setting of intracranial mass \"  Patient seen and examined. Chart reviewed in detail. 79 y.o. -male with PMH CAD, COPD, diabetes mellitus, HLD, and HTN presenting to [facility] altered mental status, which his wife states that he has been more confused since Friday. Per patient's wife states that he has been having visual and auditory hallucinations. Patient was recently admitted here on 2022 and seen by our neurology group. Presented with confusion and hallucinations at that time again. He received a full neurological work-up which showed his chronic findings. She was instructed to follow-up as outpatient for further cognitive work-up. We also checked a TSH, B12 and folate which were unremarkable as well. On exam patient is alert to self, month and year, unable to recall location or situation. Patient exam is nonfocal/nonlateralizing, and does not appear to be having any hallucinations at the time of exam.  Patient is moving all extremities without difficulty and following commands. His acute work-up such as CBC, CMP, and urinalysis in the ED are unremarkable at this time. Past Medical History:   Diagnosis Date    Acute exacerbation of COPD with asthma ([address]     Arthritis     Biliary stones     CAD (coronary artery disease)     Chronic systolic (congestive) heart failure (HCC)     COPD (chronic obstructive pulmonary disease) (HCC)     Diabetes mellitus ([address]     History of blood transfusion     Hx of angiography 3/11/2014    Pulmonary Arteries: Small sliding hiatal hernia. Mod coronary artery calcifications. Mild bilat gynecomastia.     Hyperlipidemia     Hypertension     Kidney stone     Metabolic encephalopathy     :   Past Surgical History: Procedure Laterality Date    ABDOMEN SURGERY      stones in bile duct removed x3    CARDIAC SURGERY      CABG x 2 in 2004 at [facility] - Dr. Mitch Brooks, ESOPHAGUS      ERCP  03/13/14    w/ dilation of papilla    ERCP  9/11/14    ERCP  10/24/15    extractions stones, balloon dilatation     ERCP  03/18/2017    stone extraction     ERCP N/A 1/25/2019    ERCP STONE REMOVAL/ DILATATION OF PAPILLA WITH 10-12MM AND 12-15MM BALLOON AND 9-12MM, 12-15MM  EXTRACTOR BALLOON USED FOR REMOVAL OF MULTIPLE  CBD STONES performed by Heather Zaldivar MD at [place] [address] ERCP N/A 9/7/2019    ERCP STONE REMOVAL, DILATATION OF PAPILLA WITH 10-12MM BALLOON, AND EXTRACTOR BALLOON 12-15MM USED FOR REMOVAL OF CBD STONE AND SLUDE performed by Alex Rodriguez MD at [place] [address] ERCP N/A 3/31/2020    ERCP DILATION BALLOON to 12  AND SWEEP OF CBD STONE AND SLUDGE performed by Alex Rodriguez MD at [place] [address] ERCP N/A 4/8/2021    ERCP WITH BALLOON DILATATION 10-12 MM BALLOON( TO 12 MM) OF PAPILLA, , BALLOON SWEEP  WITH REMOVAL OF CBD STONES AND WTPVYU(79-39 MM BALLOON) performed by Alex Rodriguez MD at [address] Left 2/18/2021    LEFT FEMUR IM NAIL VIANEY INSERTION performed by Hayes Baumgarten, DO at [facility] Bilateral     2005 at [address] [place] [address]  03/13/14    sphincterotomy     Medications:  Scheduled Meds:   insulin lispro  0-12 Units SubCUTAneous TID WC    insulin lispro  0-6 Units SubCUTAneous Nightly    sodium chloride flush  5-40 mL IntraVENous 2 times per day    enoxaparin  30 mg SubCUTAneous Daily    aspirin  81 mg Oral Daily    atorvastatin  40 mg Oral Daily    carvedilol  6.25 mg Oral BID WC    cilostazol  100 mg Oral BID AC    docusate sodium  100 mg Oral Daily    famotidine  10 mg Oral BID    latanoprost  1 drop Both Eyes Nightly     Continuous Infusions:   dextrose      sodium chloride       PRN Meds:.glucose, glucagon (rDNA), dextrose, sodium chloride flush, sodium chloride, ondansetron **OR** ondansetron, polyethylene glycol, acetaminophen **OR** acetaminophen, dextrose bolus (hypoglycemia), traMADol    Allergies   Allergen Reactions    Byetta 10 Mcg Pen [Exenatide]     Sulbactam     Ampicillin Rash    Aricept [Donepezil Hydrochloride] Other (See Comments)     Hallucinations, confusion    Benztropine Other (See Comments)     Hallucinations, confusion    Celebrex [Celecoxib] Other (See Comments)     'causes him to be nervous and jittery\"    Diphenhydramine Hcl Other (See Comments)     nervous and jittery    Diphenhydramine Hcl [Diphenhydramine] Anxiety    Exenatide Other (See Comments)     Causes him to be nervous and jittery    Metoprolol Succinate Other (See Comments)     Hallucinations     Phenergan [Promethazine Hcl] Other (See Comments)     Hallucinations, nervous, jittery    Plavix [Clopidogrel Bisulfate] Other (See Comments)     Causes bleeding in his stomach    Pseudoephedrine Anxiety    Reglan [Metoclopramide Hcl] Other (See Comments)     \"Caused him to just sit in a chair and rock back and forth\"     Social History     Socioeconomic History    Marital status:      Spouse name:  Olga Cain Number of children: Not on file    Years of education: Not on file    Highest education level: Not on file   Occupational History    Not on file   Tobacco Use    Smoking status: Never Smoker    Smokeless tobacco: Never Used   Substance and Sexual Activity    Alcohol use: No    Drug use: No    Sexual activity: Not on file   Other Topics Concern    Not on file   Social History Narrative    Not on file     Social Determinants of Health     Financial Resource Strain:     Difficulty of Paying Living Expenses: Not on file   Food Insecurity:     Worried About Running Out of Food in the Last Year: Not on file    Ran Out of Food in the Last Year: Not on file   Transportation Needs:     Lack of Transportation (Medical): Not on file    Lack of Transportation (Non-Medical): Not on file   Physical Activity:     Days of Exercise per Week: Not on file    Minutes of Exercise per Session: Not on file   Stress:     Feeling of Stress : Not on file   Social Connections:     Frequency of Communication with Friends and Family: Not on file    Frequency of Social Gatherings with Friends and Family: Not on file    Attends Restorationist Services: Not on file    Active Member of [address] [place] [address] [employer] or Organizations: Not on file    Attends Club or Organization Meetings: Not on file    Marital Status: Not on file   Intimate Partner Violence:     Fear of Current or Ex-Partner: Not on file    Emotionally Abused: Not on file    Physically Abused: Not on file    Sexually Abused: Not on file   Housing Stability:     Unable to Pay for Housing in the Last Year: Not on file    Number of [place] in the Last Year: Not on file    Unstable Housing in the Last Year: Not on file      Family History   Problem Relation Age of Onset    Cancer Mother     Diabetes Mother     Cancer Father          ROS (10 systems)  Unable to ask ROS questions due to patient's current mentation.     Physical Exam:       Vitals:    01/30/22 1902 01/31/22 0000 01/31/22 0100 01/31/22 1051   BP: (!) 163/94  133/74 133/73   Pulse:    88   Resp:    15   Temp:       SpO2:  97%  97%   Weight:       Height:           Wt Readings from Last 3 Encounters:   01/30/22 145 lb (65.8 kg)   01/06/22 129 lb 3.2 oz (58.6 kg)   10/27/21 145 lb (65.8 kg)     Temp Readings from Last 3 Encounters:   01/30/22 98.3 °F (36.8 °C)   01/07/22 97.8 °F (36.6 °C) (Oral)   10/27/21 97.3 °F (36.3 °C)     BP Readings from Last 3 Encounters:   01/31/22 133/73   01/07/22 118/67   09/29/21 136/70     Pulse Readings from Last 3 Encounters:   01/31/22 88   01/07/22 86 09/29/21 67        Gen: A&O x 3  HEENT: NC/AT, EOMI, PERRL, mmm, neck supple, no meningeal signs; Heart: Regular rhythm  Lungs: Respirations Unlabored  Ext: no edema, no calf tenderness b/l  Psych: normal mood and affect  Skin: no rashes or lesions    NEUROLOGIC EXAM:    Mental Status: A&O to self, month and year NAD, speech clear, language fluent, follows commands appropriately    Cranial Nerve Exam:   CN II-XII: PERRL, VFF, no nystagmus, no gaze paresis, sensation V1-V3 intact b/l, muscles of facial expression symmetric; hearing intact to conversational tone, palate elevates symmetrically, shoulder elevation symmetric and tongue protrudes midline with movement side to side. Motor Exam:       Strength 5/5 bilateral upper extremities and proximal lower extremities bilaterally. Patient has a below the knee amputation x2 so unable to examine the distal portion of extremities. Tone and bulk normal   No pronator drift    Deep Tendon Reflexes: 2/4 biceps, triceps, brachioradialis, patellar,     Sensation: Intact light touch/vibration to bilateral upper extremities and proximal lower extremities bilaterally. Patient has a below the knee amputation x2 so unable to examine the distal portion of extremities. Coordination/Cerebellum:       Tremors--none      Rapidly alternating movements: no dysdiadochokinesia b/l             Finger-to-Nose: no dysmetria b/l    Gait and stance:      Gait: deferred      LABS:        CBC:   Recent Labs     01/30/22  1411   WBC 7.6   RBC 3.85*   HGB 11.6*   HCT 34.7*      MCV 90.1     BMP:    Recent Labs     01/30/22  1411      K 4.5      CO2 24   BUN 16   CREATININE 1.0   GLUCOSE 194*   CALCIUM 9.3       IMAGING:    CT head  Impression   1. No acute intracranial process identified. 2. 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    3. Chronic microvascular ischemic changes and global cerebral atrophy.         MRI brain ordered      Above imaging reviewed in detail. ASSESSMENT/PLAN:     79 y.o. -male with PMH CAD, COPD, diabetes mellitus, HLD, and HTN presenting to [facility] altered mental status, and visual/auditory hallucinations. 1. Altered mental status with hallucinations felt to be secondary to underlying dementia. --Patient encouraged to follow-up as outpatient for her in-depth cognitive work-up. --  Neurodiagnostics  CT head as above  MRI brain ordered  Patient discussed with attending physician Kaden Jones    Thank you for allowing us to participate in the care of your patient. If there are any questions regarding evaluation please feel free to contact us. MOLLY Silva CNP, 1/31/2022     ------------------------------------    Attending Note:  I have rounded on this patient with Elzbieta Hammonds CNP. I have reviewed the chart and we have discussed this case in detail. The patient was seen and examined by myself. Pertinent labs and imaging have been personally reviewed. Our findings and impressions were discussed with the patient. I concur with the Nurse Practitioner's assessment and plan. Patient was seen by our service on 1/4/2022 for similar concerns. At that time we recommended patient follow-up with us on an outpatient basis for further cognitive testing given high suspicion for an underlying dementia process. He did not follow-up with us unfortunately. He now presents for very similar concerns by patient's wife. It appears she is unable to care for him at present time. Work-up thus far has again been largely unremarkable. On examination today, he is pleasant but oriented to self, year, city but cannot tell me where he is and has only marginal understanding of why he is in the hospital.  He does admit to seeing people at nighttime. He tells me a story about how he sees people painting his doors in the middle of the night.   He does not recognize that these are in fact not real.  On examination he has no further symptoms consistent with a parkinsonism that would point towards a Lewy body dementia. Recent MRI brain completed at the beginning of January demonstrated a stable suprasellar/sellar mass--I feel it is unlikely that this is contributing. MRI brain has been ordered per internal medicine, however, I feel this was likely be noncontributory. We will follow up on the images. Otherwise, do recommend that patient follow-up with us on an outpatient basis. Appreciate psychiatry's input.     Sushma Clancy DO 1/31/2022 7:04 PM

## 2022-01-31 NOTE — ED NOTES
Bedside report given to [facility] and care transferred at this time     Alexandru Pagan RN  01/30/22 [address], RN  01/30/22 3805

## 2022-02-01 LAB
ANION GAP SERPL CALCULATED.3IONS-SCNC: 13 MMOL/L (ref 4–16)
BASOPHILS ABSOLUTE: 0.1 K/CU MM
BASOPHILS RELATIVE PERCENT: 0.9 % (ref 0–1)
BUN BLDV-MCNC: 25 MG/DL (ref 6–23)
CALCIUM SERPL-MCNC: 9.3 MG/DL (ref 8.3–10.6)
CHLORIDE BLD-SCNC: 102 MMOL/L (ref 99–110)
CO2: 22 MMOL/L (ref 21–32)
CREAT SERPL-MCNC: 1.4 MG/DL (ref 0.9–1.3)
DIFFERENTIAL TYPE: ABNORMAL
EOSINOPHILS ABSOLUTE: 0.5 K/CU MM
EOSINOPHILS RELATIVE PERCENT: 9.3 % (ref 0–3)
GFR AFRICAN AMERICAN: 59 ML/MIN/1.73M2
GFR NON-AFRICAN AMERICAN: 49 ML/MIN/1.73M2
GLUCOSE BLD-MCNC: 128 MG/DL (ref 70–99)
GLUCOSE BLD-MCNC: 130 MG/DL (ref 70–99)
GLUCOSE BLD-MCNC: 195 MG/DL (ref 70–99)
GLUCOSE BLD-MCNC: 93 MG/DL (ref 70–99)
GLUCOSE BLD-MCNC: 96 MG/DL (ref 70–99)
HCT VFR BLD CALC: 34 % (ref 42–52)
HEMOGLOBIN: 11.2 GM/DL (ref 13.5–18)
IMMATURE NEUTROPHIL %: 0.3 % (ref 0–0.43)
LYMPHOCYTES ABSOLUTE: 2 K/CU MM
LYMPHOCYTES RELATIVE PERCENT: 34.2 % (ref 24–44)
MCH RBC QN AUTO: 29.9 PG (ref 27–31)
MCHC RBC AUTO-ENTMCNC: 32.9 % (ref 32–36)
MCV RBC AUTO: 90.9 FL (ref 78–100)
MONOCYTES ABSOLUTE: 1 K/CU MM
MONOCYTES RELATIVE PERCENT: 16.5 % (ref 0–4)
NUCLEATED RBC %: 0 %
PDW BLD-RTO: 12.9 % (ref 11.7–14.9)
PLATELET # BLD: 169 K/CU MM (ref 140–440)
PMV BLD AUTO: 8.9 FL (ref 7.5–11.1)
POTASSIUM SERPL-SCNC: 3.9 MMOL/L (ref 3.5–5.1)
RBC # BLD: 3.74 M/CU MM (ref 4.6–6.2)
SEGMENTED NEUTROPHILS ABSOLUTE COUNT: 2.3 K/CU MM
SEGMENTED NEUTROPHILS RELATIVE PERCENT: 38.8 % (ref 36–66)
SODIUM BLD-SCNC: 137 MMOL/L (ref 135–145)
TOTAL IMMATURE NEUTOROPHIL: 0.02 K/CU MM
TOTAL NUCLEATED RBC: 0 K/CU MM
TROPONIN T: <0.01 NG/ML
WBC # BLD: 5.8 K/CU MM (ref 4–10.5)

## 2022-02-01 PROCEDURE — 97162 PT EVAL MOD COMPLEX 30 MIN: CPT

## 2022-02-01 PROCEDURE — 80048 BASIC METABOLIC PNL TOTAL CA: CPT

## 2022-02-01 PROCEDURE — 96361 HYDRATE IV INFUSION ADD-ON: CPT

## 2022-02-01 PROCEDURE — 84484 ASSAY OF TROPONIN QUANT: CPT

## 2022-02-01 PROCEDURE — 2580000003 HC RX 258: Performed by: NURSE PRACTITIONER

## 2022-02-01 PROCEDURE — 85025 COMPLETE CBC W/AUTO DIFF WBC: CPT

## 2022-02-01 PROCEDURE — 94761 N-INVAS EAR/PLS OXIMETRY MLT: CPT

## 2022-02-01 PROCEDURE — 6370000000 HC RX 637 (ALT 250 FOR IP): Performed by: FAMILY MEDICINE

## 2022-02-01 PROCEDURE — 96360 HYDRATION IV INFUSION INIT: CPT

## 2022-02-01 PROCEDURE — 6370000000 HC RX 637 (ALT 250 FOR IP): Performed by: NURSE PRACTITIONER

## 2022-02-01 PROCEDURE — 36415 COLL VENOUS BLD VENIPUNCTURE: CPT

## 2022-02-01 PROCEDURE — 82962 GLUCOSE BLOOD TEST: CPT

## 2022-02-01 PROCEDURE — 6360000002 HC RX W HCPCS: Performed by: FAMILY MEDICINE

## 2022-02-01 PROCEDURE — 96372 THER/PROPH/DIAG INJ SC/IM: CPT

## 2022-02-01 PROCEDURE — G0378 HOSPITAL OBSERVATION PER HR: HCPCS

## 2022-02-01 RX ORDER — SODIUM CHLORIDE 9 MG/ML
INJECTION, SOLUTION INTRAVENOUS CONTINUOUS
Status: ACTIVE | OUTPATIENT
Start: 2022-02-01 | End: 2022-02-01

## 2022-02-01 RX ORDER — LANOLIN ALCOHOL/MO/W.PET/CERES
3 CREAM (GRAM) TOPICAL ONCE
Status: COMPLETED | OUTPATIENT
Start: 2022-02-01 | End: 2022-02-01

## 2022-02-01 RX ADMIN — TRAMADOL HYDROCHLORIDE 50 MG: 50 TABLET, COATED ORAL at 08:37

## 2022-02-01 RX ADMIN — CILOSTAZOL 100 MG: 100 TABLET ORAL at 08:45

## 2022-02-01 RX ADMIN — FAMOTIDINE 10 MG: 20 TABLET ORAL at 22:25

## 2022-02-01 RX ADMIN — ACETAMINOPHEN 650 MG: 325 TABLET ORAL at 22:32

## 2022-02-01 RX ADMIN — ASPIRIN 81 MG 81 MG: 81 TABLET ORAL at 08:37

## 2022-02-01 RX ADMIN — ACETAMINOPHEN 650 MG: 325 TABLET ORAL at 00:56

## 2022-02-01 RX ADMIN — DOCUSATE SODIUM 100 MG: 100 CAPSULE ORAL at 08:37

## 2022-02-01 RX ADMIN — CARVEDILOL 6.25 MG: 6.25 TABLET, FILM COATED ORAL at 17:51

## 2022-02-01 RX ADMIN — SODIUM CHLORIDE: 9 INJECTION, SOLUTION INTRAVENOUS at 14:24

## 2022-02-01 RX ADMIN — RISPERIDONE 1 MG: 0.5 TABLET ORAL at 08:37

## 2022-02-01 RX ADMIN — FAMOTIDINE 10 MG: 20 TABLET ORAL at 08:37

## 2022-02-01 RX ADMIN — CILOSTAZOL 100 MG: 100 TABLET ORAL at 22:46

## 2022-02-01 RX ADMIN — ENOXAPARIN SODIUM 30 MG: 100 INJECTION SUBCUTANEOUS at 08:37

## 2022-02-01 RX ADMIN — LATANOPROST 1 DROP: 50 SOLUTION/ DROPS OPHTHALMIC at 22:30

## 2022-02-01 RX ADMIN — ATORVASTATIN CALCIUM 40 MG: 40 TABLET, FILM COATED ORAL at 08:37

## 2022-02-01 RX ADMIN — Medication 3 MG: at 00:56

## 2022-02-01 RX ADMIN — TRAMADOL HYDROCHLORIDE 50 MG: 50 TABLET, COATED ORAL at 18:35

## 2022-02-01 RX ADMIN — CARVEDILOL 6.25 MG: 6.25 TABLET, FILM COATED ORAL at 08:37

## 2022-02-01 ASSESSMENT — PAIN SCALES - GENERAL
PAINLEVEL_OUTOF10: 7
PAINLEVEL_OUTOF10: 7
PAINLEVEL_OUTOF10: 5
PAINLEVEL_OUTOF10: 7

## 2022-02-01 ASSESSMENT — PAIN DESCRIPTION - PAIN TYPE: TYPE: ACUTE PAIN

## 2022-02-01 ASSESSMENT — PAIN DESCRIPTION - PROGRESSION
CLINICAL_PROGRESSION: NOT CHANGED

## 2022-02-01 ASSESSMENT — PAIN DESCRIPTION - LOCATION: LOCATION: OTHER (COMMENT)

## 2022-02-01 NOTE — PROGRESS NOTES
Comprehensive Nutrition Assessment    Type and Reason for Visit:  Initial (low BMI for age)    Nutrition Recommendations/Plan:   Continue current carb controlled diet  Will offer oral nutrition supplement during stay   Assist with meals as needed, encourage intake   Will continue to follow up during stay     Nutrition Assessment:  Admit with altered mental status, metabolic encephalopathy. Currently on carb controlled diet with hx DM. Did not eat much breakfast but interested in lunch today. Noted hx dementia. Has been agreeable to oral nutrition supplements on past admits. BMI adjusted for bilateral amputations not low, over 22. Will follow at moderate nutrition risk with predicted suboptimal intake. Malnutrition Assessment:  Malnutrition Status:  Insufficient data    Context:  Acute Illness       Estimated Daily Nutrient Needs:  Energy (kcal):  8712-9212 (25-30 perri/kg); Weight Used for Energy Requirements:  Current     Protein (g):  63-75 (1-1.2 g/kg); Weight Used for Protein Requirements:  Current        Fluid (ml/day):  1800; Method Used for Fluid Requirements:  1 ml/kcal      Nutrition Related Findings:  sitting up in bed preparing to eat lunch, sitter in room      Wounds:  None       Current Nutrition Therapies:    ADULT DIET; Regular; 4 carb choices (60 gm/meal)    Anthropometric Measures:  · Height: 5' 8\" (172.7 cm)  · Current Body Weight: 139 lb 8.8 oz (63.3 kg)   · Admission Body Weight:      · Usual Body Weight: 130 lb 8.2 oz (59.2 kg) (hx in April)     · Ideal Body Weight: 154 lbs; % Ideal Body Weight 90.6 %   · BMI: 21.2  · Adjusted Body Weight: 167.7; Amputation   · Adjusted BMI: 25.5    · BMI Categories: Overweight (BMI 25.0-29. 9)       Nutrition Diagnosis:   · Predicted inadequate energy intake related to cognitive or neurological impairment,other (comment) (reduced appetite) as evidenced by poor intake prior to admission      Nutrition Interventions:   Food and/or Nutrient Delivery:  Continue Current Diet,Start Oral Nutrition Supplement  Nutrition Education/Counseling:  Education not appropriate   Coordination of Nutrition Care:  Continue to monitor while inpatient,Feeding Assistance/Environment Change    Goals:  Patient will tolerate diet to consume at least 50-75% at meals       Nutrition Monitoring and Evaluation:   Behavioral-Environmental Outcomes:  None Identified   Food/Nutrient Intake Outcomes:  Diet Advancement/Tolerance,Food and Nutrient Intake,Supplement Intake  Physical Signs/Symptoms Outcomes:  Biochemical Data,Meal Time Behavior,Skin,Weight     Discharge Planning:    Continue current diet     Electronically signed by Marbella Javier RD, LD on 2/1/22 at 12:31 PM EST    Contact: [phone]

## 2022-02-01 NOTE — PLAN OF CARE
Nutrition Problem #1: Predicted inadequate energy intake  Intervention: Food and/or Nutrient Delivery: Continue Current Diet,Start Oral Nutrition Supplement  Nutritional Goals: Patient will tolerate diet to consume at least 50-75% at meals

## 2022-02-01 NOTE — PROGRESS NOTES
MRI brain was canceled. Patient recently had had exam (1/4). Contacted neurology, Dr. Alis Vance stated they did not need repeat exam from their perspective. MRI brain began January demonstrating stable suprasternal/hilar mass. Neurology noted likely noncontributing. Neurology does recommend follow-up for cognitive evaluation.

## 2022-02-01 NOTE — PROGRESS NOTES
Hospitalist Progress Note      Name:  Beatriz Ardon /Age/Sex: 1940  (79 y.o. male)   MRN & CSN:  [id number] & [id number] Admission Date/Time: 2022 12:01 PM   Location:  1014/1014 PCP: Mauricio Glez MD         Hospital Day: 3    Assessment and Plan:   Beatriz Ardon is a 79 y.o.  male with PMH dementia, diabetes, HTN, PVD s/p remote bilateral BKA presented to [facility] on 2022 with worsening confusion. He was hospitalized for further work-up/treatment, neurology and psychiatry evaluation     Dementia: per hx. With worsening confusion that started 2 days prior to arrival.  Was hospitalized 1/32022 for the same thing; had extensive neurological work-up at that time. No obvious infectious source. UA not indicative of UTI. No metabolic derangements. Head CT nonacute. Evaluated by neurology who noted possible worsening dementia and recommended outpatient cognitive work-up. Suspect worsening dementia;  patient lives at [facility] independent living with his wife. No indications for acute SNF placement today per review with social work. He can be discharged once cleared back to independent living and upgraded at [facility] within the system. If more care is needed. -Brain MRI pending.    -Psychiatry consult-recommended risperidone 1 mg daily to target hallucinations. Continue on discharge. -SNF placement  -PT/OT eval and treat  -Follow-up with neurology as outpatient for and does cognitive work-up.     HTN: per hx. BP variable and elevated at times. With advanced age would allow BP to run slightly on the higher side. Holding home ACE. Cannot have PRN IV hydralazine on 1 E.    -Monitor BP and resume home ACE if BP remains elevated  -Blood pressure stable 135/64 this morning.      MARY BETH: BUN 25, creatinine 1.4, creatinine on admission 1.0.  -gentle IV fluids  -Monitor BMP    Diabetes Mellitus type II: per hx. 1/3/2022 HgbA1c 6.9%. Holding home oral hypoglycemic.   -SSI.    - held home PO meds  - POCT glucose qACHS  - hypoglycemia management protocol   - target blood glucose 100-180  - ADA carb controlled diet     PVD: per hx. S/p remote bilateral lower extremity BKA. Cont home ASA< cilostazol, statin. Has prosthesis at home and ambulates with a walker. PT/OT consult.      Dispo: Discussed with social work, Adrienne patient is from [facility] independent living lives with spouse. Patient has PT OT pending. Patient possibly can be discharged back to [facility] living and possibly be upgraded SNF is needed. MRI brain, PT OT evaluations are still pending. Diet: ADULT DIET; Regular; 4 carb choices (60 gm/meal)  DVT prophylaxis: Lovenox  GI prophylaxis  CODE STATUS:Full Code    Treatment and discharge plan discussed with patient/family. Patient/family voiced understanding. This patient was seen examined and treatment plan discussed with/supervising physician. History of Present Illness:     Chief Complaint: Acute metabolic encephalopathy    Subjective  Patient examined at bedside. No acute distress noted. Patient is alert to self and answers to name. Patient has sitter at bedside she states patient has been resting comfortably without any concerns. States she was told he was trying to get out of bed as patient is a bilateral BKA was a safety issue. There is no family members at bedside. Unable to obtain due to baseline dementia    Objective: Intake/Output Summary (Last 24 hours) at 2/1/2022 0706  Last data filed at 1/31/2022 0849  Gross per 24 hour   Intake 10 ml   Output    Net 10 ml      Vitals:   Vitals:    02/01/22 0126   BP: 135/64   Pulse: 87   Resp: 16   Temp: 98.3 °F (36.8 °C)   SpO2: 97%     Physical Exam:   Physical Exam  Constitutional:       Comments: Alert to self   HENT:      Head: Normocephalic. Mouth/Throat:      Mouth: Mucous membranes are moist.   Eyes:      Extraocular Movements: Extraocular movements intact.       Conjunctiva/sclera: Conjunctivae normal.   Cardiovascular:      Rate and Rhythm: Normal rate and regular rhythm. Pulses: Normal pulses. Heart sounds: Normal heart sounds. Pulmonary:      Effort: Pulmonary effort is normal.      Breath sounds: Normal breath sounds. Abdominal:      General: Abdomen is flat. Palpations: Abdomen is soft. Musculoskeletal:      Cervical back: Normal range of motion. Comments: Bilateral BKA   Skin:     General: Skin is warm and dry. Neurological:      General: No focal deficit present. Mental Status: He is alert. He is disoriented.          Medications:   Medications:    risperiDONE  1 mg Oral Daily    insulin lispro  0-12 Units SubCUTAneous TID     insulin lispro  0-6 Units SubCUTAneous Nightly    sodium chloride flush  5-40 mL IntraVENous 2 times per day    enoxaparin  30 mg SubCUTAneous Daily    aspirin  81 mg Oral Daily    atorvastatin  40 mg Oral Daily    carvedilol  6.25 mg Oral BID WC    cilostazol  100 mg Oral BID AC    docusate sodium  100 mg Oral Daily    famotidine  10 mg Oral BID    latanoprost  1 drop Both Eyes Nightly      Infusions:    dextrose      sodium chloride       PRN Meds: glucose, 15 g, PRN  glucagon (rDNA), 1 mg, PRN  dextrose, 100 mL/hr, PRN  sodium chloride flush, 5-40 mL, PRN  sodium chloride, 25 mL, PRN  ondansetron, 4 mg, Q8H PRN   Or  ondansetron, 4 mg, Q6H PRN  polyethylene glycol, 17 g, Daily PRN  acetaminophen, 650 mg, Q6H PRN   Or  acetaminophen, 650 mg, Q6H PRN  dextrose bolus (hypoglycemia), 250 mL, PRN  traMADol, 50 mg, Q6H PRN        Recent Labs     01/30/22  1411 02/01/22  0641   WBC 7.6 5.8   HGB 11.6* 11.2*   HCT 34.7* 34.0*    169      Recent Labs     01/30/22  1411      K 4.5      CO2 24   BUN 16   CREATININE 1.0     Recent Labs     01/30/22  1411   AST 21   ALT 12   BILITOT 0.4   ALKPHOS 73     Recent Labs     01/30/22  1411   INR 0.94     Recent Labs     01/30/22  1411 01/31/22  0125   TROPONINT <0.010 <0.010        Imaging reviewed      Electronically signed by MOLLY Hardy CNP on 2/1/2022 at 7:06 AM

## 2022-02-01 NOTE — CONSULTS
[address] [facility] Atul Workman, 1940, 1014/1014-A, 2/1/2022  History  [place]:  The encounter diagnosis was Altered mental status, unspecified altered mental status type. Patient  has a past medical history of Acute exacerbation of COPD with asthma ([address] Arthritis, Biliary stones, CAD (coronary artery disease), Chronic systolic (congestive) heart failure ([address] COPD (chronic obstructive pulmonary disease) ([address] Diabetes mellitus ([address] History of blood transfusion, Hx of angiography, Hyperlipidemia, Hypertension, Kidney stone, and Metabolic encephalopathy. Patient  has a past surgical history that includes Cardiac surgery; joint replacement; Cholecystectomy; Coronary angioplasty with stent; fracture surgery; Leg amputation below knee (Bilateral); ERCP (03/13/14); other surgical history (03/13/14); ERCP (9/11/14); ERCP (10/24/15); ERCP (03/18/2017); Abdomen surgery; Dilatation, esophagus; ERCP (N/A, 1/25/2019); ERCP (N/A, 9/7/2019); ERCP (N/A, 3/31/2020); Femur fracture surgery (Left, 2/18/2021); ERCP (N/A, 4/8/2021); and hernia repair. Therapy Hx and additional comorbidities:  see above. Restrictions:  none  Communication with other providers:  cleared for tx, discussed with nursing staff and discussed with case management staff, d/w sitter  Subjective:  Patient states:  Agreeable to eval.  Knows he is in hospital.  Pain:  none. Patient goal:  None stated  Occupational profile (relevant social history and personal factors):    From facility, not SNF, has help with ADLs and BLE prosthesis application. Examination of body systems (includes body structures/functions, activity/participation limitations): · Vision and Hearing:  visual impairments and WFL hearing  · Cognition and Participation:  alert, confused, pleasant and participatory, engaged in service and follows commands appropriately.   Was initially received sleeping, roused quickly, fully participatory. · Cardiac and Pulmonary Tolerance:  tolerant of activity  · Mobility skills and general balance:    Bed Mobility: modified independent. Sup-sit:  modified independent. Sit-static balance: supervision. · Neuro screen:  [facility] [address] [facility]  · Musculoskeletal screen:  BLE with remote BKA each side. Residual limbs have bony distal ends with limited muscle flap protection. Can feel distal ends of tibia and fibula bilaterally. LLE with slight scabbed over region, appears normal color and healthy. · Prostheses present in room, deferred use per patient request.    Assessment:     Patient admitted for encephalopathy with evolving medical features and stable/uncomplicated rehabilitative features. Performed at or near baseline level of ability/function. Remote BLE BKAs. Patient performed well with physical therapy evaluation and does not require ongoing acute care physical therapy service. Discharge recommendations:  minimal/mild post-acute physical therapy service needs. Could consider [place] [id number] PT service, however, patient presents near functional baseline  Equipment recommendations:  front-wheeled rolling walker  Plan:  Discharge from acute care physical therapy service. Patient mobility with nursing staff is an important component of care during this admission. Mobility homework for patient and nurse:  change position frequently sit at edge of bed 4-6 times daily use bedside commode for voiding  Patient requires supervision for mobility with nursing. Time in:  1140  Time out:  1155  Timed treatment minutes:  0  Total treatment time:  15  Electronically signed by:   Zayra Addison, PT  2/1/2022, 1:37 PM

## 2022-02-02 LAB
GLUCOSE BLD-MCNC: 105 MG/DL (ref 70–99)
GLUCOSE BLD-MCNC: 155 MG/DL (ref 70–99)
GLUCOSE BLD-MCNC: 155 MG/DL (ref 70–99)
GLUCOSE BLD-MCNC: 213 MG/DL (ref 70–99)
GLUCOSE BLD-MCNC: 227 MG/DL (ref 70–99)

## 2022-02-02 PROCEDURE — G0378 HOSPITAL OBSERVATION PER HR: HCPCS

## 2022-02-02 PROCEDURE — 96372 THER/PROPH/DIAG INJ SC/IM: CPT

## 2022-02-02 PROCEDURE — 6360000002 HC RX W HCPCS: Performed by: FAMILY MEDICINE

## 2022-02-02 PROCEDURE — 82962 GLUCOSE BLOOD TEST: CPT

## 2022-02-02 PROCEDURE — 2580000003 HC RX 258: Performed by: FAMILY MEDICINE

## 2022-02-02 PROCEDURE — 6370000000 HC RX 637 (ALT 250 FOR IP): Performed by: NURSE PRACTITIONER

## 2022-02-02 PROCEDURE — 6370000000 HC RX 637 (ALT 250 FOR IP): Performed by: FAMILY MEDICINE

## 2022-02-02 PROCEDURE — 94761 N-INVAS EAR/PLS OXIMETRY MLT: CPT

## 2022-02-02 RX ORDER — RISPERIDONE 1 MG/1
1 TABLET, FILM COATED ORAL DAILY
Qty: 60 TABLET | Refills: 1 | Status: SHIPPED | OUTPATIENT
Start: 2022-02-02

## 2022-02-02 RX ADMIN — SODIUM CHLORIDE, PRESERVATIVE FREE 10 ML: 5 INJECTION INTRAVENOUS at 10:50

## 2022-02-02 RX ADMIN — INSULIN LISPRO 2 UNITS: 100 INJECTION, SOLUTION INTRAVENOUS; SUBCUTANEOUS at 12:34

## 2022-02-02 RX ADMIN — TRAMADOL HYDROCHLORIDE 50 MG: 50 TABLET, COATED ORAL at 22:49

## 2022-02-02 RX ADMIN — CARVEDILOL 6.25 MG: 6.25 TABLET, FILM COATED ORAL at 17:10

## 2022-02-02 RX ADMIN — CILOSTAZOL 100 MG: 100 TABLET ORAL at 10:47

## 2022-02-02 RX ADMIN — ATORVASTATIN CALCIUM 40 MG: 40 TABLET, FILM COATED ORAL at 22:49

## 2022-02-02 RX ADMIN — CARVEDILOL 6.25 MG: 6.25 TABLET, FILM COATED ORAL at 10:47

## 2022-02-02 RX ADMIN — SODIUM CHLORIDE, PRESERVATIVE FREE 10 ML: 5 INJECTION INTRAVENOUS at 22:50

## 2022-02-02 RX ADMIN — RISPERIDONE 1 MG: 0.5 TABLET ORAL at 10:47

## 2022-02-02 RX ADMIN — INSULIN LISPRO 2 UNITS: 100 INJECTION, SOLUTION INTRAVENOUS; SUBCUTANEOUS at 17:12

## 2022-02-02 RX ADMIN — LATANOPROST 1 DROP: 50 SOLUTION/ DROPS OPHTHALMIC at 22:49

## 2022-02-02 RX ADMIN — ENOXAPARIN SODIUM 30 MG: 100 INJECTION SUBCUTANEOUS at 10:49

## 2022-02-02 RX ADMIN — ASPIRIN 81 MG 81 MG: 81 TABLET ORAL at 10:47

## 2022-02-02 RX ADMIN — DOCUSATE SODIUM 100 MG: 100 CAPSULE ORAL at 10:47

## 2022-02-02 RX ADMIN — FAMOTIDINE 10 MG: 20 TABLET ORAL at 22:49

## 2022-02-02 RX ADMIN — ACETAMINOPHEN 650 MG: 325 TABLET ORAL at 22:49

## 2022-02-02 RX ADMIN — CILOSTAZOL 100 MG: 100 TABLET ORAL at 17:09

## 2022-02-02 RX ADMIN — FAMOTIDINE 10 MG: 20 TABLET ORAL at 10:47

## 2022-02-02 ASSESSMENT — PAIN SCALES - GENERAL: PAINLEVEL_OUTOF10: 6

## 2022-02-02 NOTE — PROGRESS NOTES
Brief Neurology Note:    Contacted from  regarding need for MRI. Given recent MRI with stability of sellar mass, do not feel repeat is necessary at this juncture given patient presenting with similar symptoms as prior presentation. Patient should follow up with neurology upon discharge. Nothing further from a neurologic standpoint. Patient is okay for discharge. Please do not hesitate to contact us with further questions.      Hanford Cranker, DO 2/1/2022 10:13 PM

## 2022-02-02 NOTE — DISCHARGE INSTR - COC
Continuity of Care Form    Patient Name: Ruddy Vicente   :  1940  MRN:  [id number]    Admit date:  2022  Discharge date:  ***    Code Status Order: Full Code   Advance Directives:      Admitting Physician:  Stella Rahman DO  PCP: Claudia Callahan MD    Discharging Nurse: [facility] Unit/Room#: 7199/6495-W  Discharging Unit Phone Number: ***    Emergency Contact:   Extended Emergency Contact Information  Primary Emergency Contact: Adrienne Abrams  Address: [address] [place] [address] [place] [address] [facility] [address] [place] [address] Phone: [phone]  Relation: Spouse  Secondary Emergency Contact: Greg García Phone: [phone]  Mobile Phone: [phone]  Relation: Child    Past Surgical History:  Past Surgical History:   Procedure Laterality Date    ABDOMEN SURGERY      stones in bile duct removed x3    CARDIAC SURGERY      CABG x 2 in  at [facility] - Dr. Florentin Mccall, ESOPHAGUS      ERCP  14    w/ dilation of papilla    ERCP  14    ERCP  10/24/15    extractions stones, balloon dilatation     ERCP  2017    stone extraction     ERCP N/A 2019    ERCP STONE REMOVAL/ DILATATION OF PAPILLA WITH 10-12MM AND 12-15MM BALLOON AND 9-12MM, 12-15MM  EXTRACTOR BALLOON USED FOR REMOVAL OF MULTIPLE  CBD STONES performed by Heather Zaldivar MD at [facility]    ERCP N/A 2019    ERCP STONE REMOVAL, DILATATION OF PAPILLA WITH 10-12MM BALLOON, AND EXTRACTOR BALLOON 12-15MM USED FOR REMOVAL OF CBD STONE AND SLUDE performed by Alex Rodriguez MD at [facility] ENDOSCOPY    ERCP N/A 3/31/2020    ERCP DILATION BALLOON to 12  AND SWEEP OF CBD STONE AND SLUDGE performed by Alex Rodriguez MD at [facility]    ERCP N/A 2021    ERCP WITH BALLOON DILATATION 10-12 MM BALLOON( TO 12 MM) OF PAPILLA, , BALLOON SWEEP  WITH REMOVAL OF CBD STONES AND EIZDAH(81-60 MM BALLOON) performed by Alex Rodriguez MD at [address] [facility] [address] Left 2/18/2021    LEFT FEMUR IM NAIL VIANEY INSERTION performed by Anton Acosta DO at [facility] REPLACEMENT      LEG AMPUTATION BELOW KNEE Bilateral     2005 at [facility]    OTHER SURGICAL HISTORY  03/13/14    sphincterotomy       Immunization History:   Immunization History   Administered Date(s) Administered    COVID-19, Pfizer Purple top, DILUTE for use, 12+ yrs, 30mcg/0.3mL dose 01/29/2021, 06/08/2021, 11/23/2021    Influenza Virus Vaccine 09/11/2013, 09/13/2014    Pneumococcal Conjugate 7-valent ([place]) 02/22/2012       Active Problems:  Patient Active Problem List   Diagnosis Code    Uncontrolled type II diabetes with peripheral autonomic neuropathy ([facility]) E11.43, E11.65    Essential hypertension I10    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    Choledocholithiasis K80.50    Generalized abdominal pain R10.84    COPD (chronic obstructive pulmonary disease) ([facility]) J44.9    Abdominal pain, epigastric R10.13    Elevated LFTs R79.89    Abnormal findings on imaging of biliary tract R93.2    Chest pain R07.9    ASCVD (arteriosclerotic cardiovascular disease) I25.10    Acute encephalopathy G93.40    Vomiting R11.10    Sepsis ([facility]) A41.9    Nausea vomiting and diarrhea R11.2, R19.7    Troponin I above reference range R77.8    Fever R50.9    Moderate malnutrition ([facility]) H81.2    Acute metabolic encephalopathy B40.64    Impaired cognition R41.89    Generalized weakness R53.1    Cholangitis due to bile duct calculus with obstruction K80.31    Oropharyngeal dysphagia R13.12    History of below-knee amputation of both lower extremities ([facility]) Z89.511, Z89.512    Abdominal pain R10.9    Inguinal pain R10.30    Closed fracture of left hip ([facility]) S72.002A    Acute alteration in mental status R41.82    Acute exacerbation of COPD with asthma ([facility] J44.1, J45.901    Acute cystitis without hematuria N30.00    Chronic systolic (congestive) heart failure ([facility]) I50.22    Acute cholangitis K83.09    Uncontrolled type 2 diabetes mellitus with hyperglycemia ([facility]) E11.65    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    Inguinal hernia of right side without obstruction or gangrene K40.90    Hyperlipidemia E78.5    Altered mental status R41.82    Visual hallucinations R44.1    Auditory hallucinations R44.0    Dementia with behavioral disturbance (HCC) F03.91       Isolation/Infection:   Isolation            No Isolation          Patient Infection Status       Infection Onset Added Last Indicated Last Indicated By Review Planned Expiration Resolved Resolved By    None active    Resolved    COVID-19 (Rule Out) 22 COVID-19 (Ordered)   22 Rule-Out Test Resulted    COVID-19 (Rule Out) 22 COVID-19, Rapid (Ordered)   22 Rule-Out Test Resulted    COVID-19 (Rule Out) 21 COVID-19, Rapid (Ordered)   21 Rule-Out Test Resulted    COVID-19 (Rule Out) 21 COVID-19, Rapid (Ordered)   21 Rule-Out Test Resulted    C-diff Rule Out 20 C difficile Molecular/PCR (Ordered)   21 Berkley Cramer LPN    [id number] 63/50/63 20 COVID-19   12/15/20     MRSA 19 Wound culture   20 Sebas Dc LPN            Nurse Assessment:  Last Vital Signs: BP (!) 140/83   Pulse 91   Temp 98.1 °F (36.7 °C)   Resp 15   Ht 5' 8\" (1.727 m)   Wt 138 lb 0.1 oz (62.6 kg)   SpO2 97%   BMI 20.98 kg/m²     Last documented pain score (0-10 scale): Pain Level: 7  Last Weight:   Wt Readings from Last 1 Encounters:   22 138 lb 0.1 oz (62.6 kg)     Mental Status:  {IP PT MENTAL STATUS:}    IV Access:  { RAGHAV IV ACCESS:342703186}    Nursing Mobility/ADLs:  Walking   {CHP DME VWQM:700184142}  Transfer  {CHP DME LGII:920581122}  Bathing  {CHP DME EREB:021602703}  Dressing  {CHP DME VBCU:806434329}  Toileting  {CHP DME NQAW:526586890}  Feeding  {CHP DME KVQZ:519523169}  Med Admin  {CHP DME KUOP:232958882}  Med Delivery   { RAGHAV MED Delivery:442631132}    Wound Care Documentation and Therapy:  Wound 04/03/20 Arm Proximal;Right;Dorsal skin tare (Active)   Number of days: 670       Wound 04/15/21 Buttocks buttocks dried up old stage 2 wound right buttocks (Active)   Number of days: 292        Elimination:  Continence: Bowel: {YES / PH:07322}  Bladder: {YES / WF:10006}  Urinary Catheter: {Urinary Catheter:094956469}   Colostomy/Ileostomy/Ileal Conduit: {YES / HD:51723}       Date of Last BM: ***    Intake/Output Summary (Last 24 hours) at 2/2/2022 1543  Last data filed at 2/2/2022 1238  Gross per 24 hour   Intake 730 ml   Output 0 ml   Net 730 ml     I/O last 3 completed shifts:   In: 80 [P.O.:410]  Out: 0     Safety Concerns:     [address] Safety Concerns:389350568}    Impairments/Disabilities:      [address] Impairments/Disabilities:827218047}      Patient's personal belongings (please select all that are sent with patient):  {P DME Belongings:050826903}    RN SIGNATURE:  {Esignature:690111041}    CASE MANAGEMENT/SOCIAL WORK SECTION    Inpatient Status Date: ***    Readmission Risk Assessment Score:  Readmission Risk              Risk of Unplanned Readmission:  0           Discharging to Facility/ Agency   Name:   Address:  Phone:  Fax:    Dialysis Facility (if applicable)   Name:  Address:  Dialysis Schedule:  Phone:  Fax:    / signature: {Esignature:147801906}    PHYSICIAN SECTION      Nutrition Therapy:  Current Nutrition Therapy:   [address] anchor.travel Diet List:198504662}    Routes of Feeding: {CHP DME Other Feedings:587606480}  Liquids: {Slp liquid thickness:89052}  Daily Fluid Restriction: {CHP DME Yes amt example:036412307}  Last Modified Barium Swallow with Video (Video Swallowing Test): {Done Not Done IBNK:958509204}    Treatments at the Time of Hospital Discharge: Respiratory Treatments: ***  Oxygen Therapy:  {Therapy; copd oxygen:40816}  Ventilator:    { CC Vent QEVS:421462386}    Rehab Therapies: {THERAPEUTIC INTERVENTION:3684372740}  Weight Bearing Status/Restrictions: 50Cleo TUTTLE Weight Bearin}  Other Medical Equipment (for information only, NOT a DME order):  {EQUIPMENT:367530387}  Other Treatments: ***    Prognosis: {Prognosis:5548784967}    Condition at Discharge: [address] Patient Condition:499121855}    Rehab Potential (if transferring to Rehab): {Prognosis:5824839862}    Recommended Labs or Other Treatments After Discharge: ***    Physician Certification: I certify the above information and transfer of Omar Dill  is necessary for the continuing treatment of the diagnosis listed and that he requires {Admit to Appropriate Level of Care:75038} for {GREATER/LESS:692289075} 30 days.      Update Admission H&P: {CHP DME Changes in HDBOB:844810305}    PHYSICIAN SIGNATURE:  {Esignature:565455261}

## 2022-02-02 NOTE — DISCHARGE SUMMARY
Discharge Summary    Name:  Jodi Carreon /Age/Sex: 1940  (79 y.o. male)   MRN & CSN:  [id number] & [id number] Admission Date/Time: 2022 12:01 PM   Attending:  Diaz Myers MD Discharging Physician: MOLLY Segura - CNP     Hospital Course:   Jodi Carreon is a 79 y.o.  male  who presents with Acute metabolic encephalopathy  He presented to the emergency room with altered mental status and confusion. He had similar episode in admission in 2022. Patient was again evaluated by neurology. Patient did not have any nonfocal nonlateralizing weaknesses. Patient recently had MRI brain 3 weeks ago that was negative discussed with neurology and there did not seem to be a reason to repeat MRI brain as it would not change plan. Recommend patient follow-up closely for in-depth cognitive work-up as outpatient. Patient is to follow-up after last hospitalization with neurology but follow-up was not completed. Patient was also evaluated by psychiatry due to hallucinations. Started patient on risperidone 1 mg daily to continue at discharge. No further hallucinations during his hospitalization after initiation of medication. He was also evaluated by physical therapy/ occupational therapy. PT noted patient function at baseline no need for acute rehab. Did recommend possible home health care services for PT a be of benefit. Patient is a resident of [facility] independent living where he resides with his spouse. There is also accessed assisted living and SNF care at this facility. Patient chronic conditions remained stable throughout his hospitalization. He had no acute events. The patient expressed appropriate understanding of and agreement with the discharge recommendations, medications, and plan.       Consults this admission:  IP CONSULT TO HOSPITALIST  IP CONSULT TO NEUROLOGY  IP CONSULT TO PSYCHIATRY  IP CONSULT TO SOCIAL WORK  IP CONSULT TO HOME CARE NEEDS    Discharge Instruction:   Follow up appointments: Neurology within 1 week, family doctor  Primary care physician:  within 2 weeks    Diet:  diabetic diet   Activity: activity as tolerated per PT OT recommendations.   Disposition: Discharged to:   [x]Home, [][facility], []SNF, []Acute Rehab, []Hospice   Condition on discharge: Stable    Discharge Medications:        Medication List      START taking these medications    risperiDONE 1 MG tablet  Commonly known as: RISPERDAL  Take 1 tablet by mouth daily        CONTINUE taking these medications    aspirin 81 MG chewable tablet     atorvastatin 40 MG tablet  Commonly known as: LIPITOR     busPIRone 5 MG tablet  Commonly known as: BUSPAR     carvedilol 6.25 MG tablet  Commonly known as: COREG  Take 1 tablet by mouth 2 times daily (with meals)     Cholecalciferol 50 MCG (2000 UT) Tabs     cholestyramine light 4 g packet  Take 1 packet by mouth 2 times daily as needed (diarrhea)     cilostazol 100 MG tablet  Commonly known as: PLETAL     dilTIAZem 60 MG tablet  Commonly known as: CARDIZEM  Take 1 tablet by mouth 2 times daily     docusate sodium 100 MG capsule  Commonly known as: COLACE     famotidine 40 MG tablet  Commonly known as: PEPCID     gabapentin 100 MG capsule  Commonly known as: NEURONTIN     glimepiride 4 MG tablet  Commonly known as: AMARYL  Take 1 tablet by mouth every morning (before breakfast)     insulin lispro 100 UNIT/ML injection vial  Commonly known as: HUMALOG  Inject 0-12 Units into the skin 3 times daily (with meals) **Medium Dose Corrective Algorithm**  Glucose: Dose:  If <139             No Insulin  140-199 2 Units  200-249 4 Units  250-299 6 Units  300-349 8 Units  350-400 10 Units  Above 400       12 Units     latanoprost 0.005 % ophthalmic solution  Commonly known as: XALATAN     Levemir FlexTouch 100 UNIT/ML injection pen  Generic drug: insulin detemir  Inject 10 Units into the skin nightly     lisinopril 5 MG tablet  Commonly known as: PRINIVIL;ZESTRIL  Take 1 tablet by mouth daily     MULTIVITAMIN PO     ondansetron 4 MG tablet  Commonly known as: ZOFRAN     traMADol 50 MG tablet  Commonly known as: ULTRAM           Where to Get Your Medications      These medications were sent to [facility] [facility]    Phone: [phone]   · risperiDONE 1 MG tablet         Objective Findings at Discharge:   /62   Pulse 81   Temp 97.7 °F (36.5 °C)   Resp 16   Ht 5' 8\" (1.727 m)   Wt 138 lb 0.1 oz (62.6 kg)   SpO2 95%   BMI 20.98 kg/m²            PHYSICAL EXAM   GEN Awake male, sitting upright in bed in no apparent distress. Appears given age. EYES Pupils are equally round. No scleral erythema, discharge, or conjunctivitis. HENT Mucous membranes are moist.   NECK Supple, no apparent thyromegaly or masses. RESP Clear to auscultation, no wheezes, rales or rhonchi. Symmetric chest movement while on room air. CARDIO/VASC S1/S2 auscultated. Regular rate without appreciable murmurs, rubs, or gallops. GI Abdomen is soft without significant tenderness, masses, or guarding. Bowel sounds are normoactive. .    No costovertebral angle tenderness. Normal appearing external genitalia. Renee catheter is not present. MSK bilateral BKA  SKIN Normal coloration, warm, dry. NEURO Cranial nerves appear grossly intact, normal speech, no lateralizing weakness. PSYCH Awake, alert, oriented to self and location. States he lives with his wife. Affect appropriate. BMP/CBC  Recent Labs     01/30/22  1411 02/01/22  0641    137   K 4.5 3.9    102   CO2 24 22   BUN 16 25*   CREATININE 1.0 1.4*   WBC 7.6 5.8   HCT 34.7* 34.0*    169       IMAGING:  Reviewed.     Discharge Time of 35 minutes    Electronically signed by MOLLY Ibrahim CNP on 2/2/2022 at 8:09 AM

## 2022-02-02 NOTE — CARE COORDINATION
Received a call from Renard Smallwood in the outpt pharmacy regarding pt's d/c medications. Pt has [facility] Medicare and we have not helped pt before. Voucher created for Risperidone and faxed to the pharmacy.  Pt will go on the flag list. If he would need any further assistance, he must fill out an application and provide required documentation to be considered for eligibility./MB

## 2022-02-02 NOTE — CARE COORDINATION
Chart reviewed. This CM spoke with Sanjeev Kohler RN patient's primary nurse. The patient is from [facility] with his wife Yvette Renee. He can return at any time. Nurse will see if he is active with [facility] and if so get a [facility] order, AVS, facesheet and h&p faxed to [phone]. It looks as if he was active with [facility] on 1/7/22 when he d/c from the hospital.     He is getting assistance with medications with med assist through [address] [place] [address]. He will need to fill out a application for further help in the future. JESSIAC Kohler updated to call this CM with any further questions. [facility]. Cm left a voicemail with son Radha Dey. Cm spoke AT length with daughter Kirill Parrish and this CM did her best to answer all questions. There were sever questions about the patient confusion and worsening dementia and following up with neurology. Cm provided emotional support and humor as well. Daughter states she lives an hour away and the son lives an hour away and they will not be picking the patient up. Cm updated her that there is not Transportation options today. Cm called [facility] and they have 10 transportation already scheduled for tomorrow. They state call them in the morning if transportation is needed. The daughter, wife and nurse and doctor were all updated. The daughter states depending on the weather, she might be able to help with transportation tomorrow. CM will have to call.

## 2022-02-03 LAB
GLUCOSE BLD-MCNC: 137 MG/DL (ref 70–99)
GLUCOSE BLD-MCNC: 159 MG/DL (ref 70–99)
GLUCOSE BLD-MCNC: 177 MG/DL (ref 70–99)
GLUCOSE BLD-MCNC: 81 MG/DL (ref 70–99)

## 2022-02-03 PROCEDURE — 6370000000 HC RX 637 (ALT 250 FOR IP): Performed by: FAMILY MEDICINE

## 2022-02-03 PROCEDURE — G0378 HOSPITAL OBSERVATION PER HR: HCPCS

## 2022-02-03 PROCEDURE — 6360000002 HC RX W HCPCS: Performed by: FAMILY MEDICINE

## 2022-02-03 PROCEDURE — 82962 GLUCOSE BLOOD TEST: CPT

## 2022-02-03 PROCEDURE — 94761 N-INVAS EAR/PLS OXIMETRY MLT: CPT

## 2022-02-03 PROCEDURE — 96372 THER/PROPH/DIAG INJ SC/IM: CPT

## 2022-02-03 PROCEDURE — 6370000000 HC RX 637 (ALT 250 FOR IP): Performed by: NURSE PRACTITIONER

## 2022-02-03 PROCEDURE — 2580000003 HC RX 258: Performed by: FAMILY MEDICINE

## 2022-02-03 RX ORDER — LANOLIN ALCOHOL/MO/W.PET/CERES
6 CREAM (GRAM) TOPICAL NIGHTLY PRN
Status: DISCONTINUED | OUTPATIENT
Start: 2022-02-03 | End: 2022-02-04 | Stop reason: HOSPADM

## 2022-02-03 RX ADMIN — LATANOPROST 1 DROP: 50 SOLUTION/ DROPS OPHTHALMIC at 20:39

## 2022-02-03 RX ADMIN — RISPERIDONE 1 MG: 0.5 TABLET ORAL at 09:26

## 2022-02-03 RX ADMIN — SODIUM CHLORIDE, PRESERVATIVE FREE 5 ML: 5 INJECTION INTRAVENOUS at 09:31

## 2022-02-03 RX ADMIN — Medication 6 MG: at 20:38

## 2022-02-03 RX ADMIN — CARVEDILOL 6.25 MG: 6.25 TABLET, FILM COATED ORAL at 16:22

## 2022-02-03 RX ADMIN — CARVEDILOL 6.25 MG: 6.25 TABLET, FILM COATED ORAL at 09:26

## 2022-02-03 RX ADMIN — CILOSTAZOL 100 MG: 100 TABLET ORAL at 09:27

## 2022-02-03 RX ADMIN — SODIUM CHLORIDE, PRESERVATIVE FREE 10 ML: 5 INJECTION INTRAVENOUS at 20:39

## 2022-02-03 RX ADMIN — INSULIN LISPRO 2 UNITS: 100 INJECTION, SOLUTION INTRAVENOUS; SUBCUTANEOUS at 12:29

## 2022-02-03 RX ADMIN — CILOSTAZOL 100 MG: 100 TABLET ORAL at 05:56

## 2022-02-03 RX ADMIN — TRAMADOL HYDROCHLORIDE 50 MG: 50 TABLET, COATED ORAL at 20:38

## 2022-02-03 RX ADMIN — CILOSTAZOL 100 MG: 100 TABLET ORAL at 16:22

## 2022-02-03 RX ADMIN — DOCUSATE SODIUM 100 MG: 100 CAPSULE ORAL at 09:26

## 2022-02-03 RX ADMIN — ATORVASTATIN CALCIUM 40 MG: 40 TABLET, FILM COATED ORAL at 09:26

## 2022-02-03 RX ADMIN — ACETAMINOPHEN 650 MG: 325 TABLET ORAL at 20:38

## 2022-02-03 RX ADMIN — FAMOTIDINE 10 MG: 20 TABLET ORAL at 09:30

## 2022-02-03 RX ADMIN — FAMOTIDINE 10 MG: 20 TABLET ORAL at 20:38

## 2022-02-03 RX ADMIN — ENOXAPARIN SODIUM 30 MG: 100 INJECTION SUBCUTANEOUS at 09:30

## 2022-02-03 RX ADMIN — ASPIRIN 81 MG 81 MG: 81 TABLET ORAL at 09:26

## 2022-02-03 RX ADMIN — TRAMADOL HYDROCHLORIDE 50 MG: 50 TABLET, COATED ORAL at 14:26

## 2022-02-03 ASSESSMENT — PAIN SCALES - GENERAL
PAINLEVEL_OUTOF10: 0
PAINLEVEL_OUTOF10: 6
PAINLEVEL_OUTOF10: 0
PAINLEVEL_OUTOF10: 6
PAINLEVEL_OUTOF10: 4

## 2022-02-03 ASSESSMENT — PAIN DESCRIPTION - LOCATION: LOCATION: OTHER (COMMENT)

## 2022-02-03 ASSESSMENT — PAIN DESCRIPTION - ONSET: ONSET: ON-GOING

## 2022-02-03 ASSESSMENT — PAIN DESCRIPTION - PROGRESSION: CLINICAL_PROGRESSION: NOT CHANGED

## 2022-02-03 ASSESSMENT — PAIN DESCRIPTION - FREQUENCY: FREQUENCY: INTERMITTENT

## 2022-02-03 ASSESSMENT — PAIN DESCRIPTION - PAIN TYPE: TYPE: CHRONIC PAIN

## 2022-02-03 ASSESSMENT — PAIN - FUNCTIONAL ASSESSMENT: PAIN_FUNCTIONAL_ASSESSMENT: ACTIVITIES ARE NOT PREVENTED

## 2022-02-03 ASSESSMENT — PAIN DESCRIPTION - DESCRIPTORS: DESCRIPTORS: ACHING

## 2022-02-03 NOTE — PROGRESS NOTES
Hospitalist Progress Note      Name:  Gabrielle Fernández /Age/Sex: 1940  (79 y.o. male)   MRN & CSN:  [id number] & [id number] Admission Date/Time: 2022 12:01 PM   Location:  [address] [place] [address]-A PCP: Daphney Heller, [address] Day: 5   discharge delayed pending transport    Assessment and Plan:   Gabrielle Fernández is a 79 y.o.  male  who presents with Acute metabolic encephalopathy      Dementia: Stabilizing-patient alert and oriented x3 this AM, talkative and answering questions appropriately. Brief hospital stay history   UA not indicative of UTI.  No metabolic derangements.  Head CT nonacute.  Evaluated by neurology who noted possible worsening dementia and recommended outpatient cognitive work-up.  Suspect worsening dementia;  patient lives at [facility] independent living with his wife. No indications for acute SNF placement today per review with social work. He can be discharged once cleared back to independent living and upgraded at [facility] within the system. If more care is needed. -Psychiatry consult-recommended risperidone 1 mg daily to target hallucinations. Continue on discharge. Prescription sent to [facility]  -return to home, discharge delayed pending transports  -Follow-up with neurology as outpatient for and does cognitive work-up.     HTN: per hx. BP variable and elevated at times.  With advanced age would allow BP to run slightly on the higher side.  Holding home ACE.  Cannot have PRN IV hydralazine on 1 E.    -Monitor BP and resume home ACE if BP remains elevated  -Blood pressure stable 135/64 this morning.      MARY BETH: BUN 25, creatinine 1.4, creatinine on admission 1.0.  -gentle IV fluids  -Monitor BMP     Diabetes Mellitus type II: per hx. 1/3/2022 HgbA1c 6.9%.  Holding home oral hypoglycemic.   -SSI. - held home PO meds  - POCT glucose qACHS  - hypoglycemia management protocol   - target blood glucose 100-180  - ADA carb controlled diet     PVD: per hx. S/p remote bilateral lower extremity BKA. Cont home ASA< cilostazol, statin. Has prosthesis at home and ambulates with a walker. PT/OT consult.      Dispo: Discussed with social work, Adrienne patient is from [facility] independent living lives with spouse. Patient has PT OT pending. Plan to be discharged back to [facility] living and possibly be upgraded SNF as needed.  -Discharge delayed due to transportation arrangements. Social work following.     Diet: ADULT DIET; Regular; 4 carb choices (60 gm/meal)  ADULT ORAL NUTRITION SUPPLEMENT; Breakfast, Dinner; Diabetic Oral Supplement  DVT prophylaxis: Lovenox  GI prophylaxis  CODE STATUS:Full Code    Treatment and discharge plan discussed with patient/family. Patient/family voiced understanding. This patient was seen examined and treatment plan discussed with/supervising physician. History of Present Illness:     Chief Complaint: Acute metabolic encephalopathy    Subjective  Patient examined this morning at bedside. Patient sitting up friendly and talkative this morning. Patient is alert and oriented x3 able to state his birthday name and the year. He was not sure facility he was in. Patient denies any new concerns denies any chest pain or nausea. Did state he needs help put on his prosthetics and I told him PT will be working with him while he is here. Updated him arranging for transport. ROS reviewed negative, unless as noted above    Objective: Intake/Output Summary (Last 24 hours) at 2/3/2022 1005  Last data filed at 2/2/2022 1955  Gross per 24 hour   Intake 440 ml   Output 220 ml   Net 220 ml      Vitals:   Vitals:    02/03/22 0847   BP:  121/67   Pulse:  82   Resp:  15   Temp:  97.9   SpO2: 99%     Physical Exam:   Physical Exam  Constitutional:       Appearance: Normal appearance. HENT:      Head: Normocephalic.       Mouth/Throat:      Mouth: Mucous membranes are moist.   Eyes:      Extraocular Movements: Extraocular movements intact. Conjunctiva/sclera: Conjunctivae normal.   Cardiovascular:      Rate and Rhythm: Normal rate and regular rhythm. Pulses: Normal pulses. Heart sounds: Normal heart sounds. Pulmonary:      Effort: Pulmonary effort is normal.      Breath sounds: Normal breath sounds. Abdominal:      General: Abdomen is flat. Palpations: Abdomen is soft. Musculoskeletal:         General: Normal range of motion. Comments: Bilateral BKA. Skin:     General: Skin is warm and dry. Neurological:      General: No focal deficit present. Mental Status: He is alert and oriented to person, place, and time.    Psychiatric:         Mood and Affect: Mood normal.         Medications:   Medications:    risperiDONE  1 mg Oral Daily    insulin lispro  0-12 Units SubCUTAneous TID WC    insulin lispro  0-6 Units SubCUTAneous Nightly    sodium chloride flush  5-40 mL IntraVENous 2 times per day    enoxaparin  30 mg SubCUTAneous Daily    aspirin  81 mg Oral Daily    atorvastatin  40 mg Oral Daily    carvedilol  6.25 mg Oral BID WC    cilostazol  100 mg Oral BID AC    docusate sodium  100 mg Oral Daily    famotidine  10 mg Oral BID    latanoprost  1 drop Both Eyes Nightly      Infusions:    dextrose      sodium chloride       PRN Meds: melatonin, 6 mg, Nightly PRN  glucose, 15 g, PRN  glucagon (rDNA), 1 mg, PRN  dextrose, 100 mL/hr, PRN  sodium chloride flush, 5-40 mL, PRN  sodium chloride, 25 mL, PRN  ondansetron, 4 mg, Q8H PRN   Or  ondansetron, 4 mg, Q6H PRN  polyethylene glycol, 17 g, Daily PRN  acetaminophen, 650 mg, Q6H PRN   Or  acetaminophen, 650 mg, Q6H PRN  dextrose bolus (hypoglycemia), 250 mL, PRN  traMADol, 50 mg, Q6H PRN        Recent Labs     02/01/22  0641   WBC 5.8   HGB 11.2*   HCT 34.0*         Recent Labs     02/01/22  0641      K 3.9      CO2 22   BUN 25*   CREATININE 1.4*     No results for input(s): AST, ALT, ALB, BILIDIR, BILITOT, ALKPHOS in the last 72 hours. No results for input(s): INR in the last 72 hours.   Recent Labs     02/01/22  0641   TROPONINT <0.010        Imaging reviewed      Electronically signed by MOLLY Gibbons CNP on 2/3/2022 at 10:05 AM

## 2022-02-03 NOTE — CARE COORDINATION
0930 CM into see pt. Pt is oriented to year, mos and knows where he lives. Pt was able to answer most questions but than began speaking of people that come in the condo at a snap of a finger. CM will follow up with wife. 1300 CM called wife Yvette Renee. Wife shared that pt is able to use bilateral prosthetic legs. Cm informed that they live in the [facility] at . Pt has a PCP, insurance and transportation. Pt began seeing people in the condo and that is why she brought him to the \[facility]\"". She believes she heard from someone that he had a brain tumor. CM messaged that NP to call wife. The plan will be for pt to return with [address] [place] [address] Rd to follow. Upon discharge pt will be going home with [address] [place] [address] Rd home care  Please fax H/P, D/S.  AVS, order, and face sheet to [phone]  Please call [phone] and inform of discharge  Currently due to weather there is no transport for pt. CM updated nursing of the above. 230 CM called [facility] and pt transport is set up for 4 pm.  CM called wife and updated her on discharge time tomorrow  for 4 PM. Wife informed CM that she will update her children. CM updated NP per PS. Eri [id number]  38  Pt will be returning home with wife   [address]   [facility] [id number]    [facility] transport.    Transport time at 4 PM on

## 2022-02-03 NOTE — PROGRESS NOTES
Contacted by our , Carlitos Bolaños, patient spouse requesting call back to discuss test results. I called and spoke with Mrs. Shiela Grant, his MRI results and CT results in his need to follow-up with neurology. States he has a appointment scheduled. She was worried about the hallucinations as he was trying to change people at the house. Discussed with her patient was evaluated by psychiatry and started on risperidone to help control hallucinations. I also added explanation medication his discharge paperwork. And he is to follow-up with his family doctor. She states patient cannot upgrade to skilled nursing as there insurance is not accepted by them. Explained discharge is delayed due to transportation issues.     She denied other concerns, instructed her to call back or if she speaks to social work and has further questions or concerns she can have them reach out to the medicine team.

## 2022-02-04 VITALS
TEMPERATURE: 97.3 F | BODY MASS INDEX: 20.72 KG/M2 | OXYGEN SATURATION: 96 % | HEIGHT: 68 IN | SYSTOLIC BLOOD PRESSURE: 128 MMHG | RESPIRATION RATE: 16 BRPM | WEIGHT: 136.69 LBS | DIASTOLIC BLOOD PRESSURE: 61 MMHG | HEART RATE: 81 BPM

## 2022-02-04 LAB
GLUCOSE BLD-MCNC: 131 MG/DL (ref 70–99)
GLUCOSE BLD-MCNC: 140 MG/DL (ref 70–99)
GLUCOSE BLD-MCNC: 178 MG/DL (ref 70–99)

## 2022-02-04 PROCEDURE — 6360000002 HC RX W HCPCS: Performed by: FAMILY MEDICINE

## 2022-02-04 PROCEDURE — 96372 THER/PROPH/DIAG INJ SC/IM: CPT

## 2022-02-04 PROCEDURE — 94761 N-INVAS EAR/PLS OXIMETRY MLT: CPT

## 2022-02-04 PROCEDURE — 6370000000 HC RX 637 (ALT 250 FOR IP): Performed by: NURSE PRACTITIONER

## 2022-02-04 PROCEDURE — 2580000003 HC RX 258: Performed by: FAMILY MEDICINE

## 2022-02-04 PROCEDURE — 6370000000 HC RX 637 (ALT 250 FOR IP): Performed by: FAMILY MEDICINE

## 2022-02-04 PROCEDURE — 82962 GLUCOSE BLOOD TEST: CPT

## 2022-02-04 PROCEDURE — G0378 HOSPITAL OBSERVATION PER HR: HCPCS

## 2022-02-04 RX ORDER — LORAZEPAM 0.5 MG/1
0.5 TABLET ORAL ONCE
Status: COMPLETED | OUTPATIENT
Start: 2022-02-04 | End: 2022-02-04

## 2022-02-04 RX ADMIN — RISPERIDONE 1 MG: 0.5 TABLET ORAL at 07:59

## 2022-02-04 RX ADMIN — DOCUSATE SODIUM 100 MG: 100 CAPSULE ORAL at 07:59

## 2022-02-04 RX ADMIN — CILOSTAZOL 100 MG: 100 TABLET ORAL at 07:59

## 2022-02-04 RX ADMIN — ATORVASTATIN CALCIUM 40 MG: 40 TABLET, FILM COATED ORAL at 10:37

## 2022-02-04 RX ADMIN — CARVEDILOL 6.25 MG: 6.25 TABLET, FILM COATED ORAL at 07:59

## 2022-02-04 RX ADMIN — ASPIRIN 81 MG 81 MG: 81 TABLET ORAL at 07:59

## 2022-02-04 RX ADMIN — FAMOTIDINE 10 MG: 20 TABLET ORAL at 07:59

## 2022-02-04 RX ADMIN — LORAZEPAM 0.5 MG: 0.5 TABLET ORAL at 01:53

## 2022-02-04 RX ADMIN — CILOSTAZOL 100 MG: 100 TABLET ORAL at 15:49

## 2022-02-04 RX ADMIN — ENOXAPARIN SODIUM 30 MG: 100 INJECTION SUBCUTANEOUS at 07:59

## 2022-02-04 RX ADMIN — CARVEDILOL 6.25 MG: 6.25 TABLET, FILM COATED ORAL at 17:38

## 2022-02-04 RX ADMIN — SODIUM CHLORIDE, PRESERVATIVE FREE 10 ML: 5 INJECTION INTRAVENOUS at 10:37

## 2022-02-04 NOTE — CARE COORDINATION
CM called [address] [place] [address] Rd and informed of discharge. CM faxed AVS order and face sheet to [address] [place] [address] Rd. CM placed envelope in soft chart. CM collaborated with nursing and updated. CM called wife Kiel Castillo and informed her that pt would be returning home today at 4 pm. [address]  CM received call from [facility] and they are unable to  pt until 630 pm. CM called nursing and informed. CM called wife and updated her.  [address]

## 2022-02-04 NOTE — PROGRESS NOTES
Pt discharged to home with [facility]. [facility] provided transportation to home residence with wife. IV removed from right forearm without complication. Prosthetic legs sent with pt.

## 2022-02-04 NOTE — PROGRESS NOTES
Mr. Ernesto Cho was originally set for discharge on 2/2 however his discharge was delayed due to obtaining transport and weather conditions. Refer to updated discharge summary.

## 2022-02-04 NOTE — DISCHARGE SUMMARY
Discharge Summary    Name:  Selin Barahona /Age/Sex: 1940  (79 y.o. male)   MRN & CSN:  [id number] & [id number] Admission Date/Time: 2022 12:01 PM   Attending:  Jolene Barnhart MD Discharging Physician: MOLLY Velasco [facility]     Hospital Course:   Selin Barahona is a 79 y.o.  male  who presents with Acute metabolic encephalopathy  He presented to the emergency room with altered mental status and confusion. He had similar episode in admission in 2022. Patient was again evaluated by neurology. Patient did not have any nonfocal nonlateralizing weaknesses. Patient recently had MRI brain 3 weeks ago that was negative discussed with neurology and there did not seem to be a reason to repeat MRI brain as it would not change plan. Recommend patient follow-up closely for in-depth cognitive work-up as outpatient. Patient is to follow-up after last hospitalization with neurology but follow-up was not completed. Patient was also evaluated by psychiatry due to hallucinations. Started patient on risperidone 1 mg daily to continue at discharge. No further hallucinations during his hospitalization after initiation of medication. He was also evaluated by physical therapy/ occupational therapy. PT noted patient function at baseline no need for acute rehab. Did recommend possible home health care services for PT to be of benefit. Patient is a resident of [facility] where he resides with his spouse. There is also accessed assisted living and SNF care at this facility. Patient chronic conditions remained stable throughout his hospitalization. He had no acute events. Update  --patient's discharge was delayed due to transportation issues and strong conditions. Patient is set up for transport today with [facility] at 4 PM.  Patient continue to monitor during his hospitalization with no adverse events.   I did call his wife (2/3) and review his test results and the need for him to follow-up with neurology as planned. Also discussed psychiatry starting patient on risperidone and that would be a new medication for him to continue. Mrs. Jain verbalized understanding. She did state that patient cannot upgrade to SNF at [facility] as they do not accept her insurance. The patient expressed appropriate understanding of and agreement with the discharge recommendations, medications, and plan. Consults this admission:  IP CONSULT TO HOSPITALIST  IP CONSULT TO NEUROLOGY  IP CONSULT TO PSYCHIATRY  IP CONSULT TO SOCIAL WORK  IP CONSULT TO HOME CARE NEEDS    Discharge Instruction:   Follow up appointments: Neurology within 1 week, family doctor  Primary care physician:  within 2 weeks    Diet:  diabetic diet   Activity: activity as tolerated per PT OT recommendations.   Disposition: Discharged to:   [x]Home, [x][facility], []SNF, []Acute Rehab, []Hospice   Condition on discharge: Stable    Discharge Medications:        Medication List      START taking these medications    risperiDONE 1 MG tablet  Commonly known as: RISPERDAL  Take 1 tablet by mouth daily        CONTINUE taking these medications    aspirin 81 MG chewable tablet     atorvastatin 40 MG tablet  Commonly known as: LIPITOR     busPIRone 5 MG tablet  Commonly known as: BUSPAR     carvedilol 6.25 MG tablet  Commonly known as: COREG  Take 1 tablet by mouth 2 times daily (with meals)     Cholecalciferol 50 MCG (2000 UT) Tabs     cholestyramine light 4 g packet  Take 1 packet by mouth 2 times daily as needed (diarrhea)     cilostazol 100 MG tablet  Commonly known as: PLETAL     dilTIAZem 60 MG tablet  Commonly known as: CARDIZEM  Take 1 tablet by mouth 2 times daily     docusate sodium 100 MG capsule  Commonly known as: COLACE     famotidine 40 MG tablet  Commonly known as: PEPCID     gabapentin 100 MG capsule  Commonly known as: NEURONTIN     glimepiride 4 MG tablet  Commonly known as: AMARYL  Take 1 tablet by mouth every morning (before breakfast)     insulin lispro 100 UNIT/ML injection vial  Commonly known as: HUMALOG  Inject 0-12 Units into the skin 3 times daily (with meals) **Medium Dose Corrective Algorithm**  Glucose: Dose:  If <139             No Insulin  140-199 2 Units  200-249 4 Units  250-299 6 Units  300-349 8 Units  350-400 10 Units  Above 400       12 Units     latanoprost 0.005 % ophthalmic solution  Commonly known as: XALATAN     Levemir FlexTouch 100 UNIT/ML injection pen  Generic drug: insulin detemir  Inject 10 Units into the skin nightly     lisinopril 5 MG tablet  Commonly known as: PRINIVIL;ZESTRIL  Take 1 tablet by mouth daily     MULTIVITAMIN PO     ondansetron 4 MG tablet  Commonly known as: ZOFRAN     traMADol 50 MG tablet  Commonly known as: ULTRAM           Where to Get Your Medications      These medications were sent to [address] [place] [address] 56, [facility]    Phone: [phone]   · risperiDONE 1 MG tablet         Objective Findings at Discharge:   BP (!) 168/89   Pulse 84   Temp 97.3 °F (36.3 °C)   Resp 18   Ht 5' 8\" (1.727 m)   Wt 136 lb 11 oz (62 kg)   SpO2 94%   BMI 20.78 kg/m²            PHYSICAL EXAM   GEN Awake male, laying in bed in no apparent distress. Appears given age. EYES Pupils are equally round. No scleral erythema, discharge, or conjunctivitis. HENT Mucous membranes are moist.   NECK Supple, no apparent thyromegaly or masses. RESP Clear to auscultation, no wheezes, rales or rhonchi. Symmetric chest movement while on room air. CARDIO/VASC S1/S2 auscultated. Regular rate without appreciable murmurs, rubs, or gallops. GI Abdomen is soft without significant tenderness, masses, or guarding. Bowel sounds are normoactive. .   MSK bilateral BKA  SKIN Normal coloration, warm, dry.   NEURO Cranial nerves appear grossly intact, normal speech, no lateralizing weakness. PSYCH Awake, alert, oriented to self and location. States he lives with his wife. Affect appropriate. BMP/CBC  No results for input(s): NA, K, CL, CO2, BUN, CREATININE, GLU, WBC, HEMOGLOBIN, HCT, PLT in the last 72 hours. IMAGING:  Reviewed.     Discharge Time of 35 minutes    Electronically signed by MOLLY Rincon CNP on 2/4/2022 at 11:26 AM

## 2022-02-09 ENCOUNTER — OFFICE VISIT (OUTPATIENT)
Dept: NEUROLOGY | Age: 82
End: 2022-02-09
Payer: MEDICARE

## 2022-02-09 VITALS
WEIGHT: 145 LBS | SYSTOLIC BLOOD PRESSURE: 118 MMHG | OXYGEN SATURATION: 96 % | BODY MASS INDEX: 21.98 KG/M2 | HEART RATE: 77 BPM | DIASTOLIC BLOOD PRESSURE: 64 MMHG | HEIGHT: 68 IN

## 2022-02-09 DIAGNOSIS — F03.91 DEMENTIA WITH BEHAVIORAL DISTURBANCE, UNSPECIFIED DEMENTIA TYPE: Primary | ICD-10-CM

## 2022-02-09 PROCEDURE — 1036F TOBACCO NON-USER: CPT | Performed by: PSYCHIATRY & NEUROLOGY

## 2022-02-09 PROCEDURE — 99214 OFFICE O/P EST MOD 30 MIN: CPT | Performed by: PSYCHIATRY & NEUROLOGY

## 2022-02-09 PROCEDURE — G8484 FLU IMMUNIZE NO ADMIN: HCPCS | Performed by: PSYCHIATRY & NEUROLOGY

## 2022-02-09 PROCEDURE — G8427 DOCREV CUR MEDS BY ELIG CLIN: HCPCS | Performed by: PSYCHIATRY & NEUROLOGY

## 2022-02-09 PROCEDURE — 4040F PNEUMOC VAC/ADMIN/RCVD: CPT | Performed by: PSYCHIATRY & NEUROLOGY

## 2022-02-09 PROCEDURE — 1123F ACP DISCUSS/DSCN MKR DOCD: CPT | Performed by: PSYCHIATRY & NEUROLOGY

## 2022-02-09 PROCEDURE — G8420 CALC BMI NORM PARAMETERS: HCPCS | Performed by: PSYCHIATRY & NEUROLOGY

## 2022-02-09 RX ORDER — MEMANTINE HYDROCHLORIDE 5 MG/1
TABLET ORAL
Qty: 42 TABLET | Refills: 0 | Status: ON HOLD | OUTPATIENT
Start: 2022-02-09 | End: 2022-07-09 | Stop reason: HOSPADM

## 2022-02-09 RX ORDER — MEMANTINE HYDROCHLORIDE 10 MG/1
10 TABLET ORAL 2 TIMES DAILY
Qty: 60 TABLET | Refills: 5 | Status: SHIPPED | OUTPATIENT
Start: 2022-02-09

## 2022-02-09 NOTE — PROGRESS NOTES
mouth daily 60 tablet 1    cilostazol (PLETAL) 100 MG tablet       traMADol (ULTRAM) 50 MG tablet Take 50 mg by mouth every 6 hours as needed.  insulin detemir (LEVEMIR FLEXTOUCH) 100 UNIT/ML injection pen Inject 10 Units into the skin nightly 5 pen 0    cholestyramine light 4 g packet Take 1 packet by mouth 2 times daily as needed (diarrhea) 30 packet 0    famotidine (PEPCID) 40 MG tablet Take 40 mg by mouth daily      insulin lispro (HUMALOG) 100 UNIT/ML injection vial Inject 0-12 Units into the skin 3 times daily (with meals) **Medium Dose Corrective Algorithm**  Glucose: Dose:  If <139             No Insulin  140-199 2 Units  200-249 4 Units  250-299 6 Units  300-349 8 Units  350-400 10 Units  Above 400       12 Units 1 vial 3    carvedilol (COREG) 6.25 MG tablet Take 1 tablet by mouth 2 times daily (with meals) 60 tablet 0    ondansetron (ZOFRAN) 4 MG tablet Take 4 mg by mouth 2 times daily as needed for Nausea or Vomiting      gabapentin (NEURONTIN) 100 MG capsule Take 100 mg by mouth nightly.  Cholecalciferol 50 MCG (2000 UT) TABS Take one tablet daily by mouth      atorvastatin (LIPITOR) 40 MG tablet Take 40 mg by mouth daily      docusate sodium (COLACE) 100 MG capsule Take 100 mg by mouth nightly as needed       latanoprost (XALATAN) 0.005 % ophthalmic solution Place 1 drop into both eyes nightly       diltiazem (CARDIZEM) 60 MG tablet Take 1 tablet by mouth 2 times daily 90 tablet 3    Multiple Vitamins-Minerals (MULTIVITAMIN PO) Take 1 tablet by mouth daily.  aspirin 81 MG chewable tablet Take 81 mg by mouth daily        No current facility-administered medications for this visit. Physical Exam:  Also present during visit: spouse.     Mental Status   Orientation: oriented to person, oriented to place, oriented to time and disoriented to problem    Mood/affectappropriate mood and appropriate affect   Memory/Other: remote memory intact, attention span normal, concentration normal, recent memory impaired and reduced fund of knowledge     MMSE TOTAL = 24/30  Orientation = 9/10  Registration = 3/3  Naming = 2/2  Repetition = 1/1  Spelling \"WORLD\" backwards = 3/5  Following a three-step command = 2/3  Following a written command = 1/1  Copying two intersecting pentagons = 0/1  Writing a sentence = 1/1  Recall = 2/3      Language  Language: (normal) language, no dysarthria, (normal) articulation and no dysphasia/aphasia  Cranial Nerves   Eyes: pupils normal size and reactive to light and visual fields appear full   CN III, IV, VI : extraocular muscle strength normal, normal pursuit, no nystagmus and no ptosis   Facial Motor: normal facial motor   CN XII: tongue protrudes midline  Motor/Coordination Exam   Power: motor strength appears intact throughout, no arm drift, normal tone and Unable to test distal lower extremity sensation due to distal lower extremity prosthesis bilaterally   Coordination: normal finger-to-nose, forearm rotation intact and rapid alternating movement normal  Gait and Stance   Gait/Posture: unsteady casual gait moderate and Ambulates with walker      /64 (Site: Right Upper Arm, Position: Sitting, Cuff Size: Medium Adult)   Pulse 77   Ht 5' 8\" (1.727 m)   Wt 145 lb (65.8 kg)   SpO2 96%   BMI 22.05 kg/m²     Assessment and Plan     Diagnosis Orders   1. Dementia with behavioral disturbance, unspecified dementia type (HCC)  memantine (NAMENDA) 5 MG tablet    memantine (NAMENDA) 10 MG tablet     Jessicayair Sachi presents today to establish care for his dementia with a fairly predominant component of hallucinations which is somewhat atypical in the early stages though he has had some short-term memory problems for several years. His MMSE was 24/30. There are no physical manifestations to suggest a Lewy body dementia. I do not see features according to history nor other radiographic features to suggest a frontotemporal dementia.   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 I happy to see that his hallucinations have improved on Risperdal 1 mg at bedtime which has just been increased to 1 mg twice daily. His wife states that he is better today than he usually is and he did have his morning dose today. He did not tolerate donepezil in the past and therefore we will trial memantine to be titrated up to 10 mg daily to help sort on the progress memory decline. We discussed nonpharmacologic interventions including staying active cognitively, socially, and physically to help slow down the progression of memory loss. Return in about 4 months (around 6/9/2022) for Follow-up PA/NP.     Meme Springer, DO

## 2022-02-17 ENCOUNTER — HOSPITAL ENCOUNTER (OUTPATIENT)
Age: 82
Setting detail: SPECIMEN
Discharge: HOME | End: 2022-02-17
Payer: MEDICARE

## 2022-02-17 LAB
BACTERIA: NEGATIVE /HPF
BILIRUBIN URINE: NEGATIVE MG/DL
BLOOD, URINE: NEGATIVE
CLARITY: CLEAR
COLOR: YELLOW
GLUCOSE, URINE: NEGATIVE MG/DL
HYALINE CASTS: 5 /LPF
KETONES, URINE: ABNORMAL MG/DL
LEUKOCYTE ESTERASE, URINE: NEGATIVE
MUCUS: ABNORMAL HPF
NITRITE URINE, QUANTITATIVE: NEGATIVE
PH, URINE: 6 (ref 5–8)
PROTEIN UA: NEGATIVE MG/DL
RBC URINE: ABNORMAL /HPF (ref 0–3)
SPECIFIC GRAVITY UA: 1.02 (ref 1–1.03)
UROBILINOGEN, URINE: NORMAL MG/DL (ref 0.2–1)
WBC UA: <1 /HPF (ref 0–2)

## 2022-02-17 PROCEDURE — 81001 URINALYSIS AUTO W/SCOPE: CPT

## 2022-02-17 PROCEDURE — 87086 URINE CULTURE/COLONY COUNT: CPT

## 2022-02-18 LAB
CULTURE: NORMAL
Lab: NORMAL
SPECIMEN: NORMAL

## 2022-02-28 ENCOUNTER — TELEPHONE (OUTPATIENT)
Dept: NEUROLOGY | Age: 82
End: 2022-02-28

## 2022-02-28 NOTE — TELEPHONE ENCOUNTER
Patient's wife called and states that the patient was having a bad reaction to Memantine and had to stop giving it to him. Patient's wife states he absolutely cannot take it anymore.

## 2022-03-01 NOTE — TELEPHONE ENCOUNTER
We will stop the medication at this time. Not a lot of other substitutes as memantine and donepezil are in 2 of the only medication classes we have to help slow down the progress of memory decline. He also had a reaction to donepezil in the past.  No additional changes at this time.

## 2022-03-15 ENCOUNTER — HOSPITAL ENCOUNTER (OUTPATIENT)
Age: 82
Setting detail: SPECIMEN
Discharge: HOME | End: 2022-03-15
Payer: MEDICARE

## 2022-03-15 LAB
ALBUMIN SERPL-MCNC: 4.4 GM/DL (ref 3.4–5)
ALP BLD-CCNC: 87 IU/L (ref 40–128)
ALT SERPL-CCNC: 12 U/L (ref 10–40)
ANION GAP SERPL CALCULATED.3IONS-SCNC: 14 MMOL/L (ref 4–16)
AST SERPL-CCNC: 23 IU/L (ref 15–37)
BASOPHILS ABSOLUTE: 0.1 K/CU MM
BASOPHILS RELATIVE PERCENT: 1 % (ref 0–1)
BILIRUB SERPL-MCNC: 0.6 MG/DL (ref 0–1)
BUN BLDV-MCNC: 15 MG/DL (ref 6–23)
CALCIUM SERPL-MCNC: 9.9 MG/DL (ref 8.3–10.6)
CHLORIDE BLD-SCNC: 101 MMOL/L (ref 99–110)
CHOLESTEROL: 132 MG/DL
CO2: 22 MMOL/L (ref 21–32)
CREAT SERPL-MCNC: 1.2 MG/DL (ref 0.9–1.3)
DIFFERENTIAL TYPE: ABNORMAL
EOSINOPHILS ABSOLUTE: 1.2 K/CU MM
EOSINOPHILS RELATIVE PERCENT: 15.3 % (ref 0–3)
GFR AFRICAN AMERICAN: >60 ML/MIN/1.73M2
GFR NON-AFRICAN AMERICAN: 58 ML/MIN/1.73M2
GLUCOSE BLD-MCNC: 103 MG/DL (ref 70–99)
HCT VFR BLD CALC: 39.3 % (ref 42–52)
HDLC SERPL-MCNC: 67 MG/DL
HEMOGLOBIN: 12.7 GM/DL (ref 13.5–18)
IMMATURE NEUTROPHIL %: 0.2 % (ref 0–0.43)
LDL CHOLESTEROL CALCULATED: 54 MG/DL
LDL CHOLESTEROL DIRECT: 64 MG/DL
LYMPHOCYTES ABSOLUTE: 1.9 K/CU MM
LYMPHOCYTES RELATIVE PERCENT: 23.4 % (ref 24–44)
MCH RBC QN AUTO: 29.7 PG (ref 27–31)
MCHC RBC AUTO-ENTMCNC: 32.3 % (ref 32–36)
MCV RBC AUTO: 92 FL (ref 78–100)
MONOCYTES ABSOLUTE: 0.8 K/CU MM
MONOCYTES RELATIVE PERCENT: 10.3 % (ref 0–4)
NUCLEATED RBC %: 0 %
PDW BLD-RTO: 13.1 % (ref 11.7–14.9)
PLATELET # BLD: 195 K/CU MM (ref 140–440)
PMV BLD AUTO: 9.9 FL (ref 7.5–11.1)
POTASSIUM SERPL-SCNC: 4.5 MMOL/L (ref 3.5–5.1)
RBC # BLD: 4.27 M/CU MM (ref 4.6–6.2)
SEGMENTED NEUTROPHILS ABSOLUTE COUNT: 4 K/CU MM
SEGMENTED NEUTROPHILS RELATIVE PERCENT: 49.8 % (ref 36–66)
SODIUM BLD-SCNC: 137 MMOL/L (ref 135–145)
TOTAL IMMATURE NEUTOROPHIL: 0.02 K/CU MM
TOTAL NUCLEATED RBC: 0 K/CU MM
TOTAL PROTEIN: 6.5 GM/DL (ref 6.4–8.2)
TRIGL SERPL-MCNC: 56 MG/DL
WBC # BLD: 8 K/CU MM (ref 4–10.5)

## 2022-03-15 PROCEDURE — 85025 COMPLETE CBC W/AUTO DIFF WBC: CPT

## 2022-03-15 PROCEDURE — 80061 LIPID PANEL: CPT

## 2022-03-15 PROCEDURE — 83721 ASSAY OF BLOOD LIPOPROTEIN: CPT

## 2022-03-15 PROCEDURE — 80053 COMPREHEN METABOLIC PANEL: CPT

## 2022-03-25 ENCOUNTER — HOSPITAL ENCOUNTER (OUTPATIENT)
Age: 82
Setting detail: SPECIMEN
Discharge: HOME | End: 2022-03-25
Payer: MEDICARE

## 2022-03-25 LAB
BACTERIA: NEGATIVE /HPF
BILIRUBIN URINE: NEGATIVE MG/DL
BLOOD, URINE: NEGATIVE
CLARITY: CLEAR
COLOR: YELLOW
GLUCOSE, URINE: NEGATIVE MG/DL
HYALINE CASTS: 0 /LPF
KETONES, URINE: NEGATIVE MG/DL
LEUKOCYTE ESTERASE, URINE: NEGATIVE
NITRITE URINE, QUANTITATIVE: NEGATIVE
PH, URINE: 6.5 (ref 5–8)
PROTEIN UA: NEGATIVE MG/DL
RBC URINE: NORMAL /HPF (ref 0–3)
SPECIFIC GRAVITY UA: 1.02 (ref 1–1.03)
TRICHOMONAS: NORMAL /HPF
UROBILINOGEN, URINE: NORMAL MG/DL (ref 0.2–1)
WBC UA: NORMAL /HPF (ref 0–2)

## 2022-03-25 PROCEDURE — 87086 URINE CULTURE/COLONY COUNT: CPT

## 2022-03-25 PROCEDURE — 81001 URINALYSIS AUTO W/SCOPE: CPT

## 2022-03-27 LAB
CULTURE: NORMAL
Lab: NORMAL
SPECIMEN: NORMAL

## 2022-04-05 ENCOUNTER — HOSPITAL ENCOUNTER (OUTPATIENT)
Age: 82
Setting detail: SPECIMEN
Discharge: HOME | End: 2022-04-05
Payer: MEDICARE

## 2022-04-05 PROCEDURE — 83036 HEMOGLOBIN GLYCOSYLATED A1C: CPT

## 2022-04-06 LAB
ESTIMATED AVERAGE GLUCOSE: 143 MG/DL
HBA1C MFR BLD: 6.6 % (ref 4.2–6.3)

## 2022-06-02 ENCOUNTER — HOSPITAL ENCOUNTER (OUTPATIENT)
Age: 82
Setting detail: SPECIMEN
Discharge: HOME | End: 2022-06-02
Payer: MEDICARE

## 2022-06-02 LAB
BACTERIA: NEGATIVE /HPF
BILIRUBIN URINE: NEGATIVE MG/DL
BLOOD, URINE: NEGATIVE
CAST TYPE: NORMAL /HPF
CLARITY: CLEAR
COLOR: YELLOW
CRYSTAL TYPE: NEGATIVE /HPF
EPITHELIAL CELLS, UA: NEGATIVE /HPF
GLUCOSE, URINE: NEGATIVE MG/DL
KETONES, URINE: NEGATIVE MG/DL
LEUKOCYTE ESTERASE, URINE: NEGATIVE
NITRITE URINE, QUANTITATIVE: NEGATIVE
PH, URINE: 6 (ref 5–8)
PROTEIN UA: NEGATIVE MG/DL
RBC URINE: NEGATIVE /HPF (ref 0–3)
SPECIFIC GRAVITY UA: >1.03 (ref 1–1.03)
UROBILINOGEN, URINE: 0.2 MG/DL (ref 0.2–1)
WBC UA: NEGATIVE /HPF (ref 0–2)

## 2022-06-02 PROCEDURE — 87086 URINE CULTURE/COLONY COUNT: CPT

## 2022-06-02 PROCEDURE — 81001 URINALYSIS AUTO W/SCOPE: CPT

## 2022-06-04 LAB
CULTURE: NORMAL
Lab: NORMAL
SPECIMEN: NORMAL

## 2022-06-27 ENCOUNTER — HOSPITAL ENCOUNTER (OUTPATIENT)
Age: 82
Setting detail: SPECIMEN
Discharge: HOME | End: 2022-06-27
Payer: MEDICARE

## 2022-06-27 LAB
FERRITIN: 142 NG/ML (ref 30–400)
FOLATE: >20 NG/ML (ref 3.1–17.5)
IRON: 32 UG/DL (ref 59–158)
PCT TRANSFERRIN: 16 % (ref 10–44)
TOTAL IRON BINDING CAPACITY: 203 UG/DL (ref 250–450)
UNSATURATED IRON BINDING CAPACITY: 171 UG/DL (ref 110–370)
VITAMIN B-12: 661.2 PG/ML (ref 211–911)

## 2022-06-27 PROCEDURE — 82607 VITAMIN B-12: CPT

## 2022-06-27 PROCEDURE — 83540 ASSAY OF IRON: CPT

## 2022-06-27 PROCEDURE — 82728 ASSAY OF FERRITIN: CPT

## 2022-06-27 PROCEDURE — 82746 ASSAY OF FOLIC ACID SERUM: CPT

## 2022-06-27 PROCEDURE — 83550 IRON BINDING TEST: CPT

## 2022-07-05 ENCOUNTER — HOSPITAL ENCOUNTER (INPATIENT)
Age: 82
LOS: 4 days | Discharge: NURSING, REHAB OR LONG-TERM CARE | DRG: 637 | End: 2022-07-09
Attending: FAMILY MEDICINE | Admitting: INTERNAL MEDICINE
Payer: MEDICARE

## 2022-07-05 PROBLEM — E16.2 HYPOGLYCEMIA: Status: ACTIVE | Noted: 2022-07-05

## 2022-07-05 LAB — GLUCOSE BLD-MCNC: 251 MG/DL (ref 70–99)

## 2022-07-05 PROCEDURE — 36415 COLL VENOUS BLD VENIPUNCTURE: CPT

## 2022-07-05 PROCEDURE — 82962 GLUCOSE BLOOD TEST: CPT

## 2022-07-05 PROCEDURE — 83036 HEMOGLOBIN GLYCOSYLATED A1C: CPT

## 2022-07-05 PROCEDURE — 6370000000 HC RX 637 (ALT 250 FOR IP): Performed by: PHYSICIAN ASSISTANT

## 2022-07-05 PROCEDURE — 1200000000 HC SEMI PRIVATE

## 2022-07-05 PROCEDURE — 94761 N-INVAS EAR/PLS OXIMETRY MLT: CPT

## 2022-07-05 PROCEDURE — 2580000003 HC RX 258: Performed by: PHYSICIAN ASSISTANT

## 2022-07-05 RX ORDER — ATORVASTATIN CALCIUM 40 MG/1
40 TABLET, FILM COATED ORAL DAILY
Status: DISCONTINUED | OUTPATIENT
Start: 2022-07-05 | End: 2022-07-09 | Stop reason: HOSPADM

## 2022-07-05 RX ORDER — ACETAMINOPHEN 325 MG/1
650 TABLET ORAL EVERY 6 HOURS PRN
Status: DISCONTINUED | OUTPATIENT
Start: 2022-07-05 | End: 2022-07-09 | Stop reason: HOSPADM

## 2022-07-05 RX ORDER — CILOSTAZOL 50 MG/1
100 TABLET ORAL
Status: DISCONTINUED | OUTPATIENT
Start: 2022-07-05 | End: 2022-07-09 | Stop reason: HOSPADM

## 2022-07-05 RX ORDER — ENOXAPARIN SODIUM 100 MG/ML
40 INJECTION SUBCUTANEOUS DAILY
Status: DISCONTINUED | OUTPATIENT
Start: 2022-07-05 | End: 2022-07-09 | Stop reason: HOSPADM

## 2022-07-05 RX ORDER — ONDANSETRON 2 MG/ML
4 INJECTION INTRAMUSCULAR; INTRAVENOUS EVERY 6 HOURS PRN
Status: DISCONTINUED | OUTPATIENT
Start: 2022-07-05 | End: 2022-07-09 | Stop reason: HOSPADM

## 2022-07-05 RX ORDER — SODIUM CHLORIDE 0.9 % (FLUSH) 0.9 %
10 SYRINGE (ML) INJECTION EVERY 12 HOURS SCHEDULED
Status: DISCONTINUED | OUTPATIENT
Start: 2022-07-05 | End: 2022-07-09 | Stop reason: HOSPADM

## 2022-07-05 RX ORDER — SODIUM CHLORIDE 9 MG/ML
INJECTION, SOLUTION INTRAVENOUS PRN
Status: DISCONTINUED | OUTPATIENT
Start: 2022-07-05 | End: 2022-07-09 | Stop reason: HOSPADM

## 2022-07-05 RX ORDER — MEMANTINE HYDROCHLORIDE 10 MG/1
10 TABLET ORAL 2 TIMES DAILY
Status: DISCONTINUED | OUTPATIENT
Start: 2022-07-05 | End: 2022-07-09 | Stop reason: HOSPADM

## 2022-07-05 RX ORDER — CARVEDILOL 6.25 MG/1
6.25 TABLET ORAL 2 TIMES DAILY WITH MEALS
Status: DISCONTINUED | OUTPATIENT
Start: 2022-07-05 | End: 2022-07-09 | Stop reason: HOSPADM

## 2022-07-05 RX ORDER — GABAPENTIN 100 MG/1
100 CAPSULE ORAL NIGHTLY
Status: DISCONTINUED | OUTPATIENT
Start: 2022-07-05 | End: 2022-07-09 | Stop reason: HOSPADM

## 2022-07-05 RX ORDER — SODIUM CHLORIDE 0.9 % (FLUSH) 0.9 %
10 SYRINGE (ML) INJECTION PRN
Status: DISCONTINUED | OUTPATIENT
Start: 2022-07-05 | End: 2022-07-09 | Stop reason: HOSPADM

## 2022-07-05 RX ORDER — ONDANSETRON 4 MG/1
4 TABLET, ORALLY DISINTEGRATING ORAL EVERY 8 HOURS PRN
Status: DISCONTINUED | OUTPATIENT
Start: 2022-07-05 | End: 2022-07-09 | Stop reason: HOSPADM

## 2022-07-05 RX ORDER — POLYETHYLENE GLYCOL 3350 17 G
2 POWDER IN PACKET (EA) ORAL
Status: DISCONTINUED | OUTPATIENT
Start: 2022-07-05 | End: 2022-07-05

## 2022-07-05 RX ORDER — DEXTROSE MONOHYDRATE 50 MG/ML
100 INJECTION, SOLUTION INTRAVENOUS PRN
Status: DISCONTINUED | OUTPATIENT
Start: 2022-07-05 | End: 2022-07-09 | Stop reason: HOSPADM

## 2022-07-05 RX ORDER — TRAMADOL HYDROCHLORIDE 50 MG/1
50 TABLET ORAL EVERY 6 HOURS PRN
Status: DISCONTINUED | OUTPATIENT
Start: 2022-07-05 | End: 2022-07-09 | Stop reason: HOSPADM

## 2022-07-05 RX ORDER — BUSPIRONE HYDROCHLORIDE 5 MG/1
5 TABLET ORAL 2 TIMES DAILY
Status: DISCONTINUED | OUTPATIENT
Start: 2022-07-05 | End: 2022-07-09 | Stop reason: HOSPADM

## 2022-07-05 RX ORDER — ASPIRIN 81 MG/1
81 TABLET, CHEWABLE ORAL DAILY
Status: DISCONTINUED | OUTPATIENT
Start: 2022-07-06 | End: 2022-07-09 | Stop reason: HOSPADM

## 2022-07-05 RX ORDER — LATANOPROST 50 UG/ML
1 SOLUTION/ DROPS OPHTHALMIC NIGHTLY
Status: DISCONTINUED | OUTPATIENT
Start: 2022-07-05 | End: 2022-07-09 | Stop reason: HOSPADM

## 2022-07-05 RX ORDER — ACETAMINOPHEN 650 MG/1
650 SUPPOSITORY RECTAL EVERY 6 HOURS PRN
Status: DISCONTINUED | OUTPATIENT
Start: 2022-07-05 | End: 2022-07-09 | Stop reason: HOSPADM

## 2022-07-05 RX ORDER — DILTIAZEM HYDROCHLORIDE 60 MG/1
60 CAPSULE, EXTENDED RELEASE ORAL 2 TIMES DAILY
Status: DISCONTINUED | OUTPATIENT
Start: 2022-07-05 | End: 2022-07-09 | Stop reason: HOSPADM

## 2022-07-05 RX ORDER — NICOTINE 21 MG/24HR
1 PATCH, TRANSDERMAL 24 HOURS TRANSDERMAL DAILY
Status: DISCONTINUED | OUTPATIENT
Start: 2022-07-05 | End: 2022-07-05

## 2022-07-05 RX ORDER — POLYETHYLENE GLYCOL 3350 17 G/17G
17 POWDER, FOR SOLUTION ORAL DAILY PRN
Status: DISCONTINUED | OUTPATIENT
Start: 2022-07-05 | End: 2022-07-09 | Stop reason: HOSPADM

## 2022-07-05 RX ORDER — RISPERIDONE 0.5 MG/1
1 TABLET, FILM COATED ORAL DAILY
Status: DISCONTINUED | OUTPATIENT
Start: 2022-07-05 | End: 2022-07-09 | Stop reason: HOSPADM

## 2022-07-05 RX ORDER — FAMOTIDINE 20 MG/1
20 TABLET, FILM COATED ORAL DAILY
Status: DISCONTINUED | OUTPATIENT
Start: 2022-07-05 | End: 2022-07-09 | Stop reason: HOSPADM

## 2022-07-05 RX ADMIN — LATANOPROST 1 DROP: 50 SOLUTION/ DROPS OPHTHALMIC at 23:06

## 2022-07-05 RX ADMIN — CILOSTAZOL 100 MG: 50 TABLET ORAL at 18:14

## 2022-07-05 RX ADMIN — ATORVASTATIN CALCIUM 40 MG: 40 TABLET, FILM COATED ORAL at 20:56

## 2022-07-05 RX ADMIN — FAMOTIDINE 20 MG: 20 TABLET ORAL at 20:55

## 2022-07-05 RX ADMIN — MEMANTINE 10 MG: 10 TABLET ORAL at 20:55

## 2022-07-05 RX ADMIN — SODIUM CHLORIDE, PRESERVATIVE FREE 10 ML: 5 INJECTION INTRAVENOUS at 20:56

## 2022-07-05 RX ADMIN — CARVEDILOL 6.25 MG: 6.25 TABLET, FILM COATED ORAL at 18:14

## 2022-07-05 RX ADMIN — DILTIAZEM HYDROCHLORIDE 60 MG: 60 CAPSULE, EXTENDED RELEASE ORAL at 20:55

## 2022-07-05 RX ADMIN — RISPERIDONE 1 MG: 0.5 TABLET ORAL at 18:14

## 2022-07-05 RX ADMIN — BUSPIRONE HYDROCHLORIDE 5 MG: 5 TABLET ORAL at 20:55

## 2022-07-05 RX ADMIN — GABAPENTIN 100 MG: 100 CAPSULE ORAL at 20:55

## 2022-07-05 ASSESSMENT — PAIN SCALES - PAIN ASSESSMENT IN ADVANCED DEMENTIA (PAINAD)
BODYLANGUAGE: 0
CONSOLABILITY: 0
NEGVOCALIZATION: 0
FACIALEXPRESSION: 0
TOTALSCORE: 0
BREATHING: 0

## 2022-07-05 NOTE — H&P
History and Physical      Name:  Amilcar Brown /Age/Sex: 1940  (79 y.o. male)   MRN & CSN:  [id number] & [id number] Encounter Date/Time: 2022 5:12 PM EDT   Location:  [address] [place] [address] PCP: Tong Esteves 29 Ivon Clement Day: 1    Assessment and Plan:     Amilcar Brown is a 79 y.o. male who presents with Hypoglycemia. Medical decision making:    Recurrent hypoglycemia in setting of DM II  Patient with recurrent hypoglycemia for 1-2 weeks. Per family, has had decreased appetite recently. He is on 10 units levemir nightly, SSI at home. No recent antiglycemic changes. CT head in ER negative.  UA negative  Hold home insulin  Hypoglycemic protocol  CM consult to assist in discharge plan, patient may need rehab    Peripheral neuropathy  Continue gabapentin    Uncontrolled hypertension  Continue coreg and cardizem, adjust as needed and monitor  Clarified with wife that cardizem is used for hypertension    Hyperlipidemia  Continue lipitor    COPD, not in exacerbation  Continue inhalers    Chronic systolic heart failure, not in exacerbation  Continue coreg    CAD s/p CABG in   Continue ASA, statin, coreg    Pituitary adenoma  Per history and unchanged on repeat head CT    Anxiety  Continue buspar    CKD  Repeat bmp pending    PVD  Continue pletal    Chronic pain  Continue tramadol PRN    Dementia  Continue risperdone      Disposition:   Current Living situation: home  Expected Disposition: home vs rehab vs snf   Estimated D/C: TBD    Diet regular   DVT Prophylaxis [x] Lovenox, []  Heparin, [] SCDs, [] Ambulation,  [] Eliquis, [] Xarelto   Code Status full   Surrogate Decision Maker/ POA Family, wife     History from:   Patient and EMR and wife/daughter at bedside  History of Present Illness:   Chief Complaint: low blood sugar  Amilcar Brown is a 79 y.o. male with pmh of dementia, chronic pain, CKD, PVD, anxiety,CHF, COPD, DMII, HLD, HTN who presents to the ER for evaluation of recurrent hypoglycemia. Patient is unable to provide history himself, daughter and wife provide history. He has been progressively weakening over two to three weeks at home. He usually can dress himself and put on his bilateral prosthesis but has been unable to recently and has been incontinent. He has had decreased intake recently. Wife states patient has not had recent falls, fevers, chlls, chest pain, dyspnea, or abdominal pain. No new medication changes. He has waxing and waning confusion at home, in the setting of advancing dementia. Patient's wife states his sugar is usually 90s in the AM and 200s in the evening, but this AM it was 40 and he was difficult to arouse so EMS was called. She was able to give him oral glucose. He was seen at [facility] ER and transferred to [facility] for further monitoring. COVID was checked and was negative. CXR negative. CT head negative. UA negative. Lactic normal. WBC 8.6, hemoglobin 11.4. LFTs normal, normal electrolytes, normal renal functions, Glucose 72 in ER. Patient's past medical, social, and family history have been reviewed. Review of Systems:    10 point system reviewed, negative, unless as noted in above HPI. Objective:   No intake or output data in the 24 hours ending 07/05/22 1712   Vitals:   Vitals:    07/05/22 1350   BP: (!) 170/79   Pulse: 95   Resp: 16   Temp: 98.5 °F (36.9 °C)   TempSrc: Oral   SpO2: 95%     Medications Prior to Admission     Prior to Admission medications    Medication Sig Start Date End Date Taking?  Authorizing Provider   memantine (NAMENDA) 5 MG tablet Take (1) 5 mg tab QD x7 days, then 1 BID x7 days; then 2 tabs Q am-1 tab pm x7 days then change to 10mg RX 2/9/22   Aquiles Beltran DO   memantine (NAMENDA) 10 MG tablet Take 1 tablet by mouth 2 times daily NOTE TO PHARMACY: DO NOT FILL UNTIL 5 MG RX IS COMPLETED 2/9/22   Inés Ojeda DO   risperiDONE (RISPERDAL) 1 MG tablet Take 1 tablet by mouth daily 2/2/22   James Harmons, APRN - CNP busPIRone (BUSPAR) 5 MG tablet Take 1 tablet by mouth 2 times daily     Historical Provider, MD   cilostazol (PLETAL) 100 MG tablet  8/28/21   Historical Provider, MD   traMADol (ULTRAM) 50 MG tablet Take 50 mg by mouth every 6 hours as needed. 7/6/21   Historical Provider, MD   insulin detemir (LEVEMIR FLEXTOUCH) 100 UNIT/ML injection pen Inject 10 Units into the skin nightly 4/22/21   ERIN Curtis MD   cholestyramine light 4 g packet Take 1 packet by mouth 2 times daily as needed (diarrhea) 4/22/21 9/27/24  C Aurelio Curtis MD   famotidine (PEPCID) 40 MG tablet Take 40 mg by mouth daily    Historical Provider, MD   insulin lispro (HUMALOG) 100 UNIT/ML injection vial Inject 0-12 Units into the skin 3 times daily (with meals) **Medium Dose Corrective Algorithm**  Glucose: Dose:  If <139             No Insulin  140-199 2 Units  200-249 4 Units  250-299 6 Units  300-349 8 Units  350-400 10 Units  Above 400       12 Units 2/23/21   Sonido Carias MD   carvedilol (COREG) 6.25 MG tablet Take 1 tablet by mouth 2 times daily (with meals) 12/7/20   Steffany Villarreal MD   ondansetron (ZOFRAN) 4 MG tablet Take 4 mg by mouth 2 times daily as needed for Nausea or Vomiting    Historical Provider, MD   gabapentin (NEURONTIN) 100 MG capsule Take 100 mg by mouth nightly. Historical Provider, MD   Cholecalciferol 50 MCG (2000 UT) TABS Take one tablet daily by mouth    Historical Provider, MD   atorvastatin (LIPITOR) 40 MG tablet Take 40 mg by mouth daily    Historical Provider, MD   docusate sodium (COLACE) 100 MG capsule Take 100 mg by mouth nightly as needed     Historical Provider, MD   latanoprost (XALATAN) 0.005 % ophthalmic solution Place 1 drop into both eyes nightly     Historical Provider, MD   diltiazem (CARDIZEM) 60 MG tablet Take 1 tablet by mouth 2 times daily 3/19/17   Janel Day, DO   Multiple Vitamins-Minerals (MULTIVITAMIN PO) Take 1 tablet by mouth daily.     Historical Provider, MD   aspirin 81 MG chewable tablet Take 81 mg by mouth daily     Historical Provider, MD     Physical Exam:      GEN -Awake. NAD. Confused  EYES -PERRLA. No scleral erythema, discharge, or conjunctivitis. HENT -MM are moist. Oral pharynx without exudates, no evidence of thrush. NECK -Supple, no apparent thyromegaly or masses. RESP -CTA, no wheezes, rales or rhonchi. Symmetric chest movement while on room air. C/V -S1/S2 auscultated. RRR without appreciable M/R/G. No JVD or carotid bruits. Peripheral pulses equal bilaterally and palpable. Cap refill <3 sec. No peripheral edema. GI -Abdomen is soft non distended and without significant tenderness to palpation. + BS. No masses or guarding.  -No CVA/ flank tenderness. Renee catheter is not present. LYMPH-No palpable cervical lymphadenopathy and no hepatosplenomegaly. No petechiae or ecchymoses. MS -No gross joint deformities. Bilateral below knee amputations  SKIN -Normal coloration, warm, dry. Leyla Snyder, alert, to person but not place, time, situation. Unable to identify wife in room. Cranial nerves appear grossly intact, normal speech, no lateralizing weakness. PSYC- Appropriate affect. Past Medical History:   PMHx   Past Medical History:   Diagnosis Date    [facility] exacerbation of COPD with asthma ([facility] [address]     Arthritis     Biliary stones 2015    CAD (coronary artery disease)     Chronic systolic (congestive) heart failure (HCC)     COPD (chronic obstructive pulmonary disease) (HCC)     Diabetes mellitus ([facility] [address]     History of blood transfusion     Hx of angiography 3/11/2014    Pulmonary Arteries: Small sliding hiatal hernia. Mod coronary artery calcifications. Mild bilat gynecomastia.  Hyperlipidemia     Hypertension     Kidney stone     Metabolic encephalopathy 41/34/0203     PSHX:  has a past surgical history that includes Cardiac surgery; joint replacement;  Cholecystectomy; Coronary angioplasty with stent; fracture surgery; Leg amputation below knee (Bilateral); ERCP (03/13/14); other surgical history (03/13/14); ERCP (9/11/14); ERCP (10/24/15); ERCP (03/18/2017); Abdomen surgery; Dilatation, esophagus; ERCP (N/A, 1/25/2019); ERCP (N/A, 9/7/2019); ERCP (N/A, 3/31/2020); Femur fracture surgery (Left, 2/18/2021); ERCP (N/A, 4/8/2021); and hernia repair. Allergies: Allergies   Allergen Reactions    Byetta 10 Mcg Pen [Exenatide]     Sulbactam     Ampicillin Rash    Aricept [Donepezil Hydrochloride] Other (See Comments)     Hallucinations, confusion    Benztropine Other (See Comments)     Hallucinations, confusion    Celebrex [Celecoxib] Other (See Comments)     'causes him to be nervous and jittery\"    Diphenhydramine Hcl Other (See Comments)     nervous and jittery    Diphenhydramine Hcl [Diphenhydramine] Anxiety    Exenatide Other (See Comments)     Causes him to be nervous and jittery    Metoprolol Succinate Other (See Comments)     Hallucinations     Phenergan [Promethazine Hcl] Other (See Comments)     Hallucinations, nervous, jittery    Plavix [Clopidogrel Bisulfate] Other (See Comments)     Causes bleeding in his stomach    Pseudoephedrine Anxiety    Reglan [Metoclopramide Hcl] Other (See Comments)     \"Caused him to just sit in a chair and rock back and forth\"     Fam HX: family history includes Cancer in his father and mother; Diabetes in his mother. Soc HX:   Social History     Socioeconomic History    Marital status:      Spouse name:  Irena Harmon Number of children: Not on file    Years of education: Not on file    Highest education level: Not on file   Occupational History    Not on file   Tobacco Use    Smoking status: Never Smoker    Smokeless tobacco: Never Used   Substance and Sexual Activity    Alcohol use: No    Drug use: No    Sexual activity: Not on file   Other Topics Concern    Not on file   Social History Narrative    Not on file     Social Determinants of Health     Financial Resource Strain:     Difficulty <0.010 01/30/2022 02:11 PM     Lactic Acid: No results for input(s): LACTA in the last 72 hours. BNP: No results for input(s): PROBNP in the last 72 hours. UA:  Lab Results   Component Value Date/Time    NITRU NEGATIVE 06/02/2022 02:00 PM    COLORU YELLOW 06/02/2022 02:00 PM    WBCUA NEGATIVE 06/02/2022 02:00 PM    RBCUA NEGATIVE 06/02/2022 02:00 PM    MUCUS RARE 02/17/2022 01:15 PM    TRICHOMONAS NONE SEEN 03/25/2022 07:30 PM    BACTERIA NEGATIVE 06/02/2022 02:00 PM    CLARITYU CLEAR 06/02/2022 02:00 PM    SPECGRAV >1.030 06/02/2022 02:00 PM    LEUKOCYTESUR NEGATIVE 06/02/2022 02:00 PM    UROBILINOGEN 0.2 06/02/2022 02:00 PM    BILIRUBINUR NEGATIVE 06/02/2022 02:00 PM    BLOODU NEGATIVE 06/02/2022 02:00 PM    KETUA NEGATIVE 06/02/2022 02:00 PM    AMORPHOUS RARE 04/04/2021 10:03 PM     Urine Cultures: No results found for: LABURIN  Blood Cultures: No results found for: BC  No results found for: BLOODCULT2  Organism: No results found for: ORG  Imaging/Diagnostics Last 24 Hours   No results found.     Personally reviewed Lab Studies, Imaging, and discussed case with Dr. Vannessa Weber PA-C  Hospitalist  7/5/2022, 5:12 PM

## 2022-07-06 ENCOUNTER — APPOINTMENT (OUTPATIENT)
Dept: GENERAL RADIOLOGY | Age: 82
DRG: 637 | End: 2022-07-06
Attending: FAMILY MEDICINE
Payer: MEDICARE

## 2022-07-06 LAB
ALBUMIN SERPL-MCNC: 3.9 GM/DL (ref 3.4–5)
ALP BLD-CCNC: 73 IU/L (ref 40–128)
ALT SERPL-CCNC: 20 U/L (ref 10–40)
ANION GAP SERPL CALCULATED.3IONS-SCNC: 7 MMOL/L (ref 4–16)
AST SERPL-CCNC: 29 IU/L (ref 15–37)
BACTERIA: NEGATIVE /HPF
BASOPHILS ABSOLUTE: 0.1 K/CU MM
BASOPHILS RELATIVE PERCENT: 0.6 % (ref 0–1)
BILIRUB SERPL-MCNC: 1.1 MG/DL (ref 0–1)
BILIRUBIN URINE: NEGATIVE MG/DL
BLOOD, URINE: NEGATIVE
BUN BLDV-MCNC: 18 MG/DL (ref 6–23)
CALCIUM SERPL-MCNC: 9.6 MG/DL (ref 8.3–10.6)
CHLORIDE BLD-SCNC: 103 MMOL/L (ref 99–110)
CLARITY: CLEAR
CO2: 24 MMOL/L (ref 21–32)
COLOR: YELLOW
CREAT SERPL-MCNC: 0.9 MG/DL (ref 0.9–1.3)
DIFFERENTIAL TYPE: ABNORMAL
EOSINOPHILS ABSOLUTE: 0.5 K/CU MM
EOSINOPHILS RELATIVE PERCENT: 6.6 % (ref 0–3)
ESTIMATED AVERAGE GLUCOSE: 131 MG/DL
GFR AFRICAN AMERICAN: >60 ML/MIN/1.73M2
GFR NON-AFRICAN AMERICAN: >60 ML/MIN/1.73M2
GLUCOSE BLD-MCNC: 129 MG/DL (ref 70–99)
GLUCOSE BLD-MCNC: 134 MG/DL (ref 70–99)
GLUCOSE BLD-MCNC: 145 MG/DL (ref 70–99)
GLUCOSE BLD-MCNC: 162 MG/DL (ref 70–99)
GLUCOSE BLD-MCNC: 193 MG/DL (ref 70–99)
GLUCOSE BLD-MCNC: 197 MG/DL (ref 70–99)
GLUCOSE, URINE: NEGATIVE MG/DL
HBA1C MFR BLD: 6.2 % (ref 4.2–6.3)
HCT VFR BLD CALC: 34 % (ref 42–52)
HEMOGLOBIN: 11.2 GM/DL (ref 13.5–18)
IMMATURE NEUTROPHIL %: 0.4 % (ref 0–0.43)
KETONES, URINE: 15 MG/DL
LEUKOCYTE ESTERASE, URINE: NEGATIVE
LYMPHOCYTES ABSOLUTE: 1.4 K/CU MM
LYMPHOCYTES RELATIVE PERCENT: 17 % (ref 24–44)
MCH RBC QN AUTO: 29.6 PG (ref 27–31)
MCHC RBC AUTO-ENTMCNC: 32.9 % (ref 32–36)
MCV RBC AUTO: 89.7 FL (ref 78–100)
MONOCYTES ABSOLUTE: 1 K/CU MM
MONOCYTES RELATIVE PERCENT: 12.6 % (ref 0–4)
MUCUS: ABNORMAL HPF
NITRITE URINE, QUANTITATIVE: NEGATIVE
NUCLEATED RBC %: 0 %
PDW BLD-RTO: 12.7 % (ref 11.7–14.9)
PH, URINE: 6 (ref 5–8)
PLATELET # BLD: 230 K/CU MM (ref 140–440)
PMV BLD AUTO: 9.2 FL (ref 7.5–11.1)
POTASSIUM SERPL-SCNC: 3.9 MMOL/L (ref 3.5–5.1)
PROTEIN UA: NEGATIVE MG/DL
RBC # BLD: 3.79 M/CU MM (ref 4.6–6.2)
RBC URINE: 1 /HPF (ref 0–3)
SEGMENTED NEUTROPHILS ABSOLUTE COUNT: 5.1 K/CU MM
SEGMENTED NEUTROPHILS RELATIVE PERCENT: 62.8 % (ref 36–66)
SODIUM BLD-SCNC: 134 MMOL/L (ref 135–145)
SPECIFIC GRAVITY UA: 1.02 (ref 1–1.03)
SQUAMOUS EPITHELIAL: <1 /HPF
TOTAL IMMATURE NEUTOROPHIL: 0.03 K/CU MM
TOTAL NUCLEATED RBC: 0 K/CU MM
TOTAL PROTEIN: 5.9 GM/DL (ref 6.4–8.2)
TRICHOMONAS: ABNORMAL /HPF
UROBILINOGEN, URINE: 0.2 MG/DL (ref 0.2–1)
WBC # BLD: 8.1 K/CU MM (ref 4–10.5)
WBC UA: 1 /HPF (ref 0–2)

## 2022-07-06 PROCEDURE — 85025 COMPLETE CBC W/AUTO DIFF WBC: CPT

## 2022-07-06 PROCEDURE — 6360000002 HC RX W HCPCS: Performed by: PHYSICIAN ASSISTANT

## 2022-07-06 PROCEDURE — 36415 COLL VENOUS BLD VENIPUNCTURE: CPT

## 2022-07-06 PROCEDURE — 2580000003 HC RX 258: Performed by: HOSPITALIST

## 2022-07-06 PROCEDURE — 2580000003 HC RX 258: Performed by: PHYSICIAN ASSISTANT

## 2022-07-06 PROCEDURE — 6370000000 HC RX 637 (ALT 250 FOR IP): Performed by: STUDENT IN AN ORGANIZED HEALTH CARE EDUCATION/TRAINING PROGRAM

## 2022-07-06 PROCEDURE — 51798 US URINE CAPACITY MEASURE: CPT

## 2022-07-06 PROCEDURE — 82962 GLUCOSE BLOOD TEST: CPT

## 2022-07-06 PROCEDURE — 71045 X-RAY EXAM CHEST 1 VIEW: CPT

## 2022-07-06 PROCEDURE — 81001 URINALYSIS AUTO W/SCOPE: CPT

## 2022-07-06 PROCEDURE — 97530 THERAPEUTIC ACTIVITIES: CPT

## 2022-07-06 PROCEDURE — 80053 COMPREHEN METABOLIC PANEL: CPT

## 2022-07-06 PROCEDURE — 51701 INSERT BLADDER CATHETER: CPT

## 2022-07-06 PROCEDURE — 94761 N-INVAS EAR/PLS OXIMETRY MLT: CPT

## 2022-07-06 PROCEDURE — 6370000000 HC RX 637 (ALT 250 FOR IP): Performed by: PHYSICIAN ASSISTANT

## 2022-07-06 PROCEDURE — 1200000000 HC SEMI PRIVATE

## 2022-07-06 PROCEDURE — 97167 OT EVAL HIGH COMPLEX 60 MIN: CPT

## 2022-07-06 RX ORDER — SODIUM CHLORIDE 9 MG/ML
INJECTION, SOLUTION INTRAVENOUS CONTINUOUS
Status: DISCONTINUED | OUTPATIENT
Start: 2022-07-06 | End: 2022-07-07

## 2022-07-06 RX ORDER — INSULIN GLARGINE 100 [IU]/ML
5 INJECTION, SOLUTION SUBCUTANEOUS 2 TIMES DAILY
Status: DISCONTINUED | OUTPATIENT
Start: 2022-07-06 | End: 2022-07-07

## 2022-07-06 RX ORDER — DEXTROSE AND SODIUM CHLORIDE 5; .9 G/100ML; G/100ML
INJECTION, SOLUTION INTRAVENOUS CONTINUOUS
Status: DISCONTINUED | OUTPATIENT
Start: 2022-07-06 | End: 2022-07-06

## 2022-07-06 RX ADMIN — INSULIN GLARGINE 5 UNITS: 100 INJECTION, SOLUTION SUBCUTANEOUS at 09:40

## 2022-07-06 RX ADMIN — CARVEDILOL 6.25 MG: 6.25 TABLET, FILM COATED ORAL at 08:45

## 2022-07-06 RX ADMIN — SODIUM CHLORIDE, PRESERVATIVE FREE 10 ML: 5 INJECTION INTRAVENOUS at 20:12

## 2022-07-06 RX ADMIN — ASPIRIN 81 MG CHEWABLE TABLET 81 MG: 81 TABLET CHEWABLE at 09:44

## 2022-07-06 RX ADMIN — CILOSTAZOL 100 MG: 50 TABLET ORAL at 16:53

## 2022-07-06 RX ADMIN — RISPERIDONE 1 MG: 0.5 TABLET ORAL at 08:52

## 2022-07-06 RX ADMIN — MEMANTINE 10 MG: 10 TABLET ORAL at 08:45

## 2022-07-06 RX ADMIN — SODIUM CHLORIDE, PRESERVATIVE FREE 10 ML: 5 INJECTION INTRAVENOUS at 08:47

## 2022-07-06 RX ADMIN — SODIUM CHLORIDE: 9 INJECTION, SOLUTION INTRAVENOUS at 22:46

## 2022-07-06 RX ADMIN — BUSPIRONE HYDROCHLORIDE 5 MG: 5 TABLET ORAL at 08:45

## 2022-07-06 RX ADMIN — CILOSTAZOL 100 MG: 50 TABLET ORAL at 06:46

## 2022-07-06 RX ADMIN — ENOXAPARIN SODIUM 40 MG: 100 INJECTION SUBCUTANEOUS at 08:47

## 2022-07-06 RX ADMIN — DILTIAZEM HYDROCHLORIDE 60 MG: 60 CAPSULE, EXTENDED RELEASE ORAL at 08:45

## 2022-07-06 ASSESSMENT — PAIN SCALES - PAIN ASSESSMENT IN ADVANCED DEMENTIA (PAINAD)
FACIALEXPRESSION: 0
TOTALSCORE: 0
CONSOLABILITY: 0
BREATHING: 0
NEGVOCALIZATION: 0
BODYLANGUAGE: 0

## 2022-07-06 NOTE — PROGRESS NOTES
V2.0  [facility] Hospitalist Progress Note      Name:  Christopher Rider /Age/Sex: 1940  (79 y.o. male)   MRN & CSN:  [id number] & [id number] Encounter Date/Time: 2022 4:21 PM EDT    Location:  [facility]- PCP: Mira Jiménez MD       Hospital Day: 2    Assessment and Plan:   Christopher Rider is a 79 y.o. male with pmh of diabetes mellitus CAD, hyperlipidemia, COPD, CKD who presents with Hypoglycemia    1. Recurrent hypoglycemia in setting of DM II  -Patient with recurrent hypoglycemia for 1-2 weeks. Per family, has had decreased appetite recently. He is on 10 units levemir nightly,   -No hypoglycemic episodes since admission  -Started on Lantus 5 units twice daily  -Hypoglycemic protocol  -Point-of-care glucose checks  -Continue to monitor    2. Peripheral neuropathy  -Continue gabapentin    3. Uncontrolled hypertension  -Continue Coreg and Cardizem    4. Hyperlipidemia  -Continue Lipitor    5. COPD, not in acute exacerbation  -Continue inhalers as needed    6. Chronic systolic heart failure  -Continue Coreg    7. CAD status post CABG in   -Continue home medications    8. Pituitary adenoma  -No change on repeat head CT    9. Anxiety  -Continue BuSpar    10. CKD  -Stable  -Continue to monitor    11. Chronic pain  -Continue tramadol    12. Dementia  -Continue risperidone          Diet ADULT DIET; Regular   DVT Prophylaxis [] Lovenox, []  Heparin, [] SCDs, [] Ambulation,  [] Eliquis, [] Xarelto  [] Coumadin   Code Status Full Code   Disposition  discharge to SNF. Pending PT OT eval   Surrogate Decision Maker/ POA      Subjective:     Chief Complaint: No chief complaint on file. Christopher Rider is a 79 y.o. male who presents with hypoglycemia    Patient seen and examined at bedside this morning. Interaction difficult due to baseline underlying dementia. Patient is able to respond to some questions. Denies any chest pain, shortness of breath.   No reported episodes of hypoglycemia overnight             Objective:   No intake or output data in the 24 hours ending 07/06/22 1621     Vitals:   Vitals:    07/06/22 1619   BP: (!) 107/51   Pulse:    Resp:    Temp:    SpO2:        Physical Exam:     General: NAD  Eyes: EOMI  ENT: neck supple  Cardiovascular: Regular rate. Respiratory: Clear to auscultation  Gastrointestinal: Soft, non tender  Genitourinary: no suprapubic tenderness  Musculoskeletal: No edema  Skin: warm, dry  Neuro: Alert. Psych: Mood appropriate.      Medications:   Medications:    insulin glargine  5 Units SubCUTAneous BID    sodium chloride flush  10 mL IntraVENous 2 times per day    enoxaparin  40 mg SubCUTAneous Daily    aspirin  81 mg Oral Daily    atorvastatin  40 mg Oral Daily    busPIRone  5 mg Oral BID    carvedilol  6.25 mg Oral BID WC    cilostazol  100 mg Oral BID AC    dilTIAZem  60 mg Oral BID    famotidine  20 mg Oral Daily    gabapentin  100 mg Oral Nightly    latanoprost  1 drop Both Eyes Nightly    risperiDONE  1 mg Oral Daily    memantine  10 mg Oral BID      Infusions:    dextrose      sodium chloride       PRN Meds: glucose, 4 tablet, PRN  dextrose bolus, 125 mL, PRN   Or  dextrose bolus, 250 mL, PRN  glucagon (rDNA), 1 mg, PRN  dextrose, 100 mL/hr, PRN  sodium chloride flush, 10 mL, PRN  sodium chloride, , PRN  polyethylene glycol, 17 g, Daily PRN  acetaminophen, 650 mg, Q6H PRN   Or  acetaminophen, 650 mg, Q6H PRN  ondansetron, 4 mg, Q8H PRN   Or  ondansetron, 4 mg, Q6H PRN  traMADol, 50 mg, Q6H PRN        Labs      Recent Results (from the past 24 hour(s))   POCT Glucose    Collection Time: 07/05/22  8:53 PM   Result Value Ref Range    POC Glucose 251 (H) 70 - 99 MG/DL   Hemoglobin A1c    Collection Time: 07/05/22  9:42 PM   Result Value Ref Range    Hemoglobin A1C 6.2 4.2 - 6.3 %    eAG 131 mg/dL   Comprehensive Metabolic Panel w/ Reflex to MG    Collection Time: 07/06/22  1:06 AM   Result Value Ref Range    Sodium 134 (L) 135 - 145 MMOL/L Potassium 3.9 3.5 - 5.1 MMOL/L    Chloride 103 99 - 110 mMol/L    CO2 24 21 - 32 MMOL/L    BUN 18 6 - 23 MG/DL    CREATININE 0.9 0.9 - 1.3 MG/DL    Glucose 193 (H) 70 - 99 MG/DL    Calcium 9.6 8.3 - 10.6 MG/DL    Albumin 3.9 3.4 - 5.0 GM/DL    Total Protein 5.9 (L) 6.4 - 8.2 GM/DL    Total Bilirubin 1.1 (H) 0.0 - 1.0 MG/DL    ALT 20 10 - 40 U/L    AST 29 15 - 37 IU/L    Alkaline Phosphatase 73 40 - 128 IU/L    GFR Non-African American >60 >60 mL/min/1.73m2    GFR African American >60 >60 mL/min/1.73m2    Anion Gap 7 4 - 16   CBC auto differential    Collection Time: 07/06/22  1:06 AM   Result Value Ref Range    WBC 8.1 4.0 - 10.5 K/CU MM    RBC 3.79 (L) 4.6 - 6.2 M/CU MM    Hemoglobin 11.2 (L) 13.5 - 18.0 GM/DL    Hematocrit 34.0 (L) 42 - 52 %    MCV 89.7 78 - 100 FL    MCH 29.6 27 - 31 PG    MCHC 32.9 32.0 - 36.0 %    RDW 12.7 11.7 - 14.9 %    Platelets 608 821 - 306 K/CU MM    MPV 9.2 7.5 - 11.1 FL    Differential Type AUTOMATED DIFFERENTIAL     Segs Relative 62.8 36 - 66 %    Lymphocytes % 17.0 (L) 24 - 44 %    Monocytes % 12.6 (H) 0 - 4 %    Eosinophils % 6.6 (H) 0 - 3 %    Basophils % 0.6 0 - 1 %    Segs Absolute 5.1 K/CU MM    Lymphocytes Absolute 1.4 K/CU MM    Monocytes Absolute 1.0 K/CU MM    Eosinophils Absolute 0.5 K/CU MM    Basophils Absolute 0.1 K/CU MM    Nucleated RBC % 0.0 %    Total Nucleated RBC 0.0 K/CU MM    Total Immature Neutrophil 0.03 K/CU MM    Immature Neutrophil % 0.4 0 - 0.43 %   POCT Glucose    Collection Time: 07/06/22  7:24 AM   Result Value Ref Range    POC Glucose 145 (H) 70 - 99 MG/DL   POCT Glucose    Collection Time: 07/06/22 11:47 AM   Result Value Ref Range    POC Glucose 197 (H) 70 - 99 MG/DL        Imaging/Diagnostics Last 24 Hours   No results found.     Electronically signed by Sarah De Los Santos MD on 7/6/2022 at 4:21 PM

## 2022-07-06 NOTE — PROGRESS NOTES
Occupational Therapy    [facility] OCCUPATIONAL THERAPY EVALUATION  Amilcar Stephanie, 1940, 4112/4112-A, 7/6/2022    History  [place]:  There were no encounter diagnoses. Patient  has a past medical history of Acute exacerbation of COPD with asthma ([address] Arthritis, Biliary stones, CAD (coronary artery disease), Chronic systolic (congestive) heart failure ([address] COPD (chronic obstructive pulmonary disease) ([address] Diabetes mellitus ([facility] [address] History of blood transfusion, Hx of angiography, Hyperlipidemia, Hypertension, Kidney stone, and Metabolic encephalopathy. Patient  has a past surgical history that includes Cardiac surgery; joint replacement; Cholecystectomy; Coronary angioplasty with stent; fracture surgery; Leg amputation below knee (Bilateral); ERCP (03/13/14); other surgical history (03/13/14); ERCP (9/11/14); ERCP (10/24/15); ERCP (03/18/2017); Abdomen surgery; Dilatation, esophagus; ERCP (N/A, 1/25/2019); ERCP (N/A, 9/7/2019); ERCP (N/A, 3/31/2020); Femur fracture surgery (Left, 2/18/2021); ERCP (N/A, 4/8/2021); and hernia repair. Subjective:  Patient states:  Pt required frequent re-direction to task/conversation    Pain:  Denies. Communication with other providers:  Handoff to RN  Restrictions: , BLE BKA, General Precautions, Fall Risk    Home Setup/Prior level of function    Social/Functional History  Lives With: Spouse  Type of Home: Facility ([address] per CM note)  Home Layout: One level  Home Access: Level entry  Bathroom Shower/Tub: Walk-in shower  Bathroom Equipment: Shower chair  Home Equipment: Rolling walker, Wheelchair-manual (pt typically ambulates with RW). ADL Assistance: Independent  Homemaking Tasks: Needs assistance  Ambulation Assistance: Independent (with RW and LE prostheses)  Transfer Assistance: Independent  Active : No  Additional Comments: Pt is a questionable historian.  Should confirm with family/facility    **PLOF taken from last evaluation 01/06/2022. Examination of body systems (includes body structures/functions, activity/participation limitations):  · Observation:  Supine in bed upon arrival, agreeable to therapy, prosthetics in closet, bilateral BKA, pt pocketing food. Attempted to remove food, pt became agitated, nursing notified  · Vision:  Glasses  · Hearing:  [facility]  · Cardiopulmonary:  No 02 needs. Body Systems and functions:  · ROM R/L:  Difficult to assess due to confusion/agitation, when attempted AAROM able to achieve ~90 degrees shoulder flexion and observe WFL elbow flexion/extension, unable to observe ROM in more detail due to agitation  · Strength R/L:  3-/5,   · Sensation: Unable to assess due to cognition  · Tone: Appears WFL  · Coordination: Difficult to assess due to confusion/agitation in detail, hand over hand to overcome gross/fine motor coordination deficits  · Perception: max decreased initiation/sequencing w/ hand over hand to overcome    Activities of Daily Living (ADLs):  · Feeding: maxA (attempted to feed pt sitting EOB, pt became agitated)  · Grooming: maxA   · UB bathing: maxA  · LB bathing: maxA  · UB dressing: maxA  · LB dressing: dependent/total  · Toileting: maxA    Cognitive and Psychosocial Functioning:  · Overall cognitive status: AxO to self only, decreased arousal, decreased short term/long term memory, decreased problem solving, decreased safety awareness, frequent re-direction to task/conversation, hand over hand to overcome initiation/sequencing problems  · Affect: Confused, agitated        Mobility:  · Supine to sit:  maxA  · Transfers: DNT  · Sitting balance:  maxA ~1 minute before agitation increased and pt laid self back down. · Standing balance:  DNT.   · Functional Mobility DNT  · Toilet/Shower Transfers: DNT  · Sit to supine: maxA  · Scooting to HOB: maxA x 2    · Rolling to L: maxA           AM-PAC Daily Activity Inpatient   How much help for putting on and taking off regular lower body clothing?: Total  How much help for Bathing?: Total  How much help for Toileting?: Total  How much help for putting on and taking off regular upper body clothing?: Total  How much help for taking care of personal grooming?: Total  How much help for eating meals?: Total  AM-PAC Inpatient Daily Activity Raw Score: 6  AM-PAC Inpatient ADL T-Scale Score : 17.07  ADL Inpatient CMS 0-100% Score: 100  ADL Inpatient CMS G-Code Modifier : CN    Treatment:  Therapeutic Activity Training:   Therapeutic activity training was instructed today. Cues were given for safety, sequence, UE/LE placement, awareness, and balance. Activities performed today included bed mobility training, sup-sit, sitting EOB, sit to supine, scooting to HOB, rolling to L      Safety: patient left in bed with bed alarm and MARVA, call light within reach, RN notified, gait belt used. Assessment:  Pt is a 80 yo male admitted from home for hypoglycemia. Pt currently presents w/ deficits in ADL and high level IADL independence, functional activity tolerance, dynamic sitting and standing balance and tolerance and functional transfers, BUE strength/ROM, coordination, initiation/sequencing, cognition and OOB activity Pt would benefit from continued acute care OT services w/ discharge to SNF (up-dated 7/9.  Intention was to recommend SNF)  Complexity: High  Prognosis: Fair due to poor cognition  Plan  Times per Week: 2x+  Times per Day: Daily  Current Treatment Recommendations: Strengthening,Balance training,ROM,Functional mobility training,Endurance training,Patient/Caregiver education & training,Safety education & training,Pain management,Cognitive reorientation,Neuromuscular re-education,Equipment evaluation, education, & procurement,Positioning,Self-Care / ADL,Home management training,Cognitive/Perceptual training,Coordination training     Equipment: defer    Goals:  Pt goal: go home  Time Frame for STGs: discharge  Goal 1: Pt will perform UE ADLs SBA  Goal 2: Pt will perform LE ADLs modA  Goal 3: Pt will perform toileting modA  Goal 4: Pt will perform functional transfer w/ AD modA  Goal 5: Pt will perform all aspects of bed mobility SBA  Goal 6: Pt will perform therex/theract in order to increase functional activity tolerance and dynamic sitting balance  Goal 7: Pt will perform all aspects of feeding Supervision    Treatment plan:  Pt will perform functional task in sit reaching in all 3 planes in order to increase dynamic sitting balance and functional activity tolerance    Recommendations for NURSING activity: Pt will perform functional ADL sitting EOB    Time:   Time in: 1315  Time out: 1330  Timed treatment minutes: 10 minutes  Total time: 15 minutes    Electronically signed by:    Jose A NGUYEN/IONA [id number]  2:03 PM,7/6/2022

## 2022-07-06 NOTE — CARE COORDINATION
Discussed pt in IDR, received consult for possible SNF [facility]. Requested PT/OT orders from Dr. Rosalind Hung, he agreed to order. Attempted to meet with pt, pt has  in room, not able to effectively participate in discharge planning. No family at bedside. Left  for pt's spouse Zina Hollis at [phone], to please call me to discuss discharge disposition. Per previously records pt/spouse lived in [facility] however they moved out May 1st 2022. Currently pt lives at home with spouse and is active with [address] [place] [address] Rd. CM to follow up with pt's spouse on plan for possible SNF [facility].

## 2022-07-07 LAB
ALBUMIN SERPL-MCNC: 3.7 GM/DL (ref 3.4–5)
ALP BLD-CCNC: 73 IU/L (ref 40–128)
ALT SERPL-CCNC: 20 U/L (ref 10–40)
ANION GAP SERPL CALCULATED.3IONS-SCNC: 14 MMOL/L (ref 4–16)
AST SERPL-CCNC: 46 IU/L (ref 15–37)
BASOPHILS ABSOLUTE: 0.1 K/CU MM
BASOPHILS RELATIVE PERCENT: 0.6 % (ref 0–1)
BILIRUB SERPL-MCNC: 0.6 MG/DL (ref 0–1)
BUN BLDV-MCNC: 26 MG/DL (ref 6–23)
CALCIUM SERPL-MCNC: 9.5 MG/DL (ref 8.3–10.6)
CHLORIDE BLD-SCNC: 102 MMOL/L (ref 99–110)
CO2: 23 MMOL/L (ref 21–32)
CREAT SERPL-MCNC: 1.3 MG/DL (ref 0.9–1.3)
DIFFERENTIAL TYPE: ABNORMAL
EOSINOPHILS ABSOLUTE: 0.2 K/CU MM
EOSINOPHILS RELATIVE PERCENT: 2.2 % (ref 0–3)
GFR AFRICAN AMERICAN: >60 ML/MIN/1.73M2
GFR NON-AFRICAN AMERICAN: 53 ML/MIN/1.73M2
GLUCOSE BLD-MCNC: 100 MG/DL (ref 70–99)
GLUCOSE BLD-MCNC: 121 MG/DL (ref 70–99)
GLUCOSE BLD-MCNC: 126 MG/DL (ref 70–99)
GLUCOSE BLD-MCNC: 230 MG/DL (ref 70–99)
GLUCOSE BLD-MCNC: 97 MG/DL (ref 70–99)
GLUCOSE BLD-MCNC: 99 MG/DL (ref 70–99)
HCT VFR BLD CALC: 37.3 % (ref 42–52)
HEMOGLOBIN: 10.9 GM/DL (ref 13.5–18)
IMMATURE NEUTROPHIL %: 0.4 % (ref 0–0.43)
LYMPHOCYTES ABSOLUTE: 1.7 K/CU MM
LYMPHOCYTES RELATIVE PERCENT: 17.5 % (ref 24–44)
MCH RBC QN AUTO: 30.4 PG (ref 27–31)
MCHC RBC AUTO-ENTMCNC: 29.2 % (ref 32–36)
MCV RBC AUTO: 103.9 FL (ref 78–100)
MONOCYTES ABSOLUTE: 0.9 K/CU MM
MONOCYTES RELATIVE PERCENT: 9.7 % (ref 0–4)
NUCLEATED RBC %: 0 %
PDW BLD-RTO: 12.8 % (ref 11.7–14.9)
PLATELET # BLD: 202 K/CU MM (ref 140–440)
PMV BLD AUTO: 9 FL (ref 7.5–11.1)
POTASSIUM SERPL-SCNC: 3.7 MMOL/L (ref 3.5–5.1)
RBC # BLD: 3.59 M/CU MM (ref 4.6–6.2)
SEGMENTED NEUTROPHILS ABSOLUTE COUNT: 6.8 K/CU MM
SEGMENTED NEUTROPHILS RELATIVE PERCENT: 69.6 % (ref 36–66)
SODIUM BLD-SCNC: 139 MMOL/L (ref 135–145)
TOTAL IMMATURE NEUTOROPHIL: 0.04 K/CU MM
TOTAL NUCLEATED RBC: 0 K/CU MM
TOTAL PROTEIN: 5.7 GM/DL (ref 6.4–8.2)
WBC # BLD: 9.7 K/CU MM (ref 4–10.5)

## 2022-07-07 PROCEDURE — 97530 THERAPEUTIC ACTIVITIES: CPT

## 2022-07-07 PROCEDURE — 2580000003 HC RX 258: Performed by: HOSPITALIST

## 2022-07-07 PROCEDURE — 6360000002 HC RX W HCPCS: Performed by: HOSPITALIST

## 2022-07-07 PROCEDURE — 97162 PT EVAL MOD COMPLEX 30 MIN: CPT

## 2022-07-07 PROCEDURE — 36415 COLL VENOUS BLD VENIPUNCTURE: CPT

## 2022-07-07 PROCEDURE — 1200000000 HC SEMI PRIVATE

## 2022-07-07 PROCEDURE — 80053 COMPREHEN METABOLIC PANEL: CPT

## 2022-07-07 PROCEDURE — 85025 COMPLETE CBC W/AUTO DIFF WBC: CPT

## 2022-07-07 PROCEDURE — 2700000000 HC OXYGEN THERAPY PER DAY

## 2022-07-07 PROCEDURE — 94761 N-INVAS EAR/PLS OXIMETRY MLT: CPT

## 2022-07-07 PROCEDURE — 87449 NOS EACH ORGANISM AG IA: CPT

## 2022-07-07 PROCEDURE — 6370000000 HC RX 637 (ALT 250 FOR IP): Performed by: STUDENT IN AN ORGANIZED HEALTH CARE EDUCATION/TRAINING PROGRAM

## 2022-07-07 PROCEDURE — 2580000003 HC RX 258: Performed by: PHYSICIAN ASSISTANT

## 2022-07-07 PROCEDURE — 82962 GLUCOSE BLOOD TEST: CPT

## 2022-07-07 PROCEDURE — 6370000000 HC RX 637 (ALT 250 FOR IP): Performed by: PHYSICIAN ASSISTANT

## 2022-07-07 PROCEDURE — 6360000002 HC RX W HCPCS: Performed by: PHYSICIAN ASSISTANT

## 2022-07-07 RX ORDER — INSULIN GLARGINE 100 [IU]/ML
5 INJECTION, SOLUTION SUBCUTANEOUS DAILY
Status: DISCONTINUED | OUTPATIENT
Start: 2022-07-08 | End: 2022-07-09 | Stop reason: HOSPADM

## 2022-07-07 RX ADMIN — CEFTRIAXONE SODIUM 1000 MG: 1 INJECTION, POWDER, FOR SOLUTION INTRAMUSCULAR; INTRAVENOUS at 01:41

## 2022-07-07 RX ADMIN — CARVEDILOL 6.25 MG: 6.25 TABLET, FILM COATED ORAL at 08:25

## 2022-07-07 RX ADMIN — CILOSTAZOL 100 MG: 50 TABLET ORAL at 16:34

## 2022-07-07 RX ADMIN — SODIUM CHLORIDE, PRESERVATIVE FREE 10 ML: 5 INJECTION INTRAVENOUS at 19:51

## 2022-07-07 RX ADMIN — DILTIAZEM HYDROCHLORIDE 60 MG: 60 CAPSULE, EXTENDED RELEASE ORAL at 08:25

## 2022-07-07 RX ADMIN — CARVEDILOL 6.25 MG: 6.25 TABLET, FILM COATED ORAL at 16:34

## 2022-07-07 RX ADMIN — SODIUM CHLORIDE, PRESERVATIVE FREE 10 ML: 5 INJECTION INTRAVENOUS at 08:26

## 2022-07-07 RX ADMIN — ASPIRIN 81 MG CHEWABLE TABLET 81 MG: 81 TABLET CHEWABLE at 08:25

## 2022-07-07 RX ADMIN — AZITHROMYCIN MONOHYDRATE 500 MG: 500 INJECTION, POWDER, LYOPHILIZED, FOR SOLUTION INTRAVENOUS at 02:39

## 2022-07-07 RX ADMIN — MEMANTINE 10 MG: 10 TABLET ORAL at 08:25

## 2022-07-07 RX ADMIN — BUSPIRONE HYDROCHLORIDE 5 MG: 5 TABLET ORAL at 08:25

## 2022-07-07 RX ADMIN — RISPERIDONE 1 MG: 0.5 TABLET ORAL at 08:25

## 2022-07-07 RX ADMIN — ENOXAPARIN SODIUM 40 MG: 100 INJECTION SUBCUTANEOUS at 08:25

## 2022-07-07 NOTE — PLAN OF CARE
Problem: Discharge Planning  Goal: Discharge to home or other facility with appropriate resources  7/7/2022 1011 by Abel Medellin RN  Outcome: Progressing  Flowsheets (Taken 7/7/2022 0953)  Discharge to home or other facility with appropriate resources:   Identify barriers to discharge with patient and caregiver   Arrange for needed discharge resources and transportation as appropriate   Identify discharge learning needs (meds, wound care, etc)     Problem: Safety - Adult  Goal: Free from fall injury  7/7/2022 1011 by Abel Medellin RN  Outcome: Progressing  Flowsheets (Taken 7/7/2022 1010)  Free From Fall Injury: Instruct family/caregiver on patient safety     Problem: Skin/Tissue Integrity  Goal: Absence of new skin breakdown  Description: 1. Monitor for areas of redness and/or skin breakdown  2. Assess vascular access sites hourly  3. Every 4-6 hours minimum:  Change oxygen saturation probe site  4. Every 4-6 hours:  If on nasal continuous positive airway pressure, respiratory therapy assess nares and determine need for appliance change or resting period.   7/7/2022 1011 by Abel Medellin RN  Outcome: Progressing     Problem: Pain  Goal: Verbalizes/displays adequate comfort level or baseline comfort level  7/7/2022 1011 by Abel Medellin RN  Outcome: Progressing

## 2022-07-07 NOTE — PROGRESS NOTES
Attempted to call wife back to update her on patient condition, no answer from wife. Wife called on different number than one listed. Vilma Christopher [phone]. Was not able to leave message on either number. Will attempt to reach out to her at later time.      Rosalinda Shepard, BSN, RN

## 2022-07-07 NOTE — PROGRESS NOTES
Physical Therapy  Facility/Department: [facility]  Physical Therapy Initial Assessment    Name: Jesse Antoine  : 1940  MRN: [id number]  Date of Service: 2022    Discharge Recommendations:  Continue to assess pending progress   PT Equipment Recommendations  Equipment Needed: No      Patient Diagnosis(es): There were no encounter diagnoses. Past Medical History:  has a past medical history of Acute exacerbation of COPD with asthma ([address] Arthritis, Biliary stones, CAD (coronary artery disease), Chronic systolic (congestive) heart failure ([address] COPD (chronic obstructive pulmonary disease) ([facility] [address] Diabetes mellitus ([facility] [address] History of blood transfusion, Hx of angiography, Hyperlipidemia, Hypertension, Kidney stone, and Metabolic encephalopathy. Past Surgical History:  has a past surgical history that includes Cardiac surgery; joint replacement; Cholecystectomy; Coronary angioplasty with stent; fracture surgery; Leg amputation below knee (Bilateral); ERCP (14); other surgical history (14); ERCP (14); ERCP (10/24/15); ERCP (2017); Abdomen surgery; Dilatation, esophagus; ERCP (N/A, 2019); ERCP (N/A, 2019); ERCP (N/A, 3/31/2020); Femur fracture surgery (Left, 2021); ERCP (N/A, 2021); and hernia repair. Assessment   Body Structures, Functions, Activity Limitations Requiring Skilled Therapeutic Intervention: Decreased functional mobility ; Decreased strength; Increased pain;Decreased posture;Decreased balance;Decreased endurance;Decreased cognition;Decreased safe awareness;Decreased ADL status; Decreased ROM; Decreased body mechanics  Assessment: Pt presents 2 days s/p admission for AMS 2/2 hypergylcemia per EMR. He presents from home w/ wife, very confused, limited ability to complete in social functional interview; information from prior admissions is out of date 2/2 recent move.  Pt presents w/ the above listed deficits, however, she is primarily limited by cognition at cognition ; could use assist   Vision/Hearing  Vision  Vision: Within Functional Limits  Hearing  Hearing: Within functional limits    Cognition   Orientation  Orientation Level: Oriented to person;Disoriented to situation;Disoriented to time;Disoriented to place  Cognition  Overall Cognitive Status: Exceptions  Arousal/Alertness: Delayed responses to stimuli  Following Commands: Follows one step commands with increased time; Follows multistep commands with repitition  Attention Span: Attends with cues to redirect  Memory: Decreased recall of recent events;Decreased short term memory  Safety Judgement: Decreased awareness of need for assistance  Problem Solving: Assistance required to identify errors made;Assistance required to correct errors made  Insights: Decreased awareness of deficits  Initiation: Requires cues for some  Sequencing: Requires cues for some     Objective   Heart Rate: 87  Heart Rate Source: Monitor  BP: 130/71  MAP (Calculated): 90.67  Resp: 16  SpO2: 92 %  O2 Device: Nasal cannula     Observation/Palpation  Posture: Poor (fwd head/shoulders, kyphotic)  Observation: Supine, awake, alert, agreeable. He is delayed and confused, requires mod - max cues, operating below his baseline - unsafe to attempt transfer 2/2 confusion. Tele, 2 L of nc O2 in place.         AROM RLE (degrees)  RLE AROM: WFL  RLE General AROM: grossly WFL ; knee ext to ~30*  AROM LLE (degrees)  LLE AROM : WFL  LLE General AROM: grossly WFL ; knee ext to ~15*  Strength RLE  Comment: grossly >3/5 as observed by ROM screen ; BKA  Strength LLE  Comment: grossly >3/5 as observed by ROM screen ; BKA     Bed mobility  Supine to Sit: Maximum assistance  Sit to Supine: Maximum assistance  Scooting: Maximal assistance  Transfers  Comment: unsafe to attend ; balance and cognition limiting  Ambulation  Comments: unsafe to transfer to [facility] at this time  Wheelchair Activities  Propulsion: No (unsafe to transfer to [facility] at this time) Balance  Sitting - Static: Poor    AM-PAC Score     AM-PAC Inpatient Mobility without Stair Climbing Raw Score : 7 (07/07/22 1235)  AM-PAC Inpatient without Stair Climbing T-Scale Score : 28.66 (07/07/22 1235)  Mobility Inpatient CMS 0-100% Score: 86.29 (07/07/22 1235)  Mobility Inpatient without Stair CMS G-Code Modifier : CM (07/07/22 1235)       Goals  Short Term Goals  Time Frame for Short term goals: 1 week  Short term goal 1: pt will complete bed mobility at SBA  Short term goal 2: pt will complete bed to chair transfer at sup level  Short term goal 3: pt will demonstrate fair + seated balance for more stable base to complete transfers from  Additional Goals?: No  Patient Goals   Patient goals : return home      Therapy Time   Individual Concurrent Group Co-treatment   Time In 1008         Time Out 1030         Minutes 22         Timed Code Treatment Minutes: 12 Minutes (TA)       Virgene Duane, PT, DPT

## 2022-07-07 NOTE — PROGRESS NOTES
I was notified by RN stating patient appeared more lethargic and less responsive. On my evaluation, patient opens eyes to call, moves all extremities but not following commands. He appears very lethargic. Vital signs reviewed and are currently stable  Blood glucose 160s. Patient has a history of dementia but change in mental status noted compared to baseline  Patient has been coughing per RN  Chest x-ray and UA reviewed, previous labs reviewed      Acute metabolic encephalopathy--suspected pneumonia   Checks x-ray showed left lower lobe opacity  Urine Legionella/strep pneumo ordered  Start ceftriaxone and azithromycin for now  Repeat labs in a.m.   Continue to monitor mental status      Lucero Avina MD  [facility] hospitalist physician  7/6/2022

## 2022-07-07 NOTE — PROGRESS NOTES
V2.0  [facility] Hospitalist Progress Note      Name:  Marcial Collier /Age/Sex: 1940  (79 y.o. male)   MRN & CSN:  [id number] & [id number] Encounter Date/Time: 2022 4:21 PM EDT    Location:  [facility] PCP: Amanda Pastrana MD       Hospital Day: 3    Assessment and Plan:   Marcial Collier is a 79 y.o. male with pmh of diabetes mellitus CAD, hyperlipidemia, COPD, CKD who presents with Hypoglycemia    1. Recurrent hypoglycemia in setting of DM II  -Patient with recurrent hypoglycemia for 1-2 weeks. Per family, has had decreased appetite recently. He is on 10 units levemir nightly,   -No hypoglycemic episodes since admission  -Started on Lantus 5 units twice daily. Discontinued to 5 units Lantus daily due to borderline hypoglycemia in the a.m.  -Hypoglycemic protocol  -Point-of-care glucose checks  -Continue to monitor    2. Peripheral neuropathy  -Continue gabapentin    3. Uncontrolled hypertension  -Continue Coreg and Cardizem    4. Hyperlipidemia  -Continue Lipitor    5. COPD, not in acute exacerbation  -Continue inhalers as needed    6. Chronic systolic heart failure  -Continue Coreg    7. CAD status post CABG in   -Continue home medications    8. Pituitary adenoma  -No change on repeat head CT    9. Anxiety  -Continue BuSpar    10. CKD  -Stable  -Continue to monitor    11. Chronic pain  -Continue tramadol    12. Dementia  -Continue risperidone      13. Acute metabolic encephalopathy--suspected pneumonia, resolved  -Patient appears to be back to his baseline mentation  -Checks x-ray showed left lower lobe opacity  -Urine Legionella/strep pneumo ordered  -On ceftriaxone and azithromycin               Diet ADULT DIET; Regular   DVT Prophylaxis [] Lovenox, []  Heparin, [] SCDs, [] Ambulation,  [] Eliquis, [] Xarelto  [] Coumadin   Code Status Full Code   Disposition  discharge to SNF.   Pending PT OT eval   Surrogate Decision Maker/ POA      Subjective:     Chief Complaint: No chief complaint on file. Juan Carias is a 79 y.o. male who presents with hypoglycemia    Patient seen and examined at bedside this morning. Overnight patient was found to be lethargic and a chest x-ray suggested left atelectasis versus pneumonia. Patient was started on antibiotics and work-up pending. Patient is able to eat    This morning and is alert and oriented to person and place. Has baseline underlying dementia but is able to respond appropriately to questions. Denies chest pain, shortness of breath, nausea vomiting, fever or chills. Reports occasional cough        Objective: Intake/Output Summary (Last 24 hours) at 7/7/2022 1350  Last data filed at 7/6/2022 2220  Gross per 24 hour   Intake 0 ml   Output 75 ml   Net -75 ml        Vitals:   Vitals:    07/07/22 0816   BP: 130/71   Pulse: 87   Resp: 16   Temp: 97.4 °F (36.3 °C)   SpO2: 92%       Physical Exam:     General: NAD  Eyes: EOMI  ENT: neck supple  Cardiovascular: Regular rate. Respiratory: Clear to auscultation  Gastrointestinal: Soft, non tender  Genitourinary: no suprapubic tenderness  Musculoskeletal: No edema  Skin: warm, dry  Neuro: Alert. Psych: Mood appropriate.      Medications:   Medications:    [START ON 7/8/2022] insulin glargine  5 Units SubCUTAneous Daily    cefTRIAXone (ROCEPHIN) IV  1,000 mg IntraVENous Q24H    azithromycin  500 mg IntraVENous Q24H    sodium chloride flush  10 mL IntraVENous 2 times per day    enoxaparin  40 mg SubCUTAneous Daily    aspirin  81 mg Oral Daily    atorvastatin  40 mg Oral Daily    busPIRone  5 mg Oral BID    carvedilol  6.25 mg Oral BID WC    cilostazol  100 mg Oral BID AC    dilTIAZem  60 mg Oral BID    famotidine  20 mg Oral Daily    gabapentin  100 mg Oral Nightly    latanoprost  1 drop Both Eyes Nightly    risperiDONE  1 mg Oral Daily    memantine  10 mg Oral BID      Infusions:    dextrose      sodium chloride       PRN Meds: glucose, 4 tablet, PRN  dextrose bolus, 125 mL, PRN   Or  dextrose bolus, 250 mL, PRN  glucagon (rDNA), 1 mg, PRN  dextrose, 100 mL/hr, PRN  sodium chloride flush, 10 mL, PRN  sodium chloride, , PRN  polyethylene glycol, 17 g, Daily PRN  acetaminophen, 650 mg, Q6H PRN   Or  acetaminophen, 650 mg, Q6H PRN  ondansetron, 4 mg, Q8H PRN   Or  ondansetron, 4 mg, Q6H PRN  traMADol, 50 mg, Q6H PRN        Labs      Recent Results (from the past 24 hour(s))   POCT Glucose    Collection Time: 07/06/22  4:23 PM   Result Value Ref Range    POC Glucose 134 (H) 70 - 99 MG/DL   POCT Glucose    Collection Time: 07/06/22  8:04 PM   Result Value Ref Range    POC Glucose 129 (H) 70 - 99 MG/DL   POCT Glucose    Collection Time: 07/06/22  8:35 PM   Result Value Ref Range    POC Glucose 162 (H) 70 - 99 MG/DL   Urinalysis    Collection Time: 07/06/22 10:40 PM   Result Value Ref Range    Color, UA YELLOW YELLOW    Clarity, UA CLEAR CLEAR    Glucose, Urine NEGATIVE NEGATIVE MG/DL    Bilirubin Urine NEGATIVE NEGATIVE MG/DL    Ketones, Urine 15 (A) NEGATIVE MG/DL    Specific Gravity, UA 1.020 1.001 - 1.035    Blood, Urine NEGATIVE NEGATIVE    pH, Urine 6.0 5.0 - 8.0    Protein, UA NEGATIVE NEGATIVE MG/DL    Urobilinogen, Urine 0.2 0.2 - 1.0 MG/DL    Nitrite Urine, Quantitative NEGATIVE NEGATIVE    Leukocyte Esterase, Urine NEGATIVE NEGATIVE    RBC, UA 1 0 - 3 /HPF    WBC, UA 1 0 - 2 /HPF    Bacteria, UA NEGATIVE NEGATIVE /HPF    Squam Epithel, UA <1 /HPF    Mucus, UA RARE (A) NEGATIVE HPF    Trichomonas, UA NONE SEEN NONE SEEN /HPF   POCT Glucose    Collection Time: 07/07/22 12:00 AM   Result Value Ref Range    POC Glucose 100 (H) 70 - 99 MG/DL   Comprehensive Metabolic Panel w/ Reflex to MG    Collection Time: 07/07/22  3:18 AM   Result Value Ref Range    Sodium 139 135 - 145 MMOL/L    Potassium 3.7 3.5 - 5.1 MMOL/L    Chloride 102 99 - 110 mMol/L    CO2 23 21 - 32 MMOL/L    BUN 26 (H) 6 - 23 MG/DL    CREATININE 1.3 0.9 - 1.3 MG/DL    Glucose 121 (H) 70 - 99 MG/DL    Calcium 9.5 8.3 - 10.6 MG/DL    Albumin 3.7 3.4 - 5.0 GM/DL    Total Protein 5.7 (L) 6.4 - 8.2 GM/DL    Total Bilirubin 0.6 0.0 - 1.0 MG/DL    ALT 20 10 - 40 U/L    AST 46 (H) 15 - 37 IU/L    Alkaline Phosphatase 73 40 - 128 IU/L    GFR Non- 53 (L) >60 mL/min/1.73m2    GFR African American >60 >60 mL/min/1.73m2    Anion Gap 14 4 - 16   CBC auto differential    Collection Time: 07/07/22  3:18 AM   Result Value Ref Range    WBC 9.7 4.0 - 10.5 K/CU MM    RBC 3.59 (L) 4.6 - 6.2 M/CU MM    Hemoglobin 10.9 (L) 13.5 - 18.0 GM/DL    Hematocrit 37.3 (L) 42 - 52 %    .9 (H) 78 - 100 FL    MCH 30.4 27 - 31 PG    MCHC 29.2 (L) 32.0 - 36.0 %    RDW 12.8 11.7 - 14.9 %    Platelets 402 136 - 390 K/CU MM    MPV 9.0 7.5 - 11.1 FL    Differential Type AUTOMATED DIFFERENTIAL     Segs Relative 69.6 (H) 36 - 66 %    Lymphocytes % 17.5 (L) 24 - 44 %    Monocytes % 9.7 (H) 0 - 4 %    Eosinophils % 2.2 0 - 3 %    Basophils % 0.6 0 - 1 %    Segs Absolute 6.8 K/CU MM    Lymphocytes Absolute 1.7 K/CU MM    Monocytes Absolute 0.9 K/CU MM    Eosinophils Absolute 0.2 K/CU MM    Basophils Absolute 0.1 K/CU MM    Nucleated RBC % 0.0 %    Total Nucleated RBC 0.0 K/CU MM    Total Immature Neutrophil 0.04 K/CU MM    Immature Neutrophil % 0.4 0 - 0.43 %   POCT Glucose    Collection Time: 07/07/22  8:13 AM   Result Value Ref Range    POC Glucose 99 70 - 99 MG/DL   POCT Glucose    Collection Time: 07/07/22 12:26 PM   Result Value Ref Range    POC Glucose 97 70 - 99 MG/DL        Imaging/Diagnostics Last 24 Hours   No results found.     Electronically signed by Lakshmi Phipps MD on 7/7/2022 at 1:50 PM

## 2022-07-07 NOTE — CARE COORDINATION
10:00 am. LSW reviewed patient medical record and discussed patient in the IDR meeting. LSW noted that OT has seen the patient but did not chart any recommendations. Whiteboard placed asking for PT and OT to see the patient and provide recommendations. Call to patient spouse and was not able to leave a message. Call to patient daughter/Kina [phone] and left a voicemail. ADDENDUM 5:00 pm Follow up with patient spouse in the room. She would like the patient to discharge to SNF her number 1 choice is [facility]. LSW call to Jeannine/[facility] with the referral however patient was seen by OT and PT but the therapy notes do NOT state a recommendation. A precert to SNF can not be initiated with out the recommendations from therapy.  placed another whiteboard asking therapy to please provide a recommendation. If the recommendation is for therapy needs a precert can be started. Attending physician notes from today state the disposition is [facility] to [facility] pending PT/OT eval. LSW notified  director Kim about the concerns with therapy's lack of recommendations.

## 2022-07-08 LAB
ALBUMIN SERPL-MCNC: 3.6 GM/DL (ref 3.4–5)
ALP BLD-CCNC: 69 IU/L (ref 40–128)
ALT SERPL-CCNC: 21 U/L (ref 10–40)
ANION GAP SERPL CALCULATED.3IONS-SCNC: 10 MMOL/L (ref 4–16)
AST SERPL-CCNC: 48 IU/L (ref 15–37)
BASOPHILS ABSOLUTE: 0 K/CU MM
BASOPHILS RELATIVE PERCENT: 0.5 % (ref 0–1)
BILIRUB SERPL-MCNC: 0.5 MG/DL (ref 0–1)
BUN BLDV-MCNC: 19 MG/DL (ref 6–23)
CALCIUM SERPL-MCNC: 9.4 MG/DL (ref 8.3–10.6)
CHLORIDE BLD-SCNC: 101 MMOL/L (ref 99–110)
CO2: 25 MMOL/L (ref 21–32)
CREAT SERPL-MCNC: 1 MG/DL (ref 0.9–1.3)
DIFFERENTIAL TYPE: ABNORMAL
EOSINOPHILS ABSOLUTE: 0.5 K/CU MM
EOSINOPHILS RELATIVE PERCENT: 5.5 % (ref 0–3)
GFR AFRICAN AMERICAN: >60 ML/MIN/1.73M2
GFR NON-AFRICAN AMERICAN: >60 ML/MIN/1.73M2
GLUCOSE BLD-MCNC: 144 MG/DL (ref 70–99)
GLUCOSE BLD-MCNC: 166 MG/DL (ref 70–99)
GLUCOSE BLD-MCNC: 178 MG/DL (ref 70–99)
GLUCOSE BLD-MCNC: 205 MG/DL (ref 70–99)
GLUCOSE BLD-MCNC: 208 MG/DL (ref 70–99)
GLUCOSE BLD-MCNC: 262 MG/DL (ref 70–99)
HCT VFR BLD CALC: 32.3 % (ref 42–52)
HEMOGLOBIN: 10.9 GM/DL (ref 13.5–18)
IMMATURE NEUTROPHIL %: 0.4 % (ref 0–0.43)
LYMPHOCYTES ABSOLUTE: 1 K/CU MM
LYMPHOCYTES RELATIVE PERCENT: 12.1 % (ref 24–44)
MAGNESIUM: 1.9 MG/DL (ref 1.8–2.4)
MCH RBC QN AUTO: 30.7 PG (ref 27–31)
MCHC RBC AUTO-ENTMCNC: 33.7 % (ref 32–36)
MCV RBC AUTO: 91 FL (ref 78–100)
MONOCYTES ABSOLUTE: 0.8 K/CU MM
MONOCYTES RELATIVE PERCENT: 9.4 % (ref 0–4)
NUCLEATED RBC %: 0 %
PDW BLD-RTO: 13 % (ref 11.7–14.9)
PLATELET # BLD: 211 K/CU MM (ref 140–440)
PMV BLD AUTO: 9.1 FL (ref 7.5–11.1)
POTASSIUM SERPL-SCNC: 3.3 MMOL/L (ref 3.5–5.1)
RBC # BLD: 3.55 M/CU MM (ref 4.6–6.2)
SEGMENTED NEUTROPHILS ABSOLUTE COUNT: 5.9 K/CU MM
SEGMENTED NEUTROPHILS RELATIVE PERCENT: 72.1 % (ref 36–66)
SODIUM BLD-SCNC: 136 MMOL/L (ref 135–145)
TOTAL IMMATURE NEUTOROPHIL: 0.03 K/CU MM
TOTAL NUCLEATED RBC: 0 K/CU MM
TOTAL PROTEIN: 5.6 GM/DL (ref 6.4–8.2)
WBC # BLD: 8.2 K/CU MM (ref 4–10.5)

## 2022-07-08 PROCEDURE — 85025 COMPLETE CBC W/AUTO DIFF WBC: CPT

## 2022-07-08 PROCEDURE — 82962 GLUCOSE BLOOD TEST: CPT

## 2022-07-08 PROCEDURE — 6370000000 HC RX 637 (ALT 250 FOR IP): Performed by: STUDENT IN AN ORGANIZED HEALTH CARE EDUCATION/TRAINING PROGRAM

## 2022-07-08 PROCEDURE — 6360000002 HC RX W HCPCS: Performed by: PHYSICIAN ASSISTANT

## 2022-07-08 PROCEDURE — 36415 COLL VENOUS BLD VENIPUNCTURE: CPT

## 2022-07-08 PROCEDURE — 2580000003 HC RX 258: Performed by: HOSPITALIST

## 2022-07-08 PROCEDURE — 6370000000 HC RX 637 (ALT 250 FOR IP): Performed by: PHYSICIAN ASSISTANT

## 2022-07-08 PROCEDURE — 6360000002 HC RX W HCPCS: Performed by: HOSPITALIST

## 2022-07-08 PROCEDURE — 2580000003 HC RX 258: Performed by: PHYSICIAN ASSISTANT

## 2022-07-08 PROCEDURE — 2700000000 HC OXYGEN THERAPY PER DAY

## 2022-07-08 PROCEDURE — 80053 COMPREHEN METABOLIC PANEL: CPT

## 2022-07-08 PROCEDURE — 83735 ASSAY OF MAGNESIUM: CPT

## 2022-07-08 PROCEDURE — 1200000000 HC SEMI PRIVATE

## 2022-07-08 PROCEDURE — 94761 N-INVAS EAR/PLS OXIMETRY MLT: CPT

## 2022-07-08 RX ORDER — FLUTICASONE PROPIONATE 50 MCG
2 SPRAY, SUSPENSION (ML) NASAL DAILY
Status: DISCONTINUED | OUTPATIENT
Start: 2022-07-08 | End: 2022-07-09 | Stop reason: HOSPADM

## 2022-07-08 RX ADMIN — CARVEDILOL 6.25 MG: 6.25 TABLET, FILM COATED ORAL at 16:34

## 2022-07-08 RX ADMIN — SODIUM CHLORIDE: 9 INJECTION, SOLUTION INTRAVENOUS at 00:57

## 2022-07-08 RX ADMIN — ENOXAPARIN SODIUM 40 MG: 100 INJECTION SUBCUTANEOUS at 08:17

## 2022-07-08 RX ADMIN — GABAPENTIN 100 MG: 100 CAPSULE ORAL at 20:02

## 2022-07-08 RX ADMIN — FAMOTIDINE 20 MG: 20 TABLET ORAL at 20:03

## 2022-07-08 RX ADMIN — CILOSTAZOL 100 MG: 50 TABLET ORAL at 08:21

## 2022-07-08 RX ADMIN — MEMANTINE 10 MG: 10 TABLET ORAL at 20:02

## 2022-07-08 RX ADMIN — ATORVASTATIN CALCIUM 40 MG: 40 TABLET, FILM COATED ORAL at 20:03

## 2022-07-08 RX ADMIN — CEFTRIAXONE SODIUM 1000 MG: 1 INJECTION, POWDER, FOR SOLUTION INTRAMUSCULAR; INTRAVENOUS at 00:57

## 2022-07-08 RX ADMIN — CARVEDILOL 6.25 MG: 6.25 TABLET, FILM COATED ORAL at 08:17

## 2022-07-08 RX ADMIN — ASPIRIN 81 MG CHEWABLE TABLET 81 MG: 81 TABLET CHEWABLE at 08:17

## 2022-07-08 RX ADMIN — DILTIAZEM HYDROCHLORIDE 60 MG: 60 CAPSULE, EXTENDED RELEASE ORAL at 08:17

## 2022-07-08 RX ADMIN — LATANOPROST 1 DROP: 50 SOLUTION/ DROPS OPHTHALMIC at 21:32

## 2022-07-08 RX ADMIN — BUSPIRONE HYDROCHLORIDE 5 MG: 5 TABLET ORAL at 08:17

## 2022-07-08 RX ADMIN — DILTIAZEM HYDROCHLORIDE 60 MG: 60 CAPSULE, EXTENDED RELEASE ORAL at 20:02

## 2022-07-08 RX ADMIN — SODIUM CHLORIDE, PRESERVATIVE FREE 10 ML: 5 INJECTION INTRAVENOUS at 08:18

## 2022-07-08 RX ADMIN — CILOSTAZOL 100 MG: 50 TABLET ORAL at 16:34

## 2022-07-08 RX ADMIN — INSULIN GLARGINE 5 UNITS: 100 INJECTION, SOLUTION SUBCUTANEOUS at 08:21

## 2022-07-08 RX ADMIN — RISPERIDONE 1 MG: 0.5 TABLET ORAL at 08:17

## 2022-07-08 RX ADMIN — AZITHROMYCIN MONOHYDRATE 500 MG: 500 INJECTION, POWDER, LYOPHILIZED, FOR SOLUTION INTRAVENOUS at 02:12

## 2022-07-08 RX ADMIN — BUSPIRONE HYDROCHLORIDE 5 MG: 5 TABLET ORAL at 20:02

## 2022-07-08 RX ADMIN — SODIUM CHLORIDE, PRESERVATIVE FREE 10 ML: 5 INJECTION INTRAVENOUS at 20:03

## 2022-07-08 RX ADMIN — MEMANTINE 10 MG: 10 TABLET ORAL at 08:17

## 2022-07-08 ASSESSMENT — PAIN SCALES - PAIN ASSESSMENT IN ADVANCED DEMENTIA (PAINAD)
NEGVOCALIZATION: 0
CONSOLABILITY: 0
TOTALSCORE: 0
CONSOLABILITY: 0
FACIALEXPRESSION: 0
CONSOLABILITY: 0
NEGVOCALIZATION: 0
BODYLANGUAGE: 0
TOTALSCORE: 0
BREATHING: 0
BODYLANGUAGE: 0
BREATHING: 0
BREATHING: 0
BODYLANGUAGE: 0
NEGVOCALIZATION: 0
TOTALSCORE: 0
FACIALEXPRESSION: 0
FACIALEXPRESSION: 0

## 2022-07-08 NOTE — PLAN OF CARE
Problem: Discharge Planning  Goal: Discharge to home or other facility with appropriate resources  7/7/2022 2251 by Sofia Carty RN  Outcome: Progressing  7/7/2022 1011 by Tabitha Stein RN  Outcome: Progressing  Flowsheets (Taken 7/7/2022 8860)  Discharge to home or other facility with appropriate resources:   Identify barriers to discharge with patient and caregiver   Arrange for needed discharge resources and transportation as appropriate   Identify discharge learning needs (meds, wound care, etc)     Problem: Safety - Adult  Goal: Free from fall injury  7/7/2022 2251 by Sofia Carty RN  Outcome: Progressing  7/7/2022 1011 by Tabitha Stein RN  Outcome: Progressing  Flowsheets (Taken 7/7/2022 1010)  Free From Fall Injury: Instruct family/caregiver on patient safety     Problem: Skin/Tissue Integrity  Goal: Absence of new skin breakdown  Description: 1. Monitor for areas of redness and/or skin breakdown  2. Assess vascular access sites hourly  3. Every 4-6 hours minimum:  Change oxygen saturation probe site  4. Every 4-6 hours:  If on nasal continuous positive airway pressure, respiratory therapy assess nares and determine need for appliance change or resting period.   7/7/2022 2251 by Sofia Carty RN  Outcome: Progressing  7/7/2022 1011 by Tabitha Stein RN  Outcome: Progressing     Problem: Pain  Goal: Verbalizes/displays adequate comfort level or baseline comfort level  7/7/2022 2251 by Sofia Carty RN  Outcome: Progressing  7/7/2022 1011 by Tabitha Stein RN  Outcome: Progressing

## 2022-07-08 NOTE — CARE COORDINATION
Spoke with Jeannine at [facility] and she still needs to review pt for possible admission/ still waiting for discharge dispo recommendations from PT/OT as well (pt has been evaluated by both PT/OT but need rec'ds). Received call from Jeannine they will accept pt and will try to get precert without rec'd, will update me if insurance request additional info.

## 2022-07-08 NOTE — PROGRESS NOTES
Sim Chan, 1940, [id number], 7/8/2022    Attempted x2 to see pt today to provide updated recommendations, pt asleep in bed both times, fluttering eyelids in response to name. Sternal rub and nail bed pressure unable to stir pt. Nursing notified, will re-attempt pt as schedule allows when pt more responsive and appropriate.     Maria Luisa Hong, OTR/L  7/8/2022, 3:16 PM

## 2022-07-08 NOTE — PROGRESS NOTES
V2.0  [facility] [place]      Name:  Ezequiel Zamudio /Age/Sex: 1940  (79 y.o. male)   MRN & CSN:  [id number] & [id number] Encounter Date/Time: 2022 4:21 PM EDT    Location:  [address] [place] [address]-A PCP: Sergio Montemayor MD       Hospital Day: 4    Assessment and Plan:   Ezequiel Zamudio is a 79 y.o. male with pmh of diabetes mellitus CAD, hyperlipidemia, COPD, CKD who presents with Hypoglycemia    1. Recurrent hypoglycemia in setting of DM II  -Patient with recurrent hypoglycemia for 1-2 weeks. Per family, has had decreased appetite recently. He is on 10 units levemir nightly,   -No hypoglycemic episodes since admission  -Started on Lantus 5 units twice daily. Discontinued to 5 units Lantus daily due to  hypoglycemia   -Hypoglycemic protocol  -Point-of-care glucose checks  -Continue to monitor    2. Peripheral neuropathy  -Continue gabapentin    3. Uncontrolled hypertension  -Continue Coreg and Cardizem    4. Hyperlipidemia  -Continue Lipitor    5. COPD, not in acute exacerbation  -Continue inhalers as needed    6. Chronic systolic heart failure  -Continue Coreg    7. CAD status post CABG in   -Continue home medications    8. Pituitary adenoma  -No change on repeat head CT    9. Anxiety  -Continue BuSpar    10. CKD  -Stable  -Continue to monitor    11. Chronic pain  -Continue tramadol    12. Dementia  -Continue risperidone    13. Acute metabolic encephalopathy--suspected pneumonia, resolved  -Patient appears to be back to his baseline mentation  -Checks x-ray showed left lower lobe opacity  -Urine Legionella/strep pneumo ordered  -On ceftriaxone and azithromycin         Diet ADULT DIET; Regular   DVT Prophylaxis [] Lovenox, []  Heparin, [] SCDs, [] Ambulation,  [] Eliquis, [] Xarelto  [] Coumadin   Code Status Full Code   Disposition  discharge to SNF. Surrogate Decision Maker/ POA      Subjective:     Chief Complaint: No chief complaint on file. Ezequiel Zamudio is a 79 y.o. male who presents with hypoglycemia    Patient seen and examined at bedside this morning. No acute overnight events reported. Patient responding very appropriately. Denies any new complaints. Afebrile and vital signs are stable      Objective:     No intake or output data in the 24 hours ending 07/08/22 1103     Vitals:   Vitals:    07/08/22 0830   BP: 138/69   Pulse: 79   Resp: 16   Temp: 97.9 °F (36.6 °C)   SpO2: 97%       Physical Exam:     General: NAD  Eyes: EOMI  ENT: neck supple  Cardiovascular: Regular rate. Respiratory: Clear to auscultation  Gastrointestinal: Soft, non tender  Genitourinary: no suprapubic tenderness  Musculoskeletal: No edema  Skin: warm, dry  Neuro: Alert. Psych: Mood appropriate.      Medications:   Medications:    fluticasone  2 spray Each Nostril Daily    insulin glargine  5 Units SubCUTAneous Daily    cefTRIAXone (ROCEPHIN) IV  1,000 mg IntraVENous Q24H    azithromycin  500 mg IntraVENous Q24H    sodium chloride flush  10 mL IntraVENous 2 times per day    enoxaparin  40 mg SubCUTAneous Daily    aspirin  81 mg Oral Daily    atorvastatin  40 mg Oral Daily    busPIRone  5 mg Oral BID    carvedilol  6.25 mg Oral BID WC    cilostazol  100 mg Oral BID AC    dilTIAZem  60 mg Oral BID    famotidine  20 mg Oral Daily    gabapentin  100 mg Oral Nightly    latanoprost  1 drop Both Eyes Nightly    risperiDONE  1 mg Oral Daily    memantine  10 mg Oral BID      Infusions:    dextrose      sodium chloride 25 mL/hr at 07/08/22 0057     PRN Meds: glucose, 4 tablet, PRN  dextrose bolus, 125 mL, PRN   Or  dextrose bolus, 250 mL, PRN  glucagon (rDNA), 1 mg, PRN  dextrose, 100 mL/hr, PRN  sodium chloride flush, 10 mL, PRN  sodium chloride, , PRN  polyethylene glycol, 17 g, Daily PRN  acetaminophen, 650 mg, Q6H PRN   Or  acetaminophen, 650 mg, Q6H PRN  ondansetron, 4 mg, Q8H PRN   Or  ondansetron, 4 mg, Q6H PRN  traMADol, 50 mg, Q6H PRN        Labs      Recent Results (from the past 24 hour(s))   POCT Glucose    Collection Time: 07/07/22 12:26 PM   Result Value Ref Range    POC Glucose 97 70 - 99 MG/DL   POCT Glucose    Collection Time: 07/07/22  5:10 PM   Result Value Ref Range    POC Glucose 126 (H) 70 - 99 MG/DL   POCT Glucose    Collection Time: 07/07/22  7:46 PM   Result Value Ref Range    POC Glucose 230 (H) 70 - 99 MG/DL   POCT Glucose    Collection Time: 07/08/22  1:45 AM   Result Value Ref Range    POC Glucose 178 (H) 70 - 99 MG/DL   POCT Glucose    Collection Time: 07/08/22  7:31 AM   Result Value Ref Range    POC Glucose 144 (H) 70 - 99 MG/DL   Comprehensive Metabolic Panel w/ Reflex to MG    Collection Time: 07/08/22  9:33 AM   Result Value Ref Range    Sodium 136 135 - 145 MMOL/L    Potassium 3.3 (L) 3.5 - 5.1 MMOL/L    Chloride 101 99 - 110 mMol/L    CO2 25 21 - 32 MMOL/L    BUN 19 6 - 23 MG/DL    CREATININE 1.0 0.9 - 1.3 MG/DL    Glucose 208 (H) 70 - 99 MG/DL    Calcium 9.4 8.3 - 10.6 MG/DL    Albumin 3.6 3.4 - 5.0 GM/DL    Total Protein 5.6 (L) 6.4 - 8.2 GM/DL    Total Bilirubin 0.5 0.0 - 1.0 MG/DL    ALT 21 10 - 40 U/L    AST 48 (H) 15 - 37 IU/L    Alkaline Phosphatase 69 40 - 128 IU/L    GFR Non-African American >60 >60 mL/min/1.73m2    GFR African American >60 >60 mL/min/1.73m2    Anion Gap 10 4 - 16   CBC auto differential    Collection Time: 07/08/22  9:33 AM   Result Value Ref Range    WBC 8.2 4.0 - 10.5 K/CU MM    RBC 3.55 (L) 4.6 - 6.2 M/CU MM    Hemoglobin 10.9 (L) 13.5 - 18.0 GM/DL    Hematocrit 32.3 (L) 42 - 52 %    MCV 91.0 78 - 100 FL    MCH 30.7 27 - 31 PG    MCHC 33.7 32.0 - 36.0 %    RDW 13.0 11.7 - 14.9 %    Platelets 653 056 - 611 K/CU MM    MPV 9.1 7.5 - 11.1 FL    Differential Type AUTOMATED DIFFERENTIAL     Segs Relative 72.1 (H) 36 - 66 %    Lymphocytes % 12.1 (L) 24 - 44 %    Monocytes % 9.4 (H) 0 - 4 %    Eosinophils % 5.5 (H) 0 - 3 %    Basophils % 0.5 0 - 1 %    Segs Absolute 5.9 K/CU MM    Lymphocytes Absolute 1.0 K/CU MM    Monocytes Absolute 0.8 K/CU MM    Eosinophils Absolute 0.5 K/CU MM    Basophils Absolute 0.0 K/CU MM    Nucleated RBC % 0.0 %    Total Nucleated RBC 0.0 K/CU MM    Total Immature Neutrophil 0.03 K/CU MM    Immature Neutrophil % 0.4 0 - 0.43 %        Imaging/Diagnostics Last 24 Hours   No results found.     Electronically signed by Diaz Horner MD on 7/8/2022 at 11:03 AM

## 2022-07-08 NOTE — DISCHARGE INSTR - COC
Continuity of Care Form    Patient Name: Ezequiel Zamudio   :  1940  MRN:  [id number]    Admit date:  2022  Discharge date:  ***    Code Status Order: Full Code   Advance Directives:      Admitting Physician:  Courtney Dykes MD  PCP: Sergio Montemayor MD    Discharging Nurse: [facility] Unit/Room#: 1303/9845-W  Discharging Unit Phone Number: ***    Emergency Contact:   Extended Emergency Contact Information  Primary Emergency Contact: Kezia Medellin  Address: [address] [place] [address] Phone: [phone]  Relation: Spouse  Secondary Emergency Contact: Greg Peralta Phone: [phone]  Mobile Phone: [phone]  Relation: Child    Past Surgical History:  Past Surgical History:   Procedure Laterality Date    ABDOMINAL SURGERY      stones in bile duct removed x3    CARDIAC SURGERY      CABG x 2 in  at [facility] - Dr. Amado Councilman, ESOPHAGUS      ERCP  14    w/ dilation of papilla    ERCP  14    ERCP  10/24/15    extractions stones, balloon dilatation     ERCP  2017    stone extraction     ERCP N/A 2019    ERCP STONE REMOVAL/ DILATATION OF PAPILLA WITH 10-12MM AND 12-15MM BALLOON AND 9-12MM, 12-15MM  EXTRACTOR BALLOON USED FOR REMOVAL OF MULTIPLE  CBD STONES performed by Demetrius Hines MD at [facility]    ERCP N/A 2019    ERCP STONE REMOVAL, DILATATION OF PAPILLA WITH 10-12MM BALLOON, AND EXTRACTOR BALLOON 12-15MM USED FOR REMOVAL OF CBD STONE AND SLUDE performed by Cassidy Baig MD at [facility] ENDOSCOPY    ERCP N/A 3/31/2020    ERCP DILATION BALLOON to 12  AND SWEEP OF CBD STONE AND SLUDGE performed by Cassidy Baig MD at [facility]    ERCP N/A 2021    ERCP WITH BALLOON DILATATION 10-12 MM BALLOON( TO 12 MM) OF PAPILLA, , BALLOON SWEEP  WITH REMOVAL OF CBD STONES AND INCJYS(10-31 MM BALLOON) performed by Neil Vanegas MD [place] at [address], Left 2/18/2021    LEFT FEMUR IM NAIL VIANEY INSERTION performed by Jessica Wills DO at [facility] [facility] Bilateral     2005 at [address] HISTORY  03/13/14    sphincterotomy       Immunization History:   Immunization History   Administered Date(s) Administered    COVID-19, PFIZER PURPLE top, DILUTE for use, (age 15 y+), 30mcg/0.3mL 01/29/2021, 06/08/2021, 11/23/2021    Influenza Virus Vaccine 09/11/2013, 09/13/2014    Pneumococcal Conjugate 7-valent ([facility] Sep) 02/22/2012       Active Problems:  Patient Active Problem List   Diagnosis Code    Uncontrolled type II diabetes with peripheral autonomic neuropathy ([facility]) E11.43, E11.65    Essential hypertension I10    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    Choledocholithiasis K80.50    Generalized abdominal pain R10.84    COPD (chronic obstructive pulmonary disease) ([facility]) J44.9    Abdominal pain, epigastric R10.13    Elevated LFTs R79.89    Abnormal findings on imaging of biliary tract R93.2    Chest pain R07.9    ASCVD (arteriosclerotic cardiovascular disease) I25.10    Acute encephalopathy G93.40    Vomiting R11.10    Sepsis ([facility]) A41.9    Nausea vomiting and diarrhea R11.2, R19.7    Troponin I above reference range R77.8    Fever R50.9    Moderate malnutrition ([facility]) H29.2    Acute metabolic encephalopathy X18.25    Impaired cognition R41.89    Generalized weakness R53.1    Cholangitis due to bile duct calculus with obstruction K80.31    Oropharyngeal dysphagia R13.12    History of below-knee amputation of both lower extremities ([facility]) Z89.511, Z89.512    Abdominal pain R10.9    Inguinal pain R10.30    Closed fracture of left hip ([facility]) S72.002A    Acute alteration in mental status R41.82    Acute exacerbation of COPD with asthma ([facility] J44.1, J45.901    Acute cystitis without hematuria N30.00    Chronic systolic (congestive) heart failure (HCC) I50.22    Acute cholangitis K83.09    Uncontrolled type 2 diabetes mellitus with hyperglycemia (HCC) E11.65    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    Inguinal hernia of right side without obstruction or gangrene K40.90    Hyperlipidemia E78.5    Altered mental status R41.82    Visual hallucinations R44.1    Auditory hallucinations R44.0    Dementia with behavioral disturbance (HCC) F03.91    Hypoglycemia E16.2       Isolation/Infection:   Isolation            No Isolation          Patient Infection Status       Infection Onset Added Last Indicated Last Indicated By Review Planned Expiration Resolved Resolved By    None active    Resolved    COVID-19 (Rule Out) 22 COVID-19 (Ordered)   22 Rule-Out Test Resulted    COVID-19 (Rule Out) 22 COVID-19, Rapid (Ordered)   22 Rule-Out Test Resulted    COVID-19 (Rule Out) 21 COVID-19, Rapid (Ordered)   21 Rule-Out Test Resulted    COVID-19 (Rule Out) 21 COVID-19, Rapid (Ordered)   21 Rule-Out Test Resulted    C-diff Rule Out 20 C difficile Molecular/PCR (Ordered)   21 Berkley Cramer LPN    [id number] 70/47/81 20 COVID-19   12/15/20     MRSA 19 Wound culture   20 Wendy Conti LPN            Nurse Assessment:  Last Vital Signs: /69   Pulse 79   Temp 97.9 °F (36.6 °C) (Oral)   Resp 16   Wt 126 lb 8 oz (57.4 kg)   SpO2 97%   BMI 19.23 kg/m²     Last documented pain score (0-10 scale):    Last Weight:   Wt Readings from Last 1 Encounters:   22 126 lb 8 oz (57.4 kg)     Mental Status:  disoriented and alert    IV Access:  - None    Nursing Mobility/ADLs:  Walking   Dependent  Transfer  Dependent  Bathing  Dependent  Dressing  Dependent  Toileting  Dependent  Feeding  Assisted  Med Admin  Dependent  Med Delivery   prefers mixed with applesauce    Wound Care Documentation and Therapy:  Wound Pretibial Proximal;Right (Active)   Dressing Status Clean;Dry; Intact 07/08/22 0830   Number of days:        Wound Pretibial Left;Proximal (Active)   Dressing Status Clean;Dry; Intact 07/08/22 0830   Number of days:        Wound 07/05/22 Sternum Lower one layer of skin has come off aprox 1cm diameter (Active)   Dressing Status Clean;Dry; Intact 07/08/22 0830   Number of days: 2        Elimination:  Continence: Bowel: No  Bladder: No  Urinary Catheter: None   Colostomy/Ileostomy/Ileal Conduit: No       Date of Last BM: 7/9/22  No intake or output data in the 24 hours ending 07/08/22 1137  I/O last 3 completed shifts:  In: -   Out: 75 [Urine:75]    Safety Concerns:      At Risk for Falls    Impairments/Disabilities:      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      Patient's personal belongings (please select all that are sent with patient):  [place]    RN SIGNATURE:  Electronically signed by Liberty Barnes RN on 7/9/22 at 2:14 PM EDT    CASE MANAGEMENT/SOCIAL WORK SECTION    Inpatient Status Date: ***    Readmission Risk Assessment Score:  Readmission Risk              Risk of Unplanned Readmission:  20           Discharging to Facility/ Agency   Name:   Address:  Phone:  Fax:    Dialysis Facility (if applicable)   Name:  Address:  Dialysis Schedule:  Phone:  Fax:    / signature: {Esignature:[id number]}    PHYSICIAN SECTION      Nutrition Therapy:  Current Nutrition Therapy:   [address] Diet List:492870126}    Routes of Feeding: {CHP DME Other Feedings:389974327}  Liquids: {Slp liquid thickness:75039}  Daily Fluid Restriction: {CHP DME Yes amt example:683308989}  Last Modified Barium Swallow with Video (Video Swallowing Test): {Done Not Done TVST:157641630}    Treatments at the Time of Hospital Discharge:   Respiratory Treatments: inhalers  Oxygen Therapy:  2L NC  Ventilator: no vent      Rehab Therapies: Physical Therapy and Occupational Therapy  Weight Bearing Status/Restrictions: No weight bearing restrictions  Other Medical Equipment (for information only, NOT a DME order):  walker  Other Treatments:   Prognosis: Fair    Condition at Discharge: Stable    Rehab Potential (if transferring to Rehab): Good    Recommended Labs or Other Treatments After Discharge: cbc/bmp    Physician Certification: I certify the above information and transfer of Janett Patrick  is necessary for the continuing treatment of the diagnosis listed and that he requires [place] for less 30 days.      Update Admission H&P: No change in H&P    PHYSICIAN SIGNATURE:  Electronically signed by Star Valencia MD on 7/9/22 at 12:46 PM EDT

## 2022-07-09 VITALS
OXYGEN SATURATION: 99 % | BODY MASS INDEX: 19.23 KG/M2 | HEART RATE: 70 BPM | WEIGHT: 126.5 LBS | SYSTOLIC BLOOD PRESSURE: 101 MMHG | TEMPERATURE: 97.8 F | DIASTOLIC BLOOD PRESSURE: 45 MMHG | RESPIRATION RATE: 16 BRPM

## 2022-07-09 LAB
GLUCOSE BLD-MCNC: 144 MG/DL (ref 70–99)
GLUCOSE BLD-MCNC: 193 MG/DL (ref 70–99)
GLUCOSE BLD-MCNC: 227 MG/DL (ref 70–99)
SARS-COV-2, NAAT: NOT DETECTED
SOURCE: NORMAL

## 2022-07-09 PROCEDURE — 80053 COMPREHEN METABOLIC PANEL: CPT

## 2022-07-09 PROCEDURE — 82962 GLUCOSE BLOOD TEST: CPT

## 2022-07-09 PROCEDURE — 6360000002 HC RX W HCPCS: Performed by: PHYSICIAN ASSISTANT

## 2022-07-09 PROCEDURE — 2580000003 HC RX 258: Performed by: HOSPITALIST

## 2022-07-09 PROCEDURE — 6370000000 HC RX 637 (ALT 250 FOR IP): Performed by: STUDENT IN AN ORGANIZED HEALTH CARE EDUCATION/TRAINING PROGRAM

## 2022-07-09 PROCEDURE — 2580000003 HC RX 258: Performed by: PHYSICIAN ASSISTANT

## 2022-07-09 PROCEDURE — 2700000000 HC OXYGEN THERAPY PER DAY

## 2022-07-09 PROCEDURE — 6360000002 HC RX W HCPCS: Performed by: HOSPITALIST

## 2022-07-09 PROCEDURE — 87635 SARS-COV-2 COVID-19 AMP PRB: CPT

## 2022-07-09 PROCEDURE — 94761 N-INVAS EAR/PLS OXIMETRY MLT: CPT

## 2022-07-09 PROCEDURE — 6370000000 HC RX 637 (ALT 250 FOR IP): Performed by: PHYSICIAN ASSISTANT

## 2022-07-09 RX ORDER — INSULIN DETEMIR 100 [IU]/ML
5 INJECTION, SOLUTION SUBCUTANEOUS DAILY
Qty: 1 EACH | Refills: 3 | Status: SHIPPED | OUTPATIENT
Start: 2022-07-09

## 2022-07-09 RX ORDER — DOXYCYCLINE HYCLATE 100 MG
100 TABLET ORAL 2 TIMES DAILY
Qty: 10 TABLET | Refills: 0 | Status: SHIPPED | OUTPATIENT
Start: 2022-07-09 | End: 2022-07-14

## 2022-07-09 RX ADMIN — CILOSTAZOL 100 MG: 50 TABLET ORAL at 17:09

## 2022-07-09 RX ADMIN — INSULIN GLARGINE 5 UNITS: 100 INJECTION, SOLUTION SUBCUTANEOUS at 12:21

## 2022-07-09 RX ADMIN — CARVEDILOL 6.25 MG: 6.25 TABLET, FILM COATED ORAL at 17:08

## 2022-07-09 RX ADMIN — CEFTRIAXONE SODIUM 1000 MG: 1 INJECTION, POWDER, FOR SOLUTION INTRAMUSCULAR; INTRAVENOUS at 00:35

## 2022-07-09 RX ADMIN — BUSPIRONE HYDROCHLORIDE 5 MG: 5 TABLET ORAL at 11:17

## 2022-07-09 RX ADMIN — CILOSTAZOL 100 MG: 50 TABLET ORAL at 05:50

## 2022-07-09 RX ADMIN — ASPIRIN 81 MG CHEWABLE TABLET 81 MG: 81 TABLET CHEWABLE at 11:17

## 2022-07-09 RX ADMIN — ENOXAPARIN SODIUM 40 MG: 100 INJECTION SUBCUTANEOUS at 11:19

## 2022-07-09 RX ADMIN — CARVEDILOL 6.25 MG: 6.25 TABLET, FILM COATED ORAL at 11:17

## 2022-07-09 RX ADMIN — DILTIAZEM HYDROCHLORIDE 60 MG: 60 CAPSULE, EXTENDED RELEASE ORAL at 11:17

## 2022-07-09 RX ADMIN — MEMANTINE 10 MG: 10 TABLET ORAL at 11:18

## 2022-07-09 RX ADMIN — SODIUM CHLORIDE, PRESERVATIVE FREE 10 ML: 5 INJECTION INTRAVENOUS at 10:30

## 2022-07-09 RX ADMIN — FLUTICASONE PROPIONATE 2 SPRAY: 50 SPRAY, METERED NASAL at 11:18

## 2022-07-09 RX ADMIN — AZITHROMYCIN MONOHYDRATE 500 MG: 500 INJECTION, POWDER, LYOPHILIZED, FOR SOLUTION INTRAVENOUS at 02:00

## 2022-07-09 RX ADMIN — RISPERIDONE 1 MG: 0.5 TABLET ORAL at 11:17

## 2022-07-09 ASSESSMENT — PAIN SCALES - WONG BAKER: WONGBAKER_NUMERICALRESPONSE: 0

## 2022-07-09 NOTE — PLAN OF CARE
Problem: Discharge Planning  Goal: Discharge to home or other facility with appropriate resources  7/9/2022 0006 by Rolando Reed RN  Outcome: Progressing  7/8/2022 1357 by Ange Flores RN  Outcome: Progressing     Problem: Safety - Adult  Goal: Free from fall injury  7/9/2022 0006 by Rolando Reed RN  Outcome: Progressing  7/8/2022 1357 by Ange Flores RN  Outcome: Progressing     Problem: Skin/Tissue Integrity  Goal: Absence of new skin breakdown  Description: 1. Monitor for areas of redness and/or skin breakdown  2. Assess vascular access sites hourly  3. Every 4-6 hours minimum:  Change oxygen saturation probe site  4. Every 4-6 hours:  If on nasal continuous positive airway pressure, respiratory therapy assess nares and determine need for appliance change or resting period.   7/9/2022 0006 by Rolando Reed RN  Outcome: Progressing  7/8/2022 1357 by Ange Flores RN  Outcome: Progressing     Problem: Pain  Goal: Verbalizes/displays adequate comfort level or baseline comfort level  7/9/2022 0006 by Rolando Reed RN  Outcome: Progressing  7/8/2022 1357 by Ange Flores RN  Outcome: Progressing

## 2022-07-09 NOTE — DISCHARGE SUMMARY
V2.0  Discharge Summary    Name:  Alecia Mckeon /Age/Sex: 1940 (68 y.o. male)   Admit Date: 2022  Discharge Date: 22    MRN & CSN:  [id number] & [id number] Encounter Date and Time 22 12:47 PM EDT    Attending:  Ray Esqueda MD Discharging Provider: Ray Esqueda MD       Hospital Course:     Brief HPI: Alecia Mckeon is a 79 y.o. male with pmh of dementia, chronic pain, CKD, PVD, anxiety,CHF, COPD, DMII, HLD, HTN who presents to the ER for evaluation of recurrent hypoglycemia. Patient is unable to provide history himself, daughter and wife provide history. He has been progressively weakening over two to three weeks at home. He usually can dress himself and put on his bilateral prosthesis but has been unable to recently and has been incontinent. He has had decreased intake recently. Wife states patient has not had recent falls, fevers, chlls, chest pain, dyspnea, or abdominal pain. No new medication changes. He has waxing and waning confusion at home, in the setting of advancing dementia. Patient's wife states his sugar is usually 90s in the AM and 200s in the evening, but this AM it was 40 and he was difficult to arouse so EMS was called. She was able to give him oral glucose. He was seen at [facility] ER and transferred to [address] [place] [address] for further monitoring.      COVID was checked and was negative. CXR negative. CT head negative. UA negative. Lactic normal. WBC 8.6, hemoglobin 11.4. LFTs normal, normal electrolytes, normal renal functions, Glucose 72 in ER.       Brief Problem Based Course:   1. Recurrent hypoglycemia in setting of DM II  -Patient with recurrent hypoglycemia for 1-2 weeks. Per family, has had decreased appetite recently. He is on 10 units levemir nightly,   -No hypoglycemic episodes since admission  -Started on Lantus 5 units twice daily. Discontinued to 5 units Lantus daily due to  hypoglycemia   -Blood sugar stable     2.   Peripheral neuropathy  -Continue gabapentin     3. Uncontrolled hypertension  -Continue Coreg and Cardizem     4. Hyperlipidemia  -Continue Lipitor     5. COPD, not in acute exacerbation  -Continue inhalers as needed     6. Chronic systolic heart failure  -Continue Coreg     7. CAD status post CABG in 2004  -Continue home medications     8. Pituitary adenoma  -No change on repeat head CT     9. Anxiety  -Continue BuSpar     10. CKD  -Stable  -Continue to monitor     11. Chronic pain  -Continue tramadol     12. Dementia  -Continue risperidone     13. Acute metabolic encephalopathy due to suspected pneumonia, resolved  -Patient appears to be back to his baseline mentation  -Checks x-ray showed left lower lobe opacity  -Urine Legionella/strep pneumo ordered  -On ceftriaxone and azithromycin. Will discharge to complete a course of doxy for 5 days. The patient expressed appropriate understanding of, and agreement with the discharge recommendations, medications, and plan.      Consults this admission:  IP CONSULT TO CASE MANAGEMENT    Discharge Diagnosis:   Hypoglycemia      Discharge Instruction:   Follow up appointments:   Primary care physician: Conchita Morfin MD within 2 weeks  Diet: regular diet   Activity: activity as tolerated  Disposition: Discharged to:   []Home, [][facility], [x]SNF, []Acute Rehab, []Hospice   Condition on discharge: Stable  Labs and Tests to be Followed up as an outpatient by PCP or Specialist:     Discharge Medications:        Medication List      START taking these medications    doxycycline hyclate 100 MG tablet  Commonly known as: VIBRA-TABS  Take 1 tablet by mouth 2 times daily for 5 days     Levemir 100 UNIT/ML injection vial  Generic drug: insulin detemir  Inject 5 Units into the skin daily  Replaces: Levemir FlexTouch 100 UNIT/ML injection pen        CHANGE how you take these medications    memantine 10 MG tablet  Commonly known as: NAMENDA  Take 1 tablet by mouth 2 times daily NOTE TO PHARMACY: DO NOT FILL kg/m²       Physical Exam:   General: NAD  Eyes: EOMI  ENT: neck supple  Cardiovascular: Regular rate. Respiratory: Clear to auscultation  Gastrointestinal: Soft, non tender  Genitourinary: no suprapubic tenderness  Musculoskeletal: No edema  Skin: warm, dry  Neuro: Alert. Psych: Mood appropriate. Labs and Imaging   XR CHEST PORTABLE    Result Date: 7/8/2022  EXAMINATION: ONE XRAY VIEW OF THE CHEST 7/6/2022 9:26 pm COMPARISON: January 30, 2022 HISTORY: ORDERING SYSTEM PROVIDED HISTORY: Rule out TECHNOLOGIST PROVIDED HISTORY: Reason for exam:->Rule out Reason for Exam: rule out FINDINGS: No lines or tubes. Stable cardiomediastinal silhouette. Status post CABG. Airspace opacities are seen in the left base. The lungs are otherwise clear without focal consolidation or pleural effusion. No suspicious pulmonary nodules. No pulmonary edema. No pneumothorax. No acute osseous abnormality. Status post sternotomy. Left base opacity may represent atelectasis or infection. Status post CABG. CBC:   Recent Labs     07/07/22 0318 07/08/22  0933   WBC 9.7 8.2   HGB 10.9* 10.9*    211     BMP:    Recent Labs     07/07/22 0318 07/08/22  0933    136   K 3.7 3.3*    101   CO2 23 25   BUN 26* 19   CREATININE 1.3 1.0   GLUCOSE 121* 208*     Hepatic:   Recent Labs     07/07/22 0318 07/08/22  0933   AST 46* 48*   ALT 20 21   BILITOT 0.6 0.5   ALKPHOS 73 69     Lipids:   Lab Results   Component Value Date/Time    CHOL 132 03/15/2022 09:10 AM    HDL 67 03/15/2022 09:10 AM    TRIG 56 03/15/2022 09:10 AM     Hemoglobin A1C:   Lab Results   Component Value Date/Time    LABA1C 6.2 07/05/2022 09:42 PM     TSH: No results found for: TSH  Troponin:   Lab Results   Component Value Date/Time    TROPONINT <0.010 02/01/2022 06:41 AM    TROPONINT <0.010 01/31/2022 01:25 AM    TROPONINT <0.010 01/30/2022 02:11 PM     Lactic Acid: No results for input(s): LACTA in the last 72 hours.   BNP: No results for input(s): PROBNP in the last 72 hours.   UA:  Lab Results   Component Value Date/Time    NITRU NEGATIVE 07/06/2022 10:40 PM    COLORU YELLOW 07/06/2022 10:40 PM    WBCUA 1 07/06/2022 10:40 PM    RBCUA 1 07/06/2022 10:40 PM    MUCUS RARE 07/06/2022 10:40 PM    TRICHOMONAS NONE SEEN 07/06/2022 10:40 PM    BACTERIA NEGATIVE 07/06/2022 10:40 PM    CLARITYU CLEAR 07/06/2022 10:40 PM    SPECGRAV 1.020 07/06/2022 10:40 PM    LEUKOCYTESUR NEGATIVE 07/06/2022 10:40 PM    UROBILINOGEN 0.2 07/06/2022 10:40 PM    BILIRUBINUR NEGATIVE 07/06/2022 10:40 PM    BLOODU NEGATIVE 07/06/2022 10:40 PM    KETUA 15 07/06/2022 10:40 PM    AMORPHOUS RARE 04/04/2021 10:03 PM     Urine Cultures: No results found for: LABURIN  Blood Cultures: No results found for: BC  No results found for: BLOODCULT2  Organism: No results found for: ORG    Time Spent Discharging patient 35 minutes    Electronically signed by Ayesha Sapp MD on 7/9/2022 at 12:47 PM

## 2022-07-09 NOTE — PROGRESS NOTES
Transport set up with [facility] and p/u will be at [address]. Report called and given to [facility] [place].

## 2022-07-09 NOTE — CARE COORDINATION
Received approval for plcmt from Jeannine at [facility], sent PS to Dr. Ashlyn Pagan to update, will need rapid covid.

## 2022-07-12 ENCOUNTER — HOSPITAL ENCOUNTER (OUTPATIENT)
Age: 82
Setting detail: SPECIMEN
Discharge: HOME | End: 2022-07-12

## 2022-07-12 LAB
ALBUMIN SERPL-MCNC: 3.6 GM/DL (ref 3.4–5)
ALP BLD-CCNC: 74 IU/L (ref 40–128)
ALT SERPL-CCNC: 16 U/L (ref 10–40)
ANION GAP SERPL CALCULATED.3IONS-SCNC: 14 MMOL/L (ref 4–16)
AST SERPL-CCNC: 22 IU/L (ref 15–37)
BASOPHILS ABSOLUTE: 0.1 K/CU MM
BASOPHILS RELATIVE PERCENT: 0.7 % (ref 0–1)
BILIRUB SERPL-MCNC: 0.6 MG/DL (ref 0–1)
BUN BLDV-MCNC: 17 MG/DL (ref 6–23)
CALCIUM SERPL-MCNC: 9.6 MG/DL (ref 8.3–10.6)
CHLORIDE BLD-SCNC: 103 MMOL/L (ref 99–110)
CO2: 25 MMOL/L (ref 21–32)
CREAT SERPL-MCNC: 1 MG/DL (ref 0.9–1.3)
DIFFERENTIAL TYPE: ABNORMAL
EOSINOPHILS ABSOLUTE: 0.7 K/CU MM
EOSINOPHILS RELATIVE PERCENT: 8.7 % (ref 0–3)
GFR AFRICAN AMERICAN: >60 ML/MIN/1.73M2
GFR NON-AFRICAN AMERICAN: >60 ML/MIN/1.73M2
GLUCOSE BLD-MCNC: 133 MG/DL (ref 70–99)
HCT VFR BLD CALC: 35.7 % (ref 42–52)
HEMOGLOBIN: 11.3 GM/DL (ref 13.5–18)
IMMATURE NEUTROPHIL %: 0.5 % (ref 0–0.43)
LYMPHOCYTES ABSOLUTE: 1.7 K/CU MM
LYMPHOCYTES RELATIVE PERCENT: 20.5 % (ref 24–44)
MCH RBC QN AUTO: 30.5 PG (ref 27–31)
MCHC RBC AUTO-ENTMCNC: 31.7 % (ref 32–36)
MCV RBC AUTO: 96.2 FL (ref 78–100)
MONOCYTES ABSOLUTE: 1 K/CU MM
MONOCYTES RELATIVE PERCENT: 11.4 % (ref 0–4)
NUCLEATED RBC %: 0 %
PDW BLD-RTO: 13.1 % (ref 11.7–14.9)
PLATELET # BLD: 230 K/CU MM (ref 140–440)
PMV BLD AUTO: 9.6 FL (ref 7.5–11.1)
POTASSIUM SERPL-SCNC: 3.9 MMOL/L (ref 3.5–5.1)
RBC # BLD: 3.71 M/CU MM (ref 4.6–6.2)
SEGMENTED NEUTROPHILS ABSOLUTE COUNT: 4.9 K/CU MM
SEGMENTED NEUTROPHILS RELATIVE PERCENT: 58.2 % (ref 36–66)
SODIUM BLD-SCNC: 142 MMOL/L (ref 135–145)
TOTAL IMMATURE NEUTOROPHIL: 0.04 K/CU MM
TOTAL NUCLEATED RBC: 0 K/CU MM
TOTAL PROTEIN: 5.6 GM/DL (ref 6.4–8.2)
WBC # BLD: 8.4 K/CU MM (ref 4–10.5)

## 2022-07-12 PROCEDURE — 36415 COLL VENOUS BLD VENIPUNCTURE: CPT

## 2022-07-12 PROCEDURE — 85025 COMPLETE CBC W/AUTO DIFF WBC: CPT

## 2022-07-12 PROCEDURE — 80053 COMPREHEN METABOLIC PANEL: CPT

## 2024-09-19 NOTE — PLAN OF CARE
Problem: Pain:  Goal: Pain level will decrease  Description: Pain level will decrease  Outcome: Ongoing  Goal: Control of acute pain  Description: Control of acute pain  Outcome: Ongoing  Goal: Control of chronic pain  Description: Control of chronic pain  Outcome: Ongoing     Problem: Safety:  Goal: Free from accidental physical injury  Description: Free from accidental physical injury  Outcome: Ongoing  Goal: Free from intentional harm  Description: Free from intentional harm  Outcome: Ongoing     Problem: Daily Care:  Goal: Daily care needs are met  Description: Daily care needs are met  Outcome: Ongoing     Problem: Skin Integrity:  Goal: Skin integrity will stabilize  Description: Skin integrity will stabilize  Outcome: Ongoing     Problem: Discharge Planning:  Goal: Patients continuum of care needs are met  Description: Patients continuum of care needs are met  Outcome: Ongoing Quality 47: Advance Care Plan: Advance Care Planning discussed and documented in the medical record; patient did not wish or was not able to name a surrogate decision maker or provide an advance care plan. Detail Level: Detailed Quality 226: Preventive Care And Screening: Tobacco Use: Screening And Cessation Intervention: Patient screened for tobacco use and is an ex/non-smoker

## (undated) DEVICE — CIRCUIT BREATHING SINGLE LIMB AD 72 IN 3 LT 2 FILTER LIMBO

## (undated) DEVICE — SYRINGE INFL 60ML DISP ALLIANCE II

## (undated) DEVICE — ELECTRODE ES AD CRDLSS PT RET REM POLYHESIVE

## (undated) DEVICE — 6619 2 PTNT ISO SYS INCISE AREA&LT;(&GT;&&LT;)&GT;P: Brand: STERI-DRAPE™ IOBAN™ 2

## (undated) DEVICE — ESOPHAGEAL/PYLORIC/COLONIC/BILIARY WIREGUIDED BALLOON DILATATION CATHETER: Brand: CRE™ PRO

## (undated) DEVICE — Device

## (undated) DEVICE — ROD RMR L950MM DIA2.5MM W/ EXTN BALL TIP

## (undated) DEVICE — Z DISCONTINUED (USE MFG CAT MVABO)  TUBING GAS SAMPLING STD 6.5 FT FEMALE CONN SMRT CAPNOLINE

## (undated) DEVICE — COUNTER NDL 30 COUNT FOAM STRP SGL MAG

## (undated) DEVICE — PADDING CAST W6INXL4YD COT COHESIVE HND TEARABLE SPEC 100

## (undated) DEVICE — TUBING, SUCTION, 9/32" X 10', STRAIGHT: Brand: MEDLINE

## (undated) DEVICE — BANDAGE,SELF ADHRNT,COFLEX,4"X5YD,STRL: Brand: COLABEL

## (undated) DEVICE — GUIDEWIRE ENDOSCP L450CM DIA0.035IN BILI STR RND TIP HI

## (undated) DEVICE — RETRIEVAL BALLOON CATHETER: Brand: EXTRACTOR™ PRO XL

## (undated) DEVICE — SUTURE VCRL SZ 0 L18IN ABSRB UD L36MM CT-1 1/2 CIR J840D

## (undated) DEVICE — MARKER SURG SKIN UTIL REGULAR/FINE 2 TIP W/ RUL AND 9 LBL

## (undated) DEVICE — SPHINCTEROTOME 44 20MM TRUETOME

## (undated) DEVICE — SPONGE LAP W18XL18IN WHT COT 4 PLY FLD STRUNG RADPQ DISP ST

## (undated) DEVICE — PENCIL ES CRD L10FT HND SWCHING ROCK SWCH W/ EDGE COAT BLDE

## (undated) DEVICE — DRAPE SHEET ULTRAGARD: Brand: MEDLINE

## (undated) DEVICE — SYRINGE IRRIG 60ML SFT PLIABLE BLB EZ TO GRP 1 HND USE W/

## (undated) DEVICE — TOWEL,OR,DSP,ST,BLUE,STD,6/PK,12PK/CS: Brand: MEDLINE

## (undated) DEVICE — SYSTEM BX CAP BILI RAP EXCHG CAP LOK DEV COMPATIBLE W/ OLY

## (undated) DEVICE — INTENDED FOR TISSUE SEPARATION, AND OTHER PROCEDURES THAT REQUIRE A SHARP SURGICAL BLADE TO PUNCTURE OR CUT.: Brand: BARD-PARKER ® STAINLESS STEEL BLADES

## (undated) DEVICE — SPHINCTEROTOME: Brand: HYDRATOME RX 44

## (undated) DEVICE — APPLICATOR MEDICATED 26 CC SOLUTION HI LT ORNG CHLORAPREP

## (undated) DEVICE — COVER,C-ARM,41X74: Brand: MEDLINE

## (undated) DEVICE — PAD,ABDOMINAL,5"X9",ST,LF,25/BX: Brand: MEDLINE INDUSTRIES, INC.

## (undated) DEVICE — PADDING CAST W6INXL4YD COT LO LINTING WYTEX

## (undated) DEVICE — GLOVE SURG SZ 65 CRM LTX FREE POLYISOPRENE POLYMER BEAD ANTI

## (undated) DEVICE — YANKAUER,FLEXIBLE HANDLE,REGLR CAPACITY: Brand: MEDLINE INDUSTRIES, INC.

## (undated) DEVICE — BANDAGE,GAUZE,BULKEE II,4.5"X4.1YD,STRL: Brand: MEDLINE